# Patient Record
Sex: MALE | Race: WHITE | NOT HISPANIC OR LATINO | Employment: OTHER | URBAN - METROPOLITAN AREA
[De-identification: names, ages, dates, MRNs, and addresses within clinical notes are randomized per-mention and may not be internally consistent; named-entity substitution may affect disease eponyms.]

---

## 2017-05-08 ENCOUNTER — TELEPHONE (OUTPATIENT)
Dept: MEDICAL GROUP | Facility: PHYSICIAN GROUP | Age: 67
End: 2017-05-08

## 2017-05-08 ENCOUNTER — OFFICE VISIT (OUTPATIENT)
Dept: MEDICAL GROUP | Facility: PHYSICIAN GROUP | Age: 67
End: 2017-05-08
Payer: MEDICARE

## 2017-05-08 VITALS
HEIGHT: 70 IN | OXYGEN SATURATION: 96 % | SYSTOLIC BLOOD PRESSURE: 130 MMHG | WEIGHT: 256 LBS | BODY MASS INDEX: 36.65 KG/M2 | DIASTOLIC BLOOD PRESSURE: 70 MMHG | TEMPERATURE: 97.3 F | RESPIRATION RATE: 16 BRPM | HEART RATE: 92 BPM

## 2017-05-08 DIAGNOSIS — J02.9 PHARYNGITIS, UNSPECIFIED ETIOLOGY: ICD-10-CM

## 2017-05-08 DIAGNOSIS — E66.9 OBESITY (BMI 30-39.9): ICD-10-CM

## 2017-05-08 DIAGNOSIS — F41.9 ANXIETY: ICD-10-CM

## 2017-05-08 DIAGNOSIS — Z12.11 SCREEN FOR COLON CANCER: ICD-10-CM

## 2017-05-08 PROCEDURE — G8419 CALC BMI OUT NRM PARAM NOF/U: HCPCS | Performed by: FAMILY MEDICINE

## 2017-05-08 PROCEDURE — 99214 OFFICE O/P EST MOD 30 MIN: CPT | Performed by: FAMILY MEDICINE

## 2017-05-08 PROCEDURE — 4040F PNEUMOC VAC/ADMIN/RCVD: CPT | Performed by: FAMILY MEDICINE

## 2017-05-08 PROCEDURE — 1036F TOBACCO NON-USER: CPT | Performed by: FAMILY MEDICINE

## 2017-05-08 PROCEDURE — G8432 DEP SCR NOT DOC, RNG: HCPCS | Performed by: FAMILY MEDICINE

## 2017-05-08 PROCEDURE — G8598 ASA/ANTIPLAT THER USED: HCPCS | Performed by: FAMILY MEDICINE

## 2017-05-08 PROCEDURE — 1101F PT FALLS ASSESS-DOCD LE1/YR: CPT | Performed by: FAMILY MEDICINE

## 2017-05-08 RX ORDER — PREDNISONE 50 MG/1
50 TABLET ORAL DAILY
Qty: 2 TAB | Refills: 0 | Status: SHIPPED | OUTPATIENT
Start: 2017-05-08 | End: 2017-05-10

## 2017-05-08 RX ORDER — ALPRAZOLAM 0.5 MG/1
0.25 TABLET ORAL NIGHTLY PRN
Qty: 15 TAB | Refills: 3 | Status: SHIPPED | OUTPATIENT
Start: 2017-05-08 | End: 2017-10-11 | Stop reason: SDUPTHER

## 2017-05-08 NOTE — MR AVS SNAPSHOT
"        Curt Blair   2017 1:00 PM   Office Visit   MRN: [id number]    Department:  [facility]   Dept Phone:  [phone]    Description:  Male : 1950   Provider:  Bess Angela M.D.           Reason for Visit     Pharyngitis x5 days, ear pain, feels like something is stuck in throat      Allergies as of 2017     Allergen Noted Reactions    Lipitor [Atorvastatin] 2015   Myalgia    Ok to take 10 mg  Elevated CPK  Only at 40 Mg does this happen.      You were diagnosed with     Pharyngitis, unspecified etiology   [3614494]       Obesity (BMI 30-39.9)   [260388]       Anxiety   [881030]       Screen for colon cancer   [191712]         Vital Signs     Blood Pressure Pulse Temperature Respirations Height Weight    130/70 mmHg 92 36.3 °C (97.3 °F) 16 1.778 m (5' 10\") 116.121 kg (256 lb)    Body Mass Index Oxygen Saturation Smoking Status             36.73 kg/m2 96% Former Smoker         Basic Information     Date Of Birth Sex Race Ethnicity Preferred Language    1950 Male White Non- English      Your appointments     May 22, 2017 11:30 AM   FOLLOW UP with Iván Otoole M.D.   [facility] (--[facility]    [address] [address]  [facility] [address]   [phone]              Problem List              ICD-10-CM Priority Class Noted - Resolved    Essential hypertension, benign I10 Medium  2012 - Present    Coronary artery disease due to lipid rich plaque I25.10, I25.83 High  2012 - Present    Status post coronary artery stent placement Z95.5 High  2012 - Present    Dyslipidemia E78.5 Medium  2014 - Present    Obesity E66.9   2015 - Present    Bronchitis J40   2016 - Present    Grief reaction (his wife passed way from complications of COPD 2016) (Chronic) F43.20   Unknown - Present    Obesity (BMI 30-39.9) E66.9   2017 - Present      Health Maintenance        Date Due Completion Dates    COLON " CANCER SCREENING ANNUAL FIT 12/22/2016 12/22/2015    IMM DTaP/Tdap/Td Vaccine (2 - Td) 4/23/2024 4/23/2014            Current Immunizations     13-VALENT PCV PREVNAR 10/1/2015    Influenza Vaccine Adult HD 9/27/2016, 10/1/2015    Pneumococcal polysaccharide vaccine (PPSV-23) 10/19/2016    SHINGLES VACCINE 11/5/2015    Tdap Vaccine 4/23/2014 12:25 PM      Below and/or attached are the medications your provider expects you to take. Review all of your home medications and newly ordered medications with your provider and/or pharmacist. Follow medication instructions as directed by your provider and/or pharmacist. Please keep your medication list with you and share with your provider. Update the information when medications are discontinued, doses are changed, or new medications (including over-the-counter products) are added; and carry medication information at all times in the event of emergency situations     Allergies:  LIPITOR - Myalgia               Medications  Valid as of: May 08, 2017 -  1:30 PM    Generic Name Brand Name Tablet Size Instructions for use    ALPRAZolam (Tab) XANAX 0.5 MG Take 0.5 Tabs by mouth at bedtime as needed for Sleep.        Amlodipine Besy-Benazepril HCl (Cap) LOTREL 5-10 MG TAKE ONE CAPSULE BY MOUTH DAILY (GENERIC FOR LOTREL)        Aspirin (Tab) aspirin 81 MG Take 81 mg by mouth every day.          Atorvastatin Calcium (Tab) LIPITOR 10 MG Take 1 Tab by mouth every day.        Calcium   Take  by mouth.        Coenzyme Q10   Take  by mouth.        Krill Oil   Take  by mouth.        Multiple Vitamins-Minerals   Take  by mouth.        Multiple Vitamins-Minerals (Tab) OCUVITE  Take 1 Tab by mouth every day.        PredniSONE (Tab) DELTASONE 50 MG Take 1 Tab by mouth every day for 2 days. For sore throat pain and swelling        .                 Medicines prescribed today were sent to:     [facility] #[id number] - [place] - [address] [place] [address]    Phone:  [phone] Fax: [phone]    Open 24 Hours?: No      Medication refill instructions:       If your prescription bottle indicates you have medication refills left, it is not necessary to call your provider’s office. Please contact your pharmacy and they will refill your medication.    If your prescription bottle indicates you do not have any refills left, you may request refills at any time through one of the following ways: The online [employer] system (except Urgent Care), by calling your provider’s office, or by asking your pharmacy to contact your provider’s office with a refill request. Medication refills are processed only during regular business hours and may not be available until the next business day. Your provider may request additional information or to have a follow-up visit with you prior to refilling your medication.   *Please Note: Medication refills are assigned a new Rx number when refilled electronically. Your pharmacy may indicate that no refills were authorized even though a new prescription for the same medication is available at the pharmacy. Please request the medicine by name with the pharmacy before contacting your provider for a refill.        Other Notes About Your Plan     Sorry, this patient was seen while I was working on another project. In your medical opinion, are the following conditions valid/active for 2016 and if so, would you please assess and document the next time patient is in? This patient has been coded with coronary atherosclerosis and has a prescription for nitroglycerin, and has not been coded with angina. Is this an active condition?           [email or web address] Access Code: Activation code not generated  Current [employer] Status: Active

## 2017-05-08 NOTE — PROGRESS NOTES
"Chief Complaint   Patient presents with   • Pharyngitis     x5 days, ear pain, feels like something is stuck in throat       HPI: 4-5 days of illness including: nasal congestion, clear/whitish rhinorrhea, sore throat, laryngitis, ear pain/congestion, tooth pain, cough, myalgias ,  Sinus pain and pressure: bilateral frontal  Symptoms negative for fever, chills, night sweats,   Ears feel plugged. Wonders if ears full of wax.  Treatments tried: lozenges., tylenol.    Since onset, symptoms are worse   Similarly ill exposures: yes. Spouse recently dx sinus infection.   Medical history negative for asthma  He  reports that he quit smoking about 25 years ago. His smoking use included Cigarettes. He has a 50 pack-year smoking history. He has never used smokeless tobacco.   Reports situational anxiety and difficulty sleeping since his spouse . He is seeing a counselor. He takes alprazolam half tablet a few nights per week. Has not filled prescription since January. Requesting additional refills. Denies any prior history of alcohol abuse  Due for colon cancer screening    ROS  No fever. No productive cough. No skin rashes.  No N/V/D    Walks and rides stationary bike.   Had MI 20+ years ago. Doing well since then     Blood pressure 130/70, pulse 92, temperature 36.3 °C (97.3 °F), resp. rate 16, height 1.778 m (5' 10\"), weight 116.121 kg (256 lb), SpO2 96 %.    Gen: alert, No conversational dyspnea. No audible wheeze, nontoxic.  PERRL, conjunctiva slightly injected. No photophobia. No eye discharge.  Ears: normal pinnae. TM: normal  Nares patent with thin mucus.  Sinuses non tender over maxillary / frontal sinuses  Throat: erythematous injection. No exudate.   Neck: supple, with  mildly enlarged cervical nodes  Lungs:  clear to auscultation  Skin: warm and dry. No rash.    A/P  1. Pharyngitis, unspecified etiology      Suspect from postnasal drip. Prednisone prescribed to take once daily for 2 days. If continued symptoms " this week will Rx antibiotics for bacterial infection   2. Obesity (BMI 30-39.9)  Patient identified as having weight management issue.  Appropriate orders and counseling given.    Discussed diet and exercise. Continue walking   3. Anxiety  alprazolam (XANAX) 0.5 MG Tab    Discussed nonmedication strategies such as writing down his worries. Continue alprazolam as needed at bedtime   4. Screen for colon cancer      Test ordered     Treatments advised today in addition to orders above  include:  OTC acetaminophen PRN, prescription for symptomatic treatment as written and vaporizer   Followup for worsening symptoms, difficulty breathing, lack of expected recovery, or should new symptoms or problems arise.

## 2017-05-08 NOTE — TELEPHONE ENCOUNTER
ESTABLISHED PATIENT PRE-VISIT PLANNING     Note: Patient will not be contacted if there is no indication to call.     1.  Reviewed note from last office visit with PCP and/or other med group provider: Yes    2.  If any orders were placed at last visit, do we have Results/Consult Notes?        •  Labs - Labs were not ordered at last office visit.       •  Imaging - Imaging was not ordered at last office visit.       •  Referrals - Referral ordered, patient has NOT been seen.    3.  Immunizations were updated in [facility] using WebIZ?: Yes       •  Web Iz Recommendations: Patient is up to date on all vaccines    4.  Patient is due for the following Health Maintenance Topics:   There are no preventive care reminders to display for this patient.    Cancelled appt with [facility] for colonoscopy.    5.  Patient was not informed to arrive 15 min prior to their scheduled appointment and bring in their medication bottles.

## 2017-05-09 ENCOUNTER — HOSPITAL ENCOUNTER (OUTPATIENT)
Facility: MEDICAL CENTER | Age: 67
End: 2017-05-09
Attending: FAMILY MEDICINE
Payer: MEDICARE

## 2017-05-09 PROCEDURE — 82274 ASSAY TEST FOR BLOOD FECAL: CPT

## 2017-05-10 ENCOUNTER — PATIENT MESSAGE (OUTPATIENT)
Dept: MEDICAL GROUP | Facility: PHYSICIAN GROUP | Age: 67
End: 2017-05-10

## 2017-05-10 DIAGNOSIS — J01.10 ACUTE NON-RECURRENT FRONTAL SINUSITIS: ICD-10-CM

## 2017-05-12 RX ORDER — AMOXICILLIN AND CLAVULANATE POTASSIUM 875; 125 MG/1; MG/1
1 TABLET, FILM COATED ORAL 2 TIMES DAILY
Qty: 14 TAB | Refills: 0 | Status: SHIPPED | OUTPATIENT
Start: 2017-05-12 | End: 2017-05-19

## 2017-05-13 LAB — HEMOCCULT STL QL IA: NEGATIVE

## 2017-05-17 ENCOUNTER — TELEPHONE (OUTPATIENT)
Dept: CARDIOLOGY | Facility: MEDICAL CENTER | Age: 67
End: 2017-05-17

## 2017-05-17 NOTE — TELEPHONE ENCOUNTER
Patient has appointment coming up 5/22 with Dr. Otoole.  Spoke w/pt regarding any recent lab in the new year.  Patient stated he had labs done 1st at [facility] then stated [facility] (they do not have him in their database), then [facility] (not in database).  So I called the main [facility] lab to see if maybe the result hasn't been released or there was a mistake or something.  Everything under the lab tab is current.  MEHUL    Thank you

## 2017-05-22 ENCOUNTER — OFFICE VISIT (OUTPATIENT)
Dept: CARDIOLOGY | Facility: MEDICAL CENTER | Age: 67
End: 2017-05-22
Payer: MEDICARE

## 2017-05-22 VITALS
BODY MASS INDEX: 34.67 KG/M2 | HEIGHT: 72 IN | DIASTOLIC BLOOD PRESSURE: 70 MMHG | WEIGHT: 256 LBS | HEART RATE: 88 BPM | SYSTOLIC BLOOD PRESSURE: 120 MMHG | OXYGEN SATURATION: 94 %

## 2017-05-22 DIAGNOSIS — E78.5 DYSLIPIDEMIA: ICD-10-CM

## 2017-05-22 DIAGNOSIS — I25.83 CORONARY ARTERY DISEASE DUE TO LIPID RICH PLAQUE: ICD-10-CM

## 2017-05-22 DIAGNOSIS — Z95.5 STATUS POST CORONARY ARTERY STENT PLACEMENT: ICD-10-CM

## 2017-05-22 DIAGNOSIS — I25.10 CORONARY ARTERY DISEASE DUE TO LIPID RICH PLAQUE: ICD-10-CM

## 2017-05-22 DIAGNOSIS — I10 ESSENTIAL HYPERTENSION, BENIGN: ICD-10-CM

## 2017-05-22 PROCEDURE — 1036F TOBACCO NON-USER: CPT | Performed by: INTERNAL MEDICINE

## 2017-05-22 PROCEDURE — 4040F PNEUMOC VAC/ADMIN/RCVD: CPT | Performed by: INTERNAL MEDICINE

## 2017-05-22 PROCEDURE — G8432 DEP SCR NOT DOC, RNG: HCPCS | Performed by: INTERNAL MEDICINE

## 2017-05-22 PROCEDURE — 1101F PT FALLS ASSESS-DOCD LE1/YR: CPT | Performed by: INTERNAL MEDICINE

## 2017-05-22 PROCEDURE — 99214 OFFICE O/P EST MOD 30 MIN: CPT | Performed by: INTERNAL MEDICINE

## 2017-05-22 PROCEDURE — G8419 CALC BMI OUT NRM PARAM NOF/U: HCPCS | Performed by: INTERNAL MEDICINE

## 2017-05-22 PROCEDURE — G8598 ASA/ANTIPLAT THER USED: HCPCS | Performed by: INTERNAL MEDICINE

## 2017-05-22 ASSESSMENT — ENCOUNTER SYMPTOMS
FALLS: 0
PALPITATIONS: 0
LOSS OF CONSCIOUSNESS: 0
CLAUDICATION: 0
ABDOMINAL PAIN: 0
BRUISES/BLEEDS EASILY: 0
HEADACHES: 0
NAUSEA: 0
COUGH: 0
CHILLS: 0
FOCAL WEAKNESS: 0
SORE THROAT: 0
WEAKNESS: 0
FEVER: 0
SHORTNESS OF BREATH: 0
DIZZINESS: 0
PND: 0
BLURRED VISION: 0

## 2017-05-22 NOTE — MR AVS SNAPSHOT
Curt Blair   2017 11:30 AM   Office Visit   MRN: [id number]    Department:  [facility]   Dept Phone:  [phone]    Description:  Male : 1950   Provider:  Iván Otoole M.D.           Reason for Visit     Follow-Up           Allergies as of 2017     Allergen Noted Reactions    Lipitor [Atorvastatin] 2015   Myalgia    Ok to take 10 mg  Elevated CPK  Only at 40 Mg does this happen.      You were diagnosed with     Essential hypertension, benign   [401.1.ICD-9-CM]       Dyslipidemia   [738225]       Coronary artery disease due to lipid rich plaque   [8895658]       Status post coronary artery stent placement   [157676]         Vital Signs     Blood Pressure Pulse Height Weight Body Mass Index Oxygen Saturation    120/70 mmHg 88 1.829 m (6') 116.121 kg (256 lb) 34.71 kg/m2 94%    Smoking Status                   Former Smoker           Basic Information     Date Of Birth Sex Race Ethnicity Preferred Language    1950 Male White Non- English      Problem List              ICD-10-CM Priority Class Noted - Resolved    Essential hypertension, benign I10 Medium  2012 - Present    Coronary artery disease due to lipid rich plaque I25.10, I25.83 High  2012 - Present    Status post coronary artery stent placement Z95.5 High  2012 - Present    Dyslipidemia E78.5 Medium  2014 - Present    Obesity E66.9   2015 - Present    Bronchitis J40   2016 - Present    Grief reaction (his wife passed way from complications of COPD 2016) (Chronic) F43.20   Unknown - Present    Obesity (BMI 30-39.9) E66.9   2017 - Present      Health Maintenance        Date Due Completion Dates    COLON CANCER SCREENING ANNUAL FIT 2018, 2015    IMM DTaP/Tdap/Td Vaccine (2 - Td) 2024            Current Immunizations     13-VALENT PCV PREVNAR 10/1/2015    Influenza Vaccine Adult HD 2016, 10/1/2015    Pneumococcal  polysaccharide vaccine (PPSV-23) 10/19/2016    SHINGLES VACCINE 11/5/2015    Tdap Vaccine 4/23/2014 12:25 PM      Below and/or attached are the medications your provider expects you to take. Review all of your home medications and newly ordered medications with your provider and/or pharmacist. Follow medication instructions as directed by your provider and/or pharmacist. Please keep your medication list with you and share with your provider. Update the information when medications are discontinued, doses are changed, or new medications (including over-the-counter products) are added; and carry medication information at all times in the event of emergency situations     Allergies:  LIPITOR - Myalgia               Medications  Valid as of: May 22, 2017 - 12:26 PM    Generic Name Brand Name Tablet Size Instructions for use    ALPRAZolam (Tab) XANAX 0.5 MG Take 0.5 Tabs by mouth at bedtime as needed for Sleep.        Amlodipine Besy-Benazepril HCl (Cap) LOTREL 5-10 MG TAKE ONE CAPSULE BY MOUTH DAILY (GENERIC FOR LOTREL)        Aspirin (Tab) aspirin 81 MG Take 81 mg by mouth every day.          Atorvastatin Calcium (Tab) LIPITOR 10 MG Take 1 Tab by mouth every day.        Calcium   Take  by mouth.        Coenzyme Q10   Take  by mouth.        Krill Oil   Take  by mouth.        Multiple Vitamins-Minerals   Take  by mouth.        Multiple Vitamins-Minerals (Tab) OCUVITE  Take 1 Tab by mouth every day.        .                 Medicines prescribed today were sent to:     [facility][id number] - [place] [address] [place] [address]    Phone: [phone] Fax: [phone]    Open 24 Hours?: No      Medication refill instructions:       If your prescription bottle indicates you have medication refills left, it is not necessary to call your provider’s office. Please contact your pharmacy and they will refill your medication.    If your prescription bottle indicates you do not have any refills left, you may  request refills at any time through one of the following ways: The online [email or web address] system (except Urgent Care), by calling your provider’s office, or by asking your pharmacy to contact your provider’s office with a refill request. Medication refills are processed only during regular business hours and may not be available until the next business day. Your provider may request additional information or to have a follow-up visit with you prior to refilling your medication.   *Please Note: Medication refills are assigned a new Rx number when refilled electronically. Your pharmacy may indicate that no refills were authorized even though a new prescription for the same medication is available at the pharmacy. Please request the medicine by name with the pharmacy before contacting your provider for a refill.           [email or web address] Access Code: Activation code not generated  Current [email or web address] Status: Active

## 2017-05-23 NOTE — PROGRESS NOTES
Subjective:   Curt Blair is a 66 y.o. male who presents today for follow-up of his history of coronary disease status post stenting on 2 occasions    Last year he was having some concerns and so underwent echocardiogram and PET scan which were both within normal range    Still struggles with his weight    Past Medical History   Diagnosis Date   • Hypertension    • Hyperlipidemia    • CAD (coronary artery disease)    • S/P coronary artery stent placement    • Arthritis      Right Foot     Past Surgical History   Procedure Laterality Date   • Cholecystectomy     • Appendectomy     • Stent placement     • Endarterectomy       corornary     Family History   Problem Relation Age of Onset   • Lung Disease Mother      copd   • Hypertension Mother    • Heart Disease Father      heart attack and heart disease   • Cancer Brother      neck   • Diabetes Neg Hx    • Heart Disease Paternal Grandfather      Heart attack     History   Smoking status   • Former Smoker -- 2.00 packs/day for 25 years   • Types: Cigarettes   • Quit date: 01/01/1992   Smokeless tobacco   • Never Used     Comment: avoid all tobacco products     Allergies   Allergen Reactions   • Lipitor [Atorvastatin] Myalgia     Ok to take 10 mg  Elevated CPK  Only at 40 Mg does this happen.     Outpatient Encounter Prescriptions as of 5/22/2017   Medication Sig Dispense Refill   • alprazolam (XANAX) 0.5 MG Tab Take 0.5 Tabs by mouth at bedtime as needed for Sleep. 15 Tab 3   • Multiple Vitamins-Minerals (MULTIVITAMIN PO) Take  by mouth.     • Coenzyme Q10 (CO Q 10 PO) Take  by mouth.     • MEGARED OMEGA-3 KRILL OIL PO Take  by mouth.     • CALCIUM PO Take  by mouth.     • Multiple Vitamins-Minerals (OCUVITE) Tab Take 1 Tab by mouth every day.     • amlodipine-benazepril (LOTREL) 5-10 MG per capsule TAKE ONE CAPSULE BY MOUTH DAILY (GENERIC FOR LOTREL) 90 Cap 4   • atorvastatin (LIPITOR) 10 MG Tab Take 1 Tab by mouth every day. 90 Tab 4   • aspirin 81 MG tablet  Take 81 mg by mouth every day.         No facility-administered encounter medications on file as of 5/22/2017.     Review of Systems   Constitutional: Negative for fever and chills.   HENT: Negative for sore throat.    Eyes: Negative for blurred vision.   Respiratory: Negative for cough and shortness of breath.    Cardiovascular: Negative for chest pain, palpitations, claudication, leg swelling and PND.   Gastrointestinal: Negative for nausea and abdominal pain.   Musculoskeletal: Negative for falls.   Skin: Negative for rash.   Neurological: Negative for dizziness, focal weakness, loss of consciousness, weakness and headaches.   Endo/Heme/Allergies: Does not bruise/bleed easily.        Objective:   /70 mmHg  Pulse 88  Ht 1.829 m (6')  Wt 116.121 kg (256 lb)  BMI 34.71 kg/m2  SpO2 94%    Physical Exam   Constitutional: No distress.   HENT:   Mouth/Throat: Oropharynx is clear and moist.   Eyes: No scleral icterus.   Neck: Neck supple. No JVD present.   Cardiovascular: Normal rate, regular rhythm, normal heart sounds and intact distal pulses.  Exam reveals no gallop and no friction rub.    No murmur heard.  Pulmonary/Chest: Effort normal. He has no rales.   Abdominal: Soft. Bowel sounds are normal. There is no tenderness.   Musculoskeletal: He exhibits no edema.   Neurological: He is alert.   Skin: No rash noted. He is not diaphoretic.   Psychiatric: He has a normal mood and affect.     Reviewed the results of his echocardiogram and PET scan    Assessment:     1. Essential hypertension, benign     2. Dyslipidemia     3. Coronary artery disease due to lipid rich plaque     4. Status post coronary artery stent placement         Medical Decision Making:  Today's Assessment / Status / Plan:     It was my pleasure to meet with Mr. Blair.    He's been doing better it's been over a year since his wife's passing    I encouraged him to work on meaningful weight loss    We discussed the use of Viagra he been barron  to take this in the past, given a heart issue but reinforces normal testing to be quite safe    I will see Mr. Blair back in 1 year time and encouraged him to follow up with us over the phone or e-mail using my MyChart as issues arise.    It is my pleasure to participate in the care of Mr. Blair.  Please do not hesitate to contact me with questions or concerns.    Iván Otoole MD PhD FACC  Cardiologist [facility]

## 2017-07-31 DIAGNOSIS — E78.5 DYSLIPIDEMIA: ICD-10-CM

## 2017-07-31 DIAGNOSIS — I10 ESSENTIAL HYPERTENSION, BENIGN: ICD-10-CM

## 2017-08-02 ENCOUNTER — TELEPHONE (OUTPATIENT)
Dept: CARDIOLOGY | Facility: MEDICAL CENTER | Age: 67
End: 2017-08-02

## 2017-08-02 RX ORDER — ATORVASTATIN CALCIUM 10 MG/1
TABLET, FILM COATED ORAL
Qty: 90 TAB | Refills: 0 | Status: SHIPPED | OUTPATIENT
Start: 2017-08-02 | End: 2017-10-25 | Stop reason: SDUPTHER

## 2017-08-02 NOTE — TELEPHONE ENCOUNTER
----- Message from Shannon Donald sent at 8/2/2017 11:41 AM PDT -----  Regarding: patient wants Dr. Otoole to refill his medications from PCP  ANNA/[facility]      Patient is having problems getting his medications filled since his PCP retired, he wants to know if Dr. Otoole will take over refilling his medications. He can be reached at [phone].

## 2017-08-02 NOTE — TELEPHONE ENCOUNTER
CHANO for patient to call back.   then called me to say patient called her to cancel message because he got his medications taken care of.

## 2017-08-30 ENCOUNTER — HOSPITAL ENCOUNTER (OUTPATIENT)
Dept: LAB | Facility: MEDICAL CENTER | Age: 67
End: 2017-08-30
Attending: FAMILY MEDICINE
Payer: MEDICARE

## 2017-08-30 DIAGNOSIS — I10 ESSENTIAL HYPERTENSION, BENIGN: ICD-10-CM

## 2017-08-30 DIAGNOSIS — E78.5 DYSLIPIDEMIA: ICD-10-CM

## 2017-08-30 LAB
ALBUMIN SERPL BCP-MCNC: 4.2 G/DL (ref 3.2–4.9)
ALBUMIN/GLOB SERPL: 1.2 G/DL
ALP SERPL-CCNC: 53 U/L (ref 30–99)
ALT SERPL-CCNC: 33 U/L (ref 2–50)
ANION GAP SERPL CALC-SCNC: 8 MMOL/L (ref 0–11.9)
AST SERPL-CCNC: 30 U/L (ref 12–45)
BILIRUB SERPL-MCNC: 0.8 MG/DL (ref 0.1–1.5)
BUN SERPL-MCNC: 20 MG/DL (ref 8–22)
CALCIUM SERPL-MCNC: 9.6 MG/DL (ref 8.5–10.5)
CHLORIDE SERPL-SCNC: 103 MMOL/L (ref 96–112)
CHOLEST SERPL-MCNC: 151 MG/DL (ref 100–199)
CO2 SERPL-SCNC: 25 MMOL/L (ref 20–33)
CREAT SERPL-MCNC: 0.88 MG/DL (ref 0.5–1.4)
FASTING STATUS PATIENT QL REPORTED: NORMAL
GFR SERPL CREATININE-BSD FRML MDRD: >60 ML/MIN/1.73 M 2
GLOBULIN SER CALC-MCNC: 3.4 G/DL (ref 1.9–3.5)
GLUCOSE SERPL-MCNC: 87 MG/DL (ref 65–99)
HDLC SERPL-MCNC: 61 MG/DL
LDLC SERPL CALC-MCNC: 63 MG/DL
POTASSIUM SERPL-SCNC: 4.4 MMOL/L (ref 3.6–5.5)
PROT SERPL-MCNC: 7.6 G/DL (ref 6–8.2)
SODIUM SERPL-SCNC: 136 MMOL/L (ref 135–145)
TRIGL SERPL-MCNC: 136 MG/DL (ref 0–149)

## 2017-08-30 PROCEDURE — 80053 COMPREHEN METABOLIC PANEL: CPT

## 2017-08-30 PROCEDURE — 80061 LIPID PANEL: CPT

## 2017-08-30 PROCEDURE — 36415 COLL VENOUS BLD VENIPUNCTURE: CPT

## 2017-09-20 ENCOUNTER — PATIENT MESSAGE (OUTPATIENT)
Dept: HEALTH INFORMATION MANAGEMENT | Facility: OTHER | Age: 67
End: 2017-09-20

## 2017-09-21 ENCOUNTER — PATIENT OUTREACH (OUTPATIENT)
Dept: HEALTH INFORMATION MANAGEMENT | Facility: OTHER | Age: 67
End: 2017-09-21

## 2017-09-21 NOTE — PROGRESS NOTES
Attempt #:1    WebIZ Checked & Epic Updated: Yes  · WebIZ Recommendations: FLU  · Is patient due for Tdap? NO  · Is patient due for Shingles? NO  [facility] Verified: yes  Verify PCP: yes    Communication Preference Obtained: yes     Review Care Team: yes    Annual Wellness Visit Scheduling  1. Scheduling Status:Scheduled     Care Gap Scheduling (Attempt to Schedule EACH Overdue Care Gap!)     Health Maintenance Due   Topic Date Due   • IMM INFLUENZA (1) Scheduled        Scheduled patient for Annual Wellness Visit and Immunizations: FLU       MyChart Activation: already active  [place]: no  Virtual Visits: no  Opt In to Text Messages: no

## 2017-10-11 ENCOUNTER — OFFICE VISIT (OUTPATIENT)
Dept: MEDICAL GROUP | Facility: PHYSICIAN GROUP | Age: 67
End: 2017-10-11
Payer: MEDICARE

## 2017-10-11 VITALS
WEIGHT: 258 LBS | BODY MASS INDEX: 34.95 KG/M2 | OXYGEN SATURATION: 90 % | TEMPERATURE: 98.2 F | HEIGHT: 72 IN | SYSTOLIC BLOOD PRESSURE: 124 MMHG | DIASTOLIC BLOOD PRESSURE: 78 MMHG | RESPIRATION RATE: 16 BRPM | HEART RATE: 68 BPM

## 2017-10-11 DIAGNOSIS — Z23 NEED FOR VACCINATION: ICD-10-CM

## 2017-10-11 DIAGNOSIS — F41.9 ANXIETY: ICD-10-CM

## 2017-10-11 DIAGNOSIS — E66.9 OBESITY (BMI 30-39.9): ICD-10-CM

## 2017-10-11 DIAGNOSIS — E78.5 DYSLIPIDEMIA: ICD-10-CM

## 2017-10-11 DIAGNOSIS — I10 ESSENTIAL HYPERTENSION, BENIGN: ICD-10-CM

## 2017-10-11 DIAGNOSIS — I25.83 CORONARY ARTERY DISEASE DUE TO LIPID RICH PLAQUE: ICD-10-CM

## 2017-10-11 DIAGNOSIS — Z00.00 MEDICARE ANNUAL WELLNESS VISIT, SUBSEQUENT: ICD-10-CM

## 2017-10-11 DIAGNOSIS — I25.10 CORONARY ARTERY DISEASE DUE TO LIPID RICH PLAQUE: ICD-10-CM

## 2017-10-11 PROCEDURE — 90662 IIV NO PRSV INCREASED AG IM: CPT | Performed by: FAMILY MEDICINE

## 2017-10-11 PROCEDURE — G0008 ADMIN INFLUENZA VIRUS VAC: HCPCS | Performed by: FAMILY MEDICINE

## 2017-10-11 RX ORDER — ALPRAZOLAM 0.5 MG/1
0.25 TABLET ORAL NIGHTLY PRN
Qty: 30 TAB | Refills: 2 | Status: SHIPPED | OUTPATIENT
Start: 2017-10-11 | End: 2018-02-16

## 2017-10-11 ASSESSMENT — PATIENT HEALTH QUESTIONNAIRE - PHQ9: CLINICAL INTERPRETATION OF PHQ2 SCORE: 0

## 2017-10-11 NOTE — PROGRESS NOTES
Chief Complaint   Patient presents with   • Annual Wellness Visit         HPI:  Curt Blair is a 66 y.o. here for Medicare Annual Wellness Visit     Patient Active Problem List    Diagnosis Date Noted   • Coronary artery disease due to lipid rich plaque 08/07/2012     Priority: High   • Status post coronary artery stent placement 08/07/2012     Priority: High   • Dyslipidemia 01/26/2014     Priority: Medium   • Essential hypertension, benign 01/23/2012     Priority: Medium   • Anxiety 10/11/2017   • Obesity (BMI 30-39.9) 05/08/2017       Current Outpatient Prescriptions   Medication Sig Dispense Refill   • alprazolam (XANAX) 0.5 MG Tab Take 0.5 Tabs by mouth at bedtime as needed for Sleep. 30 Tab 2   • atorvastatin (LIPITOR) 10 MG Tab TAKE ONE TABLET BY MOUTH DAILY 90 Tab 0   • Multiple Vitamins-Minerals (MULTIVITAMIN PO) Take  by mouth.     • Coenzyme Q10 (CO Q 10 PO) Take  by mouth.     • MEGARED OMEGA-3 KRILL OIL PO Take  by mouth.     • CALCIUM PO Take  by mouth.     • Multiple Vitamins-Minerals (OCUVITE) Tab Take 1 Tab by mouth every day.     • amlodipine-benazepril (LOTREL) 5-10 MG per capsule TAKE ONE CAPSULE BY MOUTH DAILY (GENERIC FOR LOTREL) 90 Cap 4   • aspirin 81 MG tablet Take 81 mg by mouth every day.         No current facility-administered medications for this visit.             Current supplements as per medication list.       Allergies: Lipitor [atorvastatin]    Current social contact/activities: Golfing     He  reports that he quit smoking about 25 years ago. His smoking use included Cigarettes. He has a 50.00 pack-year smoking history. He has never used smokeless tobacco. He reports that he drinks about 0.6 oz of alcohol per week . He reports that he does not use drugs.  Counseling given: Not Answered        DPA/Advanced Directive:  Patient has Advanced Directive on file.       ROS:    Gait: Uses no assistive device   Ostomy: no   Other tubes: no   Amputations: no   Chronic oxygen use: no    Last eye exam:a year ago   : Denies incontinence.         Depression Screening    Little interest or pleasure in doing things?  0 - not at all  Feeling down, depressed , or hopeless? 0 - not at all  Patient Health Questionnaire Score: 0     If depressive symptoms identified deferred to follow up visit unless specifically addressed in assessment and plan.    Interpretation of PHQ-9 Total Score   Score Severity   1-4 No Depression   5-9 Mild Depression   10-14 Moderate Depression   15-19 Moderately Severe Depression   20-27 Severe Depression    Screening for Cognitive Impairment    Three Minute Recall (apple, watch, tom)  3/3    Draw clock face with all 12 numbers set to the hand to show 10 minutes past 11 o'clock  1 5/5  Cognitive concerns identified deferred for follow up unless specifically addressed in assessment and plan.    Fall Risk Assessment    Has the patient had two or more falls in the last year or any fall with injury in the last year?  No    Safety Assessment    Throw rugs on floor.  Yes  Handrails on all stairs.  Yes  Good lighting in all hallways.  Yes  Difficulty hearing.  No  Patient counseled about all safety risks that were identified.    Functional Assessment ADLs    Are there any barriers preventing you from cooking for yourself or meeting nutritional needs?  No.    Are there any barriers preventing you from driving safely or obtaining transportation?  No.    Are there any barriers preventing you from using a telephone or calling for help?  No.    Are there any barriers preventing you from shopping?  No.    Are there any barriers preventing you from taking care of your own finances?  No.    Are there any barriers preventing you from managing your medications?  No.    Are currently engaging any exercise or physical activity?  Yes.       Health Maintenance Summary                IMM INFLUENZA Overdue 9/1/2017      Done 9/27/2016 Imm Admin: Influenza Vaccine Adult HD     Patient has more  history with this topic...    Annual Wellness Visit Next Due 10/20/2017      Done 10/19/2016 Visit Dx: Medicare annual wellness visit, subsequent     Patient has more history with this topic...    COLON CANCER SCREENING ANNUAL FIT Next Due 5/9/2018      Done 5/9/2017 OCCULT BLOOD FECES IMMUNOASSAY     Patient has more history with this topic...    IMM DTaP/Tdap/Td Vaccine Next Due 4/23/2024      Done 4/23/2014 Imm Admin: Tdap Vaccine          Patient Care Team:  Rabia Hussein D.O. as PCP - General (Family Medicine)  Iván Otoole M.D. as Consulting Physician (Cardiology)  Irving Patel O.D. as Consulting Physician (Optometry)      Social History   Substance Use Topics   • Smoking status: Former Smoker     Packs/day: 2.00     Years: 25.00     Types: Cigarettes     Quit date: 1/1/1992   • Smokeless tobacco: Never Used      Comment: avoid all tobacco products   • Alcohol use 0.6 oz/week     1 Standard drinks or equivalent per week      Comment: socially     Family History   Problem Relation Age of Onset   • Lung Disease Mother      copd   • Hypertension Mother    • Heart Disease Father      heart attack and heart disease   • Cancer Brother      neck   • Heart Disease Paternal Grandfather      Heart attack   • Other Sister      non-malignant brain tumor   • Diabetes Neg Hx      He  has a past medical history of Arthritis; CAD (coronary artery disease); Hyperlipidemia; Hypertension; and S/P coronary artery stent placement.   Past Surgical History:   Procedure Laterality Date   • APPENDECTOMY     • CHOLECYSTECTOMY     • ENDARTERECTOMY      corornary   • STENT PLACEMENT         Exam:     Blood pressure 124/78, pulse 68, temperature 36.8 °C (98.2 °F), resp. rate 16, height 1.829 m (6'), weight 117 kg (258 lb), SpO2 90 %. Body mass index is 34.99 kg/m².    Hearing good.  Dentition good  Alert, oriented in no acute distress.  Eye contact is good, speech goal directed, affect calm      Assessment and Plan.  The following treatment and monitoring plan is recommended:    1. Medicare annual wellness visit, subsequent      2. Essential hypertension, benign  Chronic: well controlled    3. Obesity (BMI 30-39.9)  Pt educated on the increase of morbidity and mortality associated with excess weight including DM, Heart Disease, HTN, stroke, and sleep apnea.  Pt advised weight loss of 5% through reduced calorie, low fat diet and 150 mins of exercise a week  - Patient identified as having weight management issue.  Appropriate orders and counseling given.    4. Coronary artery disease due to lipid rich plaque  Chronic: no chest pain    5. Anxiety  Chronic: well controlled.  verified  - alprazolam (XANAX) 0.5 MG Tab; Take 0.5 Tabs by mouth at bedtime as needed for Sleep.  Dispense: 30 Tab; Refill: 2    6. Need for vaccination  I discussed benefits and side effects of each vaccine with patient, and I answered all patient's questions about vaccines.  - INFLUENZA VACCINE, HIGH DOSE (65+ ONLY)    7. Dyslipidemia  Chronic: well controlled        Services suggested: No services needed at this time  Health Care Screening: Age-appropriate preventive services Medicare covers discussed today and ordered if indicated.  Referrals offered: Community-based lifestyle interventions to reduce health risks and promote self-management and wellness, fall prevention, nutrition, physical activity, tobacco-use cessation, weight loss, and mental health services as per orders if indicated.    Discussion today about general wellness and lifestyle habits:    · Prevent falls and reduce trip hazards; Cautioned about securing or removing rugs.  · Have a working fire alarm and carbon monoxide detector;   · Engage in regular physical activity and social activities       Follow-up: Return in about 6 months (around 4/11/2018) for chronic conditions.

## 2017-10-26 NOTE — TELEPHONE ENCOUNTER
Was the patient seen in the last year in this department? Yes     Does patient have an active prescription for medications requested? No     Received Request Via: Pharmacy      Pt met protocol?: Yes Last OV 10/2017  Lab Results   Component Value Date/Time    HDL 61 08/30/2017 06:55 AM

## 2017-10-30 RX ORDER — ATORVASTATIN CALCIUM 10 MG/1
TABLET, FILM COATED ORAL
Qty: 90 TAB | Refills: 3 | Status: SHIPPED | OUTPATIENT
Start: 2017-10-30 | End: 2018-09-11 | Stop reason: SDUPTHER

## 2017-10-30 NOTE — TELEPHONE ENCOUNTER
Pt is almost out of Rx, this request was sent from pt pharmacy 5 days ago to [employer] and no response.    Please advise refill Dr Montero. Thank you.

## 2017-12-29 DIAGNOSIS — I10 ESSENTIAL HYPERTENSION, BENIGN: ICD-10-CM

## 2017-12-29 RX ORDER — AMLODIPINE BESYLATE AND BENAZEPRIL HYDROCHLORIDE 5; 10 MG/1; MG/1
CAPSULE ORAL
Qty: 90 CAP | Refills: 1 | Status: SHIPPED | OUTPATIENT
Start: 2017-12-29 | End: 2018-06-26 | Stop reason: SDUPTHER

## 2017-12-29 NOTE — TELEPHONE ENCOUNTER
Refill X 6 months, sent to pharmacy.Pt. Seen in the last 6 months per protocol.   Lab Results   Component Value Date/Time    SODIUM 136 08/30/2017 06:55 AM    POTASSIUM 4.4 08/30/2017 06:55 AM    CHLORIDE 103 08/30/2017 06:55 AM    CO2 25 08/30/2017 06:55 AM    GLUCOSE 87 08/30/2017 06:55 AM    BUN 20 08/30/2017 06:55 AM    CREATININE 0.88 08/30/2017 06:55 AM    CREATININE 0.93 02/20/2013 07:20 AM    BUNCREATRAT 17 02/20/2013 07:20 AM

## 2017-12-29 NOTE — TELEPHONE ENCOUNTER
Was the patient seen in the last year in this department? Yes     Does patient have an active prescription for medications requested? No     Received Request Via: Pharmacy      Pt met protocol?: Yes    LAST OV 10/11/2017    BP Readings from Last 1 Encounters:   10/11/17 124/78     Lab Results   Component Value Date/Time    CHOLSTRLTOT 151 08/30/2017 06:55 AM    LDL 63 08/30/2017 06:55 AM    HDL 61 08/30/2017 06:55 AM    TRIGLYCERIDE 136 08/30/2017 06:55 AM       Lab Results   Component Value Date/Time    SODIUM 136 08/30/2017 06:55 AM    POTASSIUM 4.4 08/30/2017 06:55 AM    CHLORIDE 103 08/30/2017 06:55 AM    CO2 25 08/30/2017 06:55 AM    GLUCOSE 87 08/30/2017 06:55 AM    BUN 20 08/30/2017 06:55 AM    CREATININE 0.88 08/30/2017 06:55 AM    CREATININE 0.93 02/20/2013 07:20 AM    BUNCREATRAT 17 02/20/2013 07:20 AM     Lab Results   Component Value Date/Time    ALKPHOSPHAT 53 08/30/2017 06:55 AM    ASTSGOT 30 08/30/2017 06:55 AM    ALTSGPT 33 08/30/2017 06:55 AM    TBILIRUBIN 0.8 08/30/2017 06:55 AM

## 2018-02-13 ENCOUNTER — TELEPHONE (OUTPATIENT)
Dept: MEDICAL GROUP | Facility: MEDICAL CENTER | Age: 68
End: 2018-02-13

## 2018-02-13 NOTE — TELEPHONE ENCOUNTER
ESTABLISHED PATIENT PRE-VISIT PLANNING     Note: Patient will not be contacted if there is no indication to call.     1.  Reviewed notes from the last few office visits within the medical group: Yes 10/11/2017    2.  If any orders were placed at last visit or intended to be done for this visit (i.e. 6 mos follow-up), do we have Results/Consult Notes?        •  Labs - Labs were not ordered at last office visit.   Note: If patient appointment is for lab review and patient did not complete labs, check with provider if OK to reschedule patient until labs completed.       •  Imaging - Imaging was not ordered at last office visit.       •  Referrals - No referrals were ordered at last office visit.    3. Is this appointment scheduled as a Hospital Follow-Up? No    4.  Immunizations were updated in Epic using WebIZ?: Yes       •  Web Iz Recommendations: Patient is up to date on all vaccines    5.  Patient is due for the following Health Maintenance Topics:   There are no preventive care reminders to display for this patient.        6.  Patient was NOT informed to arrive 15 min prior to their scheduled appointment and bring in their medication bottles.

## 2018-02-15 ENCOUNTER — OFFICE VISIT (OUTPATIENT)
Dept: MEDICAL GROUP | Facility: MEDICAL CENTER | Age: 68
End: 2018-02-15
Payer: MEDICARE

## 2018-02-15 VITALS
HEIGHT: 72 IN | SYSTOLIC BLOOD PRESSURE: 120 MMHG | BODY MASS INDEX: 35.03 KG/M2 | TEMPERATURE: 97.4 F | HEART RATE: 78 BPM | WEIGHT: 258.6 LBS | DIASTOLIC BLOOD PRESSURE: 76 MMHG | RESPIRATION RATE: 16 BRPM | OXYGEN SATURATION: 95 %

## 2018-02-15 DIAGNOSIS — F41.1 GENERALIZED ANXIETY DISORDER: ICD-10-CM

## 2018-02-15 DIAGNOSIS — M19.90 ARTHRITIS: ICD-10-CM

## 2018-02-15 DIAGNOSIS — E66.9 OBESITY (BMI 30-39.9): ICD-10-CM

## 2018-02-15 DIAGNOSIS — L72.3 INFLAMED SEBACEOUS CYST: ICD-10-CM

## 2018-02-15 DIAGNOSIS — Z12.11 COLON CANCER SCREENING: ICD-10-CM

## 2018-02-15 DIAGNOSIS — E78.5 DYSLIPIDEMIA: ICD-10-CM

## 2018-02-15 DIAGNOSIS — I10 ESSENTIAL HYPERTENSION, BENIGN: ICD-10-CM

## 2018-02-15 PROCEDURE — 99214 OFFICE O/P EST MOD 30 MIN: CPT | Performed by: FAMILY MEDICINE

## 2018-02-15 RX ORDER — DICLOFENAC SODIUM 75 MG/1
75 TABLET, DELAYED RELEASE ORAL 2 TIMES DAILY
Qty: 180 TAB | Refills: 3 | Status: SHIPPED | OUTPATIENT
Start: 2018-02-15 | End: 2019-01-25

## 2018-02-15 NOTE — ASSESSMENT & PLAN NOTE
This is a chronic health problem that is not controlled with current lifestyle measures. Patient has joint pain and stiffness in his hands bilaterally, knees bilaterally and his right foot. He grew up playing sports. He's had knee injections in the past. He was in the past able to get by with using over-the-counter medications but he now finds that they do not last for him. He like to try a prescription medication.

## 2018-02-15 NOTE — ASSESSMENT & PLAN NOTE
This is a chronic health problem that is uncontrolled with current medications and lifestyle measures.  The patient denies chest pain, shortness of breath or dyspnea on exertion.

## 2018-02-15 NOTE — ASSESSMENT & PLAN NOTE
"This is a chronic problem for the patient that he is unable to date how long it's been there. He tells me that it's irritated by his collar and it \"bugs him\". He would like to have it removed and we will send him to dermatology for evaluation and treatment.  "

## 2018-02-16 PROBLEM — F41.1 GENERALIZED ANXIETY DISORDER: Status: ACTIVE | Noted: 2017-10-11

## 2018-02-16 RX ORDER — TRAZODONE HYDROCHLORIDE 100 MG/1
100 TABLET ORAL NIGHTLY PRN
Qty: 90 TAB | Refills: 3 | Status: SHIPPED | OUTPATIENT
Start: 2018-02-16 | End: 2018-08-02

## 2018-02-16 NOTE — ASSESSMENT & PLAN NOTE
This is a chronic health problem for this patient is actually fairly well controlled. He previously was seeing Dr. Ariza at the [facility] but is getting ready to move to [facility] therefore is transferring care. He had blood work done on 8/30/17. At that time his total cholesterol was 151, triglycerides 136, HDL good at 61 and his LDL was excellent at 63. No liver dysfunction. He is on atorvastatin 10 mg daily and that seems to be working well for him. We will have him continue with that medication long-term.

## 2018-02-16 NOTE — PROGRESS NOTES
"  CC: Arthritis, dyslipidemia, hypertension, inflamed sebaceous cyst.    HISTORY OF PRESENT ILLNESS: Patient is a 67 y.o. male established patient who presents today to establish care at our clinic since his primary care Dr. Ariza has not left [facility]. He has a few chronic health problems that we discuss below. 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 He still has some anxiety and difficulty with sleep that's talked about below.    Health Maintenance: Patient will do his fit test after May 9, 2018    Arthritis  This is a chronic health problem that is not controlled with current lifestyle measures. Patient has joint pain and stiffness in his hands bilaterally, knees bilaterally and his right foot. He grew up playing sports. He's had knee injections in the past. He was in the past able to get by with using over-the-counter medications but he now finds that they do not last for him. He like to try a prescription medication.    Essential hypertension, benign  This is a chronic health problem that is uncontrolled with current medications and lifestyle measures.  The patient denies chest pain, shortness of breath or dyspnea on exertion.      Obesity (BMI 30-39.9)  This is a chronic health problem for this patient. He does try to get exercise. He has gained a few pounds but his weight is stable. Patient will work on weight loss with time.    Inflamed sebaceous cyst  This is a chronic problem for the patient that he is unable to date how long it's been there. He tells me that it's irritated by his collar and it \"bugs him\". He would like to have it removed and we will send him to dermatology for evaluation and treatment.    Dyslipidemia  This is a chronic health problem for this patient is actually fairly well controlled. He previously was seeing Dr. Ariza at the [facility] but is " getting ready to move to [facility] therefore is transferring care. He had blood work done on 8/30/17. At that time his total cholesterol was 151, triglycerides 136, HDL good at 61 and his LDL was excellent at 63. No liver dysfunction. He is on atorvastatin 10 mg daily and that seems to be working well for him. We will have him continue with that medication long-term.     Generalized anxiety disorder  This is a chronic condition for this gentleman. He had to take care of his wife and her final illness after 46 years of marriage. He was on alprazolam taking half of the 0.5 mg to sleep. We had a long discussion regarding the addictive nature of this medication. He states that he mostly has problems with sleep and doesn't really feel he has much in way of anxiety anymore. He is very open to trying a different medication to help him to sleep when he can't turn his mind off. I would like to try trazodone. We discussed that this is non-addicting and all of the positive aspects of this medication. He is willing to give it a try and I will write that prescription. I'm going to discontinue his alprazolam.      Patient Active Problem List    Diagnosis Date Noted   • Coronary artery disease due to lipid rich plaque 08/07/2012     Priority: High   • Status post coronary artery stent placement 08/07/2012     Priority: High   • Dyslipidemia 01/26/2014     Priority: Medium   • Essential hypertension, benign 01/23/2012     Priority: Medium   • Arthritis 02/15/2018   • Inflamed sebaceous cyst 02/15/2018   • Generalized anxiety disorder 10/11/2017   • Obesity (BMI 30-39.9) 05/08/2017      Allergies:Lipitor [atorvastatin]    Current Outpatient Prescriptions   Medication Sig Dispense Refill   • diclofenac EC (VOLTAREN) 75 MG Tablet Delayed Response Take 1 Tab by mouth 2 times a day. 180 Tab 3   • amlodipine-benazepril (LOTREL) 5-10 MG per capsule TAKE ONE CAPSULE BY MOUTH DAILY 90 Cap 1   • atorvastatin (LIPITOR) 10 MG Tab TAKE ONE  TABLET BY MOUTH DAILY 90 Tab 3   • Multiple Vitamins-Minerals (MULTIVITAMIN PO) Take  by mouth.     • Coenzyme Q10 (CO Q 10 PO) Take  by mouth.     • MEGARED OMEGA-3 KRILL OIL PO Take  by mouth.     • CALCIUM PO Take  by mouth.     • aspirin 81 MG tablet Take 81 mg by mouth every day.       • Multiple Vitamins-Minerals (OCUVITE) Tab Take 1 Tab by mouth every day.       No current facility-administered medications for this visit.        Social History   Substance Use Topics   • Smoking status: Former Smoker     Packs/day: 2.00     Years: 25.00     Types: Cigarettes     Quit date: 1/1/1992   • Smokeless tobacco: Never Used      Comment: avoid all tobacco products   • Alcohol use 0.6 oz/week     1 Standard drinks or equivalent per week      Comment: socially     Social History     Social History Narrative   • No narrative on file       Family History   Problem Relation Age of Onset   • Lung Disease Mother      copd   • Hypertension Mother    • Heart Disease Father      heart attack and heart disease   • Cancer Brother      neck   • Heart Disease Paternal Grandfather      Heart attack   • Other Sister      non-malignant brain tumor   • Diabetes Neg Hx         ROS:     - Constitutional:  Negative for fever, chills, unexpected weight change, and fatigue/generalized weakness.    - HEENT:  Negative for headaches, vision changes, hearing changes, ear pain, ear discharge, rhinorrhea, sinus congestion, sore throat, and neck pain.      - Respiratory: Negative for cough, sputum production, chest congestion, dyspnea, wheezing, and crackles.      - Cardiovascular: Negative for chest pain, palpitations, orthopnea, and bilateral lower extremity edema.     - Gastrointestinal: Negative for heartburn, nausea, vomiting, abdominal pain, hematochezia, melena, diarrhea, constipation, and greasy/foul-smelling stools.     - Genitourinary: Negative for dysuria, polyuria, hematuria, pyuria, urinary urgency, and urinary incontinence.     -  Musculoskeletal: Negative for myalgias, back pain, and joint pain.     - Skin: Lesion growing at the base of his neck slightly to the left of midline over the last year, constantly irritated by his collar.      - Neurological: Negative for dizziness, tingling, tremors, focal sensory deficit, focal weakness and headaches.     - Endo/Heme/Allergies: Does not bruise/bleed easily.     - Psychiatric/Behavioral: Continued anxiety and insomnia around the death of his previous wife, patient is now remarried Negative for depression, suicidal/homicidal ideation and memory loss.          - NOTE: All other systems reviewed and are negative, except as in HPI.      Exam:    Blood pressure 120/76, pulse 78, temperature 36.3 °C (97.4 °F), resp. rate 16, height 1.829 m (6'), weight 117.3 kg (258 lb 9.6 oz), SpO2 95 %. Body mass index is 35.07 kg/m².    General:  Well nourished, well developed male in NAD  Head is grossly normal.  Neck: Supple without JVD or bruit. Thyroid is not enlarged.  Pulmonary: Clear to ausculation and percussion.  Normal effort. No rales, ronchi, or wheezing.  Cardiovascular: Regular rate and rhythm without murmur. Carotid and radial pulses are intact and equal bilaterally.  Extremities: no clubbing, cyanosis, or edema.  Skin: Patient has a fairly large inflamed sebaceous cyst at the base of the neck in the posterior region just to the left of midline. Does have some excoriation and irritation  Please note that this dictation was created using voice recognition software. I have made every reasonable attempt to correct obvious errors, but I expect that there are errors of grammar and possibly content that I did not discover before finalizing the note.    Assessment/Plan:  1. Arthritis  Uncontrolled, patient has used over-the-counter NSAIDs without complete relief. We will try Voltaren 75 mg twice a day. I did caution him that he has to eat a meal when he does this.  - diclofenac EC (VOLTAREN) 75 MG Tablet  Delayed Response; Take 1 Tab by mouth 2 times a day.  Dispense: 180 Tab; Refill: 3    2. Essential hypertension, benign  Controlled, continue with current meds and lifestyle.      3. Obesity (BMI 30-39.9)  Uncontrolled, patient gained weight while he was caring for his wife. He is slowly starting to lose weight at this time    4. Inflamed sebaceous cyst  Uncontrolled will refer to dermatology  - REFERRAL TO DERMATOLOGY    5. Colon cancer screening  Patient will do his fit test after 5/9/18 when it's do  - OCCULT BLOOD FECES IMMUNOASSAY; Future    6. Dyslipidemia  Controlled, continue with current meds and lifestyle.  Plan will be to recheck this in August 2018, one year after the last labs    7. Generalized anxiety disorder  Stable but still having problems with insomnia. We will try trazodone for sleep

## 2018-02-16 NOTE — ASSESSMENT & PLAN NOTE
This is a chronic condition for this gentleman. He had to take care of his wife and her final illness after 46 years of marriage. He was on alprazolam taking half of the 0.5 mg to sleep. We had a long discussion regarding the addictive nature of this medication. He states that he mostly has problems with sleep and doesn't really feel he has much in way of anxiety anymore. He is very open to trying a different medication to help him to sleep when he can't turn his mind off. I would like to try trazodone. We discussed that this is non-addicting and all of the positive aspects of this medication. He is willing to give it a try and I will write that prescription. I'm going to discontinue his alprazolam.

## 2018-02-28 ENCOUNTER — PATIENT MESSAGE (OUTPATIENT)
Dept: MEDICAL GROUP | Facility: MEDICAL CENTER | Age: 68
End: 2018-02-28

## 2018-02-28 DIAGNOSIS — L72.3 INFLAMED SEBACEOUS CYST: ICD-10-CM

## 2018-03-04 ENCOUNTER — PATIENT MESSAGE (OUTPATIENT)
Dept: MEDICAL GROUP | Facility: MEDICAL CENTER | Age: 68
End: 2018-03-04

## 2018-03-16 ENCOUNTER — HOSPITAL ENCOUNTER (OUTPATIENT)
Facility: MEDICAL CENTER | Age: 68
End: 2018-03-16
Attending: SURGERY
Payer: MEDICARE

## 2018-03-16 PROCEDURE — 88305 TISSUE EXAM BY PATHOLOGIST: CPT

## 2018-04-02 ENCOUNTER — PATIENT MESSAGE (OUTPATIENT)
Dept: MEDICAL GROUP | Facility: MEDICAL CENTER | Age: 68
End: 2018-04-02

## 2018-04-02 DIAGNOSIS — C44.41 BASAL CELL CARCINOMA OF SKIN OF NECK: ICD-10-CM

## 2018-05-09 ENCOUNTER — APPOINTMENT (RX ONLY)
Dept: URBAN - METROPOLITAN AREA CLINIC 20 | Facility: CLINIC | Age: 68
Setting detail: DERMATOLOGY
End: 2018-05-09

## 2018-05-09 ENCOUNTER — OFFICE VISIT (OUTPATIENT)
Dept: CARDIOLOGY | Facility: MEDICAL CENTER | Age: 68
End: 2018-05-09
Payer: MEDICARE

## 2018-05-09 VITALS
HEIGHT: 72 IN | BODY MASS INDEX: 34.54 KG/M2 | OXYGEN SATURATION: 95 % | HEART RATE: 77 BPM | SYSTOLIC BLOOD PRESSURE: 124 MMHG | DIASTOLIC BLOOD PRESSURE: 72 MMHG | WEIGHT: 255 LBS

## 2018-05-09 DIAGNOSIS — L57.0 ACTINIC KERATOSIS: ICD-10-CM

## 2018-05-09 DIAGNOSIS — I25.83 CORONARY ARTERY DISEASE DUE TO LIPID RICH PLAQUE: ICD-10-CM

## 2018-05-09 DIAGNOSIS — Z71.89 OTHER SPECIFIED COUNSELING: ICD-10-CM

## 2018-05-09 DIAGNOSIS — I10 ESSENTIAL HYPERTENSION, BENIGN: ICD-10-CM

## 2018-05-09 DIAGNOSIS — I25.10 CORONARY ARTERY DISEASE DUE TO LIPID RICH PLAQUE: ICD-10-CM

## 2018-05-09 DIAGNOSIS — D22 MELANOCYTIC NEVI: ICD-10-CM

## 2018-05-09 DIAGNOSIS — E78.5 DYSLIPIDEMIA: ICD-10-CM

## 2018-05-09 DIAGNOSIS — L81.4 OTHER MELANIN HYPERPIGMENTATION: ICD-10-CM

## 2018-05-09 DIAGNOSIS — Z95.5 STATUS POST CORONARY ARTERY STENT PLACEMENT: ICD-10-CM

## 2018-05-09 DIAGNOSIS — L82.1 OTHER SEBORRHEIC KERATOSIS: ICD-10-CM

## 2018-05-09 DIAGNOSIS — Z85.828 PERSONAL HISTORY OF OTHER MALIGNANT NEOPLASM OF SKIN: ICD-10-CM

## 2018-05-09 DIAGNOSIS — D18.0 HEMANGIOMA: ICD-10-CM

## 2018-05-09 PROBLEM — D22.72 MELANOCYTIC NEVI OF LEFT LOWER LIMB, INCLUDING HIP: Status: ACTIVE | Noted: 2018-05-09

## 2018-05-09 PROBLEM — D22.62 MELANOCYTIC NEVI OF LEFT UPPER LIMB, INCLUDING SHOULDER: Status: ACTIVE | Noted: 2018-05-09

## 2018-05-09 PROBLEM — D48.5 NEOPLASM OF UNCERTAIN BEHAVIOR OF SKIN: Status: ACTIVE | Noted: 2018-05-09

## 2018-05-09 PROBLEM — D22.71 MELANOCYTIC NEVI OF RIGHT LOWER LIMB, INCLUDING HIP: Status: ACTIVE | Noted: 2018-05-09

## 2018-05-09 PROBLEM — D22.5 MELANOCYTIC NEVI OF TRUNK: Status: ACTIVE | Noted: 2018-05-09

## 2018-05-09 PROBLEM — D18.01 HEMANGIOMA OF SKIN AND SUBCUTANEOUS TISSUE: Status: ACTIVE | Noted: 2018-05-09

## 2018-05-09 PROBLEM — D22.39 MELANOCYTIC NEVI OF OTHER PARTS OF FACE: Status: ACTIVE | Noted: 2018-05-09

## 2018-05-09 PROBLEM — D22.61 MELANOCYTIC NEVI OF RIGHT UPPER LIMB, INCLUDING SHOULDER: Status: ACTIVE | Noted: 2018-05-09

## 2018-05-09 PROCEDURE — 99203 OFFICE O/P NEW LOW 30 MIN: CPT | Mod: 25

## 2018-05-09 PROCEDURE — ? BIOPSY BY SHAVE METHOD

## 2018-05-09 PROCEDURE — 11100: CPT | Mod: 59

## 2018-05-09 PROCEDURE — 17003 DESTRUCT PREMALG LES 2-14: CPT

## 2018-05-09 PROCEDURE — ? LIQUID NITROGEN

## 2018-05-09 PROCEDURE — 99214 OFFICE O/P EST MOD 30 MIN: CPT | Performed by: INTERNAL MEDICINE

## 2018-05-09 PROCEDURE — ? SUNSCREEN RECOMMENDATIONS

## 2018-05-09 PROCEDURE — ? OBSERVATION

## 2018-05-09 PROCEDURE — ? COUNSELING

## 2018-05-09 PROCEDURE — 17000 DESTRUCT PREMALG LESION: CPT

## 2018-05-09 RX ORDER — CEPHALEXIN 500 MG/1
CAPSULE ORAL
COMMUNITY
Start: 2018-04-02 | End: 2018-08-02

## 2018-05-09 RX ORDER — AMOXICILLIN AND CLAVULANATE POTASSIUM 875; 125 MG/1; MG/1
TABLET, FILM COATED ORAL
COMMUNITY
Start: 2018-03-09 | End: 2018-08-02

## 2018-05-09 RX ORDER — ALPRAZOLAM 0.5 MG/1
0.25 TABLET ORAL PRN
COMMUNITY
Start: 2018-03-04 | End: 2018-10-01 | Stop reason: SDUPTHER

## 2018-05-09 ASSESSMENT — ENCOUNTER SYMPTOMS
BRUISES/BLEEDS EASILY: 0
PND: 0
FALLS: 0
DIZZINESS: 0
COUGH: 0
CLAUDICATION: 0
ABDOMINAL PAIN: 0
WEAKNESS: 0
PALPITATIONS: 0
FOCAL WEAKNESS: 0
BLURRED VISION: 0
FEVER: 0
SORE THROAT: 0
SHORTNESS OF BREATH: 0
NAUSEA: 0
CHILLS: 0

## 2018-05-09 ASSESSMENT — LOCATION DETAILED DESCRIPTION DERM
LOCATION DETAILED: RIGHT INFERIOR MEDIAL MIDBACK
LOCATION DETAILED: LEFT DISTAL POSTERIOR THIGH
LOCATION DETAILED: LEFT LATERAL PROXIMAL PRETIBIAL REGION
LOCATION DETAILED: RIGHT DISTAL POSTERIOR UPPER ARM
LOCATION DETAILED: INFERIOR THORACIC SPINE
LOCATION DETAILED: LEFT VENTRAL PROXIMAL FOREARM
LOCATION DETAILED: MID-OCCIPITAL SCALP
LOCATION DETAILED: RIGHT INFERIOR FOREHEAD
LOCATION DETAILED: RIGHT CENTRAL TEMPLE
LOCATION DETAILED: RIGHT VENTRAL PROXIMAL FOREARM
LOCATION DETAILED: RIGHT DISTAL POSTERIOR THIGH
LOCATION DETAILED: MID POSTERIOR NECK
LOCATION DETAILED: RIGHT SUPERIOR OCCIPITAL SCALP
LOCATION DETAILED: LEFT PROXIMAL POSTERIOR UPPER ARM
LOCATION DETAILED: RIGHT INFERIOR MEDIAL UPPER BACK
LOCATION DETAILED: RIGHT INFERIOR CENTRAL MALAR CHEEK
LOCATION DETAILED: RIGHT PROXIMAL PRETIBIAL REGION
LOCATION DETAILED: RIGHT SUPERIOR UPPER BACK

## 2018-05-09 ASSESSMENT — LOCATION ZONE DERM
LOCATION ZONE: FACE
LOCATION ZONE: ARM
LOCATION ZONE: LEG
LOCATION ZONE: NECK
LOCATION ZONE: TRUNK
LOCATION ZONE: SCALP

## 2018-05-09 ASSESSMENT — LOCATION SIMPLE DESCRIPTION DERM
LOCATION SIMPLE: RIGHT FOREARM
LOCATION SIMPLE: RIGHT OCCIPITAL SCALP
LOCATION SIMPLE: RIGHT UPPER BACK
LOCATION SIMPLE: LEFT FOREARM
LOCATION SIMPLE: LEFT PRETIBIAL REGION
LOCATION SIMPLE: RIGHT POSTERIOR UPPER ARM
LOCATION SIMPLE: POSTERIOR NECK
LOCATION SIMPLE: RIGHT POSTERIOR THIGH
LOCATION SIMPLE: LEFT POSTERIOR UPPER ARM
LOCATION SIMPLE: RIGHT PRETIBIAL REGION
LOCATION SIMPLE: UPPER BACK
LOCATION SIMPLE: LEFT POSTERIOR THIGH
LOCATION SIMPLE: RIGHT TEMPLE
LOCATION SIMPLE: RIGHT CHEEK
LOCATION SIMPLE: RIGHT FOREHEAD
LOCATION SIMPLE: POSTERIOR SCALP
LOCATION SIMPLE: RIGHT LOWER BACK

## 2018-05-09 NOTE — PROGRESS NOTES
Chief Complaint   Patient presents with   • Coronary Artery Disease     F/V DX:CAD       Subjective:   Curt Blair is a 67 y.o. male who presents today for follow-up of his history of coronary disease with history of stenting    He has been doing okay no major issues over the past year his weight has remained the same blood pressures have been well controlled    He quite a bit issues from a sebaceous cyst as well as basal cell status post resection seems that that has improved    Past Medical History:   Diagnosis Date   • Arthritis     Right Foot   • CAD (coronary artery disease)    • Generalized anxiety disorder 10/11/2017   • Hyperlipidemia    • Hypertension    • Inflamed sebaceous cyst 2/15/2018   • S/P coronary artery stent placement     2 stents     Past Surgical History:   Procedure Laterality Date   • APPENDECTOMY     • CHOLECYSTECTOMY     • ENDARTERECTOMY      corornary   • STENT PLACEMENT       Family History   Problem Relation Age of Onset   • Lung Disease Mother      copd   • Hypertension Mother    • Heart Disease Father      heart attack and heart disease   • Cancer Brother      neck   • Heart Disease Paternal Grandfather      Heart attack   • Other Sister      non-malignant brain tumor   • Diabetes Neg Hx      Social History     Social History   • Marital status:      Spouse name: N/A   • Number of children: N/A   • Years of education: N/A     Occupational History   • Not on file.     Social History Main Topics   • Smoking status: Former Smoker     Packs/day: 2.00     Years: 25.00     Types: Cigarettes     Quit date: 1/1/1992   • Smokeless tobacco: Never Used      Comment: avoid all tobacco products   • Alcohol use 0.6 oz/week     1 Standard drinks or equivalent per week      Comment: socially   • Drug use: No   • Sexual activity: Not Currently     Partners: Female     Other Topics Concern   • Not on file     Social History Narrative   • No narrative on file     Allergies   Allergen  "Reactions   • Lipitor [Atorvastatin] Myalgia     Ok to take 10 mg  Elevated CPK  Only at 40 Mg does this happen.     Outpatient Encounter Prescriptions as of 5/9/2018   Medication Sig Dispense Refill   • ALPRAZolam (XANAX) 0.5 MG Tab Take 0.25 mg by mouth as needed.     • traZODone (DESYREL) 100 MG Tab Take 1 Tab by mouth at bedtime as needed for Sleep. 90 Tab 3   • diclofenac EC (VOLTAREN) 75 MG Tablet Delayed Response Take 1 Tab by mouth 2 times a day. 180 Tab 3   • amlodipine-benazepril (LOTREL) 5-10 MG per capsule TAKE ONE CAPSULE BY MOUTH DAILY 90 Cap 1   • atorvastatin (LIPITOR) 10 MG Tab TAKE ONE TABLET BY MOUTH DAILY 90 Tab 3   • Multiple Vitamins-Minerals (MULTIVITAMIN PO) Take  by mouth.     • Coenzyme Q10 (CO Q 10 PO) Take  by mouth.     • MEGARED OMEGA-3 KRILL OIL PO Take  by mouth.     • CALCIUM PO Take  by mouth.     • Multiple Vitamins-Minerals (OCUVITE) Tab Take 1 Tab by mouth every day.     • aspirin 81 MG tablet Take 81 mg by mouth every day.       • cephALEXin (KEFLEX) 500 MG Cap      • amoxicillin-clavulanate (AUGMENTIN) 875-125 MG Tab        No facility-administered encounter medications on file as of 5/9/2018.      Review of Systems   Constitutional: Negative for chills and fever.   HENT: Negative for sore throat.    Eyes: Negative for blurred vision.   Respiratory: Negative for cough and shortness of breath.    Cardiovascular: Negative for chest pain, palpitations, claudication, leg swelling and PND.   Gastrointestinal: Negative for abdominal pain and nausea.   Musculoskeletal: Negative for falls and joint pain.   Skin: Negative for rash.   Neurological: Negative for dizziness, focal weakness and weakness.   Endo/Heme/Allergies: Does not bruise/bleed easily.        Objective:   /72   Pulse 77   Ht 1.829 m (6' 0.01\")   Wt 115.7 kg (255 lb)   SpO2 95%   BMI 34.58 kg/m²     Physical Exam   Constitutional: No distress.   HENT:   Mouth/Throat: Oropharynx is clear and moist. No " oropharyngeal exudate.   Eyes: No scleral icterus.   Neck: No JVD present.   Cardiovascular: Normal rate and normal heart sounds.  Exam reveals no gallop and no friction rub.    No murmur heard.  Pulmonary/Chest: No respiratory distress. He has no wheezes. He has no rales.   Abdominal: Soft. Bowel sounds are normal.   Musculoskeletal: He exhibits no edema.   Neurological: He is alert.   Skin: No rash noted. He is not diaphoretic.   Psychiatric: He has a normal mood and affect.     Labs reviewed normal lipid profile    Assessment:     1. Coronary artery disease due to lipid rich plaque     2. Status post coronary artery stent placement     3. Essential hypertension, benign     4. Dyslipidemia         Medical Decision Making:  Today's Assessment / Status / Plan:     It was my pleasure to meet with Mr. Blair.    He continues to do quite well for heart status he will work on meaningful weight loss    I will see Mr. Blair back in 1 year time and encouraged him to follow up with us over the phone or e-mail using my MyChart as issues arise.    It is my pleasure to participate in the care of Mr. Blair.  Please do not hesitate to contact me with questions or concerns.    Iván Otoole MD PhD FACC  Cardiologist [facility]

## 2018-05-09 NOTE — HPI: CYST
How Severe Is Your Cyst?: mild
Is This A New Presentation, Or A Follow-Up?: Cyst
Additional History: Patient states the cyst has been drained twice previously at a surgery center. History of infection which was treated with Keflex. Cyst may be resolved now and is asymtomatic.

## 2018-05-09 NOTE — PROCEDURE: LIQUID NITROGEN
Render Post-Care Instructions In Note?: yes
Consent: The patient's consent was obtained including but not limited to risks of crusting, scabbing, blistering, scarring, darker or lighter pigmentary change, recurrence, incomplete removal and infection. RTC in 2 months if lesion(s) persistent.
Duration Of Freeze Thaw-Cycle (Seconds): 10
Detail Level: Detailed
Number Of Freeze-Thaw Cycles: 2 freeze-thaw cycles
Post-Care Instructions: I reviewed with the patient in detail post-care instructions. Patient is to wear sunprotection, and avoid picking at any of the treated lesions. Pt may apply Vaseline to crusted or scabbing areas.

## 2018-05-09 NOTE — PROCEDURE: BIOPSY BY SHAVE METHOD
Billing Type: Third-Party Bill
Was A Bandage Applied: Yes
Post-Care Instructions: I reviewed with the patient in detail post-care instructions. Patient is to keep the biopsy site clean once daily and then apply petroleum and bandaid  until healed.
Anesthesia Volume In Cc: 1
Consent: Written consent was obtained and risks were reviewed including but not limited to scarring, infection, bleeding, scabbing, incomplete removal, nerve damage and allergy to anesthesia.
X Size Of Lesion In Cm: 0.5
Bill 78586 For Specimen Handling/Conveyance To Laboratory?: no
Biopsy Method: Personna blade
Lab Facility: 
Anesthesia Type: 1% lidocaine with 1:100,000 epinephrine and 408mcg clindamycin/ml and a 1:10 solution of 8.4% sodium bicarbonate
Additional Anesthesia Volume In Cc (Will Not Render If 0): 0
Biopsy Type: H and E
Dressing: Band-Aid
Type Of Destruction Used: Curettage
Notification Instructions: Patient will be notified of biopsy results. However, patient instructed to call the office if not contacted within 2 weeks.
Hemostasis: Drysol and Electrocautery
Detail Level: Detailed
Wound Care: Bacitracin
Lab: [id number]

## 2018-05-10 ENCOUNTER — HOSPITAL ENCOUNTER (OUTPATIENT)
Facility: MEDICAL CENTER | Age: 68
End: 2018-05-10
Attending: FAMILY MEDICINE
Payer: MEDICARE

## 2018-05-10 PROCEDURE — 82274 ASSAY TEST FOR BLOOD FECAL: CPT

## 2018-05-16 DIAGNOSIS — Z12.11 COLON CANCER SCREENING: ICD-10-CM

## 2018-05-17 LAB — HEMOCCULT STL QL IA: NEGATIVE

## 2018-05-21 DIAGNOSIS — I25.119 CORONARY ARTERY DISEASE WITH ANGINA PECTORIS, UNSPECIFIED VESSEL OR LESION TYPE, UNSPECIFIED WHETHER NATIVE OR TRANSPLANTED HEART (HCC): ICD-10-CM

## 2018-05-21 RX ORDER — NITROGLYCERIN 0.4 MG/1
0.4 TABLET SUBLINGUAL PRN
Qty: 25 TAB | Refills: 5 | Status: SHIPPED | OUTPATIENT
Start: 2018-05-21 | End: 2019-05-15 | Stop reason: SDUPTHER

## 2018-05-27 ENCOUNTER — PATIENT MESSAGE (OUTPATIENT)
Dept: MEDICAL GROUP | Facility: MEDICAL CENTER | Age: 68
End: 2018-05-27

## 2018-06-26 DIAGNOSIS — I10 ESSENTIAL HYPERTENSION, BENIGN: ICD-10-CM

## 2018-06-26 RX ORDER — AMLODIPINE BESYLATE AND BENAZEPRIL HYDROCHLORIDE 5; 10 MG/1; MG/1
1 CAPSULE ORAL DAILY
Qty: 90 CAP | Refills: 3 | Status: SHIPPED | OUTPATIENT
Start: 2018-06-26 | End: 2019-05-15 | Stop reason: SDUPTHER

## 2018-08-02 ENCOUNTER — OFFICE VISIT (OUTPATIENT)
Dept: MEDICAL GROUP | Facility: MEDICAL CENTER | Age: 68
End: 2018-08-02
Payer: MEDICARE

## 2018-08-02 VITALS
TEMPERATURE: 97.5 F | WEIGHT: 254 LBS | DIASTOLIC BLOOD PRESSURE: 60 MMHG | SYSTOLIC BLOOD PRESSURE: 122 MMHG | OXYGEN SATURATION: 95 % | HEART RATE: 88 BPM | HEIGHT: 72 IN | BODY MASS INDEX: 34.4 KG/M2

## 2018-08-02 DIAGNOSIS — E66.9 OBESITY (BMI 30-39.9): ICD-10-CM

## 2018-08-02 DIAGNOSIS — M54.31 SCIATICA, RIGHT SIDE: ICD-10-CM

## 2018-08-02 PROCEDURE — 99214 OFFICE O/P EST MOD 30 MIN: CPT | Performed by: PHYSICIAN ASSISTANT

## 2018-08-02 NOTE — ASSESSMENT & PLAN NOTE
This is a 67-year-old male complains of a three-week history of sciatica. States that there is a shooting jolts of pain that starts in his right butt cheek and goes down to the leg. Symptoms are usually worse with driving. Denies any trauma. No chronic history of back pain. Has not had sciatica in the past. He is currently taking diclofenac for arthritis. Also takes aspirin 81 mg. No relief. He doesn't like to take medication. He is leaving on Monday to drive to [place]. Also is flying down to [place] and then in October going to [place]. He is retired.

## 2018-08-02 NOTE — PROGRESS NOTES
Subjective:   Curt Blair is a 67 y.o. male here today for 3 week history of right sided sciatica.    Sciatica, right side  This is a 67-year-old male complains of a three-week history of sciatica. States that there is a shooting jolts of pain that starts in his right butt cheek and goes down to the leg. Symptoms are usually worse with driving. Denies any trauma. No chronic history of back pain. Has not had sciatica in the past. He is currently taking diclofenac for arthritis. Also takes aspirin 81 mg. No relief. He doesn't like to take medication. He is leaving on Monday to drive to [place]. Also is flying down to [place] and then in October going to [place]. He is retired.       Current medicines (including changes today)  Current Outpatient Prescriptions   Medication Sig Dispense Refill   • amlodipine-benazepril (LOTREL) 5-10 MG per capsule Take 1 Cap by mouth every day. 90 Cap 3   • diclofenac EC (VOLTAREN) 75 MG Tablet Delayed Response Take 1 Tab by mouth 2 times a day. 180 Tab 3   • atorvastatin (LIPITOR) 10 MG Tab TAKE ONE TABLET BY MOUTH DAILY 90 Tab 3   • Multiple Vitamins-Minerals (MULTIVITAMIN PO) Take  by mouth.     • Coenzyme Q10 (CO Q 10 PO) Take  by mouth.     • MEGARED OMEGA-3 KRILL OIL PO Take  by mouth.     • CALCIUM PO Take  by mouth.     • Multiple Vitamins-Minerals (OCUVITE) Tab Take 1 Tab by mouth every day.     • aspirin 81 MG tablet Take 81 mg by mouth every day.       • nitroglycerin (NITROSTAT) 0.4 MG SL Tab Place 1 Tab under tongue as needed for Chest Pain (Place 1 tablet under the tongue every 5 minutes up to 3 doses as needed for chest pain). 25 Tab 5   • ALPRAZolam (XANAX) 0.5 MG Tab Take 0.25 mg by mouth as needed.       No current facility-administered medications for this visit.      He  has a past medical history of Arthritis; CAD (coronary artery disease); Generalized anxiety disorder (10/11/2017); Hyperlipidemia; Hypertension; Inflamed sebaceous cyst (2/15/2018);  and S/P coronary artery stent placement.    Social History and Family History were reviewed and updated.    ROS   No chest pain, no shortness of breath, no abdominal pain and all other systems were reviewed and are negative.       Objective:     Blood pressure 122/60, pulse 88, temperature 36.4 °C (97.5 °F), height 1.829 m (6'), weight 115.2 kg (254 lb), SpO2 95 %. Body mass index is 34.45 kg/m².   Physical Exam:  Constitutional: Alert, no distress.  Skin: Warm, dry, good turgor, no rashes in visible areas.  Eye: Equal, round and reactive, conjunctiva clear, lids normal.  ENMT: Lips without lesions, good dentition, oropharynx clear.  Neck: Trachea midline, no masses.   Lymph: No cervical or supraclavicular lymphadenopathy  Respiratory: Unlabored respiratory effort, lungs appear clear, no wheezes.  Cardiovascular: Normal S1, S2, no murmur, no edema.  Musculoskeletal: No spinal or paraspinal tenderness on the right side. Muscle strength bilateral lower extremity at 5 out of 5. No change in gait.  Neuro: DTRs bilaterally symmetrical.  Psych: Alert and oriented x3, normal affect and mood.        Assessment and Plan:   The following treatment plan was discussed    1. Sciatica, right side  Acute, new onset condition. Referred urgently to chiropractic services and physical therapy. Advised to go in today to [place] chiropractor to see about being evaluated. Discussed possible medication such as prednisone, a muscle relaxer and gabapentin he declined. Advised him that he should perform stretching exercises and provided him a handout. Advise if symptoms are not improving he will need imaging and possible referral to see physiatry. He understood. May continue Voltaren 75 mg twice a day. Also may take Tylenol 1000 mg every 4 hours if it's effective.  - REFERRAL TO PHYSICAL THERAPY Reason for Therapy: Eval/Treat/Report  - REFERRAL TO CHIROPRACTIC    2. Obesity (BMI 30-39.9)  Chronic condition. We'll monitor.  - Patient  identified as having weight management issue.  Appropriate orders and counseling given.      Followup: Return if symptoms worsen or fail to improve.    Please note that this dictation was created using voice recognition software. I have made every reasonable attempt to correct obvious errors, but I expect that there are errors of grammar and possibly content that I did not discover before finalizing the note.

## 2018-08-21 ENCOUNTER — PHYSICAL THERAPY (OUTPATIENT)
Dept: PHYSICAL THERAPY | Facility: MEDICAL CENTER | Age: 68
End: 2018-08-21
Attending: PHYSICIAN ASSISTANT
Payer: MEDICARE

## 2018-08-21 DIAGNOSIS — M54.31 SCIATICA OF RIGHT SIDE: ICD-10-CM

## 2018-08-21 PROCEDURE — 97162 PT EVAL MOD COMPLEX 30 MIN: CPT

## 2018-08-21 ASSESSMENT — ENCOUNTER SYMPTOMS
PAIN SCALE AT LOWEST: 3
QUALITY: RADIATING
PAIN SCALE: 6
QUALITY: SHOOTING
PAIN TIMING: INTERMITTENT
PAIN SCALE AT HIGHEST: 6
QUALITY: ACHING
QUALITY: TIGHTNESS

## 2018-08-21 NOTE — OP THERAPY EVALUATION
Outpatient Physical Therapy  INITIAL EVALUATION    [facility]  [address]  [place] [address]  Phone:  [phone]  Fax:  [phone]    Date of Evaluation: 2018    Patient: Curt Blair  YOB: 1950  MRN: [id number]     Referring Provider: Jarrod Ballard P.A.-C.  [address]  [address]  [place] [address]   Referring Diagnosis Sciatica, right side [M54.31]     Time Calculation  Start time: 830  Stop time: 930 Time Calculation (min): 60 minutes     Physical Therapy Occurrence Codes    Date of onset of impairment:  18   Date physical therapy care plan established or reviewed:  18   Date physical therapy treatment started:  18          Chief Complaint: Back Problem    Visit Diagnoses     ICD-10-CM   1. Sciatica of right side M54.31         Subjective   History of Present Illness:     History of chief complaint:  Curt presents today for evaluation of R sided sciatica. This has been a problem the last couple of months.  He reports no RENAN or event that caused an immediate increase in pain. Has seen a chiropractor 5 x times in the last month that has helped some. He is active golfing a couple times a week and gets out of the house regularly. 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 This has affected his walking slightly but has not caused a big problem. He wants to see if we can help alliviate his symptoms.              Pain:     Current pain ratin    At best pain rating:  3    At worst pain ratin    Quality:  Aching, radiating, tightness and shooting    Pain timing:  Intermittent    Aggravating factors:  Sitting in harder chairs   Some transition in sitting to standing  Laying down for extended periods of time        Relieving factors:  Ice and  Standing up   Changing position.     Activity Tolerance:     Current activity tolerance / Recreational activities:   Retired in the last few years. He had to care for his ill wife who has since passed away. Golf's 2-3 x week.    Social Support:     Lives in:  One-story house    Lives with:  Alone    Hand Dominance:     Hand dominance:  Right    Treatments:     Treatments to date:  Chiropractic treatment 5 x in last month    Patient Goals:     Patient goals for therapy:  Decrease pain       Past Medical History:   Diagnosis Date   • Arthritis     Right Foot   • CAD (coronary artery disease)    • Generalized anxiety disorder 10/11/2017   • Hyperlipidemia    • Hypertension    • Inflamed sebaceous cyst 2/15/2018   • S/P coronary artery stent placement     2 stents     Past Surgical History:   Procedure Laterality Date   • APPENDECTOMY     • CHOLECYSTECTOMY     • ENDARTERECTOMY      corornary   • STENT PLACEMENT         Precautions:       Objective   Observation and functional movement:  Walks with short steps, slightly antalgic. No major distress     Range of motion and strength:    Active range of motion is within functional limits.    Strength is within functional limits.    Some pull on the low back during R resisted hip flexion    Sensation and reflexes:     Sensation is intact.      Reflexes are normal and symmetrical.    Palpation and joint mobility:     Tight IR bilaterally    Tender R glute med, SI complex     Special tests:      SIMEON: Neg   Hip shear: Neg   SLR: 80 deg tightness     Balance:     No balance deficits noted.    Gait:      Normal pattern gait.    Coordination and tone:     Coordination is intact.    Tone is normal.    Basic self care and IADL's:     Independent with all self care.    Independent with all ADL's.    Cognition and visual perception:     No cognition deficits noted.    No visual perception deficits noted.            Therapeutic Exercises (CPT 97721):     1. Develop HEP, See below       Therapeutic Exercise Summary: Access Code: [email or web address]   URL: [email or web address]   Date:  08/21/2018   Prepared by: Dustin Null     Exercises  · Supine Piriformis Stretch with Foot on Ground - 2 sets - 30 hold - 2x daily - 5x weekly  · Hooklying Isometric Hip Flexion - 5 each side reps - 5 hold - 2x daily - 5x weekly  · Supine Bridge - 10 reps - 1 sets - 1x daily - 5x weekly  · Standing Hip ER Stretch at Table - 2 sets - 15 sec hold - 2x daily - 5x weekly  · Standing Lumbar Extension at Wall - Forearms - 10 reps - 3 sets - 2x daily - 5x weekly  · Standing Quadratus Lumborum Stretch with Doorway - 2 sets - 10-15 hold - 2x daily - 5x weekly        Time-based treatments/modalities:          Assessment, Response and Plan:   Impairments: abnormal gait, hypersensitivity and pain with function    Assessment details:  Curt is a 67 year old with sudden onset of R side radicular symptoms. He denies central back pain or mechanism of injury. He is active playing golf a couple times a week He has symptoms from R butt cheek to mid of R thigh. He has increase in symptoms when sitting in certain chairs and some transition from sitting to standing. He likely has some instability poor timing of motor control in the low back and hips. He would benefit from therapy services to decrease his symptoms and to improve pelvic stability and motor control.   Barriers to therapy:  None  Goals:   Short Term Goals:   1. Establish HEP  2. Decrease Symptoms 25%+ via subjective report/objective pain scale   3. Patient to show ability to brace neutral spine in supine with UE/LE motions with VC and Min A   4. Patient able to walk 30+ mins without increase in symptoms   5. Patient will show good hip hinge for squat and deadlift patterns with VC's   Short term goal time span:  2-4 weeks      Long Term Goals:    1. Ind in HEP  2. Decrease Symptoms 50%+ via subjective report/objective pain scale   3. Patient to show ability to brace neutral spine in standing with UE/LE motions with VC and Min A   4. Patient able to walk 60+ mins without  increase in symptoms   5. Patient will show good hip hinge for squat and deadlift patterns with mod I   6. Decrease RMQ to 2 or less     Long term goal time span:  6-8 weeks    Plan:   Therapy options:  Physical therapy treatment to continue  Planned therapy interventions:  E Stim Unattended (CPT 83713), Hot or Cold Pack Therapy (CPT 71743), Gait Training (CPT 33757), Manual Therapy (CPT 81948), Neuromuscular Re-education (CPT 99532), Therapeutic Exercise (CPT 68188) and Therapeutic Activities (CPT 45467)  Frequency:  1x week  Duration in weeks:  8  Duration in visits:  8  Plan details:  1 x week as needed. 8 visits as needed.       Functional Limitation G-Codes and Severity Modifiers  Mitchell Mark Low Back Pain and Disability Score: 12.5   Current:     Goal:       Referring provider co-signature:  I have reviewed this plan of care and my co-signature certifies the need for services.  Certification Dates:   From 8/21/18     To 10/16/18    Physician Signature: ________________________________ Date: ______________

## 2018-08-28 ENCOUNTER — HOSPITAL ENCOUNTER (OUTPATIENT)
Dept: LAB | Facility: MEDICAL CENTER | Age: 68
End: 2018-08-28
Attending: NURSE PRACTITIONER
Payer: MEDICARE

## 2018-08-28 ENCOUNTER — APPOINTMENT (RX ONLY)
Dept: URBAN - METROPOLITAN AREA CLINIC 20 | Facility: CLINIC | Age: 68
Setting detail: DERMATOLOGY
End: 2018-08-28

## 2018-08-28 DIAGNOSIS — L72.8 OTHER FOLLICULAR CYSTS OF THE SKIN AND SUBCUTANEOUS TISSUE: ICD-10-CM

## 2018-08-28 PROBLEM — D48.5 NEOPLASM OF UNCERTAIN BEHAVIOR OF SKIN: Status: ACTIVE | Noted: 2018-08-28

## 2018-08-28 PROCEDURE — 87205 SMEAR GRAM STAIN: CPT

## 2018-08-28 PROCEDURE — 87186 SC STD MICRODIL/AGAR DIL: CPT

## 2018-08-28 PROCEDURE — 87070 CULTURE OTHR SPECIMN AEROBIC: CPT

## 2018-08-28 PROCEDURE — ? EXCISION

## 2018-08-28 PROCEDURE — ? ORDER TESTS

## 2018-08-28 PROCEDURE — 87077 CULTURE AEROBIC IDENTIFY: CPT

## 2018-08-28 PROCEDURE — 11422 EXC H-F-NK-SP B9+MARG 1.1-2: CPT

## 2018-08-28 PROCEDURE — 12041 INTMD RPR N-HF/GENIT 2.5CM/<: CPT

## 2018-08-28 ASSESSMENT — LOCATION SIMPLE DESCRIPTION DERM: LOCATION SIMPLE: POSTERIOR NECK

## 2018-08-28 ASSESSMENT — LOCATION ZONE DERM: LOCATION ZONE: NECK

## 2018-08-28 ASSESSMENT — LOCATION DETAILED DESCRIPTION DERM: LOCATION DETAILED: RIGHT INFERIOR POSTERIOR NECK

## 2018-08-28 NOTE — PROCEDURE: EXCISION
Number Of Epidermal Sutures (Optional): 5
Trilobed Flap Text: The defect edges were debeveled with a #15 scalpel blade.  Given the location of the defect and the proximity to free margins a trilobed flap was deemed most appropriate.  Using a sterile surgical marker, an appropriate trilobed flap drawn around the defect.    The area thus outlined was incised deep to adipose tissue with a #15 scalpel blade.  The skin margins were undermined to an appropriate distance in all directions utilizing iris scissors.
Patient Will Remove Sutures At Home?: No
Excision Method: Elliptical
Island Pedicle Flap Text: The defect edges were debeveled with a #15 scalpel blade.  Given the location of the defect, shape of the defect and the proximity to free margins an island pedicle advancement flap was deemed most appropriate.  Using a sterile surgical marker, an appropriate advancement flap was drawn incorporating the defect, outlining the appropriate donor tissue and placing the expected incisions within the relaxed skin tension lines where possible.    The area thus outlined was incised deep to adipose tissue with a #15 scalpel blade.  The skin margins were undermined to an appropriate distance in all directions around the primary defect and laterally outward around the island pedicle utilizing iris scissors.  There was minimal undermining beneath the pedicle flap.
Fusiform Excision Additional Text (Leave Blank If You Do Not Want): The margin was drawn around the clinically apparent lesion.  A fusiform shape was then drawn on the skin incorporating the lesion and margins.  Incisions were then made along these lines to the appropriate tissue plane and the lesion was extirpated.
Anesthesia Type: 1% lidocaine with 1:100,000 epinephrine and a 1:12 solution of 8.4% sodium bicarbonate
Repair Performed By Another Provider Text (Leave Blank If You Do Not Want): After the tissue was excised the defect was repaired by another provider.
Show Accession Variable: Yes
Estimated Blood Loss (Cc): minimal
W Plasty Text: The lesion was extirpated to the level of the fat with a #15 scalpel blade.  Given the location of the defect, shape of the defect and the proximity to free margins a W-plasty was deemed most appropriate for repair.  Using a sterile surgical marker, the appropriate transposition arms of the W-plasty were drawn incorporating the defect and placing the expected incisions within the relaxed skin tension lines where possible.    The area thus outlined was incised deep to adipose tissue with a #15 scalpel blade.  The skin margins were undermined to an appropriate distance in all directions utilizing iris scissors.  The opposing transposition arms were then transposed into place in opposite direction and anchored with interrupted buried subcutaneous sutures.
Dermal Closure: buried vertical mattress
Bilobed Transposition Flap Text: The defect edges were debeveled with a #15 scalpel blade.  Given the location of the defect and the proximity to free margins a bilobed transposition flap was deemed most appropriate.  Using a sterile surgical marker, an appropriate bilobe flap drawn around the defect.    The area thus outlined was incised deep to adipose tissue with a #15 scalpel blade.  The skin margins were undermined to an appropriate distance in all directions utilizing iris scissors.
Second Skin Substitute Units (Will Override Primary Defect Units If Greater Than 0): 0
Complex Repair And Modified Advancement Flap Text: The defect edges were debeveled with a #15 scalpel blade.  The primary defect was closed partially with a complex linear closure.  Given the location of the remaining defect, shape of the defect and the proximity to free margins a modified advancement flap was deemed most appropriate for complete closure of the defect.  Using a sterile surgical marker, an appropriate advancement flap was drawn incorporating the defect and placing the expected incisions within the relaxed skin tension lines where possible.    The area thus outlined was incised deep to adipose tissue with a #15 scalpel blade.  The skin margins were undermined to an appropriate distance in all directions utilizing iris scissors.
Dermal Autograft Text: The defect edges were debeveled with a #15 scalpel blade.  Given the location of the defect, shape of the defect and the proximity to free margins a dermal autograft was deemed most appropriate.  Using a sterile surgical marker, the primary defect shape was transferred to the donor site. The area thus outlined was incised deep to adipose tissue with a #15 scalpel blade.  The harvested graft was then trimmed of adipose and epidermal tissue until only dermis was left.  The skin graft was then placed in the primary defect and oriented appropriately.
Complex Repair And V-Y Plasty Text: The defect edges were debeveled with a #15 scalpel blade.  The primary defect was closed partially with a complex linear closure.  Given the location of the remaining defect, shape of the defect and the proximity to free margins a V-Y plasty was deemed most appropriate for complete closure of the defect.  Using a sterile surgical marker, an appropriate advancement flap was drawn incorporating the defect and placing the expected incisions within the relaxed skin tension lines where possible.    The area thus outlined was incised deep to adipose tissue with a #15 scalpel blade.  The skin margins were undermined to an appropriate distance in all directions utilizing iris scissors.
Complex Repair And W Plasty Text: The defect edges were debeveled with a #15 scalpel blade.  The primary defect was closed partially with a complex linear closure.  Given the location of the remaining defect, shape of the defect and the proximity to free margins a W plasty was deemed most appropriate for complete closure of the defect.  Using a sterile surgical marker, an appropriate advancement flap was drawn incorporating the defect and placing the expected incisions within the relaxed skin tension lines where possible.    The area thus outlined was incised deep to adipose tissue with a #15 scalpel blade.  The skin margins were undermined to an appropriate distance in all directions utilizing iris scissors.
Billing Type: Third-Party Bill
Bilateral Helical Rim Advancement Flap Text: The defect edges were debeveled with a #15 blade scalpel.  Given the location of the defect and the proximity to free margins (helical rim) a bilateral helical rim advancement flap was deemed most appropriate.  Using a sterile surgical marker, the appropriate advancement flaps were drawn incorporating the defect and placing the expected incisions between the helical rim and antihelix where possible.  The area thus outlined was incised through and through with a #15 scalpel blade.  With a skin hook and iris scissors, the flaps were gently and sharply undermined and freed up.
S Plasty Text: Given the location and shape of the defect, and the orientation of relaxed skin tension lines, an S-plasty was deemed most appropriate for repair.  Using a sterile surgical marker, the appropriate outline of the S-plasty was drawn, incorporating the defect and placing the expected incisions within the relaxed skin tension lines where possible.  The area thus outlined was incised deep to adipose tissue with a #15 scalpel blade.  The skin margins were undermined to an appropriate distance in all directions utilizing iris scissors. The skin flaps were advanced over the defect.  The opposing margins were then approximated with interrupted buried subcutaneous sutures.
Dorsal Nasal Flap Text: The defect edges were debeveled with a #15 scalpel blade.  Given the location of the defect and the proximity to free margins a dorsal nasal flap was deemed most appropriate.  Using a sterile surgical marker, an appropriate dorsal nasal flap was drawn around the defect.    The area thus outlined was incised deep to adipose tissue with a #15 scalpel blade.  The skin margins were undermined to an appropriate distance in all directions utilizing iris scissors.
O-T Plasty Text: The defect edges were debeveled with a #15 scalpel blade.  Given the location of the defect, shape of the defect and the proximity to free margins an O-T plasty was deemed most appropriate.  Using a sterile surgical marker, an appropriate O-T plasty was drawn incorporating the defect and placing the expected incisions within the relaxed skin tension lines where possible.    The area thus outlined was incised deep to adipose tissue with a #15 scalpel blade.  The skin margins were undermined to an appropriate distance in all directions utilizing iris scissors.
Interpolation Flap Text: A decision was made to reconstruct the defect utilizing an interpolation axial flap and a staged reconstruction.  A telfa template was made of the defect.  This telfa template was then used to outline the interpolation flap.  The donor area for the pedicle flap was then injected with anesthesia.  The flap was excised through the skin and subcutaneous tissue down to the layer of the underlying musculature.  The interpolation flap was carefully excised within this deep plane to maintain its blood supply.  The edges of the donor site were undermined.   The donor site was closed in a primary fashion.  The pedicle was then rotated into position and sutured.  Once the tube was sutured into place, adequate blood supply was confirmed with blanching and refill.  The pedicle was then wrapped with xeroform gauze and dressed appropriately with a telfa and gauze bandage to ensure continued blood supply and protect the attached pedicle.
Complex Repair And A-T Advancement Flap Text: The defect edges were debeveled with a #15 scalpel blade.  The primary defect was closed partially with a complex linear closure.  Given the location of the remaining defect, shape of the defect and the proximity to free margins an A-T advancement flap was deemed most appropriate for complete closure of the defect.  Using a sterile surgical marker, an appropriate advancement flap was drawn incorporating the defect and placing the expected incisions within the relaxed skin tension lines where possible.    The area thus outlined was incised deep to adipose tissue with a #15 scalpel blade.  The skin margins were undermined to an appropriate distance in all directions utilizing iris scissors.
Modified Advancement Flap Text: The defect edges were debeveled with a #15 scalpel blade.  Given the location of the defect, shape of the defect and the proximity to free margins a modified advancement flap was deemed most appropriate.  Using a sterile surgical marker, an appropriate advancement flap was drawn incorporating the defect and placing the expected incisions within the relaxed skin tension lines where possible.    The area thus outlined was incised deep to adipose tissue with a #15 scalpel blade.  The skin margins were undermined to an appropriate distance in all directions utilizing iris scissors.
Complex Repair And Dermal Autograft Text: The defect edges were debeveled with a #15 scalpel blade.  The primary defect was closed partially with a complex linear closure.  Given the location of the defect, shape of the defect and the proximity to free margins an dermal autograft was deemed most appropriate to repair the remaining defect.  The graft was trimmed to fit the size of the remaining defect.  The graft was then placed in the primary defect, oriented appropriately, and sutured into place.
Z Plasty Text: The lesion was extirpated to the level of the fat with a #15 scalpel blade.  Given the location of the defect, shape of the defect and the proximity to free margins a Z-plasty was deemed most appropriate for repair.  Using a sterile surgical marker, the appropriate transposition arms of the Z-plasty were drawn incorporating the defect and placing the expected incisions within the relaxed skin tension lines where possible.    The area thus outlined was incised deep to adipose tissue with a #15 scalpel blade.  The skin margins were undermined to an appropriate distance in all directions utilizing iris scissors.  The opposing transposition arms were then transposed into place in opposite direction and anchored with interrupted buried subcutaneous sutures.
Complex Repair And Melolabial Flap Text: The defect edges were debeveled with a #15 scalpel blade.  The primary defect was closed partially with a complex linear closure.  Given the location of the remaining defect, shape of the defect and the proximity to free margins a melolabial flap was deemed most appropriate for complete closure of the defect.  Using a sterile surgical marker, an appropriate advancement flap was drawn incorporating the defect and placing the expected incisions within the relaxed skin tension lines where possible.    The area thus outlined was incised deep to adipose tissue with a #15 scalpel blade.  The skin margins were undermined to an appropriate distance in all directions utilizing iris scissors.
No Repair - Repaired With Adjacent Surgical Defect Text (Leave Blank If You Do Not Want): After the excision the defect was repaired concurrently with another surgical defect which was in close approximation.
Intermediate Repair Preamble Text (Leave Blank If You Do Not Want): Undermining was performed with blunt dissection.
Hemostasis: Electrocautery
Complex Repair And Skin Substitute Graft Text: The defect edges were debeveled with a #15 scalpel blade.  The primary defect was closed partially with a complex linear closure.  Given the location of the remaining defect, shape of the defect and the proximity to free margins a skin substitute graft was deemed most appropriate to repair the remaining defect.  The graft was trimmed to fit the size of the remaining defect.  The graft was then placed in the primary defect, oriented appropriately, and sutured into place.
Banner Transposition Flap Text: The defect edges were debeveled with a #15 scalpel blade.  Given the location of the defect and the proximity to free margins a Banner transposition flap was deemed most appropriate.  Using a sterile surgical marker, an appropriate flap drawn around the defect. The area thus outlined was incised deep to adipose tissue with a #15 scalpel blade.  The skin margins were undermined to an appropriate distance in all directions utilizing iris scissors.
Bilobed Flap Text: The defect edges were debeveled with a #15 scalpel blade.  Given the location of the defect and the proximity to free margins a bilobe flap was deemed most appropriate.  Using a sterile surgical marker, an appropriate bilobe flap drawn around the defect.    The area thus outlined was incised deep to adipose tissue with a #15 scalpel blade.  The skin margins were undermined to an appropriate distance in all directions utilizing iris scissors.
Complex Repair And Double Advancement Flap Text: The defect edges were debeveled with a #15 scalpel blade.  The primary defect was closed partially with a complex linear closure.  Given the location of the remaining defect, shape of the defect and the proximity to free margins a double advancement flap was deemed most appropriate for complete closure of the defect.  Using a sterile surgical marker, an appropriate advancement flap was drawn incorporating the defect and placing the expected incisions within the relaxed skin tension lines where possible.    The area thus outlined was incised deep to adipose tissue with a #15 scalpel blade.  The skin margins were undermined to an appropriate distance in all directions utilizing iris scissors.
Complex Repair And M Plasty Text: The defect edges were debeveled with a #15 scalpel blade.  The primary defect was closed partially with a complex linear closure.  Given the location of the remaining defect, shape of the defect and the proximity to free margins an M plasty was deemed most appropriate for complete closure of the defect.  Using a sterile surgical marker, an appropriate advancement flap was drawn incorporating the defect and placing the expected incisions within the relaxed skin tension lines where possible.    The area thus outlined was incised deep to adipose tissue with a #15 scalpel blade.  The skin margins were undermined to an appropriate distance in all directions utilizing iris scissors.
Dressing: pressure dressing
Intermediate / Complex Repair - Final Wound Length In Cm: 1.2
Mercedes Flap Text: The defect edges were debeveled with a #15 scalpel blade.  Given the location of the defect, shape of the defect and the proximity to free margins a Mercedes flap was deemed most appropriate.  Using a sterile surgical marker, an appropriate advancement flap was drawn incorporating the defect and placing the expected incisions within the relaxed skin tension lines where possible. The area thus outlined was incised deep to adipose tissue with a #15 scalpel blade.  The skin margins were undermined to an appropriate distance in all directions utilizing iris scissors.
Complex Repair And Epidermal Autograft Text: The defect edges were debeveled with a #15 scalpel blade.  The primary defect was closed partially with a complex linear closure.  Given the location of the defect, shape of the defect and the proximity to free margins an epidermal autograft was deemed most appropriate to repair the remaining defect.  The graft was trimmed to fit the size of the remaining defect.  The graft was then placed in the primary defect, oriented appropriately, and sutured into place.
Xenograft Text: The defect edges were debeveled with a #15 scalpel blade.  Given the location of the defect, shape of the defect and the proximity to free margins a xenograft was deemed most appropriate.  The graft was then trimmed to fit the size of the defect.  The graft was then placed in the primary defect and oriented appropriately.
Tissue Cultured Epidermal Autograft Text: The defect edges were debeveled with a #15 scalpel blade.  Given the location of the defect, shape of the defect and the proximity to free margins a tissue cultured epidermal autograft was deemed most appropriate.  The graft was then trimmed to fit the size of the defect.  The graft was then placed in the primary defect and oriented appropriately.
Excision Depth: adipose tissue
O-Z Plasty Text: The defect edges were debeveled with a #15 scalpel blade.  Given the location of the defect, shape of the defect and the proximity to free margins an O-Z plasty (double transposition flap) was deemed most appropriate.  Using a sterile surgical marker, the appropriate transposition flaps were drawn incorporating the defect and placing the expected incisions within the relaxed skin tension lines where possible.    The area thus outlined was incised deep to adipose tissue with a #15 scalpel blade.  The skin margins were undermined to an appropriate distance in all directions utilizing iris scissors.  Hemostasis was achieved with electrocautery.  The flaps were then transposed into place, one clockwise and the other counterclockwise, and anchored with interrupted buried subcutaneous sutures.
Composite Graft Text: The defect edges were debeveled with a #15 scalpel blade.  Given the location of the defect, shape of the defect, the proximity to free margins and the fact the defect was full thickness a composite graft was deemed most appropriate.  The defect was outline and then transferred to the donor site.  A full thickness graft was then excised from the donor site. The graft was then placed in the primary defect, oriented appropriately and then sutured into place.  The secondary defect was then repaired using a primary closure.
Epidermal Autograft Text: The defect edges were debeveled with a #15 scalpel blade.  Given the location of the defect, shape of the defect and the proximity to free margins an epidermal autograft was deemed most appropriate.  Using a sterile surgical marker, the primary defect shape was transferred to the donor site. The epidermal graft was then harvested.  The skin graft was then placed in the primary defect and oriented appropriately.
Complex Repair And Single Advancement Flap Text: The defect edges were debeveled with a #15 scalpel blade.  The primary defect was closed partially with a complex linear closure.  Given the location of the remaining defect, shape of the defect and the proximity to free margins a single advancement flap was deemed most appropriate for complete closure of the defect.  Using a sterile surgical marker, an appropriate advancement flap was drawn incorporating the defect and placing the expected incisions within the relaxed skin tension lines where possible.    The area thus outlined was incised deep to adipose tissue with a #15 scalpel blade.  The skin margins were undermined to an appropriate distance in all directions utilizing iris scissors.
Number Of Deep Sutures (Optional): 3
Slit Excision Additional Text (Leave Blank If You Do Not Want): A linear line was drawn on the skin overlying the lesion. An incision was made slowly until the lesion was visualized.  Once visualized, the lesion was removed with blunt dissection.
Complex Repair And Z Plasty Text: The defect edges were debeveled with a #15 scalpel blade.  The primary defect was closed partially with a complex linear closure.  Given the location of the remaining defect, shape of the defect and the proximity to free margins a Z plasty was deemed most appropriate for complete closure of the defect.  Using a sterile surgical marker, an appropriate advancement flap was drawn incorporating the defect and placing the expected incisions within the relaxed skin tension lines where possible.    The area thus outlined was incised deep to adipose tissue with a #15 scalpel blade.  The skin margins were undermined to an appropriate distance in all directions utilizing iris scissors.
Complex Repair And Double M Plasty Text: The defect edges were debeveled with a #15 scalpel blade.  The primary defect was closed partially with a complex linear closure.  Given the location of the remaining defect, shape of the defect and the proximity to free margins a double M plasty was deemed most appropriate for complete closure of the defect.  Using a sterile surgical marker, an appropriate advancement flap was drawn incorporating the defect and placing the expected incisions within the relaxed skin tension lines where possible.    The area thus outlined was incised deep to adipose tissue with a #15 scalpel blade.  The skin margins were undermined to an appropriate distance in all directions utilizing iris scissors.
Double Island Pedicle Flap Text: The defect edges were debeveled with a #15 scalpel blade.  Given the location of the defect, shape of the defect and the proximity to free margins a double island pedicle advancement flap was deemed most appropriate.  Using a sterile surgical marker, an appropriate advancement flap was drawn incorporating the defect, outlining the appropriate donor tissue and placing the expected incisions within the relaxed skin tension lines where possible.    The area thus outlined was incised deep to adipose tissue with a #15 scalpel blade.  The skin margins were undermined to an appropriate distance in all directions around the primary defect and laterally outward around the island pedicle utilizing iris scissors.  There was minimal undermining beneath the pedicle flap.
Complex Repair And Bilobe Flap Text: The defect edges were debeveled with a #15 scalpel blade.  The primary defect was closed partially with a complex linear closure.  Given the location of the remaining defect, shape of the defect and the proximity to free margins a bilobe flap was deemed most appropriate for complete closure of the defect.  Using a sterile surgical marker, an appropriate advancement flap was drawn incorporating the defect and placing the expected incisions within the relaxed skin tension lines where possible.    The area thus outlined was incised deep to adipose tissue with a #15 scalpel blade.  The skin margins were undermined to an appropriate distance in all directions utilizing iris scissors.
Post-Care Instructions: I reviewed with the patient in detail post-care instructions. Patient is not to engage in any heavy lifting, exercise, or swimming for the next 14 days. Should the patient develop any fevers, chills, bleeding, severe pain patient will contact the office immediately.
Complex Repair Preamble Text (Leave Blank If You Do Not Want): Extensive wide undermining was performed.
Cheek-To-Nose Interpolation Flap Text: A decision was made to reconstruct the defect utilizing an interpolation axial flap and a staged reconstruction.  A telfa template was made of the defect.  This telfa template was then used to outline the Cheek-To-Nose Interpolation flap.  The donor area for the pedicle flap was then injected with anesthesia.  The flap was excised through the skin and subcutaneous tissue down to the layer of the underlying musculature.  The interpolation flap was carefully excised within this deep plane to maintain its blood supply.  The edges of the donor site were undermined.   The donor site was closed in a primary fashion.  The pedicle was then rotated into position and sutured.  Once the tube was sutured into place, adequate blood supply was confirmed with blanching and refill.  The pedicle was then wrapped with xeroform gauze and dressed appropriately with a telfa and gauze bandage to ensure continued blood supply and protect the attached pedicle.
Muscle Hinge Flap Text: The defect edges were debeveled with a #15 scalpel blade.  Given the size, depth and location of the defect and the proximity to free margins a muscle hinge flap was deemed most appropriate.  Using a sterile surgical marker, an appropriate hinge flap was drawn incorporating the defect. The area thus outlined was incised with a #15 scalpel blade.  The skin margins were undermined to an appropriate distance in all directions utilizing iris scissors.
Epidermal Closure Graft Donor Site (Optional): simple interrupted
Melolabial Transposition Flap Text: The defect edges were debeveled with a #15 scalpel blade.  Given the location of the defect and the proximity to free margins a melolabial flap was deemed most appropriate.  Using a sterile surgical marker, an appropriate melolabial transposition flap was drawn incorporating the defect.    The area thus outlined was incised deep to adipose tissue with a #15 scalpel blade.  The skin margins were undermined to an appropriate distance in all directions utilizing iris scissors.
Path Notes (To The Dermatopathologist): Please check margins.
Advancement Flap (Single) Text: The defect edges were debeveled with a #15 scalpel blade.  Given the location of the defect and the proximity to free margins a single advancement flap was deemed most appropriate.  Using a sterile surgical marker, an appropriate advancement flap was drawn incorporating the defect and placing the expected incisions within the relaxed skin tension lines where possible.    The area thus outlined was incised deep to adipose tissue with a #15 scalpel blade.  The skin margins were undermined to an appropriate distance in all directions utilizing iris scissors.
Complex Repair And Rhombic Flap Text: The defect edges were debeveled with a #15 scalpel blade.  The primary defect was closed partially with a complex linear closure.  Given the location of the remaining defect, shape of the defect and the proximity to free margins a rhombic flap was deemed most appropriate for complete closure of the defect.  Using a sterile surgical marker, an appropriate advancement flap was drawn incorporating the defect and placing the expected incisions within the relaxed skin tension lines where possible.    The area thus outlined was incised deep to adipose tissue with a #15 scalpel blade.  The skin margins were undermined to an appropriate distance in all directions utilizing iris scissors.
Suture Removal: 10 days
Cheek Interpolation Flap Text: A decision was made to reconstruct the defect utilizing an interpolation axial flap and a staged reconstruction.  A telfa template was made of the defect.  This telfa template was then used to outline the Cheek Interpolation flap.  The donor area for the pedicle flap was then injected with anesthesia.  The flap was excised through the skin and subcutaneous tissue down to the layer of the underlying musculature.  The interpolation flap was carefully excised within this deep plane to maintain its blood supply.  The edges of the donor site were undermined.   The donor site was closed in a primary fashion.  The pedicle was then rotated into position and sutured.  Once the tube was sutured into place, adequate blood supply was confirmed with blanching and refill.  The pedicle was then wrapped with xeroform gauze and dressed appropriately with a telfa and gauze bandage to ensure continued blood supply and protect the attached pedicle.
Spiral Flap Text: The defect edges were debeveled with a #15 scalpel blade.  Given the location of the defect, shape of the defect and the proximity to free margins a spiral flap was deemed most appropriate.  Using a sterile surgical marker, an appropriate rotation flap was drawn incorporating the defect and placing the expected incisions within the relaxed skin tension lines where possible. The area thus outlined was incised deep to adipose tissue with a #15 scalpel blade.  The skin margins were undermined to an appropriate distance in all directions utilizing iris scissors.
Ear Star Wedge Flap Text: The defect edges were debeveled with a #15 blade scalpel.  Given the location of the defect and the proximity to free margins (helical rim) an ear star wedge flap was deemed most appropriate.  Using a sterile surgical marker, the appropriate flap was drawn incorporating the defect and placing the expected incisions between the helical rim and antihelix where possible.  The area thus outlined was incised through and through with a #15 scalpel blade.
Perilesional Excision Additional Text (Leave Blank If You Do Not Want): The margin was drawn around the clinically apparent lesion. Incisions were then made along these lines to the appropriate tissue plane and the lesion was extirpated.
Deep Sutures: 3-0 Vicryl
V-Y Plasty Text: The defect edges were debeveled with a #15 scalpel blade.  Given the location of the defect, shape of the defect and the proximity to free margins an V-Y advancement flap was deemed most appropriate.  Using a sterile surgical marker, an appropriate advancement flap was drawn incorporating the defect and placing the expected incisions within the relaxed skin tension lines where possible.    The area thus outlined was incised deep to adipose tissue with a #15 scalpel blade.  The skin margins were undermined to an appropriate distance in all directions utilizing iris scissors.
Complex Repair And O-T Advancement Flap Text: The defect edges were debeveled with a #15 scalpel blade.  The primary defect was closed partially with a complex linear closure.  Given the location of the remaining defect, shape of the defect and the proximity to free margins an O-T advancement flap was deemed most appropriate for complete closure of the defect.  Using a sterile surgical marker, an appropriate advancement flap was drawn incorporating the defect and placing the expected incisions within the relaxed skin tension lines where possible.    The area thus outlined was incised deep to adipose tissue with a #15 scalpel blade.  The skin margins were undermined to an appropriate distance in all directions utilizing iris scissors.
Alar Island Pedicle Flap Text: The defect edges were debeveled with a #15 scalpel blade.  Given the location of the defect, shape of the defect and the proximity to the alar rim an island pedicle advancement flap was deemed most appropriate.  Using a sterile surgical marker, an appropriate advancement flap was drawn incorporating the defect, outlining the appropriate donor tissue and placing the expected incisions within the nasal ala running parallel to the alar rim. The area thus outlined was incised with a #15 scalpel blade.  The skin margins were undermined minimally to an appropriate distance in all directions around the primary defect and laterally outward around the island pedicle utilizing iris scissors.  There was minimal undermining beneath the pedicle flap.
Bi-Rhombic Flap Text: The defect edges were debeveled with a #15 scalpel blade.  Given the location of the defect and the proximity to free margins a bi-rhombic flap was deemed most appropriate.  Using a sterile surgical marker, an appropriate rhombic flap was drawn incorporating the defect. The area thus outlined was incised deep to adipose tissue with a #15 scalpel blade.  The skin margins were undermined to an appropriate distance in all directions utilizing iris scissors.
Anesthesia Type: 1% lidocaine with epinephrine
Rotation Flap Text: The defect edges were debeveled with a #15 scalpel blade.  Given the location of the defect, shape of the defect and the proximity to free margins a rotation flap was deemed most appropriate.  Using a sterile surgical marker, an appropriate rotation flap was drawn incorporating the defect and placing the expected incisions within the relaxed skin tension lines where possible.    The area thus outlined was incised deep to adipose tissue with a #15 scalpel blade.  The skin margins were undermined to an appropriate distance in all directions utilizing iris scissors.
Cartilage Graft Text: The defect edges were debeveled with a #15 scalpel blade.  Given the location of the defect, shape of the defect, the fact the defect involved a full thickness cartilage defect a cartilage graft was deemed most appropriate.  An appropriate donor site was identified, cleansed, and anesthetized. The cartilage graft was then harvested and transferred to the recipient site, oriented appropriately and then sutured into place.  The secondary defect was then repaired using a primary closure.
Paramedian Forehead Flap Text: A decision was made to reconstruct the defect utilizing an interpolation axial flap and a staged reconstruction.  A telfa template was made of the defect.  This telfa template was then used to outline the paramedian forehead pedicle flap.  The donor area for the pedicle flap was then injected with anesthesia.  The flap was excised through the skin and subcutaneous tissue down to the layer of the underlying musculature.  The pedicle flap was carefully excised within this deep plane to maintain its blood supply.  The edges of the donor site were undermined.   The donor site was closed in a primary fashion.  The pedicle was then rotated into position and sutured.  Once the tube was sutured into place, adequate blood supply was confirmed with blanching and refill.  The pedicle was then wrapped with xeroform gauze and dressed appropriately with a telfa and gauze bandage to ensure continued blood supply and protect the attached pedicle.
O-T Advancement Flap Text: The defect edges were debeveled with a #15 scalpel blade.  Given the location of the defect, shape of the defect and the proximity to free margins an O-T advancement flap was deemed most appropriate.  Using a sterile surgical marker, an appropriate advancement flap was drawn incorporating the defect and placing the expected incisions within the relaxed skin tension lines where possible.    The area thus outlined was incised deep to adipose tissue with a #15 scalpel blade.  The skin margins were undermined to an appropriate distance in all directions utilizing iris scissors.
Rhombic Flap Text: The defect edges were debeveled with a #15 scalpel blade.  Given the location of the defect and the proximity to free margins a rhombic flap was deemed most appropriate.  Using a sterile surgical marker, an appropriate rhombic flap was drawn incorporating the defect.    The area thus outlined was incised deep to adipose tissue with a #15 scalpel blade.  The skin margins were undermined to an appropriate distance in all directions utilizing iris scissors.
Consent was obtained from the patient. The risks and benefits to therapy were discussed in detail. Specifically, the risks of infection, scarring, bleeding, prolonged wound healing, incomplete removal, allergy to anesthesia, nerve injury and recurrence were addressed. Prior to the procedure, the treatment site was clearly identified and confirmed by the patient. All components of Universal Protocol/PAUSE Rule completed.
Purse String (Intermediate) Text: Given the location of the defect and the characteristics of the surrounding skin a purse string intermediate closure was deemed most appropriate.  Undermining was performed circumferentially around the surgical defect.  A purse string suture was then placed and tightened.
Wound Care: Petrolatum
Saucerization Excision Additional Text (Leave Blank If You Do Not Want): The margin was drawn around the clinically apparent lesion.  Incisions were then made along these lines, in a tangential fashion, to the appropriate tissue plane and the lesion was extirpated.
Epidermal Sutures: 4-0 Nylon
Purse String (Simple) Text: Given the location of the defect and the characteristics of the surrounding skin a purse string simple closure was deemed most appropriate.  Undermining was performed circumferentially around the surgical defect.  A purse string suture was then placed and tightened.
Anesthesia Volume In Cc: 4
A-T Advancement Flap Text: The defect edges were debeveled with a #15 scalpel blade.  Given the location of the defect, shape of the defect and the proximity to free margins an A-T advancement flap was deemed most appropriate.  Using a sterile surgical marker, an appropriate advancement flap was drawn incorporating the defect and placing the expected incisions within the relaxed skin tension lines where possible.    The area thus outlined was incised deep to adipose tissue with a #15 scalpel blade.  The skin margins were undermined to an appropriate distance in all directions utilizing iris scissors.
Complex Repair And O-L Flap Text: The defect edges were debeveled with a #15 scalpel blade.  The primary defect was closed partially with a complex linear closure.  Given the location of the remaining defect, shape of the defect and the proximity to free margins an O-L flap was deemed most appropriate for complete closure of the defect.  Using a sterile surgical marker, an appropriate flap was drawn incorporating the defect and placing the expected incisions within the relaxed skin tension lines where possible.    The area thus outlined was incised deep to adipose tissue with a #15 scalpel blade.  The skin margins were undermined to an appropriate distance in all directions utilizing iris scissors.
Graft Donor Site Bandage (Optional-Leave Blank If You Don't Want In Note): Steri-strips and a pressure bandage were applied to the donor site.
Complex Repair And Dorsal Nasal Flap Text: The defect edges were debeveled with a #15 scalpel blade.  The primary defect was closed partially with a complex linear closure.  Given the location of the remaining defect, shape of the defect and the proximity to free margins a dorsal nasal flap was deemed most appropriate for complete closure of the defect.  Using a sterile surgical marker, an appropriate flap was drawn incorporating the defect and placing the expected incisions within the relaxed skin tension lines where possible.    The area thus outlined was incised deep to adipose tissue with a #15 scalpel blade.  The skin margins were undermined to an appropriate distance in all directions utilizing iris scissors.
Island Pedicle Flap-Requiring Vessel Identification Text: The defect edges were debeveled with a #15 scalpel blade.  Given the location of the defect, shape of the defect and the proximity to free margins an island pedicle advancement flap was deemed most appropriate.  Using a sterile surgical marker, an appropriate advancement flap was drawn, based on the axial vessel mentioned above, incorporating the defect, outlining the appropriate donor tissue and placing the expected incisions within the relaxed skin tension lines where possible.    The area thus outlined was incised deep to adipose tissue with a #15 scalpel blade.  The skin margins were undermined to an appropriate distance in all directions around the primary defect and laterally outward around the island pedicle utilizing iris scissors.  There was minimal undermining beneath the pedicle flap.
Skin Substitute Text: The defect edges were debeveled with a #15 scalpel blade.  Given the location of the defect, shape of the defect and the proximity to free margins a skin substitute graft was deemed most appropriate.  The graft material was trimmed to fit the size of the defect. The graft was then placed in the primary defect and oriented appropriately.
Complex Repair And Split-Thickness Skin Graft Text: The defect edges were debeveled with a #15 scalpel blade.  The primary defect was closed partially with a complex linear closure.  Given the location of the defect, shape of the defect and the proximity to free margins a split thickness skin graft was deemed most appropriate to repair the remaining defect.  The graft was trimmed to fit the size of the remaining defect.  The graft was then placed in the primary defect, oriented appropriately, and sutured into place.
Complex Repair And Transposition Flap Text: The defect edges were debeveled with a #15 scalpel blade.  The primary defect was closed partially with a complex linear closure.  Given the location of the remaining defect, shape of the defect and the proximity to free margins a transposition flap was deemed most appropriate for complete closure of the defect.  Using a sterile surgical marker, an appropriate advancement flap was drawn incorporating the defect and placing the expected incisions within the relaxed skin tension lines where possible.    The area thus outlined was incised deep to adipose tissue with a #15 scalpel blade.  The skin margins were undermined to an appropriate distance in all directions utilizing iris scissors.
Complex Repair And Rotation Flap Text: The defect edges were debeveled with a #15 scalpel blade.  The primary defect was closed partially with a complex linear closure.  Given the location of the remaining defect, shape of the defect and the proximity to free margins a rotation flap was deemed most appropriate for complete closure of the defect.  Using a sterile surgical marker, an appropriate advancement flap was drawn incorporating the defect and placing the expected incisions within the relaxed skin tension lines where possible.    The area thus outlined was incised deep to adipose tissue with a #15 scalpel blade.  The skin margins were undermined to an appropriate distance in all directions utilizing iris scissors.
Burow's Advancement Flap Text: The defect edges were debeveled with a #15 scalpel blade.  Given the location of the defect and the proximity to free margins a Burow's advancement flap was deemed most appropriate.  Using a sterile surgical marker, the appropriate advancement flap was drawn incorporating the defect and placing the expected incisions within the relaxed skin tension lines where possible.    The area thus outlined was incised deep to adipose tissue with a #15 scalpel blade.  The skin margins were undermined to an appropriate distance in all directions utilizing iris scissors.
O-L Flap Text: The defect edges were debeveled with a #15 scalpel blade.  Given the location of the defect, shape of the defect and the proximity to free margins an O-L flap was deemed most appropriate.  Using a sterile surgical marker, an appropriate advancement flap was drawn incorporating the defect and placing the expected incisions within the relaxed skin tension lines where possible.    The area thus outlined was incised deep to adipose tissue with a #15 scalpel blade.  The skin margins were undermined to an appropriate distance in all directions utilizing iris scissors.
Star Wedge Flap Text: The defect edges were debeveled with a #15 scalpel blade.  Given the location of the defect, shape of the defect and the proximity to free margins a star wedge flap was deemed most appropriate.  Using a sterile surgical marker, an appropriate rotation flap was drawn incorporating the defect and placing the expected incisions within the relaxed skin tension lines where possible. The area thus outlined was incised deep to adipose tissue with a #15 scalpel blade.  The skin margins were undermined to an appropriate distance in all directions utilizing iris scissors.
Complex Repair And Tissue Cultured Epidermal Autograft Text: The defect edges were debeveled with a #15 scalpel blade.  The primary defect was closed partially with a complex linear closure.  Given the location of the defect, shape of the defect and the proximity to free margins an tissue cultured epidermal autograft was deemed most appropriate to repair the remaining defect.  The graft was trimmed to fit the size of the remaining defect.  The graft was then placed in the primary defect, oriented appropriately, and sutured into place.
Transposition Flap Text: The defect edges were debeveled with a #15 scalpel blade.  Given the location of the defect and the proximity to free margins a transposition flap was deemed most appropriate.  Using a sterile surgical marker, an appropriate transposition flap was drawn incorporating the defect.    The area thus outlined was incised deep to adipose tissue with a #15 scalpel blade.  The skin margins were undermined to an appropriate distance in all directions utilizing iris scissors.
Hatchet Flap Text: The defect edges were debeveled with a #15 scalpel blade.  Given the location of the defect, shape of the defect and the proximity to free margins a hatchet flap was deemed most appropriate.  Using a sterile surgical marker, an appropriate hatchet flap was drawn incorporating the defect and placing the expected incisions within the relaxed skin tension lines where possible.    The area thus outlined was incised deep to adipose tissue with a #15 scalpel blade.  The skin margins were undermined to an appropriate distance in all directions utilizing iris scissors.
Mastoid Interpolation Flap Text: A decision was made to reconstruct the defect utilizing an interpolation axial flap and a staged reconstruction.  A telfa template was made of the defect.  This telfa template was then used to outline the mastoid interpolation flap.  The donor area for the pedicle flap was then injected with anesthesia.  The flap was excised through the skin and subcutaneous tissue down to the layer of the underlying musculature.  The pedicle flap was carefully excised within this deep plane to maintain its blood supply.  The edges of the donor site were undermined.   The donor site was closed in a primary fashion.  The pedicle was then rotated into position and sutured.  Once the tube was sutured into place, adequate blood supply was confirmed with blanching and refill.  The pedicle was then wrapped with xeroform gauze and dressed appropriately with a telfa and gauze bandage to ensure continued blood supply and protect the attached pedicle.
Surgeon Performing Repair (Optional): Arianna Gray
Excisional Biopsy Additional Text (Leave Blank If You Do Not Want): The margin was drawn around the clinically apparent lesion. An elliptical shape was then drawn on the skin incorporating the lesion and margins.  Incisions were then made along these lines to the appropriate tissue plane and the lesion was extirpated.
Ftsg Text: The defect edges were debeveled with a #15 scalpel blade.  Given the location of the defect, shape of the defect and the proximity to free margins a full thickness skin graft was deemed most appropriate.  Using a sterile surgical marker, the primary defect shape was transferred to the donor site. The area thus outlined was incised deep to adipose tissue with a #15 scalpel blade.  The harvested graft was then trimmed of adipose tissue until only dermis and epidermis was left.  The skin margins of the secondary defect were undermined to an appropriate distance in all directions utilizing iris scissors.  The secondary defect was closed with interrupted buried subcutaneous sutures.  The skin edges were then re-apposed with running  sutures.  The skin graft was then placed in the primary defect and oriented appropriately.
Lab Facility: [id number]
H Plasty Text: Given the location of the defect, shape of the defect and the proximity to free margins a H-plasty was deemed most appropriate for repair.  Using a sterile surgical marker, the appropriate advancement arms of the H-plasty were drawn incorporating the defect and placing the expected incisions within the relaxed skin tension lines where possible. The area thus outlined was incised deep to adipose tissue with a #15 scalpel blade. The skin margins were undermined to an appropriate distance in all directions utilizing iris scissors.  The opposing advancement arms were then advanced into place in opposite direction and anchored with interrupted buried subcutaneous sutures.
Undermining Location (Optional): in the superficial subcutaneous fat
Repair Type: Intermediate
Medical Necessity Information: It is in your best interest to select a reason for this procedure from the list below. All of these items fulfill various CMS LCD requirements except lesion extends to a margin.
Complex Repair And Xenograft Text: The defect edges were debeveled with a #15 scalpel blade.  The primary defect was closed partially with a complex linear closure.  Given the location of the defect, shape of the defect and the proximity to free margins a xenograft was deemed most appropriate to repair the remaining defect.  The graft was trimmed to fit the size of the remaining defect.  The graft was then placed in the primary defect, oriented appropriately, and sutured into place.
Complex Repair And Ftsg Text: The defect edges were debeveled with a #15 scalpel blade.  The primary defect was closed partially with a complex linear closure.  Given the location of the defect, shape of the defect and the proximity to free margins a full thickness skin graft was deemed most appropriate to repair the remaining defect.  The graft was trimmed to fit the size of the remaining defect.  The graft was then placed in the primary defect, oriented appropriately, and sutured into place.
Crescentic Advancement Flap Text: The defect edges were debeveled with a #15 scalpel blade.  Given the location of the defect and the proximity to free margins a crescentic advancement flap was deemed most appropriate.  Using a sterile surgical marker, the appropriate advancement flap was drawn incorporating the defect and placing the expected incisions within the relaxed skin tension lines where possible.    The area thus outlined was incised deep to adipose tissue with a #15 scalpel blade.  The skin margins were undermined to an appropriate distance in all directions utilizing iris scissors.
Scalpel Size: 10 blade
V-Y Flap Text: The defect edges were debeveled with a #15 scalpel blade.  Given the location of the defect, shape of the defect and the proximity to free margins a V-Y flap was deemed most appropriate.  Using a sterile surgical marker, an appropriate advancement flap was drawn incorporating the defect and placing the expected incisions within the relaxed skin tension lines where possible.    The area thus outlined was incised deep to adipose tissue with a #15 scalpel blade.  The skin margins were undermined to an appropriate distance in all directions utilizing iris scissors.
Medical Necessity Clause: This procedure was medically necessary because the lesion that was treated was painful
Mucosal Advancement Flap Text: Given the location of the defect, shape of the defect and the proximity to free margins a mucosal advancement flap was deemed most appropriate. Incisions were made with a 15 blade scalpel in the appropriate fashion along the cutaneous vermillion border and the mucosal lip. The remaining actinically damaged mucosal tissue was excised.  The mucosal advancement flap was then elevated to the gingival sulcus with care taken to preserve the neurovascular structures and advanced into the primary defect. Care was taken to ensure that precise realignment of the vermilion border was achieved.
Melolabial Interpolation Flap Text: A decision was made to reconstruct the defect utilizing an interpolation axial flap and a staged reconstruction.  A telfa template was made of the defect.  This telfa template was then used to outline the melolabial interpolation flap.  The donor area for the pedicle flap was then injected with anesthesia.  The flap was excised through the skin and subcutaneous tissue down to the layer of the underlying musculature.  The pedicle flap was carefully excised within this deep plane to maintain its blood supply.  The edges of the donor site were undermined.   The donor site was closed in a primary fashion.  The pedicle was then rotated into position and sutured.  Once the tube was sutured into place, adequate blood supply was confirmed with blanching and refill.  The pedicle was then wrapped with xeroform gauze and dressed appropriately with a telfa and gauze bandage to ensure continued blood supply and protect the attached pedicle.
Eliptical Excision Additional Text (Leave Blank If You Do Not Want): The margin was drawn around the clinically apparent lesion.  An elliptical shape was then drawn on the skin incorporating the lesion and margins.  Incisions were then made along these lines to the appropriate tissue plane and the lesion was extirpated.
Lab: [id number]
Advancement Flap (Double) Text: The defect edges were debeveled with a #15 scalpel blade.  Given the location of the defect and the proximity to free margins a double advancement flap was deemed most appropriate.  Using a sterile surgical marker, the appropriate advancement flaps were drawn incorporating the defect and placing the expected incisions within the relaxed skin tension lines where possible.    The area thus outlined was incised deep to adipose tissue with a #15 scalpel blade.  The skin margins were undermined to an appropriate distance in all directions utilizing iris scissors.
Split-Thickness Skin Graft Text: The defect edges were debeveled with a #15 scalpel blade.  Given the location of the defect, shape of the defect and the proximity to free margins a split thickness skin graft was deemed most appropriate.  Using a sterile surgical marker, the primary defect shape was transferred to the donor site. The split thickness graft was then harvested.  The skin graft was then placed in the primary defect and oriented appropriately.
Helical Rim Advancement Flap Text: The defect edges were debeveled with a #15 blade scalpel.  Given the location of the defect and the proximity to free margins (helical rim) a double helical rim advancement flap was deemed most appropriate.  Using a sterile surgical marker, the appropriate advancement flaps were drawn incorporating the defect and placing the expected incisions between the helical rim and antihelix where possible.  The area thus outlined was incised through and through with a #15 scalpel blade.  With a skin hook and iris scissors, the flaps were gently and sharply undermined and freed up.
Keystone Flap Text: The defect edges were debeveled with a #15 scalpel blade.  Given the location of the defect, shape of the defect a keystone flap was deemed most appropriate.  Using a sterile surgical marker, an appropriate keystone flap was drawn incorporating the defect, outlining the appropriate donor tissue and placing the expected incisions within the relaxed skin tension lines where possible. The area thus outlined was incised deep to adipose tissue with a #15 scalpel blade.  The skin margins were undermined to an appropriate distance in all directions around the primary defect and laterally outward around the flap utilizing iris scissors.
Lip Wedge Excision Repair Text: Given the location of the defect and the proximity to free margins a full thickness wedge repair was deemed most appropriate.  Using a sterile surgical marker, the appropriate repair was drawn incorporating the defect and placing the expected incisions perpendicular to the vermilion border.  The vermilion border was also meticulously outlined to ensure appropriate reapproximation during the repair.  The area thus outlined was incised through and through with a #15 scalpel blade.  The muscularis and dermis were reaproximated with deep sutures following hemostasis. Care was taken to realign the vermilion border before proceeding with the superficial closure.  Once the vermilion was realigned the superfical and mucosal closure was finished.
Detail Level: Detailed
Posterior Auricular Interpolation Flap Text: A decision was made to reconstruct the defect utilizing an interpolation axial flap and a staged reconstruction.  A telfa template was made of the defect.  This telfa template was then used to outline the posterior auricular interpolation flap.  The donor area for the pedicle flap was then injected with anesthesia.  The flap was excised through the skin and subcutaneous tissue down to the layer of the underlying musculature.  The pedicle flap was carefully excised within this deep plane to maintain its blood supply.  The edges of the donor site were undermined.   The donor site was closed in a primary fashion.  The pedicle was then rotated into position and sutured.  Once the tube was sutured into place, adequate blood supply was confirmed with blanching and refill.  The pedicle was then wrapped with xeroform gauze and dressed appropriately with a telfa and gauze bandage to ensure continued blood supply and protect the attached pedicle.
Island Pedicle Flap With Canthal Suspension Text: The defect edges were debeveled with a #15 scalpel blade.  Given the location of the defect, shape of the defect and the proximity to free margins an island pedicle advancement flap was deemed most appropriate.  Using a sterile surgical marker, an appropriate advancement flap was drawn incorporating the defect, outlining the appropriate donor tissue and placing the expected incisions within the relaxed skin tension lines where possible. The area thus outlined was incised deep to adipose tissue with a #15 scalpel blade.  The skin margins were undermined to an appropriate distance in all directions around the primary defect and laterally outward around the island pedicle utilizing iris scissors.  There was minimal undermining beneath the pedicle flap. A suspension suture was placed in the canthal tendon to prevent tension and prevent ectropion.
Additional Anesthesia Volume In Cc: 6

## 2018-08-29 ENCOUNTER — PHYSICAL THERAPY (OUTPATIENT)
Dept: PHYSICAL THERAPY | Facility: MEDICAL CENTER | Age: 68
End: 2018-08-29
Attending: PHYSICIAN ASSISTANT
Payer: MEDICARE

## 2018-08-29 DIAGNOSIS — M54.31 SCIATICA OF RIGHT SIDE: ICD-10-CM

## 2018-08-29 LAB
AMBIGUOUS DTTM AMBI4: NORMAL
GRAM STN SPEC: NORMAL
SIGNIFICANT IND 70042: NORMAL
SIGNIFICANT IND 70042: NORMAL
SITE SITE: NORMAL
SITE SITE: NORMAL
SOURCE SOURCE: NORMAL
SOURCE SOURCE: NORMAL

## 2018-08-29 PROCEDURE — 97140 MANUAL THERAPY 1/> REGIONS: CPT

## 2018-08-29 PROCEDURE — 97110 THERAPEUTIC EXERCISES: CPT

## 2018-08-29 PROCEDURE — 97014 ELECTRIC STIMULATION THERAPY: CPT

## 2018-08-29 NOTE — OP THERAPY DAILY TREATMENT
Outpatient Physical Therapy  DAILY TREATMENT     [facility]  [address]  [place] [address]  Phone:  [phone]  Fax:  [phone]    Date: 08/29/2018    Patient: Curt Blair  YOB: 1950  MRN: [id number]     Time Calculation  Start time: 1130  Stop time: 1215 Time Calculation (min): 45 minutes     Chief Complaint: Back Problem and Hip Problem    Visit #: 2    SUBJECTIVE:  Curt seen for follow up on R sided low back pain. He feels slightly better over the last few days/week. Pain still present though.     OBJECTIVE:  Current objective measures:          Therapeutic Exercises (CPT 10908):     1. Review HEP    2. Add Standing Lat/QL stretch    3. Standing Palvov press, green band 10-15 each side     Therapeutic Treatments and Modalities:     1. Manual Therapy (CPT 97744), R LB/hip, STM to erectors and QL (tender), glute med peel with pin and stretch flexion, ext, Belted hips mobs, iso abd and add    2. E Stim Unattended (CPT 89800), R LB, Hip, IFC and heat to R sided LB     Time-based treatments/modalities:  Manual therapy minutes (CPT 64963): 20 minutes  Therapeutic exercise minutes (CPT 66985): 10 minutes       Pain rating before treatment: 0  Pain rating after treatment: 0    ASSESSMENT:   Response to treatment: Doing ok after treatment. Will progress as able. Try side lying stab, leg abd or side plank/bridge    PLAN/RECOMMENDATIONS:   Plan for treatment: therapy treatment to continue next visit.  Planned interventions for next visit: continue with current treatment.

## 2018-08-31 LAB
BACTERIA WND AEROBE CULT: ABNORMAL
BACTERIA WND AEROBE CULT: ABNORMAL
GRAM STN SPEC: ABNORMAL
SIGNIFICANT IND 70042: ABNORMAL
SITE SITE: ABNORMAL
SOURCE SOURCE: ABNORMAL

## 2018-09-04 ENCOUNTER — RX ONLY (OUTPATIENT)
Age: 68
Setting detail: RX ONLY
End: 2018-09-04

## 2018-09-04 RX ORDER — DOXYCYCLINE HYCLATE 100 MG/1
CAPSULE, GELATIN COATED ORAL
Qty: 28 | Refills: 0 | Status: ERX

## 2018-09-05 ENCOUNTER — PHYSICAL THERAPY (OUTPATIENT)
Dept: PHYSICAL THERAPY | Facility: MEDICAL CENTER | Age: 68
End: 2018-09-05
Attending: PHYSICIAN ASSISTANT
Payer: MEDICARE

## 2018-09-05 DIAGNOSIS — M54.31 SCIATICA OF RIGHT SIDE: ICD-10-CM

## 2018-09-05 PROCEDURE — 97140 MANUAL THERAPY 1/> REGIONS: CPT

## 2018-09-05 PROCEDURE — 97110 THERAPEUTIC EXERCISES: CPT

## 2018-09-05 PROCEDURE — 97014 ELECTRIC STIMULATION THERAPY: CPT

## 2018-09-05 NOTE — OP THERAPY DAILY TREATMENT
Outpatient Physical Therapy  DAILY TREATMENT     [facility]  [address]  [place] [address]  Phone:  [phone]  Fax:  [phone]    Date: 09/05/2018    Patient: Curt Blair  YOB: 1950  MRN: [id number]     Time Calculation  Start time: 1130  Stop time: 1215 Time Calculation (min): 45 minutes     Chief Complaint: Back Problem and Hip Problem    Visit #: 3    SUBJECTIVE:  Curt seen for follow up on R sided low back pain. He feels slightly better over the last few days/week. Notes especially good      OBJECTIVE:  Current objective measures:          Therapeutic Exercises (CPT 56875):     1. Review HEP    2. Add Standing Lat/QL stretch    3. Mod half kneel groin stretch    Therapeutic Treatments and Modalities:     1. Manual Therapy (CPT 98003), R LB/hip, STM to erectors and QL (tender), glute med peel with pin and stretch flexion, ext, Belted hips mobs, iso abd and add    2. E Stim Unattended (CPT 52462), R LB, Hip, IFC and heat to R sided LB     Time-based treatments/modalities:  Manual therapy minutes (CPT 23877): 20 minutes  Therapeutic exercise minutes (CPT 48429): 10 minutes       Pain rating before treatment: 0  Pain rating after treatment: 0    ASSESSMENT:   Response to treatment: Doing ok after treatment. Will progress as able. Try side lying stab, leg abd or side plank/bridge. Will see for a couple more visits then DC to HEP     PLAN/RECOMMENDATIONS:   Plan for treatment: therapy treatment to continue next visit.  Planned interventions for next visit: continue with current treatment.

## 2018-09-11 ENCOUNTER — TELEPHONE (OUTPATIENT)
Dept: MEDICAL GROUP | Facility: MEDICAL CENTER | Age: 68
End: 2018-09-11

## 2018-09-11 ENCOUNTER — APPOINTMENT (RX ONLY)
Dept: URBAN - METROPOLITAN AREA CLINIC 20 | Facility: CLINIC | Age: 68
Setting detail: DERMATOLOGY
End: 2018-09-11

## 2018-09-11 DIAGNOSIS — Z48.02 ENCOUNTER FOR REMOVAL OF SUTURES: ICD-10-CM

## 2018-09-11 PROCEDURE — ? SUTURE REMOVAL (GLOBAL PERIOD)

## 2018-09-11 RX ORDER — ATORVASTATIN CALCIUM 10 MG/1
10 TABLET, FILM COATED ORAL DAILY
Qty: 90 TAB | Refills: 3 | Status: SHIPPED | OUTPATIENT
Start: 2018-09-11 | End: 2019-04-02 | Stop reason: SDUPTHER

## 2018-09-11 ASSESSMENT — LOCATION ZONE DERM: LOCATION ZONE: NECK

## 2018-09-11 ASSESSMENT — LOCATION SIMPLE DESCRIPTION DERM: LOCATION SIMPLE: POSTERIOR NECK

## 2018-09-11 ASSESSMENT — LOCATION DETAILED DESCRIPTION DERM: LOCATION DETAILED: MID POSTERIOR NECK

## 2018-09-11 NOTE — TELEPHONE ENCOUNTER
Future Appointments       Provider Department Center    9/19/2018 11:30 AM Dustin Null, PT, DPT [facility]    10/1/2018 10:00 AM Cheryl Chopra M.D.; [facility]  [facility]        ANNUAL WELLNESS VISIT PRE-VISIT PLANNING WITHOUT OUTREACH  3.  Immunizations were updated in Epic using WebIZ?: Yes       •  WebIZ Recommendations: FLU and SHINGRIX (Shingles)        •  Is patient due for Tdap? NO       •  Is patient due for Shingles? YES. Patient was not notified of copay/out of pocket cost.     4.  Patient is due for the following Health Maintenance Topics:   Health Maintenance Due   Topic Date Due   • IMM ZOSTER VACCINES (2 of 3) 12/31/2015   • IMM INFLUENZA (1) 09/01/2018       5.  Reviewed/Updated the following with patient:       •   Preferred Pharmacy? YES       •   Preferred Lab? YES       •   Preferred Communication? YES       •   Allergies? YES       •   Medications? YES. Was Abstract Encounter opened and chart updated? YES       •   Social History? YES. Was Abstract Encounter opened and chart updated? YES       •   Family History (document living status of immediate family members and if + hx of  cancer, diabetes, hypertension, hyperlipidemia, heart attack, stroke) YES. Was Abstract Encounter opened and chart updated? YES    6.  Care Team Updated:       •   DME Company (gait device, O2, CPAP, etc.): N\A       •   Other Specialists (eye doctor, derm, GYN, cardiology, endo, etc): N\A    7.  Patient has the following Care Path diagnoses on Problem List:  NONE    8.  MDX printed and highlighted for Provider? YES    9.  Patient was advised: “This is a free wellness visit. The provider will screen for medical conditions to help you stay healthy. If you have other concerns to address you may be asked to discuss these at a separate visit or there may be an additional fee.”     10.  Patient was  informed to arrive 15 min prior to their scheduled appointment and bring in their medication bottles.

## 2018-09-11 NOTE — TELEPHONE ENCOUNTER
Was the patient seen in the last year in this department? Yes    Does patient have an active prescription for medications requested? No     Received Request Via: Patient       Patient scheduled for labs this Thursday.

## 2018-09-11 NOTE — PROCEDURE: SUTURE REMOVAL (GLOBAL PERIOD)
Detail Level: Detailed
Add 18192 Cpt? (Important Note: In 2017 The Use Of 17424 Is Being Tracked By Cms To Determine Future Global Period Reimbursement For Global Periods): no

## 2018-09-12 ENCOUNTER — APPOINTMENT (OUTPATIENT)
Dept: PHYSICAL THERAPY | Facility: MEDICAL CENTER | Age: 68
End: 2018-09-12
Attending: PHYSICIAN ASSISTANT
Payer: MEDICARE

## 2018-09-13 ENCOUNTER — HOSPITAL ENCOUNTER (OUTPATIENT)
Dept: LAB | Facility: MEDICAL CENTER | Age: 68
End: 2018-09-13
Attending: FAMILY MEDICINE
Payer: MEDICARE

## 2018-09-13 DIAGNOSIS — E78.5 DYSLIPIDEMIA: ICD-10-CM

## 2018-09-13 LAB
ALBUMIN SERPL BCP-MCNC: 4.1 G/DL (ref 3.2–4.9)
ALBUMIN/GLOB SERPL: 1.3 G/DL
ALP SERPL-CCNC: 48 U/L (ref 30–99)
ALT SERPL-CCNC: 39 U/L (ref 2–50)
ANION GAP SERPL CALC-SCNC: 10 MMOL/L (ref 0–11.9)
AST SERPL-CCNC: 28 U/L (ref 12–45)
BASOPHILS # BLD AUTO: 1.2 % (ref 0–1.8)
BASOPHILS # BLD: 0.06 K/UL (ref 0–0.12)
BILIRUB SERPL-MCNC: 0.8 MG/DL (ref 0.1–1.5)
BUN SERPL-MCNC: 21 MG/DL (ref 8–22)
CALCIUM SERPL-MCNC: 9.2 MG/DL (ref 8.5–10.5)
CHLORIDE SERPL-SCNC: 103 MMOL/L (ref 96–112)
CHOLEST SERPL-MCNC: 146 MG/DL (ref 100–199)
CO2 SERPL-SCNC: 25 MMOL/L (ref 20–33)
CREAT SERPL-MCNC: 0.88 MG/DL (ref 0.5–1.4)
EOSINOPHIL # BLD AUTO: 0.14 K/UL (ref 0–0.51)
EOSINOPHIL NFR BLD: 2.9 % (ref 0–6.9)
ERYTHROCYTE [DISTWIDTH] IN BLOOD BY AUTOMATED COUNT: 44.7 FL (ref 35.9–50)
FASTING STATUS PATIENT QL REPORTED: NORMAL
GLOBULIN SER CALC-MCNC: 3.1 G/DL (ref 1.9–3.5)
GLUCOSE SERPL-MCNC: 97 MG/DL (ref 65–99)
HCT VFR BLD AUTO: 45.8 % (ref 42–52)
HDLC SERPL-MCNC: 56 MG/DL
HGB BLD-MCNC: 15.1 G/DL (ref 14–18)
IMM GRANULOCYTES # BLD AUTO: 0.01 K/UL (ref 0–0.11)
IMM GRANULOCYTES NFR BLD AUTO: 0.2 % (ref 0–0.9)
LDLC SERPL CALC-MCNC: 63 MG/DL
LYMPHOCYTES # BLD AUTO: 1.16 K/UL (ref 1–4.8)
LYMPHOCYTES NFR BLD: 24 % (ref 22–41)
MCH RBC QN AUTO: 30.7 PG (ref 27–33)
MCHC RBC AUTO-ENTMCNC: 33 G/DL (ref 33.7–35.3)
MCV RBC AUTO: 93.1 FL (ref 81.4–97.8)
MONOCYTES # BLD AUTO: 0.75 K/UL (ref 0–0.85)
MONOCYTES NFR BLD AUTO: 15.5 % (ref 0–13.4)
NEUTROPHILS # BLD AUTO: 2.72 K/UL (ref 1.82–7.42)
NEUTROPHILS NFR BLD: 56.2 % (ref 44–72)
NRBC # BLD AUTO: 0 K/UL
NRBC BLD-RTO: 0 /100 WBC
PLATELET # BLD AUTO: 248 K/UL (ref 164–446)
PMV BLD AUTO: 11.4 FL (ref 9–12.9)
POTASSIUM SERPL-SCNC: 4.5 MMOL/L (ref 3.6–5.5)
PROT SERPL-MCNC: 7.2 G/DL (ref 6–8.2)
RBC # BLD AUTO: 4.92 M/UL (ref 4.7–6.1)
SODIUM SERPL-SCNC: 138 MMOL/L (ref 135–145)
TRIGL SERPL-MCNC: 134 MG/DL (ref 0–149)
WBC # BLD AUTO: 4.8 K/UL (ref 4.8–10.8)

## 2018-09-13 PROCEDURE — 80053 COMPREHEN METABOLIC PANEL: CPT

## 2018-09-13 PROCEDURE — 36415 COLL VENOUS BLD VENIPUNCTURE: CPT

## 2018-09-13 PROCEDURE — 85025 COMPLETE CBC W/AUTO DIFF WBC: CPT

## 2018-09-13 PROCEDURE — 80061 LIPID PANEL: CPT

## 2018-09-19 ENCOUNTER — APPOINTMENT (OUTPATIENT)
Dept: PHYSICAL THERAPY | Facility: MEDICAL CENTER | Age: 68
End: 2018-09-19
Attending: PHYSICIAN ASSISTANT
Payer: MEDICARE

## 2018-09-20 ENCOUNTER — OFFICE VISIT (OUTPATIENT)
Dept: MEDICAL GROUP | Facility: MEDICAL CENTER | Age: 68
End: 2018-09-20
Payer: MEDICARE

## 2018-09-20 VITALS
HEART RATE: 86 BPM | SYSTOLIC BLOOD PRESSURE: 113 MMHG | OXYGEN SATURATION: 97 % | WEIGHT: 254.19 LBS | TEMPERATURE: 97.7 F | DIASTOLIC BLOOD PRESSURE: 60 MMHG | HEIGHT: 75 IN | BODY MASS INDEX: 31.61 KG/M2

## 2018-09-20 DIAGNOSIS — Z23 NEED FOR SHINGLES VACCINE: ICD-10-CM

## 2018-09-20 DIAGNOSIS — R43.2 AGEUSIA: ICD-10-CM

## 2018-09-20 DIAGNOSIS — Z23 NEEDS FLU SHOT: ICD-10-CM

## 2018-09-20 DIAGNOSIS — H61.23 BILATERAL IMPACTED CERUMEN: ICD-10-CM

## 2018-09-20 PROCEDURE — G0008 ADMIN INFLUENZA VIRUS VAC: HCPCS | Performed by: PHYSICIAN ASSISTANT

## 2018-09-20 PROCEDURE — 99214 OFFICE O/P EST MOD 30 MIN: CPT | Mod: 25 | Performed by: PHYSICIAN ASSISTANT

## 2018-09-20 PROCEDURE — 90662 IIV NO PRSV INCREASED AG IM: CPT | Performed by: PHYSICIAN ASSISTANT

## 2018-09-20 NOTE — ASSESSMENT & PLAN NOTE
This is a 67-year-old male who is here today complaining of hearing loss on the right ear. Left ear is not affected. Has a chronic history of wax impaction. Tries to do things over-the-counter with a device but has not been effective recently. Denies any pain in his ears.

## 2018-09-20 NOTE — ASSESSMENT & PLAN NOTE
Has had significant loss of taste over the past few months. It was getting worse but then accelerated with starting diclofenac. He was told by his PCP there is no side effects attributed to that medication with loss of taste. Has a remote history of smoking. Follows with dentistry every 6 months. Does have some allergy concerns. Sinus congestion and drainage. Takes an over-the-counter allergy medication daily.

## 2018-09-20 NOTE — PROGRESS NOTES
Subjective:   Curt Blair is a 67 y.o. male here today for hearing loss for one week worse in the left ear, also loss of taste and vaccines.    Bilateral impacted cerumen  This is a 67-year-old male who is here today complaining of hearing loss on the right ear. Left ear is not affected. Has a chronic history of wax impaction. Tries to do things over-the-counter with a device but has not been effective recently. Denies any pain in his ears.    Ageusia  Has had significant loss of taste over the past few months. It was getting worse but then accelerated with starting diclofenac. He was told by his PCP there is no side effects attributed to that medication with loss of taste. Has a remote history of smoking. Follows with dentistry every 6 months. Does have some allergy concerns. Sinus congestion and drainage. Takes an over-the-counter allergy medication daily.       Current medicines (including changes today)  Current Outpatient Prescriptions   Medication Sig Dispense Refill   • Zoster Vac Recomb Adjuvanted (SHINGRIX) 50 MCG Recon Susp 0.5 mL by Intramuscular route Once for 1 dose. 0.5 mL 0   • atorvastatin (LIPITOR) 10 MG Tab Take 1 Tab by mouth every day. TAKE ONE TABLET BY MOUTH DAILY 90 Tab 3   • amlodipine-benazepril (LOTREL) 5-10 MG per capsule Take 1 Cap by mouth every day. 90 Cap 3   • nitroglycerin (NITROSTAT) 0.4 MG SL Tab Place 1 Tab under tongue as needed for Chest Pain (Place 1 tablet under the tongue every 5 minutes up to 3 doses as needed for chest pain). 25 Tab 5   • ALPRAZolam (XANAX) 0.5 MG Tab Take 0.25 mg by mouth as needed.     • diclofenac EC (VOLTAREN) 75 MG Tablet Delayed Response Take 1 Tab by mouth 2 times a day. 180 Tab 3   • Multiple Vitamins-Minerals (MULTIVITAMIN PO) Take  by mouth.     • Coenzyme Q10 (CO Q 10 PO) Take  by mouth.     • MEGARED OMEGA-3 KRILL OIL PO Take  by mouth.     • CALCIUM PO Take  by mouth.     • Multiple Vitamins-Minerals (OCUVITE) Tab Take 1 Tab by mouth  "every day.     • aspirin 81 MG tablet Take 81 mg by mouth every day.         No current facility-administered medications for this visit.      He  has a past medical history of Arthritis; CAD (coronary artery disease); Generalized anxiety disorder (10/11/2017); Hyperlipidemia; Hypertension; Inflamed sebaceous cyst (2/15/2018); and S/P coronary artery stent placement.    Social History and Family History were reviewed and updated.    ROS   No chest pain, no shortness of breath, no abdominal pain and all other systems were reviewed and are negative.       Objective:     Blood pressure 113/60, pulse 86, temperature 36.5 °C (97.7 °F), height 1.905 m (6' 3\"), weight 115.3 kg (254 lb 3.1 oz), SpO2 97 %. Body mass index is 31.77 kg/m².   Physical Exam:  Constitutional: Alert, no distress.  Skin: Warm, dry, good turgor, no rashes in visible areas.  Eye: Equal, round and reactive, conjunctiva clear, lids normal.  ENMT: Lips without lesions, good dentition, oropharynx clear. Right ear with successful lavage. Left ear with some wax noted. TM is not occluded. He tolerated well.  Neck: Trachea midline, no masses.   Lymph: No cervical or supraclavicular lymphadenopathy  Respiratory: Unlabored respiratory effort, lungs clear to auscultation, no wheezes, no ronchi.  Cardiovascular: Normal S1, S2, no murmur, no edema.  Abdomen: Soft, non-tender, no masses.  Psych: Alert and oriented x3, normal affect and mood.        Assessment and Plan:   The following treatment plan was discussed    1. Bilateral impacted cerumen  New-onset condition. Advised receive yearly lavages. Discuss home use lavage.    2. Ageusia  New condition but chronic. Advised potentially using over-the-counter nasal steroid spray to see if this helps with his allergies thereby clearing of taste but concerned. Symptoms likely secondary to age. Follow with dentistry.    3. Needs flu shot  Administered without complaints.  - INFLUENZA VACCINE, HIGH DOSE (65+ ONLY)    4. " Need for shingles vaccine  Provided prescription and discussed.  - Zoster Vac Recomb Adjuvanted (SHINGRIX) 50 MCG Recon Susp; 0.5 mL by Intramuscular route Once for 1 dose.  Dispense: 0.5 mL; Refill: 0    Patient was seen for 25 minutes face to face of which > 50% of appointment time was spent on counseling and coordination of care regarding the above.    Followup: Return if symptoms worsen or fail to improve.    Please note that this dictation was created using voice recognition software. I have made every reasonable attempt to correct obvious errors, but I expect that there are errors of grammar and possibly content that I did not discover before finalizing the note.

## 2018-09-23 ENCOUNTER — PATIENT MESSAGE (OUTPATIENT)
Dept: MEDICAL GROUP | Facility: MEDICAL CENTER | Age: 68
End: 2018-09-23

## 2018-09-23 DIAGNOSIS — M54.30 SCIATICA, UNSPECIFIED LATERALITY: ICD-10-CM

## 2018-10-01 ENCOUNTER — OFFICE VISIT (OUTPATIENT)
Dept: MEDICAL GROUP | Facility: MEDICAL CENTER | Age: 68
End: 2018-10-01
Payer: MEDICARE

## 2018-10-01 VITALS
RESPIRATION RATE: 16 BRPM | BODY MASS INDEX: 34.54 KG/M2 | TEMPERATURE: 97.2 F | DIASTOLIC BLOOD PRESSURE: 66 MMHG | HEART RATE: 79 BPM | WEIGHT: 255 LBS | OXYGEN SATURATION: 95 % | SYSTOLIC BLOOD PRESSURE: 116 MMHG | HEIGHT: 72 IN

## 2018-10-01 DIAGNOSIS — Z95.5 STATUS POST CORONARY ARTERY STENT PLACEMENT: ICD-10-CM

## 2018-10-01 DIAGNOSIS — I25.83 CORONARY ARTERY DISEASE DUE TO LIPID RICH PLAQUE: ICD-10-CM

## 2018-10-01 DIAGNOSIS — I10 ESSENTIAL HYPERTENSION, BENIGN: ICD-10-CM

## 2018-10-01 DIAGNOSIS — I25.10 CORONARY ARTERY DISEASE DUE TO LIPID RICH PLAQUE: ICD-10-CM

## 2018-10-01 DIAGNOSIS — M54.31 SCIATICA, RIGHT SIDE: ICD-10-CM

## 2018-10-01 DIAGNOSIS — E78.5 DYSLIPIDEMIA: ICD-10-CM

## 2018-10-01 DIAGNOSIS — E66.9 OBESITY (BMI 30-39.9): ICD-10-CM

## 2018-10-01 DIAGNOSIS — M19.90 ARTHRITIS: ICD-10-CM

## 2018-10-01 DIAGNOSIS — F41.1 GENERALIZED ANXIETY DISORDER: ICD-10-CM

## 2018-10-01 PROBLEM — H61.23 BILATERAL IMPACTED CERUMEN: Status: RESOLVED | Noted: 2018-09-20 | Resolved: 2018-10-01

## 2018-10-01 PROBLEM — L72.3 INFLAMED SEBACEOUS CYST: Status: RESOLVED | Noted: 2018-02-15 | Resolved: 2018-10-01

## 2018-10-01 PROCEDURE — G0439 PPPS, SUBSEQ VISIT: HCPCS | Performed by: FAMILY MEDICINE

## 2018-10-01 RX ORDER — ALPRAZOLAM 0.5 MG/1
0.5 TABLET ORAL PRN
Qty: 30 TAB | Refills: 2 | Status: SHIPPED | OUTPATIENT
Start: 2018-10-01 | End: 2018-10-01 | Stop reason: SDUPTHER

## 2018-10-01 RX ORDER — ALPRAZOLAM 0.5 MG/1
0.5 TABLET ORAL PRN
Qty: 90 TAB | Refills: 0 | Status: SHIPPED | OUTPATIENT
Start: 2018-10-01 | End: 2019-01-25 | Stop reason: SDUPTHER

## 2018-10-01 ASSESSMENT — ENCOUNTER SYMPTOMS: GENERAL WELL-BEING: GOOD

## 2018-10-01 ASSESSMENT — ACTIVITIES OF DAILY LIVING (ADL): BATHING_REQUIRES_ASSISTANCE: 0

## 2018-10-01 ASSESSMENT — PATIENT HEALTH QUESTIONNAIRE - PHQ9: CLINICAL INTERPRETATION OF PHQ2 SCORE: 0

## 2018-10-01 NOTE — ASSESSMENT & PLAN NOTE
This is an acute problem that is really causing patient a lot of discomfort.  He now has an appointment to be seen at physical medicine/rehab with Dr. Porter later this week.  I am hopeful that Dr. Porter will be able to do an epidural and possibly get this under better control.

## 2018-10-01 NOTE — PROGRESS NOTES
Chief Complaint   Patient presents with   • Annual Exam         HPI:  Curt is a 67 y.o. here for Medicare Annual Wellness Visit        Patient Active Problem List    Diagnosis Date Noted   • Coronary artery disease due to lipid rich plaque 08/07/2012     Priority: High   • Status post coronary artery stent placement 08/07/2012     Priority: High   • Dyslipidemia 01/26/2014     Priority: Medium   • Essential hypertension, benign 01/23/2012     Priority: Medium   • Bilateral impacted cerumen 09/20/2018   • Ageusia 09/20/2018   • Sciatica, right side 08/02/2018   • Arthritis 02/15/2018   • Inflamed sebaceous cyst 02/15/2018   • Generalized anxiety disorder 10/11/2017   • Obesity (BMI 30-39.9) 05/08/2017       Current Outpatient Prescriptions   Medication Sig Dispense Refill   • atorvastatin (LIPITOR) 10 MG Tab Take 1 Tab by mouth every day. TAKE ONE TABLET BY MOUTH DAILY 90 Tab 3   • amlodipine-benazepril (LOTREL) 5-10 MG per capsule Take 1 Cap by mouth every day. 90 Cap 3   • nitroglycerin (NITROSTAT) 0.4 MG SL Tab Place 1 Tab under tongue as needed for Chest Pain (Place 1 tablet under the tongue every 5 minutes up to 3 doses as needed for chest pain). 25 Tab 5   • ALPRAZolam (XANAX) 0.5 MG Tab Take 0.25 mg by mouth as needed.     • diclofenac EC (VOLTAREN) 75 MG Tablet Delayed Response Take 1 Tab by mouth 2 times a day. 180 Tab 3   • Multiple Vitamins-Minerals (MULTIVITAMIN PO) Take  by mouth.     • Coenzyme Q10 (CO Q 10 PO) Take  by mouth.     • MEGARED OMEGA-3 KRILL OIL PO Take  by mouth.     • CALCIUM PO Take  by mouth.     • Multiple Vitamins-Minerals (OCUVITE) Tab Take 1 Tab by mouth every day.     • aspirin 81 MG tablet Take 81 mg by mouth every day.         No current facility-administered medications for this visit.         Patient is taking medications as noted in medication list.  Current supplements as per medication list.     Allergies: Patient has no known allergies.    Current social  contact/activities: Dinner with friends,consulting.     Is patient current with immunizations? No, due for SHINGRIX (Shingles). Patient is interested in receiving NONE today.    He  reports that he quit smoking about 26 years ago. His smoking use included Cigarettes. He has a 50.00 pack-year smoking history. He has never used smokeless tobacco. He reports that he drinks about 0.6 oz of alcohol per week . He reports that he does not use drugs.  Counseling given: Not Answered        DPA/Advanced directive: Patient has Advanced Directive on file.     ROS:    Gait: Uses no assistive device   Ostomy: No   Other tubes: No   Amputations: No   Chronic oxygen use No   Last eye exam 01/2018   Wears hearing aids: No   : Denies any urinary leakage during the last 6 months      Depression Screening    Little interest or pleasure in doing things?  0 - not at all  Feeling down, depressed, or hopeless? 0 - not at all  Patient Health Questionnaire Score: 0    If depressive symptoms identified deferred to follow up visit unless specifically addressed in assessment and plan.    Interpretation of PHQ-9 Total Score   Score Severity   1-4 No Depression   5-9 Mild Depression   10-14 Moderate Depression   15-19 Moderately Severe Depression   20-27 Severe Depression    Screening for Cognitive Impairment    Three Minute Recall (leader, season, table)   /3  (leader, season, table)  Edilberto clock face with all 12 numbers and set the hands to show 10 past 11.  Yes 5/5  If cognitive concerns identified, deferred for follow up unless specifically addressed in assessment and plan.    Fall Risk Assessment    Has the patient had two or more falls in the last year or any fall with injury in the last year?  No  If fall risk identified, deferred for follow up unless specifically addressed in assessment and plan.    Safety Assessment    Throw rugs on floor.  Yes  Handrails on all stairs.  Yes  Good lighting in all hallways.  Yes  Difficulty hearing.   No  Patient counseled about all safety risks that were identified.    Functional Assessment ADLs    Are there any barriers preventing you from cooking for yourself or meeting nutritional needs?  No.    Are there any barriers preventing you from driving safely or obtaining transportation?  No.    Are there any barriers preventing you from using a telephone or calling for help?  No.    Are there any barriers preventing you from shopping?  No.    Are there any barriers preventing you from taking care of your own finances?  No.    Are there any barriers preventing you from managing your medications?  No.    Are there any barriers preventing you from showering, bathing or dressing yourself?  No.    Are you currently engaging in any exercise or physical activity?  Yes.  Walking daily and golf 3 x week  What is your perception of your health?  Good.    Health Maintenance Summary                IMM ZOSTER VACCINES Overdue 12/31/2015      Done 11/5/2015 Imm Admin: Zoster Vaccine Live (ZVL) (Zostavax)    Annual Wellness Visit Next Due 10/12/2018      Done 10/11/2017 Visit Dx: Medicare annual wellness visit, subsequent     Patient has more history with this topic...    COLON CANCER SCREENING ANNUAL FIT Next Due 5/10/2019      Done 5/10/2018 OCCULT BLOOD FECES IMMUNOASSAY     Patient has more history with this topic...    IMM DTaP/Tdap/Td Vaccine Next Due 4/23/2024      Done 4/23/2014 Imm Admin: Tdap Vaccine          Patient Care Team:  Cheryl Chopra M.D. as PCP - General (Family Medicine)  Iván Otoole M.D. as Consulting Physician (Cardiology)  Irving Patel O.D. as Consulting Physician (Optometry)  Dustin Null, PT, DPT as Physical Therapy (Physical Therapy)    Social History   Substance Use Topics   • Smoking status: Former Smoker     Packs/day: 2.00     Years: 25.00     Types: Cigarettes     Quit date: 1/1/1992   • Smokeless tobacco: Never Used      Comment: avoid all tobacco products   • Alcohol use 0.6  oz/week     1 Standard drinks or equivalent per week      Comment: Maybe couple of beers once in awhile     Family History   Problem Relation Age of Onset   • Lung Disease Mother         copd   • Hypertension Mother    • Heart Disease Father         heart attack and heart disease   • Cancer Brother         neck   • Heart Disease Paternal Grandfather         Heart attack   • Other Sister         non-malignant brain tumor   • Diabetes Neg Hx      He  has a past medical history of Arthritis; CAD (coronary artery disease); Generalized anxiety disorder (10/11/2017); Hyperlipidemia; Hypertension; Inflamed sebaceous cyst (2/15/2018); and S/P coronary artery stent placement.   Past Surgical History:   Procedure Laterality Date   • APPENDECTOMY     • CHOLECYSTECTOMY     • ENDARTERECTOMY      corornary   • STENT PLACEMENT             Exam:     Blood pressure 116/66, pulse 79, temperature 36.2 °C (97.2 °F), resp. rate 16, height 1.829 m (6'), weight 115.7 kg (255 lb), SpO2 95 %. Body mass index is 34.58 kg/m².    Hearing good.    Dentition good  Alert, oriented in no acute distress.  Eye contact is good, speech goal directed, affect calm      Assessment and Plan. The following treatment and monitoring plan is recommended:   Arthritis  This is a chronic health problem that is well controlled with current medications and lifestyle measures.    Coronary artery disease due to lipid rich plaque  This is a chronic health problem that is well controlled with current medications and lifestyle measures.    Dyslipidemia  This is a chronic health problem that is well controlled with current medications and lifestyle measures.    Essential hypertension, benign  This is a chronic health problem that is well controlled with current medications and lifestyle measures.    Generalized anxiety disorder  This is a chronic health problem that is well controlled with current medications and lifestyle measures.    This is a chronic health problem  for this patient for which he utilizes alprazolam at bedtime on a as needed basis.  There are some nights where he has a difficult time getting to sleep because he cannot turn his brain off.  He has utilized this medication responsibly.  His  report indicates that.  We will write a new prescription today.  Obtained and reviewed patient utilization report from [facility] pharmacy database on 10/1/2018 10:56 AM  prior to writing prescription for controlled substance II, III or IV per [place] bill . Based on assessment of the report,my physical exam if necessary and the patient's health problem, the prescription is medically necessary.       Obesity (BMI 30-39.9)  Patient is working on weight loss and is doing daily exercise    Sciatica, right side  This is an acute problem that is really causing patient a lot of discomfort.  He now has an appointment to be seen at physical medicine/rehab with Dr. Porter later this week.  I am hopeful that Dr. Porter will be able to do an epidural and possibly get this under better control.    Status post coronary artery stent placement  This is a chronic health problem that is well controlled with current medications and lifestyle measures.        Services suggested: No services needed at this time  Health Care Screening recommendations as per orders if indicated.  Referrals offered: PT/OT/Nutrition counseling/Behavioral Health/Smoking cessation as per orders if indicated.    Discussion today about general wellness and lifestyle habits:    · Prevent falls and reduce trip hazards; Cautioned about securing or removing rugs.  · Have a working fire alarm and carbon monoxide detector;   · Engage in regular physical activity and social activities       Follow-up: 6 months for refill of his Alprazolam

## 2018-10-01 NOTE — ASSESSMENT & PLAN NOTE
This is a chronic health problem that is well controlled with current medications and lifestyle measures.    This is a chronic health problem for this patient for which he utilizes alprazolam at bedtime on a as needed basis.  There are some nights where he has a difficult time getting to sleep because he cannot turn his brain off.  He has utilized this medication responsibly.  His  report indicates that.  We will write a new prescription today.

## 2018-10-03 ENCOUNTER — OFFICE VISIT (OUTPATIENT)
Dept: PHYSICAL MEDICINE AND REHAB | Facility: MEDICAL CENTER | Age: 68
End: 2018-10-03
Payer: MEDICARE

## 2018-10-03 VITALS
BODY MASS INDEX: 34.82 KG/M2 | WEIGHT: 257.06 LBS | OXYGEN SATURATION: 94 % | HEIGHT: 72 IN | DIASTOLIC BLOOD PRESSURE: 70 MMHG | SYSTOLIC BLOOD PRESSURE: 120 MMHG | TEMPERATURE: 97.5 F | HEART RATE: 91 BPM

## 2018-10-03 DIAGNOSIS — I25.119 CORONARY ARTERY DISEASE WITH ANGINA PECTORIS, UNSPECIFIED VESSEL OR LESION TYPE, UNSPECIFIED WHETHER NATIVE OR TRANSPLANTED HEART (HCC): ICD-10-CM

## 2018-10-03 DIAGNOSIS — M79.18 MYOFASCIAL PAIN: ICD-10-CM

## 2018-10-03 DIAGNOSIS — M25.551 RIGHT HIP PAIN: ICD-10-CM

## 2018-10-03 DIAGNOSIS — M54.50 LUMBOSACRAL PAIN: ICD-10-CM

## 2018-10-03 DIAGNOSIS — M54.16 LUMBAR RADICULITIS: ICD-10-CM

## 2018-10-03 PROCEDURE — 99204 OFFICE O/P NEW MOD 45 MIN: CPT | Performed by: PHYSICAL MEDICINE & REHABILITATION

## 2018-10-03 ASSESSMENT — ENCOUNTER SYMPTOMS
EYE PAIN: 0
SPUTUM PRODUCTION: 0
FEVER: 0
SENSORY CHANGE: 1
TINGLING: 1
NAUSEA: 0
MYALGIAS: 1
PALPITATIONS: 0
VOMITING: 0
ORTHOPNEA: 0
HEMOPTYSIS: 0
BACK PAIN: 1
CHILLS: 0

## 2018-10-03 NOTE — PROGRESS NOTES
Subjective:      Curt Blair is a 67 y.o. male who presents with New Patient    Chief complaint: Low back pain        HPI The patient notes the onset of lumbosacral area pain approximately 2 months ago without specific event or trauma.  He was seen by primary care, referred for physical therapy as well as chiropractics.  The patient was also instructed on home exercise program, which he is performing    Unfortunately, he notes ongoing pain in the right lumbosacral region, notes right lower limb radiating pain with neuropathic component, worse with sitting.    He notes intermittent right hip area pain.    No significant neck or mid back pain noted.    He has tried physical therapy and chiropractics.  No acute changes with bowel/bladder noted.  No acute changes with strength noted.  He is making an effort with home exercise program.  The ongoing pain limits his ability to function.  He is inquiring about additional treatment options.      MEDICAL RECORDS REVIEW/DATA REVIEW: Reviewed in epic.    Records Reviewed: Reviewed referring provider notes.     I reviewed medications.     I reviewed diagnostic studies:     I reviewed radiographs.  Reviewed DEXA 10/2014, normal.    I reviewed lab studies.  Reviewed labs 9/2018, including CMP, CBC.    I reviewed medical issues.     I reviewed family history: No neuromuscular disorders noted.    I reviewed social issues.  Retired      PAST MEDICAL HISTORY:   Past Medical History:   Diagnosis Date   • Arthritis     Right Foot   • CAD (coronary artery disease)    • Generalized anxiety disorder 10/11/2017   • Hyperlipidemia    • Hypertension    • Inflamed sebaceous cyst 2/15/2018   • S/P coronary artery stent placement     2 stents       PAST SURGICAL HISTORY:    Past Surgical History:   Procedure Laterality Date   • APPENDECTOMY     • CHOLECYSTECTOMY     • ENDARTERECTOMY      corornary   • STENT PLACEMENT         ALLERGIES:  Patient has no known allergies.    MEDICATIONS:     Outpatient Encounter Prescriptions as of 10/3/2018   Medication Sig Dispense Refill   • ALPRAZolam (XANAX) 0.5 MG Tab Take 1 Tab by mouth as needed for Sleep for up to 90 days. 90 Tab 0   • atorvastatin (LIPITOR) 10 MG Tab Take 1 Tab by mouth every day. TAKE ONE TABLET BY MOUTH DAILY 90 Tab 3   • amlodipine-benazepril (LOTREL) 5-10 MG per capsule Take 1 Cap by mouth every day. 90 Cap 3   • nitroglycerin (NITROSTAT) 0.4 MG SL Tab Place 1 Tab under tongue as needed for Chest Pain (Place 1 tablet under the tongue every 5 minutes up to 3 doses as needed for chest pain). 25 Tab 5   • diclofenac EC (VOLTAREN) 75 MG Tablet Delayed Response Take 1 Tab by mouth 2 times a day. 180 Tab 3   • Multiple Vitamins-Minerals (MULTIVITAMIN PO) Take  by mouth.     • Coenzyme Q10 (CO Q 10 PO) Take  by mouth.     • MEGARED OMEGA-3 KRILL OIL PO Take  by mouth.     • CALCIUM PO Take  by mouth.     • Multiple Vitamins-Minerals (OCUVITE) Tab Take 1 Tab by mouth every day.     • aspirin 81 MG tablet Take 81 mg by mouth every day.         No facility-administered encounter medications on file as of 10/3/2018.        SOCIAL HISTORY:    Social History     Social History   • Marital status:      Spouse name: N/A   • Number of children: N/A   • Years of education: N/A     Social History Main Topics   • Smoking status: Former Smoker     Packs/day: 2.00     Years: 25.00     Types: Cigarettes     Quit date: 1/1/1992   • Smokeless tobacco: Never Used      Comment: avoid all tobacco products   • Alcohol use 0.6 oz/week     1 Standard drinks or equivalent per week      Comment: Maybe couple of beers once in awhile   • Drug use: No   • Sexual activity: Not Currently     Partners: Female      Comment: , 3 children     Other Topics Concern   •  Service Yes   • Blood Transfusions No   • Caffeine Concern No   • Occupational Exposure No   • Hobby Hazards No   • Sleep Concern Yes   • Stress Concern Yes   • Weight Concern Yes   • Special  Diet No     no fried   • Back Care Yes   • Exercise Yes   • Bike Helmet No     does not ride bike   • Self-Exams Yes     Social History Narrative   • No narrative on file         Review of Systems   Constitutional: Negative for chills and fever.   HENT: Negative for ear pain and tinnitus.    Eyes: Negative for pain.   Respiratory: Negative for hemoptysis and sputum production.    Cardiovascular: Negative for palpitations and orthopnea.   Gastrointestinal: Negative for nausea and vomiting.   Genitourinary: Negative for frequency and urgency.   Musculoskeletal: Positive for back pain, joint pain and myalgias.   Skin: Negative.    Neurological: Positive for tingling and sensory change.         Objective:     /70 (BP Location: Left arm, Patient Position: Sitting, BP Cuff Size: Adult)   Pulse 91   Temp 36.4 °C (97.5 °F) (Temporal)   Ht 1.829 m (6')   Wt 116.6 kg (257 lb 0.9 oz)   SpO2 94%   BMI 34.86 kg/m²      Physical Exam   Constitutional: oriented to person, place, and time, appears well-developed and well-nourished.   HEENT: Normocephalic atraumatic, neck supple, no JVD noted, no masses noted, no meningeal signs noted  Lymphadenopathy: no cervical, supraclavicular, or inguinal lymphadenopathy noted  Cardiovascular: Intact distal pulses, including at wrists and ankles, no limb swelling noted  Pulmonary: No tachypnea noted, no accessory muscle use noted, no dyspnea noted  Abdominal: Soft, nontender, exhibits no distension, no peritoneal signs, no HSM  Musculoskeletal:   Right hip: exhibits mild tenderness. Mild pain with range of motion testing  Left hip: exhibits no significant tenderness. Minimal pain with range of motion testing  Lumbar back: exhibits  decreased range of motion, tenderness and pain. negative straight leg testing, trigger points noted  Thoracic: No significant tenderness, no significant pain with range of motion testing  Neurological: oriented to person, place, and time. Cranial nerves  grossly intact, normal strength. Sensation intact distally. Reflexes 1+ in  lower limbs, Gait mildly antalgic, reciprocal, able to heel/toe walk, No upper motor neuron signs evident  Skin: Skin is intact. no rashes or lesions noted  Psychiatric: normal mood and affect. speech is normal and behavior is normal. Judgment and thought content normal. Cognition and memory are normal.        Assessment/Plan:       ASSESSMENT:    1.  Lumbosacral pain, myofascial pain, lumbar radicular symptoms, suspect underlying degenerative disc disease/spondylosis, consider stenosis    - DX-LUMBAR SPINE-4+ VIEWS; Future  -Reviewed injection therapy, beginning with trigger point injections to treat the myofascial component to the ongoing pain, submit authorization request.  Further goal of trigger point injection/injection therapy is to avoid opioid habituation, polypharmacy    2. Right hip pain, sprain strain    - DX-HIP-COMPLETE - UNILATERAL 2+ RIGHT; Future    3.  Comorbid medical issues, including cardiac, with care per primary care provider      DISCUSSION/PLAN:    - I discussed management options. I reviewed symptomatic care    - I reviewed home exercise program, with medical/cardiac precautions    - The patient can consider complementary trials with acupuncture, superficial massage therapy, or TENS unit    - I reviewed medication monitoring. I reviewed further symptomatic medications.  I did not prescribe any medications today    - I reviewed additional diagnostic options, including further/advanced imaging, including MRI lumbar spine, electrodiagnostic testing, vascular studies, and further lab screen    - I reviewed additional therapeutic options, including further injection/interventional therapy and additional consultative input    - Return after the above-noted diagnostic studies, after getting injection authorization in place, or an as-needed basis      Please note that this dictation was created using voice recognition  software. I have made every reasonable attempt to correct obvious errors but there may be errors of grammar and content that I may have overlooked prior to finalization of this note.

## 2018-10-04 ENCOUNTER — HOSPITAL ENCOUNTER (OUTPATIENT)
Dept: RADIOLOGY | Facility: MEDICAL CENTER | Age: 68
End: 2018-10-04
Attending: PHYSICAL MEDICINE & REHABILITATION
Payer: MEDICARE

## 2018-10-04 DIAGNOSIS — M25.551 RIGHT HIP PAIN: ICD-10-CM

## 2018-10-04 DIAGNOSIS — M54.50 LUMBOSACRAL PAIN: ICD-10-CM

## 2018-10-04 PROCEDURE — 72110 X-RAY EXAM L-2 SPINE 4/>VWS: CPT

## 2018-10-04 PROCEDURE — 73502 X-RAY EXAM HIP UNI 2-3 VIEWS: CPT | Mod: RT

## 2018-10-08 ENCOUNTER — OFFICE VISIT (OUTPATIENT)
Dept: PHYSICAL MEDICINE AND REHAB | Facility: MEDICAL CENTER | Age: 68
End: 2018-10-08
Payer: MEDICARE

## 2018-10-08 VITALS
OXYGEN SATURATION: 85 % | BODY MASS INDEX: 34.86 KG/M2 | TEMPERATURE: 97.5 F | WEIGHT: 257 LBS | SYSTOLIC BLOOD PRESSURE: 118 MMHG | DIASTOLIC BLOOD PRESSURE: 74 MMHG | HEART RATE: 94 BPM

## 2018-10-08 DIAGNOSIS — M54.16 LUMBAR RADICULITIS: ICD-10-CM

## 2018-10-08 DIAGNOSIS — M79.18 MYOFASCIAL PAIN: ICD-10-CM

## 2018-10-08 DIAGNOSIS — M47.816 LUMBAR SPONDYLOSIS: ICD-10-CM

## 2018-10-08 DIAGNOSIS — M51.36 DDD (DEGENERATIVE DISC DISEASE), LUMBAR: ICD-10-CM

## 2018-10-08 DIAGNOSIS — M25.559 ARTHRALGIA OF HIP, UNSPECIFIED LATERALITY: ICD-10-CM

## 2018-10-08 DIAGNOSIS — I25.119 CORONARY ARTERY DISEASE WITH ANGINA PECTORIS, UNSPECIFIED VESSEL OR LESION TYPE, UNSPECIFIED WHETHER NATIVE OR TRANSPLANTED HEART (HCC): ICD-10-CM

## 2018-10-08 DIAGNOSIS — M54.50 LUMBOSACRAL PAIN: ICD-10-CM

## 2018-10-08 DIAGNOSIS — M16.10 HIP ARTHRITIS: ICD-10-CM

## 2018-10-08 PROCEDURE — 99212 OFFICE O/P EST SF 10 MIN: CPT | Mod: 25 | Performed by: PHYSICAL MEDICINE & REHABILITATION

## 2018-10-08 PROCEDURE — 20552 NJX 1/MLT TRIGGER POINT 1/2: CPT | Performed by: PHYSICAL MEDICINE & REHABILITATION

## 2018-10-08 RX ORDER — METHYLPREDNISOLONE ACETATE 80 MG/ML
80 INJECTION, SUSPENSION INTRA-ARTICULAR; INTRALESIONAL; INTRAMUSCULAR; SOFT TISSUE ONCE
Status: COMPLETED | OUTPATIENT
Start: 2018-10-08 | End: 2018-10-08

## 2018-10-08 RX ADMIN — METHYLPREDNISOLONE ACETATE 80 MG: 80 INJECTION, SUSPENSION INTRA-ARTICULAR; INTRALESIONAL; INTRAMUSCULAR; SOFT TISSUE at 11:14

## 2018-10-08 ASSESSMENT — ENCOUNTER SYMPTOMS
VOMITING: 0
ORTHOPNEA: 0
EYE PAIN: 0
MYALGIAS: 1
TINGLING: 1
CHILLS: 0
HEMOPTYSIS: 0
BACK PAIN: 1
SENSORY CHANGE: 1
NAUSEA: 0
FEVER: 0
PALPITATIONS: 0
SPUTUM PRODUCTION: 0

## 2018-10-08 NOTE — PROGRESS NOTES
Subjective:      Curt Blair presents with Follow-Up        HPI Mr. Blair returns to the office today for follow-up evaluation of spinal/joint/musculoskeletal pain.    The patient notes ongoing pain in the right lumbosacral region, constant, notes intermittent radiating pain in the right lower limb with neuropathic component, worse with sitting.    He notes intermittent right hip area pain, relatively controlled.    He has had prior treatment with medications.  He has been to therapy.  He has tried chiropractics.  No acute changes with bowel/bladder noted.  No acute changes with strength noted.  He is making an effort with home exercise program.  The ongoing pain limits his ability to function.  He is inquiring about additional treatment options.      MEDICAL RECORDS REVIEW/DATA REVIEW: Reviewed in epic.    I reviewed medications.     I reviewed diagnostic studies:     I reviewed radiographs.      Reviewed lumbar spine x-rays 10/2018, see below, I reviewed images and report, showed multilevel degenerative changes, mild scoliosis, spondylosis, listhesis, with mild instability on flexion/extension    Reviewed hip x-rays 10/2018, I reviewed images and report, showed moderate bilateral hip arthritis.    Reviewed DEXA 10/2014, normal.    I reviewed lab studies.  Reviewed labs 9/2018, including CMP, CBC.    I reviewed medical issues.     I reviewed family history: No neuromuscular disorders noted.    I reviewed social issues.  Retired      10/4/2018 8:21 AM    HISTORY/REASON FOR EXAM:  Atraumatic Pain  Atraumatic LBP that radiates into right hip and down to right knee X2 months    TECHNIQUE/ EXAM DESCRIPTION AND NUMBER OF VIEWS:  4 views of the lumbar spine.    COMPARISON: None.    FINDINGS:    The bony trabecular pattern is normal.    Evaluation of the sacrum is limited due to overlying bowel content.    No acute fracture. No compression deformity.    Mild retrolisthesis of L1-2, L2-3 and L3-4 that are worse  with extension.    Mild anterolisthesis of L4-5 that is worse with flexion.    There is partial disc space fusion of the lower thoracic spine.    Severe degenerative change of the lumbar spine.    Vascular calcification.   Impression         Mild retrolisthesis of L1-2, L2-3 and L3-4 that are worse with extension.    Mild anterolisthesis of L4-5 that is worse with flexion.    There is partial disc space fusion of the lower thoracic spine.         PAST MEDICAL HISTORY:   Past Medical History:   Diagnosis Date   • Arthritis     Right Foot   • CAD (coronary artery disease)    • Generalized anxiety disorder 10/11/2017   • Hyperlipidemia    • Hypertension    • Inflamed sebaceous cyst 2/15/2018   • S/P coronary artery stent placement     2 stents       PAST SURGICAL HISTORY:    Past Surgical History:   Procedure Laterality Date   • APPENDECTOMY     • CHOLECYSTECTOMY     • ENDARTERECTOMY      corornary   • STENT PLACEMENT         ALLERGIES:  Patient has no known allergies.    MEDICATIONS:    Outpatient Encounter Prescriptions as of 10/8/2018   Medication Sig Dispense Refill   • ALPRAZolam (XANAX) 0.5 MG Tab Take 1 Tab by mouth as needed for Sleep for up to 90 days. 90 Tab 0   • atorvastatin (LIPITOR) 10 MG Tab Take 1 Tab by mouth every day. TAKE ONE TABLET BY MOUTH DAILY 90 Tab 3   • amlodipine-benazepril (LOTREL) 5-10 MG per capsule Take 1 Cap by mouth every day. 90 Cap 3   • nitroglycerin (NITROSTAT) 0.4 MG SL Tab Place 1 Tab under tongue as needed for Chest Pain (Place 1 tablet under the tongue every 5 minutes up to 3 doses as needed for chest pain). 25 Tab 5   • diclofenac EC (VOLTAREN) 75 MG Tablet Delayed Response Take 1 Tab by mouth 2 times a day. 180 Tab 3   • Multiple Vitamins-Minerals (MULTIVITAMIN PO) Take  by mouth.     • Coenzyme Q10 (CO Q 10 PO) Take  by mouth.     • MEGARED OMEGA-3 KRILL OIL PO Take  by mouth.     • CALCIUM PO Take  by mouth.     • Multiple Vitamins-Minerals (OCUVITE) Tab Take 1 Tab by mouth  every day.     • aspirin 81 MG tablet Take 81 mg by mouth every day.         No facility-administered encounter medications on file as of 10/8/2018.        SOCIAL HISTORY:    Social History     Social History   • Marital status:      Spouse name: N/A   • Number of children: N/A   • Years of education: N/A     Social History Main Topics   • Smoking status: Former Smoker     Packs/day: 2.00     Years: 25.00     Types: Cigarettes     Quit date: 1/1/1992   • Smokeless tobacco: Never Used      Comment: avoid all tobacco products   • Alcohol use 0.6 oz/week     1 Standard drinks or equivalent per week      Comment: Maybe couple of beers once in awhile   • Drug use: No   • Sexual activity: Not Currently     Partners: Female      Comment: , 3 children     Other Topics Concern   •  Service Yes   • Blood Transfusions No   • Caffeine Concern No   • Occupational Exposure No   • Hobby Hazards No   • Sleep Concern Yes   • Stress Concern Yes   • Weight Concern Yes   • Special Diet No     no fried   • Back Care Yes   • Exercise Yes   • Bike Helmet No     does not ride bike   • Self-Exams Yes     Social History Narrative   • No narrative on file         Review of Systems   Constitutional: Negative for chills and fever.   HENT: Negative for ear pain and tinnitus.    Eyes: Negative for pain.   Respiratory: Negative for hemoptysis and sputum production.    Cardiovascular: Negative for palpitations and orthopnea.   Gastrointestinal: Negative for nausea and vomiting.   Genitourinary: Negative for frequency and urgency.   Musculoskeletal: Positive for back pain, joint pain and myalgias.   Skin: Negative.    Neurological: Positive for tingling and sensory change.   Reviewed, no changes noted     Objective:     /74 (BP Location: Left arm, Patient Position: Sitting, BP Cuff Size: Adult)   Pulse 94   Temp 36.4 °C (97.5 °F) (Temporal)   Wt 116.6 kg (257 lb)   SpO2 (!) 85%   BMI 34.86 kg/m²      Physical Exam    Constitutional: oriented to person, place, and time, appears well-developed and well-nourished.   HEENT: Normocephalic atraumatic, neck supple, no JVD noted, no masses noted, no meningeal signs noted  Lymphadenopathy: no cervical, supraclavicular, or inguinal lymphadenopathy noted  Cardiovascular: Intact distal pulses, including at ankles, no limb swelling noted  Pulmonary: No tachypnea noted, no accessory muscle use noted, no dyspnea noted  Abdominal: Soft, nontender, exhibits no distension, no peritoneal signs, no HSM  Musculoskeletal:   Right hip: exhibits mild tenderness. Mild pain with range of motion testing  Left hip: exhibits mild tenderness. Mild pain with range of motion testing  Lumbar back: exhibits  decreased range of motion, tenderness and  pain. negative straight leg testing, trigger points noted right lumbosacral region  Neurological: oriented to person, place, and time. Cranial nerves grossly intact, normal strength. Sensation intact distally. Reflexes 1+ in upper limbs, Gait mildly antalgic, reciprocal  Skin: Skin is intact. no rashes or lesions noted  Psychiatric: normal mood and affect. speech is normal and behavior is normal. Judgment and thought content normal.            Procedure note: Written/informed consent was obtained, risks benefits alternatives discussed, and all questions were answered.  The patient was placed prone on the exam table and 2 trigger points were identified in the right lumbosacral region.  The areas were marked, then sterilely prepared.  Following local skin anesthesia using a 25-gauge needle, trigger point injections were performed with injection of 2 cc of a mixture of 1 cc of Depo-Medrol 80 mg/cc and 3 cc of 1% lidocaine.  The patient tolerated the procedure well.  There were no complications.        Assessment/Plan:       ASSESSMENT:    1.  Lumbosacral pain, myofascial pain, intermittent lumbar radiculitis, degenerative disc disease, listhesis with mild  instability, facet arthropathy, spondylosis, mild scoliosis    - I reviewed postprocedure precautions  - Reviewed injection therapy with further trigger point injections to treat the residual myofascial component to the ongoing pain, if not responding to more conservative care, submit authorization request.  Further goal of trigger point injection/injection therapy is to avoid opioid habituation, polypharmacy    2. Right hip pain, sprain strain, moderate hip arthritis, relatively symptomatically controlled    3.  Comorbid medical issues, including cardiac, with care per primary care provider      DISCUSSION/PLAN:    - I discussed management options. I reviewed symptomatic care    - I reviewed home exercise program, with medical/cardiac precautions.  I reviewed activity modification    - The patient can consider complementary trials with acupuncture or TENS unit    - I reviewed medication monitoring. I reviewed further symptomatic medications.  I did not prescribe any medications today    - I reviewed additional diagnostic options, including further/advanced imaging, including MRI lumbar spine, electrodiagnostic testing, vascular studies, and further lab screen    - I reviewed additional therapeutic options, including further injection/interventional therapy and additional consultative input    - Return in 1 month or an as-needed basis      Please note that this dictation was created using voice recognition software. I have made every reasonable attempt to correct obvious errors but there may be errors of grammar and content that I may have overlooked prior to finalization of this note.

## 2018-11-05 ENCOUNTER — OFFICE VISIT (OUTPATIENT)
Dept: PHYSICAL MEDICINE AND REHAB | Facility: MEDICAL CENTER | Age: 68
End: 2018-11-05
Payer: MEDICARE

## 2018-11-05 VITALS
TEMPERATURE: 97.2 F | HEIGHT: 72 IN | WEIGHT: 257 LBS | SYSTOLIC BLOOD PRESSURE: 112 MMHG | OXYGEN SATURATION: 96 % | HEART RATE: 77 BPM | BODY MASS INDEX: 34.81 KG/M2 | DIASTOLIC BLOOD PRESSURE: 68 MMHG

## 2018-11-05 DIAGNOSIS — M51.36 DDD (DEGENERATIVE DISC DISEASE), LUMBAR: ICD-10-CM

## 2018-11-05 DIAGNOSIS — M47.816 LUMBAR SPONDYLOSIS: ICD-10-CM

## 2018-11-05 DIAGNOSIS — M54.50 LUMBOSACRAL PAIN: ICD-10-CM

## 2018-11-05 DIAGNOSIS — M16.10 HIP ARTHRITIS: ICD-10-CM

## 2018-11-05 DIAGNOSIS — M25.559 ARTHRALGIA OF HIP, UNSPECIFIED LATERALITY: ICD-10-CM

## 2018-11-05 DIAGNOSIS — M25.569 KNEE PAIN, UNSPECIFIED CHRONICITY, UNSPECIFIED LATERALITY: ICD-10-CM

## 2018-11-05 PROCEDURE — 99213 OFFICE O/P EST LOW 20 MIN: CPT | Performed by: PHYSICAL MEDICINE & REHABILITATION

## 2018-11-05 ASSESSMENT — ENCOUNTER SYMPTOMS
CHILLS: 0
SENSORY CHANGE: 1
FEVER: 0
TINGLING: 1
SPUTUM PRODUCTION: 0
MYALGIAS: 1
NAUSEA: 0
ORTHOPNEA: 0
VOMITING: 0
HEMOPTYSIS: 0
BACK PAIN: 1
PALPITATIONS: 0
EYE PAIN: 0

## 2018-11-05 NOTE — PROGRESS NOTES
Subjective:      Curt Blair presents with Follow-Up        HPI Mr. Blair returns to the office today for follow-up evaluation of spinal/joint/musculoskeletal pain.    The patient notes the lumbosacral trigger point injections performed last month were helpful, tolerated, no complications noted.    The patient notes lumbosacral pain is overall controlled, notes intermittent pain primarily in the right lumbosacral region, primarily with sitting, without overt radicular component.  The patient notes standing and walking tolerance is adequate.  He has been able to improve his function, including having pain control with travel as well as with golf.    He notes intermittent hip area pain,  controlled.    He notes intermittent knee pain, left greater than right, notes orthopedic evaluation approximately 4 years ago, noted arthritis, had prior left knee injection, with benefit.  The patient notes knee pain is now relatively symptomatically controlled.    He has had prior treatment with medications.  He has been to therapy.  He has tried chiropractics.  No acute changes with bowel/bladder noted.  No acute changes with strength noted.  He is making an effort with home exercise program.  He is pleased by the progress to date.      MEDICAL RECORDS REVIEW/DATA REVIEW: Reviewed in epic.    I reviewed medications.     I reviewed diagnostic studies:     I reviewed radiographs.      Reviewed lumbar spine x-rays 10/2018, showed multilevel degenerative changes, mild scoliosis, spondylosis, listhesis, with mild instability on flexion/extension    Reviewed hip x-rays 10/2018, showed moderate bilateral hip arthritis.    Reviewed DEXA 10/2014, normal.    I reviewed lab studies.  Reviewed labs 9/2018, including CMP, CBC.    I reviewed medical issues.  Followed by primary care provider.    I reviewed family history: No neuromuscular disorders noted.    I reviewed social issues.  Retired      PAST MEDICAL HISTORY:   Past Medical  History:   Diagnosis Date   • Arthritis     Right Foot   • CAD (coronary artery disease)    • Generalized anxiety disorder 10/11/2017   • Hyperlipidemia    • Hypertension    • Inflamed sebaceous cyst 2/15/2018   • S/P coronary artery stent placement     2 stents       PAST SURGICAL HISTORY:    Past Surgical History:   Procedure Laterality Date   • APPENDECTOMY     • CHOLECYSTECTOMY     • ENDARTERECTOMY      corornary   • STENT PLACEMENT         ALLERGIES:  Patient has no known allergies.    MEDICATIONS:    Outpatient Encounter Prescriptions as of 11/5/2018   Medication Sig Dispense Refill   • ALPRAZolam (XANAX) 0.5 MG Tab Take 1 Tab by mouth as needed for Sleep for up to 90 days. 90 Tab 0   • atorvastatin (LIPITOR) 10 MG Tab Take 1 Tab by mouth every day. TAKE ONE TABLET BY MOUTH DAILY 90 Tab 3   • amlodipine-benazepril (LOTREL) 5-10 MG per capsule Take 1 Cap by mouth every day. 90 Cap 3   • nitroglycerin (NITROSTAT) 0.4 MG SL Tab Place 1 Tab under tongue as needed for Chest Pain (Place 1 tablet under the tongue every 5 minutes up to 3 doses as needed for chest pain). 25 Tab 5   • diclofenac EC (VOLTAREN) 75 MG Tablet Delayed Response Take 1 Tab by mouth 2 times a day. 180 Tab 3   • Multiple Vitamins-Minerals (MULTIVITAMIN PO) Take  by mouth.     • Coenzyme Q10 (CO Q 10 PO) Take  by mouth.     • MEGARED OMEGA-3 KRILL OIL PO Take  by mouth.     • CALCIUM PO Take  by mouth.     • Multiple Vitamins-Minerals (OCUVITE) Tab Take 1 Tab by mouth every day.     • aspirin 81 MG tablet Take 81 mg by mouth every day.         No facility-administered encounter medications on file as of 11/5/2018.        SOCIAL HISTORY:    Social History     Social History   • Marital status:      Spouse name: N/A   • Number of children: N/A   • Years of education: N/A     Social History Main Topics   • Smoking status: Former Smoker     Packs/day: 2.00     Years: 25.00     Types: Cigarettes     Quit date: 1/1/1992   • Smokeless tobacco:  Never Used      Comment: avoid all tobacco products   • Alcohol use 0.6 oz/week     1 Standard drinks or equivalent per week      Comment: Maybe couple of beers once in awhile   • Drug use: No   • Sexual activity: Not Currently     Partners: Female      Comment: , 3 children     Other Topics Concern   •  Service Yes   • Blood Transfusions No   • Caffeine Concern No   • Occupational Exposure No   • Hobby Hazards No   • Sleep Concern Yes   • Stress Concern Yes   • Weight Concern Yes   • Special Diet No     no fried   • Back Care Yes   • Exercise Yes   • Bike Helmet No     does not ride bike   • Self-Exams Yes     Social History Narrative   • No narrative on file         Review of Systems   Constitutional: Negative for chills and fever.   HENT: Negative for ear pain and tinnitus.    Eyes: Negative for pain.   Respiratory: Negative for hemoptysis and sputum production.    Cardiovascular: Negative for palpitations and orthopnea.   Gastrointestinal: Negative for nausea and vomiting.   Genitourinary: Negative for frequency and urgency.   Musculoskeletal: Positive for back pain, joint pain and myalgias.   Skin: Negative.    Neurological: Positive for tingling and sensory change.   Reviewed, no changes noted     Objective:     /68 (BP Location: Left arm, Patient Position: Sitting, BP Cuff Size: Adult)   Pulse 77   Temp 36.2 °C (97.2 °F) (Temporal)   Ht 1.829 m (6')   Wt 116.6 kg (257 lb)   SpO2 96%   BMI 34.86 kg/m²      Physical Exam   Constitutional: oriented to person, place, and time, appears well-developed and well-nourished.   HEENT: Normocephalic atraumatic, neck supple, no JVD noted, no masses noted, no meningeal signs noted  Lymphadenopathy: no cervical, supraclavicular, or inguinal lymphadenopathy noted  Cardiovascular: Intact distal pulses, including at ankles, no limb swelling noted  Pulmonary: No tachypnea noted, no accessory muscle use noted, no dyspnea noted  Abdominal: Soft,  nontender, exhibits no distension, no peritoneal signs, no HSM  Musculoskeletal:   Right hip: exhibits only mild tenderness. Minimal pain with range of motion testing  Left hip: exhibits only mild tenderness. Minimal pain with range of motion testing  Lumbar back: exhibits mild decreased range of motion, mild tenderness and mild pain. negative straight leg testing, trigger points noted  Knee: Only mild tenderness with palpation about knees, only mild pain with range of motion testing, mild crepitus noted, no significant effusion noted, no signs of infection, stable with stress testing  Neurological: oriented to person, place, and time. Cranial nerves grossly intact, normal strength. Sensation intact distally. Reflexes 1+ in lower limbs, Gait steady, reciprocal  Skin: Skin is intact. no rashes or lesions noted  Psychiatric: normal mood and affect. speech is normal and behavior is normal. Judgment and thought content normal.    Assessment/Plan:       ASSESSMENT:    1.  Lumbosacral pain, myofascial pain, intermittent lumbar radiculitis, degenerative disc disease, listhesis with mild instability, facet arthropathy, spondylosis, mild scoliosis, overall relatively symptomatically controlled    - Reviewed injection therapy with further trigger point injections to treat the myofascial component of pain, if not responding to more conservative care    2. Hip pain, sprain strain, moderate hip arthritis, symptomatically controlled    3.  Bilateral knee pain, sprain strain, suspect underlying arthritis, relatively symptomatically controlled    4.  Comorbid medical issues, including cardiac, with care per primary care provider      DISCUSSION/PLAN:    - I discussed management options. I reviewed symptomatic care    - I reviewed home exercise program, with medical/cardiac precautions.  I reviewed activity modification    - The patient can consider complementary trials with acupuncture or TENS unit    - I reviewed medication  monitoring. I reviewed further symptomatic medications.  I did not prescribe any medications today    - I reviewed additional diagnostic options, including further/advanced imaging, including MRI lumbar spine, electrodiagnostic testing, vascular studies, and further lab screen    - I reviewed additional therapeutic options, including further injection/interventional therapy and additional consultative input    - Return in 3 months or an as-needed basis      Please note that this dictation was created using voice recognition software. I have made every reasonable attempt to correct obvious errors but there may be errors of grammar and content that I may have overlooked prior to finalization of this note.

## 2018-11-07 ENCOUNTER — APPOINTMENT (RX ONLY)
Dept: URBAN - METROPOLITAN AREA CLINIC 20 | Facility: CLINIC | Age: 68
Setting detail: DERMATOLOGY
End: 2018-11-07

## 2018-11-07 DIAGNOSIS — D18.0 HEMANGIOMA: ICD-10-CM

## 2018-11-07 DIAGNOSIS — D22 MELANOCYTIC NEVI: ICD-10-CM

## 2018-11-07 DIAGNOSIS — L81.4 OTHER MELANIN HYPERPIGMENTATION: ICD-10-CM

## 2018-11-07 DIAGNOSIS — L90.5 SCAR CONDITIONS AND FIBROSIS OF SKIN: ICD-10-CM

## 2018-11-07 DIAGNOSIS — L82.1 OTHER SEBORRHEIC KERATOSIS: ICD-10-CM

## 2018-11-07 DIAGNOSIS — L57.0 ACTINIC KERATOSIS: ICD-10-CM

## 2018-11-07 DIAGNOSIS — Z85.828 PERSONAL HISTORY OF OTHER MALIGNANT NEOPLASM OF SKIN: ICD-10-CM

## 2018-11-07 DIAGNOSIS — Z71.89 OTHER SPECIFIED COUNSELING: ICD-10-CM

## 2018-11-07 PROBLEM — D22.62 MELANOCYTIC NEVI OF LEFT UPPER LIMB, INCLUDING SHOULDER: Status: ACTIVE | Noted: 2018-11-07

## 2018-11-07 PROBLEM — D22.5 MELANOCYTIC NEVI OF TRUNK: Status: ACTIVE | Noted: 2018-11-07

## 2018-11-07 PROBLEM — D18.01 HEMANGIOMA OF SKIN AND SUBCUTANEOUS TISSUE: Status: ACTIVE | Noted: 2018-11-07

## 2018-11-07 PROBLEM — D22.61 MELANOCYTIC NEVI OF RIGHT UPPER LIMB, INCLUDING SHOULDER: Status: ACTIVE | Noted: 2018-11-07

## 2018-11-07 PROCEDURE — 17000 DESTRUCT PREMALG LESION: CPT

## 2018-11-07 PROCEDURE — ? LIQUID NITROGEN

## 2018-11-07 PROCEDURE — ? OBSERVATION

## 2018-11-07 PROCEDURE — ? COUNSELING

## 2018-11-07 PROCEDURE — 17003 DESTRUCT PREMALG LES 2-14: CPT

## 2018-11-07 PROCEDURE — 99214 OFFICE O/P EST MOD 30 MIN: CPT | Mod: 25

## 2018-11-07 PROCEDURE — ? SUNSCREEN RECOMMENDATIONS

## 2018-11-07 ASSESSMENT — LOCATION DETAILED DESCRIPTION DERM
LOCATION DETAILED: SUPERIOR THORACIC SPINE
LOCATION DETAILED: LEFT SUPERIOR FOREHEAD
LOCATION DETAILED: RIGHT INFERIOR POSTERIOR NECK
LOCATION DETAILED: INFERIOR THORACIC SPINE
LOCATION DETAILED: RIGHT PROXIMAL DORSAL FOREARM
LOCATION DETAILED: LEFT CENTRAL FRONTAL SCALP
LOCATION DETAILED: RIGHT MEDIAL UPPER BACK
LOCATION DETAILED: RIGHT INFERIOR UPPER BACK
LOCATION DETAILED: RIGHT INFERIOR MEDIAL FOREHEAD
LOCATION DETAILED: LEFT RADIAL DORSAL HAND
LOCATION DETAILED: RIGHT RADIAL DORSAL HAND
LOCATION DETAILED: MID POSTERIOR NECK
LOCATION DETAILED: RIGHT INFERIOR CENTRAL MALAR CHEEK
LOCATION DETAILED: RIGHT VENTRAL DISTAL FOREARM
LOCATION DETAILED: LEFT PROXIMAL DORSAL FOREARM
LOCATION DETAILED: NASAL SUPRATIP
LOCATION DETAILED: LEFT VENTRAL PROXIMAL FOREARM

## 2018-11-07 ASSESSMENT — LOCATION ZONE DERM
LOCATION ZONE: TRUNK
LOCATION ZONE: FACE
LOCATION ZONE: ARM
LOCATION ZONE: NOSE
LOCATION ZONE: NECK
LOCATION ZONE: HAND
LOCATION ZONE: SCALP

## 2018-11-07 ASSESSMENT — LOCATION SIMPLE DESCRIPTION DERM
LOCATION SIMPLE: UPPER BACK
LOCATION SIMPLE: LEFT FOREARM
LOCATION SIMPLE: RIGHT CHEEK
LOCATION SIMPLE: RIGHT FOREHEAD
LOCATION SIMPLE: LEFT HAND
LOCATION SIMPLE: RIGHT HAND
LOCATION SIMPLE: LEFT SCALP
LOCATION SIMPLE: LEFT FOREHEAD
LOCATION SIMPLE: RIGHT UPPER BACK
LOCATION SIMPLE: NOSE
LOCATION SIMPLE: RIGHT FOREARM
LOCATION SIMPLE: POSTERIOR NECK

## 2018-11-07 NOTE — PROCEDURE: LIQUID NITROGEN
Render Post-Care Instructions In Note?: yes
Detail Level: Detailed
Post-Care Instructions: I reviewed with the patient in detail post-care instructions. Patient is to wear sunprotection, and avoid picking at any of the treated lesions. Pt may apply Vaseline to crusted or scabbing areas.
Number Of Freeze-Thaw Cycles: 2 freeze-thaw cycles
Duration Of Freeze Thaw-Cycle (Seconds): 10
Consent: The patient's consent was obtained including but not limited to risks of crusting, scabbing, blistering, scarring, darker or lighter pigmentary change, recurrence, incomplete removal and infection. RTC in 2 months if lesion(s) persistent.

## 2018-12-05 ENCOUNTER — HOSPITAL ENCOUNTER (EMERGENCY)
Facility: MEDICAL CENTER | Age: 68
End: 2018-12-05
Attending: EMERGENCY MEDICINE
Payer: MEDICARE

## 2018-12-05 ENCOUNTER — APPOINTMENT (OUTPATIENT)
Dept: RADIOLOGY | Facility: MEDICAL CENTER | Age: 68
End: 2018-12-05
Attending: EMERGENCY MEDICINE
Payer: MEDICARE

## 2018-12-05 VITALS
OXYGEN SATURATION: 93 % | RESPIRATION RATE: 19 BRPM | DIASTOLIC BLOOD PRESSURE: 79 MMHG | TEMPERATURE: 97 F | HEART RATE: 85 BPM | WEIGHT: 258.16 LBS | SYSTOLIC BLOOD PRESSURE: 141 MMHG | BODY MASS INDEX: 34.97 KG/M2 | HEIGHT: 72 IN

## 2018-12-05 DIAGNOSIS — K62.5 RECTAL BLEEDING: ICD-10-CM

## 2018-12-05 LAB
ALBUMIN SERPL BCP-MCNC: 4 G/DL (ref 3.2–4.9)
ALBUMIN/GLOB SERPL: 1.2 G/DL
ALP SERPL-CCNC: 50 U/L (ref 30–99)
ALT SERPL-CCNC: 42 U/L (ref 2–50)
ANION GAP SERPL CALC-SCNC: 6 MMOL/L (ref 0–11.9)
APTT PPP: 26.7 SEC (ref 24.7–36)
AST SERPL-CCNC: 31 U/L (ref 12–45)
BASOPHILS # BLD AUTO: 0.7 % (ref 0–1.8)
BASOPHILS # BLD: 0.05 K/UL (ref 0–0.12)
BILIRUB SERPL-MCNC: 0.8 MG/DL (ref 0.1–1.5)
BUN SERPL-MCNC: 25 MG/DL (ref 8–22)
CALCIUM SERPL-MCNC: 8.7 MG/DL (ref 8.4–10.2)
CHLORIDE SERPL-SCNC: 106 MMOL/L (ref 96–112)
CO2 SERPL-SCNC: 24 MMOL/L (ref 20–33)
CREAT SERPL-MCNC: 0.77 MG/DL (ref 0.5–1.4)
EOSINOPHIL # BLD AUTO: 0.09 K/UL (ref 0–0.51)
EOSINOPHIL NFR BLD: 1.3 % (ref 0–6.9)
ERYTHROCYTE [DISTWIDTH] IN BLOOD BY AUTOMATED COUNT: 43.4 FL (ref 35.9–50)
GLOBULIN SER CALC-MCNC: 3.3 G/DL (ref 1.9–3.5)
GLUCOSE SERPL-MCNC: 122 MG/DL (ref 65–99)
HCT VFR BLD AUTO: 44.6 % (ref 42–52)
HGB BLD-MCNC: 15.3 G/DL (ref 14–18)
IMM GRANULOCYTES # BLD AUTO: 0.03 K/UL (ref 0–0.11)
IMM GRANULOCYTES NFR BLD AUTO: 0.4 % (ref 0–0.9)
INR PPP: 1.17 (ref 0.87–1.13)
LYMPHOCYTES # BLD AUTO: 0.73 K/UL (ref 1–4.8)
LYMPHOCYTES NFR BLD: 10.9 % (ref 22–41)
MCH RBC QN AUTO: 31.6 PG (ref 27–33)
MCHC RBC AUTO-ENTMCNC: 34.3 G/DL (ref 33.7–35.3)
MCV RBC AUTO: 92.1 FL (ref 81.4–97.8)
MONOCYTES # BLD AUTO: 0.66 K/UL (ref 0–0.85)
MONOCYTES NFR BLD AUTO: 9.9 % (ref 0–13.4)
NEUTROPHILS # BLD AUTO: 5.11 K/UL (ref 1.82–7.42)
NEUTROPHILS NFR BLD: 76.8 % (ref 44–72)
NRBC # BLD AUTO: 0 K/UL
NRBC BLD-RTO: 0 /100 WBC
PLATELET # BLD AUTO: 225 K/UL (ref 164–446)
PMV BLD AUTO: 10.4 FL (ref 9–12.9)
POTASSIUM SERPL-SCNC: 4.2 MMOL/L (ref 3.6–5.5)
PROT SERPL-MCNC: 7.3 G/DL (ref 6–8.2)
PROTHROMBIN TIME: 14.8 SEC (ref 12–14.6)
RBC # BLD AUTO: 4.84 M/UL (ref 4.7–6.1)
SODIUM SERPL-SCNC: 136 MMOL/L (ref 135–145)
WBC # BLD AUTO: 6.7 K/UL (ref 4.8–10.8)

## 2018-12-05 PROCEDURE — 85025 COMPLETE CBC W/AUTO DIFF WBC: CPT

## 2018-12-05 PROCEDURE — 700105 HCHG RX REV CODE 258: Performed by: EMERGENCY MEDICINE

## 2018-12-05 PROCEDURE — 74177 CT ABD & PELVIS W/CONTRAST: CPT

## 2018-12-05 PROCEDURE — 99284 EMERGENCY DEPT VISIT MOD MDM: CPT

## 2018-12-05 PROCEDURE — 80053 COMPREHEN METABOLIC PANEL: CPT

## 2018-12-05 PROCEDURE — 700117 HCHG RX CONTRAST REV CODE 255: Performed by: EMERGENCY MEDICINE

## 2018-12-05 PROCEDURE — 700102 HCHG RX REV CODE 250 W/ 637 OVERRIDE(OP): Performed by: EMERGENCY MEDICINE

## 2018-12-05 PROCEDURE — 85610 PROTHROMBIN TIME: CPT

## 2018-12-05 PROCEDURE — 85730 THROMBOPLASTIN TIME PARTIAL: CPT

## 2018-12-05 PROCEDURE — A9270 NON-COVERED ITEM OR SERVICE: HCPCS | Performed by: EMERGENCY MEDICINE

## 2018-12-05 PROCEDURE — 36415 COLL VENOUS BLD VENIPUNCTURE: CPT

## 2018-12-05 RX ORDER — SODIUM CHLORIDE 9 MG/ML
1000 INJECTION, SOLUTION INTRAVENOUS ONCE
Status: COMPLETED | OUTPATIENT
Start: 2018-12-05 | End: 2018-12-05

## 2018-12-05 RX ORDER — CIPROFLOXACIN 500 MG/1
500 TABLET, FILM COATED ORAL ONCE
Status: COMPLETED | OUTPATIENT
Start: 2018-12-05 | End: 2018-12-05

## 2018-12-05 RX ORDER — METRONIDAZOLE 500 MG/1
500 TABLET ORAL ONCE
Status: COMPLETED | OUTPATIENT
Start: 2018-12-05 | End: 2018-12-05

## 2018-12-05 RX ORDER — METRONIDAZOLE 500 MG/1
500 TABLET ORAL 3 TIMES DAILY
Qty: 21 TAB | Refills: 0 | Status: SHIPPED | OUTPATIENT
Start: 2018-12-05 | End: 2018-12-12

## 2018-12-05 RX ORDER — CIPROFLOXACIN 500 MG/1
500 TABLET, FILM COATED ORAL 2 TIMES DAILY
Qty: 14 TAB | Refills: 0 | Status: SHIPPED | OUTPATIENT
Start: 2018-12-05 | End: 2018-12-05

## 2018-12-05 RX ORDER — CIPROFLOXACIN 500 MG/1
500 TABLET, FILM COATED ORAL 2 TIMES DAILY
Qty: 14 TAB | Refills: 0 | Status: SHIPPED | OUTPATIENT
Start: 2018-12-05 | End: 2018-12-12

## 2018-12-05 RX ADMIN — SODIUM CHLORIDE 1000 ML: 9 INJECTION, SOLUTION INTRAVENOUS at 07:09

## 2018-12-05 RX ADMIN — METRONIDAZOLE 500 MG: 500 TABLET ORAL at 10:20

## 2018-12-05 RX ADMIN — IOHEXOL 100 ML: 350 INJECTION, SOLUTION INTRAVENOUS at 09:06

## 2018-12-05 RX ADMIN — CIPROFLOXACIN 500 MG: 500 TABLET, FILM COATED ORAL at 10:19

## 2018-12-05 NOTE — ED PROVIDER NOTES
"ED Provider Note    CHIEF COMPLAINT  Chief Complaint   Patient presents with   • Bloody Stools     started this morning, \"BRB in the toilet\"       HPI  Curt Blair is a 68 y.o. male who presents To the emergency department with a chief complaint of bloody stool.  The patient has not had any abdominal pain.  He awoke this morning and felt like he needed to have a bowel movement.  He went to the bathroom and passed bright red blood per rectum mixed with stool.  There was one large bowel movement.  Subsequently he had 4 5 smaller bowel movements.  He seemed to be diminishing with each bowel movement.  Last time the patient passed stool was brown stool without any blood.  He denies any melena.  No hematemesis.  No pain or fevers.    REVIEW OF SYSTEMS  See HPI for further details. All other systems are negative.     PAST MEDICAL HISTORY  Past Medical History:   Diagnosis Date   • Arthritis     Right Foot   • CAD (coronary artery disease)    • Generalized anxiety disorder 10/11/2017   • Hyperlipidemia    • Hypertension    • Inflamed sebaceous cyst 2/15/2018   • S/P coronary artery stent placement     2 stents       FAMILY HISTORY  Family History   Problem Relation Age of Onset   • Lung Disease Mother         copd   • Hypertension Mother    • Heart Disease Father         heart attack and heart disease   • Cancer Brother         neck   • Heart Disease Paternal Grandfather         Heart attack   • Other Sister         non-malignant brain tumor   • Diabetes Neg Hx        SOCIAL HISTORY  Social History     Social History   • Marital status:      Spouse name: N/A   • Number of children: N/A   • Years of education: N/A     Social History Main Topics   • Smoking status: Former Smoker     Packs/day: 2.00     Years: 25.00     Types: Cigarettes     Quit date: 1/1/1992   • Smokeless tobacco: Never Used      Comment: avoid all tobacco products   • Alcohol use 0.6 oz/week     1 Standard drinks or equivalent per week   • " Drug use: No   • Sexual activity: Not Currently     Partners: Female      Comment: , 3 children     Other Topics Concern   •  Service Yes   • Blood Transfusions No   • Caffeine Concern No   • Occupational Exposure No   • Hobby Hazards No   • Sleep Concern Yes   • Stress Concern Yes   • Weight Concern Yes   • Special Diet No     no fried   • Back Care Yes   • Exercise Yes   • Bike Helmet No     does not ride bike   • Self-Exams Yes     Social History Narrative   • No narrative on file       SURGICAL HISTORY  Past Surgical History:   Procedure Laterality Date   • APPENDECTOMY     • CHOLECYSTECTOMY     • ENDARTERECTOMY      corornary   • STENT PLACEMENT         CURRENT MEDICATIONS  Home Medications     Reviewed by Samantha Jimenez (Pharmacy Tech) on 12/05/18 at 0726  Med List Status: Complete   Medication Last Dose Status   ALPRAZolam (XANAX) 0.5 MG Tab 12/4/2018 Active   amlodipine-benazepril (LOTREL) 5-10 MG per capsule 12/4/2018 Active   aspirin 81 MG tablet 12/4/2018 Active   atorvastatin (LIPITOR) 10 MG Tab 12/4/2018 Active   CALCIUM PO 12/4/2018 Active   Coenzyme Q10 (CO Q 10 PO) 12/4/2018 Active   diclofenac EC (VOLTAREN) 75 MG Tablet Delayed Response 12/4/2018 Active   MEGARED OMEGA-3 KRILL OIL PO 12/4/2018 Active   Multiple Vitamins-Minerals (MULTIVITAMIN PO) 12/4/2018 Active   Multiple Vitamins-Minerals (OCUVITE) Tab 12/4/2018 Active   nitroglycerin (NITROSTAT) 0.4 MG SL Tab PRN Active   NON SPECIFIED 12/4/2018 Active                ALLERGIES  No Known Allergies    PHYSICAL EXAM  VITAL SIGNS: /79   Pulse 79   Temp 36.1 °C (97 °F) (Temporal)   Resp 19   Ht 1.829 m (6')   Wt 117.1 kg (258 lb 2.5 oz)   SpO2 94%   BMI 35.01 kg/m²   Constitutional: Well developed, Well nourished, no distress, Non-toxic appearance.   HENT: Normocephalic, Atraumatic, Bilateral external ears normal, Oropharynx moist, No oral exudates, Nose normal.   Eyes: PERRL, EOMI, Conjunctiva normal, No discharge.    Neck: Normal range of motion, No tenderness, Supple, No stridor.   Lymphatic: No lymphadenopathy noted.   Cardiovascular: Normal heart rate, Normal rhythm, No murmurs, No rubs, No gallops.   Thorax & Lungs: Normal breath sounds, No respiratory distress, No wheezing, No chest tenderness.   Abdomen: Obese, soft, reducible umbilical hernia hernia noted, no reproducible abdominal tenderness to palpation.  Active bowel sounds.  No peritoneal signs.  Skin: Warm, Dry, No erythema, No rash.   Back: No tenderness, No CVA tenderness.   Extremities: Intact distal pulses, No edema, No tenderness, No cyanosis, No clubbing.   Neurologic: Alert & oriented x 3, Normal motor function, Normal sensory function, No focal deficits noted.       RADIOLOGY/PROCEDURES  CT-ABDOMEN-PELVIS WITH   Final Result      Moderate severity colonic diverticulosis with possible diverticulitis      Moderate prostate enlargement without lymphadenopathy      Hepatic steatosis      Renal cysts      Moderate coronary artery disease        Results for orders placed or performed during the hospital encounter of 12/05/18   CBC WITH DIFFERENTIAL   Result Value Ref Range    WBC 6.7 4.8 - 10.8 K/uL    RBC 4.84 4.70 - 6.10 M/uL    Hemoglobin 15.3 14.0 - 18.0 g/dL    Hematocrit 44.6 42.0 - 52.0 %    MCV 92.1 81.4 - 97.8 fL    MCH 31.6 27.0 - 33.0 pg    MCHC 34.3 33.7 - 35.3 g/dL    RDW 43.4 35.9 - 50.0 fL    Platelet Count 225 164 - 446 K/uL    MPV 10.4 9.0 - 12.9 fL    Neutrophils-Polys 76.80 (H) 44.00 - 72.00 %    Lymphocytes 10.90 (L) 22.00 - 41.00 %    Monocytes 9.90 0.00 - 13.40 %    Eosinophils 1.30 0.00 - 6.90 %    Basophils 0.70 0.00 - 1.80 %    Immature Granulocytes 0.40 0.00 - 0.90 %    Nucleated RBC 0.00 /100 WBC    Neutrophils (Absolute) 5.11 1.82 - 7.42 K/uL    Lymphs (Absolute) 0.73 (L) 1.00 - 4.80 K/uL    Monos (Absolute) 0.66 0.00 - 0.85 K/uL    Eos (Absolute) 0.09 0.00 - 0.51 K/uL    Baso (Absolute) 0.05 0.00 - 0.12 K/uL    Immature Granulocytes  (abs) 0.03 0.00 - 0.11 K/uL    NRBC (Absolute) 0.00 K/uL   COMP METABOLIC PANEL   Result Value Ref Range    Sodium 136 135 - 145 mmol/L    Potassium 4.2 3.6 - 5.5 mmol/L    Chloride 106 96 - 112 mmol/L    Co2 24 20 - 33 mmol/L    Anion Gap 6.0 0.0 - 11.9    Glucose 122 (H) 65 - 99 mg/dL    Bun 25 (H) 8 - 22 mg/dL    Creatinine 0.77 0.50 - 1.40 mg/dL    Calcium 8.7 8.4 - 10.2 mg/dL    AST(SGOT) 31 12 - 45 U/L    ALT(SGPT) 42 2 - 50 U/L    Alkaline Phosphatase 50 30 - 99 U/L    Total Bilirubin 0.8 0.1 - 1.5 mg/dL    Albumin 4.0 3.2 - 4.9 g/dL    Total Protein 7.3 6.0 - 8.2 g/dL    Globulin 3.3 1.9 - 3.5 g/dL    A-G Ratio 1.2 g/dL   APTT   Result Value Ref Range    APTT 26.7 24.7 - 36.0 sec   PROTHROMBIN TIME (INR)   Result Value Ref Range    PT 14.8 (H) 12.0 - 14.6 sec    INR 1.17 (H) 0.87 - 1.13   ESTIMATED GFR   Result Value Ref Range    GFR If African American >60 >60 mL/min/1.73 m 2    GFR If Non African American >60 >60 mL/min/1.73 m 2         COURSE & MEDICAL DECISION MAKING  Pertinent Labs & Imaging studies reviewed. (See chart for details)    The patient presents today with bloody stools.  He has a benign abdominal exam.  He does not any pain.  I suspect diverticulosis as the etiology of bleeding.  The patient has had a colonoscopy in the past but said it has been years.  He does not take any blood thinners.  Only takes an aspirin.    An IV is established.  Laboratory studies are obtained.  White blood cell count is normal.  Hemoglobin is normal.  Anabolic panel is unrevealing.  CT scan demonstrates findings consistent with fairly severe diverticulosis.  There is a questionable area of possible diverticulitis.  However, I note the patient does not have any pain or tenderness.    Patient likely has bleeding secondary to diverticulosis.  There is question of possible diverticulitis.  Therefore I will treat the patient with ciprofloxacin and Flagyl.  Patient knows doctors Cyn and Dr. Dewey.  He plans to  follow-up with 1 of them as an outpatient.  Return precautions are discussed including fevers, pain, persistent or worsening bleeding.  Patient verbalized understanding.  Discharged home in stable condition.    The patient will return for new or worsening symptoms and is stable at the time of discharge.    The patient is referred to a primary physician for blood pressure management, diabetic screening, and for all other preventative health concerns.    DISPOSITION:  Patient will be discharged home in stable condition.    FOLLOW UP:  [facility], Emergency Dept  [address]  [facility] [address] [phone]    If symptoms worsen    Trip Dewey M.D.  [address]  [address]  [facility] [address] [phone]    Schedule an appointment as soon as possible for a visit       Rodolfo Fernandes M.D.  [address]  [address]  [facility] [address] [phone]    Schedule an appointment as soon as possible for a visit         OUTPATIENT MEDICATIONS:  New Prescriptions    CIPROFLOXACIN (CIPRO) 500 MG TAB    Take 1 Tab by mouth 2 times a day for 7 days.    CIPROFLOXACIN (CIPRO) 500 MG TAB    Take 1 Tab by mouth 2 times a day for 7 days.       FINAL IMPRESSION  1. Rectal bleeding            Electronically signed by: Ag Duncan, 12/5/2018 10:04 AM

## 2018-12-05 NOTE — ED NOTES
Med rec complete per pt at bedside  Allergies have been verified   No oral ABX within the last 30 days

## 2018-12-05 NOTE — ED TRIAGE NOTES
"Chief Complaint   Patient presents with   • Bloody Stools     started this morning, \"BRB in the toilet\"     /79   Pulse 87   Temp 36.1 °C (97 °F) (Temporal)   Resp 19   Ht 1.829 m (6')   Wt 117.1 kg (258 lb 2.5 oz)   SpO2 92%   BMI 35.01 kg/m²       "

## 2018-12-05 NOTE — ED NOTES
Discharged to home with instructions and prescriptions sent to pharmacy  Patient will follow up with his doctor

## 2019-01-14 ENCOUNTER — PATIENT OUTREACH (OUTPATIENT)
Dept: HEALTH INFORMATION MANAGEMENT | Facility: OTHER | Age: 69
End: 2019-01-14

## 2019-01-18 ENCOUNTER — TELEPHONE (OUTPATIENT)
Dept: MEDICAL GROUP | Facility: MEDICAL CENTER | Age: 69
End: 2019-01-18

## 2019-01-18 NOTE — TELEPHONE ENCOUNTER
ESTABLISHED PATIENT PRE-VISIT PLANNING     Patient was NOT contacted to complete PVP.     Note: Patient will not be contacted if there is no indication to call.     1.  Reviewed notes from the last few office visits within the medical group: Yes  10/01/2018  2.  If any orders were placed at last visit or intended to be done for this visit (i.e. 6 mos follow-up), do we have Results/Consult Notes?        •  Labs - Labs were not ordered at last office visit.   Note: If patient appointment is for lab review and patient did not complete labs, check with provider if OK to reschedule patient until labs completed.       •  Imaging - Imaging was not ordered at last office visit.       •  Referrals - No referrals were ordered at last office visit.    3. Is this appointment scheduled as a Hospital Follow-Up? No    4.  Immunizations were updated in [employer] using Web[employer]?: Yes       •  Web [employer] Recommendations: SHINGRIX (Shingles)    5.  Patient is due for the following Health Maintenance Topics:   Health Maintenance Due   Topic Date Due   • IMM ZOSTER VACCINES (2 of 3) 12/31/2015   • Annual Wellness Visit  10/12/2018       6. Orders for overdue Health Maintenance topics pended in Pre-Charting? no    7.  AHA (MDX) form printed for Provider? No, already completed    8.  Patient was NOT informed to arrive 15 min prior to their scheduled appointment and bring in their medication bottles.

## 2019-01-25 ENCOUNTER — OFFICE VISIT (OUTPATIENT)
Dept: MEDICAL GROUP | Facility: MEDICAL CENTER | Age: 69
End: 2019-01-25
Payer: MEDICARE

## 2019-01-25 VITALS
OXYGEN SATURATION: 90 % | BODY MASS INDEX: 35.18 KG/M2 | WEIGHT: 259.7 LBS | RESPIRATION RATE: 14 BRPM | TEMPERATURE: 98.2 F | HEIGHT: 72 IN | HEART RATE: 79 BPM | DIASTOLIC BLOOD PRESSURE: 76 MMHG | SYSTOLIC BLOOD PRESSURE: 130 MMHG

## 2019-01-25 DIAGNOSIS — I10 ESSENTIAL HYPERTENSION, BENIGN: ICD-10-CM

## 2019-01-25 DIAGNOSIS — I70.90 ARTERIOSCLEROTIC VASCULAR DISEASE: ICD-10-CM

## 2019-01-25 DIAGNOSIS — I25.83 CORONARY ARTERY DISEASE DUE TO LIPID RICH PLAQUE: ICD-10-CM

## 2019-01-25 DIAGNOSIS — I25.119 CORONARY ARTERY DISEASE WITH ANGINA PECTORIS, UNSPECIFIED VESSEL OR LESION TYPE, UNSPECIFIED WHETHER NATIVE OR TRANSPLANTED HEART (HCC): ICD-10-CM

## 2019-01-25 DIAGNOSIS — M19.90 ARTHRITIS: ICD-10-CM

## 2019-01-25 DIAGNOSIS — F41.1 GENERALIZED ANXIETY DISORDER: ICD-10-CM

## 2019-01-25 DIAGNOSIS — I25.10 CORONARY ARTERY DISEASE DUE TO LIPID RICH PLAQUE: ICD-10-CM

## 2019-01-25 PROCEDURE — 8041 PR SCP AHA: Performed by: FAMILY MEDICINE

## 2019-01-25 PROCEDURE — 99214 OFFICE O/P EST MOD 30 MIN: CPT | Performed by: FAMILY MEDICINE

## 2019-01-25 RX ORDER — ALPRAZOLAM 0.5 MG/1
0.5 TABLET ORAL PRN
Qty: 90 TAB | Refills: 0 | Status: SHIPPED | OUTPATIENT
Start: 2019-01-25 | End: 2019-04-25 | Stop reason: SDUPTHER

## 2019-01-25 RX ORDER — CELECOXIB 200 MG/1
200 CAPSULE ORAL DAILY
Qty: 90 CAP | Refills: 3 | Status: SHIPPED | OUTPATIENT
Start: 2019-01-25 | End: 2019-04-03

## 2019-01-25 ASSESSMENT — PATIENT HEALTH QUESTIONNAIRE - PHQ9: CLINICAL INTERPRETATION OF PHQ2 SCORE: 0

## 2019-01-25 NOTE — ASSESSMENT & PLAN NOTE
This is a chronic health problem for this patient for which she utilizes alprazolam when he is having problems.  He still has some from the previous prescription but it is time for the refill and I want to be certain we get this done for him.  Obtained and reviewed patient utilization report from [facility] pharmacy database on 1/25/2019 1:20 PM  prior to writing prescription for controlled substance II, III or IV per [place] bill . Based on assessment of the report,my physical exam if necessary and the patient's health problem, the prescription is medically necessary.

## 2019-01-25 NOTE — ASSESSMENT & PLAN NOTE
This is a chronic health problem for this patient adequately controlled with current meds.  Blood pressure 130/76.The patient denies chest pain, shortness of breath or dyspnea on exertion.

## 2019-01-25 NOTE — PROGRESS NOTES
Annual Health Assessment Questions:    1.  Are you currently engaging in any exercise or physical activity? Yes    2.  How would you describe your mood or emotional well-being today? good    3.  Have you had any falls in the last year? No    4.  Have you noticed any problems with your balance or had difficulty walking? No    5.  In the last six months have you experienced any leakage of urine? No    6. DPA/Advanced Directive: Patient has Advanced Directive on file.      CC:Diagnoses of Arthritis, Generalized anxiety disorder, Coronary artery disease with angina pectoris, unspecified vessel or lesion type, unspecified whether native or transplanted heart (HCC), Coronary artery disease due to lipid rich plaque, Essential hypertension, benign, and Arteriosclerotic vascular disease were pertinent to this visit.    HISTORY OF PRESENT ILLNESS: Patient is a 68 y.o. male established patient who presents today to follow-up on chronic health problems as described below.  Patient also needs refills of his medications.  We did his annual health assessment as well.    Health Maintenance: Completed    Arthritis  This is a chronic health problem for this patient that is affecting multiple joints.  We had put him on diclofenac 75 mg twice a day and that really had helped with his joint pain.  He was able to get back out playing golf and doing fairly well.  He ended up having a GI bleed in December and had 3 bloody stools that were bright red blood.  It stopped on its own.  He has just seen Dr. Fernandes on 1/22/19 and Dr. Fernandes pointed out that the diclofenac could be causing the GI bleeding.  He wondered if there was not something else we could use.  I talked with the patient about Celebrex which he has tried before and found it to be helpful.  Were going to try switching to Celebrex 200 mg once a day and see if that is adequate to control his pain because it has less GI side effects.    Generalized anxiety disorder  This is a chronic  health problem for this patient for which she utilizes alprazolam when he is having problems.  He still has some from the previous prescription but it is time for the refill and I want to be certain we get this done for him.  Obtained and reviewed patient utilization report from [facility] pharmacy database on 1/25/2019 1:20 PM  prior to writing prescription for controlled substance II, III or IV per [place] bill . Based on assessment of the report,my physical exam if necessary and the patient's health problem, the prescription is medically necessary.       Coronary artery disease due to lipid rich plaque  This is a chronic health problem for this patient for which she continues to follow with cardiology regularly.  This is really been quite stable.  He is working on weight loss and regular exercise which has been helpful.    Essential hypertension, benign  This is a chronic health problem for this patient adequately controlled with current meds.  Blood pressure 130/76.The patient denies chest pain, shortness of breath or dyspnea on exertion.      Arteriosclerotic vascular disease  This is a chronic health problem for this patient for which she is on secondary prevention including atorvastatin 10 mg daily, daily aspirin and he has quit smoking.  He will continue in his efforts.    Coronary artery disease with angina pectoris (HCC)  This is to do first this is a chronic health problem for which patient continues to follow with cardiology and is doing quite well.  He will continue with current meds.      Patient Active Problem List    Diagnosis Date Noted   • Coronary artery disease due to lipid rich plaque 08/07/2012     Priority: High   • Status post coronary artery stent placement 08/07/2012     Priority: High   • Dyslipidemia 01/26/2014     Priority: Medium   • Essential hypertension, benign 01/23/2012     Priority: Medium   • Arteriosclerotic vascular disease 01/25/2019   • Coronary artery disease with  angina pectoris (HCC) 01/25/2019   • Ageusia 09/20/2018   • Sciatica, right side 08/02/2018   • Arthritis 02/15/2018   • Generalized anxiety disorder 10/11/2017   • Obesity (BMI 30-39.9) 05/08/2017      Allergies:Patient has no known allergies.    Current Outpatient Prescriptions   Medication Sig Dispense Refill   • celecoxib (CELEBREX) 200 MG Cap Take 1 Cap by mouth every day. 90 Cap 3   • ALPRAZolam (XANAX) 0.5 MG Tab Take 1 Tab by mouth as needed for Sleep for up to 90 days. 90 Tab 0   • NON SPECIFIED Take 1 mg by mouth every morning. CBD OIL     • atorvastatin (LIPITOR) 10 MG Tab Take 1 Tab by mouth every day. TAKE ONE TABLET BY MOUTH DAILY 90 Tab 3   • amlodipine-benazepril (LOTREL) 5-10 MG per capsule Take 1 Cap by mouth every day. 90 Cap 3   • nitroglycerin (NITROSTAT) 0.4 MG SL Tab Place 1 Tab under tongue as needed for Chest Pain (Place 1 tablet under the tongue every 5 minutes up to 3 doses as needed for chest pain). 25 Tab 5   • Multiple Vitamins-Minerals (MULTIVITAMIN PO) Take 1 Tab by mouth every morning.     • Coenzyme Q10 (CO Q 10 PO) Take 1 Tab by mouth every morning.     • MEGARED OMEGA-3 KRILL OIL PO Take 1 Tab by mouth every morning.     • CALCIUM PO Take 1 Tab by mouth every day.     • Multiple Vitamins-Minerals (OCUVITE) Tab Take 1 Tab by mouth every day.     • aspirin 81 MG tablet Take 81 mg by mouth every day.         No current facility-administered medications for this visit.        Social History   Substance Use Topics   • Smoking status: Former Smoker     Packs/day: 2.00     Years: 25.00     Types: Cigarettes     Quit date: 1/1/1992   • Smokeless tobacco: Never Used      Comment: avoid all tobacco products   • Alcohol use 0.6 oz/week     1 Standard drinks or equivalent per week     Social History     Social History Narrative   • No narrative on file       Family History   Problem Relation Age of Onset   • Lung Disease Mother         copd   • Hypertension Mother    • Heart Disease Father          heart attack and heart disease   • Cancer Brother         neck   • Heart Disease Paternal Grandfather         Heart attack   • Other Sister         non-malignant brain tumor   • Diabetes Neg Hx         ROS:     - Constitutional:  Negative for fever, chills, unexpected weight change, and fatigue/generalized weakness.    - HEENT:  Negative for headaches, vision changes, hearing changes, ear pain, ear discharge, rhinorrhea, sinus congestion, sore throat, and neck pain.      - Respiratory: Negative for cough, sputum production, chest congestion, dyspnea, wheezing, and crackles.      - Cardiovascular: Negative for chest pain, palpitations, orthopnea, and bilateral lower extremity edema.     - Gastrointestinal: Negative for heartburn, nausea, vomiting, abdominal pain, hematochezia, melena, diarrhea, constipation, and greasy/foul-smelling stools.     - Genitourinary: Negative for dysuria, polyuria, hematuria, pyuria, urinary urgency, and urinary incontinence.     - Musculoskeletal: Patient having multiple joints with problems including back, shoulders bilaterally, hips bilaterally and knees bilaterally secondary to decreasing his anti-inflammatory.  Negative for myalgias, back pain, and joint pain.     - Skin: Negative for rash, itching, cyanotic skin color change.     - Neurological: Negative for dizziness, tingling, tremors, focal sensory deficit, focal weakness and headaches.     - Endo/Heme/Allergies: Does not bruise/bleed easily.     - Psychiatric/Behavioral: Negative for depression continues to have anxiety but denies suicidal or homicidal ideation.  Also not complaining of any memory loss.            - NOTE: All other systems reviewed and are negative, except as in HPI.      Exam:    Blood pressure 130/76, pulse 79, temperature 36.8 °C (98.2 °F), temperature source Temporal, resp. rate 14, height 1.829 m (6'), weight 117.8 kg (259 lb 11.2 oz), SpO2 90 %. Body mass index is 35.22 kg/m².    General:  Well  nourished, well developed male in NAD  Head is grossly normal.  Neck: Supple without JVD or bruit. Thyroid is not enlarged.  Pulmonary: Clear to ausculation and percussion.  Normal effort. No rales, ronchi, or wheezing.  Cardiovascular: Regular rate and rhythm without murmur. Carotid and radial pulses are intact and equal bilaterally.  Extremities: no clubbing, cyanosis, or edema.    Please note that this dictation was created using voice recognition software. I have made every reasonable attempt to correct obvious errors, but I expect that there are errors of grammar and possibly content that I did not discover before finalizing the note.    Assessment/Plan:  1. Arthritis  Uncontrolled, patient had to cut down in his diclofenac sodium to 75 mg once a day and that is not adequate to keep him moving.  He would like to try a different anti-inflammatory.  We will try Celebrex 200 mg daily and see if that is adequate.  I will be seeing him back in 3 months or he could definitely let me try no sooner how he is doing via my chart    2. Generalized anxiety disorder  Adequately controlled with current dosing of alprazolam.  Refills provided for the coming 3 months.  - ALPRAZolam (XANAX) 0.5 MG Tab; Take 1 Tab by mouth as needed for Sleep for up to 90 days.  Dispense: 90 Tab; Refill: 0    3. Coronary artery disease with angina pectoris, unspecified vessel or lesion type, unspecified whether native or transplanted heart (HCC)  Stable, this patient continues to follow with cardiology.  His symptomatology is under control with current meds.    4. Coronary artery disease due to lipid rich plaque  Stable, continue to follow with cardiology    5. Essential hypertension, benign  Controlled, continue with current meds and lifestyle.      6. Arteriosclerotic vascular disease  Controlled, continue with current meds and lifestyle.

## 2019-01-25 NOTE — ASSESSMENT & PLAN NOTE
This is a chronic health problem for this patient for which she continues to follow with cardiology regularly.  This is really been quite stable.  He is working on weight loss and regular exercise which has been helpful.

## 2019-01-25 NOTE — ASSESSMENT & PLAN NOTE
This is a chronic health problem for this patient for which she is on secondary prevention including atorvastatin 10 mg daily, daily aspirin and he has quit smoking.  He will continue in his efforts.

## 2019-01-25 NOTE — ASSESSMENT & PLAN NOTE
This is to do first this is a chronic health problem for which patient continues to follow with cardiology and is doing quite well.  He will continue with current meds.

## 2019-01-25 NOTE — ASSESSMENT & PLAN NOTE
This is a chronic health problem for this patient that is affecting multiple joints.  We had put him on diclofenac 75 mg twice a day and that really had helped with his joint pain.  He was able to get back out playing golf and doing fairly well.  He ended up having a GI bleed in December and had 3 bloody stools that were bright red blood.  It stopped on its own.  He has just seen Dr. Fernandes on 1/22/19 and Dr. Fernandes pointed out that the diclofenac could be causing the GI bleeding.  He wondered if there was not something else we could use.  I talked with the patient about Celebrex which he has tried before and found it to be helpful.  Were going to try switching to Celebrex 200 mg once a day and see if that is adequate to control his pain because it has less GI side effects.

## 2019-01-29 ENCOUNTER — PATIENT MESSAGE (OUTPATIENT)
Dept: MEDICAL GROUP | Facility: MEDICAL CENTER | Age: 69
End: 2019-01-29

## 2019-01-30 ENCOUNTER — APPOINTMENT (RX ONLY)
Dept: URBAN - METROPOLITAN AREA CLINIC 20 | Facility: CLINIC | Age: 69
Setting detail: DERMATOLOGY
End: 2019-01-30

## 2019-01-30 DIAGNOSIS — Z85.828 PERSONAL HISTORY OF OTHER MALIGNANT NEOPLASM OF SKIN: ICD-10-CM

## 2019-01-30 DIAGNOSIS — L82.0 INFLAMED SEBORRHEIC KERATOSIS: ICD-10-CM

## 2019-01-30 DIAGNOSIS — L84 CORNS AND CALLOSITIES: ICD-10-CM

## 2019-01-30 PROCEDURE — ? LIQUID NITROGEN

## 2019-01-30 PROCEDURE — ? OBSERVATION

## 2019-01-30 PROCEDURE — 17110 DESTRUCTION B9 LES UP TO 14: CPT

## 2019-01-30 PROCEDURE — ? COUNSELING

## 2019-01-30 PROCEDURE — 99213 OFFICE O/P EST LOW 20 MIN: CPT | Mod: 25

## 2019-01-30 ASSESSMENT — LOCATION SIMPLE DESCRIPTION DERM
LOCATION SIMPLE: RIGHT PLANTAR SURFACE
LOCATION SIMPLE: POSTERIOR NECK
LOCATION SIMPLE: RIGHT FOREHEAD

## 2019-01-30 ASSESSMENT — LOCATION DETAILED DESCRIPTION DERM
LOCATION DETAILED: RIGHT INFERIOR FOREHEAD
LOCATION DETAILED: MID POSTERIOR NECK
LOCATION DETAILED: RIGHT PLANTAR FOREFOOT OVERLYING 2ND METATARSAL
LOCATION DETAILED: RIGHT FOREHEAD

## 2019-01-30 ASSESSMENT — LOCATION ZONE DERM
LOCATION ZONE: FACE
LOCATION ZONE: NECK
LOCATION ZONE: FEET

## 2019-01-30 NOTE — PROCEDURE: LIQUID NITROGEN
Medical Necessity Information: It is in your best interest to select a reason for this procedure from the list below. All of these items fulfill various CMS LCD requirements except the new and changing color options.
Add 52 Modifier (Optional): no
Number Of Freeze-Thaw Cycles: 2 freeze-thaw cycles
Consent: The patient's consent was obtained including but not limited to risks of crusting, scabbing, blistering, scarring, darker or lighter pigmentary change, recurrence, incomplete removal and infection.
Medical Necessity Clause: This procedure was medically necessary because the lesions that were treated were:
Detail Level: Detailed
Post-Care Instructions: I reviewed with the patient in detail post-care instructions. Patient is to wear sunprotection, and avoid picking at any of the treated lesions. Pt may apply Vaseline to crusted or scabbing areas.

## 2019-02-01 ENCOUNTER — APPOINTMENT (OUTPATIENT)
Dept: PHYSICAL MEDICINE AND REHAB | Facility: MEDICAL CENTER | Age: 69
End: 2019-02-01
Payer: MEDICARE

## 2019-02-20 ENCOUNTER — PATIENT MESSAGE (OUTPATIENT)
Dept: MEDICAL GROUP | Facility: MEDICAL CENTER | Age: 69
End: 2019-02-20

## 2019-03-19 ENCOUNTER — APPOINTMENT (RX ONLY)
Dept: URBAN - METROPOLITAN AREA CLINIC 20 | Facility: CLINIC | Age: 69
Setting detail: DERMATOLOGY
End: 2019-03-19

## 2019-03-19 DIAGNOSIS — L91.8 OTHER HYPERTROPHIC DISORDERS OF THE SKIN: ICD-10-CM

## 2019-03-19 DIAGNOSIS — L82.1 OTHER SEBORRHEIC KERATOSIS: ICD-10-CM

## 2019-03-19 PROCEDURE — 99213 OFFICE O/P EST LOW 20 MIN: CPT

## 2019-03-19 PROCEDURE — ? COUNSELING

## 2019-03-19 PROCEDURE — ? LIQUID NITROGEN

## 2019-03-19 ASSESSMENT — LOCATION SIMPLE DESCRIPTION DERM: LOCATION SIMPLE: RIGHT THIGH

## 2019-03-19 ASSESSMENT — LOCATION DETAILED DESCRIPTION DERM
LOCATION DETAILED: RIGHT ANTERIOR MEDIAL PROXIMAL THIGH
LOCATION DETAILED: RIGHT ANTERIOR PROXIMAL THIGH

## 2019-03-19 ASSESSMENT — LOCATION ZONE DERM: LOCATION ZONE: LEG

## 2019-03-19 NOTE — PROCEDURE: LIQUID NITROGEN
Detail Level: Detailed
Medical Necessity Information: It is in your best interest to select a reason for this procedure from the list below. All of these items fulfill various CMS LCD requirements except the new and changing color options.
Post-Care Instructions: I reviewed with the patient in detail post-care instructions. Patient is to wear sunprotection, and avoid picking at any of the treated lesions. Pt may apply Vaseline to crusted or scabbing areas.
Add 52 Modifier (Optional): no
Consent: The patient's consent was obtained including but not limited to risks of crusting, scabbing, blistering, scarring, darker or lighter pigmentary change, recurrence, incomplete removal and infection.
Medical Necessity Clause: This procedure was medically necessary because the lesions that were
Duration Of Freeze Thaw-Cycle (Seconds): 0

## 2019-03-19 NOTE — HPI: SKIN LESION (SKIN TAGS)
How Severe Are They?: severe
Is This A New Presentation, Or A Follow-Up?: Skin Lesion
Additional History: Pt reports large lesion was pulled and half of lesion was torn off from undeware. Lesion is irritated, bleeding and pt reports several other similar lesions that are frequently irritated.

## 2019-03-29 ENCOUNTER — APPOINTMENT (RX ONLY)
Dept: URBAN - METROPOLITAN AREA CLINIC 20 | Facility: CLINIC | Age: 69
Setting detail: DERMATOLOGY
End: 2019-03-29

## 2019-04-02 RX ORDER — ATORVASTATIN CALCIUM 10 MG/1
10 TABLET, FILM COATED ORAL DAILY
Qty: 100 TAB | Refills: 3 | Status: SHIPPED | OUTPATIENT
Start: 2019-04-02 | End: 2019-06-11 | Stop reason: SDUPTHER

## 2019-04-03 ENCOUNTER — HOSPITAL ENCOUNTER (OUTPATIENT)
Dept: RADIOLOGY | Facility: MEDICAL CENTER | Age: 69
End: 2019-04-03
Attending: FAMILY MEDICINE
Payer: MEDICARE

## 2019-04-03 ENCOUNTER — OFFICE VISIT (OUTPATIENT)
Dept: URGENT CARE | Facility: PHYSICIAN GROUP | Age: 69
End: 2019-04-03
Payer: MEDICARE

## 2019-04-03 VITALS
OXYGEN SATURATION: 95 % | TEMPERATURE: 98.6 F | HEIGHT: 72 IN | DIASTOLIC BLOOD PRESSURE: 72 MMHG | BODY MASS INDEX: 34.67 KG/M2 | SYSTOLIC BLOOD PRESSURE: 132 MMHG | WEIGHT: 256 LBS | HEART RATE: 93 BPM

## 2019-04-03 DIAGNOSIS — R05.9 COUGH: ICD-10-CM

## 2019-04-03 DIAGNOSIS — J06.9 VIRAL UPPER RESPIRATORY ILLNESS: ICD-10-CM

## 2019-04-03 PROCEDURE — 99214 OFFICE O/P EST MOD 30 MIN: CPT | Performed by: FAMILY MEDICINE

## 2019-04-03 PROCEDURE — 71046 X-RAY EXAM CHEST 2 VIEWS: CPT

## 2019-04-03 NOTE — PATIENT INSTRUCTIONS
You have a viral illness  Treatment is supportive  Salt water gurgles for any sore throat  Lozenges as needed for sore throat  Over the counter cough medication as needed like Mucinex DM or Robitussin-DM  Over the counter medication as needed for pain  Follow up if not significantly improved as expected, sooner if any worsening

## 2019-04-03 NOTE — PROGRESS NOTES
Subjective:      Curt Blair is a 68 y.o. male who presents with Cough (sore [id number] )            This is a new problem.  68-year-old who is been otherwise healthy presenting with 3-day history of sore throat, cough and congestion.  Denies any fever chills, wheezing or shortness of breath.  No travel history exposure to pneumonia.  He has not been using any over-the-counter medication for cough and wondering what he can use.        Review of Systems   All other systems reviewed and are negative.         Objective:     /72   Pulse 93   Temp 37 °C (98.6 °F) (Temporal)   Ht 1.829 m (6')   Wt 116.1 kg (256 lb)   SpO2 95%   BMI 34.72 kg/m²      Physical Exam   Constitutional: He is oriented to person, place, and time. He appears well-developed and well-nourished.  Non-toxic appearance. No distress.   HENT:   Head: Normocephalic and atraumatic.   Right Ear: Tympanic membrane, external ear and ear canal normal.   Left Ear: Tympanic membrane, external ear and ear canal normal.   Nose: No rhinorrhea.   Mouth/Throat: Uvula is midline and oropharynx is clear and moist. No oral lesions. No trismus in the jaw. No uvula swelling. No oropharyngeal exudate, posterior oropharyngeal edema, posterior oropharyngeal erythema or tonsillar abscesses. No tonsillar exudate.   Eyes: Conjunctivae are normal.   Neck: Neck supple.   Cardiovascular: Normal rate and regular rhythm.  Exam reveals no gallop and no friction rub.    No murmur heard.  Pulmonary/Chest: Effort normal. No stridor. No respiratory distress. He has decreased breath sounds in the right lower field. He has no wheezes. He has no rhonchi. He has no rales.   Lymphadenopathy:     He has no cervical adenopathy.   Neurological: He is alert and oriented to person, place, and time.   Skin: Skin is warm. He is not diaphoretic. No erythema. No pallor.        Chest x-rays negative for acute abnormalities on my read     Assessment/Plan:     1. Viral upper  respiratory illness    2. Cough  - DX-CHEST-2 VIEWS; Future      Continue symptomatic care   Reassured her chest x-ray is normal  We discussed over-the-counter Mucinex DM or Robitussin-DM as needed for cough  Plan per orders and instructions  Warning signs reviewed

## 2019-04-08 ENCOUNTER — OFFICE VISIT (OUTPATIENT)
Dept: MEDICAL GROUP | Facility: MEDICAL CENTER | Age: 69
End: 2019-04-08
Payer: MEDICARE

## 2019-04-08 VITALS
TEMPERATURE: 98 F | SYSTOLIC BLOOD PRESSURE: 132 MMHG | HEART RATE: 82 BPM | BODY MASS INDEX: 34.67 KG/M2 | DIASTOLIC BLOOD PRESSURE: 68 MMHG | RESPIRATION RATE: 12 BRPM | WEIGHT: 256 LBS | OXYGEN SATURATION: 94 % | HEIGHT: 72 IN

## 2019-04-08 DIAGNOSIS — M19.90 ARTHRITIS: ICD-10-CM

## 2019-04-08 DIAGNOSIS — R05.9 COUGH: ICD-10-CM

## 2019-04-08 PROBLEM — M54.31 SCIATICA, RIGHT SIDE: Status: RESOLVED | Noted: 2018-08-02 | Resolved: 2019-04-08

## 2019-04-08 PROCEDURE — 99214 OFFICE O/P EST MOD 30 MIN: CPT | Performed by: PHYSICIAN ASSISTANT

## 2019-04-08 RX ORDER — PROMETHAZINE HYDROCHLORIDE AND CODEINE PHOSPHATE 6.25; 1 MG/5ML; MG/5ML
5 SYRUP ORAL NIGHTLY PRN
Qty: 160 ML | Refills: 0 | Status: SHIPPED | OUTPATIENT
Start: 2019-04-08 | End: 2019-05-08

## 2019-04-08 NOTE — PROGRESS NOTES
Subjective:   Curt Blair is a 68 y.o. male here today for coughing for 2 weeks.    Cough  This is a 68-year-old male complains of a 2-week history of coughing.  Associated sinus congestion and drainage.  Went to the urgent care and had a chest x-ray that was normal.  Diagnosed with a cough.  Told to take over-the-counter medications.  States that his medication does not help. Guaifenesin has not been effective.  Denies any fevers.  Denies any shortness of breath or chest pain.  Coughing up yellow mucus at times.  No known sick contacts.    Arthritis  States currently he is back taking diclofenac.  States Celebrex was not effective.  Took it enough weeks to know.  States that diclofenac works well.       Current medicines (including changes today)  Current Outpatient Prescriptions   Medication Sig Dispense Refill   • promethazine-codeine (PHENERGAN-CODEINE) 6.25-10 MG/5ML Syrup Take 5 mL by mouth at bedtime as needed for up to 30 days. 160 mL 0   • atorvastatin (LIPITOR) 10 MG Tab Take 1 Tab by mouth every day. 100 Tab 3   • NON SPECIFIED Take 1 mg by mouth every morning. CBD OIL     • amlodipine-benazepril (LOTREL) 5-10 MG per capsule Take 1 Cap by mouth every day. 90 Cap 3   • nitroglycerin (NITROSTAT) 0.4 MG SL Tab Place 1 Tab under tongue as needed for Chest Pain (Place 1 tablet under the tongue every 5 minutes up to 3 doses as needed for chest pain). 25 Tab 5   • Multiple Vitamins-Minerals (MULTIVITAMIN PO) Take 1 Tab by mouth every morning.     • Coenzyme Q10 (CO Q 10 PO) Take 1 Tab by mouth every morning.     • MEGARED OMEGA-3 KRILL OIL PO Take 1 Tab by mouth every morning.     • CALCIUM PO Take 1 Tab by mouth every day.     • Multiple Vitamins-Minerals (OCUVITE) Tab Take 1 Tab by mouth every day.     • aspirin 81 MG tablet Take 81 mg by mouth every day.       • ALPRAZolam (XANAX) 0.5 MG Tab Take 1 Tab by mouth as needed for Sleep for up to 90 days. 90 Tab 0     No current facility-administered  medications for this visit.      He  has a past medical history of Arteriosclerotic vascular disease (1/25/2019); Arthritis; CAD (coronary artery disease); Generalized anxiety disorder (10/11/2017); Hyperlipidemia; Hypertension; Inflamed sebaceous cyst (2/15/2018); and S/P coronary artery stent placement.    Social History and Family History were reviewed and updated.    ROS   No chest pain, no shortness of breath, no abdominal pain and all other systems were reviewed and are negative.       Objective:     /68 (BP Location: Right arm, Patient Position: Sitting, BP Cuff Size: Adult)   Pulse 82   Temp 36.7 °C (98 °F) (Temporal)   Resp 12   Ht 1.829 m (6')   Wt 116.1 kg (256 lb)   SpO2 94%  Body mass index is 34.72 kg/m².   Physical Exam:  Constitutional: Alert, no distress.  Occasional cough with noted mucus in the throat.  Skin: Warm, dry, good turgor, no rashes in visible areas.  Eye: Equal, round and reactive, conjunctiva clear, lids normal.  ENMT: Lips without lesions, good dentition, oropharynx clear.  Neck: Trachea midline, no masses.   Lymph: No cervical or supraclavicular lymphadenopathy  Respiratory: Unlabored respiratory effort, lungs clear to auscultation, no wheezes, no ronchi.  Cardiovascular: Normal S1, S2, no murmur, no edema.  Psych: Alert and oriented x3, normal affect and mood.        Assessment and Plan:   The following treatment plan was discussed    1. Cough  Acute, new onset condition.  Discussed likely viral process.  Discussed symptoms may last for another week or 2.  Continue to push fluids.  Advised to take an over-the-counter antihistamine which he has at home.  Provided a codeine-based cough syrup.  Take at nighttime only.  Do not take with Xanax.  May cause drowsiness.  Do not drink or drive.  Go to the ER with any worsening symptoms such as fever, shortness of breath or chest pain.  - promethazine-codeine (PHENERGAN-CODEINE) 6.25-10 MG/5ML Syrup; Take 5 mL by mouth at bedtime  as needed for up to 30 days.  Dispense: 160 mL; Refill: 0    2. Arthritis  Chronic condition.  Stable.  Continue diclofenac as directed.  Follow-up with his PCP at the end of month.    Patient was seen for 25 minutes face to face of which > 50% of appointment time was spent on counseling and coordination of care regarding the above.        Followup: Return if symptoms worsen or fail to improve.    Please note that this dictation was created using voice recognition software. I have made every reasonable attempt to correct obvious errors, but I expect that there are errors of grammar and possibly content that I did not discover before finalizing the note.

## 2019-04-08 NOTE — ASSESSMENT & PLAN NOTE
This is a 68-year-old male complains of a 2-week history of coughing.  Associated sinus congestion and drainage.  Went to the urgent care and had a chest x-ray that was normal.  Diagnosed with a cough.  Told to take over-the-counter medications.  States that his medication does not help. Guaifenesin has not been effective.  Denies any fevers.  Denies any shortness of breath or chest pain.  Coughing up yellow mucus at times.  No known sick contacts.

## 2019-04-25 ENCOUNTER — OFFICE VISIT (OUTPATIENT)
Dept: MEDICAL GROUP | Facility: MEDICAL CENTER | Age: 69
End: 2019-04-25
Payer: MEDICARE

## 2019-04-25 VITALS
HEART RATE: 100 BPM | WEIGHT: 259.7 LBS | SYSTOLIC BLOOD PRESSURE: 122 MMHG | OXYGEN SATURATION: 94 % | BODY MASS INDEX: 35.18 KG/M2 | TEMPERATURE: 98.2 F | DIASTOLIC BLOOD PRESSURE: 60 MMHG | HEIGHT: 72 IN

## 2019-04-25 DIAGNOSIS — F41.1 GENERALIZED ANXIETY DISORDER: ICD-10-CM

## 2019-04-25 DIAGNOSIS — M19.90 ARTHRITIS: ICD-10-CM

## 2019-04-25 PROCEDURE — 99214 OFFICE O/P EST MOD 30 MIN: CPT | Performed by: FAMILY MEDICINE

## 2019-04-25 RX ORDER — DICLOFENAC SODIUM 75 MG/1
75 TABLET, DELAYED RELEASE ORAL 2 TIMES DAILY
Qty: 180 TAB | Refills: 3 | Status: SHIPPED | OUTPATIENT
Start: 2019-04-25 | End: 2019-08-09 | Stop reason: SDUPTHER

## 2019-04-25 RX ORDER — ALPRAZOLAM 0.5 MG/1
0.5 TABLET ORAL PRN
Qty: 90 TAB | Refills: 0 | Status: SHIPPED | OUTPATIENT
Start: 2019-04-25 | End: 2019-10-04 | Stop reason: SDUPTHER

## 2019-04-25 NOTE — ASSESSMENT & PLAN NOTE
This is a chronic health problem for which this patient utilizes alprazolam 0.5 mg on a as needed basis.  He gets his refills appropriately but does need a refill at this time.  Obtained and reviewed patient utilization report from [facility] pharmacy database on 4/25/2019 1:49 PM  prior to writing prescription for controlled substance II, III or IV per [place] bill . Based on assessment of the report,my physical exam if necessary and the patient's health problem, the prescription is medically necessary.

## 2019-04-25 NOTE — PROGRESS NOTES
CC:Diagnoses of Arthritis and Generalized anxiety disorder were pertinent to this visit.    HISTORY OF PRESENT ILLNESS: Patient is a 68 y.o. male established patient who presents today to talk about his chronic health problems.  Patient informs me he had his colonoscopy and is already talked with his gastroenterologist about going back on his arthritis medication which was the only one that really helped him.    Health Maintenance: Completed    Arthritis  This is a chronic health problem for this patient where he developed a GI bleed that was thought to be from his diclofenac and so he stopped that medication we tried Celebrex it does not work to control his pain.  He just had his colonoscopy in March it showed no polyps and it showed that he had gotten an infected diverticulum and that is what bled.  Patient talked with Dr. Addison his gastroenterologist who said that he would leave it up to me to decide whether or not to put him back on the medication.  Currently this patient cannot  anything because of the arthritis in his hands.  Were going to go back on the diclofenac and he will let me know if that takes care of the pain.  If it does not we can have individual joints that are particularly painful injected by the physical medicine doctors.    Generalized anxiety disorder  This is a chronic health problem for which this patient utilizes alprazolam 0.5 mg on a as needed basis.  He gets his refills appropriately but does need a refill at this time.  Obtained and reviewed patient utilization report from [facility] pharmacy database on 4/25/2019 1:49 PM  prior to writing prescription for controlled substance II, III or IV per [place] bill . Based on assessment of the report,my physical exam if necessary and the patient's health problem, the prescription is medically necessary.         Patient Active Problem List    Diagnosis Date Noted   • Coronary artery disease due to lipid rich plaque 08/07/2012      Priority: High   • Status post coronary artery stent placement 08/07/2012     Priority: High   • Dyslipidemia 01/26/2014     Priority: Medium   • Essential hypertension, benign 01/23/2012     Priority: Medium   • Cough 04/08/2019   • Arteriosclerotic vascular disease 01/25/2019   • Coronary artery disease with angina pectoris (HCC) 01/25/2019   • [place]usia 09/20/2018   • Arthritis 02/15/2018   • Generalized anxiety disorder 10/11/2017   • Obesity (BMI 30-39.9) 05/08/2017      Allergies:Patient has no known allergies.    Current Outpatient Prescriptions   Medication Sig Dispense Refill   • diclofenac EC (VOLTAREN) 75 MG Tablet Delayed Response Take 1 Tab by mouth 2 times a day. 180 Tab 3   • ALPRAZolam (XANAX) 0.5 MG Tab Take 1 Tab by mouth as needed for Sleep for up to 90 days. 90 Tab 0   • promethazine-codeine (PHENERGAN-CODEINE) 6.25-10 MG/5ML Syrup Take 5 mL by mouth at bedtime as needed for up to 30 days. 160 mL 0   • atorvastatin (LIPITOR) 10 MG Tab Take 1 Tab by mouth every day. 100 Tab 3   • NON SPECIFIED Take 1 mg by mouth every morning. CBD OIL     • amlodipine-benazepril (LOTREL) 5-10 MG per capsule Take 1 Cap by mouth every day. 90 Cap 3   • nitroglycerin (NITROSTAT) 0.4 MG SL Tab Place 1 Tab under tongue as needed for Chest Pain (Place 1 tablet under the tongue every 5 minutes up to 3 doses as needed for chest pain). 25 Tab 5   • Multiple Vitamins-Minerals (MULTIVITAMIN PO) Take 1 Tab by mouth every morning.     • Coenzyme Q10 (CO Q 10 PO) Take 1 Tab by mouth every morning.     • MEGARED OMEGA-3 KRILL OIL PO Take 1 Tab by mouth every morning.     • CALCIUM PO Take 1 Tab by mouth every day.     • Multiple Vitamins-Minerals (OCUVITE) Tab Take 1 Tab by mouth every day.     • aspirin 81 MG tablet Take 81 mg by mouth every day.         No current facility-administered medications for this visit.        Social History   Substance Use Topics   • Smoking status: Former Smoker     Packs/day: 2.00     Years: 25.00      Types: Cigarettes     Quit date: 1/1/1992   • Smokeless tobacco: Never Used      Comment: avoid all tobacco products   • Alcohol use 0.6 oz/week     1 Standard drinks or equivalent per week     Social History     Social History Narrative   • No narrative on file       Family History   Problem Relation Age of Onset   • Lung Disease Mother         copd   • Hypertension Mother    • Heart Disease Father         heart attack and heart disease   • Cancer Brother         neck   • Heart Disease Paternal Grandfather         Heart attack   • Other Sister         non-malignant brain tumor   • Diabetes Neg Hx         ROS:     - Constitutional:  Negative for fever, chills, unexpected weight change, and fatigue/generalized weakness.    - HEENT: Negative for headaches, vision changes, hearing changes, ear pain, ear discharge, rhinorrhea, sinus congestion, sore throat, and neck pain.      - Respiratory: Negative for cough, sputum production, chest congestion, dyspnea, wheezing, and crackles.      - Cardiovascular: Negative for chest pain, palpitations, orthopnea, and bilateral lower extremity edema.     - Gastrointestinal: Negative for heartburn, nausea, vomiting, abdominal pain, hematochezia, melena, diarrhea, constipation, and greasy/foul-smelling stools.     - Genitourinary: Negative for dysuria, polyuria, hematuria, pyuria, urinary urgency, and urinary incontinence.     - Musculoskeletal: Positive for significant arthritis involving hands, low back, hips and knees.   - Skin: Negative for rash, itching, cyanotic skin color change.     - Neurological: Negative for dizziness, tingling, tremors, focal sensory deficit, focal weakness and headaches.     - Endo/Heme/Allergies: Does not bruise/bleed easily.     - Psychiatric/Behavioral: Positive for generalized anxiety but denies depression suicidal/homicidal ideation and memory loss.          - NOTE: All other systems reviewed and are negative, except as in HPI.      Exam:    BP  122/60 (BP Location: Left arm, Patient Position: Sitting, BP Cuff Size: Adult)   Pulse 100   Temp 36.8 °C (98.2 °F) (Temporal)   Ht 1.829 m (6')   Wt 117.8 kg (259 lb 11.2 oz)   SpO2 94%  Body mass index is 35.22 kg/m².    General:  Well nourished, well developed male in NAD  Head is grossly normal.  Neck: Supple without JVD or bruit. Thyroid is not enlarged.  Pulmonary: Clear to ausculation and percussion.  Normal effort. No rales, ronchi, or wheezing.  Cardiovascular: Regular rate and rhythm without murmur. Carotid and radial pulses are intact and equal bilaterally.  Extremities: no clubbing, cyanosis, or edema.  Patient was seen for 25 minutes face to face of which, 20 minutes was spent counseling regarding medications interactions and side effects.  Discussion of the colonoscopy and results as well as the risk of going back on diclofenac..    Please note that this dictation was created using voice recognition software. I have made every reasonable attempt to correct obvious errors, but I expect that there are errors of grammar and possibly content that I did not discover before finalizing the note.    Assessment/Plan:  1. Arthritis  Uncontrolled, patient will return to diclofenac sodium taking 75 mg twice daily.  He will make certain he takes it with food, drinks plenty of fluid and then will let us know how this is working for him.    2. Generalized anxiety disorder  Adequately controlled with use of alprazolam.  Refill provided and follow-up in 90 days.  - ALPRAZolam (XANAX) 0.5 MG Tab; Take 1 Tab by mouth as needed for Sleep for up to 90 days.  Dispense: 90 Tab; Refill: 0

## 2019-04-25 NOTE — ASSESSMENT & PLAN NOTE
This is a chronic health problem for this patient where he developed a GI bleed that was thought to be from his diclofenac and so he stopped that medication we tried Celebrex it does not work to control his pain.  He just had his colonoscopy in March it showed no polyps and it showed that he had gotten an infected diverticulum and that is what bled.  Patient talked with Dr. Addison his gastroenterologist who said that he would leave it up to me to decide whether or not to put him back on the medication.  Currently this patient cannot  anything because of the arthritis in his hands.  Were going to go back on the diclofenac and he will let me know if that takes care of the pain.  If it does not we can have individual joints that are particularly painful injected by the physical medicine doctors.

## 2019-05-14 ENCOUNTER — APPOINTMENT (RX ONLY)
Dept: URBAN - METROPOLITAN AREA CLINIC 20 | Facility: CLINIC | Age: 69
Setting detail: DERMATOLOGY
End: 2019-05-14

## 2019-05-14 DIAGNOSIS — L82.1 OTHER SEBORRHEIC KERATOSIS: ICD-10-CM

## 2019-05-14 DIAGNOSIS — L57.0 ACTINIC KERATOSIS: ICD-10-CM

## 2019-05-14 DIAGNOSIS — D18.0 HEMANGIOMA: ICD-10-CM

## 2019-05-14 DIAGNOSIS — Z71.89 OTHER SPECIFIED COUNSELING: ICD-10-CM

## 2019-05-14 DIAGNOSIS — L85.3 XEROSIS CUTIS: ICD-10-CM

## 2019-05-14 DIAGNOSIS — L72.8 OTHER FOLLICULAR CYSTS OF THE SKIN AND SUBCUTANEOUS TISSUE: ICD-10-CM

## 2019-05-14 DIAGNOSIS — L81.4 OTHER MELANIN HYPERPIGMENTATION: ICD-10-CM

## 2019-05-14 DIAGNOSIS — Z85.828 PERSONAL HISTORY OF OTHER MALIGNANT NEOPLASM OF SKIN: ICD-10-CM

## 2019-05-14 DIAGNOSIS — D22 MELANOCYTIC NEVI: ICD-10-CM

## 2019-05-14 PROBLEM — D22.62 MELANOCYTIC NEVI OF LEFT UPPER LIMB, INCLUDING SHOULDER: Status: ACTIVE | Noted: 2019-05-14

## 2019-05-14 PROBLEM — D22.61 MELANOCYTIC NEVI OF RIGHT UPPER LIMB, INCLUDING SHOULDER: Status: ACTIVE | Noted: 2019-05-14

## 2019-05-14 PROBLEM — D22.5 MELANOCYTIC NEVI OF TRUNK: Status: ACTIVE | Noted: 2019-05-14

## 2019-05-14 PROBLEM — D18.01 HEMANGIOMA OF SKIN AND SUBCUTANEOUS TISSUE: Status: ACTIVE | Noted: 2019-05-14

## 2019-05-14 PROCEDURE — ? SUNSCREEN RECOMMENDATIONS

## 2019-05-14 PROCEDURE — 99214 OFFICE O/P EST MOD 30 MIN: CPT | Mod: 25

## 2019-05-14 PROCEDURE — ? COUNSELING

## 2019-05-14 PROCEDURE — 17000 DESTRUCT PREMALG LESION: CPT

## 2019-05-14 PROCEDURE — 17003 DESTRUCT PREMALG LES 2-14: CPT

## 2019-05-14 PROCEDURE — ? OBSERVATION AND MEASURE

## 2019-05-14 PROCEDURE — ? OBSERVATION

## 2019-05-14 PROCEDURE — ? LIQUID NITROGEN

## 2019-05-14 ASSESSMENT — LOCATION ZONE DERM
LOCATION ZONE: TRUNK
LOCATION ZONE: HAND
LOCATION ZONE: FACE
LOCATION ZONE: ARM
LOCATION ZONE: NECK
LOCATION ZONE: SCALP

## 2019-05-14 ASSESSMENT — LOCATION SIMPLE DESCRIPTION DERM
LOCATION SIMPLE: LEFT SCALP
LOCATION SIMPLE: RIGHT FOREHEAD
LOCATION SIMPLE: LEFT CHEEK
LOCATION SIMPLE: ABDOMEN
LOCATION SIMPLE: RIGHT UPPER BACK
LOCATION SIMPLE: RIGHT FOREARM
LOCATION SIMPLE: RIGHT HAND
LOCATION SIMPLE: LEFT FOREARM
LOCATION SIMPLE: LEFT LOWER BACK
LOCATION SIMPLE: LEFT HAND
LOCATION SIMPLE: UPPER BACK
LOCATION SIMPLE: POSTERIOR NECK
LOCATION SIMPLE: LEFT FOREHEAD
LOCATION SIMPLE: RIGHT CLAVICULAR SKIN

## 2019-05-14 ASSESSMENT — LOCATION DETAILED DESCRIPTION DERM
LOCATION DETAILED: RIGHT MEDIAL UPPER BACK
LOCATION DETAILED: RIGHT CLAVICULAR SKIN
LOCATION DETAILED: LEFT SUPERIOR FOREHEAD
LOCATION DETAILED: RIGHT RADIAL DORSAL HAND
LOCATION DETAILED: LEFT PROXIMAL DORSAL FOREARM
LOCATION DETAILED: INFERIOR THORACIC SPINE
LOCATION DETAILED: LEFT CENTRAL FRONTAL SCALP
LOCATION DETAILED: PERIUMBILICAL SKIN
LOCATION DETAILED: LEFT SUPERIOR MEDIAL LOWER BACK
LOCATION DETAILED: RIGHT INFERIOR UPPER BACK
LOCATION DETAILED: LEFT VENTRAL PROXIMAL FOREARM
LOCATION DETAILED: LEFT SUPERIOR LATERAL MALAR CHEEK
LOCATION DETAILED: MID POSTERIOR NECK
LOCATION DETAILED: RIGHT VENTRAL DISTAL FOREARM
LOCATION DETAILED: RIGHT PROXIMAL DORSAL FOREARM
LOCATION DETAILED: RIGHT INFERIOR MEDIAL FOREHEAD
LOCATION DETAILED: LEFT RADIAL DORSAL HAND
LOCATION DETAILED: RIGHT PROXIMAL RADIAL DORSAL FOREARM
LOCATION DETAILED: SUPERIOR THORACIC SPINE

## 2019-05-14 NOTE — PROCEDURE: LIQUID NITROGEN
Duration Of Freeze Thaw-Cycle (Seconds): 10
Render Note In Bullet Format When Appropriate: No
Consent: The patient's consent was obtained including but not limited to risks of crusting, scabbing, blistering, scarring, darker or lighter pigmentary change, recurrence, incomplete removal and infection. RTC in 2 months if lesion(s) persistent.
Detail Level: Detailed
Render Post-Care Instructions In Note?: yes
Number Of Freeze-Thaw Cycles: 2 freeze-thaw cycles
Post-Care Instructions: I reviewed with the patient in detail post-care instructions. Patient is to wear sunprotection, and avoid picking at any of the treated lesions. Pt may apply Vaseline to crusted or scabbing areas.

## 2019-05-15 ENCOUNTER — OFFICE VISIT (OUTPATIENT)
Dept: CARDIOLOGY | Facility: MEDICAL CENTER | Age: 69
End: 2019-05-15
Payer: MEDICARE

## 2019-05-15 VITALS
SYSTOLIC BLOOD PRESSURE: 120 MMHG | HEART RATE: 78 BPM | BODY MASS INDEX: 35.35 KG/M2 | DIASTOLIC BLOOD PRESSURE: 68 MMHG | OXYGEN SATURATION: 95 % | HEIGHT: 72 IN | WEIGHT: 261 LBS

## 2019-05-15 DIAGNOSIS — I25.119 CORONARY ARTERY DISEASE WITH ANGINA PECTORIS, UNSPECIFIED VESSEL OR LESION TYPE, UNSPECIFIED WHETHER NATIVE OR TRANSPLANTED HEART (HCC): ICD-10-CM

## 2019-05-15 DIAGNOSIS — E78.5 DYSLIPIDEMIA: ICD-10-CM

## 2019-05-15 DIAGNOSIS — I25.10 CORONARY ARTERY DISEASE DUE TO LIPID RICH PLAQUE: ICD-10-CM

## 2019-05-15 DIAGNOSIS — I10 ESSENTIAL HYPERTENSION, BENIGN: ICD-10-CM

## 2019-05-15 DIAGNOSIS — I25.83 CORONARY ARTERY DISEASE DUE TO LIPID RICH PLAQUE: ICD-10-CM

## 2019-05-15 DIAGNOSIS — Z95.5 STATUS POST CORONARY ARTERY STENT PLACEMENT: ICD-10-CM

## 2019-05-15 PROCEDURE — 99214 OFFICE O/P EST MOD 30 MIN: CPT | Performed by: INTERNAL MEDICINE

## 2019-05-15 RX ORDER — AMLODIPINE BESYLATE AND BENAZEPRIL HYDROCHLORIDE 5; 10 MG/1; MG/1
1 CAPSULE ORAL DAILY
Qty: 90 CAP | Refills: 3 | Status: SHIPPED | OUTPATIENT
Start: 2019-05-15 | End: 2020-04-09

## 2019-05-15 RX ORDER — NITROGLYCERIN 0.4 MG/1
0.4 TABLET SUBLINGUAL PRN
Qty: 25 TAB | Refills: 5 | Status: SHIPPED | OUTPATIENT
Start: 2019-05-15 | End: 2021-05-03

## 2019-05-16 ASSESSMENT — ENCOUNTER SYMPTOMS
BLURRED VISION: 0
CLAUDICATION: 0
PALPITATIONS: 0
NAUSEA: 0
ABDOMINAL PAIN: 0
PND: 0
FALLS: 0
SHORTNESS OF BREATH: 0
COUGH: 0
WEAKNESS: 0
FEVER: 0
FOCAL WEAKNESS: 0
BRUISES/BLEEDS EASILY: 0
SORE THROAT: 0
DIZZINESS: 0
CHILLS: 0

## 2019-05-16 NOTE — PROGRESS NOTES
Chief Complaint   Patient presents with   • Coronary Artery Disease       Subjective:   Curt Blair is a 68 y.o. male who presents today for follow-up of his history of coronary disease with stenting    He still has need for dermatologic procedures doing well holding his aspirin if needed    Tolerating his blood pressure medicines well      Past Medical History:   Diagnosis Date   • Arteriosclerotic vascular disease 1/25/2019   • Arthritis     Right Foot   • CAD (coronary artery disease)    • Generalized anxiety disorder 10/11/2017   • Hyperlipidemia    • Hypertension    • Inflamed sebaceous cyst 2/15/2018   • S/P coronary artery stent placement     2 stents     Past Surgical History:   Procedure Laterality Date   • APPENDECTOMY     • CHOLECYSTECTOMY     • ENDARTERECTOMY      corornary   • STENT PLACEMENT       Family History   Problem Relation Age of Onset   • Lung Disease Mother         copd   • Hypertension Mother    • Heart Disease Father         heart attack and heart disease   • Cancer Brother         neck   • Heart Disease Paternal Grandfather         Heart attack   • Other Sister         non-malignant brain tumor   • Diabetes Neg Hx      Social History     Social History   • Marital status:      Spouse name: N/A   • Number of children: N/A   • Years of education: N/A     Occupational History   • Not on file.     Social History Main Topics   • Smoking status: Former Smoker     Packs/day: 2.00     Years: 25.00     Types: Cigarettes     Quit date: 1/1/1992   • Smokeless tobacco: Never Used      Comment: avoid all tobacco products   • Alcohol use 0.6 oz/week     1 Standard drinks or equivalent per week   • Drug use: No   • Sexual activity: Not Currently     Partners: Female      Comment: , 3 children     Other Topics Concern   •  Service Yes   • Blood Transfusions No   • Caffeine Concern No   • Occupational Exposure No   • Hobby Hazards No   • Sleep Concern Yes   • Stress Concern  Yes   • Weight Concern Yes   • Special Diet No     no fried   • Back Care Yes   • Exercise Yes   • Bike Helmet No     does not ride bike   • Self-Exams Yes     Social History Narrative   • No narrative on file     No Known Allergies  Outpatient Encounter Prescriptions as of 5/15/2019   Medication Sig Dispense Refill   • nitroglycerin (NITROSTAT) 0.4 MG SL Tab Place 1 Tab under tongue as needed for Chest Pain (Place 1 tablet under the tongue every 5 minutes up to 3 doses as needed for chest pain). 25 Tab 5   • amlodipine-benazepril (LOTREL) 5-10 MG per capsule Take 1 Cap by mouth every day. 90 Cap 3   • diclofenac EC (VOLTAREN) 75 MG Tablet Delayed Response Take 1 Tab by mouth 2 times a day. 180 Tab 3   • ALPRAZolam (XANAX) 0.5 MG Tab Take 1 Tab by mouth as needed for Sleep for up to 90 days. 90 Tab 0   • atorvastatin (LIPITOR) 10 MG Tab Take 1 Tab by mouth every day. 100 Tab 3   • NON SPECIFIED Take 1 mg by mouth every morning. CBD OIL     • Multiple Vitamins-Minerals (MULTIVITAMIN PO) Take 1 Tab by mouth every morning.     • Coenzyme Q10 (CO Q 10 PO) Take 1 Tab by mouth every morning.     • MEGARED OMEGA-3 KRILL OIL PO Take 1 Tab by mouth every morning.     • CALCIUM PO Take 1 Tab by mouth every day.     • aspirin 81 MG tablet Take 81 mg by mouth every day.       • [DISCONTINUED] amlodipine-benazepril (LOTREL) 5-10 MG per capsule Take 1 Cap by mouth every day. 90 Cap 3   • [DISCONTINUED] nitroglycerin (NITROSTAT) 0.4 MG SL Tab Place 1 Tab under tongue as needed for Chest Pain (Place 1 tablet under the tongue every 5 minutes up to 3 doses as needed for chest pain). 25 Tab 5   • Multiple Vitamins-Minerals (OCUVITE) Tab Take 1 Tab by mouth every day.       No facility-administered encounter medications on file as of 5/15/2019.      Review of Systems   Constitutional: Negative for chills and fever.   HENT: Negative for sore throat.    Eyes: Negative for blurred vision.   Respiratory: Negative for cough and shortness of  breath.    Cardiovascular: Negative for chest pain, palpitations, claudication, leg swelling and PND.   Gastrointestinal: Negative for abdominal pain and nausea.   Musculoskeletal: Negative for falls and joint pain.   Skin: Negative for rash.   Neurological: Negative for dizziness, focal weakness and weakness.   Endo/Heme/Allergies: Does not bruise/bleed easily.        Objective:   /68 (BP Location: Left arm, Patient Position: Sitting, BP Cuff Size: Adult)   Pulse 78   Ht 1.829 m (6')   Wt 118.4 kg (261 lb)   SpO2 95%   BMI 35.40 kg/m²     Physical Exam   Constitutional: No distress.   HENT:   Mouth/Throat: Oropharynx is clear and moist. No oropharyngeal exudate.   Eyes: No scleral icterus.   Neck: No JVD present.   Cardiovascular: Normal rate and normal heart sounds.  Exam reveals no gallop and no friction rub.    No murmur heard.  Pulmonary/Chest: No respiratory distress. He has no wheezes. He has no rales.   Abdominal: Soft. Bowel sounds are normal.   Musculoskeletal: He exhibits no edema.   Neurological: He is alert.   Skin: No rash noted. He is not diaphoretic.   Psychiatric: He has a normal mood and affect.     We reviewed in person the most recent labs  Recent Results (from the past 4032 hour(s))   CBC WITH DIFFERENTIAL    Collection Time: 12/05/18  7:00 AM   Result Value Ref Range    WBC 6.7 4.8 - 10.8 K/uL    RBC 4.84 4.70 - 6.10 M/uL    Hemoglobin 15.3 14.0 - 18.0 g/dL    Hematocrit 44.6 42.0 - 52.0 %    MCV 92.1 81.4 - 97.8 fL    MCH 31.6 27.0 - 33.0 pg    MCHC 34.3 33.7 - 35.3 g/dL    RDW 43.4 35.9 - 50.0 fL    Platelet Count 225 164 - 446 K/uL    MPV 10.4 9.0 - 12.9 fL    Neutrophils-Polys 76.80 (H) 44.00 - 72.00 %    Lymphocytes 10.90 (L) 22.00 - 41.00 %    Monocytes 9.90 0.00 - 13.40 %    Eosinophils 1.30 0.00 - 6.90 %    Basophils 0.70 0.00 - 1.80 %    Immature Granulocytes 0.40 0.00 - 0.90 %    Nucleated RBC 0.00 /100 WBC    Neutrophils (Absolute) 5.11 1.82 - 7.42 K/uL    Lymphs  (Absolute) 0.73 (L) 1.00 - 4.80 K/uL    Monos (Absolute) 0.66 0.00 - 0.85 K/uL    Eos (Absolute) 0.09 0.00 - 0.51 K/uL    Baso (Absolute) 0.05 0.00 - 0.12 K/uL    Immature Granulocytes (abs) 0.03 0.00 - 0.11 K/uL    NRBC (Absolute) 0.00 K/uL   COMP METABOLIC PANEL    Collection Time: 12/05/18  7:00 AM   Result Value Ref Range    Sodium 136 135 - 145 mmol/L    Potassium 4.2 3.6 - 5.5 mmol/L    Chloride 106 96 - 112 mmol/L    Co2 24 20 - 33 mmol/L    Anion Gap 6.0 0.0 - 11.9    Glucose 122 (H) 65 - 99 mg/dL    Bun 25 (H) 8 - 22 mg/dL    Creatinine 0.77 0.50 - 1.40 mg/dL    Calcium 8.7 8.4 - 10.2 mg/dL    AST(SGOT) 31 12 - 45 U/L    ALT(SGPT) 42 2 - 50 U/L    Alkaline Phosphatase 50 30 - 99 U/L    Total Bilirubin 0.8 0.1 - 1.5 mg/dL    Albumin 4.0 3.2 - 4.9 g/dL    Total Protein 7.3 6.0 - 8.2 g/dL    Globulin 3.3 1.9 - 3.5 g/dL    A-G Ratio 1.2 g/dL   APTT    Collection Time: 12/05/18  7:00 AM   Result Value Ref Range    APTT 26.7 24.7 - 36.0 sec   PROTHROMBIN TIME (INR)    Collection Time: 12/05/18  7:00 AM   Result Value Ref Range    PT 14.8 (H) 12.0 - 14.6 sec    INR 1.17 (H) 0.87 - 1.13   ESTIMATED GFR    Collection Time: 12/05/18  7:00 AM   Result Value Ref Range    GFR If African American >60 >60 mL/min/1.73 m 2    GFR If Non African American >60 >60 mL/min/1.73 m 2         Assessment:     1. Status post coronary artery stent placement     2. Coronary artery disease due to lipid rich plaque     3. Dyslipidemia     4. Essential hypertension, benign  amlodipine-benazepril (LOTREL) 5-10 MG per capsule   5. Coronary artery disease with angina pectoris, unspecified vessel or lesion type, unspecified whether native or transplanted heart (HCC)  nitroglycerin (NITROSTAT) 0.4 MG SL Tab       Medical Decision Making:  Today's Assessment / Status / Plan:     It was my pleasure to meet with Mr. Blair.    Blood pressure is well controlled.      He is on appropriate statin.    I will see Mr. Blair back in 1 year time and  encouraged him to follow up with us over the phone or e-mail using my MyChart as issues arise.    It is my pleasure to participate in the care of Mr. Blair.  Please do not hesitate to contact me with questions or concerns.    Iván Otoole MD PhD [facility]  Cardiologist [facility]    Please note that this dictation was created using voice recognition software. I have worked with consultants from the vendor as well as technical experts from [facility] to optimize the interface. I have made every reasonable attempt to correct obvious errors, but I expect that there are errors of grammar and possibly content I did not discover before finalizing the note.

## 2019-06-11 ENCOUNTER — PATIENT MESSAGE (OUTPATIENT)
Dept: MEDICAL GROUP | Facility: MEDICAL CENTER | Age: 69
End: 2019-06-11

## 2019-06-11 NOTE — TELEPHONE ENCOUNTER
From: Curt Blair  To: Cheryl Chopra M.D.  Sent: 6/11/2019 11:48 AM PDT  Subject: Prescription Question    Hi Dr. Luna I have one refill remaing on my lipitor it is at [facility]. I have been trying to get it transfered to [facility] but so far they wont do it [facility] has been trying as well including contacting you so they say. Should I just go ahead and refill at [facility] this one last time or would you sent a perscription to [facility] for me I have about a week left of the meds. thanks see you soon Curt

## 2019-06-12 RX ORDER — ATORVASTATIN CALCIUM 10 MG/1
10 TABLET, FILM COATED ORAL DAILY
Qty: 100 TAB | Refills: 3 | Status: SHIPPED | OUTPATIENT
Start: 2019-06-12 | End: 2020-06-05

## 2019-07-15 ENCOUNTER — TELEPHONE (OUTPATIENT)
Dept: MEDICAL GROUP | Facility: MEDICAL CENTER | Age: 69
End: 2019-07-15

## 2019-07-15 NOTE — TELEPHONE ENCOUNTER
ESTABLISHED PATIENT PRE-VISIT PLANNING     Patient was NOT contacted to complete PVP.     Note: Patient will not be contacted if there is no indication to call.     1.  Reviewed notes from the last few office visits within the medical group: Yes    2.  If any orders were placed at last visit or intended to be done for this visit (i.e. 6 mos follow-up), do we have Results/Consult Notes?        •  Labs - Labs were not ordered at last office visit.   Note: If patient appointment is for lab review and patient did not complete labs, check with provider if OK to reschedule patient until labs completed.       •  Imaging - Imaging was not ordered at last office visit.       •  Referrals - Referral ordered, patient was seen and consult notes are in chart. Care Teams updated  YES.    3. Is this appointment scheduled as a Hospital Follow-Up? No    4.  Immunizations were updated in Epic using WebIZ?: Epic matches WebIZ       •  Web Iz Recommendations: SHINGRIX (Shingles)    5.  Patient is due for the following Health Maintenance Topics:   Health Maintenance Due   Topic Date Due   • HEPATITIS C SCREENING  1950   • IMM ZOSTER VACCINES (2 of 3) 12/31/2015   • Annual Wellness Visit  10/12/2018       - Patient has completed FLU, PNEUMOVAX (PPSV23), PREVNAR (PCV13)  and TDAP Immunization(s) per WebIZ. Chart has been updated.    6. Orders for overdue Health Maintenance topics pended in Pre-Charting? NO    7.  AHA (MDX) form printed for Provider? No, already completed    8.  Patient was NOT informed to arrive 15 min prior to their scheduled appointment and bring in their medication bottles.

## 2019-07-19 ENCOUNTER — OFFICE VISIT (OUTPATIENT)
Dept: MEDICAL GROUP | Facility: MEDICAL CENTER | Age: 69
End: 2019-07-19
Payer: MEDICARE

## 2019-07-19 VITALS
HEIGHT: 72 IN | RESPIRATION RATE: 12 BRPM | SYSTOLIC BLOOD PRESSURE: 120 MMHG | DIASTOLIC BLOOD PRESSURE: 68 MMHG | WEIGHT: 265.43 LBS | BODY MASS INDEX: 35.95 KG/M2 | OXYGEN SATURATION: 93 % | TEMPERATURE: 98.2 F | HEART RATE: 89 BPM

## 2019-07-19 DIAGNOSIS — I10 ESSENTIAL HYPERTENSION, BENIGN: ICD-10-CM

## 2019-07-19 DIAGNOSIS — R73.03 PREDIABETES: ICD-10-CM

## 2019-07-19 DIAGNOSIS — E78.5 DYSLIPIDEMIA: ICD-10-CM

## 2019-07-19 PROCEDURE — 99214 OFFICE O/P EST MOD 30 MIN: CPT | Performed by: FAMILY MEDICINE

## 2019-07-19 NOTE — ASSESSMENT & PLAN NOTE
This is a chronic health problem for this patient that is well controlled with his current medications.  Blood pressure 120/68.  Patient has gained a few pounds over the winter and is frustrated because he cannot get his head wrapped around losing weight at this time.  I am concerned because his blood sugar was high.  We are going to recheck this and we will also to recheck his lipid panel and then see him back in about 3 months.

## 2019-07-19 NOTE — ASSESSMENT & PLAN NOTE
This is a chronic health problem that is uncontrolled with current medications and lifestyle measures.  His glucose was elevated at 122.  We need to recheck his numbers.

## 2019-07-19 NOTE — PROGRESS NOTES
CC:Diagnoses of Prediabetes, Essential hypertension, benign, and Dyslipidemia were pertinent to this visit.    HISTORY OF PRESENT ILLNESS: Patient is a 68 y.o. male established patient who presents today to get refills of his alprazolam that he uses for generalized anxiety disorder.  This patient does not take that medication on a daily basis.  Turns out he actually did not need a refill today.  He does want to talk about the fact that he is having a difficult time losing weight and wanting to get some blood work done.  We will be seeing him back for his annual wellness exam in October and will do blood work prior to that visit.    Health Maintenance: Completed    Prediabetes  This is a chronic health problem that is uncontrolled with current medications and lifestyle measures.  His glucose was elevated at 122.  We need to recheck his numbers.    Essential hypertension, benign  This is a chronic health problem for this patient that is well controlled with his current medications.  Blood pressure 120/68.  Patient has gained a few pounds over the winter and is frustrated because he cannot get his head wrapped around losing weight at this time.  I am concerned because his blood sugar was high.  We are going to recheck this and we will also to recheck his lipid panel and then see him back in about 3 months.    Dyslipidemia  This is a chronic health problem for which the patient is on atorvastatin 10 mg daily.  Last time we did his lipids 6 months ago they were in the normal range.  We will plan to recheck these prior to his coming for his annual wellness visit.      Patient Active Problem List    Diagnosis Date Noted   • Coronary artery disease due to lipid rich plaque 08/07/2012     Priority: High   • Status post coronary artery stent placement 08/07/2012     Priority: High   • Dyslipidemia 01/26/2014     Priority: Medium   • Essential hypertension, benign 01/23/2012     Priority: Medium   • Prediabetes 07/19/2019   •  Cough 04/08/2019   • Arteriosclerotic vascular disease 01/25/2019   • Ageusia 09/20/2018   • Arthritis 02/15/2018   • Generalized anxiety disorder 10/11/2017   • Obesity (BMI 30-39.9) 05/08/2017      Allergies:Patient has no known allergies.    Current Outpatient Prescriptions   Medication Sig Dispense Refill   • atorvastatin (LIPITOR) 10 MG Tab Take 1 Tab by mouth every day. 100 Tab 3   • nitroglycerin (NITROSTAT) 0.4 MG SL Tab Place 1 Tab under tongue as needed for Chest Pain (Place 1 tablet under the tongue every 5 minutes up to 3 doses as needed for chest pain). 25 Tab 5   • amlodipine-benazepril (LOTREL) 5-10 MG per capsule Take 1 Cap by mouth every day. 90 Cap 3   • diclofenac EC (VOLTAREN) 75 MG Tablet Delayed Response Take 1 Tab by mouth 2 times a day. 180 Tab 3   • ALPRAZolam (XANAX) 0.5 MG Tab Take 1 Tab by mouth as needed for Sleep for up to 90 days. 90 Tab 0   • NON SPECIFIED Take 1 mg by mouth every morning. CBD OIL     • Multiple Vitamins-Minerals (MULTIVITAMIN PO) Take 1 Tab by mouth every morning.     • Coenzyme Q10 (CO Q 10 PO) Take 1 Tab by mouth every morning.     • MEGARED OMEGA-3 KRILL OIL PO Take 1 Tab by mouth every morning.     • CALCIUM PO Take 1 Tab by mouth every day.     • Multiple Vitamins-Minerals (OCUVITE) Tab Take 1 Tab by mouth every day.     • aspirin 81 MG tablet Take 81 mg by mouth every day.         No current facility-administered medications for this visit.        Social History   Substance Use Topics   • Smoking status: Former Smoker     Packs/day: 2.00     Years: 25.00     Types: Cigarettes     Quit date: 1/1/1992   • Smokeless tobacco: Never Used      Comment: avoid all tobacco products   • Alcohol use 0.6 oz/week     1 Standard drinks or equivalent per week     Social History     Social History Narrative   • No narrative on file       Family History   Problem Relation Age of Onset   • Lung Disease Mother         copd   • Hypertension Mother    • Heart Disease Father          heart attack and heart disease   • Cancer Brother         neck   • Heart Disease Paternal Grandfather         Heart attack   • Other Sister         non-malignant brain tumor   • Diabetes Neg Hx         ROS:     - Constitutional:  Negative for fever, chills, unexpected weight change, and fatigue/generalized weakness.    - HEENT:  Negative for headaches, vision changes, hearing changes, ear pain, ear discharge, rhinorrhea, sinus congestion, sore throat, and neck pain.      - Respiratory: Negative for cough, sputum production, chest congestion, dyspnea, wheezing, and crackles.      - Cardiovascular: Negative for chest pain, palpitations, orthopnea, and bilateral lower extremity edema.     - Gastrointestinal: Negative for heartburn, nausea, vomiting, abdominal pain, hematochezia, melena, diarrhea, constipation, and greasy/foul-smelling stools.     - Genitourinary: Negative for dysuria, polyuria, hematuria, pyuria, urinary urgency, and urinary incontinence.     - Musculoskeletal: Positive for significant bilateral knee and foot pain.     - Skin: Patient has a small cyst in the medial surface of the right thumb.     - Neurological: Negative for dizziness, tingling, tremors, focal sensory deficit, focal weakness and headaches.     - Endo/Heme/Allergies: Does not bruise/bleed easily.     - Psychiatric/Behavioral: Negative for depression, suicidal/homicidal ideation and memory loss.          - NOTE: All other systems reviewed and are negative, except as in HPI.      Exam:    /68 (BP Location: Left arm, Patient Position: Sitting, BP Cuff Size: Adult)   Pulse 89   Temp 36.8 °C (98.2 °F) (Temporal)   Resp 12   Ht 1.829 m (6')   Wt 120.4 kg (265 lb 6.9 oz)   SpO2 93%  Body mass index is 36 kg/m².    General:  Well nourished, well developed male in NAD  Head is grossly normal.  .  Patient was seen for 25 minutes face to face of which, 20 minutes was spent counseling regarding medications interactions and side effects  as well as a discussion of his bilateral knee pain that keeps him from exercising as much as he would like.  We also talked about options for how to treat this short of knee replacement.  He is going to consider a geniculate block and let me know when I see him back..    Please note that this dictation was created using voice recognition software. I have made every reasonable attempt to correct obvious errors, but I expect that there are errors of grammar and possibly content that I did not discover before finalizing the note.    Assessment/Plan:  1. Prediabetes  Uncontrolled, I am very concerned that this gentleman may have slipped over into diabetes since he is having such a difficult time with weight loss.  We will plan to recheck prior to his visit in October  - HEMOGLOBIN A1C; Future    2. Essential hypertension, benign  Controlled, continue with current meds and lifestyle.      3. Dyslipidemia  Previously well controlled with current meds but with weight gain I am concerned.  - Comp Metabolic Panel; Future  - Lipid Profile; Future

## 2019-07-19 NOTE — ASSESSMENT & PLAN NOTE
This is a chronic health problem for which the patient is on atorvastatin 10 mg daily.  Last time we did his lipids 6 months ago they were in the normal range.  We will plan to recheck these prior to his coming for his annual wellness visit.

## 2019-08-09 RX ORDER — DICLOFENAC SODIUM 75 MG/1
TABLET, DELAYED RELEASE ORAL
Qty: 180 TAB | Refills: 3 | Status: SHIPPED | OUTPATIENT
Start: 2019-08-09 | End: 2020-07-22 | Stop reason: SDUPTHER

## 2019-09-19 ENCOUNTER — HOSPITAL ENCOUNTER (OUTPATIENT)
Dept: LAB | Facility: MEDICAL CENTER | Age: 69
End: 2019-09-19
Attending: FAMILY MEDICINE
Payer: MEDICARE

## 2019-09-19 DIAGNOSIS — R73.03 PREDIABETES: ICD-10-CM

## 2019-09-19 DIAGNOSIS — E78.5 DYSLIPIDEMIA: ICD-10-CM

## 2019-09-19 LAB
ALBUMIN SERPL BCP-MCNC: 3.7 G/DL (ref 3.2–4.9)
ALBUMIN/GLOB SERPL: 1.2 G/DL
ALP SERPL-CCNC: 47 U/L (ref 30–99)
ALT SERPL-CCNC: 27 U/L (ref 2–50)
ANION GAP SERPL CALC-SCNC: 7 MMOL/L (ref 0–11.9)
AST SERPL-CCNC: 24 U/L (ref 12–45)
BILIRUB SERPL-MCNC: 0.6 MG/DL (ref 0.1–1.5)
BUN SERPL-MCNC: 21 MG/DL (ref 8–22)
CALCIUM SERPL-MCNC: 8.7 MG/DL (ref 8.5–10.5)
CHLORIDE SERPL-SCNC: 104 MMOL/L (ref 96–112)
CHOLEST SERPL-MCNC: 177 MG/DL (ref 100–199)
CO2 SERPL-SCNC: 27 MMOL/L (ref 20–33)
CREAT SERPL-MCNC: 1.1 MG/DL (ref 0.5–1.4)
EST. AVERAGE GLUCOSE BLD GHB EST-MCNC: 120 MG/DL
GLOBULIN SER CALC-MCNC: 3 G/DL (ref 1.9–3.5)
GLUCOSE SERPL-MCNC: 91 MG/DL (ref 65–99)
HBA1C MFR BLD: 5.8 % (ref 0–5.6)
HDLC SERPL-MCNC: 64 MG/DL
LDLC SERPL CALC-MCNC: 84 MG/DL
POTASSIUM SERPL-SCNC: 4.7 MMOL/L (ref 3.6–5.5)
PROT SERPL-MCNC: 6.7 G/DL (ref 6–8.2)
SODIUM SERPL-SCNC: 138 MMOL/L (ref 135–145)
TRIGL SERPL-MCNC: 145 MG/DL (ref 0–149)

## 2019-09-19 PROCEDURE — 83036 HEMOGLOBIN GLYCOSYLATED A1C: CPT

## 2019-09-19 PROCEDURE — 80061 LIPID PANEL: CPT

## 2019-09-19 PROCEDURE — 80053 COMPREHEN METABOLIC PANEL: CPT

## 2019-09-19 PROCEDURE — 36415 COLL VENOUS BLD VENIPUNCTURE: CPT

## 2019-09-30 ENCOUNTER — TELEPHONE (OUTPATIENT)
Dept: MEDICAL GROUP | Facility: MEDICAL CENTER | Age: 69
End: 2019-09-30

## 2019-09-30 ENCOUNTER — PATIENT MESSAGE (OUTPATIENT)
Dept: HEALTH INFORMATION MANAGEMENT | Facility: OTHER | Age: 69
End: 2019-09-30

## 2019-09-30 NOTE — TELEPHONE ENCOUNTER
ANNUAL WELLNESS VISIT PRE-VISIT PLANNING WITHOUT OUTREACH    1.  Reviewed note from last office visit with PCP: YES    2.  If any orders were placed at last visit, do we have Results/Consult Notes?        •  Labs - Labs ordered, completed on 09/19/2019 and results are in chart.  Note: If patient appointment is for lab review and patient did not complete labs, check with provider if OK to reschedule patient until labs completed.       •  Imaging - Imaging was not ordered at last office visit.       •  Referrals - No referrals were ordered at last office visit.    3.  Immunizations were updated in [facility] using WebIZ?: Yes       •  WebIZ Recommendations: SHINGRIX (Shingles)        •  Is patient due for Tdap? NO       •  Is patient due for Shingrix? YES. Patient was not notified of copay/out of pocket cost. Vaccine is on backorder.    4.  Patient is due for the following Health Maintenance Topics:   Health Maintenance Due   Topic Date Due   • HEPATITIS C SCREENING  1950   • IMM ZOSTER VACCINES (2 of 3) 12/31/2015   • Annual Wellness Visit  10/12/2018       - Patient has completed FLU, PNEUMOVAX (PPSV23), PREVNAR (PCV13)  and TDAP Immunization(s) per WebIZ. Chart has been updated.    5.  Reviewed/Updated the following with patient:       •   Preferred Pharmacy? YES       •   Preferred Lab? YES       •   Preferred Communication? YES       •   Allergies? YES       •   Medications? YES. Was Abstract Encounter opened and chart updated? YES       •   Social History? NO       •   Family History (document living status of immediate family members and if + hx of  cancer, diabetes, hypertension, hyperlipidemia, heart attack, stroke) NO    6.  Care Team Updated:       •   DME Company (gait device, O2, CPAP, etc.): N\A       •   Other Specialists (eye doctor, derm, GYN, cardiology, endo, etc): YES    7. Orders for overdue Health Maintenance topics pended in Pre-Charting? NO    8.  Patient has the following Care Path diagnoses  on Problem List:  NONE    9.  Patient was advised: “This is a free wellness visit. The provider will screen for medical conditions to help you stay healthy. If you have other concerns to address you may be asked to discuss these at a separate visit or there may be an additional fee.”     10.  Patient was informed to arrive 15 min prior to their scheduled appointment and bring in their medication bottles.

## 2019-10-04 ENCOUNTER — OFFICE VISIT (OUTPATIENT)
Dept: MEDICAL GROUP | Facility: MEDICAL CENTER | Age: 69
End: 2019-10-04
Payer: MEDICARE

## 2019-10-04 VITALS
WEIGHT: 263.6 LBS | HEIGHT: 71 IN | HEART RATE: 83 BPM | DIASTOLIC BLOOD PRESSURE: 58 MMHG | BODY MASS INDEX: 36.9 KG/M2 | OXYGEN SATURATION: 96 % | SYSTOLIC BLOOD PRESSURE: 110 MMHG | TEMPERATURE: 98 F

## 2019-10-04 DIAGNOSIS — I10 ESSENTIAL HYPERTENSION, BENIGN: ICD-10-CM

## 2019-10-04 DIAGNOSIS — E66.9 OBESITY (BMI 30-39.9): ICD-10-CM

## 2019-10-04 DIAGNOSIS — E78.5 DYSLIPIDEMIA: ICD-10-CM

## 2019-10-04 DIAGNOSIS — I25.10 CORONARY ARTERY DISEASE DUE TO LIPID RICH PLAQUE: ICD-10-CM

## 2019-10-04 DIAGNOSIS — I25.83 CORONARY ARTERY DISEASE DUE TO LIPID RICH PLAQUE: ICD-10-CM

## 2019-10-04 DIAGNOSIS — F41.1 GENERALIZED ANXIETY DISORDER: ICD-10-CM

## 2019-10-04 DIAGNOSIS — Z12.5 SCREENING PSA (PROSTATE SPECIFIC ANTIGEN): ICD-10-CM

## 2019-10-04 DIAGNOSIS — R43.2 AGEUSIA: ICD-10-CM

## 2019-10-04 DIAGNOSIS — M19.90 ARTHRITIS: ICD-10-CM

## 2019-10-04 DIAGNOSIS — Z11.59 NEED FOR HEPATITIS C SCREENING TEST: ICD-10-CM

## 2019-10-04 PROBLEM — R05.9 COUGH: Status: RESOLVED | Noted: 2019-04-08 | Resolved: 2019-10-04

## 2019-10-04 PROCEDURE — G0439 PPPS, SUBSEQ VISIT: HCPCS | Performed by: FAMILY MEDICINE

## 2019-10-04 RX ORDER — ALPRAZOLAM 0.5 MG/1
0.5 TABLET ORAL PRN
Qty: 90 TAB | Refills: 0 | Status: SHIPPED | OUTPATIENT
Start: 2019-10-04 | End: 2020-03-17 | Stop reason: SDUPTHER

## 2019-10-04 ASSESSMENT — ACTIVITIES OF DAILY LIVING (ADL): BATHING_REQUIRES_ASSISTANCE: 0

## 2019-10-04 ASSESSMENT — ENCOUNTER SYMPTOMS: GENERAL WELL-BEING: GOOD

## 2019-10-04 ASSESSMENT — PATIENT HEALTH QUESTIONNAIRE - PHQ9: CLINICAL INTERPRETATION OF PHQ2 SCORE: 0

## 2019-10-04 NOTE — ASSESSMENT & PLAN NOTE
She is now working on weight loss with regular exercise.  He did hurt his back so he had to stop for a while but he is down 2 pounds.  He will continue to work on this.

## 2019-10-04 NOTE — ASSESSMENT & PLAN NOTE
This is a chronic health problem for this patient for which she utilizes alprazolam 0.5 mg on a as needed basis.  He only takes it at night when he is having trouble turning his brain off and is afraid he will not get adequate sleep.  He does not take it on a nightly basis.  It is appropriate for refill at this time.  Obtained and reviewed patient utilization report from [facility] pharmacy database on 10/4/2019 11:03 AM  prior to writing prescription for controlled substance II, III or IV per [place] bill . Based on assessment of the report,my physical exam if necessary and the patient's health problem, the prescription is medically necessary.

## 2019-10-04 NOTE — PROGRESS NOTES
Chief Complaint   Patient presents with   • Annual Wellness Visit         HPI:  Curt is a 68 y.o. here for Medicare Annual Wellness Visit        Patient Active Problem List    Diagnosis Date Noted   • Coronary artery disease due to lipid rich plaque 08/07/2012     Priority: High   • Status post coronary artery stent placement 08/07/2012     Priority: High   • Dyslipidemia 01/26/2014     Priority: Medium   • Essential hypertension, benign 01/23/2012     Priority: Medium   • Prediabetes 07/19/2019   • Cough 04/08/2019   • Arteriosclerotic vascular disease 01/25/2019   • Ageusia 09/20/2018   • Arthritis 02/15/2018   • Generalized anxiety disorder 10/11/2017   • Obesity (BMI 30-39.9) 05/08/2017       Current Outpatient Medications   Medication Sig Dispense Refill   • diclofenac EC (VOLTAREN) 75 MG Tablet Delayed Response TAKE 1 TABLET BY MOUTH TWICE A  Tab 3   • atorvastatin (LIPITOR) 10 MG Tab Take 1 Tab by mouth every day. 100 Tab 3   • nitroglycerin (NITROSTAT) 0.4 MG SL Tab Place 1 Tab under tongue as needed for Chest Pain (Place 1 tablet under the tongue every 5 minutes up to 3 doses as needed for chest pain). 25 Tab 5   • amlodipine-benazepril (LOTREL) 5-10 MG per capsule Take 1 Cap by mouth every day. 90 Cap 3   • NON SPECIFIED Take 1 mg by mouth every morning. CBD OIL     • Multiple Vitamins-Minerals (MULTIVITAMIN PO) Take 1 Tab by mouth every morning.     • Coenzyme Q10 (CO Q 10 PO) Take 1 Tab by mouth every morning.     • MEGARED OMEGA-3 KRILL OIL PO Take 1 Tab by mouth every morning.     • CALCIUM PO Take 1 Tab by mouth every day.     • Multiple Vitamins-Minerals (OCUVITE) Tab Take 1 Tab by mouth every day.     • aspirin 81 MG tablet Take 81 mg by mouth every day.         No current facility-administered medications for this visit.         Patient is taking medications as noted in medication list.  Current supplements as per medication list.     Allergies: Patient has no known allergies.    Current  social contact/activities: pt reports going out for dinners, traveling     Is patient current with immunizations? No, due for SHINGRIX (Shingles). Patient is interested in receiving NONE today.Vaccine is on backorder.    He  reports that he quit smoking about 27 years ago. His smoking use included cigarettes. He has a 50.00 pack-year smoking history. He has never used smokeless tobacco. He reports that he drinks about 0.6 oz of alcohol per week. He reports that he does not use drugs.  Counseling given: Not Answered  Comment: avoid all tobacco products        DPA/Advanced directive: Patient has Advanced Directive on file.     ROS:    Gait: Uses no assistive device   Ostomy: No   Other tubes: No   Amputations: No   Chronic oxygen use No   Last eye exam pt reports less than 1 year ago   Wears hearing aids: No   : Denies any urinary leakage during the last 6 months          Depression Screening    Little interest or pleasure in doing things?  0 - not at all  Feeling down, depressed, or hopeless?    Patient Health Questionnaire Score: 0    If depressive symptoms identified deferred to follow up visit unless specifically addressed in assessment and plan.    Interpretation of PHQ-9 Total Score   Score Severity   1-4 No Depression   5-9 Mild Depression   10-14 Moderate Depression   15-19 Moderately Severe Depression   20-27 Severe Depression    Screening for Cognitive Impairment    Three Minute Recall (village, kitchen, baby)  2/3    Edilberto clock face with all 12 numbers and set the hands to show 10 past 10.  Yes    If cognitive concerns identified, deferred for follow up unless specifically addressed in assessment and plan.    Fall Risk Assessment    Has the patient had two or more falls in the last year or any fall with injury in the last year?  No  If fall risk identified, deferred for follow up unless specifically addressed in assessment and plan.    Safety Assessment    Throw rugs on floor.  Yes  Handrails on all  stairs.  Yes  Good lighting in all hallways.  Yes  Difficulty hearing.  No  Patient counseled about all safety risks that were identified.    Functional Assessment ADLs    Are there any barriers preventing you from cooking for yourself or meeting nutritional needs?  No.    Are there any barriers preventing you from driving safely or obtaining transportation?  No.    Are there any barriers preventing you from using a telephone or calling for help?  No.    Are there any barriers preventing you from shopping?  No.    Are there any barriers preventing you from taking care of your own finances?  No.    Are there any barriers preventing you from managing your medications?  No.    Are there any barriers preventing you from showering, bathing or dressing yourself?  No.    Are you currently engaging in any exercise or physical activity?  Yes.  Pt reports going golfing, using the rowing machine at home  What is your perception of your health?  Good.    Health Maintenance Summary                HEPATITIS C SCREENING Overdue 1950     IMM ZOSTER VACCINES Overdue 12/31/2015      Done 11/5/2015 Imm Admin: Zoster Vaccine Live (ZVL) (Zostavax)    Annual Wellness Visit Overdue 10/12/2018      Done 10/11/2017 Visit Dx: Medicare annual wellness visit, subsequent     Patient has more history with this topic...    IMM DTaP/Tdap/Td Vaccine Next Due 4/23/2024      Done 4/23/2014 Imm Admin: Tdap Vaccine    COLONOSCOPY Next Due 3/13/2029      Done 3/13/2019 REFERRAL TO GI FOR COLONOSCOPY     Patient has more history with this topic...          Patient Care Team:  Cheryl Chopra M.D. as PCP - General (Family Medicine)  Iván Otoole M.D. as Consulting Physician (Cardiology)  Irving Patel O.D. as Consulting Physician (Optometry)  Dustin Null, PT, DPT as Physical Therapy (Physical Therapy)  Rodolfo Fernandes M.D. as Consulting Physician (Gastroenterology)    Social History     Tobacco Use   • Smoking status: Former Smoker     " Packs/day: 2.00     Years: 25.00     Pack years: 50.00     Types: Cigarettes     Last attempt to quit: 1992     Years since quittin.7   • Smokeless tobacco: Never Used   • Tobacco comment: avoid all tobacco products   Substance Use Topics   • Alcohol use: Yes     Alcohol/week: 0.6 oz     Types: 1 Standard drinks or equivalent per week   • Drug use: No     Family History   Problem Relation Age of Onset   • Lung Disease Mother         copd   • Hypertension Mother    • Heart Disease Father         heart attack and heart disease   • Cancer Brother         neck   • Heart Disease Paternal Grandfather         Heart attack   • Other Sister         non-malignant brain tumor   • Diabetes Neg Hx      He  has a past medical history of Arteriosclerotic vascular disease (2019), Arthritis, CAD (coronary artery disease), Generalized anxiety disorder (10/11/2017), Hyperlipidemia, Hypertension, Inflamed sebaceous cyst (2/15/2018), Prediabetes (2019), and S/P coronary artery stent placement.   Past Surgical History:   Procedure Laterality Date   • APPENDECTOMY     • CHOLECYSTECTOMY     • ENDARTERECTOMY      corornary   • STENT PLACEMENT             Exam:     /58 (BP Location: Left arm, Patient Position: Sitting, BP Cuff Size: Large adult long)   Pulse 83   Temp 36.7 °C (98 °F) (Temporal)   Ht 1.803 m (5' 11\")   Wt 119.6 kg (263 lb 9.6 oz)   SpO2 96%  Body mass index is 36.76 kg/m².    Hearing good.    Dentition good  Alert, oriented in no acute distress.  Eye contact is good, speech goal directed, affect calm      Assessment and Plan. The following treatment and monitoring plan is recommended:    Ageusia  This is better than it had been.     Arthritis  This is a chronic health problem that is well controlled with current medications and lifestyle measures.    Coronary artery disease due to lipid rich plaque  This is a chronic health problem that is well controlled with current medications and lifestyle " measures.    Dyslipidemia  Well controlled on current meds with a total cholesterol of 177, 145, HDL 64, LDL 84.    Essential hypertension, benign  This is a chronic health problem that is well controlled with current medications and lifestyle measures.    Generalized anxiety disorder  This is a chronic health problem for this patient for which she utilizes alprazolam 0.5 mg on a as needed basis.  He only takes it at night when he is having trouble turning his brain off and is afraid he will not get adequate sleep.  He does not take it on a nightly basis.  It is appropriate for refill at this time.  Obtained and reviewed patient utilization report from [facility] pharmacy database on 10/4/2019 11:03 AM  prior to writing prescription for controlled substance II, III or IV per [place] bill . Based on assessment of the report,my physical exam if necessary and the patient's health problem, the prescription is medically necessary.       Obesity (BMI 30-39.9)  She is now working on weight loss with regular exercise.  He did hurt his back so he had to stop for a while but he is down 2 pounds.  He will continue to work on this.        Services suggested: No services needed at this time  Health Care Screening recommendations as per orders if indicated.  Referrals offered: PT/OT/Nutrition counseling/Behavioral Health/Smoking cessation as per orders if indicated.    Discussion today about general wellness and lifestyle habits:    · Prevent falls and reduce trip hazards; Cautioned about securing or removing rugs.  · Have a working fire alarm and carbon monoxide detector;   · Engage in regular physical activity and social activities       Follow-up: 6 months

## 2019-10-09 ENCOUNTER — HOSPITAL ENCOUNTER (OUTPATIENT)
Dept: LAB | Facility: MEDICAL CENTER | Age: 69
End: 2019-10-09
Attending: FAMILY MEDICINE
Payer: MEDICARE

## 2019-10-09 DIAGNOSIS — Z11.59 NEED FOR HEPATITIS C SCREENING TEST: ICD-10-CM

## 2019-10-09 PROCEDURE — 36415 COLL VENOUS BLD VENIPUNCTURE: CPT

## 2019-10-09 PROCEDURE — 86803 HEPATITIS C AB TEST: CPT

## 2019-10-10 LAB — HCV AB SER QL: NEGATIVE

## 2019-10-14 ENCOUNTER — OFFICE VISIT (OUTPATIENT)
Dept: MEDICAL GROUP | Facility: MEDICAL CENTER | Age: 69
End: 2019-10-14
Payer: MEDICARE

## 2019-10-14 VITALS
HEIGHT: 72 IN | WEIGHT: 264 LBS | DIASTOLIC BLOOD PRESSURE: 56 MMHG | OXYGEN SATURATION: 94 % | BODY MASS INDEX: 35.76 KG/M2 | HEART RATE: 98 BPM | RESPIRATION RATE: 14 BRPM | TEMPERATURE: 97.3 F | SYSTOLIC BLOOD PRESSURE: 126 MMHG

## 2019-10-14 DIAGNOSIS — J06.9 ACUTE URI: ICD-10-CM

## 2019-10-14 PROCEDURE — 99214 OFFICE O/P EST MOD 30 MIN: CPT | Performed by: PHYSICIAN ASSISTANT

## 2019-10-14 RX ORDER — PROMETHAZINE HYDROCHLORIDE AND CODEINE PHOSPHATE 6.25; 1 MG/5ML; MG/5ML
5 SYRUP ORAL NIGHTLY PRN
Qty: 160 ML | Refills: 0 | Status: SHIPPED | OUTPATIENT
Start: 2019-10-14 | End: 2019-11-13

## 2019-10-14 RX ORDER — AMOXICILLIN 875 MG/1
875 TABLET, COATED ORAL 2 TIMES DAILY
Qty: 20 TAB | Refills: 0 | Status: SHIPPED | OUTPATIENT
Start: 2019-10-14 | End: 2019-10-24

## 2019-10-14 NOTE — PROGRESS NOTES
Subjective:   Curt Blair is a 68 y.o. male here today for cold symptoms for 5 days.    Acute URI  This is a 68-year-old male who complains of a proximate 5-day history of sinus congestion drainage and coughing.  Denies any fevers, shortness of breath or chest pain.  His wife was treated last week by Dr. Louie with an antibiotic as well as cough syrup.  The antibiotic which was amoxicillin to help with her symptoms.  He is leaving for town in 2 days to [place].  He is currently taking some over-the-counter cold medications.  Also drinking fluids.       Current medicines (including changes today)  Current Outpatient Medications   Medication Sig Dispense Refill   • promethazine-codeine (PHENERGAN-CODEINE) 6.25-10 MG/5ML Syrup Take 5 mL by mouth at bedtime as needed for up to 30 days. 160 mL 0   • amoxicillin (AMOXIL) 875 MG tablet Take 1 Tab by mouth 2 times a day for 10 days. 20 Tab 0   • ALPRAZolam (XANAX) 0.5 MG Tab Take 1 Tab by mouth as needed for Sleep for up to 90 days. 90 Tab 0   • diclofenac EC (VOLTAREN) 75 MG Tablet Delayed Response TAKE 1 TABLET BY MOUTH TWICE A  Tab 3   • atorvastatin (LIPITOR) 10 MG Tab Take 1 Tab by mouth every day. 100 Tab 3   • nitroglycerin (NITROSTAT) 0.4 MG SL Tab Place 1 Tab under tongue as needed for Chest Pain (Place 1 tablet under the tongue every 5 minutes up to 3 doses as needed for chest pain). 25 Tab 5   • amlodipine-benazepril (LOTREL) 5-10 MG per capsule Take 1 Cap by mouth every day. 90 Cap 3   • NON SPECIFIED Take 1 mg by mouth every morning. CBD OIL     • Multiple Vitamins-Minerals (MULTIVITAMIN PO) Take 1 Tab by mouth every morning.     • Coenzyme Q10 (CO Q 10 PO) Take 1 Tab by mouth every morning.     • MEGARED OMEGA-3 KRILL OIL PO Take 1 Tab by mouth every morning.     • CALCIUM PO Take 1 Tab by mouth every day.     • aspirin 81 MG tablet Take 81 mg by mouth every day.       • Multiple Vitamins-Minerals (OCUVITE) Tab Take 1 Tab by mouth every  day.       No current facility-administered medications for this visit.      He  has a past medical history of Arteriosclerotic vascular disease (1/25/2019), Arthritis, CAD (coronary artery disease), Generalized anxiety disorder (10/11/2017), Hyperlipidemia, Hypertension, Inflamed sebaceous cyst (2/15/2018), Prediabetes (7/19/2019), and S/P coronary artery stent placement.    Social History and Family History were reviewed and updated.    ROS   No chest pain, no shortness of breath, no abdominal pain and all other systems were reviewed and are negative.       Objective:     /56 (BP Location: Left arm, Patient Position: Sitting, BP Cuff Size: Large adult)   Pulse 98   Temp 36.3 °C (97.3 °F) (Temporal)   Resp 14   Ht 1.829 m (6')   Wt 119.7 kg (264 lb)   SpO2 94%  Body mass index is 35.8 kg/m².   Physical Exam:  Constitutional: Alert, no distress.  Skin: Warm, dry, good turgor, no rashes in visible areas.  Eye: Equal, round and reactive, conjunctiva clear, lids normal.  ENMT: Lips without lesions, good dentition, oropharynx clear.  Neck: Trachea midline, no masses.   Lymph: No cervical or supraclavicular lymphadenopathy  Respiratory: Unlabored respiratory effort, lungs clear to auscultation, no wheezes, no ronchi.  Cardiovascular: Normal S1, S2, no murmur, no edema.  Psych: Alert and oriented x3, normal affect and mood.        Assessment and Plan:   The following treatment plan was discussed    1. Acute URI  Acute, new onset condition.  Discussed with viral process.  Is not a bacterial infection.  Push fluids.  Make continue over-the-counter cold medications.  Provided a codeine-based cough syrup.  May take at nighttime only.  Do not drink or drive.  Prescribed Amoxicillin to take for worsening symptoms. Follow-up with any worsening symptoms such as fever, shortness of breath or chest pain.  That would signify a bacterial process.  - promethazine-codeine (PHENERGAN-CODEINE) 6.25-10 MG/5ML Syrup; Take 5 mL  by mouth at bedtime as needed for up to 30 days.  Dispense: 160 mL; Refill: 0  - amoxicillin (AMOXIL) 875 MG tablet; Take 1 Tab by mouth 2 times a day for 10 days.  Dispense: 20 Tab; Refill: 0      Followup: Return if symptoms worsen or fail to improve.    Please note that this dictation was created using voice recognition software. I have made every reasonable attempt to correct obvious errors, but I expect that there are errors of grammar and possibly content that I did not discover before finalizing the note.

## 2019-10-14 NOTE — ASSESSMENT & PLAN NOTE
This is a 68-year-old male who complains of a proximate 5-day history of sinus congestion drainage and coughing.  Denies any fevers, shortness of breath or chest pain.  His wife was treated last week by Dr. Louie with an antibiotic as well as cough syrup.  The antibiotic which was amoxicillin to help with her symptoms.  He is leaving for town in 2 days to [place].  He is currently taking some over-the-counter cold medications.  Also drinking fluids.

## 2019-11-05 ENCOUNTER — APPOINTMENT (RX ONLY)
Dept: URBAN - METROPOLITAN AREA CLINIC 20 | Facility: CLINIC | Age: 69
Setting detail: DERMATOLOGY
End: 2019-11-05

## 2019-11-05 DIAGNOSIS — L82.0 INFLAMED SEBORRHEIC KERATOSIS: ICD-10-CM

## 2019-11-05 DIAGNOSIS — L57.0 ACTINIC KERATOSIS: ICD-10-CM

## 2019-11-05 PROCEDURE — 17003 DESTRUCT PREMALG LES 2-14: CPT

## 2019-11-05 PROCEDURE — 17000 DESTRUCT PREMALG LESION: CPT

## 2019-11-05 PROCEDURE — 99213 OFFICE O/P EST LOW 20 MIN: CPT | Mod: 25

## 2019-11-05 PROCEDURE — ? COUNSELING

## 2019-11-05 PROCEDURE — ? ADDITIONAL NOTES

## 2019-11-05 PROCEDURE — ? LIQUID NITROGEN

## 2019-11-05 ASSESSMENT — LOCATION DETAILED DESCRIPTION DERM
LOCATION DETAILED: NASAL DORSUM
LOCATION DETAILED: RIGHT VENTRAL PROXIMAL FOREARM
LOCATION DETAILED: LEFT DISTAL RADIAL DORSAL FOREARM

## 2019-11-05 ASSESSMENT — LOCATION SIMPLE DESCRIPTION DERM
LOCATION SIMPLE: RIGHT FOREARM
LOCATION SIMPLE: NOSE
LOCATION SIMPLE: LEFT FOREARM

## 2019-11-05 ASSESSMENT — LOCATION ZONE DERM
LOCATION ZONE: NOSE
LOCATION ZONE: ARM

## 2019-11-05 NOTE — PROCEDURE: LIQUID NITROGEN
Number Of Freeze-Thaw Cycles: 2 freeze-thaw cycles
Post-Care Instructions: I reviewed with the patient in detail post-care instructions. Patient is to wear sunprotection, and avoid picking at any of the treated lesions. Pt may apply Vaseline to crusted or scabbing areas.
Render Note In Bullet Format When Appropriate: No
Consent: The patient's consent was obtained including but not limited to risks of crusting, scabbing, blistering, scarring, darker or lighter pigmentary change, recurrence, incomplete removal and infection. RTC in 2 months if lesion(s) persistent.
Duration Of Freeze Thaw-Cycle (Seconds): 10
Render Post-Care Instructions In Note?: yes
Detail Level: Detailed

## 2019-11-05 NOTE — HPI: SKIN LESIONS
Is This A New Presentation, Or A Follow-Up?: Skin Lesions
How Severe Is Your Skin Lesion?: mild
Have Your Skin Lesions Been Treated?: not been treated
Additional History: Patient has used hydrocortisone cream.

## 2019-11-05 NOTE — PROCEDURE: ADDITIONAL NOTES
Detail Level: Detailed
Additional Notes: Will biopsy nose AK at next office visit if not resolved with liquid nitrogen.

## 2019-12-24 ENCOUNTER — OFFICE VISIT (OUTPATIENT)
Dept: MEDICAL GROUP | Facility: MEDICAL CENTER | Age: 69
End: 2019-12-24
Payer: MEDICARE

## 2019-12-24 VITALS
TEMPERATURE: 97.1 F | HEART RATE: 91 BPM | OXYGEN SATURATION: 96 % | RESPIRATION RATE: 14 BRPM | WEIGHT: 264.99 LBS | SYSTOLIC BLOOD PRESSURE: 124 MMHG | DIASTOLIC BLOOD PRESSURE: 66 MMHG | BODY MASS INDEX: 35.89 KG/M2 | HEIGHT: 72 IN

## 2019-12-24 DIAGNOSIS — T78.40XA ALLERGIC STATE, INITIAL ENCOUNTER: ICD-10-CM

## 2019-12-24 DIAGNOSIS — I10 ESSENTIAL HYPERTENSION, BENIGN: ICD-10-CM

## 2019-12-24 PROBLEM — R43.2 AGEUSIA: Status: RESOLVED | Noted: 2018-09-20 | Resolved: 2019-12-24

## 2019-12-24 PROBLEM — J06.9 ACUTE URI: Status: RESOLVED | Noted: 2019-10-14 | Resolved: 2019-12-24

## 2019-12-24 PROCEDURE — 99214 OFFICE O/P EST MOD 30 MIN: CPT | Performed by: FAMILY MEDICINE

## 2019-12-24 RX ORDER — PREDNISONE 10 MG/1
TABLET ORAL
Qty: 15 TAB | Refills: 0 | Status: SHIPPED | OUTPATIENT
Start: 2019-12-24 | End: 2020-05-29

## 2019-12-24 NOTE — ASSESSMENT & PLAN NOTE
This is a new problem for this patient where he seems of gotten exposed to something he is allergic to.  He did put up an artificial tree about 2 weeks ago and his arms were attached him definitely have contact dermatitis but he also is experiencing increased nasal drainage that is all clear, a cough that is productive of clear sputum and wheezing at night.  He has been taking Allegra but it does not last for 24 hours.  I did like to give him a quick round of prednisone to clear this up for him.  We will put him on 20 mg for 5 days then 10 mg for 5 days then off.

## 2019-12-24 NOTE — ASSESSMENT & PLAN NOTE
Chronic health problem for this patient well-controlled with current meds.  Blood pressure 124/66 and weight is stable which is excellent in light of the time of year and the fact that his wife is baking like crazy.

## 2019-12-24 NOTE — PROGRESS NOTES
CC:Diagnoses of Allergic state, initial encounter and Essential hypertension, benign were pertinent to this visit.    HISTORY OF PRESENT ILLNESS: Patient is a 69 y.o. male established patient who presents today to talk about the fact that he has had this cough bothering him for about 2 to 3 weeks and a rash she is developed on his arms.  Appears that this is all allergies.  He is interested in something to try and make everything better.      Allergies  This is a new problem for this patient where he seems of gotten exposed to something he is allergic to.  He did put up an artificial tree about 2 weeks ago and his arms were attached him definitely have contact dermatitis but he also is experiencing increased nasal drainage that is all clear, a cough that is productive of clear sputum and wheezing at night.  He has been taking Allegra but it does not last for 24 hours.  I did like to give him a quick round of prednisone to clear this up for him.  We will put him on 20 mg for 5 days then 10 mg for 5 days then off.    Essential hypertension, benign  Chronic health problem for this patient well-controlled with current meds.  Blood pressure 124/66 and weight is stable which is excellent in light of the time of year and the fact that his wife is baking like crazy.      Patient Active Problem List    Diagnosis Date Noted   • Coronary artery disease due to lipid rich plaque 08/07/2012     Priority: High   • Status post coronary artery stent placement 08/07/2012     Priority: High   • Dyslipidemia 01/26/2014     Priority: Medium   • Essential hypertension, benign 01/23/2012     Priority: Medium   • Allergies 12/24/2019   • Prediabetes 07/19/2019   • Arteriosclerotic vascular disease 01/25/2019   • Arthritis 02/15/2018   • Generalized anxiety disorder 10/11/2017   • Obesity (BMI 30-39.9) 05/08/2017      Allergies:Patient has no known allergies.    Current Outpatient Medications   Medication Sig Dispense Refill   • predniSONE  (DELTASONE) 10 MG Tab 2 tabs daily for 5 days, then 1 tab daily for 5 days. 15 Tab 0   • ALPRAZolam (XANAX) 0.5 MG Tab Take 1 Tab by mouth as needed for Sleep for up to 90 days. 90 Tab 0   • diclofenac EC (VOLTAREN) 75 MG Tablet Delayed Response TAKE 1 TABLET BY MOUTH TWICE A  Tab 3   • atorvastatin (LIPITOR) 10 MG Tab Take 1 Tab by mouth every day. 100 Tab 3   • nitroglycerin (NITROSTAT) 0.4 MG SL Tab Place 1 Tab under tongue as needed for Chest Pain (Place 1 tablet under the tongue every 5 minutes up to 3 doses as needed for chest pain). 25 Tab 5   • amlodipine-benazepril (LOTREL) 5-10 MG per capsule Take 1 Cap by mouth every day. 90 Cap 3   • NON SPECIFIED Take 1 mg by mouth every morning. CBD OIL     • Multiple Vitamins-Minerals (MULTIVITAMIN PO) Take 1 Tab by mouth every morning.     • Coenzyme Q10 (CO Q 10 PO) Take 1 Tab by mouth every morning.     • MEGARED OMEGA-3 KRILL OIL PO Take 1 Tab by mouth every morning.     • CALCIUM PO Take 1 Tab by mouth every day.     • Multiple Vitamins-Minerals (OCUVITE) Tab Take 1 Tab by mouth every day.     • aspirin 81 MG tablet Take 81 mg by mouth every day.         No current facility-administered medications for this visit.        Social History     Tobacco Use   • Smoking status: Former Smoker     Packs/day: 2.00     Years: 25.00     Pack years: 50.00     Types: Cigarettes     Last attempt to quit: 1992     Years since quittin.9   • Smokeless tobacco: Never Used   • Tobacco comment: avoid all tobacco products   Substance Use Topics   • Alcohol use: Yes     Alcohol/week: 0.6 oz     Types: 1 Standard drinks or equivalent per week   • Drug use: No     Social History     Patient does not qualify to have social determinant information on file (likely too young).   Social History Narrative   • Not on file       Family History   Problem Relation Age of Onset   • Lung Disease Mother         copd   • Hypertension Mother    • Heart Disease Father         heart  attack and heart disease   • Cancer Brother         neck   • Heart Disease Paternal Grandfather         Heart attack   • Other Sister         non-malignant brain tumor   • Diabetes Neg Hx         ROS:     - Constitutional:  Negative for fever, chills, unexpected weight change, and fatigue/generalized weakness.    - HEENT: Clear rhinorrhea denies sinus congestion, headache, sore throat or neck pain.       - Respiratory: Cough productive intermittently of clear sputum otherwise no rales rhonchi or wheezes.       - Cardiovascular: Negative for chest pain, palpitations, orthopnea, and bilateral lower extremity edema.     - Gastrointestinal: Negative for heartburn, nausea, vomiting, abdominal pain, hematochezia, melena, diarrhea, constipation, and greasy/foul-smelling stools.     - Genitourinary: Negative for dysuria, polyuria, hematuria, pyuria, urinary urgency, and urinary incontinence.     - Musculoskeletal: Negative for myalgias, back pain, and joint pain.     - Skin: Negative for rash, itching, cyanotic skin color change.     - Neurological: Negative for dizziness, tingling, tremors, focal sensory deficit, focal weakness and headaches.     - Endo/Heme/Allergies: Does not bruise/bleed easily.     - Psychiatric/Behavioral: Negative for depression, suicidal/homicidal ideation and memory loss.          - NOTE: All other systems reviewed and are negative, except as in HPI.      Exam:    /66 (BP Location: Left arm, Patient Position: Sitting, BP Cuff Size: Adult)   Pulse 91   Temp 36.2 °C (97.1 °F) (Temporal)   Resp 14   Ht 1.829 m (6')   Wt 120.2 kg (264 lb 15.9 oz)   SpO2 96%  Body mass index is 35.94 kg/m².    General:  Well nourished, well developed male in NAD  HEENT: Eyes conjunctiva is clear, lids without ptosis, pupils equal round and reactive to light and accommodation.  Ears normal shape and contour, canals are clear bilaterally, TMs with good light reflex and appear normal.  Nasal mucosa dry.   Oropharynx benign.  Sinuses (frontal and maxillary) nontender to palpation.  Head is grossly normal.  Neck: Supple without JVD or bruit. Thyroid is not enlarged.  Pulmonary: Clear to ausculation and percussion.  Normal effort. No rales, ronchi, or wheezing.  Cardiovascular: Regular rate and rhythm without murmur. Carotid and radial pulses are intact and equal bilaterally.  Extremities: no clubbing, cyanosis, or edema.    Please note that this dictation was created using voice recognition software. I have made every reasonable attempt to correct obvious errors, but I expect that there are errors of grammar and possibly content that I did not discover before finalizing the note.    Assessment/Plan:  1. Allergic state, initial encounter  Uncontrolled patient will start on a prednisone taper utilizing 20 mg for 5 days then 10 mg for 5 days then off.  Hoping this will take care of both the rhinorrhea, cough and dermatitis.    2. Essential hypertension, benign  Controlled, continue with current meds and lifestyle.

## 2020-02-07 ENCOUNTER — OFFICE VISIT (OUTPATIENT)
Dept: MEDICAL GROUP | Facility: MEDICAL CENTER | Age: 70
End: 2020-02-07
Payer: MEDICARE

## 2020-02-07 VITALS
HEIGHT: 72 IN | WEIGHT: 262.35 LBS | OXYGEN SATURATION: 95 % | DIASTOLIC BLOOD PRESSURE: 70 MMHG | BODY MASS INDEX: 35.53 KG/M2 | HEART RATE: 82 BPM | SYSTOLIC BLOOD PRESSURE: 126 MMHG | TEMPERATURE: 98.1 F | RESPIRATION RATE: 14 BRPM

## 2020-02-07 DIAGNOSIS — L20.84 INTRINSIC ECZEMA: ICD-10-CM

## 2020-02-07 DIAGNOSIS — Z23 NEED FOR VACCINATION: ICD-10-CM

## 2020-02-07 PROCEDURE — 99214 OFFICE O/P EST MOD 30 MIN: CPT | Mod: 25 | Performed by: FAMILY MEDICINE

## 2020-02-07 PROCEDURE — 90471 IMMUNIZATION ADMIN: CPT | Performed by: FAMILY MEDICINE

## 2020-02-07 PROCEDURE — 90750 HZV VACC RECOMBINANT IM: CPT | Performed by: FAMILY MEDICINE

## 2020-02-07 RX ORDER — TRIAMCINOLONE ACETONIDE 1 MG/G
CREAM TOPICAL
Qty: 60 G | Refills: 3 | Status: SHIPPED | OUTPATIENT
Start: 2020-02-07 | End: 2020-05-29

## 2020-02-07 ASSESSMENT — PATIENT HEALTH QUESTIONNAIRE - PHQ9: CLINICAL INTERPRETATION OF PHQ2 SCORE: 0

## 2020-02-07 NOTE — PROGRESS NOTES
CC:Diagnoses of Intrinsic eczema and Need for vaccination were pertinent to this visit.    HISTORY OF PRESENT ILLNESS: Patient is a 69 y.o. male established patient who presents today to talk about his rash that has returned on the left forearm.  Patient also has a couple of actinic keratoses in the same area but he tells me those will be treated by his dermatologist in May and he does not want them treated today.      Intrinsic eczema  This is a new problem affecting the patient's left forearm.  He had a similar episode back in December 2019 that we treated with a steroid course because we thought it was secondary to putting up his [place] tree.  He now in the exact same area has an erythematous rash with associated pruritus consistent with eczema.  Were going to treat with triamcinolone cream      Patient Active Problem List    Diagnosis Date Noted   • Coronary artery disease due to lipid rich plaque 08/07/2012     Priority: High   • Status post coronary artery stent placement 08/07/2012     Priority: High   • Dyslipidemia 01/26/2014     Priority: Medium   • Essential hypertension, benign 01/23/2012     Priority: Medium   • Intrinsic eczema 02/07/2020   • Allergies 12/24/2019   • Prediabetes 07/19/2019   • Arteriosclerotic vascular disease 01/25/2019   • Arthritis 02/15/2018   • Generalized anxiety disorder 10/11/2017   • Obesity (BMI 30-39.9) 05/08/2017      Allergies:Patient has no known allergies.    Current Outpatient Medications   Medication Sig Dispense Refill   • triamcinolone acetonide (KENALOG) 0.1 % Cream Apply to affected area bid til clear. 60 g 3   • predniSONE (DELTASONE) 10 MG Tab 2 tabs daily for 5 days, then 1 tab daily for 5 days. 15 Tab 0   • diclofenac EC (VOLTAREN) 75 MG Tablet Delayed Response TAKE 1 TABLET BY MOUTH TWICE A  Tab 3   • atorvastatin (LIPITOR) 10 MG Tab Take 1 Tab by mouth every day. 100 Tab 3   • nitroglycerin (NITROSTAT) 0.4 MG SL Tab Place 1 Tab under tongue as  needed for Chest Pain (Place 1 tablet under the tongue every 5 minutes up to 3 doses as needed for chest pain). 25 Tab 5   • amlodipine-benazepril (LOTREL) 5-10 MG per capsule Take 1 Cap by mouth every day. 90 Cap 3   • NON SPECIFIED Take 1 mg by mouth every morning. CBD OIL     • Multiple Vitamins-Minerals (MULTIVITAMIN PO) Take 1 Tab by mouth every morning.     • Coenzyme Q10 (CO Q 10 PO) Take 1 Tab by mouth every morning.     • MEGARED OMEGA-3 KRILL OIL PO Take 1 Tab by mouth every morning.     • CALCIUM PO Take 1 Tab by mouth every day.     • Multiple Vitamins-Minerals (OCUVITE) Tab Take 1 Tab by mouth every day.     • aspirin 81 MG tablet Take 81 mg by mouth every day.         No current facility-administered medications for this visit.        Social History     Tobacco Use   • Smoking status: Former Smoker     Packs/day: 2.00     Years: 25.00     Pack years: 50.00     Types: Cigarettes     Last attempt to quit: 1992     Years since quittin.1   • Smokeless tobacco: Never Used   • Tobacco comment: avoid all tobacco products   Substance Use Topics   • Alcohol use: Yes     Alcohol/week: 0.6 oz     Types: 1 Standard drinks or equivalent per week   • Drug use: No     Social History     Patient does not qualify to have social determinant information on file (likely too young).   Social History Narrative   • Not on file       Family History   Problem Relation Age of Onset   • Lung Disease Mother         copd   • Hypertension Mother    • Heart Disease Father         heart attack and heart disease   • Cancer Brother         neck   • Heart Disease Paternal Grandfather         Heart attack   • Other Sister         non-malignant brain tumor   • Diabetes Neg Hx         ROS:     - Constitutional:  Negative for fever, chills, unexpected weight change, and fatigue/generalized weakness.    - HEENT:  Negative for headaches, vision changes, hearing changes, ear pain, ear discharge, rhinorrhea, sinus congestion, sore  throat, and neck pain.      - Respiratory: Negative for cough, sputum production, chest congestion, dyspnea, wheezing, and crackles.      - Cardiovascular: Negative for chest pain, palpitations, orthopnea, and bilateral lower extremity edema.     - Gastrointestinal: Negative for heartburn, nausea, vomiting, abdominal pain, hematochezia, melena, diarrhea, constipation, and greasy/foul-smelling stools.     - Genitourinary: Negative for dysuria, polyuria, hematuria, pyuria, urinary urgency, and urinary incontinence.     - Musculoskeletal: Negative for myalgias, back pain, and joint pain.     - Skin: Patient has multiple actinic keratoses on his forearms bilaterally.  Also has eczema without secondary cellulitis.      - Neurological: Negative for dizziness, tingling, tremors, focal sensory deficit, focal weakness and headaches.     - Endo/Heme/Allergies: Does not bruise/bleed easily.     - Psychiatric/Behavioral: Negative for depression, suicidal/homicidal ideation and memory loss.          - NOTE: All other systems reviewed and are negative, except as in HPI.      Exam:    /70   Pulse 82   Temp 36.7 °C (98.1 °F)   Resp 14   Ht 1.829 m (6')   Wt 119 kg (262 lb 5.6 oz)   SpO2 95%  Body mass index is 35.58 kg/m².    General:  Well nourished, well developed male in NAD  Head is grossly normal.  Extremities: no clubbing, cyanosis, or edema.  Skin: Erythematous scaly rash on the left forearm measuring approximately 10 cm x 4 cm without secondary signs of cellulitis.  Mild excoriations present.  Please note that this dictation was created using voice recognition software. I have made every reasonable attempt to correct obvious errors, but I expect that there are errors of grammar and possibly content that I did not discover before finalizing the note.    Assessment/Plan:  1. Intrinsic eczema  Uncontrolled, will give him triamcinolone 0.1% cream to apply small amount twice daily till clear knowing that he may need  it again.    2. Need for vaccination  Given today he will need to have be revaccinated within 6 months.  - Shingles Vaccine (Shingrix)

## 2020-02-07 NOTE — ASSESSMENT & PLAN NOTE
This is a new problem affecting the patient's left forearm.  He had a similar episode back in December 2019 that we treated with a steroid course because we thought it was secondary to putting up his [place] tree.  He now in the exact same area has an erythematous rash with associated pruritus consistent with eczema.  Were going to treat with triamcinolone cream

## 2020-02-17 ENCOUNTER — PATIENT MESSAGE (OUTPATIENT)
Dept: MEDICAL GROUP | Facility: MEDICAL CENTER | Age: 70
End: 2020-02-17

## 2020-03-16 ENCOUNTER — TELEPHONE (OUTPATIENT)
Dept: PHYSICAL THERAPY | Facility: MEDICAL CENTER | Age: 70
End: 2020-03-16

## 2020-03-16 NOTE — OP THERAPY DISCHARGE SUMMARY
Outpatient Physical Therapy  DISCHARGE SUMMARY NOTE      [facility]  [address]  [place] [address]  Phone:  [phone]  Fax:  [phone]    Date of Visit: 03/16/2020    Patient: Curt Blair  YOB: 1950  MRN: [id number]     Referring Provider: No referring provider defined for this encounter.   Referring Diagnosis No admission diagnoses are documented for this encounter.     Physical Therapy Occurrence Codes    Date of onset of impairment:  8/2/18   Date physical therapy care plan established or reviewed:  8/21/18   Date physical therapy treatment started:  8/21/18          Functional Assessment Used        Your patient is being discharged from Physical Therapy with the following comments:   · Patient cancelled or missed more than 2 scheduled appointments/non-compliant  · Patient has failed to schedule or reschedule follow-up visits    Comments:  Patients seen for 3 visits for low back. Was last seen 9/5/18. Chart will be closedd/discharged    Limitations Remaining:  Unknown  Recommendations:  Follow up with primary care as needed    Dustin Null PT, DPT    Date: 3/16/2020

## 2020-03-17 DIAGNOSIS — F41.1 GENERALIZED ANXIETY DISORDER: ICD-10-CM

## 2020-03-17 RX ORDER — ALPRAZOLAM 0.5 MG/1
0.5 TABLET ORAL PRN
Qty: 90 TAB | Refills: 0 | Status: SHIPPED
Start: 2020-03-17 | End: 2020-10-01 | Stop reason: SDUPTHER

## 2020-03-17 NOTE — PROGRESS NOTES
This patient cannot come into the office due to age and the chronic crisis.  We will send a prescription for his alprazolam directly to his pharmacy.  His last fill was completed as of 1/2/2020 and his  is appropriate.  Obtained and reviewed patient utilization report from [facility] pharmacy database on 3/17/2020 2:02 PM  prior to writing prescription for controlled substance II, III or IV per [place] bill . Based on assessment of the report,my physical exam if necessary and the patient's health problem, the prescription is medically necessary.

## 2020-04-09 DIAGNOSIS — I10 ESSENTIAL HYPERTENSION, BENIGN: ICD-10-CM

## 2020-04-09 RX ORDER — AMLODIPINE BESYLATE AND BENAZEPRIL HYDROCHLORIDE 5; 10 MG/1; MG/1
CAPSULE ORAL
Qty: 100 CAP | Refills: 1 | Status: SHIPPED | OUTPATIENT
Start: 2020-04-09 | End: 2020-10-19

## 2020-04-13 ENCOUNTER — TELEPHONE (OUTPATIENT)
Dept: MEDICAL GROUP | Facility: MEDICAL CENTER | Age: 70
End: 2020-04-13

## 2020-04-13 DIAGNOSIS — Z23 NEED FOR VACCINATION: ICD-10-CM

## 2020-04-13 NOTE — TELEPHONE ENCOUNTER
Patient is on the MA Schedule tomorrow for Zoster vaccine/injection.    SPECIFIC Action To Be Taken: Orders pending, please sign.

## 2020-04-14 ENCOUNTER — NON-PROVIDER VISIT (OUTPATIENT)
Dept: MEDICAL GROUP | Facility: MEDICAL CENTER | Age: 70
End: 2020-04-14
Payer: MEDICARE

## 2020-04-14 PROCEDURE — 90471 IMMUNIZATION ADMIN: CPT | Performed by: FAMILY MEDICINE

## 2020-04-14 PROCEDURE — 90750 HZV VACC RECOMBINANT IM: CPT | Performed by: FAMILY MEDICINE

## 2020-04-14 NOTE — PROGRESS NOTES
"Curt Blair is a 69 y.o. male here for a non-provider visit for:   SHINGRIX (Shingles)    Reason for immunization: Overdue/Provider Recommended  Immunization records indicate need for vaccine: Yes, confirmed with Epic  Minimum interval has been met for this vaccine: Yes  ABN completed: Not Indicated    Order and dose verified by: JACK  VIS Dated  02/18/2018 was given to patient: Yes  All IAC Questionnaire questions were answered \"No.\"    Patient tolerated injection and no adverse effects were observed or reported: Yes    Pt scheduled for next dose in series: Not Indicated    "

## 2020-05-05 ENCOUNTER — APPOINTMENT (RX ONLY)
Dept: URBAN - METROPOLITAN AREA CLINIC 20 | Facility: CLINIC | Age: 70
Setting detail: DERMATOLOGY
End: 2020-05-05

## 2020-05-05 DIAGNOSIS — L91.8 OTHER HYPERTROPHIC DISORDERS OF THE SKIN: ICD-10-CM

## 2020-05-05 DIAGNOSIS — L57.0 ACTINIC KERATOSIS: ICD-10-CM

## 2020-05-05 PROCEDURE — 99213 OFFICE O/P EST LOW 20 MIN: CPT | Mod: 25

## 2020-05-05 PROCEDURE — 17000 DESTRUCT PREMALG LESION: CPT

## 2020-05-05 PROCEDURE — ? COUNSELING

## 2020-05-05 PROCEDURE — ? LIQUID NITROGEN

## 2020-05-05 PROCEDURE — ? ADDITIONAL NOTES

## 2020-05-05 ASSESSMENT — LOCATION SIMPLE DESCRIPTION DERM
LOCATION SIMPLE: LEFT ZYGOMA
LOCATION SIMPLE: LEFT UPPER ARM

## 2020-05-05 ASSESSMENT — LOCATION ZONE DERM
LOCATION ZONE: FACE
LOCATION ZONE: ARM

## 2020-05-05 ASSESSMENT — LOCATION DETAILED DESCRIPTION DERM
LOCATION DETAILED: LEFT ANTERIOR MEDIAL PROXIMAL UPPER ARM
LOCATION DETAILED: LEFT CENTRAL ZYGOMA

## 2020-05-05 NOTE — PROCEDURE: ADDITIONAL NOTES
Detail Level: Detailed
Additional Notes: As a courtesy, 3 lesions cleansed with Maxicleanse and snipped. Band aids applied

## 2020-05-05 NOTE — PROCEDURE: LIQUID NITROGEN
Render Post-Care Instructions In Note?: yes
Duration Of Freeze Thaw-Cycle (Seconds): 10
Detail Level: Detailed
Render Note In Bullet Format When Appropriate: No
Post-Care Instructions: I reviewed with the patient in detail post-care instructions. Patient is to wear sunprotection, and avoid picking at any of the treated lesions. Pt may apply Vaseline to crusted or scabbing areas.
Number Of Freeze-Thaw Cycles: 2 freeze-thaw cycles
Consent: The patient's consent was obtained including but not limited to risks of crusting, scabbing, blistering, scarring, darker or lighter pigmentary change, recurrence, incomplete removal and infection. RTC in 2 months if lesion(s) persistent.

## 2020-05-17 ENCOUNTER — PATIENT MESSAGE (OUTPATIENT)
Dept: MEDICAL GROUP | Facility: PHYSICIAN GROUP | Age: 70
End: 2020-05-17

## 2020-05-17 DIAGNOSIS — M25.562 CHRONIC PAIN OF BOTH KNEES: ICD-10-CM

## 2020-05-17 DIAGNOSIS — G89.29 CHRONIC PAIN OF BOTH KNEES: ICD-10-CM

## 2020-05-17 DIAGNOSIS — M25.561 CHRONIC PAIN OF BOTH KNEES: ICD-10-CM

## 2020-05-18 ENCOUNTER — PATIENT MESSAGE (OUTPATIENT)
Dept: MEDICAL GROUP | Facility: PHYSICIAN GROUP | Age: 70
End: 2020-05-18

## 2020-05-19 ENCOUNTER — PATIENT OUTREACH (OUTPATIENT)
Dept: HEALTH INFORMATION MANAGEMENT | Facility: OTHER | Age: 70
End: 2020-05-19

## 2020-05-19 ENCOUNTER — PATIENT MESSAGE (OUTPATIENT)
Dept: MEDICAL GROUP | Facility: PHYSICIAN GROUP | Age: 70
End: 2020-05-19

## 2020-05-19 NOTE — TELEPHONE ENCOUNTER
From: Curt Blair  To: Cheryl Chopra M.D.  Sent: 5/18/2020 11:24 AM PDT  Subject: Non-Urgent Medical Question    thanks do I wait until they contact me?      ----- Message -----   From:Cheryl Chopra M.D.   Sent:5/18/2020 10:36 AM PDT   To:Curt Blair   Subject:RE: Non-Urgent Medical Question    Tereso Elias,  Let's send you to see Dr. Sanders. He is the majo who injected my knee and I am still pain free.  Thank you,  Dr. MOJICA  Putting in the referral right now      ----- Message -----   From:Curt Blair   Sent:5/17/2020 12:00 PM PDT   To:Cheryl Chopra M.D.   Subject:Non-Urgent Medical Question    Hi hope you are all settled in to the new dig[place]. My left knee thats full of arthritis is really hurting do you have a non-surgical treatment for it. You said one time that you were looking at one for your knees. Stay well, Curt

## 2020-05-19 NOTE — TELEPHONE ENCOUNTER
From: Curt Blair  To: Cheryl Chopra M.D.  Sent: 5/19/2020 3:56 PM PDT  Subject: Non-Urgent Medical Question    could not see Dr. Sanders until mid july have apt. with Dr. Rick June 12th also have knot on side of knee and a lot of pain any suggestions on what I can do re pain?      ----- Message -----   From:Cheryl Chopra M.D.   Sent:5/18/2020 7:27 PM PDT   To:Curt Blair    Subject:RE: Non-Urgent Medical Question    Yes Curt they will call to set you up once the referral is approved by [facility].  Thank you,  Dr. MOJICA      ----- Message -----   From:Curt Blair   Sent:5/18/2020 11:24 AM PDT   To:Cheryl Chopra M.D.   Subject:Non-Urgent Medical Question    thanks do I wait until they contact me?      ----- Message -----   From:Cheryl Chopra M.D.   Sent:5/18/2020 10:36 AM PDT   To:Curt Blair   Subject:RE: Non-Urgent Medical Question    Hi Curt,  Let's send you to see Dr. Sanders. He is the majo who injected my knee and I am still pain free.  Thank you,  Dr. MOJICA  Putting in the referral right now      ----- Message -----   From:Curt Blair   Sent:5/17/2020 12:00 PM PDT   To:Cheryl Chopra M.D.   Subject:Non-Urgent Medical Question    Hi hope you are all settled in to the new digs. My left knee thats full of arthritis is really hurting do you have a non-surgical treatment for it. You said one time that you were looking at one for your knees. Stay well, Curt

## 2020-05-19 NOTE — PROGRESS NOTES
Outcome: Left Message  AWV / AHA call      Please transfer to Patient Outreach Team at [phone] when patient returns call.      [facility] Verified: yes    Attempt # 1

## 2020-05-29 ENCOUNTER — OFFICE VISIT (OUTPATIENT)
Dept: PHYSICAL MEDICINE AND REHAB | Facility: MEDICAL CENTER | Age: 70
End: 2020-05-29
Payer: MEDICARE

## 2020-05-29 VITALS
DIASTOLIC BLOOD PRESSURE: 60 MMHG | TEMPERATURE: 97.7 F | HEIGHT: 72 IN | WEIGHT: 264.33 LBS | HEART RATE: 96 BPM | BODY MASS INDEX: 35.8 KG/M2 | OXYGEN SATURATION: 93 % | SYSTOLIC BLOOD PRESSURE: 124 MMHG

## 2020-05-29 DIAGNOSIS — M17.12 PRIMARY OSTEOARTHRITIS OF LEFT KNEE: ICD-10-CM

## 2020-05-29 DIAGNOSIS — M51.36 DDD (DEGENERATIVE DISC DISEASE), LUMBAR: ICD-10-CM

## 2020-05-29 DIAGNOSIS — M17.0 PRIMARY OSTEOARTHRITIS OF BOTH KNEES: ICD-10-CM

## 2020-05-29 PROCEDURE — 99215 OFFICE O/P EST HI 40 MIN: CPT | Performed by: PHYSICAL MEDICINE & REHABILITATION

## 2020-05-29 ASSESSMENT — PAIN SCALES - GENERAL: PAINLEVEL: 8=MODERATE-SEVERE PAIN

## 2020-05-29 ASSESSMENT — PATIENT HEALTH QUESTIONNAIRE - PHQ9: CLINICAL INTERPRETATION OF PHQ2 SCORE: 0

## 2020-05-29 ASSESSMENT — FIBROSIS 4 INDEX: FIB4 SCORE: 1.44

## 2020-05-29 NOTE — PATIENT INSTRUCTIONS
[email or web address]

## 2020-05-29 NOTE — PROGRESS NOTES
New patient note, patient previously seen by Dr. Porter     Physiatry (physical medicine and  Rehabilitation), interventional spine and sports medicine    Date of Service: 05/29/2020    Chief complaint:   Chief Complaint   Patient presents with   • New Patient     Knee pain       HISTORY    HPI: Curt Blair 69 y.o. male who presents today for evaluation of knee pain.  He presents today for evaluation of his knee pain.  From what he reports, he has had pain in his knees for a number of years.  The left knee is the worst.  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  Growing up, he was involved with a lot of sports and he has had intermittent issues with pain in his knees over the years.      About 6 years ago, he saw an orthopedic surgeon and had a steroid injection, which was helpful for a while.  He is not interested in surgery.  He reports that when he gets up in the morning, he has cracking in his knees.  The left knee has been waking him up for the last few weeks.  Prior to the last few weeks, he has had an exercise routine with walking and rowing machine.     No locking in the knees.  Knees have not been giving out.  The left knee has edema intermittently.    Standing and walking makes the pain worse.  Sitting relieves the pain.  He has had some relief with aspercreme with lidocaine.  Walking up and down stairs makes his pain worse       Medical records review:  I reviewed the note from the referring provider Cheryl Chopra M.D. dated 02/07/2020 for unrelated conditions.    Reviewed records from Dr Clovis Alicea 07/2013 At that visit, he assessed that MRI of left knee was indicated.  On 07/30/2013, they reviewed the study and the patient had a left knee corticosteroid injection    Previous treatments:    Physical Therapy: No    Medications the patient is tried: NSAIDs    Previous interventions: none, recently     Previous surgeries to relieve the  above pain:  none      ROS:   Eyes: cataract surgery, recently  CV: history of MI at 41  Red Flags ROS:   Fever, Chills, Sweats: Denies  Involuntary Weight Loss: Denies  Bladder Incontinence: Denies  Bowel Incontinence: Denies  Saddle Anesthesia: Denies    All other systems reviewed and negative.       PMHx:   Past Medical History:   Diagnosis Date   • Arteriosclerotic vascular disease 1/25/2019   • Arthritis     Right Foot   • CAD (coronary artery disease)    • Generalized anxiety disorder 10/11/2017   • Hyperlipidemia    • Hypertension    • Inflamed sebaceous cyst 2/15/2018   • Intrinsic eczema 2/7/2020   • Prediabetes 7/19/2019   • S/P coronary artery stent placement     2 stents       PSHx:   Past Surgical History:   Procedure Laterality Date   • APPENDECTOMY     • CHOLECYSTECTOMY     • ENDARTERECTOMY      corornary   • STENT PLACEMENT         Family history   Family History   Problem Relation Age of Onset   • Lung Disease Mother         copd   • Hypertension Mother    • Heart Disease Father         heart attack and heart disease   • Cancer Brother         neck   • Heart Disease Paternal Grandfather         Heart attack   • Other Sister         non-malignant brain tumor   • Diabetes Neg Hx          Medications:   Current Outpatient Medications   Medication   • Diclofenac Sodium 1 % Gel   • amlodipine-benazepril (LOTREL) 5-10 MG per capsule   • ALPRAZolam (XANAX) 0.5 MG Tab   • diclofenac EC (VOLTAREN) 75 MG Tablet Delayed Response   • atorvastatin (LIPITOR) 10 MG Tab   • nitroglycerin (NITROSTAT) 0.4 MG SL Tab   • Multiple Vitamins-Minerals (MULTIVITAMIN PO)   • Coenzyme Q10 (CO Q 10 PO)   • MEGARED OMEGA-3 KRILL OIL PO   • CALCIUM PO   • aspirin 81 MG tablet   • NON SPECIFIED   • Multiple Vitamins-Minerals (OCUVITE) Tab     No current facility-administered medications for this visit.        Allergies:   No Known Allergies    Social Hx:   Social History     Socioeconomic History   • Marital status:       Spouse name: Not on file   • Number of children: Not on file   • Years of education: Not on file   • Highest education level: Not on file   Occupational History   • Not on file   Social Needs   • Financial resource strain: Not on file   • Food insecurity     Worry: Not on file     Inability: Not on file   • Transportation needs     Medical: Not on file     Non-medical: Not on file   Tobacco Use   • Smoking status: Former Smoker     Packs/day: 2.00     Years: 25.00     Pack years: 50.00     Types: Cigarettes     Last attempt to quit: 1992     Years since quittin.4   • Smokeless tobacco: Never Used   • Tobacco comment: avoid all tobacco products   Substance and Sexual Activity   • Alcohol use: Yes     Alcohol/week: 0.6 oz     Types: 1 Standard drinks or equivalent per week   • Drug use: No   • Sexual activity: Not Currently     Partners: Female     Comment: , 3 children   Lifestyle   • Physical activity     Days per week: Not on file     Minutes per session: Not on file   • Stress: Not on file   Relationships   • Social connections     Talks on phone: Not on file     Gets together: Not on file     Attends Christianity service: Not on file     Active member of club or organization: Not on file     Attends meetings of clubs or organizations: Not on file     Relationship status: Not on file   • Intimate partner violence     Fear of current or ex partner: Not on file     Emotionally abused: Not on file     Physically abused: Not on file     Forced sexual activity: Not on file   Other Topics Concern   •  Service Yes   • Blood Transfusions No   • Caffeine Concern No   • Occupational Exposure No   • Hobby Hazards No   • Sleep Concern Yes   • Stress Concern Yes   • Weight Concern Yes   • Special Diet No     Comment: no fried   • Back Care Yes   • Exercise Yes   • Bike Helmet No     Comment: does not ride bike   • Seat Belt Not Asked   • Self-Exams Yes   Social History Narrative   • Not on file          EXAMINATION     Physical Exam:   Vitals: /60 (BP Location: Left arm, Patient Position: Sitting, BP Cuff Size: Large adult long)   Pulse 96   Temp 36.5 °C (97.7 °F) (Temporal)   Ht 1.829 m (6')   Wt 119.9 kg (264 lb 5.3 oz)   SpO2 93%     Constitutional:   Body Habitus: Body mass index is 35.85 kg/m².  Cooperation: Fully cooperates with exam  Appearance: Well-groomed, well-nourished, not disheveled, in no acute distress    Eyes: No scleral icterus, no proptosis     ENT -no obvious auditory deficits, wearing face mask    Skin -no rashes or lesions noted     Respiratory-  breathing comfortable on room air, no audible wheezing    Cardiovascular- capillary refills less than 2 seconds. No lower extremity edema is noted.     Psychiatric- alert and oriented ×3. Normal affect.     Gait - normal gait, no use of ambulatory device, minimally antalgic. The patient can toe walk with ease. The patient can heel walk with ease..     Musculoskeletal -   Thoracic/Lumbar Spine/Sacral Spine/Hips   Inspection: No evidence of atrophy in bilateral lower extremities throughout     ROM: functiona AROM lumbar spine, but decreased flexion with fingertips to the midcalf bilaterally, extension, lateral flexion slightly decreased bilaterally    Palpation:   palpation over SI joint: negative bilaterally    palpation over buttock: negative bilaterally    palpation in hip or over the greater trochanters: negative bilaterally      Lumbar spine Special tests  Neuro tension  Straight leg test negative bilaterally    Note of hamstring tightness bilaterally    KNEES  Left knee  Mild-moderate effusion on the left knee. Tenderness over the medial greater than lateral joint line.  Mild patellar apprehension, mild crepitus with ROM of the knee.  Functional ROM of the knee  Tenderness to palpation over the medial knee.  Positive Moiz's for medial pain.  No medial or lateral instability.  Negative Lachman's test    Right knee  No  significant effusion noted.  Mild tenderness over the medial joint line.  Minimal crepitus.  Negative patellar apprehension.  Functional ROM.  Negative Lachman's test.  Negative for medial or lateral instability.  Negative Moiz's test.      HIP  Range of motion in the hips is within normal limits internal and external rotation bilaterally    SI joint tests  Observation patient sits on one buttocks: Negative    Neuro     Key points for the international standards for neurological classification of spinal cord injury (ISNCSCI) to light touch.     Dermatome R L   L2 2 2   L3 2 2   L4 2 2   L5 2 2   S1 2 2   S2 2 2       Motor Exam Lower Extremities    ? Myotome R L   Hip flexion L2 5 5   Knee extension L3 5 5   Ankle dorsiflexion L4 5 5   Toe extension L5 5 5   Ankle plantarflexion S1 5 5       Clonus of the ankle negative bilaterally     Reflexes  ?  R L   Patella  2+ 2+   Achilles   2+ 2+       MEDICAL DECISION MAKING    Medical records review: see under HPI section.     DATA    Labs:   Lab Results   Component Value Date/Time    SODIUM 138 09/19/2019 06:45 AM    POTASSIUM 4.7 09/19/2019 06:45 AM    CHLORIDE 104 09/19/2019 06:45 AM    CO2 27 09/19/2019 06:45 AM    ANION 7.0 09/19/2019 06:45 AM    GLUCOSE 91 09/19/2019 06:45 AM    BUN 21 09/19/2019 06:45 AM    CREATININE 1.10 09/19/2019 06:45 AM    CREATININE 0.93 02/20/2013 07:20 AM    CALCIUM 8.7 09/19/2019 06:45 AM    ASTSGOT 24 09/19/2019 06:45 AM    ALTSGPT 27 09/19/2019 06:45 AM    TBILIRUBIN 0.6 09/19/2019 06:45 AM    ALBUMIN 3.7 09/19/2019 06:45 AM    TOTPROTEIN 6.7 09/19/2019 06:45 AM    GLOBULIN 3.0 09/19/2019 06:45 AM    AGRATIO 1.2 09/19/2019 06:45 AM       Lab Results   Component Value Date/Time    PROTHROMBTM 14.8 (H) 12/05/2018 07:00 AM    INR 1.17 (H) 12/05/2018 07:00 AM        Lab Results   Component Value Date/Time    WBC 6.7 12/05/2018 07:00 AM    RBC 4.84 12/05/2018 07:00 AM    HEMOGLOBIN 15.3 12/05/2018 07:00 AM    HEMATOCRIT 44.6 12/05/2018  07:00 AM    MCV 92.1 12/05/2018 07:00 AM    MCH 31.6 12/05/2018 07:00 AM    MCHC 34.3 12/05/2018 07:00 AM    MPV 10.4 12/05/2018 07:00 AM    NEUTSPOLYS 76.80 (H) 12/05/2018 07:00 AM    LYMPHOCYTES 10.90 (L) 12/05/2018 07:00 AM    MONOCYTES 9.90 12/05/2018 07:00 AM    EOSINOPHILS 1.30 12/05/2018 07:00 AM    BASOPHILS 0.70 12/05/2018 07:00 AM        Lab Results   Component Value Date/Time    HBA1C 5.8 (H) 09/19/2019 06:45 AM        Imaging: I personally reviewed following images, these are my reads  Xray hip right 10/04/2018  Moderate hip osteoarthritis noted bilaterally     MRI left knee 07/22/2013  There is note of moderate joint effusion.  The medial collateral ligament has mild edema.  There is note of osteophytes in the medial and lateral compartments.  Tendinopathy in the quadriceps tendon noted.    IMAGING radiology reads. I reviewed the following radiology reads   MRI left knee 07/22/2013       Impression        1. Tendinopathy of the quadriceps tendon.     2. Medial collateral ligament sprain.     3. Osteoarthritis, most severe in the medial compartment.     4. Fraying of the inner rim of the bodies of the lateral and medial menisci.     5. Moderate joint effusion.      Xray hips 10/04/2018  Impression:  No acute osseous abnormality                         Results for orders placed during the hospital encounter of 10/04/18   DX-LUMBAR SPINE-4+ VIEWS    Impression Mild retrolisthesis of L1-2, L2-3 and L3-4 that are worse with extension.    Mild anterolisthesis of L4-5 that is worse with flexion.    There is partial disc space fusion of the lower thoracic spine.                                         Diagnosis   Visit Diagnoses     ICD-10-CM   1. Primary osteoarthritis of both knees  M17.0   2. Primary osteoarthritis of left knee  M17.12   3. DDD (degenerative disc disease), lumbar  M51.36           ASSESSMENT:  Curt Blair 69 y.o. male seen for above     Curt was seen today for new  patient.    Diagnoses and all orders for this visit:    Primary osteoarthritis of both knees  -     Diclofenac Sodium 1 % Gel; Apply 4 g to skin as directed 4 times a day as needed for up to 30 days.  -     DX-KNEE COMPLETE 4+ RIGHT; Future  -     DX-KNEE COMPLETE 4+ LEFT; Future    Primary osteoarthritis of left knee  -     REFERRAL TO PHYSIATRY (PMR)    DDD (degenerative disc disease), lumbar      Discussed left knee pain and reviewed findings from treatment with Dr. Alicea as well as findings on MRI, although from 2013.  No clear indication to repeat MRI at this time.  Although he does have some findings that could suggest medial meniscus injury, he does not have any locking or giving way.    We discussed the range of treatment plans including physical therapy, medications and injections.  He would like to avoid surgery.  He has tried to maintain strength at home.  Deferred repeat PT for now.    Trial topical diclofenac gel 1%.  Instructions given.  He will stop his oral diclofenac and see if he is able to manage symptoms with this.  If his hand arthritis worsens, he will let me know.    We discussed left knee injection with synvisc one.  The risks benefits and alternatives to this procedure were discussed and the patient wishes to proceed with the procedure. Risks include but are not limited to damage to surrounding structures, infection, bleeding, worsening of pain which can be permanent, weakness which can be permanent. Benefits include pain relief, improved function. Alternatives includes not doing the procedure.  Information given for him to review.  Request placed.  He would like to proceed.      Follow-up: Return for Office injection.    Thank you very much for asking me to participate in Curt Blair's care.  Please contact me with any questions or concerns.      Please note that this dictation was created using voice recognition software. I have made every reasonable attempt to correct obvious  errors but there may be errors of grammar and content that I may have overlooked prior to finalization of this note.      Sav Rick MD  Physical Medicine and Rehabilitation  Interventional Spine and Sports Physiatry  [facility]           Cheryl Hill M.D.

## 2020-05-30 ENCOUNTER — HOSPITAL ENCOUNTER (OUTPATIENT)
Dept: RADIOLOGY | Facility: MEDICAL CENTER | Age: 70
End: 2020-05-30
Attending: PHYSICAL MEDICINE & REHABILITATION
Payer: MEDICARE

## 2020-05-30 DIAGNOSIS — M17.0 PRIMARY OSTEOARTHRITIS OF BOTH KNEES: ICD-10-CM

## 2020-05-30 PROCEDURE — 73564 X-RAY EXAM KNEE 4 OR MORE: CPT | Mod: LT

## 2020-05-30 PROCEDURE — 73564 X-RAY EXAM KNEE 4 OR MORE: CPT | Mod: RT

## 2020-06-05 ENCOUNTER — OFFICE VISIT (OUTPATIENT)
Dept: CARDIOLOGY | Facility: MEDICAL CENTER | Age: 70
End: 2020-06-05
Payer: MEDICARE

## 2020-06-05 VITALS
HEIGHT: 72 IN | HEART RATE: 84 BPM | WEIGHT: 263 LBS | DIASTOLIC BLOOD PRESSURE: 61 MMHG | OXYGEN SATURATION: 95 % | SYSTOLIC BLOOD PRESSURE: 126 MMHG | RESPIRATION RATE: 20 BRPM | BODY MASS INDEX: 35.62 KG/M2

## 2020-06-05 DIAGNOSIS — I70.90 ARTERIOSCLEROTIC VASCULAR DISEASE: ICD-10-CM

## 2020-06-05 DIAGNOSIS — I25.10 CORONARY ARTERY DISEASE DUE TO LIPID RICH PLAQUE: ICD-10-CM

## 2020-06-05 DIAGNOSIS — I10 ESSENTIAL HYPERTENSION, BENIGN: ICD-10-CM

## 2020-06-05 DIAGNOSIS — Z95.5 STATUS POST CORONARY ARTERY STENT PLACEMENT: ICD-10-CM

## 2020-06-05 DIAGNOSIS — I25.83 CORONARY ARTERY DISEASE DUE TO LIPID RICH PLAQUE: ICD-10-CM

## 2020-06-05 DIAGNOSIS — E78.5 DYSLIPIDEMIA: ICD-10-CM

## 2020-06-05 PROCEDURE — 99214 OFFICE O/P EST MOD 30 MIN: CPT | Performed by: INTERNAL MEDICINE

## 2020-06-05 RX ORDER — ROSUVASTATIN CALCIUM 10 MG/1
20 TABLET, COATED ORAL EVERY EVENING
Qty: 90 TAB | Refills: 3 | Status: SHIPPED | OUTPATIENT
Start: 2020-06-05 | End: 2020-06-08 | Stop reason: SDUPTHER

## 2020-06-05 SDOH — HEALTH STABILITY: MENTAL HEALTH: HOW OFTEN DO YOU HAVE A DRINK CONTAINING ALCOHOL?: 2-4 TIMES A MONTH

## 2020-06-05 ASSESSMENT — ENCOUNTER SYMPTOMS
SHORTNESS OF BREATH: 0
SORE THROAT: 0
PALPITATIONS: 0
CLAUDICATION: 0
COUGH: 0
PND: 0
CHILLS: 0
BLURRED VISION: 0
ABDOMINAL PAIN: 0
DIZZINESS: 0
FOCAL WEAKNESS: 0
NAUSEA: 0
FALLS: 0
WEAKNESS: 0
BRUISES/BLEEDS EASILY: 0
FEVER: 0

## 2020-06-05 ASSESSMENT — FIBROSIS 4 INDEX: FIB4 SCORE: 1.44

## 2020-06-05 NOTE — PROGRESS NOTES
Chief Complaint   Patient presents with   • Coronary Artery Disease       Subjective:   Curt Blair is a 69 y.o. male who presents today for follow-up of his history of coronary disease status post stenting remotely    Labs last year showed increase in his LDL previously we have tried higher dose atorvastatin but he developed myalgias and his CPK was mildly elevated so we backed off to 10 mg as his numbers were actually good    He has gained about 10 pounds in the last year which is maintaining that weight previously was around 2 50-55    He is planning on upcoming knee injections for significant arthritis in his knees which is limiting his exercise    Past Medical History:   Diagnosis Date   • Arteriosclerotic vascular disease 1/25/2019   • Arthritis     Right Foot   • CAD (coronary artery disease)    • Generalized anxiety disorder 10/11/2017   • Hyperlipidemia    • Hypertension    • Inflamed sebaceous cyst 2/15/2018   • Intrinsic eczema 2/7/2020   • Prediabetes 7/19/2019   • S/P coronary artery stent placement     2 stents     Past Surgical History:   Procedure Laterality Date   • APPENDECTOMY     • CHOLECYSTECTOMY     • ENDARTERECTOMY      corornary   • STENT PLACEMENT       Family History   Problem Relation Age of Onset   • Lung Disease Mother         copd   • Hypertension Mother    • Heart Disease Father         heart attack and heart disease   • Cancer Brother         neck   • Heart Disease Paternal Grandfather         Heart attack   • Other Sister         non-malignant brain tumor   • Diabetes Neg Hx      Social History     Socioeconomic History   • Marital status:      Spouse name: Not on file   • Number of children: Not on file   • Years of education: Not on file   • Highest education level: Not on file   Occupational History   • Not on file   Social Needs   • Financial resource strain: Not on file   • Food insecurity     Worry: Not on file     Inability: Not on file   • Transportation needs      Medical: Not on file     Non-medical: Not on file   Tobacco Use   • Smoking status: Former Smoker     Packs/day: 2.00     Years: 25.00     Pack years: 50.00     Types: Cigarettes     Last attempt to quit: 1992     Years since quittin.4   • Smokeless tobacco: Never Used   • Tobacco comment: avoid all tobacco products   Substance and Sexual Activity   • Alcohol use: Yes     Alcohol/week: 0.6 oz     Types: 1 Standard drinks or equivalent per week     Frequency: 2-4 times a month   • Drug use: No   • Sexual activity: Not Currently     Partners: Female     Comment: , 3 children   Lifestyle   • Physical activity     Days per week: Not on file     Minutes per session: Not on file   • Stress: Not on file   Relationships   • Social connections     Talks on phone: Not on file     Gets together: Not on file     Attends Cheondoism service: Not on file     Active member of club or organization: Not on file     Attends meetings of clubs or organizations: Not on file     Relationship status: Not on file   • Intimate partner violence     Fear of current or ex partner: Not on file     Emotionally abused: Not on file     Physically abused: Not on file     Forced sexual activity: Not on file   Other Topics Concern   •  Service Yes   • Blood Transfusions No   • Caffeine Concern No   • Occupational Exposure No   • Hobby Hazards No   • Sleep Concern Yes   • Stress Concern Yes   • Weight Concern Yes   • Special Diet No     Comment: no fried   • Back Care Yes   • Exercise Yes   • Bike Helmet No     Comment: does not ride bike   • Seat Belt Not Asked   • Self-Exams Yes   Social History Narrative   • Not on file     No Known Allergies  Outpatient Encounter Medications as of 2020   Medication Sig Dispense Refill   • rosuvastatin (CRESTOR) 10 MG Tab Take 2 Tabs by mouth every evening. 90 Tab 3   • Diclofenac Sodium 1 % Gel Apply 4 g to skin as directed 4 times a day as needed for up to 30 days. 100 g 1   •  amlodipine-benazepril (LOTREL) 5-10 MG per capsule TAKE 1 CAPSULE BY MOUTH EVERY  Cap 1   • ALPRAZolam (XANAX) 0.5 MG Tab Take 1 Tab by mouth as needed for Sleep for up to 90 days. 90 Tab 0   • diclofenac EC (VOLTAREN) 75 MG Tablet Delayed Response TAKE 1 TABLET BY MOUTH TWICE A  Tab 3   • nitroglycerin (NITROSTAT) 0.4 MG SL Tab Place 1 Tab under tongue as needed for Chest Pain (Place 1 tablet under the tongue every 5 minutes up to 3 doses as needed for chest pain). 25 Tab 5   • Multiple Vitamins-Minerals (MULTIVITAMIN PO) Take 1 Tab by mouth every morning.     • Coenzyme Q10 (CO Q 10 PO) Take 1 Tab by mouth every morning.     • MEGARED OMEGA-3 KRILL OIL PO Take 1 Tab by mouth every morning.     • CALCIUM PO Take 1 Tab by mouth every day.     • Multiple Vitamins-Minerals (OCUVITE) Tab Take 1 Tab by mouth every day.     • aspirin 81 MG tablet Take 81 mg by mouth every day.       • [DISCONTINUED] atorvastatin (LIPITOR) 10 MG Tab Take 1 Tab by mouth every day. 100 Tab 3   • NON SPECIFIED Take 1 mg by mouth every morning. CBD OIL       No facility-administered encounter medications on file as of 6/5/2020.      Review of Systems   Constitutional: Negative for chills and fever.   HENT: Negative for sore throat.    Eyes: Negative for blurred vision.   Respiratory: Negative for cough and shortness of breath.    Cardiovascular: Negative for chest pain, palpitations, claudication, leg swelling and PND.   Gastrointestinal: Negative for abdominal pain and nausea.   Musculoskeletal: Positive for joint pain. Negative for falls.   Skin: Negative for rash.   Neurological: Negative for dizziness, focal weakness and weakness.   Endo/Heme/Allergies: Does not bruise/bleed easily.        Objective:   /61 (BP Location: Left arm, Patient Position: Sitting, BP Cuff Size: Large adult)   Pulse 84   Resp 20   Ht 1.829 m (6')   Wt 119.3 kg (263 lb)   SpO2 95%   BMI 35.67 kg/m²     Physical Exam   Constitutional: No  distress.   HENT:   Mouth/Throat: Oropharynx is clear and moist. No oropharyngeal exudate.   Eyes: No scleral icterus.   Neck: No JVD present.   Cardiovascular: Normal rate and normal heart sounds. Exam reveals no gallop and no friction rub.   No murmur heard.  Pulmonary/Chest: No respiratory distress. He has no wheezes. He has no rales.   Abdominal: Soft. Bowel sounds are normal.   Musculoskeletal:         General: No edema.   Neurological: He is alert.   Skin: No rash noted. He is not diaphoretic.   Psychiatric: He has a normal mood and affect.     We reviewed in person the most recent labs      Assessment:     1. Coronary artery disease due to lipid rich plaque     2. Status post coronary artery stent placement     3. Dyslipidemia  Lipid Profile    CREATINE KINASE   4. Essential hypertension, benign     5. Arteriosclerotic vascular disease         Medical Decision Making:  Today's Assessment / Status / Plan:     It was my pleasure to meet with Mr. Blair.    Blood pressure is well controlled.  We specifically assessed the labs on hypertension treatment    He is on appropriate statin.  We will try rosuvastatin in place of the Lipitor hopefully this is tolerated well because had elevated CPK in the past perhaps we will just make sure that it is normal on any medicine    We discussed the importance of meaningful weight loss.    I will see Mr. Blair back in 1 year time and encouraged him to follow up with us over the phone or electronically using my MyChart as issues arise.    It is my pleasure to participate in the care of Mr. Blair.  Please do not hesitate to contact me with questions or concerns.    Iván Otoole MD PhD FAC  Cardiologist [facility]    Please note that this dictation was created using voice recognition software. There may be errors I did not discover before finalizing the note.

## 2020-06-08 DIAGNOSIS — E78.5 DYSLIPIDEMIA: Primary | ICD-10-CM

## 2020-06-08 RX ORDER — ROSUVASTATIN CALCIUM 20 MG/1
20 TABLET, COATED ORAL EVERY EVENING
Qty: 90 TAB | Refills: 3 | Status: SHIPPED | OUTPATIENT
Start: 2020-06-08 | End: 2020-08-17

## 2020-06-12 ENCOUNTER — PATIENT MESSAGE (OUTPATIENT)
Dept: CARDIOLOGY | Facility: MEDICAL CENTER | Age: 70
End: 2020-06-12

## 2020-06-12 ENCOUNTER — OFFICE VISIT (OUTPATIENT)
Dept: PHYSICAL MEDICINE AND REHAB | Facility: MEDICAL CENTER | Age: 70
End: 2020-06-12
Payer: MEDICARE

## 2020-06-12 VITALS
DIASTOLIC BLOOD PRESSURE: 62 MMHG | HEIGHT: 72 IN | BODY MASS INDEX: 35.68 KG/M2 | OXYGEN SATURATION: 95 % | TEMPERATURE: 98.3 F | WEIGHT: 263.45 LBS | HEART RATE: 85 BPM | SYSTOLIC BLOOD PRESSURE: 126 MMHG

## 2020-06-12 DIAGNOSIS — M25.562 LEFT KNEE PAIN, UNSPECIFIED CHRONICITY: ICD-10-CM

## 2020-06-12 DIAGNOSIS — M17.12 PRIMARY OSTEOARTHRITIS OF LEFT KNEE: ICD-10-CM

## 2020-06-12 PROCEDURE — 20610 DRAIN/INJ JOINT/BURSA W/O US: CPT | Mod: LT | Performed by: PHYSICAL MEDICINE & REHABILITATION

## 2020-06-12 ASSESSMENT — PAIN SCALES - GENERAL: PAINLEVEL: 8=MODERATE-SEVERE PAIN

## 2020-06-12 ASSESSMENT — FIBROSIS 4 INDEX: FIB4 SCORE: 1.44

## 2020-06-12 ASSESSMENT — PATIENT HEALTH QUESTIONNAIRE - PHQ9: CLINICAL INTERPRETATION OF PHQ2 SCORE: 0

## 2020-06-12 NOTE — PROCEDURES
Date of Service: 6/12/2020    Physician/s: Sav Rick MD    Pre-operative Diagnosis: Left knee osteoarthritis, left knee pain    Post-operative Diagnosis: Left knee osteoarthritis, left knee pain    Procedure: Left intra-articular knee synvisc One injection    Description of procedure:    The risks, benefits, and alternatives of the procedure were reviewed and discussed with the patient.  Written informed consent was freely obtained. A pre-procedural time-out was conducted by the physician verifying patient’s identity, procedure to be performed, procedure site and side, and allergy verification. Appropriate equipment was determined to be in place for the procedure.     No sedation was used for this procedure.     In the office suite the patient was placed in a supine position, and the left knee was prepped and draped in the usual sterile fashion.  The target for injection was marked, and a 22g 1.5 inch needle was placed into skin and advanced into the joint space. Following negative aspiration, synvisc one was then injected into the joint, and the needle was subsequently removed intact. The patient's knee was wiped with a 4x4 gauze, the area was cleansed with alcohol prep, and a bandaid was applied. There were no complications noted. The patient tolerated improvement in his pain prior to discharge.      Sav iRck MD   Physical Medicine and Rehabilitation  Interventional Spine and Sports Physiatry  [facility]

## 2020-06-15 NOTE — PATIENT COMMUNICATION
Received [employer] bookhart message from pt.    Pt concerns relayed to Pharmacy Technician.    Will await for Pharmacy Technician response to be received.

## 2020-07-07 ENCOUNTER — OFFICE VISIT (OUTPATIENT)
Dept: PHYSICAL MEDICINE AND REHAB | Facility: MEDICAL CENTER | Age: 70
End: 2020-07-07
Payer: MEDICARE

## 2020-07-07 VITALS
DIASTOLIC BLOOD PRESSURE: 90 MMHG | SYSTOLIC BLOOD PRESSURE: 130 MMHG | HEART RATE: 76 BPM | OXYGEN SATURATION: 94 % | WEIGHT: 263.01 LBS | TEMPERATURE: 97.8 F | BODY MASS INDEX: 37.65 KG/M2 | HEIGHT: 70 IN

## 2020-07-07 DIAGNOSIS — M17.12 PRIMARY OSTEOARTHRITIS OF LEFT KNEE: ICD-10-CM

## 2020-07-07 DIAGNOSIS — M25.562 LEFT KNEE PAIN, UNSPECIFIED CHRONICITY: ICD-10-CM

## 2020-07-07 DIAGNOSIS — M23.304 DEGENERATION DISEASE OF MEDIAL MENISCUS OF LEFT KNEE: ICD-10-CM

## 2020-07-07 PROCEDURE — 99213 OFFICE O/P EST LOW 20 MIN: CPT | Performed by: PHYSICAL MEDICINE & REHABILITATION

## 2020-07-07 ASSESSMENT — PAIN SCALES - GENERAL: PAINLEVEL: 5=MODERATE PAIN

## 2020-07-07 ASSESSMENT — FIBROSIS 4 INDEX: FIB4 SCORE: 1.44

## 2020-07-07 ASSESSMENT — PATIENT HEALTH QUESTIONNAIRE - PHQ9
SUM OF ALL RESPONSES TO PHQ QUESTIONS 1-9: 2
5. POOR APPETITE OR OVEREATING: 0 - NOT AT ALL
CLINICAL INTERPRETATION OF PHQ2 SCORE: 2

## 2020-07-07 NOTE — PROGRESS NOTES
Follow-up patient note, patient previously seen by Dr. Porter     Physiatry (physical medicine and  Rehabilitation), interventional spine and sports medicine    Date of Service: 07/7/2020    Chief complaint:   Chief Complaint   Patient presents with   • Follow-Up     Knee pain       HISTORY    HPI: Curt Blair 69 y.o. male who presents today for evaluation of knee pain.  He presents today for follow-up evaluation of his knee pain.      Since the last visit, he reports that he has had improvement in his knee symptoms.  There is no cracking now when he gets up.  He does have some pain with weight-bearing, although this is less. He is able to cross his legs, which he could not before.  Still, when he has his legs crossed for a while, symptoms worsen.  Going up stairs is difficult.  He has to go up with the right leg rather than the left.    At around 2:30-4AM, he has cramping on the knee caps.  Tylenol does not stop the cramps.  Cramping stops when he gets up.  He is a side sleeper.    Tums have helped with his leg cramps.      Medical records review:  I reviewed the note from the referring provider Cheryl Chopra M.D. dated 02/07/2020 for unrelated conditions.    Reviewed records from Dr Clovis Alicea 07/2013 At that visit, he assessed that MRI of left knee was indicated.  On 07/30/2013, they reviewed the study and the patient had a left knee corticosteroid injection    Previous treatments:    Physical Therapy: No    Medications the patient is tried: NSAIDs    Previous interventions: none, recently     Previous surgeries to relieve the above pain:  none      ROS: Unchanged from 06/12/2020  Eyes: cataract surgery, recently  CV: history of MI at 41  Red Flags ROS:   Fever, Chills, Sweats: Denies  Involuntary Weight Loss: Denies  Bladder Incontinence: Denies  Bowel Incontinence: Denies  Saddle Anesthesia: Denies    All other systems reviewed and negative.       PMHx:   Past Medical History:   Diagnosis Date    • Arteriosclerotic vascular disease 1/25/2019   • Arthritis     Right Foot   • CAD (coronary artery disease)    • Generalized anxiety disorder 10/11/2017   • Hyperlipidemia    • Hypertension    • Inflamed sebaceous cyst 2/15/2018   • Intrinsic eczema 2/7/2020   • Prediabetes 7/19/2019   • S/P coronary artery stent placement     2 stents       PSHx:   Past Surgical History:   Procedure Laterality Date   • APPENDECTOMY     • CHOLECYSTECTOMY     • ENDARTERECTOMY      corornary   • STENT PLACEMENT         Family history   Family History   Problem Relation Age of Onset   • Lung Disease Mother         copd   • Hypertension Mother    • Heart Disease Father         heart attack and heart disease   • Cancer Brother         neck   • Heart Disease Paternal Grandfather         Heart attack   • Other Sister         non-malignant brain tumor   • Diabetes Neg Hx          Medications:   Current Outpatient Medications   Medication   • rosuvastatin (CRESTOR) 20 MG Tab   • amlodipine-benazepril (LOTREL) 5-10 MG per capsule   • diclofenac EC (VOLTAREN) 75 MG Tablet Delayed Response   • nitroglycerin (NITROSTAT) 0.4 MG SL Tab   • NON SPECIFIED   • Multiple Vitamins-Minerals (MULTIVITAMIN PO)   • Coenzyme Q10 (CO Q 10 PO)   • MEGARED OMEGA-3 KRILL OIL PO   • CALCIUM PO   • Multiple Vitamins-Minerals (OCUVITE) Tab   • aspirin 81 MG tablet     No current facility-administered medications for this visit.        Allergies:   No Known Allergies    Social Hx:   Social History     Socioeconomic History   • Marital status:      Spouse name: Not on file   • Number of children: Not on file   • Years of education: Not on file   • Highest education level: Not on file   Occupational History   • Not on file   Social Needs   • Financial resource strain: Not on file   • Food insecurity     Worry: Not on file     Inability: Not on file   • Transportation needs     Medical: Not on file     Non-medical: Not on file   Tobacco Use   • Smoking  "status: Former Smoker     Packs/day: 2.00     Years: 25.00     Pack years: 50.00     Types: Cigarettes     Last attempt to quit: 1992     Years since quittin.5   • Smokeless tobacco: Never Used   • Tobacco comment: avoid all tobacco products   Substance and Sexual Activity   • Alcohol use: Yes     Alcohol/week: 0.6 oz     Types: 1 Standard drinks or equivalent per week     Frequency: 2-4 times a month   • Drug use: No   • Sexual activity: Not Currently     Partners: Female     Comment: , 3 children   Lifestyle   • Physical activity     Days per week: Not on file     Minutes per session: Not on file   • Stress: Not on file   Relationships   • Social connections     Talks on phone: Not on file     Gets together: Not on file     Attends Synagogue service: Not on file     Active member of club or organization: Not on file     Attends meetings of clubs or organizations: Not on file     Relationship status: Not on file   • Intimate partner violence     Fear of current or ex partner: Not on file     Emotionally abused: Not on file     Physically abused: Not on file     Forced sexual activity: Not on file   Other Topics Concern   •  Service Yes   • Blood Transfusions No   • Caffeine Concern No   • Occupational Exposure No   • Hobby Hazards No   • Sleep Concern Yes   • Stress Concern Yes   • Weight Concern Yes   • Special Diet No     Comment: no fried   • Back Care Yes   • Exercise Yes   • Bike Helmet No     Comment: does not ride bike   • Seat Belt Not Asked   • Self-Exams Yes   Social History Narrative   • Not on file         EXAMINATION     Physical Exam:   Vitals: /90 (BP Location: Left arm, Patient Position: Sitting, BP Cuff Size: Adult)   Pulse 76   Temp 36.6 °C (97.8 °F) (Temporal)   Ht 1.778 m (5' 10\")   Wt 119.3 kg (263 lb 0.1 oz)   SpO2 94%     Constitutional:   Body Habitus: Body mass index is 37.74 kg/m².  Cooperation: Fully cooperates with exam  Appearance: Well-groomed, " well-nourished, not disheveled, in no acute distress    Eyes: No scleral icterus, no proptosis     ENT -no obvious auditory deficits, wearing face mask    Skin -no rashes or lesions noted     Respiratory-  breathing comfortable on room air, no audible wheezing    Cardiovascular- capillary refills less than 2 seconds. No lower extremity edema is noted.     Psychiatric- alert and oriented ×3. Normal affect.     Gait - normal gait, no use of ambulatory device, minimally antalgic.     Musculoskeletal -   Thoracic/Lumbar Spine/Sacral Spine/Hips   Inspection: No evidence of atrophy in bilateral lower extremities throughout     KNEES  Left knee  Mild-moderate effusion on the left knee. Tenderness over the medial greater than lateral joint line.  Mild patellar apprehension, mild crepitus with ROM of the knee.  Functional ROM of the knee.  Positive Moiz's for medial pain.  No medial or lateral instability.  Negative Lachman's test      MEDICAL DECISION MAKING    Medical records review: see under HPI section.     DATA    Labs:   Lab Results   Component Value Date/Time    SODIUM 138 09/19/2019 06:45 AM    POTASSIUM 4.7 09/19/2019 06:45 AM    CHLORIDE 104 09/19/2019 06:45 AM    CO2 27 09/19/2019 06:45 AM    ANION 7.0 09/19/2019 06:45 AM    GLUCOSE 91 09/19/2019 06:45 AM    BUN 21 09/19/2019 06:45 AM    CREATININE 1.10 09/19/2019 06:45 AM    CREATININE 0.93 02/20/2013 07:20 AM    CALCIUM 8.7 09/19/2019 06:45 AM    ASTSGOT 24 09/19/2019 06:45 AM    ALTSGPT 27 09/19/2019 06:45 AM    TBILIRUBIN 0.6 09/19/2019 06:45 AM    ALBUMIN 3.7 09/19/2019 06:45 AM    TOTPROTEIN 6.7 09/19/2019 06:45 AM    GLOBULIN 3.0 09/19/2019 06:45 AM    AGRATIO 1.2 09/19/2019 06:45 AM       Lab Results   Component Value Date/Time    PROTHROMBTM 14.8 (H) 12/05/2018 07:00 AM    INR 1.17 (H) 12/05/2018 07:00 AM        Lab Results   Component Value Date/Time    WBC 6.7 12/05/2018 07:00 AM    RBC 4.84 12/05/2018 07:00 AM    HEMOGLOBIN 15.3 12/05/2018 07:00  AM    HEMATOCRIT 44.6 12/05/2018 07:00 AM    MCV 92.1 12/05/2018 07:00 AM    MCH 31.6 12/05/2018 07:00 AM    MCHC 34.3 12/05/2018 07:00 AM    MPV 10.4 12/05/2018 07:00 AM    NEUTSPOLYS 76.80 (H) 12/05/2018 07:00 AM    LYMPHOCYTES 10.90 (L) 12/05/2018 07:00 AM    MONOCYTES 9.90 12/05/2018 07:00 AM    EOSINOPHILS 1.30 12/05/2018 07:00 AM    BASOPHILS 0.70 12/05/2018 07:00 AM        Lab Results   Component Value Date/Time    HBA1C 5.8 (H) 09/19/2019 06:45 AM        Imaging: I personally reviewed following images, these are my reads    Xray hip right 10/04/2018  Moderate hip osteoarthritis noted bilaterally     MRI left knee 07/22/2013  There is note of moderate joint effusion.  The medial collateral ligament has mild edema.  There is note of osteophytes in the medial and lateral compartments.  Tendinopathy in the quadriceps tendon noted.    IMAGING radiology reads. I reviewed the following radiology reads     MRI left knee 07/22/2013       Impression        1. Tendinopathy of the quadriceps tendon.     2. Medial collateral ligament sprain.     3. Osteoarthritis, most severe in the medial compartment.     4. Fraying of the inner rim of the bodies of the lateral and medial menisci.     5. Moderate joint effusion.      Xray hips 10/04/2018  Impression:  No acute osseous abnormality                         Results for orders placed during the hospital encounter of 10/04/18   DX-LUMBAR SPINE-4+ VIEWS    Impression Mild retrolisthesis of L1-2, L2-3 and L3-4 that are worse with extension.    Mild anterolisthesis of L4-5 that is worse with flexion.    There is partial disc space fusion of the lower thoracic spine.                                         Diagnosis   Visit Diagnoses     ICD-10-CM   1. Primary osteoarthritis of left knee  M17.12   2. Left knee pain, unspecified chronicity  M25.562   3. 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 male seen for  above     Curt was seen today for follow-up.    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 He has had some improvement after synvisc one.  We discussed consideration of left knee injection.  Discussed possible left intra-articular steroid injection.  The risks benefits and alternatives to this procedure were discussed and the patient wishes to proceed with the procedure. Risks include but are not limited to damage to surrounding structures, infection, bleeding, worsening of pain which can be permanent, weakness which can be permanent. Benefits include pain relief, improved function. Alternatives includes not doing the procedure.  Will plan to order MRI left knee first.  2. MRI left knee  3. Trial compression sleeve at night.        Follow-up: Return after MRI left knee.    Thank you very much for asking me to participate in Curt Blair's care.  Please contact me with any questions or concerns.      Please note that this dictation was created using voice recognition software. I have made every reasonable attempt to correct obvious errors but there may be errors of grammar and content that I may have overlooked prior to finalization of this note.      Sav Rick MD  Physical Medicine and Rehabilitation  Interventional Spine and Sports Physiatry  [facility]           Cheryl Hill M.D.

## 2020-07-10 ENCOUNTER — HOSPITAL ENCOUNTER (OUTPATIENT)
Dept: RADIOLOGY | Facility: MEDICAL CENTER | Age: 70
End: 2020-07-10
Attending: PHYSICAL MEDICINE & REHABILITATION
Payer: MEDICARE

## 2020-07-10 DIAGNOSIS — M17.12 PRIMARY OSTEOARTHRITIS OF LEFT KNEE: ICD-10-CM

## 2020-07-10 DIAGNOSIS — M25.562 LEFT KNEE PAIN, UNSPECIFIED CHRONICITY: ICD-10-CM

## 2020-07-10 DIAGNOSIS — M23.304 DEGENERATION DISEASE OF MEDIAL MENISCUS OF LEFT KNEE: ICD-10-CM

## 2020-07-10 PROCEDURE — 73721 MRI JNT OF LWR EXTRE W/O DYE: CPT | Mod: LT

## 2020-07-16 ENCOUNTER — OFFICE VISIT (OUTPATIENT)
Dept: PHYSICAL MEDICINE AND REHAB | Facility: MEDICAL CENTER | Age: 70
End: 2020-07-16
Payer: MEDICARE

## 2020-07-16 VITALS
WEIGHT: 261.47 LBS | TEMPERATURE: 98.2 F | OXYGEN SATURATION: 95 % | DIASTOLIC BLOOD PRESSURE: 82 MMHG | HEART RATE: 83 BPM | BODY MASS INDEX: 35.41 KG/M2 | SYSTOLIC BLOOD PRESSURE: 118 MMHG | HEIGHT: 72 IN

## 2020-07-16 DIAGNOSIS — M25.862 CYST OF LEFT KNEE JOINT: ICD-10-CM

## 2020-07-16 DIAGNOSIS — M25.562 LEFT KNEE PAIN, UNSPECIFIED CHRONICITY: ICD-10-CM

## 2020-07-16 DIAGNOSIS — M17.12 PRIMARY OSTEOARTHRITIS OF LEFT KNEE: ICD-10-CM

## 2020-07-16 PROCEDURE — 99999 PR NO CHARGE: CPT | Mod: 25 | Performed by: PHYSICAL MEDICINE & REHABILITATION

## 2020-07-16 PROCEDURE — 20610 DRAIN/INJ JOINT/BURSA W/O US: CPT | Mod: LT | Performed by: PHYSICAL MEDICINE & REHABILITATION

## 2020-07-16 RX ORDER — TRIAMCINOLONE ACETONIDE 40 MG/ML
40 INJECTION, SUSPENSION INTRA-ARTICULAR; INTRAMUSCULAR ONCE
Qty: 1 ML | Refills: 0 | Status: SHIPPED | OUTPATIENT
Start: 2020-07-16 | End: 2020-07-16

## 2020-07-16 RX ORDER — TRIAMCINOLONE ACETONIDE 40 MG/ML
40 INJECTION, SUSPENSION INTRA-ARTICULAR; INTRAMUSCULAR ONCE
Status: COMPLETED | OUTPATIENT
Start: 2020-07-16 | End: 2020-07-16

## 2020-07-16 RX ADMIN — TRIAMCINOLONE ACETONIDE 40 MG: 40 INJECTION, SUSPENSION INTRA-ARTICULAR; INTRAMUSCULAR at 10:43

## 2020-07-16 ASSESSMENT — PATIENT HEALTH QUESTIONNAIRE - PHQ9: CLINICAL INTERPRETATION OF PHQ2 SCORE: 0

## 2020-07-16 ASSESSMENT — FIBROSIS 4 INDEX: FIB4 SCORE: 1.44

## 2020-07-16 ASSESSMENT — PAIN SCALES - GENERAL: PAINLEVEL: 7=MODERATE-SEVERE PAIN

## 2020-07-16 NOTE — PROCEDURES
Date of Service: 7/16/2020    Physician/s: Sav Rick MD    Pre-operative Diagnosis: left knee osteoarthritis, pain    Post-operative Diagnosis: left knee osteoarthritis, pain    Procedure: left Knee injection ultrasound-guided    Description of procedure:    The risks, benefits, and alternatives of the procedure were reviewed and discussed with the patient.  Written informed consent was freely obtained. A pre-procedural time-out was conducted by the physician verifying patient’s identity, procedure to be performed, procedure site and side, and allergy verification. Appropriate equipment was determined to be in place for the procedure.     No sedation was used for this procedure.     In the office suite the patient was placed in a sitting position, and the knee was prepped and draped in the usual sterile fashion. The target for injection was marked, and a 25g 1.5 inch needle was placed into skin and advanced into the joint space. Following negative aspiration, approx 4mL of 1% lidocaine and 1cc of 40mg/mL Triamcinolone was then injected into the joint, and the needle was subsequently removed intact. The patient's knee was wiped with a 4x4 gauze, the area was cleansed with alcohol prep, and a bandaid was applied. There were no complications noted.     The patient tolerated the procedure and was discharged in good condition.    Sav Rick MD  Physical Medicine and Rehabilitation  Interventional Spine and Sports Physiatry  [facility]

## 2020-07-16 NOTE — PROGRESS NOTES
Follow-up patient note, patient previously seen by Dr. Porter     Physiatry (physical medicine and  Rehabilitation), interventional spine and sports medicine    Date of Service: 07/16/2020    Chief complaint:   Chief Complaint   Patient presents with   • Follow-Up     Left knee       HISTORY    HPI: Curt Blair 69 y.o. male who presents today for follow-up evaluation of knee pain.  He presents today for follow-up evaluation of his knee pain.      Since the last visit, he had MRI of his knee.      His knee pain is worse with weight-bearing.  Going up stairs is painful.  Pain is present anteriorly in the knee.  After synvisc-one injection, he noted no cracking, increased tolerance of crossing his legs, and some decrease in knee pain.  Still, anterior knee pain persists.  Use of knee sleeve was not helpful in managing cramping.      Medical records review:  I reviewed the note from the referring provider Cheryl Chopra M.D. dated 02/07/2020 for unrelated conditions.    Reviewed records from Dr Clovis Alicea 07/2013 At that visit, he assessed that MRI of left knee was indicated.  On 07/30/2013, they reviewed the study and the patient had a left knee corticosteroid injection    Previous treatments:    Physical Therapy: No    Medications the patient is tried: NSAIDs    Previous interventions: none, recently     Previous surgeries to relieve the above pain:  none      ROS: Unchanged from 06/12/2020  Eyes: cataract surgery, recently  CV: history of MI at 41  Red Flags ROS:   Fever, Chills, Sweats: Denies  Involuntary Weight Loss: Denies  Bladder Incontinence: Denies  Bowel Incontinence: Denies  Saddle Anesthesia: Denies    All other systems reviewed and negative.       PMHx:   Past Medical History:   Diagnosis Date   • Arteriosclerotic vascular disease 1/25/2019   • Arthritis     Right Foot   • CAD (coronary artery disease)    • Generalized anxiety disorder 10/11/2017   • Hyperlipidemia    • Hypertension    •  Inflamed sebaceous cyst 2/15/2018   • Intrinsic eczema 2020   • Prediabetes 2019   • S/P coronary artery stent placement     2 stents       PSHx:   Past Surgical History:   Procedure Laterality Date   • APPENDECTOMY     • CHOLECYSTECTOMY     • ENDARTERECTOMY      corornary   • STENT PLACEMENT         Family history   Family History   Problem Relation Age of Onset   • Lung Disease Mother         copd   • Hypertension Mother    • Heart Disease Father         heart attack and heart disease   • Cancer Brother         neck   • Heart Disease Paternal Grandfather         Heart attack   • Other Sister         non-malignant brain tumor   • Diabetes Neg Hx          Medications:   Current Outpatient Medications   Medication   • rosuvastatin (CRESTOR) 20 MG Tab   • amlodipine-benazepril (LOTREL) 5-10 MG per capsule   • diclofenac EC (VOLTAREN) 75 MG Tablet Delayed Response   • nitroglycerin (NITROSTAT) 0.4 MG SL Tab   • Multiple Vitamins-Minerals (MULTIVITAMIN PO)   • Coenzyme Q10 (CO Q 10 PO)   • MEGARED OMEGA-3 KRILL OIL PO   • CALCIUM PO   • aspirin 81 MG tablet   • NON SPECIFIED   • Multiple Vitamins-Minerals (OCUVITE) Tab     No current facility-administered medications for this visit.        Allergies:   No Known Allergies    Social Hx:   Social History     Socioeconomic History   • Marital status:      Spouse name: Not on file   • Number of children: Not on file   • Years of education: Not on file   • Highest education level: Not on file   Occupational History   • Not on file   Social Needs   • Financial resource strain: Not on file   • Food insecurity     Worry: Not on file     Inability: Not on file   • Transportation needs     Medical: Not on file     Non-medical: Not on file   Tobacco Use   • Smoking status: Former Smoker     Packs/day: 2.00     Years: 25.00     Pack years: 50.00     Types: Cigarettes     Last attempt to quit: 1992     Years since quittin.5   • Smokeless tobacco: Never Used    • Tobacco comment: avoid all tobacco products   Substance and Sexual Activity   • Alcohol use: Not Currently     Alcohol/week: 0.6 oz     Types: 1 Standard drinks or equivalent per week     Frequency: 2-4 times a month   • Drug use: No   • Sexual activity: Not Currently     Partners: Female     Comment: , 3 children   Lifestyle   • Physical activity     Days per week: Not on file     Minutes per session: Not on file   • Stress: Not on file   Relationships   • Social connections     Talks on phone: Not on file     Gets together: Not on file     Attends [employer] service: Not on file     Active member of club or organization: Not on file     Attends meetings of clubs or organizations: Not on file     Relationship status: Not on file   • Intimate partner violence     Fear of current or ex partner: Not on file     Emotionally abused: Not on file     Physically abused: Not on file     Forced sexual activity: Not on file   Other Topics Concern   •  Service Yes   • Blood Transfusions No   • Caffeine Concern No   • Occupational Exposure No   • Hobby Hazards No   • Sleep Concern Yes   • Stress Concern Yes   • Weight Concern Yes   • Special Diet No     Comment: no fried   • Back Care Yes   • Exercise Yes   • Bike Helmet No     Comment: does not ride bike   • Seat Belt Not Asked   • Self-Exams Yes   Social History Narrative   • Not on file         EXAMINATION     Physical Exam:   Vitals: /82 (BP Location: Left arm, Patient Position: Sitting, BP Cuff Size: Adult long)   Pulse 83   Temp 36.8 °C (98.2 °F) (Temporal)   Ht 1.829 m (6')   Wt 118.6 kg (261 lb 7.5 oz)   SpO2 95%     Constitutional:   Body Habitus: Body mass index is 35.46 kg/m².  Cooperation: Fully cooperates with exam  Appearance: Well-groomed, well-nourished, not disheveled, in no acute distress    Eyes: No scleral icterus, no proptosis     ENT -no obvious auditory deficits, wearing face mask    Skin -no rashes or lesions noted      Respiratory-  breathing comfortable on room air, no audible wheezing    Cardiovascular- No lower extremity edema is noted.     Psychiatric- alert and oriented ×3. Normal affect.     Gait - normal gait, no use of ambulatory device, minimally antalgic.     Musculoskeletal -   Thoracic/Lumbar Spine/Sacral Spine/Hips   Inspection: No evidence of atrophy in bilateral lower extremities throughout     KNEES  Left knee  Mild-moderate effusion on the left knee. Tenderness over the medial greater than lateral joint line.       MEDICAL DECISION MAKING    Medical records review: see under HPI section.     DATA    Labs:   Lab Results   Component Value Date/Time    SODIUM 138 09/19/2019 06:45 AM    POTASSIUM 4.7 09/19/2019 06:45 AM    CHLORIDE 104 09/19/2019 06:45 AM    CO2 27 09/19/2019 06:45 AM    ANION 7.0 09/19/2019 06:45 AM    GLUCOSE 91 09/19/2019 06:45 AM    BUN 21 09/19/2019 06:45 AM    CREATININE 1.10 09/19/2019 06:45 AM    CREATININE 0.93 02/20/2013 07:20 AM    CALCIUM 8.7 09/19/2019 06:45 AM    ASTSGOT 24 09/19/2019 06:45 AM    ALTSGPT 27 09/19/2019 06:45 AM    TBILIRUBIN 0.6 09/19/2019 06:45 AM    ALBUMIN 3.7 09/19/2019 06:45 AM    TOTPROTEIN 6.7 09/19/2019 06:45 AM    GLOBULIN 3.0 09/19/2019 06:45 AM    AGRATIO 1.2 09/19/2019 06:45 AM       Lab Results   Component Value Date/Time    PROTHROMBTM 14.8 (H) 12/05/2018 07:00 AM    INR 1.17 (H) 12/05/2018 07:00 AM        Lab Results   Component Value Date/Time    WBC 6.7 12/05/2018 07:00 AM    RBC 4.84 12/05/2018 07:00 AM    HEMOGLOBIN 15.3 12/05/2018 07:00 AM    HEMATOCRIT 44.6 12/05/2018 07:00 AM    MCV 92.1 12/05/2018 07:00 AM    MCH 31.6 12/05/2018 07:00 AM    MCHC 34.3 12/05/2018 07:00 AM    MPV 10.4 12/05/2018 07:00 AM    NEUTSPOLYS 76.80 (H) 12/05/2018 07:00 AM    LYMPHOCYTES 10.90 (L) 12/05/2018 07:00 AM    MONOCYTES 9.90 12/05/2018 07:00 AM    EOSINOPHILS 1.30 12/05/2018 07:00 AM    BASOPHILS 0.70 12/05/2018 07:00 AM        Lab Results   Component Value  Date/Time    HBA1C 5.8 (H) 09/19/2019 06:45 AM        Imaging: I personally reviewed following images, these are my reads  MRI left knee 07/10/2020  There is note of a small joint effusion.  Cyst posterior to the PCL.  No ligamentous or meniscal tears  Tricompartmental degenerative changes, greatest medial compartment    Xray hip right 10/04/2018  Moderate hip osteoarthritis noted bilaterally     MRI left knee 07/22/2013  There is note of moderate joint effusion.  The medial collateral ligament has mild edema.  There is note of osteophytes in the medial and lateral compartments.  Tendinopathy in the quadriceps tendon noted.    IMAGING radiology reads. I reviewed the following radiology reads   MRI left knee 07/10/2020  IMPRESSION:     1.  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     2.  Intact ligaments and menisci.     3.  Small joint effusion.     4.  [place] synovial or ganglion cyst immediately posterior to the posterior cruciate ligament.      MRI left knee 07/22/2013       Impression        1. Tendinopathy of the quadriceps tendon.     2. Medial collateral ligament sprain.     3. Osteoarthritis, most severe in the medial compartment.     4. Fraying of the inner rim of the bodies of the lateral and medial menisci.     5. Moderate joint effusion.      Xray hips 10/04/2018  Impression:  No acute osseous abnormality                         Results for orders placed during the hospital encounter of 10/04/18   DX-LUMBAR SPINE-4+ VIEWS    Impression Mild retrolisthesis of L1-2, L2-3 and L3-4 that are worse with extension.    Mild anterolisthesis of L4-5 that is worse with flexion.    There is partial disc space fusion of the lower thoracic spine.                                         Diagnosis   Visit Diagnoses     ICD-10-CM   1. Primary osteoarthritis of left knee  M17.12   2. Left knee pain, unspecified chronicity  M25.562   3. Cyst of left knee  joint  M25.862           ASSESSMENT:  Curt Blair 69 y.o. male seen for above     Curt was seen today for follow-up.    Diagnoses and all orders for this visit:    Primary osteoarthritis of left knee  -     Discontinue: triamcinolone acetonide (KENALOG-40) 40 MG/ML Suspension; 1 mL by Intramuscular route Once for 1 dose.  -     triamcinolone acetonide (KENALOG-40) injection 40 mg    Left knee pain, unspecified chronicity    Cyst of left knee joint         1.  We reviewed the findings of the MRI left knee.  2.  Discussed proceeding with left intra-articular steroid injection.  The risks benefits and alternatives to this procedure were discussed and the patient wishes to proceed with the procedure. Risks include but are not limited to damage to surrounding structures, infection, bleeding, worsening of pain which can be permanent, weakness which can be permanent. Benefits include pain relief, improved function. Alternatives includes not doing the procedure.    3. See separate procedure note of the same date        Follow-up: Return in about 2 weeks (around 7/30/2020), or if symptoms worsen or fail to improve.    Thank you very much for asking me to participate in Curt Blair's care.  Please contact me with any questions or concerns.      Please note that this dictation was created using voice recognition software. I have made every reasonable attempt to correct obvious errors but there may be errors of grammar and content that I may have overlooked prior to finalization of this note.      Sav Rick MD  Physical Medicine and Rehabilitation  Interventional Spine and Sports Physiatry  [facility]

## 2020-07-22 ENCOUNTER — APPOINTMENT (RX ONLY)
Dept: URBAN - METROPOLITAN AREA CLINIC 20 | Facility: CLINIC | Age: 70
Setting detail: DERMATOLOGY
End: 2020-07-22

## 2020-07-22 DIAGNOSIS — Z85.828 PERSONAL HISTORY OF OTHER MALIGNANT NEOPLASM OF SKIN: ICD-10-CM

## 2020-07-22 DIAGNOSIS — Z71.89 OTHER SPECIFIED COUNSELING: ICD-10-CM

## 2020-07-22 DIAGNOSIS — D18.0 HEMANGIOMA: ICD-10-CM

## 2020-07-22 DIAGNOSIS — D22 MELANOCYTIC NEVI: ICD-10-CM

## 2020-07-22 DIAGNOSIS — L57.0 ACTINIC KERATOSIS: ICD-10-CM

## 2020-07-22 DIAGNOSIS — L82.1 OTHER SEBORRHEIC KERATOSIS: ICD-10-CM

## 2020-07-22 DIAGNOSIS — L81.4 OTHER MELANIN HYPERPIGMENTATION: ICD-10-CM | Status: STABLE

## 2020-07-22 PROBLEM — D18.01 HEMANGIOMA OF SKIN AND SUBCUTANEOUS TISSUE: Status: ACTIVE | Noted: 2020-07-22

## 2020-07-22 PROBLEM — D22.71 MELANOCYTIC NEVI OF RIGHT LOWER LIMB, INCLUDING HIP: Status: ACTIVE | Noted: 2020-07-22

## 2020-07-22 PROBLEM — D22.72 MELANOCYTIC NEVI OF LEFT LOWER LIMB, INCLUDING HIP: Status: ACTIVE | Noted: 2020-07-22

## 2020-07-22 PROBLEM — D22.5 MELANOCYTIC NEVI OF TRUNK: Status: ACTIVE | Noted: 2020-07-22

## 2020-07-22 PROBLEM — D22.61 MELANOCYTIC NEVI OF RIGHT UPPER LIMB, INCLUDING SHOULDER: Status: ACTIVE | Noted: 2020-07-22

## 2020-07-22 PROBLEM — D22.62 MELANOCYTIC NEVI OF LEFT UPPER LIMB, INCLUDING SHOULDER: Status: ACTIVE | Noted: 2020-07-22

## 2020-07-22 PROBLEM — D22.39 MELANOCYTIC NEVI OF OTHER PARTS OF FACE: Status: ACTIVE | Noted: 2020-07-22

## 2020-07-22 PROCEDURE — ? COUNSELING

## 2020-07-22 PROCEDURE — 99214 OFFICE O/P EST MOD 30 MIN: CPT | Mod: 25

## 2020-07-22 PROCEDURE — ? SUNSCREEN RECOMMENDATIONS

## 2020-07-22 PROCEDURE — 17003 DESTRUCT PREMALG LES 2-14: CPT

## 2020-07-22 PROCEDURE — ? LIQUID NITROGEN

## 2020-07-22 PROCEDURE — ? OBSERVATION AND MEASURE

## 2020-07-22 PROCEDURE — 17000 DESTRUCT PREMALG LESION: CPT

## 2020-07-22 PROCEDURE — ? OBSERVATION

## 2020-07-22 RX ORDER — DICLOFENAC SODIUM 75 MG/1
TABLET, DELAYED RELEASE ORAL
Qty: 180 TAB | Refills: 3 | Status: SHIPPED | OUTPATIENT
Start: 2020-07-22 | End: 2021-07-19

## 2020-07-22 ASSESSMENT — LOCATION DETAILED DESCRIPTION DERM
LOCATION DETAILED: RIGHT FOREHEAD
LOCATION DETAILED: RIGHT INFERIOR MEDIAL UPPER BACK
LOCATION DETAILED: LEFT SUPERIOR LATERAL BUCCAL CHEEK
LOCATION DETAILED: RIGHT INFERIOR CENTRAL MALAR CHEEK
LOCATION DETAILED: RIGHT DISTAL POSTERIOR UPPER ARM
LOCATION DETAILED: RIGHT PROXIMAL RADIAL DORSAL FOREARM
LOCATION DETAILED: RIGHT INFERIOR MEDIAL MIDBACK
LOCATION DETAILED: LEFT DISTAL POSTERIOR THIGH
LOCATION DETAILED: LEFT LATERAL MALAR CHEEK
LOCATION DETAILED: RIGHT INFERIOR FOREHEAD
LOCATION DETAILED: RIGHT CENTRAL TEMPLE
LOCATION DETAILED: LEFT PROXIMAL POSTERIOR UPPER ARM
LOCATION DETAILED: INFERIOR THORACIC SPINE
LOCATION DETAILED: MID POSTERIOR NECK
LOCATION DETAILED: RIGHT PROXIMAL PRETIBIAL REGION
LOCATION DETAILED: LEFT LATERAL PROXIMAL PRETIBIAL REGION
LOCATION DETAILED: LEFT INFERIOR PREAURICULAR CHEEK
LOCATION DETAILED: RIGHT DISTAL POSTERIOR THIGH
LOCATION DETAILED: RIGHT SUPERIOR UPPER BACK
LOCATION DETAILED: LEFT VENTRAL PROXIMAL FOREARM
LOCATION DETAILED: LEFT INFERIOR LATERAL MALAR CHEEK
LOCATION DETAILED: RIGHT VENTRAL PROXIMAL FOREARM

## 2020-07-22 ASSESSMENT — LOCATION SIMPLE DESCRIPTION DERM
LOCATION SIMPLE: RIGHT POSTERIOR UPPER ARM
LOCATION SIMPLE: RIGHT FOREARM
LOCATION SIMPLE: RIGHT LOWER BACK
LOCATION SIMPLE: RIGHT UPPER BACK
LOCATION SIMPLE: POSTERIOR NECK
LOCATION SIMPLE: LEFT PRETIBIAL REGION
LOCATION SIMPLE: LEFT CHEEK
LOCATION SIMPLE: RIGHT TEMPLE
LOCATION SIMPLE: UPPER BACK
LOCATION SIMPLE: RIGHT FOREHEAD
LOCATION SIMPLE: LEFT FOREARM
LOCATION SIMPLE: RIGHT PRETIBIAL REGION
LOCATION SIMPLE: LEFT POSTERIOR THIGH
LOCATION SIMPLE: LEFT POSTERIOR UPPER ARM
LOCATION SIMPLE: RIGHT POSTERIOR THIGH
LOCATION SIMPLE: RIGHT CHEEK

## 2020-07-22 ASSESSMENT — LOCATION ZONE DERM
LOCATION ZONE: FACE
LOCATION ZONE: NECK
LOCATION ZONE: ARM
LOCATION ZONE: TRUNK
LOCATION ZONE: LEG

## 2020-07-22 NOTE — PROCEDURE: MIPS QUALITY
Quality 431: Preventive Care And Screening: Unhealthy Alcohol Use - Screening: Patient screened for unhealthy alcohol use using a single question and scores less than 2 times per year
Detail Level: Detailed
Quality 130: Documentation Of Current Medications In The Medical Record: Current Medications Documented
Quality 111:Pneumonia Vaccination Status For Older Adults: Pneumococcal Vaccination Previously Received
Quality 226: Preventive Care And Screening: Tobacco Use: Screening And Cessation Intervention: Patient screened for tobacco use and is an ex/non-smoker

## 2020-07-28 ENCOUNTER — PATIENT MESSAGE (OUTPATIENT)
Dept: MEDICAL GROUP | Facility: PHYSICIAN GROUP | Age: 70
End: 2020-07-28

## 2020-08-07 ENCOUNTER — OFFICE VISIT (OUTPATIENT)
Dept: PHYSICAL MEDICINE AND REHAB | Facility: MEDICAL CENTER | Age: 70
End: 2020-08-07
Payer: MEDICARE

## 2020-08-07 ENCOUNTER — HOSPITAL ENCOUNTER (OUTPATIENT)
Dept: LAB | Facility: MEDICAL CENTER | Age: 70
End: 2020-08-07
Attending: PHYSICAL MEDICINE & REHABILITATION
Payer: MEDICARE

## 2020-08-07 ENCOUNTER — HOSPITAL ENCOUNTER (OUTPATIENT)
Dept: RADIOLOGY | Facility: MEDICAL CENTER | Age: 70
End: 2020-08-07
Attending: PHYSICAL MEDICINE & REHABILITATION
Payer: MEDICARE

## 2020-08-07 VITALS
WEIGHT: 257.06 LBS | DIASTOLIC BLOOD PRESSURE: 62 MMHG | HEART RATE: 87 BPM | TEMPERATURE: 97.6 F | SYSTOLIC BLOOD PRESSURE: 124 MMHG | OXYGEN SATURATION: 93 % | HEIGHT: 72 IN | BODY MASS INDEX: 34.82 KG/M2

## 2020-08-07 DIAGNOSIS — M17.12 PRIMARY OSTEOARTHRITIS OF LEFT KNEE: ICD-10-CM

## 2020-08-07 DIAGNOSIS — M47.816 LUMBAR SPONDYLOSIS: ICD-10-CM

## 2020-08-07 DIAGNOSIS — Z87.19 H/O DIVERTICULITIS OF COLON: ICD-10-CM

## 2020-08-07 DIAGNOSIS — M25.562 LEFT KNEE PAIN, UNSPECIFIED CHRONICITY: ICD-10-CM

## 2020-08-07 DIAGNOSIS — M25.862 CYST OF LEFT KNEE JOINT: ICD-10-CM

## 2020-08-07 DIAGNOSIS — M43.16 SPONDYLOLISTHESIS OF LUMBAR REGION: ICD-10-CM

## 2020-08-07 LAB
BASOPHILS # BLD AUTO: 0.7 % (ref 0–1.8)
BASOPHILS # BLD: 0.05 K/UL (ref 0–0.12)
EOSINOPHIL # BLD AUTO: 0.09 K/UL (ref 0–0.51)
EOSINOPHIL NFR BLD: 1.3 % (ref 0–6.9)
ERYTHROCYTE [DISTWIDTH] IN BLOOD BY AUTOMATED COUNT: 42.8 FL (ref 35.9–50)
HCT VFR BLD AUTO: 46.9 % (ref 42–52)
HGB BLD-MCNC: 15.9 G/DL (ref 14–18)
IMM GRANULOCYTES # BLD AUTO: 0.03 K/UL (ref 0–0.11)
IMM GRANULOCYTES NFR BLD AUTO: 0.4 % (ref 0–0.9)
LYMPHOCYTES # BLD AUTO: 1.3 K/UL (ref 1–4.8)
LYMPHOCYTES NFR BLD: 18.3 % (ref 22–41)
MCH RBC QN AUTO: 31.1 PG (ref 27–33)
MCHC RBC AUTO-ENTMCNC: 33.9 G/DL (ref 33.7–35.3)
MCV RBC AUTO: 91.8 FL (ref 81.4–97.8)
MONOCYTES # BLD AUTO: 0.95 K/UL (ref 0–0.85)
MONOCYTES NFR BLD AUTO: 13.3 % (ref 0–13.4)
NEUTROPHILS # BLD AUTO: 4.7 K/UL (ref 1.82–7.42)
NEUTROPHILS NFR BLD: 66 % (ref 44–72)
NRBC # BLD AUTO: 0 K/UL
NRBC BLD-RTO: 0 /100 WBC
PLATELET # BLD AUTO: 236 K/UL (ref 164–446)
PMV BLD AUTO: 10 FL (ref 9–12.9)
RBC # BLD AUTO: 5.11 M/UL (ref 4.7–6.1)
WBC # BLD AUTO: 7.1 K/UL (ref 4.8–10.8)

## 2020-08-07 PROCEDURE — 72110 X-RAY EXAM L-2 SPINE 4/>VWS: CPT

## 2020-08-07 PROCEDURE — 99215 OFFICE O/P EST HI 40 MIN: CPT | Performed by: PHYSICAL MEDICINE & REHABILITATION

## 2020-08-07 PROCEDURE — 85025 COMPLETE CBC W/AUTO DIFF WBC: CPT

## 2020-08-07 PROCEDURE — 36415 COLL VENOUS BLD VENIPUNCTURE: CPT

## 2020-08-07 ASSESSMENT — PATIENT HEALTH QUESTIONNAIRE - PHQ9: CLINICAL INTERPRETATION OF PHQ2 SCORE: 0

## 2020-08-07 ASSESSMENT — PAIN SCALES - GENERAL: PAINLEVEL: 3=SLIGHT PAIN

## 2020-08-07 ASSESSMENT — FIBROSIS 4 INDEX: FIB4 SCORE: 1.44

## 2020-08-07 NOTE — PROGRESS NOTES
Follow-up patient note, patient previously seen by Dr. Porter     Physiatry (physical medicine and  Rehabilitation), interventional spine and sports medicine    Date of Service: 08/07/2020    Chief complaint:   Chief Complaint   Patient presents with   • Follow-Up     Lower back pain       HISTORY    HPI: Curt Blair 69 y.o. male who presents today for follow-up evaluation of knee pain.      The right knee is nonpainful.  No cracking on the left, right knee cracks some.  Going up and down stairs is no trouble.  Injection seems to have helped.  Going up and down stairs is now nonpainful on the left.  No new complaints regarding knee pain.    He had a cramp in his low back that started last week.  He has had some relief after bowel movement.  This was typically in the morning. He does report that he has had a history of diverticulitis last year, which did seem to have some low back pain symptoms, he is wondering if this is related or is related to lumbar spine pathology.  No paresthesias or radicular symptoms.    He does not report any systemic symptoms and denies blood in stool.    Medical records review:  I reviewed the note from the referring provider Cheryl Chopra M.D. dated 02/07/2020 for unrelated conditions.    Reviewed records from Dr Clovis Alicea 07/2013 At that visit, he assessed that MRI of left knee was indicated.  On 07/30/2013, they reviewed the study and the patient had a left knee corticosteroid injection    Previous treatments:    Physical Therapy: No    Medications the patient is tried: NSAIDs    Previous interventions: none, recently     Previous surgeries to relieve the above pain:  none      ROS: Unchanged from 07/02/2020, except as noted in the HPI  Eyes: cataract surgery, recently  CV: history of MI at 41  Red Flags ROS:   Fever, Chills, Sweats: Denies  Involuntary Weight Loss: Denies  Bladder Incontinence: Denies  Bowel Incontinence: Denies  Saddle Anesthesia: Denies    All other  systems reviewed and negative.       PMHx:   Past Medical History:   Diagnosis Date   • Arteriosclerotic vascular disease 1/25/2019   • Arthritis     Right Foot   • CAD (coronary artery disease)    • Generalized anxiety disorder 10/11/2017   • Hyperlipidemia    • Hypertension    • Inflamed sebaceous cyst 2/15/2018   • Intrinsic eczema 2/7/2020   • Prediabetes 7/19/2019   • S/P coronary artery stent placement     2 stents       PSHx:   Past Surgical History:   Procedure Laterality Date   • APPENDECTOMY     • CHOLECYSTECTOMY     • ENDARTERECTOMY      corornary   • STENT PLACEMENT         Family history   Family History   Problem Relation Age of Onset   • Lung Disease Mother         copd   • Hypertension Mother    • Heart Disease Father         heart attack and heart disease   • Cancer Brother         neck   • Heart Disease Paternal Grandfather         Heart attack   • Other Sister         non-malignant brain tumor   • Diabetes Neg Hx          Medications:   Current Outpatient Medications   Medication   • diclofenac DR (VOLTAREN) 75 MG Tablet Delayed Response   • rosuvastatin (CRESTOR) 20 MG Tab   • amlodipine-benazepril (LOTREL) 5-10 MG per capsule   • nitroglycerin (NITROSTAT) 0.4 MG SL Tab   • Multiple Vitamins-Minerals (MULTIVITAMIN PO)   • Coenzyme Q10 (CO Q 10 PO)   • MEGARED OMEGA-3 KRILL OIL PO   • CALCIUM PO   • aspirin 81 MG tablet     No current facility-administered medications for this visit.        Allergies:   No Known Allergies    Social Hx:   Social History     Socioeconomic History   • Marital status:      Spouse name: Not on file   • Number of children: Not on file   • Years of education: Not on file   • Highest education level: Not on file   Occupational History   • Not on file   Social Needs   • Financial resource strain: Not on file   • Food insecurity     Worry: Not on file     Inability: Not on file   • Transportation needs     Medical: Not on file     Non-medical: Not on file   Tobacco  Use   • Smoking status: Former Smoker     Packs/day: 2.00     Years: 25.00     Pack years: 50.00     Types: Cigarettes     Quit date: 1992     Years since quittin.6   • Smokeless tobacco: Never Used   • Tobacco comment: avoid all tobacco products   Substance and Sexual Activity   • Alcohol use: Not Currently     Alcohol/week: 0.6 oz     Types: 1 Standard drinks or equivalent per week     Frequency: 2-4 times a month   • Drug use: No   • Sexual activity: Not Currently     Partners: Female     Comment: , 3 children   Lifestyle   • Physical activity     Days per week: Not on file     Minutes per session: Not on file   • Stress: Not on file   Relationships   • Social connections     Talks on phone: Not on file     Gets together: Not on file     Attends Christian service: Not on file     Active member of club or organization: Not on file     Attends meetings of clubs or organizations: Not on file     Relationship status: Not on file   • Intimate partner violence     Fear of current or ex partner: Not on file     Emotionally abused: Not on file     Physically abused: Not on file     Forced sexual activity: Not on file   Other Topics Concern   •  Service Yes   • Blood Transfusions No   • Caffeine Concern No   • Occupational Exposure No   • Hobby Hazards No   • Sleep Concern Yes   • Stress Concern Yes   • Weight Concern Yes   • Special Diet No     Comment: no fried   • Back Care Yes   • Exercise Yes   • Bike Helmet No     Comment: does not ride bike   • Seat Belt Not Asked   • Self-Exams Yes   Social History Narrative   • Not on file         EXAMINATION     Physical Exam:   Vitals: /62 (BP Location: Left arm, Patient Position: Sitting, BP Cuff Size: Large adult long)   Pulse 87   Temp 36.4 °C (97.6 °F) (Temporal)   Ht 1.829 m (6')   Wt 116.6 kg (257 lb 0.9 oz)   SpO2 93%     Constitutional:   Body Habitus: Body mass index is 34.86 kg/m².  Cooperation: Fully cooperates with  exam  Appearance: Well-groomed, well-nourished, not disheveled, in no acute distress    Eyes: No scleral icterus, no proptosis     ENT -no obvious auditory deficits, wearing a face mask    Skin -no rashes or lesions noted     Respiratory-  breathing comfortable on room air, no audible wheezing    Cardiovascular- No lower extremity edema is noted.     Psychiatric- alert and oriented ×3. Normal affect.     Gait - normal gait, no use of ambulatory device, minimally antalgic.     Musculoskeletal -   Thoracic/Lumbar Spine/Sacral Spine/Hips   Inspection: No evidence of atrophy in bilateral lower extremities throughout     KNEES  Left knee  Mild-moderate effusion on the left knee. No warmth or erythema    Lumbar spine ROM is within functional limits.  Increased pain with extension and quadrant loading on the right    No focal motor or sensory deficits in the lower extremities bilaterally    Reflexes are 2+ patella and achilles bilaterally    Seated SLR is negative bilaterally    MEDICAL DECISION MAKING    Medical records review: see under HPI section.     DATA    Labs:   Lab Results   Component Value Date/Time    SODIUM 138 09/19/2019 06:45 AM    POTASSIUM 4.7 09/19/2019 06:45 AM    CHLORIDE 104 09/19/2019 06:45 AM    CO2 27 09/19/2019 06:45 AM    ANION 7.0 09/19/2019 06:45 AM    GLUCOSE 91 09/19/2019 06:45 AM    BUN 21 09/19/2019 06:45 AM    CREATININE 1.10 09/19/2019 06:45 AM    CREATININE 0.93 02/20/2013 07:20 AM    CALCIUM 8.7 09/19/2019 06:45 AM    ASTSGOT 24 09/19/2019 06:45 AM    ALTSGPT 27 09/19/2019 06:45 AM    TBILIRUBIN 0.6 09/19/2019 06:45 AM    ALBUMIN 3.7 09/19/2019 06:45 AM    TOTPROTEIN 6.7 09/19/2019 06:45 AM    GLOBULIN 3.0 09/19/2019 06:45 AM    AGRATIO 1.2 09/19/2019 06:45 AM       Lab Results   Component Value Date/Time    PROTHROMBTM 14.8 (H) 12/05/2018 07:00 AM    INR 1.17 (H) 12/05/2018 07:00 AM        Lab Results   Component Value Date/Time    WBC 7.1 08/07/2020 11:10 AM    RBC 5.11 08/07/2020  11:10 AM    HEMOGLOBIN 15.9 08/07/2020 11:10 AM    HEMATOCRIT 46.9 08/07/2020 11:10 AM    MCV 91.8 08/07/2020 11:10 AM    MCH 31.1 08/07/2020 11:10 AM    MCHC 33.9 08/07/2020 11:10 AM    MPV 10.0 08/07/2020 11:10 AM    NEUTSPOLYS 66.00 08/07/2020 11:10 AM    LYMPHOCYTES 18.30 (L) 08/07/2020 11:10 AM    MONOCYTES 13.30 08/07/2020 11:10 AM    EOSINOPHILS 1.30 08/07/2020 11:10 AM    BASOPHILS 0.70 08/07/2020 11:10 AM        Lab Results   Component Value Date/Time    HBA1C 5.8 (H) 09/19/2019 06:45 AM        Imaging: I personally reviewed following images, these are my reads    Xray lumbar spine 10/04/2018  There is severe degenerative change in the lumbar spine; there is note of multilevel retrolisthesis at L1-2, L3-4 and L2-3  Mild movement of anterolisthesis at L4-5 with flexion    MRI left knee 07/10/2020  There is note of a small joint effusion.  Cyst posterior to the PCL.  No ligamentous or meniscal tears  Tricompartmental degenerative changes, greatest medial compartment    Xray hip right 10/04/2018  Moderate hip osteoarthritis noted bilaterally     MRI left knee 07/22/2013  There is note of moderate joint effusion.  The medial collateral ligament has mild edema.  There is note of osteophytes in the medial and lateral compartments.  Tendinopathy in the quadriceps tendon noted.    IMAGING radiology reads. I reviewed the following radiology reads   Xray lumbar spine 10/04/2018  IMPRESSION:        Mild retrolisthesis of L1-2, L2-3 and L3-4 that are worse with extension.     Mild anterolisthesis of L4-5 that is worse with flexion.     There is partial disc space fusion of the lower thoracic spine.    MRI left knee 07/10/2020  IMPRESSION:     1.  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     2.  Intact ligaments and menisci.     3.  Small joint effusion.     4.  [place] synovial or ganglion cyst immediately posterior to the posterior cruciate  ligament.      MRI left knee 07/22/2013       Impression        1. Tendinopathy of the quadriceps tendon.     2. Medial collateral ligament sprain.     3. Osteoarthritis, most severe in the medial compartment.     4. Fraying of the inner rim of the bodies of the lateral and medial menisci.     5. Moderate joint effusion.      Xray hips 10/04/2018  Impression:  No acute osseous abnormality                         Results for orders placed during the hospital encounter of 10/04/18   DX-LUMBAR SPINE-4+ VIEWS    Impression Mild retrolisthesis of L1-2, L2-3 and L3-4 that are worse with extension.    Mild anterolisthesis of L4-5 that is worse with flexion.    There is partial disc space fusion of the lower thoracic spine.                                         Diagnosis   Visit Diagnoses     ICD-10-CM   1. Primary osteoarthritis of left knee  M17.12   2. Left knee pain, unspecified chronicity  M25.562   3. Cyst of left knee joint  M25.862   4. Lumbar spondylosis  M47.816   5. Spondylolisthesis of lumbar region  M43.16   6. H/O diverticulitis of colon  Z87.19           ASSESSMENT:  Curt Blair 69 y.o. male seen for above     Curt was seen today for follow-up.    Diagnoses and all orders for this visit:    Primary osteoarthritis of left knee    Left knee pain, unspecified chronicity    Cyst of left knee joint    Lumbar spondylosis  -     DX-LUMBAR SPINE-4+ VIEWS; Future    Spondylolisthesis of lumbar region  -     DX-LUMBAR SPINE-4+ VIEWS; Future    H/O diverticulitis of colon  -     CBC WITH DIFFERENTIAL; Future           1. He has done well after the left knee intra-articular steroid injection.  2. Discussed getting repeat lumbar spine films.    3. Check CBC and encouraged him to follow-up with Dr. Chopra regarding concerns about diverticulosis.      Follow-up: Return in about 4 weeks (around 9/4/2020).    Thank you very much for asking me to participate in Curt Blair's care.  Please contact me with  any questions or concerns.      Please note that this dictation was created using voice recognition software. I have made every reasonable attempt to correct obvious errors but there may be errors of grammar and content that I may have overlooked prior to finalization of this note.      Sav Rick MD  Physical Medicine and Rehabilitation  Interventional Spine and Sports Physiatry  [facility]

## 2020-08-13 ENCOUNTER — PATIENT MESSAGE (OUTPATIENT)
Dept: CARDIOLOGY | Facility: MEDICAL CENTER | Age: 70
End: 2020-08-13

## 2020-08-14 ENCOUNTER — HOSPITAL ENCOUNTER (OUTPATIENT)
Dept: LAB | Facility: MEDICAL CENTER | Age: 70
End: 2020-08-14
Attending: INTERNAL MEDICINE
Payer: MEDICARE

## 2020-08-14 DIAGNOSIS — E78.5 DYSLIPIDEMIA: ICD-10-CM

## 2020-08-14 LAB
CHOLEST SERPL-MCNC: 137 MG/DL (ref 100–199)
CK SERPL-CCNC: 229 U/L (ref 0–154)
FASTING STATUS PATIENT QL REPORTED: NORMAL
HDLC SERPL-MCNC: 73 MG/DL
LDLC SERPL CALC-MCNC: 50 MG/DL
TRIGL SERPL-MCNC: 72 MG/DL (ref 0–149)

## 2020-08-14 PROCEDURE — 82550 ASSAY OF CK (CPK): CPT

## 2020-08-14 PROCEDURE — 80061 LIPID PANEL: CPT

## 2020-08-14 PROCEDURE — 36415 COLL VENOUS BLD VENIPUNCTURE: CPT

## 2020-08-16 ENCOUNTER — PATIENT MESSAGE (OUTPATIENT)
Dept: MEDICAL GROUP | Facility: PHYSICIAN GROUP | Age: 70
End: 2020-08-16

## 2020-08-16 DIAGNOSIS — E78.5 DYSLIPIDEMIA: ICD-10-CM

## 2020-08-16 DIAGNOSIS — R74.8 ELEVATED CPK: Chronic | ICD-10-CM

## 2020-08-17 ENCOUNTER — PATIENT MESSAGE (OUTPATIENT)
Dept: MEDICAL GROUP | Facility: PHYSICIAN GROUP | Age: 70
End: 2020-08-17

## 2020-08-17 RX ORDER — ATORVASTATIN CALCIUM 10 MG/1
10 TABLET, FILM COATED ORAL DAILY
Qty: 90 TAB | Refills: 3 | Status: SHIPPED | OUTPATIENT
Start: 2020-08-17 | End: 2021-05-13

## 2020-08-17 NOTE — TELEPHONE ENCOUNTER
From: Curt Blair  To: Cheryl Chopra M.D.  Sent: 8/17/2020 10:00 AM PDT  Subject: Non-Urgent Medical Question    thanks my blood work wasnt all that bad to begin with. since my visit with Dr. Otoole I have lost 10 pounds which should help as well      ----- Message -----   From:Cheryl Chopra M.D.   Sent:8/17/2020 9:53 AM PDT   To:Curt Blair   Subject:RE: Non-Urgent Medical Question    Tereso Elias  I looked at your blood work. I can put you back on the Lipitor which is better than nothing. I will have you do a lipid panel before you come see me in October.  Sending Rx now  Take care, Dr. Luna    I also placed the lab orders so the be available to you prior to your coming to see me.      ----- Message -----   From:Curt Blair   Sent:8/16/2020 1:20 PM PDT   To:Cheryl Chopra M.D.   Subject:Non-Urgent Medical Question    Hi Dr. Luna please take a look at my latest blood work ordered by Dr. Otoole. He put me on crestor 20ml. and like the last time he put me on a higher dose I stared having problems with my legs I have not heard from him but I will not take any more of the drug. I want to go back to lipitor 10ml. If you need any more blood work for my next visit oct.1 please let me know thanks Curt dwyer I will be bringing Luann down for her well baby checkup lol on sept. 8th

## 2020-08-20 NOTE — PROGRESS NOTES
He can certainly go back on the 10 mg of atorvastatin if that is the best option, we don't want him to have regular muscle cramps on either of the medications.      He could also explore the PCSK9 inhibitors as a monthly injection as an alternative option they do not have the concerns with the muscle but unfortunately are quite expensive for most patients.    Please call him and let him know    Thank you

## 2020-09-04 ENCOUNTER — OFFICE VISIT (OUTPATIENT)
Dept: PHYSICAL MEDICINE AND REHAB | Facility: MEDICAL CENTER | Age: 70
End: 2020-09-04
Payer: MEDICARE

## 2020-09-04 VITALS
SYSTOLIC BLOOD PRESSURE: 124 MMHG | BODY MASS INDEX: 34.7 KG/M2 | HEART RATE: 87 BPM | TEMPERATURE: 98 F | WEIGHT: 256.17 LBS | OXYGEN SATURATION: 95 % | HEIGHT: 72 IN | DIASTOLIC BLOOD PRESSURE: 60 MMHG

## 2020-09-04 DIAGNOSIS — M17.12 PRIMARY OSTEOARTHRITIS OF LEFT KNEE: ICD-10-CM

## 2020-09-04 DIAGNOSIS — M47.816 LUMBAR SPONDYLOSIS: ICD-10-CM

## 2020-09-04 DIAGNOSIS — M17.0 PRIMARY OSTEOARTHRITIS OF BOTH KNEES: ICD-10-CM

## 2020-09-04 DIAGNOSIS — M25.562 LEFT KNEE PAIN, UNSPECIFIED CHRONICITY: ICD-10-CM

## 2020-09-04 DIAGNOSIS — M43.16 SPONDYLOLISTHESIS OF LUMBAR REGION: ICD-10-CM

## 2020-09-04 PROCEDURE — 99214 OFFICE O/P EST MOD 30 MIN: CPT | Performed by: PHYSICAL MEDICINE & REHABILITATION

## 2020-09-04 ASSESSMENT — FIBROSIS 4 INDEX: FIB4 SCORE: 1.37

## 2020-09-04 ASSESSMENT — PATIENT HEALTH QUESTIONNAIRE - PHQ9: CLINICAL INTERPRETATION OF PHQ2 SCORE: 0

## 2020-09-04 ASSESSMENT — PAIN SCALES - GENERAL: PAINLEVEL: 6=MODERATE PAIN

## 2020-09-04 NOTE — PROGRESS NOTES
Follow-up patient note, patient previously seen by Dr. Porter     Physiatry (physical medicine and  Rehabilitation), interventional spine and sports medicine    Date of Service: 09/04/2020     Chief complaint:   Chief Complaint   Patient presents with   • Follow-Up     Knee pain       HISTORY    HPI: Curt Blair 69 y.o. male who presents today for follow-up evaluation of knee pain.      Since his last visit, he reports that he started having more cramping on his low back and legs.  This seemed to start after making a change in his lipid meds, being changed to Crestor.  He reports that he had an elevation in his CPK to 229 on 08/14/2020.  Dr. Chopra agreed to resume lipitor.  He has been been working on weight loss.  His sleep dysfunction was worse with the Crestor as well.    Since going back off of the Crestor, he has had resolution of his low back pain.  No pain now.  Leg cramps are gone, too.    His left knee pain is better during the day and is not bothering him during daily activties.  This is better since injections.  He does seem to have pain that wakes him up at night around 2-3 AM.  Heat and aspercreme have not helped the left knee. He takes his diclofenac 75mg one in the morning and one at night.  Use of a neoprene sleeve at night did seem to help initially, but he is having a hard time tolerating it.    His right knee is better and is not bothering him with his daily activities.      Medical records review:  I reviewed the note from the referring provider Cheryl Chopra M.D. dated 02/07/2020 for unrelated conditions.    Reviewed records from Dr Clovis Alicea 07/2013 At that visit, he assessed that MRI of left knee was indicated.  On 07/30/2013, they reviewed the study and the patient had a left knee corticosteroid injection    Previous treatments:    Physical Therapy: No    Medications the patient is tried: NSAIDs    Previous interventions: none, recently     Previous surgeries to relieve  the above pain:  none      ROS: Unchanged from 08/07/2020, except as noted in the HPI  Eyes: cataract surgery, recently  CV: history of MI at 41  Red Flags ROS:   Fever, Chills, Sweats: Denies  Involuntary Weight Loss: Denies  Bladder Incontinence: Denies  Bowel Incontinence: Denies  Saddle Anesthesia: Denies    All other systems reviewed and negative.       PMHx:   Past Medical History:   Diagnosis Date   • Arteriosclerotic vascular disease 1/25/2019   • Arthritis     Right Foot   • CAD (coronary artery disease)    • Elevated CPK 8/16/2020   • Generalized anxiety disorder 10/11/2017   • Hyperlipidemia    • Hypertension    • Inflamed sebaceous cyst 2/15/2018   • Intrinsic eczema 2/7/2020   • Prediabetes 7/19/2019   • S/P coronary artery stent placement     2 stents       PSHx:   Past Surgical History:   Procedure Laterality Date   • APPENDECTOMY     • CHOLECYSTECTOMY     • ENDARTERECTOMY      corornary   • STENT PLACEMENT         Family history   Family History   Problem Relation Age of Onset   • Lung Disease Mother         copd   • Hypertension Mother    • Heart Disease Father         heart attack and heart disease   • Cancer Brother         neck   • Heart Disease Paternal Grandfather         Heart attack   • Other Sister         non-malignant brain tumor   • Diabetes Neg Hx          Medications:   Current Outpatient Medications   Medication   • atorvastatin (LIPITOR) 10 MG Tab   • diclofenac DR (VOLTAREN) 75 MG Tablet Delayed Response   • amlodipine-benazepril (LOTREL) 5-10 MG per capsule   • nitroglycerin (NITROSTAT) 0.4 MG SL Tab   • Multiple Vitamins-Minerals (MULTIVITAMIN PO)   • Coenzyme Q10 (CO Q 10 PO)   • MEGARED OMEGA-3 KRILL OIL PO   • CALCIUM PO   • aspirin 81 MG tablet     No current facility-administered medications for this visit.        Allergies:   Allergies   Allergen Reactions   • Atorvastatin Myalgia     Ok to take 10 mg  Elevated CPK  Only at 40 Mg does this happen.   • Rosuvastatin      muscle  cramps elevated CPK       Social Hx:   Social History     Socioeconomic History   • Marital status:      Spouse name: Not on file   • Number of children: Not on file   • Years of education: Not on file   • Highest education level: Not on file   Occupational History   • Not on file   Social Needs   • Financial resource strain: Not on file   • Food insecurity     Worry: Not on file     Inability: Not on file   • Transportation needs     Medical: Not on file     Non-medical: Not on file   Tobacco Use   • Smoking status: Former Smoker     Packs/day: 2.00     Years: 25.00     Pack years: 50.00     Types: Cigarettes     Quit date: 1992     Years since quittin.6   • Smokeless tobacco: Never Used   • Tobacco comment: avoid all tobacco products   Substance and Sexual Activity   • Alcohol use: Not Currently     Alcohol/week: 0.6 oz     Types: 1 Standard drinks or equivalent per week     Frequency: 2-4 times a month   • Drug use: No   • Sexual activity: Not Currently     Partners: Female     Comment: , 3 children   Lifestyle   • Physical activity     Days per week: Not on file     Minutes per session: Not on file   • Stress: Not on file   Relationships   • Social connections     Talks on phone: Not on file     Gets together: Not on file     Attends [employer] service: Not on file     Active member of club or organization: Not on file     Attends meetings of clubs or organizations: Not on file     Relationship status: Not on file   • Intimate partner violence     Fear of current or ex partner: Not on file     Emotionally abused: Not on file     Physically abused: Not on file     Forced sexual activity: Not on file   Other Topics Concern   •  Service Yes   • Blood Transfusions No   • Caffeine Concern No   • Occupational Exposure No   • Hobby Hazards No   • Sleep Concern Yes   • Stress Concern Yes   • Weight Concern Yes   • Special Diet No     Comment: no fried   • Back Care Yes   • Exercise Yes   •  Bike Helmet No     Comment: does not ride bike   • Seat Belt Not Asked   • Self-Exams Yes   Social History Narrative   • Not on file         EXAMINATION     Physical Exam:   Vitals: /60 (BP Location: Right arm, Patient Position: Sitting, BP Cuff Size: Large adult long)   Pulse 87   Temp 36.7 °C (98 °F) (Temporal)   Ht 1.829 m (6')   Wt 116.2 kg (256 lb 2.8 oz)   SpO2 95%     Constitutional:   Body Habitus: Body mass index is 34.74 kg/m².  Cooperation: Fully cooperates with exam  Appearance: Well-groomed, well-nourished, not disheveled, in no acute distress    Eyes: No scleral icterus, no proptosis     ENT -no obvious auditory deficits, wearing a face mask    Skin -no rashes or lesions noted, no warmth or erythema of the left knee    Respiratory-  breathing comfortable on room air, no audible wheezing    Cardiovascular- No lower extremity edema is noted.     Psychiatric- alert and oriented ×3. Normal affect.     Gait - normal gait, no use of ambulatory device, minimally antalgic.     Musculoskeletal -   Thoracic/Lumbar Spine/Sacral Spine/Hips   Inspection: No evidence of atrophy in bilateral lower extremities throughout     KNEES  Left knee  Mild effusion on the left knee. No warmth or erythema.  No medial or lateral joint line tenderness.    No focal motor or sensory deficits in the lower extremities bilaterally    Reflexes are 2+ patella and achilles bilaterally    Seated SLR is negative bilaterally    MEDICAL DECISION MAKING    Medical records review: see under HPI section.     DATA    Labs:   Lab Results   Component Value Date/Time    SODIUM 138 09/19/2019 06:45 AM    POTASSIUM 4.7 09/19/2019 06:45 AM    CHLORIDE 104 09/19/2019 06:45 AM    CO2 27 09/19/2019 06:45 AM    ANION 7.0 09/19/2019 06:45 AM    GLUCOSE 91 09/19/2019 06:45 AM    BUN 21 09/19/2019 06:45 AM    CREATININE 1.10 09/19/2019 06:45 AM    CREATININE 0.93 02/20/2013 07:20 AM    CALCIUM 8.7 09/19/2019 06:45 AM    ASTSGOT 24 09/19/2019 06:45  AM    ALTSGPT 27 09/19/2019 06:45 AM    TBILIRUBIN 0.6 09/19/2019 06:45 AM    ALBUMIN 3.7 09/19/2019 06:45 AM    TOTPROTEIN 6.7 09/19/2019 06:45 AM    GLOBULIN 3.0 09/19/2019 06:45 AM    AGRATIO 1.2 09/19/2019 06:45 AM       Lab Results   Component Value Date/Time    PROTHROMBTM 14.8 (H) 12/05/2018 07:00 AM    INR 1.17 (H) 12/05/2018 07:00 AM        Lab Results   Component Value Date/Time    WBC 7.1 08/07/2020 11:10 AM    RBC 5.11 08/07/2020 11:10 AM    HEMOGLOBIN 15.9 08/07/2020 11:10 AM    HEMATOCRIT 46.9 08/07/2020 11:10 AM    MCV 91.8 08/07/2020 11:10 AM    MCH 31.1 08/07/2020 11:10 AM    MCHC 33.9 08/07/2020 11:10 AM    MPV 10.0 08/07/2020 11:10 AM    NEUTSPOLYS 66.00 08/07/2020 11:10 AM    LYMPHOCYTES 18.30 (L) 08/07/2020 11:10 AM    MONOCYTES 13.30 08/07/2020 11:10 AM    EOSINOPHILS 1.30 08/07/2020 11:10 AM    BASOPHILS 0.70 08/07/2020 11:10 AM        Lab Results   Component Value Date/Time    HBA1C 5.8 (H) 09/19/2019 06:45 AM        Imaging: I personally reviewed following images, these are my reads  Xray lumbar spine 08/07/2020  There is multilevel retrolisthesis at L1-2, L2-3, and L3-4  Grade I anterolisthesis at L4-5, stable on flexion and extension  Degenerative changes with multilevel facet arthropathy    Xray lumbar spine 10/04/2018  There is severe degenerative change in the lumbar spine; there is note of multilevel retrolisthesis at L1-2, L3-4 and L2-3  Mild movement of anterolisthesis at L4-5 with flexion    MRI left knee 07/10/2020  There is note of a small joint effusion.  Cyst posterior to the PCL.  No ligamentous or meniscal tears  Tricompartmental degenerative changes, greatest medial compartment    Xray hip right 10/04/2018  Moderate hip osteoarthritis noted bilaterally     MRI left knee 07/22/2013  There is note of moderate joint effusion.  The medial collateral ligament has mild edema.  There is note of osteophytes in the medial and lateral compartments.  Tendinopathy in the quadriceps  tendon noted.    IMAGING radiology reads. I reviewed the following radiology reads     Xray lumbar spine 08/07/2020  IMPRESSION:     1.  Multilevel degenerative disc disease and facet degeneration.     2.  Mild anterior subluxation at L4-5. This is stable with flexion and extension.     3.  Again seen multilevel degenerative retrolisthesis. This does not change significantly extension.    Xray lumbar spine 10/04/2018  IMPRESSION:        Mild retrolisthesis of L1-2, L2-3 and L3-4 that are worse with extension.     Mild anterolisthesis of L4-5 that is worse with flexion.     There is partial disc space fusion of the lower thoracic spine.    MRI left knee 07/10/2020  IMPRESSION:     1.  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     2.  Intact ligaments and menisci.     3.  Small joint effusion.     4.  [place] synovial or ganglion cyst immediately posterior to the posterior cruciate ligament.      MRI left knee 07/22/2013       Impression        1. Tendinopathy of the quadriceps tendon.     2. Medial collateral ligament sprain.     3. Osteoarthritis, most severe in the medial compartment.     4. Fraying of the inner rim of the bodies of the lateral and medial menisci.     5. Moderate joint effusion.      Xray hips 10/04/2018  Impression:  No acute osseous abnormality                         Results for orders placed during the hospital encounter of 10/04/18   DX-LUMBAR SPINE-4+ VIEWS    Impression Mild retrolisthesis of L1-2, L2-3 and L3-4 that are worse with extension.    Mild anterolisthesis of L4-5 that is worse with flexion.    There is partial disc space fusion of the lower thoracic spine.                                         Diagnosis   Visit Diagnoses     ICD-10-CM   1. Primary osteoarthritis of left knee  M17.12   2. Left knee pain, unspecified chronicity  M25.562   3. Spondylolisthesis of lumbar region  M43.16   4. Lumbar spondylosis   M47.816   5. Primary osteoarthritis of both knees  M17.0           ASSESSMENT:  Curt Blair 69 y.o. male seen for above     Curt was seen today for follow-up.    Diagnoses and all orders for this visit:    Primary osteoarthritis of left knee    Left knee pain, unspecified chronicity    Spondylolisthesis of lumbar region    Lumbar spondylosis    Primary osteoarthritis of both knees       1. Lumbar spine findings are essentially stable from previous imaging study.  2. Discussed trial of use of pillow between his knees when he sleeps.  He tends to sleep on his right side.  3. He will try topical diclofenac at night to see if this helps.  During the day, his knee pain is well-controlled and pain is focused at the knee.  He does not want to try other medications at this time.    4. Worsened cramps with change to Crestor, improved after he stopped this.  5. Continue to follow-up with Dr. Chopra for general medical complaints.      Follow-up: Return if symptoms worsen or fail to improve.    Thank you very much for asking me to participate in Curt Blair's care.  Please contact me with any questions or concerns.      Please note that this dictation was created using voice recognition software. I have made every reasonable attempt to correct obvious errors but there may be errors of grammar and content that I may have overlooked prior to finalization of this note.      Sav Rick MD  Physical Medicine and Rehabilitation  Interventional Spine and Sports Physiatry  [facility]

## 2020-09-16 DIAGNOSIS — M17.0 PRIMARY OSTEOARTHRITIS OF BOTH KNEES: ICD-10-CM

## 2020-09-16 NOTE — TELEPHONE ENCOUNTER
Was the patient seen in the last year in this department? Yes    Does patient have an active prescription for medications requested? No      Do they have a refill on file? No     If a controlled substance, is a new  on file? No     Received Request Via: Pharmacy    If pharmacy requests, is the patient still taking the medication or medication discontinued? yes

## 2020-09-23 ENCOUNTER — HOSPITAL ENCOUNTER (OUTPATIENT)
Dept: LAB | Facility: MEDICAL CENTER | Age: 70
End: 2020-09-23
Attending: FAMILY MEDICINE
Payer: MEDICARE

## 2020-09-23 DIAGNOSIS — E78.5 DYSLIPIDEMIA: ICD-10-CM

## 2020-09-23 DIAGNOSIS — R74.8 ELEVATED CPK: Chronic | ICD-10-CM

## 2020-09-23 LAB
ALBUMIN SERPL BCP-MCNC: 4.2 G/DL (ref 3.2–4.9)
ALBUMIN/GLOB SERPL: 1.2 G/DL
ALP SERPL-CCNC: 56 U/L (ref 30–99)
ALT SERPL-CCNC: 34 U/L (ref 2–50)
ANION GAP SERPL CALC-SCNC: 12 MMOL/L (ref 7–16)
AST SERPL-CCNC: 32 U/L (ref 12–45)
BILIRUB SERPL-MCNC: 0.7 MG/DL (ref 0.1–1.5)
BUN SERPL-MCNC: 23 MG/DL (ref 8–22)
CALCIUM SERPL-MCNC: 9.3 MG/DL (ref 8.4–10.2)
CHLORIDE SERPL-SCNC: 100 MMOL/L (ref 96–112)
CHOLEST SERPL-MCNC: 159 MG/DL (ref 100–199)
CK SERPL-CCNC: 201 U/L (ref 0–154)
CO2 SERPL-SCNC: 24 MMOL/L (ref 20–33)
CREAT SERPL-MCNC: 0.84 MG/DL (ref 0.5–1.4)
FASTING STATUS PATIENT QL REPORTED: NORMAL
GLOBULIN SER CALC-MCNC: 3.5 G/DL (ref 1.9–3.5)
GLUCOSE SERPL-MCNC: 107 MG/DL (ref 65–99)
HDLC SERPL-MCNC: 65 MG/DL
LDLC SERPL CALC-MCNC: 69 MG/DL
POTASSIUM SERPL-SCNC: 4.6 MMOL/L (ref 3.6–5.5)
PROT SERPL-MCNC: 7.7 G/DL (ref 6–8.2)
SODIUM SERPL-SCNC: 136 MMOL/L (ref 135–145)
TRIGL SERPL-MCNC: 124 MG/DL (ref 0–149)

## 2020-09-23 PROCEDURE — 80053 COMPREHEN METABOLIC PANEL: CPT

## 2020-09-23 PROCEDURE — 80061 LIPID PANEL: CPT

## 2020-09-23 PROCEDURE — 36415 COLL VENOUS BLD VENIPUNCTURE: CPT

## 2020-09-23 PROCEDURE — 82550 ASSAY OF CK (CPK): CPT

## 2020-09-24 SDOH — ECONOMIC STABILITY: HOUSING INSECURITY: IN THE LAST 12 MONTHS, HOW MANY PLACES HAVE YOU LIVED?: 1

## 2020-09-24 SDOH — ECONOMIC STABILITY: HOUSING INSECURITY
IN THE LAST 12 MONTHS, WAS THERE A TIME WHEN YOU DID NOT HAVE A STEADY PLACE TO SLEEP OR SLEPT IN A SHELTER (INCLUDING NOW)?: NO

## 2020-09-24 SDOH — HEALTH STABILITY: PHYSICAL HEALTH: ON AVERAGE, HOW MANY MINUTES DO YOU ENGAGE IN EXERCISE AT THIS LEVEL?: 0 MINUTES

## 2020-09-24 SDOH — HEALTH STABILITY: PHYSICAL HEALTH: ON AVERAGE, HOW MANY DAYS PER WEEK DO YOU ENGAGE IN MODERATE TO STRENUOUS EXERCISE (LIKE A BRISK WALK)?: 0 DAYS

## 2020-09-24 SDOH — ECONOMIC STABILITY: INCOME INSECURITY: IN THE LAST 12 MONTHS, WAS THERE A TIME WHEN YOU WERE NOT ABLE TO PAY THE MORTGAGE OR RENT ON TIME?: NO

## 2020-09-24 SDOH — ECONOMIC STABILITY: TRANSPORTATION INSECURITY
IN THE PAST 12 MONTHS, HAS THE LACK OF TRANSPORTATION KEPT YOU FROM MEDICAL APPOINTMENTS OR FROM GETTING MEDICATIONS?: NO

## 2020-09-24 SDOH — HEALTH STABILITY: MENTAL HEALTH
STRESS IS WHEN SOMEONE FEELS TENSE, NERVOUS, ANXIOUS, OR CAN'T SLEEP AT NIGHT BECAUSE THEIR MIND IS TROUBLED. HOW STRESSED ARE YOU?: TO SOME EXTENT

## 2020-09-24 SDOH — ECONOMIC STABILITY: TRANSPORTATION INSECURITY
IN THE PAST 12 MONTHS, HAS LACK OF RELIABLE TRANSPORTATION KEPT YOU FROM MEDICAL APPOINTMENTS, MEETINGS, WORK OR FROM GETTING THINGS NEEDED FOR DAILY LIVING?: NO

## 2020-09-24 ASSESSMENT — SOCIAL DETERMINANTS OF HEALTH (SDOH)
HOW OFTEN DO YOU HAVE SIX OR MORE DRINKS ON ONE OCCASION: NEVER
HOW HARD IS IT FOR YOU TO PAY FOR THE VERY BASICS LIKE FOOD, HOUSING, MEDICAL CARE, AND HEATING?: NOT HARD AT ALL
HOW MANY DRINKS CONTAINING ALCOHOL DO YOU HAVE ON A TYPICAL DAY WHEN YOU ARE DRINKING: 1 OR 2
DO YOU BELONG TO ANY CLUBS OR ORGANIZATIONS SUCH AS CHURCH GROUPS UNIONS, FRATERNAL OR ATHLETIC GROUPS, OR SCHOOL GROUPS?: YES
HOW OFTEN DO YOU ATTEND CHURCH OR RELIGIOUS SERVICES?: NEVER
HOW OFTEN DO YOU HAVE A DRINK CONTAINING ALCOHOL: 2-4 TIMES A MONTH
IN A TYPICAL WEEK, HOW MANY TIMES DO YOU TALK ON THE PHONE WITH FAMILY, FRIENDS, OR NEIGHBORS?: MORE THAN THREE TIMES A WEEK
HOW OFTEN DO YOU ATTENT MEETINGS OF THE CLUB OR ORGANIZATION YOU BELONG TO?: 1 TO 4 TIMES PER YEAR
WITHIN THE PAST 12 MONTHS, THE FOOD YOU BOUGHT JUST DIDN'T LAST AND YOU DIDN'T HAVE MONEY TO GET MORE: NEVER TRUE
WITHIN THE PAST 12 MONTHS, YOU WORRIED THAT YOUR FOOD WOULD RUN OUT BEFORE YOU GOT THE MONEY TO BUY MORE: NEVER TRUE
HOW OFTEN DO YOU GET TOGETHER WITH FRIENDS OR RELATIVES?: ONCE A WEEK

## 2020-09-26 ENCOUNTER — OFFICE VISIT (OUTPATIENT)
Dept: URGENT CARE | Facility: PHYSICIAN GROUP | Age: 70
End: 2020-09-26
Payer: MEDICARE

## 2020-09-26 VITALS
HEART RATE: 98 BPM | WEIGHT: 256 LBS | BODY MASS INDEX: 34.67 KG/M2 | TEMPERATURE: 98.9 F | SYSTOLIC BLOOD PRESSURE: 122 MMHG | DIASTOLIC BLOOD PRESSURE: 70 MMHG | OXYGEN SATURATION: 97 % | RESPIRATION RATE: 16 BRPM | HEIGHT: 72 IN

## 2020-09-26 DIAGNOSIS — L20.84 INTRINSIC ECZEMA: ICD-10-CM

## 2020-09-26 PROCEDURE — 99214 OFFICE O/P EST MOD 30 MIN: CPT | Performed by: PHYSICIAN ASSISTANT

## 2020-09-26 RX ORDER — PREDNISONE 10 MG/1
40 TABLET ORAL DAILY
Qty: 20 TAB | Refills: 0 | Status: SHIPPED | OUTPATIENT
Start: 2020-09-26 | End: 2020-10-01

## 2020-09-26 ASSESSMENT — FIBROSIS 4 INDEX: FIB4 SCORE: 1.63

## 2020-09-26 ASSESSMENT — ENCOUNTER SYMPTOMS
MYALGIAS: 0
VOMITING: 0
SHORTNESS OF BREATH: 0
SORE THROAT: 0
NAUSEA: 0
CHILLS: 0
DIARRHEA: 0
HEADACHES: 0
EYE PAIN: 0
FEVER: 0
ABDOMINAL PAIN: 0
COUGH: 0
CONSTIPATION: 0

## 2020-09-26 NOTE — PROGRESS NOTES
Subjective:   Curt Blair is a 69 y.o. male who presents for Rash (all over body, x2 days )      Is a 69-year-old male with a history of intrinsic eczema who presents for a body wide rash which began on his ankles and feet and spread up his legs, became bothersome on his arms, and now is diffuse and mild on his trunk and neck, moderate behind his ears, but diffusely present everywhere.  He has not tried any treatments for this yet.  He describes onset over the last several days.  It is fairly pruritic especially on the ankles.  He is not noticed any fevers or chills, myalgias, body aches.  He has no lesions on the palms or soles or in the mouth.  He had a similar episode around this spring and was treated with topical 0.1% triamcinolone      Review of Systems   Constitutional: Negative for chills and fever.   HENT: Negative for congestion, ear pain and sore throat.    Eyes: Negative for pain.   Respiratory: Negative for cough and shortness of breath.    Cardiovascular: Negative for chest pain.   Gastrointestinal: Negative for abdominal pain, constipation, diarrhea, nausea and vomiting.   Genitourinary: Negative for dysuria.   Musculoskeletal: Negative for myalgias.   Skin: Positive for itching and rash.   Neurological: Negative for headaches.       Medications:    • amlodipine-benazepril  • aspirin  • atorvastatin Tabs  • CALCIUM PO  • CO Q 10 PO  • diclofenac DR Tbec  • Diclofenac Sodium Gel  • MEGARED OMEGA-3 KRILL OIL PO  • MULTIVITAMIN PO  • nitroglycerin Subl  • predniSONE Tabs    Allergies: Atorvastatin and Rosuvastatin    Problem List: Curt Blair has Essential hypertension, benign; Coronary artery disease due to lipid rich plaque; Status post coronary artery stent placement; Dyslipidemia; Obesity (BMI 30-39.9); Generalized anxiety disorder; Arthritis; Arteriosclerotic vascular disease; Prediabetes; Allergies; Intrinsic eczema; and Elevated CPK on their problem list.    Surgical History:  Past  Surgical History:   Procedure Laterality Date   • APPENDECTOMY     • CHOLECYSTECTOMY     • ENDARTERECTOMY      corornary   • STENT PLACEMENT         Past Social Hx: Curt Blair  reports that he quit smoking about 28 years ago. His smoking use included cigarettes. He has a 50.00 pack-year smoking history. He has never used smokeless tobacco. He reports previous alcohol use of about 0.6 oz of alcohol per week. He reports that he does not use drugs.     Past Family Hx:  Curt Blair family history includes Cancer in his brother; Heart Disease in his father and paternal grandfather; Hypertension in his mother; Lung Disease in his mother; Other in his sister.     Problem list, medications, and allergies reviewed by myself today in Epic.     Objective:     /70   Pulse 98   Temp 37.2 °C (98.9 °F) (Temporal)   Resp 16   Ht 1.829 m (6')   Wt 116.1 kg (256 lb)   SpO2 97%   BMI 34.72 kg/m²     Physical Exam  Vitals signs reviewed.   Constitutional:       Appearance: Normal appearance.   HENT:      Head: Normocephalic and atraumatic.      Right Ear: External ear normal.      Left Ear: External ear normal.      Nose: Nose normal.      Mouth/Throat:      Mouth: Mucous membranes are moist.   Eyes:      Conjunctiva/sclera: Conjunctivae normal.   Cardiovascular:      Rate and Rhythm: Normal rate.   Pulmonary:      Effort: Pulmonary effort is normal.   Skin:     General: Skin is warm and dry.      Capillary Refill: Capillary refill takes less than 2 seconds.      Comments: Dry scaling skin with diffuse erythematous rash most apparent on the ankles and legs but diffuse over flexor and extensor surfaces of the extremities.  Mild on the face and neck and truncal areas.  No blistering or bulla.  No oral lesions or palmar lesions.  No splinter hemorrhages or other signs on the nails   Neurological:      Mental Status: He is alert and oriented to person, place, and time.         Assessment/Plan:     Diagnosis  and associated orders:     1. Intrinsic eczema  predniSONE (DELTASONE) 10 MG Tab      Comments/MDM:     • 5 days of prednisone therapy for his diffuse eczema  • Suggested barrier cream such as Vaseline or Aquaphor  • Discontinue use of oral Voltaren while on steroid  • Follow-up with PCP for scheduled appointment this week  • Return if any infectious symptoms  • Avoid itching, consider nonsedating antihistamine during the day  • Reviewed patient's labs and he has adequate kidney function, no dose adjustment necessary, reviewed previous notes and he is tolerated steroids in the past without difficulty           Differential diagnosis, natural history, supportive care, and indications for immediate follow-up discussed.    Advised the patient to follow-up with the primary care physician for recheck, reevaluation, and consideration of further management.    Please note that this dictation was created using voice recognition software. I have made a reasonable attempt to correct obvious errors, but I expect that there are errors of grammar and possibly content that I did not discover before finalizing the note.    This note was electronically signed by Jacques Ramos PA-C

## 2020-10-01 ENCOUNTER — OFFICE VISIT (OUTPATIENT)
Dept: MEDICAL GROUP | Facility: PHYSICIAN GROUP | Age: 70
End: 2020-10-01
Payer: MEDICARE

## 2020-10-01 VITALS
HEIGHT: 72 IN | BODY MASS INDEX: 34.81 KG/M2 | DIASTOLIC BLOOD PRESSURE: 82 MMHG | WEIGHT: 257 LBS | SYSTOLIC BLOOD PRESSURE: 130 MMHG | RESPIRATION RATE: 12 BRPM | HEART RATE: 89 BPM | OXYGEN SATURATION: 96 % | TEMPERATURE: 98.2 F

## 2020-10-01 DIAGNOSIS — I25.10 CORONARY ARTERY DISEASE DUE TO LIPID RICH PLAQUE: ICD-10-CM

## 2020-10-01 DIAGNOSIS — R73.03 PREDIABETES: ICD-10-CM

## 2020-10-01 DIAGNOSIS — I10 ESSENTIAL HYPERTENSION, BENIGN: ICD-10-CM

## 2020-10-01 DIAGNOSIS — I25.83 CORONARY ARTERY DISEASE DUE TO LIPID RICH PLAQUE: ICD-10-CM

## 2020-10-01 DIAGNOSIS — E78.5 DYSLIPIDEMIA: ICD-10-CM

## 2020-10-01 DIAGNOSIS — R74.8 ELEVATED CPK: Chronic | ICD-10-CM

## 2020-10-01 DIAGNOSIS — E66.9 OBESITY (BMI 30-39.9): ICD-10-CM

## 2020-10-01 DIAGNOSIS — F41.1 GENERALIZED ANXIETY DISORDER: ICD-10-CM

## 2020-10-01 DIAGNOSIS — M19.90 ARTHRITIS: ICD-10-CM

## 2020-10-01 PROCEDURE — 99214 OFFICE O/P EST MOD 30 MIN: CPT | Performed by: FAMILY MEDICINE

## 2020-10-01 PROCEDURE — 8041 PR SCP AHA: Performed by: FAMILY MEDICINE

## 2020-10-01 RX ORDER — PREDNISONE 20 MG/1
20 TABLET ORAL DAILY
Qty: 5 TAB | Refills: 0 | Status: SHIPPED | OUTPATIENT
Start: 2020-10-01 | End: 2020-10-06

## 2020-10-01 RX ORDER — ALPRAZOLAM 0.5 MG/1
0.5 TABLET ORAL NIGHTLY PRN
Qty: 90 TAB | Refills: 0 | Status: SHIPPED | OUTPATIENT
Start: 2020-10-01 | End: 2020-12-30

## 2020-10-01 ASSESSMENT — FIBROSIS 4 INDEX: FIB4 SCORE: 1.63

## 2020-10-01 ASSESSMENT — PATIENT HEALTH QUESTIONNAIRE - PHQ9: CLINICAL INTERPRETATION OF PHQ2 SCORE: 0

## 2020-10-01 NOTE — ASSESSMENT & PLAN NOTE
This is a chronic health problem for this patient for which she currently is on atorvastatin 10 mg daily.  He previously had his cardiologist raise his atorvastatin dose and he immediately developed muscle aches and spasms.  When he went and got his blood work done his CPK was elevated at 229 on 8/14/2020.  We just repeated that on 9/23/2020 and is down to 201.  His total cholesterol is 159, triglycerides 124, HDL 65 LDL 69.  He will continue at the 10 mg dose which seems to be working well for him.

## 2020-10-01 NOTE — PROGRESS NOTES
Chief Complaint   Patient presents with   • Annual Exam         HPI:  Curt is a 69 y.o. here for Medicare Annual Wellness Visit    ***    Patient Active Problem List    Diagnosis Date Noted   • Coronary artery disease due to lipid rich plaque 08/07/2012     Priority: High   • Status post coronary artery stent placement 08/07/2012     Priority: High   • Dyslipidemia 01/26/2014     Priority: Medium   • Essential hypertension, benign 01/23/2012     Priority: Medium   • Elevated CPK 08/16/2020   • Intrinsic eczema 02/07/2020   • Allergies 12/24/2019   • Prediabetes 07/19/2019   • Arteriosclerotic vascular disease 01/25/2019   • Arthritis 02/15/2018   • Generalized anxiety disorder 10/11/2017   • Obesity (BMI 30-39.9) 05/08/2017       Current Outpatient Medications   Medication Sig Dispense Refill   • predniSONE (DELTASONE) 10 MG Tab Take 4 Tabs by mouth every day for 5 days. 20 Tab 0   • Diclofenac Sodium 1 % Gel APPLY 4 G TO SKIN AS DIRECTED 4 TIMES A DAY AS NEEDED FOR UP TO 30 DAYS. 100 g 1   • atorvastatin (LIPITOR) 10 MG Tab Take 1 Tab by mouth every day. 90 Tab 3   • diclofenac DR (VOLTAREN) 75 MG Tablet Delayed Response TAKE 1 TABLET BY MOUTH TWICE A  Tab 3   • amlodipine-benazepril (LOTREL) 5-10 MG per capsule TAKE 1 CAPSULE BY MOUTH EVERY  Cap 1   • nitroglycerin (NITROSTAT) 0.4 MG SL Tab Place 1 Tab under tongue as needed for Chest Pain (Place 1 tablet under the tongue every 5 minutes up to 3 doses as needed for chest pain). 25 Tab 5   • Multiple Vitamins-Minerals (MULTIVITAMIN PO) Take 1 Tab by mouth every morning.     • Coenzyme Q10 (CO Q 10 PO) Take 1 Tab by mouth every morning.     • MEGARED OMEGA-3 KRILL OIL PO Take 1 Tab by mouth every morning.     • CALCIUM PO Take 1 Tab by mouth every day.     • aspirin 81 MG tablet Take 81 mg by mouth every day.         No current facility-administered medications for this visit.         {MEDICATION ADHERENCE:52697}  Current supplements as per medication  list.     Allergies: Atorvastatin and Rosuvastatin    Current social contact/activities: *** ***    Is patient current with immunizations? Yes.    He  reports that he quit smoking about 28 years ago. His smoking use included cigarettes. He has a 50.00 pack-year smoking history. He has never used smokeless tobacco. He reports current alcohol use of about 0.6 oz of alcohol per week. He reports that he does not use drugs.  Counseling given: Not Answered  Comment: avoid all tobacco products        DPA/Advanced directive: Patient has Living Will, but it is not on file. Instructed to bring in a copy to scan into their chart.    ROS:    Gait: Uses no assistive device ***  Ostomy: No ***  Other tubes: No ***  Amputations: No ***  Chronic oxygen use No ***  Last eye exam *** ***  Wears hearing aids: {YES / NO:30037} ***  : {Urinary leakage?:82557}  ***    Screening:    ***    Depression Screening    Little interest or pleasure in doing things?  0 - not at all  Feeling down, depressed, or hopeless? 0 - not at all  Patient Health Questionnaire Score: 0    If depressive symptoms identified deferred to follow up visit unless specifically addressed in assessment and plan.    Interpretation of PHQ-9 Total Score   Score Severity   1-4 No Depression   5-9 Mild Depression   10-14 Moderate Depression   15-19 Moderately Severe Depression   20-27 Severe Depression    Screening for Cognitive Impairment    Three Minute Recall (river, jim, finger)  3/3    Edilberto clock face with all 12 numbers and set the hands to show 10 past 11.  Yes    If cognitive concerns identified, deferred for follow up unless specifically addressed in assessment and plan.    Fall Risk Assessment    Has the patient had two or more falls in the last year or any fall with injury in the last year?  No  If fall risk identified, deferred for follow up unless specifically addressed in assessment and plan.    Safety Assessment    Throw rugs on floor.  Yes  Handrails on  all stairs.  Yes  Good lighting in all hallways.  Yes  Difficulty hearing.  Yes  Patient counseled about all safety risks that were identified.    Functional Assessment ADLs    Are there any barriers preventing you from cooking for yourself or meeting nutritional needs?  No.    Are there any barriers preventing you from driving safely or obtaining transportation?  No.    Are there any barriers preventing you from using a telephone or calling for help?  No.    Are there any barriers preventing you from shopping?  No.    Are there any barriers preventing you from taking care of your own finances?  No.    Are there any barriers preventing you from managing your medications?  No.    Are there any barriers preventing you from showering, bathing or dressing yourself?  No.    Are you currently engaging in any exercise or physical activity?  No.     What is your perception of your health?  Good.    Health Maintenance Summary                Annual Wellness Visit Next Due 10/4/2020      Done 10/4/2019 SUBSEQUENT ANNUAL WELLNESS VISIT-INCLUDES PPPS ()     Patient has more history with this topic...    IMM DTaP/Tdap/Td Vaccine Next Due 2024      Done 2014 Imm Admin: Tdap Vaccine    COLONOSCOPY Next Due 3/13/2029      Done 3/13/2019 REFERRAL TO GI FOR COLONOSCOPY     Patient has more history with this topic...          Patient Care Team:  Cheryl Chopra M.D. as PCP - General (Family Medicine)  Iván Otoole M.D. as Consulting Physician (Cardiology)  Irving Patel O.D. as Consulting Physician (Optometry)  Dustin Null, PT, DPT as Physical Therapy (Physical Therapy)  Rodolfo Fernandes M.D. as Consulting Physician (Gastroenterology)    Social History     Tobacco Use   • Smoking status: Former Smoker     Packs/day: 2.00     Years: 25.00     Pack years: 50.00     Types: Cigarettes     Quit date: 1992     Years since quittin.7   • Smokeless tobacco: Never Used   • Tobacco comment: avoid all tobacco  products   Substance Use Topics   • Alcohol use: Yes     Alcohol/week: 0.6 oz     Types: 1 Standard drinks or equivalent per week     Frequency: 2-4 times a month     Drinks per session: 1 or 2     Binge frequency: Never   • Drug use: No     Family History   Problem Relation Age of Onset   • Lung Disease Mother         copd   • Hypertension Mother    • Heart Disease Father         heart attack and heart disease   • Cancer Brother         neck   • Heart Disease Paternal Grandfather         Heart attack   • Other Sister         non-malignant brain tumor   • Diabetes Neg Hx      He  has a past medical history of Arteriosclerotic vascular disease (1/25/2019), Arthritis, CAD (coronary artery disease), Elevated CPK (8/16/2020), Generalized anxiety disorder (10/11/2017), Hyperlipidemia, Hypertension, Inflamed sebaceous cyst (2/15/2018), Intrinsic eczema (2/7/2020), Prediabetes (7/19/2019), and S/P coronary artery stent placement.   Past Surgical History:   Procedure Laterality Date   • APPENDECTOMY     • CHOLECYSTECTOMY     • ENDARTERECTOMY      corornary   • STENT PLACEMENT     • VASECTOMY             Exam:     /82 (BP Location: Left arm, Patient Position: Sitting, BP Cuff Size: Adult)   Pulse 89   Temp 36.8 °C (98.2 °F) (Temporal)   Resp 12   Ht 1.829 m (6')   Wt 116.6 kg (257 lb)   SpO2 96%  Body mass index is 34.86 kg/m².    Hearing {GOOD/FAIR/POOR/EXCELLENT:56336}.    Dentition {DENTITION:74927}  Alert, oriented in no acute distress.  Eye contact is good, speech goal directed, affect calm  ***    Assessment and Plan. The following treatment and monitoring plan is recommended:  ***  No diagnosis found.      Services suggested: { AWV COORDINATION OF SERVICES:01071}  Health Care Screening recommendations as per orders if indicated.  Referrals offered: PT/OT/Nutrition counseling/Behavioral Health/Smoking cessation as per orders if indicated.    Discussion today about general wellness and lifestyle habits:     · Prevent falls and reduce trip hazards; Cautioned about securing or removing rugs.  · Have a working fire alarm and carbon monoxide detector;   · Engage in regular physical activity and social activities       Follow-up: No follow-ups on file.

## 2020-10-01 NOTE — ASSESSMENT & PLAN NOTE
This is a chronic health problem for this patient and he does continue to work on lifestyle management trying to lose weight.

## 2020-10-01 NOTE — PROGRESS NOTES
Annual Health Assessment Questions:    1.  Are you currently engaging in any exercise or physical activity? No    2.  How would you describe your mood or emotional well-being today? good    3.  Have you had any falls in the last year? No    4.  Have you noticed any problems with your balance or had difficulty walking? No    5.  In the last six months have you experienced any leakage of urine? No    6. DPA/Advanced Directive: Patient has Advanced Directive, but it is not on file. Instructed to bring in a copy to scan into their chart.   CC:Diagnoses of Prediabetes, Essential hypertension, benign, Generalized anxiety disorder, Elevated CPK, Dyslipidemia, Coronary artery disease due to lipid rich plaque, and Arthritis were pertinent to this visit.    HISTORY OF PRESENT ILLNESS: Patient is a 69 y.o. male established patient who presents today to have his annual health assessment and to review his pulse 8 form.  Patient also tells me he knows he has bilateral wax in his ears and needs that cleaned out.      Prediabetes  This is a chronic health problem that is well controlled with current medications and lifestyle measures. He did his blood work and his glucose was great at 107.    Essential hypertension, benign  This is a chronic health problem that is well controlled with current medications and lifestyle measures. His BP is excellent at 130/82.    Generalized anxiety disorder  This is a chronic health problem that is well controlled with current medications and lifestyle measures. He is doing well with use of alprazolam 0.5 mg that he utilizes for sleep.  It is time to renew his prescription today.  His  report is appropriate.  Obtained and reviewed patient utilization report from [facility] pharmacy database on 10/1/2020 11:55 AM  prior to writing prescription for controlled substance II, III or IV per [place] bill . Based on assessment of the report,my physical exam if necessary and the patient's health  problem, the prescription is medically necessary.       Elevated CPK  This patient unfortunately had his atorvastatin dose elevated and he developed muscle aches and cramps and had checked his CPK and it was above 250.  I did and new CPK with labs on 9/23/2020 and is down to 201.  I did like to recheck about November 15 which will be 3 months after he dropped his dose back to the current dose and I am hoping his CPK will be back to normal.    Dyslipidemia  This is a chronic health problem for this patient for which she currently is on atorvastatin 10 mg daily.  He previously had his cardiologist raise his atorvastatin dose and he immediately developed muscle aches and spasms.  When he went and got his blood work done his CPK was elevated at 229 on 8/14/2020.  We just repeated that on 9/23/2020 and is down to 201.  His total cholesterol is 159, triglycerides 124, HDL 65 LDL 69.  He will continue at the 10 mg dose which seems to be working well for him.    Coronary artery disease due to lipid rich plaque  We are having him follow secondary prevention with atorvastatin 10 mg daily.    Arthritis  Patient has this in multiple joints particularly his hands and feet.  He is working with Dr. Rick on trying to get his left knee better.      Patient Active Problem List    Diagnosis Date Noted   • Coronary artery disease due to lipid rich plaque 08/07/2012     Priority: High   • Status post coronary artery stent placement 08/07/2012     Priority: High   • Dyslipidemia 01/26/2014     Priority: Medium   • Essential hypertension, benign 01/23/2012     Priority: Medium   • Elevated CPK 08/16/2020   • Intrinsic eczema 02/07/2020   • Allergies 12/24/2019   • Prediabetes 07/19/2019   • Arteriosclerotic vascular disease 01/25/2019   • Arthritis 02/15/2018   • Generalized anxiety disorder 10/11/2017   • Obesity (BMI 30-39.9) 05/08/2017      Allergies:Atorvastatin and Rosuvastatin    Current Outpatient Medications   Medication Sig  Dispense Refill   • predniSONE (DELTASONE) 20 MG Tab Take 1 Tab by mouth every day for 5 days. 5 Tab 0   • ALPRAZolam (XANAX) 0.5 MG Tab Take 1 Tab by mouth at bedtime as needed for Sleep for up to 90 days. 90 Tab 0   • Diclofenac Sodium 1 % Gel APPLY 4 G TO SKIN AS DIRECTED 4 TIMES A DAY AS NEEDED FOR UP TO 30 DAYS. 100 g 1   • atorvastatin (LIPITOR) 10 MG Tab Take 1 Tab by mouth every day. 90 Tab 3   • diclofenac DR (VOLTAREN) 75 MG Tablet Delayed Response TAKE 1 TABLET BY MOUTH TWICE A  Tab 3   • amlodipine-benazepril (LOTREL) 5-10 MG per capsule TAKE 1 CAPSULE BY MOUTH EVERY  Cap 1   • nitroglycerin (NITROSTAT) 0.4 MG SL Tab Place 1 Tab under tongue as needed for Chest Pain (Place 1 tablet under the tongue every 5 minutes up to 3 doses as needed for chest pain). 25 Tab 5   • Multiple Vitamins-Minerals (MULTIVITAMIN PO) Take 1 Tab by mouth every morning.     • Coenzyme Q10 (CO Q 10 PO) Take 1 Tab by mouth every morning.     • MEGARED OMEGA-3 KRILL OIL PO Take 1 Tab by mouth every morning.     • CALCIUM PO Take 1 Tab by mouth every day.     • aspirin 81 MG tablet Take 81 mg by mouth every day.         No current facility-administered medications for this visit.        Social History     Tobacco Use   • Smoking status: Former Smoker     Packs/day: 2.00     Years: 25.00     Pack years: 50.00     Types: Cigarettes     Quit date: 1992     Years since quittin.7   • Smokeless tobacco: Never Used   • Tobacco comment: avoid all tobacco products   Substance Use Topics   • Alcohol use: Yes     Alcohol/week: 0.6 oz     Types: 1 Standard drinks or equivalent per week     Frequency: 2-4 times a month     Drinks per session: 1 or 2     Binge frequency: Never   • Drug use: No     Social History     Social History Narrative   • Not on file       Family History   Problem Relation Age of Onset   • Lung Disease Mother         copd   • Hypertension Mother    • Heart Disease Father         heart attack and heart  disease   • Cancer Brother         neck   • Heart Disease Paternal Grandfather         Heart attack   • Other Sister         non-malignant brain tumor   • Diabetes Neg Hx         ROS:     - Constitutional:  Negative for fever, chills, unexpected weight change, and fatigue/generalized weakness.    - HEENT:  Negative for headaches, vision changes, hearing changes, ear pain, ear discharge, rhinorrhea, sinus congestion, sore throat, and neck pain.      - Respiratory: Negative for cough, sputum production, chest congestion, dyspnea, wheezing, and crackles.      - Cardiovascular: Negative for chest pain, palpitations, orthopnea, and bilateral lower extremity edema.     - Gastrointestinal: Negative for heartburn, nausea, vomiting, abdominal pain, hematochezia, melena, diarrhea, constipation, and greasy/foul-smelling stools.     - Genitourinary: Negative for dysuria, polyuria, hematuria, pyuria, urinary urgency, and urinary incontinence.     - Musculoskeletal: All double joints that are afflicted with osteoarthritis including bilateral hands bilateral feet and left knee.      - Skin: Negative for rash, itching, cyanotic skin color change.     - Neurological: Negative for dizziness, tingling, tremors, focal sensory deficit, focal weakness and headaches.     - Endo/Heme/Allergies: Does not bruise/bleed easily.     - Psychiatric/Behavioral: Negative for depression, suicidal/homicidal ideation and memory loss.          - NOTE: All other systems reviewed and are negative, except as in HPI.      Exam:    /82 (BP Location: Left arm, Patient Position: Sitting, BP Cuff Size: Adult)   Pulse 89   Temp 36.8 °C (98.2 °F) (Temporal)   Resp 12   Ht 1.829 m (6')   Wt 116.6 kg (257 lb)   SpO2 96%  Body mass index is 34.86 kg/m².    General:  Well nourished, well developed male in NAD    LABS: 9/23/2020: Results reviewed and discussed with the patient, questions answered.    Please note that this dictation was created using voice  recognition software. I have made every reasonable attempt to correct obvious errors, but I expect that there are errors of grammar and possibly content that I did not discover before finalizing the note.    Assessment/Plan:  1. Prediabetes  Controlled, continue with current meds and lifestyle.      2. Essential hypertension, benign  Controlled, continue with current meds and lifestyle.      3. Generalized anxiety disorder  Patient utilizing it appropriately, renewed for the coming 90 days  - ALPRAZolam (XANAX) 0.5 MG Tab; Take 1 Tab by mouth at bedtime as needed for Sleep for up to 90 days.  Dispense: 90 Tab; Refill: 0    4. Elevated CPK  Uncontrolled, we will recheck on 11/15/2020  - CREATINE KINASE; Future    5. Dyslipidemia  Controlled, continue with current meds and lifestyle.      6. Coronary artery disease due to lipid rich plaque  Controlled, continue with current meds and lifestyle.      7. Arthritis  Controlled, continue with current meds and lifestyle.

## 2020-10-01 NOTE — ASSESSMENT & PLAN NOTE
This is a chronic health problem that is well controlled with current medications and lifestyle measures. His BP is excellent at 130/82.

## 2020-10-01 NOTE — ASSESSMENT & PLAN NOTE
This is a chronic health problem that is well controlled with current medications and lifestyle measures. He is doing well with use of alprazolam 0.5 mg that he utilizes for sleep.  It is time to renew his prescription today.  His  report is appropriate.  Obtained and reviewed patient utilization report from [facility] pharmacy database on 10/1/2020 11:55 AM  prior to writing prescription for controlled substance II, III or IV per [place] bill . Based on assessment of the report,my physical exam if necessary and the patient's health problem, the prescription is medically necessary.

## 2020-10-01 NOTE — ASSESSMENT & PLAN NOTE
This patient unfortunately had his atorvastatin dose elevated and he developed muscle aches and cramps and had checked his CPK and it was above 250.  I did and new CPK with labs on 9/23/2020 and is down to 201.  I did like to recheck about November 15 which will be 3 months after he dropped his dose back to the current dose and I am hoping his CPK will be back to normal.

## 2020-10-01 NOTE — ASSESSMENT & PLAN NOTE
Patient has this in multiple joints particularly his hands and feet.  He is working with Dr. Rick on trying to get his left knee better.

## 2020-10-01 NOTE — ASSESSMENT & PLAN NOTE
This is a chronic health problem that is well controlled with current medications and lifestyle measures. He did his blood work and his glucose was great at 107.

## 2020-10-16 ENCOUNTER — OFFICE VISIT (OUTPATIENT)
Dept: PHYSICAL MEDICINE AND REHAB | Facility: MEDICAL CENTER | Age: 70
End: 2020-10-16
Payer: MEDICARE

## 2020-10-16 VITALS
SYSTOLIC BLOOD PRESSURE: 124 MMHG | BODY MASS INDEX: 34.79 KG/M2 | DIASTOLIC BLOOD PRESSURE: 70 MMHG | TEMPERATURE: 97.6 F | HEART RATE: 92 BPM | WEIGHT: 256.84 LBS | OXYGEN SATURATION: 92 % | HEIGHT: 72 IN

## 2020-10-16 DIAGNOSIS — R20.2 PARESTHESIA: ICD-10-CM

## 2020-10-16 DIAGNOSIS — M17.12 PRIMARY OSTEOARTHRITIS OF LEFT KNEE: ICD-10-CM

## 2020-10-16 DIAGNOSIS — M43.16 SPONDYLOLISTHESIS OF LUMBAR REGION: ICD-10-CM

## 2020-10-16 DIAGNOSIS — M51.36 DDD (DEGENERATIVE DISC DISEASE), LUMBAR: ICD-10-CM

## 2020-10-16 PROCEDURE — 99214 OFFICE O/P EST MOD 30 MIN: CPT | Performed by: PHYSICAL MEDICINE & REHABILITATION

## 2020-10-16 ASSESSMENT — FIBROSIS 4 INDEX: FIB4 SCORE: 1.63

## 2020-10-16 ASSESSMENT — PATIENT HEALTH QUESTIONNAIRE - PHQ9
SUM OF ALL RESPONSES TO PHQ QUESTIONS 1-9: 4
5. POOR APPETITE OR OVEREATING: 0 - NOT AT ALL
CLINICAL INTERPRETATION OF PHQ2 SCORE: 2

## 2020-10-16 ASSESSMENT — PAIN SCALES - GENERAL: PAINLEVEL: 5=MODERATE PAIN

## 2020-10-16 NOTE — PROGRESS NOTES
Follow-up patient note, patient previously seen by Dr. Porter     Physiatry (physical medicine and  Rehabilitation), interventional spine and sports medicine    Date of Service: 10/16/2020    Chief complaint:   Chief Complaint   Patient presents with   • Follow-Up     Left knee       HISTORY    HPI: Curt Blair 69 y.o. male who presents today for follow-up evaluation of knee pain.      Since the last visit, Curt reports that he continues to have decreased knee pain with walking.  He went to [facility] recently and walked 2.5 miles and 3.0 miles on subsequent days without change in his knee pain.    He continues to report that he has had aching in his legs, but this has improved since taking Tums, as Dr. Louie suggested.      Still, in the middle of the night, he has cramping in his left knee at night.  This is usually between 2:30-3AM and seems to be better when he lays on his back, rather than on his side.  No weakness in his legs.    Diclofenac helps with pain and he takes this twice a day.  At times, he uses topical diclofenac.      Medical records review:  I reviewed the note from the referring provider Cheryl Chopra M.D. dated 02/07/2020 for unrelated conditions.    Reviewed records from Dr Clovis Alicea 07/2013 At that visit, he assessed that MRI of left knee was indicated.  On 07/30/2013, they reviewed the study and the patient had a left knee corticosteroid injection    Previous treatments:    Physical Therapy: No    Medications the patient is tried: NSAIDs    Previous interventions: none, recently     Previous surgeries to relieve the above pain:  none      ROS: Unchanged from 09/04/2020, except as noted in the HPI  Eyes: cataract surgery, recently  CV: history of MI at 41  Red Flags ROS:   Fever, Chills, Sweats: Denies  Involuntary Weight Loss: Denies  Bladder Incontinence: Denies  Bowel Incontinence: Denies  Saddle Anesthesia: Denies    All other systems reviewed and negative.       PMHx:    Past Medical History:   Diagnosis Date   • Arteriosclerotic vascular disease 1/25/2019   • Arthritis     Right Foot   • CAD (coronary artery disease)    • Elevated CPK 8/16/2020   • Generalized anxiety disorder 10/11/2017   • Hyperlipidemia    • Hypertension    • Inflamed sebaceous cyst 2/15/2018   • Intrinsic eczema 2/7/2020   • Prediabetes 7/19/2019   • S/P coronary artery stent placement     2 stents       PSHx:   Past Surgical History:   Procedure Laterality Date   • APPENDECTOMY     • CHOLECYSTECTOMY     • ENDARTERECTOMY      corornary   • STENT PLACEMENT     • VASECTOMY         Family history   Family History   Problem Relation Age of Onset   • Lung Disease Mother         copd   • Hypertension Mother    • Heart Disease Father         heart attack and heart disease   • Cancer Brother         neck   • Heart Disease Paternal Grandfather         Heart attack   • Other Sister         non-malignant brain tumor   • Diabetes Neg Hx          Medications:   Current Outpatient Medications   Medication   • ALPRAZolam (XANAX) 0.5 MG Tab   • atorvastatin (LIPITOR) 10 MG Tab   • diclofenac DR (VOLTAREN) 75 MG Tablet Delayed Response   • amlodipine-benazepril (LOTREL) 5-10 MG per capsule   • nitroglycerin (NITROSTAT) 0.4 MG SL Tab   • Multiple Vitamins-Minerals (MULTIVITAMIN PO)   • Coenzyme Q10 (CO Q 10 PO)   • MEGARED OMEGA-3 KRILL OIL PO   • CALCIUM PO   • aspirin 81 MG tablet     No current facility-administered medications for this visit.        Allergies:   Allergies   Allergen Reactions   • Atorvastatin Myalgia     Ok to take 10 mg  Elevated CPK  Only at 40 Mg does this happen.   • Rosuvastatin      muscle cramps elevated CPK       Social Hx:   Social History     Socioeconomic History   • Marital status:      Spouse name: Not on file   • Number of children: Not on file   • Years of education: Not on file   • Highest education level: 12th grade   Occupational History   • Not on file   Social Needs   • Financial  resource strain: Not hard at all   • Food insecurity     Worry: Never true     Inability: Never true   • Transportation needs     Medical: No     Non-medical: No   Tobacco Use   • Smoking status: Former Smoker     Packs/day: 2.00     Years: 25.00     Pack years: 50.00     Types: Cigarettes     Quit date: 1992     Years since quittin.8   • Smokeless tobacco: Never Used   • Tobacco comment: avoid all tobacco products   Substance and Sexual Activity   • Alcohol use: Yes     Alcohol/week: 0.6 oz     Types: 1 Standard drinks or equivalent per week     Frequency: 2-4 times a month     Drinks per session: 1 or 2     Binge frequency: Never     Comment: occ   • Drug use: No   • Sexual activity: Yes     Partners: Female     Comment: , 3 children   Lifestyle   • Physical activity     Days per week: 0 days     Minutes per session: 0 min   • Stress: To some extent   Relationships   • Social connections     Talks on phone: More than three times a week     Gets together: Once a week     Attends [employer] service: Never     Active member of club or organization: Yes     Attends meetings of clubs or organizations: 1 to 4 times per year     Relationship status:    • Intimate partner violence     Fear of current or ex partner: Not on file     Emotionally abused: Not on file     Physically abused: Not on file     Forced sexual activity: Not on file   Other Topics Concern   •  Service Yes   • Blood Transfusions No   • Caffeine Concern No   • Occupational Exposure No   • Hobby Hazards No   • Sleep Concern Yes   • Stress Concern Yes   • Weight Concern Yes   • Special Diet No     Comment: no fried   • Back Care Yes   • Exercise Yes   • Bike Helmet No     Comment: does not ride bike   • Seat Belt Not Asked   • Self-Exams Yes   Social History Narrative   • Not on file         EXAMINATION     Physical Exam:   Vitals: /70 (BP Location: Right arm, Patient Position: Sitting, BP Cuff Size: Adult long)   Pulse  92   Temp 36.4 °C (97.6 °F) (Temporal)   Ht 1.829 m (6')   Wt 116.5 kg (256 lb 13.4 oz)   SpO2 92%     Constitutional:   Body Habitus: Body mass index is 34.83 kg/m².  Cooperation: Fully cooperates with exam  Appearance: Well-groomed, well-nourished, not disheveled, in no acute distress    Eyes: No scleral icterus, no proptosis     ENT -no obvious auditory deficits, wearing a face mask    Skin -no rashes or lesions noted, no warmth or erythema of the left knee    Respiratory-  breathing comfortable on room air, no audible wheezing    Cardiovascular- No lower extremity edema is noted.     Psychiatric- alert and oriented ×3. Normal affect.     Gait - normal gait, no use of ambulatory device, minimally antalgic.     Musculoskeletal -   Thoracic/Lumbar Spine/Sacral Spine/Hips   Inspection: No evidence of atrophy in bilateral lower extremities throughout     KNEES  Left knee  Minimal effusion on the left knee. No warmth or erythema.  No medial or lateral joint line tenderness.    No focal motor or sensory deficits in the lower extremities bilaterally    Reflexes are 2+ patella and achilles bilaterally    Seated SLR is negative bilaterally    MEDICAL DECISION MAKING    Medical records review: see under HPI section.     DATA    Labs:   Lab Results   Component Value Date/Time    SODIUM 136 09/23/2020 07:31 AM    POTASSIUM 4.6 09/23/2020 07:31 AM    CHLORIDE 100 09/23/2020 07:31 AM    CO2 24 09/23/2020 07:31 AM    ANION 12.0 09/23/2020 07:31 AM    GLUCOSE 107 (H) 09/23/2020 07:31 AM    BUN 23 (H) 09/23/2020 07:31 AM    CREATININE 0.84 09/23/2020 07:31 AM    CREATININE 0.93 02/20/2013 07:20 AM    CALCIUM 9.3 09/23/2020 07:31 AM    ASTSGOT 32 09/23/2020 07:31 AM    ALTSGPT 34 09/23/2020 07:31 AM    TBILIRUBIN 0.7 09/23/2020 07:31 AM    ALBUMIN 4.2 09/23/2020 07:31 AM    TOTPROTEIN 7.7 09/23/2020 07:31 AM    GLOBULIN 3.5 09/23/2020 07:31 AM    AGRATIO 1.2 09/23/2020 07:31 AM       Lab Results   Component Value Date/Time     PROTHROMBTM 14.8 (H) 12/05/2018 07:00 AM    INR 1.17 (H) 12/05/2018 07:00 AM        Lab Results   Component Value Date/Time    WBC 7.1 08/07/2020 11:10 AM    RBC 5.11 08/07/2020 11:10 AM    HEMOGLOBIN 15.9 08/07/2020 11:10 AM    HEMATOCRIT 46.9 08/07/2020 11:10 AM    MCV 91.8 08/07/2020 11:10 AM    MCH 31.1 08/07/2020 11:10 AM    MCHC 33.9 08/07/2020 11:10 AM    MPV 10.0 08/07/2020 11:10 AM    NEUTSPOLYS 66.00 08/07/2020 11:10 AM    LYMPHOCYTES 18.30 (L) 08/07/2020 11:10 AM    MONOCYTES 13.30 08/07/2020 11:10 AM    EOSINOPHILS 1.30 08/07/2020 11:10 AM    BASOPHILS 0.70 08/07/2020 11:10 AM        Lab Results   Component Value Date/Time    HBA1C 5.8 (H) 09/19/2019 06:45 AM        Imaging: I personally reviewed following images, these are my reads  Xray lumbar spine 08/07/2020  There is multilevel retrolisthesis at L1-2, L2-3, and L3-4  Grade I anterolisthesis at L4-5, stable on flexion and extension  Degenerative changes with multilevel facet arthropathy    Xray lumbar spine 10/04/2018  There is severe degenerative change in the lumbar spine; there is note of multilevel retrolisthesis at L1-2, L3-4 and L2-3  Mild movement of anterolisthesis at L4-5 with flexion    MRI left knee 07/10/2020  There is note of a small joint effusion.  Cyst posterior to the PCL.  No ligamentous or meniscal tears  Tricompartmental degenerative changes, greatest medial compartment    Xray hip right 10/04/2018  Moderate hip osteoarthritis noted bilaterally     MRI left knee 07/22/2013  There is note of moderate joint effusion.  The medial collateral ligament has mild edema.  There is note of osteophytes in the medial and lateral compartments.  Tendinopathy in the quadriceps tendon noted.    IMAGING radiology reads. I reviewed the following radiology reads     Xray lumbar spine 08/07/2020  IMPRESSION:  1.  Multilevel degenerative disc disease and facet degeneration.  2.  Mild anterior subluxation at L4-5. This is stable with flexion and  extension.  3.  Again seen multilevel degenerative retrolisthesis. This does not change significantly extension.    Xray lumbar spine 10/04/2018  IMPRESSION:     Mild retrolisthesis of L1-2, L2-3 and L3-4 that are worse with extension.  Mild anterolisthesis of L4-5 that is worse with flexion.  There is partial disc space fusion of the lower thoracic spine.    MRI left knee 07/10/2020  IMPRESSION:  1.  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  2.  Intact ligaments and menisci.  3.  Small joint effusion.  4.  [place] synovial or ganglion cyst immediately posterior to the posterior cruciate ligament.      MRI left knee 07/22/2013       Impression        1. Tendinopathy of the quadriceps tendon.     2. Medial collateral ligament sprain.     3. Osteoarthritis, most severe in the medial compartment.     4. Fraying of the inner rim of the bodies of the lateral and medial menisci.     5. Moderate joint effusion.      Xray hips 10/04/2018  Impression:  No acute osseous abnormality                         Results for orders placed during the hospital encounter of 10/04/18   DX-LUMBAR SPINE-4+ VIEWS    Impression Mild retrolisthesis of L1-2, L2-3 and L3-4 that are worse with extension.    Mild anterolisthesis of L4-5 that is worse with flexion.    There is partial disc space fusion of the lower thoracic spine.                                         Diagnosis   Visit Diagnoses     ICD-10-CM   1. Spondylolisthesis of lumbar region  M43.16   2. Primary osteoarthritis of left knee  M17.12   3. Paresthesia  R20.2   4. DDD (degenerative disc disease), lumbar  M51.36           ASSESSMENT:  Curt Blair 69 y.o. male seen for above     Curt was seen today for follow-up.    Diagnoses and all orders for this visit:    Spondylolisthesis of lumbar region  -     MR-LUMBAR SPINE-W/O; Future    Primary osteoarthritis of left knee    Paresthesia  -     MR-LUMBAR  SPINE-W/O; Future    DDD (degenerative disc disease), lumbar  -     MR-LUMBAR SPINE-W/O; Future       1. Discussed MRI lumbar spine to further evaluate possible contribution from lumbar spine to current pain complaints.  2. Left knee pain is not limiting to daily activities.  3. Minimize diclofenac as much as he is able, but this helps with more diffuse musculoskeletal pain complaints.  4. Continue to follow-up with Dr. Chopra for general medical complaints.      Follow-up: Return for after MRI lumbar spine.    Thank you very much for asking me to participate in Curt Blair's care.  Please contact me with any questions or concerns.      Please note that this dictation was created using voice recognition software. I have made every reasonable attempt to correct obvious errors but there may be errors of grammar and content that I may have overlooked prior to finalization of this note.      Sav Rick MD  Physical Medicine and Rehabilitation  Interventional Spine and Sports Physiatry  [facility]

## 2020-10-17 DIAGNOSIS — I10 ESSENTIAL HYPERTENSION, BENIGN: ICD-10-CM

## 2020-10-20 RX ORDER — AMLODIPINE BESYLATE AND BENAZEPRIL HYDROCHLORIDE 5; 10 MG/1; MG/1
CAPSULE ORAL
Qty: 100 CAP | Refills: 3 | Status: SHIPPED | OUTPATIENT
Start: 2020-10-20 | End: 2021-11-22

## 2020-10-22 ENCOUNTER — PATIENT MESSAGE (OUTPATIENT)
Dept: MEDICAL GROUP | Facility: PHYSICIAN GROUP | Age: 70
End: 2020-10-22

## 2020-10-28 ENCOUNTER — TELEPHONE (OUTPATIENT)
Dept: MEDICAL GROUP | Facility: PHYSICIAN GROUP | Age: 70
End: 2020-10-28

## 2020-10-28 NOTE — TELEPHONE ENCOUNTER
We would love to see them.     We are both sorry you are leaving, but of course wish you all the best. When is your last day?     Right now we are closed to new patients but will open up again next month.     Can you have them call me in November?

## 2020-10-28 NOTE — TELEPHONE ENCOUNTER
----- Message from Cheryl Chopra M.D. sent at 10/1/2020  4:31 PM PDT -----    ----- Message -----  From: Shila Mullen D.O.  Sent: 10/1/2020  11:54 AM PDT  To: Cheryl Chopra M.D.    Landen Lozae,    Happy to have them, thanks for thinking of us! Kenya will reach out to get them scheduled before I go out on maternity leave (due 2020!).    Estephanie  ----- Message -----  From: Cheryl Chopra M.D.  Sent: 10/1/2020  11:48 AM PDT  To: Kenya Purvis, [facility], #    Ladies,  This gentleman and his wife both have [facility] and are wonderful patients.  Since I am now leaving and will be able to see [facility] where I am going, I was hoping you would consider taking them into your practice.    Could Kenya give them a call to get them set up to be seen before the end of the year.  I just did his AHA today.    His wife is Luann Blair : 47

## 2020-10-29 ENCOUNTER — HOSPITAL ENCOUNTER (OUTPATIENT)
Dept: RADIOLOGY | Facility: MEDICAL CENTER | Age: 70
End: 2020-10-29
Attending: PHYSICAL MEDICINE & REHABILITATION
Payer: MEDICARE

## 2020-10-29 DIAGNOSIS — M51.36 DDD (DEGENERATIVE DISC DISEASE), LUMBAR: ICD-10-CM

## 2020-10-29 DIAGNOSIS — R20.2 PARESTHESIA: ICD-10-CM

## 2020-10-29 DIAGNOSIS — M43.16 SPONDYLOLISTHESIS OF LUMBAR REGION: ICD-10-CM

## 2020-10-29 PROCEDURE — 72148 MRI LUMBAR SPINE W/O DYE: CPT

## 2020-11-03 ENCOUNTER — TELEPHONE (OUTPATIENT)
Dept: PHYSICAL MEDICINE AND REHAB | Facility: MEDICAL CENTER | Age: 70
End: 2020-11-03

## 2020-11-03 NOTE — TELEPHONE ENCOUNTER
----- Message from Sav Rick M.D. sent at 11/3/2020  8:56 AM PST -----  Please let Curt know that I have reviewed the imaging studies performed.  If he would like to discuss these in the office, please have him schedule a follow-up.    Thank you.

## 2020-11-05 ENCOUNTER — OFFICE VISIT (OUTPATIENT)
Dept: PHYSICAL MEDICINE AND REHAB | Facility: MEDICAL CENTER | Age: 70
End: 2020-11-05
Payer: MEDICARE

## 2020-11-05 VITALS
SYSTOLIC BLOOD PRESSURE: 110 MMHG | BODY MASS INDEX: 35.12 KG/M2 | OXYGEN SATURATION: 97 % | HEIGHT: 72 IN | HEART RATE: 95 BPM | WEIGHT: 259.26 LBS | DIASTOLIC BLOOD PRESSURE: 60 MMHG | TEMPERATURE: 97.5 F

## 2020-11-05 DIAGNOSIS — M43.16 SPONDYLOLISTHESIS OF LUMBAR REGION: ICD-10-CM

## 2020-11-05 DIAGNOSIS — M23.304 DEGENERATION DISEASE OF MEDIAL MENISCUS OF LEFT KNEE: ICD-10-CM

## 2020-11-05 DIAGNOSIS — M17.12 PRIMARY OSTEOARTHRITIS OF LEFT KNEE: ICD-10-CM

## 2020-11-05 DIAGNOSIS — M48.061 SPINAL STENOSIS OF LUMBAR REGION, UNSPECIFIED WHETHER NEUROGENIC CLAUDICATION PRESENT: ICD-10-CM

## 2020-11-05 DIAGNOSIS — M48.061 NEURAL FORAMINAL STENOSIS OF LUMBAR SPINE: ICD-10-CM

## 2020-11-05 PROCEDURE — 99214 OFFICE O/P EST MOD 30 MIN: CPT | Performed by: PHYSICAL MEDICINE & REHABILITATION

## 2020-11-05 ASSESSMENT — PATIENT HEALTH QUESTIONNAIRE - PHQ9
CLINICAL INTERPRETATION OF PHQ2 SCORE: 1
SUM OF ALL RESPONSES TO PHQ QUESTIONS 1-9: 1
5. POOR APPETITE OR OVEREATING: 0 - NOT AT ALL

## 2020-11-05 ASSESSMENT — PAIN SCALES - GENERAL: PAINLEVEL: 5=MODERATE PAIN

## 2020-11-05 ASSESSMENT — FIBROSIS 4 INDEX: FIB4 SCORE: 1.65

## 2020-11-05 NOTE — PROGRESS NOTES
Follow-up patient note, patient previously seen by Dr. Porter     Physiatry (physical medicine and  Rehabilitation), interventional spine and sports medicine    Date of Service: 11/05/2020    Chief complaint:   Chief Complaint   Patient presents with   • Follow-Up     Left knee pain       HISTORY    HPI: Curt Blari 70 y.o. male who presents today for follow-up evaluation of knee pain.      Since the last visit, Curt reports that his symptoms are slightly better than previously.  He reports that he thinks his pain is about 50% better.  Knee pain does not seem to have returned to level prior to knee injection.    The symptoms of cramping pain in the middle of the night that was waking him seems to be decreasing.  He has some aching in his back, but this is not significant.   He has been able to continue golfing without significant change in symptoms.    Diclofenac helps with pain and he takes this twice a day.  At times, he uses topical diclofenac, although has not been using this lately at night and has had continued reduction in symptoms.    Medical records review:  I reviewed the note from the referring provider Cheryl Chopra M.D. dated 02/07/2020 for unrelated conditions.    Reviewed records from Dr Clovis Alicea 07/2013 At that visit, he assessed that MRI of left knee was indicated.  On 07/30/2013, they reviewed the study and the patient had a left knee corticosteroid injection    Previous treatments:    Physical Therapy: No    Medications the patient is tried: NSAIDs    Previous interventions: none, recently     Previous surgeries to relieve the above pain:  none      ROS: Unchanged from 10/16/2020 , except as noted in the HPI  Eyes: cataract surgery, recently  CV: history of MI at 41  Red Flags ROS:   Fever, Chills, Sweats: Denies  Involuntary Weight Loss: Denies  Bladder Incontinence: Denies  Bowel Incontinence: Denies  Saddle Anesthesia: Denies    All other systems reviewed and negative.        PMHx:   Past Medical History:   Diagnosis Date   • Arteriosclerotic vascular disease 1/25/2019   • Arthritis     Right Foot   • CAD (coronary artery disease)    • Elevated CPK 8/16/2020   • Generalized anxiety disorder 10/11/2017   • Hyperlipidemia    • Hypertension    • Inflamed sebaceous cyst 2/15/2018   • Intrinsic eczema 2/7/2020   • Prediabetes 7/19/2019   • S/P coronary artery stent placement     2 stents       PSHx:   Past Surgical History:   Procedure Laterality Date   • APPENDECTOMY     • CHOLECYSTECTOMY     • ENDARTERECTOMY      corornary   • STENT PLACEMENT     • VASECTOMY         Family history   Family History   Problem Relation Age of Onset   • Lung Disease Mother         copd   • Hypertension Mother    • Heart Disease Father         heart attack and heart disease   • Cancer Brother         neck   • Heart Disease Paternal Grandfather         Heart attack   • Other Sister         non-malignant brain tumor   • Diabetes Neg Hx          Medications:   Current Outpatient Medications   Medication   • amlodipine-benazepril (LOTREL) 5-10 MG per capsule   • ALPRAZolam (XANAX) 0.5 MG Tab   • atorvastatin (LIPITOR) 10 MG Tab   • diclofenac DR (VOLTAREN) 75 MG Tablet Delayed Response   • nitroglycerin (NITROSTAT) 0.4 MG SL Tab   • Multiple Vitamins-Minerals (MULTIVITAMIN PO)   • Coenzyme Q10 (CO Q 10 PO)   • MEGARED OMEGA-3 KRILL OIL PO   • CALCIUM PO   • aspirin 81 MG tablet     No current facility-administered medications for this visit.        Allergies:   Allergies   Allergen Reactions   • Atorvastatin Myalgia     Ok to take 10 mg  Elevated CPK  Only at 40 Mg does this happen.   • Rosuvastatin      muscle cramps elevated CPK       Social Hx:   Social History     Socioeconomic History   • Marital status:      Spouse name: Not on file   • Number of children: Not on file   • Years of education: Not on file   • Highest education level: 12th grade   Occupational History   • Not on file   Social Needs    • Financial resource strain: Not hard at all   • Food insecurity     Worry: Never true     Inability: Never true   • Transportation needs     Medical: No     Non-medical: No   Tobacco Use   • Smoking status: Former Smoker     Packs/day: 2.00     Years: 25.00     Pack years: 50.00     Types: Cigarettes     Quit date: 1992     Years since quittin.8   • Smokeless tobacco: Never Used   • Tobacco comment: avoid all tobacco products   Substance and Sexual Activity   • Alcohol use: Yes     Alcohol/week: 0.6 oz     Types: 1 Standard drinks or equivalent per week     Frequency: 2-4 times a month     Drinks per session: 1 or 2     Binge frequency: Never     Comment: occ   • Drug use: No   • Sexual activity: Yes     Partners: Female     Comment: , 3 children   Lifestyle   • Physical activity     Days per week: 0 days     Minutes per session: 0 min   • Stress: To some extent   Relationships   • Social connections     Talks on phone: More than three times a week     Gets together: Once a week     Attends Shinto service: Never     Active member of club or organization: Yes     Attends meetings of clubs or organizations: 1 to 4 times per year     Relationship status:    • Intimate partner violence     Fear of current or ex partner: Not on file     Emotionally abused: Not on file     Physically abused: Not on file     Forced sexual activity: Not on file   Other Topics Concern   •  Service Yes   • Blood Transfusions No   • Caffeine Concern No   • Occupational Exposure No   • Hobby Hazards No   • Sleep Concern Yes   • Stress Concern Yes   • Weight Concern Yes   • Special Diet No     Comment: no fried   • Back Care Yes   • Exercise Yes   • Bike Helmet No     Comment: does not ride bike   • Seat Belt Not Asked   • Self-Exams Yes   Social History Narrative   • Not on file         EXAMINATION     Physical Exam:   Vitals: /60 (BP Location: Right arm, Patient Position: Sitting, BP Cuff Size: Adult  long)   Pulse 95   Temp 36.4 °C (97.5 °F) (Temporal)   Ht 1.829 m (6')   Wt 117.6 kg (259 lb 4.2 oz)   SpO2 97%     Constitutional:   Body Habitus: Body mass index is 35.16 kg/m².  Cooperation: Fully cooperates with exam  Appearance: Well-groomed, well-nourished, not disheveled, in no acute distress    Eyes: No scleral icterus, no proptosis     ENT -no obvious auditory deficits, wearing a face mask    Skin -no rashes or lesions noted    Respiratory-  breathing comfortable on room air, no audible wheezing    Cardiovascular- No lower extremity edema is noted.     Psychiatric- alert and oriented ×3. Normal affect.     Gait - normal gait, no use of ambulatory device, minimally antalgic.     Musculoskeletal -   Thoracic/Lumbar Spine/Sacral Spine/Hips   Inspection: No evidence of atrophy in bilateral lower extremities throughout     KNEES  Left knee  Minimal effusion on the left knee. No warmth or erythema.  No medial or lateral joint line tenderness.    No focal motor or sensory deficits in the lower extremities bilaterally    Reflexes are 2+ patella and achilles bilaterally    Seated SLR is negative bilaterally    MEDICAL DECISION MAKING    Medical records review: see under HPI section.     DATA    Labs:   Lab Results   Component Value Date/Time    SODIUM 136 09/23/2020 07:31 AM    POTASSIUM 4.6 09/23/2020 07:31 AM    CHLORIDE 100 09/23/2020 07:31 AM    CO2 24 09/23/2020 07:31 AM    ANION 12.0 09/23/2020 07:31 AM    GLUCOSE 107 (H) 09/23/2020 07:31 AM    BUN 23 (H) 09/23/2020 07:31 AM    CREATININE 0.84 09/23/2020 07:31 AM    CREATININE 0.93 02/20/2013 07:20 AM    CALCIUM 9.3 09/23/2020 07:31 AM    ASTSGOT 32 09/23/2020 07:31 AM    ALTSGPT 34 09/23/2020 07:31 AM    TBILIRUBIN 0.7 09/23/2020 07:31 AM    ALBUMIN 4.2 09/23/2020 07:31 AM    TOTPROTEIN 7.7 09/23/2020 07:31 AM    GLOBULIN 3.5 09/23/2020 07:31 AM    AGRATIO 1.2 09/23/2020 07:31 AM       Lab Results   Component Value Date/Time    PROTHROMBTM 14.8 (H)  12/05/2018 07:00 AM    INR 1.17 (H) 12/05/2018 07:00 AM        Lab Results   Component Value Date/Time    WBC 7.1 08/07/2020 11:10 AM    RBC 5.11 08/07/2020 11:10 AM    HEMOGLOBIN 15.9 08/07/2020 11:10 AM    HEMATOCRIT 46.9 08/07/2020 11:10 AM    MCV 91.8 08/07/2020 11:10 AM    MCH 31.1 08/07/2020 11:10 AM    MCHC 33.9 08/07/2020 11:10 AM    MPV 10.0 08/07/2020 11:10 AM    NEUTSPOLYS 66.00 08/07/2020 11:10 AM    LYMPHOCYTES 18.30 (L) 08/07/2020 11:10 AM    MONOCYTES 13.30 08/07/2020 11:10 AM    EOSINOPHILS 1.30 08/07/2020 11:10 AM    BASOPHILS 0.70 08/07/2020 11:10 AM        Lab Results   Component Value Date/Time    HBA1C 5.8 (H) 09/19/2019 06:45 AM        Imaging: I personally reviewed following images, these are my reads  MRI lumbar spine 10/29/2020  At L5-S1, there is bilateral facet arthropathy without central canal narrowing.  Mild foraminal stenosis bilaterally   At L4-5, mild central canal stenosis second to disc bulge and severe facet arthropathy.  Moderate foraminal stenosis bilaterally  At L3-4, disc bulge with moderate facet arthropathy and mild-moderate central canal stenosis.  Severe foraminal stenosis bilaterally  At L2-3, posterior disc bulge with facet arthropathy and mild central canal narrowing.  Mild foraminal stenosis bilaterally  At L1-2, mild facet arthropathy bilaterally, mild foraminal stenosis bilaterally.  Prominent epidural fat    Xray lumbar spine 08/07/2020  There is multilevel retrolisthesis at L1-2, L2-3, and L3-4  Grade I anterolisthesis at L4-5, stable on flexion and extension  Degenerative changes with multilevel facet arthropathy    Xray lumbar spine 10/04/2018  There is severe degenerative change in the lumbar spine; there is note of multilevel retrolisthesis at L1-2, L3-4 and L2-3  Mild movement of anterolisthesis at L4-5 with flexion    MRI left knee 07/10/2020  There is note of a small joint effusion.  Cyst posterior to the PCL.  No ligamentous or meniscal  tears  Tricompartmental degenerative changes, greatest medial compartment    Xray hip right 10/04/2018  Moderate hip osteoarthritis noted bilaterally     MRI left knee 07/22/2013  There is note of moderate joint effusion.  The medial collateral ligament has mild edema.  There is note of osteophytes in the medial and lateral compartments.  Tendinopathy in the quadriceps tendon noted.    IMAGING radiology reads. I reviewed the following radiology reads     MRI lumbar spine 10/29/2020  IMPRESSION:  1.  Minimal anterolisthesis of L4-5.  2.  Multilevel degenerative disease and facet arthropathy with resultant central canal and foraminal narrowing as described.    Xray lumbar spine 08/07/2020  IMPRESSION:  1.  Multilevel degenerative disc disease and facet degeneration.  2.  Mild anterior subluxation at L4-5. This is stable with flexion and extension.  3.  Again seen multilevel degenerative retrolisthesis. This does not change significantly extension.    Xray lumbar spine 10/04/2018  IMPRESSION:     Mild retrolisthesis of L1-2, L2-3 and L3-4 that are worse with extension.  Mild anterolisthesis of L4-5 that is worse with flexion.  There is partial disc space fusion of the lower thoracic spine.    MRI left knee 07/10/2020  IMPRESSION:  1.  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  2.  Intact ligaments and menisci.  3.  Small joint effusion.  4.  [place] synovial or ganglion cyst immediately posterior to the posterior cruciate ligament.      MRI left knee 07/22/2013       Impression        1. Tendinopathy of the quadriceps tendon.     2. Medial collateral ligament sprain.     3. Osteoarthritis, most severe in the medial compartment.     4. Fraying of the inner rim of the bodies of the lateral and medial menisci.     5. Moderate joint effusion.      Xray hips 10/04/2018  Impression:  No acute osseous abnormality                         Results for orders  placed during the hospital encounter of 10/04/18   DX-LUMBAR SPINE-4+ VIEWS    Impression Mild retrolisthesis of L1-2, L2-3 and L3-4 that are worse with extension.    Mild anterolisthesis of L4-5 that is worse with flexion.    There is partial disc space fusion of the lower thoracic spine.                                         Diagnosis   Visit Diagnoses     ICD-10-CM   1. Spondylolisthesis of lumbar region  M43.16   2. Primary osteoarthritis of left knee  M17.12   3. 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 Neural foraminal stenosis of lumbar spine  M99.73   5. Spinal stenosis of lumbar region, unspecified whether neurogenic claudication present  M48.061           ASSESSMENT:  Curt Blair 70 y.o. male seen for above     Curt was seen today for follow-up.    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 Discussed that he has had improvement in his nocturnal pain.  As a result, we have held on use of additional medication.  2. Reviewed MRI lumbar spine findings, which may account for symptoms.  If symptoms worsen, he will let me know and we can consider medications or injections.  3. Left knee pain is not limiting his activities at this time.  4. Minimize diclofenac as much as he is able, but this helps with more diffuse musculoskeletal pain complaints.      Follow-up: Return if symptoms worsen or fail to improve.    Thank you very much for asking me to participate in Curt Blair's care.  Please contact me with any questions or concerns.      Please note that this dictation was created using voice recognition software. I have made every reasonable attempt to correct obvious errors but there may be  errors of grammar and content that I may have overlooked prior to finalization of this note.      Sav Rick MD  Physical Medicine and Rehabilitation  Interventional Spine and Sports Physiatry  [facility]

## 2020-11-13 ENCOUNTER — PATIENT MESSAGE (OUTPATIENT)
Dept: MEDICAL GROUP | Facility: PHYSICIAN GROUP | Age: 70
End: 2020-11-13

## 2020-11-13 ENCOUNTER — HOSPITAL ENCOUNTER (OUTPATIENT)
Dept: LAB | Facility: MEDICAL CENTER | Age: 70
End: 2020-11-13
Attending: FAMILY MEDICINE
Payer: MEDICARE

## 2020-11-13 DIAGNOSIS — R74.8 ELEVATED CPK: Chronic | ICD-10-CM

## 2020-11-13 LAB — CK SERPL-CCNC: 127 U/L (ref 0–154)

## 2020-11-13 PROCEDURE — 36415 COLL VENOUS BLD VENIPUNCTURE: CPT

## 2020-11-13 PROCEDURE — 82550 ASSAY OF CK (CPK): CPT

## 2020-11-18 ENCOUNTER — OFFICE VISIT (OUTPATIENT)
Dept: PHYSICAL MEDICINE AND REHAB | Facility: MEDICAL CENTER | Age: 70
End: 2020-11-18
Payer: MEDICARE

## 2020-11-18 VITALS
WEIGHT: 261.25 LBS | OXYGEN SATURATION: 93 % | BODY MASS INDEX: 35.38 KG/M2 | TEMPERATURE: 97.4 F | DIASTOLIC BLOOD PRESSURE: 60 MMHG | HEART RATE: 83 BPM | SYSTOLIC BLOOD PRESSURE: 130 MMHG | HEIGHT: 72 IN

## 2020-11-18 DIAGNOSIS — M48.061 SPINAL STENOSIS OF LUMBAR REGION, UNSPECIFIED WHETHER NEUROGENIC CLAUDICATION PRESENT: ICD-10-CM

## 2020-11-18 DIAGNOSIS — M23.304 DEGENERATION DISEASE OF MEDIAL MENISCUS OF LEFT KNEE: ICD-10-CM

## 2020-11-18 DIAGNOSIS — M43.16 SPONDYLOLISTHESIS OF LUMBAR REGION: ICD-10-CM

## 2020-11-18 DIAGNOSIS — M47.816 LUMBAR SPONDYLOSIS: ICD-10-CM

## 2020-11-18 DIAGNOSIS — M48.061 NEURAL FORAMINAL STENOSIS OF LUMBAR SPINE: ICD-10-CM

## 2020-11-18 DIAGNOSIS — M17.12 PRIMARY OSTEOARTHRITIS OF LEFT KNEE: ICD-10-CM

## 2020-11-18 PROCEDURE — 99214 OFFICE O/P EST MOD 30 MIN: CPT | Performed by: PHYSICAL MEDICINE & REHABILITATION

## 2020-11-18 ASSESSMENT — PATIENT HEALTH QUESTIONNAIRE - PHQ9
5. POOR APPETITE OR OVEREATING: 0 - NOT AT ALL
SUM OF ALL RESPONSES TO PHQ QUESTIONS 1-9: 2
CLINICAL INTERPRETATION OF PHQ2 SCORE: 1

## 2020-11-18 ASSESSMENT — PAIN SCALES - GENERAL: PAINLEVEL: 5=MODERATE PAIN

## 2020-11-18 ASSESSMENT — FIBROSIS 4 INDEX: FIB4 SCORE: 1.65

## 2020-11-18 NOTE — PROGRESS NOTES
Follow-up patient note, patient previously seen by Dr. Porter     Physiatry (physical medicine and  Rehabilitation), interventional spine and sports medicine    Date of Service: 11/18/2020    Chief complaint:   Chief Complaint   Patient presents with   • Follow-Up     Left knee pain       HISTORY    HPI: Curt Blair 70 y.o. male who presents today for follow-up evaluation of knee pain.      Curt reports that he does not have back pain and that his pain is primarily a cramping pain at night that wakes him.  It seems that this is waking him more frequently again.      He does not report limitation from pain with his daily activities and does not report significant back pain or radicular symptoms.  The pain he has at night seems to resolve with changing his position.    Knee pain has not returned to previous level of pain prior to injection.    Diclofenac helps with pain and he takes this twice a day.  This helps with pain in his hands as well and discontinuing it was not successful previously    He is concerned because his PCP seems to think that he needs to have back surgery and wants to discuss this.    Medical records review:  I reviewed the note from the referring provider Cheryl Chopra M.D. dated 02/07/2020 for unrelated conditions.    Reviewed records from Dr Clovis Alicea 07/2013 At that visit, he assessed that MRI of left knee was indicated.  On 07/30/2013, they reviewed the study and the patient had a left knee corticosteroid injection    Previous treatments:    Physical Therapy: No    Medications the patient is tried: NSAIDs    Previous interventions: none, recently     Previous surgeries to relieve the above pain:  none      ROS: Unchanged from 11/05/2020 , except as noted in the HPI  Eyes: cataract surgery, recently  CV: history of MI at 41  Red Flags ROS:   Fever, Chills, Sweats: Denies  Involuntary Weight Loss: Denies  Bladder Incontinence: Denies  Bowel Incontinence: Denies  Saddle  Anesthesia: Denies    All other systems reviewed and negative.       PMHx:   Past Medical History:   Diagnosis Date   • Arteriosclerotic vascular disease 1/25/2019   • Arthritis     Right Foot   • CAD (coronary artery disease)    • Elevated CPK 8/16/2020   • Generalized anxiety disorder 10/11/2017   • Hyperlipidemia    • Hypertension    • Inflamed sebaceous cyst 2/15/2018   • Intrinsic eczema 2/7/2020   • Prediabetes 7/19/2019   • S/P coronary artery stent placement     2 stents       PSHx:   Past Surgical History:   Procedure Laterality Date   • APPENDECTOMY     • CHOLECYSTECTOMY     • ENDARTERECTOMY      corornary   • STENT PLACEMENT     • VASECTOMY         Family history   Family History   Problem Relation Age of Onset   • Lung Disease Mother         copd   • Hypertension Mother    • Heart Disease Father         heart attack and heart disease   • Cancer Brother         neck   • Heart Disease Paternal Grandfather         Heart attack   • Other Sister         non-malignant brain tumor   • Diabetes Neg Hx          Medications:   Current Outpatient Medications   Medication   • amlodipine-benazepril (LOTREL) 5-10 MG per capsule   • ALPRAZolam (XANAX) 0.5 MG Tab   • atorvastatin (LIPITOR) 10 MG Tab   • diclofenac DR (VOLTAREN) 75 MG Tablet Delayed Response   • nitroglycerin (NITROSTAT) 0.4 MG SL Tab   • Multiple Vitamins-Minerals (MULTIVITAMIN PO)   • Coenzyme Q10 (CO Q 10 PO)   • MEGARED OMEGA-3 KRILL OIL PO   • CALCIUM PO   • aspirin 81 MG tablet     No current facility-administered medications for this visit.        Allergies:   Allergies   Allergen Reactions   • Atorvastatin Myalgia     Ok to take 10 mg  Elevated CPK  Only at 40 Mg does this happen.   • Rosuvastatin      muscle cramps elevated CPK       Social Hx:   Social History     Socioeconomic History   • Marital status:      Spouse name: Not on file   • Number of children: Not on file   • Years of education: Not on file   • Highest education level:  12th grade   Occupational History   • Not on file   Social Needs   • Financial resource strain: Not hard at all   • Food insecurity     Worry: Never true     Inability: Never true   • Transportation needs     Medical: No     Non-medical: No   Tobacco Use   • Smoking status: Former Smoker     Packs/day: 2.00     Years: 25.00     Pack years: 50.00     Types: Cigarettes     Quit date: 1992     Years since quittin.9   • Smokeless tobacco: Never Used   • Tobacco comment: avoid all tobacco products   Substance and Sexual Activity   • Alcohol use: Yes     Alcohol/week: 0.6 oz     Types: 1 Standard drinks or equivalent per week     Frequency: 2-4 times a month     Drinks per session: 1 or 2     Binge frequency: Never     Comment: occ   • Drug use: No   • Sexual activity: Yes     Partners: Female     Comment: , 3 children   Lifestyle   • Physical activity     Days per week: 0 days     Minutes per session: 0 min   • Stress: To some extent   Relationships   • Social connections     Talks on phone: More than three times a week     Gets together: Once a week     Attends [employer] service: Never     Active member of club or organization: Yes     Attends meetings of clubs or organizations: 1 to 4 times per year     Relationship status:    • Intimate partner violence     Fear of current or ex partner: Not on file     Emotionally abused: Not on file     Physically abused: Not on file     Forced sexual activity: Not on file   Other Topics Concern   •  Service Yes   • Blood Transfusions No   • Caffeine Concern No   • Occupational Exposure No   • Hobby Hazards No   • Sleep Concern Yes   • Stress Concern Yes   • Weight Concern Yes   • Special Diet No     Comment: no fried   • Back Care Yes   • Exercise Yes   • Bike Helmet No     Comment: does not ride bike   • Seat Belt Not Asked   • Self-Exams Yes   Social History Narrative   • Not on file         EXAMINATION     Physical Exam:   Vitals: /60 (BP  Location: Right arm, Patient Position: Sitting, BP Cuff Size: Adult long)   Pulse 83   Temp 36.3 °C (97.4 °F) (Temporal)   Ht 1.829 m (6')   Wt 118.5 kg (261 lb 3.9 oz)   SpO2 93%     Constitutional:   Body Habitus: Body mass index is 35.43 kg/m².  Cooperation: Fully cooperates with exam  Appearance: Well-groomed, well-nourished, not disheveled, in no acute distress    Eyes: No scleral icterus, no proptosis     ENT -no obvious auditory deficits, wearing a face mask    Skin -no rashes or lesions noted    Respiratory-  breathing comfortable on room air, no audible wheezing    Cardiovascular- No lower extremity edema is noted.     Psychiatric- alert and oriented ×3. Normal affect.     Gait - normal gait, no use of ambulatory device, minimally antalgic.     Musculoskeletal -   Thoracic/Lumbar Spine/Sacral Spine/Hips   Inspection: No evidence of atrophy in bilateral lower extremities throughout     KNEES  Left knee  Minimal effusion on the left knee. No warmth or erythema.  No medial or lateral joint line tenderness.    No focal motor or sensory deficits in the lower extremities bilaterally    Reflexes are 2+ patella and achilles bilaterally    Seated SLR is negative bilaterally    MEDICAL DECISION MAKING    Medical records review: see under HPI section.     DATA    Labs:   Lab Results   Component Value Date/Time    SODIUM 136 09/23/2020 07:31 AM    POTASSIUM 4.6 09/23/2020 07:31 AM    CHLORIDE 100 09/23/2020 07:31 AM    CO2 24 09/23/2020 07:31 AM    ANION 12.0 09/23/2020 07:31 AM    GLUCOSE 107 (H) 09/23/2020 07:31 AM    BUN 23 (H) 09/23/2020 07:31 AM    CREATININE 0.84 09/23/2020 07:31 AM    CREATININE 0.93 02/20/2013 07:20 AM    CALCIUM 9.3 09/23/2020 07:31 AM    ASTSGOT 32 09/23/2020 07:31 AM    ALTSGPT 34 09/23/2020 07:31 AM    TBILIRUBIN 0.7 09/23/2020 07:31 AM    ALBUMIN 4.2 09/23/2020 07:31 AM    TOTPROTEIN 7.7 09/23/2020 07:31 AM    GLOBULIN 3.5 09/23/2020 07:31 AM    AGRATIO 1.2 09/23/2020 07:31 AM        Lab Results   Component Value Date/Time    PROTHROMBTM 14.8 (H) 12/05/2018 07:00 AM    INR 1.17 (H) 12/05/2018 07:00 AM        Lab Results   Component Value Date/Time    WBC 7.1 08/07/2020 11:10 AM    RBC 5.11 08/07/2020 11:10 AM    HEMOGLOBIN 15.9 08/07/2020 11:10 AM    HEMATOCRIT 46.9 08/07/2020 11:10 AM    MCV 91.8 08/07/2020 11:10 AM    MCH 31.1 08/07/2020 11:10 AM    MCHC 33.9 08/07/2020 11:10 AM    MPV 10.0 08/07/2020 11:10 AM    NEUTSPOLYS 66.00 08/07/2020 11:10 AM    LYMPHOCYTES 18.30 (L) 08/07/2020 11:10 AM    MONOCYTES 13.30 08/07/2020 11:10 AM    EOSINOPHILS 1.30 08/07/2020 11:10 AM    BASOPHILS 0.70 08/07/2020 11:10 AM        Lab Results   Component Value Date/Time    HBA1C 5.8 (H) 09/19/2019 06:45 AM        Imaging: I personally reviewed following images, these are my reads  MRI lumbar spine 10/29/2020  At L5-S1, there is bilateral facet arthropathy without central canal narrowing.  Mild foraminal stenosis bilaterally   At L4-5, mild central canal stenosis second to disc bulge and severe facet arthropathy.  Moderate foraminal stenosis bilaterally  At L3-4, disc bulge with moderate facet arthropathy and mild-moderate central canal stenosis.  Severe foraminal stenosis bilaterally  At L2-3, posterior disc bulge with facet arthropathy and mild central canal narrowing.  Mild foraminal stenosis bilaterally  At L1-2, mild facet arthropathy bilaterally, mild foraminal stenosis bilaterally.  Prominent epidural fat    Xray lumbar spine 08/07/2020  There is multilevel retrolisthesis at L1-2, L2-3, and L3-4  Grade I anterolisthesis at L4-5, stable on flexion and extension  Degenerative changes with multilevel facet arthropathy    Xray lumbar spine 10/04/2018  There is severe degenerative change in the lumbar spine; there is note of multilevel retrolisthesis at L1-2, L3-4 and L2-3  Mild movement of anterolisthesis at L4-5 with flexion    MRI left knee 07/10/2020  There is note of a small joint effusion.  Cyst  posterior to the PCL.  No ligamentous or meniscal tears  Tricompartmental degenerative changes, greatest medial compartment    Xray hip right 10/04/2018  Moderate hip osteoarthritis noted bilaterally     MRI left knee 07/22/2013  There is note of moderate joint effusion.  The medial collateral ligament has mild edema.  There is note of osteophytes in the medial and lateral compartments.  Tendinopathy in the quadriceps tendon noted.    IMAGING radiology reads. I reviewed the following radiology reads     MRI lumbar spine 10/29/2020  IMPRESSION:  1.  Minimal anterolisthesis of L4-5.  2.  Multilevel degenerative disease and facet arthropathy with resultant central canal and foraminal narrowing as described.    Xray lumbar spine 08/07/2020  IMPRESSION:  1.  Multilevel degenerative disc disease and facet degeneration.  2.  Mild anterior subluxation at L4-5. This is stable with flexion and extension.  3.  Again seen multilevel degenerative retrolisthesis. This does not change significantly extension.    Xray lumbar spine 10/04/2018  IMPRESSION:     Mild retrolisthesis of L1-2, L2-3 and L3-4 that are worse with extension.  Mild anterolisthesis of L4-5 that is worse with flexion.  There is partial disc space fusion of the lower thoracic spine.    MRI left knee 07/10/2020  IMPRESSION:  1.  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  2.  Intact ligaments and menisci.  3.  Small joint effusion.  4.  Clifton synovial or ganglion cyst immediately posterior to the posterior cruciate ligament.      MRI left knee 07/22/2013       Impression        1. Tendinopathy of the quadriceps tendon.     2. Medial collateral ligament sprain.     3. Osteoarthritis, most severe in the medial compartment.     4. Fraying of the inner rim of the bodies of the lateral and medial menisci.     5. Moderate joint effusion.      Xray hips 10/04/2018  Impression:  No acute osseous  abnormality                         Results for orders placed during the hospital encounter of 10/04/18   DX-LUMBAR SPINE-4+ VIEWS    Impression Mild retrolisthesis of L1-2, L2-3 and L3-4 that are worse with extension.    Mild anterolisthesis of L4-5 that is worse with flexion.    There is partial disc space fusion of the lower thoracic spine.                                         Diagnosis   Visit Diagnoses     ICD-10-CM   1. Primary osteoarthritis of left knee  M17.12   2. Spondylolisthesis of lumbar region  M43.16   3. Spinal stenosis of lumbar region, unspecified whether neurogenic claudication present  M48.061   4. Neural foraminal stenosis of lumbar spine  M99.73   5. 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 Lumbar spondylosis  M47.816           ASSESSMENT:  Curt Blair 70 y.o. male seen for above     Curt was seen today for follow-up.    Diagnoses and all orders for this visit:    Primary osteoarthritis of left knee    Spondylolisthesis of lumbar region    Spinal stenosis of lumbar region, unspecified whether neurogenic claudication present    Neural foraminal stenosis of lumbar spine    Degeneration disease of medial meniscus of left knee    Lumbar spondylosis         1. We reviewed his symptoms and MRI of the lumbar spine and pathology in the left knee.   2. While he has symptoms in his left knee that primarily bother him at night, he is not currently limited by pain or radicular symptoms during his ADLs or recreational activities.  We reviewed that he does have significant findings on his MRI lumbar spine but that he is not currently wanting a diagnostic block in the lumbar spine to see if this changes his nocturnal left knee pain, does not want to take other medications for these symptoms and is not interested in having low back surgery at this time.  We discussed that he could change his mind, should symptoms progress or start to impact his activities.    3. We  discussed the risks and benefits of a conservative or more aggressive strategies and that he could have progression of symptoms over time.  He does have spine pathology.  If he starts having more symptoms or wants to trial diagnostic and potentially therapeutic injections, he will let me know and we can consider this or possible surgical referral.  Discussed that surgical consultation would not mean that he needs to have surgery.  He does not want to pursue this right now.   4. Minimize diclofenac as much as he is able, but this helps with more diffuse musculoskeletal pain complaints.      Follow-up: Return if symptoms worsen or fail to improve.    Thank you very much for asking me to participate in Curt Blair's care.  Please contact me with any questions or concerns.    Patient was seen for 28 minutes face to face of which > 50% of appointment time was spent on counseling and coordination of care regarding the above.    Please note that this dictation was created using voice recognition software. I have made every reasonable attempt to correct obvious errors but there may be errors of grammar and content that I may have overlooked prior to finalization of this note.      Sav Rick MD  Physical Medicine and Rehabilitation  Interventional Spine and Sports Physiatry  [facility]

## 2020-12-15 ENCOUNTER — OFFICE VISIT (OUTPATIENT)
Dept: MEDICAL GROUP | Facility: MEDICAL CENTER | Age: 70
End: 2020-12-15
Payer: MEDICARE

## 2020-12-15 VITALS
DIASTOLIC BLOOD PRESSURE: 60 MMHG | HEIGHT: 72 IN | TEMPERATURE: 97.9 F | OXYGEN SATURATION: 95 % | BODY MASS INDEX: 35.06 KG/M2 | SYSTOLIC BLOOD PRESSURE: 132 MMHG | HEART RATE: 76 BPM | WEIGHT: 258.82 LBS

## 2020-12-15 DIAGNOSIS — I25.10 CORONARY ARTERY DISEASE DUE TO LIPID RICH PLAQUE: ICD-10-CM

## 2020-12-15 DIAGNOSIS — I25.83 CORONARY ARTERY DISEASE DUE TO LIPID RICH PLAQUE: ICD-10-CM

## 2020-12-15 DIAGNOSIS — I10 ESSENTIAL HYPERTENSION, BENIGN: ICD-10-CM

## 2020-12-15 DIAGNOSIS — G89.29 CHRONIC PAIN OF LEFT KNEE: ICD-10-CM

## 2020-12-15 DIAGNOSIS — R73.03 PREDIABETES: ICD-10-CM

## 2020-12-15 DIAGNOSIS — K57.90 DIVERTICULOSIS: ICD-10-CM

## 2020-12-15 DIAGNOSIS — R74.8 ELEVATED CPK: Chronic | ICD-10-CM

## 2020-12-15 DIAGNOSIS — F41.1 GENERALIZED ANXIETY DISORDER: ICD-10-CM

## 2020-12-15 DIAGNOSIS — E78.5 DYSLIPIDEMIA: ICD-10-CM

## 2020-12-15 DIAGNOSIS — M25.562 CHRONIC PAIN OF LEFT KNEE: ICD-10-CM

## 2020-12-15 DIAGNOSIS — K42.9 UMBILICAL HERNIA WITHOUT OBSTRUCTION AND WITHOUT GANGRENE: ICD-10-CM

## 2020-12-15 DIAGNOSIS — Z95.5 STATUS POST CORONARY ARTERY STENT PLACEMENT: ICD-10-CM

## 2020-12-15 DIAGNOSIS — M19.90 ARTHRITIS: ICD-10-CM

## 2020-12-15 PROBLEM — T78.40XA ALLERGIES: Status: RESOLVED | Noted: 2019-12-24 | Resolved: 2020-12-15

## 2020-12-15 PROCEDURE — 99204 OFFICE O/P NEW MOD 45 MIN: CPT | Performed by: INTERNAL MEDICINE

## 2020-12-15 ASSESSMENT — FIBROSIS 4 INDEX: FIB4 SCORE: 1.65

## 2020-12-15 NOTE — ASSESSMENT & PLAN NOTE
Recent problem, improving.  With the steroid injections, increased calcium at night, and adjustment of the support in his bed he reports that the left knee pain is nearly resolved.  It was most bothersome in the evening.  He does not require use of a gait aid during the day.

## 2020-12-15 NOTE — ASSESSMENT & PLAN NOTE
Recent problem, resolved.  Secondary to increase in statin dose.  No additional lab work needed at this time.  Myalgias are improved.

## 2020-12-15 NOTE — PROGRESS NOTES
Subjective:     CC:  Diagnoses of Status post coronary artery stent placement, Prediabetes, Elevated CPK, Dyslipidemia, Essential hypertension, benign, Diverticulosis- proximal transverse and sigmoid, moderate severity, Chronic pain of left knee, Umbilical hernia without obstruction and without gangrene, Generalized anxiety disorder, Arthritis, and Coronary artery disease due to lipid rich plaque were pertinent to this visit.    HISTORY OF THE PRESENT ILLNESS: Patient is a 70 y.o. male. This pleasant patient is here today to establish care and discuss the below stated chronic medical conditions. He is accompanied by his wife, Luann.    Problem   Coronary Artery Disease Due to Lipid Rich Plaque    He reports a MI at age 41 with unsuccessful stenting. He underwent atherectomy at [place] with good results. Then in his late 50's he had a 2nd event leading to stenting of the RCA.    He follows annually with cardiology. Blood pressure well controlled on amlodipine-benazepril and cholesterol well controlled on atorvastatin 10 mg daily. He takes baby aspirin.       Status Post Coronary Artery Stent Placement    CAD status post atherectomy of the LAD many years ago and subsequent PCI to the RCA approximately 17 years ago. He has no cardiac symptoms at this time, but previously did have chest pain prior to his PCI procedures.    He follows with Dr. Otoole annually.    Current regimen: Amlodipine-benazepril 5-10 mg daily, aspirin 81 mg daily, atorvastatin 10 mg daily, coenzyme Q 10, omega-3 Krill oil, nitroglycerin as needed     Dyslipidemia       Ref. Range 8/14/2020 07:11 9/23/2020 07:31   Cholesterol,Tot Latest Ref Range: 100 - 199 mg/dL 137 159   Triglycerides Latest Ref Range: 0 - 149 mg/dL 72 124   HDL Latest Ref Range: >=40 mg/dL 73 65   LDL Latest Ref Range: <100 mg/dL 50 69     He had elevations in his CPK following increase in statin medicine. He has since been switched to rosuvastatin and CPK levels have trended  down.     Essential Hypertension, Benign       Ref. Range 9/23/2020 07:31   Sodium Latest Ref Range: 135 - 145 mmol/L 136   Potassium Latest Ref Range: 3.6 - 5.5 mmol/L 4.6   Chloride Latest Ref Range: 96 - 112 mmol/L 100   Bun Latest Ref Range: 8 - 22 mg/dL 23 (H)   Creatinine Latest Ref Range: 0.50 - 1.40 mg/dL 0.84   GFR If Non  Latest Ref Range: >60 mL/min/1.73 m 2 >60     He reports longstanding history of hypertension.  Is been well controlled on same regiment for many years.  No signs or symptoms of orthostasis.  No angina equivalents.  He follows with cardiology on an annual basis.    Current regimen: amlodipine-benazepril 5-10 mg daily    Blood pressure in clinic ranges 110-132/60.     Diverticulosis- proximal transverse and sigmoid, moderate severity    Seen on last colonoscopy in March 2019. Follows with Dr. Fernandes at [facility].    Shortly before his colonoscopy in March 2019 he was having an exacerbation of back pain but then passed bright red blood felt to be secondary to diverticulitis.  This resolved with antibiotic treatment.     Chronic Pain of Left Knee    S/p 2 injections into the left knee. He tried conservative measures and with increased support at night on his bed settings and has nearly resolved.     Umbilical Hernia Without Obstruction and Without Gangrene    Around 2017 he noted development of an umbilical hernia. It has remained stable in size.  No history of gangrene or incarceration.  No prior laparoscopic surgeries.  Other than aesthetically not liking it, he does not have any specific complaints related to his hernia.  He knows to monitor closely for signs of incarceration.     Elevated Cpk       Ref. Range 8/14/2020 07:11 9/23/2020 07:31 11/13/2020 10:49   CPK Total Latest Ref Range: 0 - 154 U/L 229 (H) 201 (H) 127     He had a recent exacerbation of myalgias related to an increased dose of his statin.  He also had this occur in the past when his statin medicine  was increased.  Since it has been reduced his symptoms have abated.  Most recent CPK level follow-up was reassuring.     Prediabetes       Ref. Range 2019 06:45   Glycohemoglobin Latest Ref Range: 0.0 - 5.6 % 5.8 (H)   Estim. Avg Glu Latest Units: mg/dL 120     He has a history of prediabetes.  He reports is been present for a long time and is never progressed.  No significant side effect such as blurred vision, polyuria, or polydipsia.  No prior use of glucose lowering medications.     Arthritis    He reports history of arthritis, most recently noted in the left knee.  He also has significant lumbar changes on advanced imaging but clinically no significant back pain or radiculopathy.  He is working with Dr. Rick of physiatry.  He has had 2 steroid injections into the left knee which have been helpful in addition to increasing calcium intake at night as he is having associated cramps.  He uses diclofenac twice daily which was started many years ago for osteoarthritis in the hands.     Generalized Anxiety Disorder    He developed generalized anxiety at bedtime around the time of his wife's passing.  She  in 2016 from complications of COPD.  He has never slept 7 or 8 hours per night, he normally averages 4 to 5 hours per evening which is sufficient for him.  His main challenge was initiation of sleep.  He started with over-the-counter attempts with melatonin and Benadryl with no significant improvement.  He was then started on low-dose alprazolam 0.25 to 0.5 mg nightly.  Prior to the COVID-19 pandemic he was using this 3-4 nights per week.  Due to increased stress related to the pandemic he has been using it more regularly.  He will still try to use the lower dose but will sometimes require the second dose.  He understands the inherent risk with chronic use of benzodiazepines including cognitive impairment as well as withdrawal symptoms and those with regular use.  He feels it is helpful and does not have  any negative sequelae such as morning grogginess or gait disturbance.  He would like to continue with this strategy.         Allergies: Atorvastatin and Rosuvastatin    Current Outpatient Medications Ordered in Epic   Medication Sig Dispense Refill   • amlodipine-benazepril (LOTREL) 5-10 MG per capsule TAKE 1 CAPSULE BY MOUTH EVERY  Cap 3   • ALPRAZolam (XANAX) 0.5 MG Tab Take 1 Tab by mouth at bedtime as needed for Sleep for up to 90 days. 90 Tab 0   • atorvastatin (LIPITOR) 10 MG Tab Take 1 Tab by mouth every day. 90 Tab 3   • diclofenac DR (VOLTAREN) 75 MG Tablet Delayed Response TAKE 1 TABLET BY MOUTH TWICE A  Tab 3   • nitroglycerin (NITROSTAT) 0.4 MG SL Tab Place 1 Tab under tongue as needed for Chest Pain (Place 1 tablet under the tongue every 5 minutes up to 3 doses as needed for chest pain). 25 Tab 5   • Multiple Vitamins-Minerals (MULTIVITAMIN PO) Take 1 Tab by mouth every morning.     • Coenzyme Q10 (CO Q 10 PO) Take 1 Tab by mouth every morning.     • MEGARED OMEGA-3 KRILL OIL PO Take 1 Tab by mouth every morning.     • CALCIUM PO Take 1 Tab by mouth every day.     • aspirin 81 MG tablet Take 81 mg by mouth every day.         No current Epic-ordered facility-administered medications on file.        Past Medical History:   Diagnosis Date   • Allergies 12/24/2019   • Arteriosclerotic vascular disease 1/25/2019   • Arthritis     Right Foot   • CAD (coronary artery disease)    • Elevated CPK 8/16/2020   • Generalized anxiety disorder 10/11/2017   • Hyperlipidemia    • Hypertension    • Inflamed sebaceous cyst 2/15/2018   • Intrinsic eczema 2/7/2020   • Prediabetes 7/19/2019   • S/P coronary artery stent placement     2 stents       Past Surgical History:   Procedure Laterality Date   • APPENDECTOMY     • CHOLECYSTECTOMY     • ENDARTERECTOMY      corornary   • STENT PLACEMENT     • VASECTOMY         Social History     Tobacco Use   • Smoking status: Former Smoker     Packs/day: 2.00     Years:  25.00     Pack years: 50.00     Types: Cigarettes     Quit date: 1992     Years since quittin.9   • Smokeless tobacco: Never Used   • Tobacco comment: avoid all tobacco products   Substance Use Topics   • Alcohol use: Yes     Alcohol/week: 0.6 oz     Types: 1 Standard drinks or equivalent per week     Frequency: 2-4 times a month     Drinks per session: 1 or 2     Binge frequency: Never     Comment: occ   • Drug use: No       Social History     Social History Narrative    He was born in [place]. He worked in the [employer] industry, he retired at age 65. He  Luann in 2016 after both of their spouses passed away. He has children in [place] and several grandchildren and great grandchildren. He is an avid traveler.       Family History   Problem Relation Age of Onset   • Lung Disease Mother         copd   • Hypertension Mother    • Heart Disease Father         heart attack and heart disease   • Cancer Brother         neck   • Heart Disease Paternal Grandfather         Heart attack   • Other Sister         non-malignant brain tumor   • Diabetes Neg Hx        Health Maintenance: Completed    ROS:   As above in the HPI All Others Reviewed and Negative  Objective:     Exam: /60 (BP Location: Left arm, Patient Position: Sitting, BP Cuff Size: Adult)   Pulse 76   Temp 36.6 °C (97.9 °F) (Temporal)   Ht 1.829 m (6')   Wt 117.4 kg (258 lb 13.1 oz)   SpO2 95%  Body mass index is 35.1 kg/m².    General: Normal appearing. No distress. Appears stated age.  EEN: Eyes conjunctiva clear lids without ptosis, extraocular movements intact, ears normal shape and contour, external ear canals with mild cerumen bilaterally, tympanic membranes are benign.  Neck: Supple without JVD. Thyroid is not enlarged. No carotid bruits.  Pulmonary: Clear to ausculation.  Normal effort. No rales, ronchi, or wheezing. No cough.  Cardiovascular: Regular rate and rhythm without murmur. No lower extremity edema  bilaterally.  Abdomen: Soft, nontender, nondistended. Normal bowel sounds. +umbilical hernia, reducible.  Neurologic: No resting tremor, no increased tone or rigidity.  Lymph: No cervical or supraclavicular lymph nodes are palpable  Skin: Warm and dry.  No obvious lesions on skin exposed areas.  Musculoskeletal: Normal gait. No extremity cyanosis, clubbing, or edema. Patient ambulates independently and without a gait aid. He reports pain in the left knee.  Psych: Normal mood and affect. Judgment and insight is normal.    Assessment & Plan:   70 y.o. male with the following -    Problem List Items Addressed This Visit     Elevated CPK (Chronic)     Recent problem, resolved.  Secondary to increase in statin dose.  No additional lab work needed at this time.  Myalgias are improved.         Coronary artery disease due to lipid rich plaque     Chronic and stable problem.  Continue follow-up with cardiology as recommended.  Continue good blood pressure and cholesterol control with amlodipine-benazepril and atorvastatin.  Continue on baby aspirin.           Status post coronary artery stent placement     Chronic and stable problem.  Continue follow-up with cardiology as recommended.  He has nitroglycerin available for angina.  Continue current regiment of amlodipine-benazepril, aspirin, atorvastatin, coenzyme every 10, and omega-3 Krill oil.         Essential hypertension, benign     Chronic and stable problem.  Blood pressure remains at goal.  Most recent electrolytes and kidney function are appropriate.  Okay to continue on amlodipine-benazepril 5-10 mg daily.  Continue follow-up with cardiology as recommended.         Dyslipidemia     Chronic and stable problem.  He has had 2 previous dose adjustments to his statin and both have led to myalgias and elevation of CPK levels.  He does well on the 10 mg dose and will plan to continue on that moving forward.  LDL below 70 and other cholesterol values also at goal.          Generalized anxiety disorder     Chronic and stable problem.  He has difficulty shutting off his mind at night before bed. This started around the time of his wife's illness and eventual death in 2016.  We had a long discussion of the risk of long-term use of alprazolam and he reports that he has no negative sequelae and wants to continue at this time.  Prior to COVID-19 pandemic he was only using it 3-4 times weekly but now finds himself using it nightly due to increased stress and frustration in the current pandemic.  We will continue to assess dose reduction and discontinuation of this medicine moving forward.         Arthritis     Chronic and ongoing problem.  Continue follow-up with Dr. Rick as recommended.  Discussed things to watch out for on oral diclofenac including gastritis, hematochezia, and the need for monitoring of kidney function at least on a twice annual basis.         Prediabetes     Previous problem that requires follow-up.  Will update hemoglobin A1c to ensure ongoing stability.  Notification for pharmacotherapy at this time.         Diverticulosis- proximal transverse and sigmoid, moderate severity     Previous problem, no recurrence.  He continues to monitor for hematochezia.  He is on baby aspirin.  Previous episode felt to be related to diverticulitis, no subsequent episodes.         Chronic pain of left knee     Recent problem, improving.  With the steroid injections, increased calcium at night, and adjustment of the support in his bed he reports that the left knee pain is nearly resolved.  It was most bothersome in the evening.  He does not require use of a gait aid during the day.         Umbilical hernia without obstruction and without gangrene     New problem by my assessment, issue has been present since least 2017 per patient.  He knows to monitor for signs and symptoms of incarceration.  Continued observation at this time recommended.                Return in about 3 months (around  3/15/2021).    Please note that this dictation was created using voice recognition software. I have made every reasonable attempt to correct obvious errors, but I expect that there are errors of grammar and possibly content that I did not discover before finalizing the note.

## 2020-12-15 NOTE — ASSESSMENT & PLAN NOTE
Chronic and stable problem.  Blood pressure remains at goal.  Most recent electrolytes and kidney function are appropriate.  Okay to continue on amlodipine-benazepril 5-10 mg daily.  Continue follow-up with cardiology as recommended.

## 2020-12-15 NOTE — ASSESSMENT & PLAN NOTE
Previous problem, no recurrence.  He continues to monitor for hematochezia.  He is on baby aspirin.  Previous episode felt to be related to diverticulitis, no subsequent episodes.

## 2020-12-15 NOTE — ASSESSMENT & PLAN NOTE
Chronic and ongoing problem.  Continue follow-up with Dr. Rick as recommended.  Discussed things to watch out for on oral diclofenac including gastritis, hematochezia, and the need for monitoring of kidney function at least on a twice annual basis.

## 2020-12-15 NOTE — ASSESSMENT & PLAN NOTE
Chronic and stable problem.  He has had 2 previous dose adjustments to his statin and both have led to myalgias and elevation of CPK levels.  He does well on the 10 mg dose and will plan to continue on that moving forward.  LDL below 70 and other cholesterol values also at goal.

## 2020-12-15 NOTE — ASSESSMENT & PLAN NOTE
Chronic and stable problem.  Continue follow-up with cardiology as recommended.  Continue good blood pressure and cholesterol control with amlodipine-benazepril and atorvastatin.  Continue on baby aspirin.

## 2020-12-15 NOTE — ASSESSMENT & PLAN NOTE
Chronic and stable problem.  He has difficulty shutting off his mind at night before bed. This started around the time of his wife's illness and eventual death in 2016.  We had a long discussion of the risk of long-term use of alprazolam and he reports that he has no negative sequelae and wants to continue at this time.  Prior to COVID-19 pandemic he was only using it 3-4 times weekly but now finds himself using it nightly due to increased stress and frustration in the current pandemic.  We will continue to assess dose reduction and discontinuation of this medicine moving forward.

## 2020-12-16 NOTE — ASSESSMENT & PLAN NOTE
Chronic and stable problem.  Continue follow-up with cardiology as recommended.  He has nitroglycerin available for angina.  Continue current regiment of amlodipine-benazepril, aspirin, atorvastatin, coenzyme every 10, and omega-3 Krill oil.

## 2020-12-16 NOTE — ASSESSMENT & PLAN NOTE
Previous problem that requires follow-up.  Will update hemoglobin A1c to ensure ongoing stability.  Notification for pharmacotherapy at this time.

## 2020-12-16 NOTE — ASSESSMENT & PLAN NOTE
New problem by my assessment, issue has been present since least 2017 per patient.  He knows to monitor for signs and symptoms of incarceration.  Continued observation at this time recommended.

## 2020-12-17 NOTE — ASSESSMENT & PLAN NOTE
This is a chronic health problem for this patient. He does try to get exercise. He has gained a few pounds but his weight is stable. Patient will work on weight loss with time.   63

## 2021-01-15 DIAGNOSIS — Z23 NEED FOR VACCINATION: ICD-10-CM

## 2021-01-19 DIAGNOSIS — M25.562 LEFT KNEE PAIN, UNSPECIFIED CHRONICITY: ICD-10-CM

## 2021-01-19 DIAGNOSIS — M17.12 PRIMARY OSTEOARTHRITIS OF LEFT KNEE: ICD-10-CM

## 2021-01-22 ENCOUNTER — NURSE TRIAGE (OUTPATIENT)
Dept: HEALTH INFORMATION MANAGEMENT | Facility: OTHER | Age: 71
End: 2021-01-22

## 2021-01-28 ENCOUNTER — IMMUNIZATION (OUTPATIENT)
Dept: FAMILY PLANNING/WOMEN'S HEALTH CLINIC | Facility: IMMUNIZATION CENTER | Age: 71
End: 2021-01-28
Attending: INTERNAL MEDICINE
Payer: MEDICARE

## 2021-01-28 DIAGNOSIS — Z23 NEED FOR VACCINATION: ICD-10-CM

## 2021-01-28 DIAGNOSIS — Z23 ENCOUNTER FOR VACCINATION: Primary | ICD-10-CM

## 2021-01-28 PROCEDURE — 0011A MODERNA SARS-COV-2 VACCINE: CPT

## 2021-01-28 PROCEDURE — 91301 MODERNA SARS-COV-2 VACCINE: CPT

## 2021-02-05 ENCOUNTER — OFFICE VISIT (OUTPATIENT)
Dept: PHYSICAL MEDICINE AND REHAB | Facility: MEDICAL CENTER | Age: 71
End: 2021-02-05
Payer: MEDICARE

## 2021-02-05 VITALS
OXYGEN SATURATION: 96 % | TEMPERATURE: 97.4 F | DIASTOLIC BLOOD PRESSURE: 68 MMHG | SYSTOLIC BLOOD PRESSURE: 130 MMHG | HEIGHT: 72 IN | BODY MASS INDEX: 35.24 KG/M2 | WEIGHT: 260.14 LBS | HEART RATE: 76 BPM

## 2021-02-05 DIAGNOSIS — M48.061 SPINAL STENOSIS OF LUMBAR REGION, UNSPECIFIED WHETHER NEUROGENIC CLAUDICATION PRESENT: ICD-10-CM

## 2021-02-05 DIAGNOSIS — M17.12 PRIMARY OSTEOARTHRITIS OF LEFT KNEE: ICD-10-CM

## 2021-02-05 DIAGNOSIS — M43.16 SPONDYLOLISTHESIS OF LUMBAR REGION: ICD-10-CM

## 2021-02-05 DIAGNOSIS — M48.061 NEURAL FORAMINAL STENOSIS OF LUMBAR SPINE: ICD-10-CM

## 2021-02-05 DIAGNOSIS — M25.562 LEFT KNEE PAIN, UNSPECIFIED CHRONICITY: ICD-10-CM

## 2021-02-05 PROCEDURE — 99213 OFFICE O/P EST LOW 20 MIN: CPT | Performed by: PHYSICAL MEDICINE & REHABILITATION

## 2021-02-05 ASSESSMENT — FIBROSIS 4 INDEX: FIB4 SCORE: 1.65

## 2021-02-05 ASSESSMENT — PAIN SCALES - GENERAL: PAINLEVEL: 8=MODERATE-SEVERE PAIN

## 2021-02-05 ASSESSMENT — PATIENT HEALTH QUESTIONNAIRE - PHQ9: CLINICAL INTERPRETATION OF PHQ2 SCORE: 0

## 2021-02-07 NOTE — PROGRESS NOTES
Follow-up patient note, patient previously seen by Dr. Porter     Physiatry (physical medicine and  Rehabilitation), interventional spine and sports medicine    Date of Service: 02/5/2021    Chief complaint:   Chief Complaint   Patient presents with   • Follow-Up     Left knee intra-articular steroid injection       HISTORY    HPI: Curt Blair 70 y.o. male who presents today for follow-up evaluation of knee pain.    He continues to report that his left knee pain does not limit him during the day.  He has cramping pain at night, it seems to wake him at 2-3 AM.  When he takes tylenol PM at bedtime, still wakes.  Recently, stopped that and took when he woke.  This seemed to work.  The pain makes it difficult for him to find a comfortable position and needs to get up to resolve.    Back pain is not prominent.  He does not want to consider knee or back surgery, if avoidable.    Diclofenac helps with pain and he takes this twice a day.  This helps with pain in his hands as well and discontinuing it was not successful previously    Medical records review:  I reviewed the note from the referring provider Cheryl Chopra M.D. dated 02/07/2020 for unrelated conditions.    Reviewed records from Dr Clovis Alicea 07/2013 At that visit, he assessed that MRI of left knee was indicated.  On 07/30/2013, they reviewed the study and the patient had a left knee corticosteroid injection    Previous treatments:    Physical Therapy: No    Medications the patient is tried: NSAIDs    Previous interventions: none, recently     Previous surgeries to relieve the above pain:  none      ROS: Unchanged from 11/05/2020, except as noted in the HPI  Eyes: cataract surgery, recently  CV: history of MI at 41  Red Flags ROS:   Fever, Chills, Sweats: Denies  Involuntary Weight Loss: Denies  Bladder Incontinence: Denies  Bowel Incontinence: Denies  Saddle Anesthesia: Denies    All other systems reviewed and negative.       PMHx:   Past Medical  History:   Diagnosis Date   • Allergies 12/24/2019   • Arteriosclerotic vascular disease 1/25/2019   • Arthritis     Right Foot   • CAD (coronary artery disease)    • Elevated CPK 8/16/2020   • Generalized anxiety disorder 10/11/2017   • Hyperlipidemia    • Hypertension    • Inflamed sebaceous cyst 2/15/2018   • Intrinsic eczema 2/7/2020   • Prediabetes 7/19/2019   • S/P coronary artery stent placement     2 stents       PSHx:   Past Surgical History:   Procedure Laterality Date   • APPENDECTOMY     • CHOLECYSTECTOMY     • ENDARTERECTOMY      corornary   • STENT PLACEMENT     • VASECTOMY         Family history   Family History   Problem Relation Age of Onset   • Lung Disease Mother         copd   • Hypertension Mother    • Heart Disease Father         heart attack and heart disease   • Cancer Brother         neck   • Heart Disease Paternal Grandfather         Heart attack   • Other Sister         non-malignant brain tumor   • Diabetes Neg Hx          Medications:   Current Outpatient Medications   Medication   • amlodipine-benazepril (LOTREL) 5-10 MG per capsule   • atorvastatin (LIPITOR) 10 MG Tab   • diclofenac DR (VOLTAREN) 75 MG Tablet Delayed Response   • nitroglycerin (NITROSTAT) 0.4 MG SL Tab   • Multiple Vitamins-Minerals (MULTIVITAMIN PO)   • Coenzyme Q10 (CO Q 10 PO)   • MEGARED OMEGA-3 KRILL OIL PO   • CALCIUM PO   • aspirin 81 MG tablet     No current facility-administered medications for this visit.        Allergies:   Allergies   Allergen Reactions   • Atorvastatin Myalgia     Ok to take 10 mg  Elevated CPK  Only at 40 Mg does this happen.   • Rosuvastatin      muscle cramps elevated CPK       Social Hx:   Social History     Socioeconomic History   • Marital status:      Spouse name: Not on file   • Number of children: Not on file   • Years of education: Not on file   • Highest education level: 12th grade   Occupational History   • Not on file   Social Needs   • Financial resource strain: Not  hard at all   • Food insecurity     Worry: Never true     Inability: Never true   • Transportation needs     Medical: No     Non-medical: No   Tobacco Use   • Smoking status: Former Smoker     Packs/day: 2.00     Years: 25.00     Pack years: 50.00     Types: Cigarettes     Quit date: 1992     Years since quittin.1   • Smokeless tobacco: Never Used   • Tobacco comment: avoid all tobacco products   Substance and Sexual Activity   • Alcohol use: Yes     Alcohol/week: 0.6 oz     Types: 1 Standard drinks or equivalent per week     Frequency: 2-4 times a month     Drinks per session: 1 or 2     Binge frequency: Never     Comment: occ   • Drug use: No   • Sexual activity: Yes     Partners: Female     Comment: , 3 children   Lifestyle   • Physical activity     Days per week: 0 days     Minutes per session: 0 min   • Stress: To some extent   Relationships   • Social connections     Talks on phone: More than three times a week     Gets together: Once a week     Attends Sabianism service: Never     Active member of club or organization: Yes     Attends meetings of clubs or organizations: 1 to 4 times per year     Relationship status:    • Intimate partner violence     Fear of current or ex partner: Not on file     Emotionally abused: Not on file     Physically abused: Not on file     Forced sexual activity: Not on file   Other Topics Concern   •  Service Yes   • Blood Transfusions No   • Caffeine Concern No   • Occupational Exposure No   • Hobby Hazards No   • Sleep Concern Yes   • Stress Concern Yes   • Weight Concern Yes   • Special Diet No     Comment: no fried   • Back Care Yes   • Exercise Yes   • Bike Helmet No     Comment: does not ride bike   • Seat Belt Not Asked   • Self-Exams Yes   Social History Narrative    He was born in [place]. He worked in the [employer] industry, he retired at age 65. He  Luann in 2016 after both of their spouses passed away. He has children in [place]  and several grandchildren and great grandchildren. He is an avid traveler.         EXAMINATION     Physical Exam:   Vitals: /68 (BP Location: Right arm, Patient Position: Sitting, BP Cuff Size: Adult long)   Pulse 76   Temp 36.3 °C (97.4 °F) (Temporal)   Ht 1.829 m (6')   Wt 118 kg (260 lb 2.3 oz)   SpO2 96%     Constitutional:   Body Habitus: Body mass index is 35.28 kg/m².  Cooperation: Fully cooperates with exam  Appearance: Well-groomed, well-nourished, not disheveled, in no acute distress    Eyes: No scleral icterus, no proptosis     ENT -no obvious auditory deficits, wearing a face mask    Skin -no rashes or lesions noted    Respiratory-  breathing comfortable on room air, no audible wheezing    Cardiovascular- No lower extremity edema is noted.     Psychiatric- alert and oriented ×3. Normal affect.     Gait - normal gait, no use of ambulatory device, minimally antalgic.       MEDICAL DECISION MAKING    Medical records review: see under HPI section.     DATA    Labs:   Lab Results   Component Value Date/Time    SODIUM 136 09/23/2020 07:31 AM    POTASSIUM 4.6 09/23/2020 07:31 AM    CHLORIDE 100 09/23/2020 07:31 AM    CO2 24 09/23/2020 07:31 AM    ANION 12.0 09/23/2020 07:31 AM    GLUCOSE 107 (H) 09/23/2020 07:31 AM    BUN 23 (H) 09/23/2020 07:31 AM    CREATININE 0.84 09/23/2020 07:31 AM    CREATININE 0.93 02/20/2013 07:20 AM    CALCIUM 9.3 09/23/2020 07:31 AM    ASTSGOT 32 09/23/2020 07:31 AM    ALTSGPT 34 09/23/2020 07:31 AM    TBILIRUBIN 0.7 09/23/2020 07:31 AM    ALBUMIN 4.2 09/23/2020 07:31 AM    TOTPROTEIN 7.7 09/23/2020 07:31 AM    GLOBULIN 3.5 09/23/2020 07:31 AM    AGRATIO 1.2 09/23/2020 07:31 AM       Lab Results   Component Value Date/Time    PROTHROMBTM 14.8 (H) 12/05/2018 07:00 AM    INR 1.17 (H) 12/05/2018 07:00 AM        Lab Results   Component Value Date/Time    WBC 7.1 08/07/2020 11:10 AM    RBC 5.11 08/07/2020 11:10 AM    HEMOGLOBIN 15.9 08/07/2020 11:10 AM    HEMATOCRIT 46.9  08/07/2020 11:10 AM    MCV 91.8 08/07/2020 11:10 AM    MCH 31.1 08/07/2020 11:10 AM    MCHC 33.9 08/07/2020 11:10 AM    MPV 10.0 08/07/2020 11:10 AM    NEUTSPOLYS 66.00 08/07/2020 11:10 AM    LYMPHOCYTES 18.30 (L) 08/07/2020 11:10 AM    MONOCYTES 13.30 08/07/2020 11:10 AM    EOSINOPHILS 1.30 08/07/2020 11:10 AM    BASOPHILS 0.70 08/07/2020 11:10 AM        Lab Results   Component Value Date/Time    HBA1C 5.8 (H) 09/19/2019 06:45 AM        Imaging: I personally reviewed following images, these are my reads  MRI lumbar spine 10/29/2020  At L5-S1, there is bilateral facet arthropathy without central canal narrowing.  Mild foraminal stenosis bilaterally   At L4-5, mild central canal stenosis second to disc bulge and severe facet arthropathy.  Moderate foraminal stenosis bilaterally  At L3-4, disc bulge with moderate facet arthropathy and mild-moderate central canal stenosis.  Severe foraminal stenosis bilaterally  At L2-3, posterior disc bulge with facet arthropathy and mild central canal narrowing.  Mild foraminal stenosis bilaterally  At L1-2, mild facet arthropathy bilaterally, mild foraminal stenosis bilaterally.  Prominent epidural fat    Xray lumbar spine 08/07/2020  There is multilevel retrolisthesis at L1-2, L2-3, and L3-4  Grade I anterolisthesis at L4-5, stable on flexion and extension  Degenerative changes with multilevel facet arthropathy    Xray lumbar spine 10/04/2018  There is severe degenerative change in the lumbar spine; there is note of multilevel retrolisthesis at L1-2, L3-4 and L2-3  Mild movement of anterolisthesis at L4-5 with flexion    MRI left knee 07/10/2020  There is note of a small joint effusion.  Cyst posterior to the PCL.  No ligamentous or meniscal tears  Tricompartmental degenerative changes, greatest medial compartment    Xray hip right 10/04/2018  Moderate hip osteoarthritis noted bilaterally     MRI left knee 07/22/2013  There is note of moderate joint effusion.  The medial collateral  ligament has mild edema.  There is note of osteophytes in the medial and lateral compartments.  Tendinopathy in the quadriceps tendon noted.    IMAGING radiology reads. I reviewed the following radiology reads     MRI lumbar spine 10/29/2020  IMPRESSION:  1.  Minimal anterolisthesis of L4-5.  2.  Multilevel degenerative disease and facet arthropathy with resultant central canal and foraminal narrowing as described.    Xray lumbar spine 08/07/2020  IMPRESSION:  1.  Multilevel degenerative disc disease and facet degeneration.  2.  Mild anterior subluxation at L4-5. This is stable with flexion and extension.  3.  Again seen multilevel degenerative retrolisthesis. This does not change significantly extension.    Xray lumbar spine 10/04/2018  IMPRESSION:     Mild retrolisthesis of L1-2, L2-3 and L3-4 that are worse with extension.  Mild anterolisthesis of L4-5 that is worse with flexion.  There is partial disc space fusion of the lower thoracic spine.    MRI left knee 07/10/2020  IMPRESSION:  1.  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  2.  Intact ligaments and menisci.  3.  Small joint effusion.  4.  [place] synovial or ganglion cyst immediately posterior to the posterior cruciate ligament.      MRI left knee 07/22/2013       Impression        1. Tendinopathy of the quadriceps tendon.     2. Medial collateral ligament sprain.     3. Osteoarthritis, most severe in the medial compartment.     4. Fraying of the inner rim of the bodies of the lateral and medial menisci.     5. Moderate joint effusion.      Xray hips 10/04/2018  Impression:  No acute osseous abnormality                         Results for orders placed during the hospital encounter of 10/04/18   DX-LUMBAR SPINE-4+ VIEWS    Impression Mild retrolisthesis of L1-2, L2-3 and L3-4 that are worse with extension.    Mild anterolisthesis of L4-5 that is worse with flexion.    There is partial disc  space fusion of the lower thoracic spine.                                         Diagnosis   Visit Diagnoses     ICD-10-CM   1. Primary osteoarthritis of left knee  M17.12   2. Left knee pain, unspecified chronicity  M25.562   3. Spondylolisthesis of lumbar region  M43.16   4. Spinal stenosis of lumbar region, unspecified whether neurogenic claudication present  M48.061   5. Neural foraminal stenosis of lumbar spine  M99.73           ASSESSMENT:  Curt Blair 70 y.o. male seen for above     Curt was seen today for follow-up.    Diagnoses and all orders for this visit:    Primary osteoarthritis of left knee    Left knee pain, unspecified chronicity    Spondylolisthesis of lumbar region    Spinal stenosis of lumbar region, unspecified whether neurogenic claudication present    Neural foraminal stenosis of lumbar spine       1. Discussed plans for injection.  Reviewed that he plans to get the COVID vaccine and we discussed concerns about steroids within this period.  Guidance from [facility] regarding spine injection reviewed.  We discussed the unknown, essentially, as these guidelines are not well-established.  In the end, we agreed to delay left knee injection for now.   Discussed again the range of possible medications that could be used, his use of tylenol if he wakes and the possible involvement of spine vs. Knee in these symptoms.  He still would like to consider left knee injection first.  We discussed holding off until two weeks after his second vaccine injection. The risks benefits and alternatives to this procedure were discussed and the patient wishes to proceed with the procedure. Risks include but are not limited to damage to surrounding structures, infection, bleeding, worsening of pain which can be permanent, weakness which can be permanent. Benefits include pain relief, improved function. Alternatives includes not doing the procedure.      2.Minimize diclofenac as much as he is able, but this helps  with more diffuse musculoskeletal pain complaints.      Follow-up: Return in about 5 weeks (around 3/12/2021) for Office injection.    Thank you very much for asking me to participate in Curt Blair's care.  Please contact me with any questions or concerns.    Please note that this dictation was created using voice recognition software. I have made every reasonable attempt to correct obvious errors but there may be errors of grammar and content that I may have overlooked prior to finalization of this note.      Sav Rick MD  Physical Medicine and Rehabilitation  Interventional Spine and Sports Physiatry  [facility]

## 2021-02-11 DIAGNOSIS — M48.061 SPINAL STENOSIS OF LUMBAR REGION, UNSPECIFIED WHETHER NEUROGENIC CLAUDICATION PRESENT: ICD-10-CM

## 2021-02-11 DIAGNOSIS — M48.061 NEURAL FORAMINAL STENOSIS OF LUMBAR SPINE: ICD-10-CM

## 2021-02-11 RX ORDER — GABAPENTIN 300 MG/1
300 CAPSULE ORAL 2 TIMES DAILY PRN
Qty: 60 CAPSULE | Refills: 1 | Status: SHIPPED | OUTPATIENT
Start: 2021-02-11 | End: 2021-03-18

## 2021-02-23 ENCOUNTER — APPOINTMENT (OUTPATIENT)
Dept: PHYSICAL MEDICINE AND REHAB | Facility: MEDICAL CENTER | Age: 71
End: 2021-02-23
Payer: MEDICARE

## 2021-02-25 ENCOUNTER — IMMUNIZATION (OUTPATIENT)
Dept: FAMILY PLANNING/WOMEN'S HEALTH CLINIC | Facility: IMMUNIZATION CENTER | Age: 71
End: 2021-02-25
Attending: INTERNAL MEDICINE
Payer: MEDICARE

## 2021-02-25 DIAGNOSIS — Z23 ENCOUNTER FOR VACCINATION: Primary | ICD-10-CM

## 2021-02-25 PROCEDURE — 0012A MODERNA SARS-COV-2 VACCINE: CPT

## 2021-02-25 PROCEDURE — 91301 MODERNA SARS-COV-2 VACCINE: CPT

## 2021-03-18 ENCOUNTER — OFFICE VISIT (OUTPATIENT)
Dept: PHYSICAL MEDICINE AND REHAB | Facility: MEDICAL CENTER | Age: 71
End: 2021-03-18
Payer: MEDICARE

## 2021-03-18 VITALS
WEIGHT: 261.02 LBS | HEART RATE: 79 BPM | DIASTOLIC BLOOD PRESSURE: 84 MMHG | TEMPERATURE: 97.1 F | OXYGEN SATURATION: 95 % | HEIGHT: 72 IN | SYSTOLIC BLOOD PRESSURE: 128 MMHG | BODY MASS INDEX: 35.35 KG/M2

## 2021-03-18 DIAGNOSIS — M43.16 SPONDYLOLISTHESIS OF LUMBAR REGION: ICD-10-CM

## 2021-03-18 DIAGNOSIS — M48.061 NEURAL FORAMINAL STENOSIS OF LUMBAR SPINE: ICD-10-CM

## 2021-03-18 DIAGNOSIS — M48.061 SPINAL STENOSIS OF LUMBAR REGION, UNSPECIFIED WHETHER NEUROGENIC CLAUDICATION PRESENT: ICD-10-CM

## 2021-03-18 DIAGNOSIS — M25.562 LEFT KNEE PAIN, UNSPECIFIED CHRONICITY: ICD-10-CM

## 2021-03-18 DIAGNOSIS — M17.12 PRIMARY OSTEOARTHRITIS OF LEFT KNEE: ICD-10-CM

## 2021-03-18 PROCEDURE — 20611 DRAIN/INJ JOINT/BURSA W/US: CPT | Mod: LT | Performed by: PHYSICAL MEDICINE & REHABILITATION

## 2021-03-18 PROCEDURE — 99214 OFFICE O/P EST MOD 30 MIN: CPT | Mod: 25 | Performed by: PHYSICAL MEDICINE & REHABILITATION

## 2021-03-18 RX ORDER — DEXAMETHASONE SODIUM PHOSPHATE 4 MG/ML
4 INJECTION, SOLUTION INTRA-ARTICULAR; INTRALESIONAL; INTRAMUSCULAR; INTRAVENOUS; SOFT TISSUE ONCE
Status: COMPLETED | OUTPATIENT
Start: 2021-03-18 | End: 2021-03-18

## 2021-03-18 RX ORDER — PREGABALIN 50 MG/1
50 CAPSULE ORAL 2 TIMES DAILY
Qty: 60 CAPSULE | Refills: 0 | Status: SHIPPED | OUTPATIENT
Start: 2021-03-18 | End: 2021-05-25

## 2021-03-18 RX ADMIN — DEXAMETHASONE SODIUM PHOSPHATE 4 MG: 4 INJECTION, SOLUTION INTRA-ARTICULAR; INTRALESIONAL; INTRAMUSCULAR; INTRAVENOUS; SOFT TISSUE at 11:55

## 2021-03-18 ASSESSMENT — PAIN SCALES - GENERAL: PAINLEVEL: 7=MODERATE-SEVERE PAIN

## 2021-03-18 ASSESSMENT — PATIENT HEALTH QUESTIONNAIRE - PHQ9: CLINICAL INTERPRETATION OF PHQ2 SCORE: 0

## 2021-03-18 ASSESSMENT — FIBROSIS 4 INDEX: FIB4 SCORE: 1.65

## 2021-03-18 NOTE — PROGRESS NOTES
Follow-up patient note, patient previously seen by Dr. Porter     Physiatry (physical medicine and  Rehabilitation), interventional spine and sports medicine    Date of Service: 03/18/2021    Chief complaint:   Chief Complaint   Patient presents with   • Follow-Up     Left knee intra-articular steroid injection       HISTORY    HPI: Curt Blair 70 y.o. male who presents today for follow-up evaluation of knee pain.   Since the last visit, Curt reports that he tried taking gabapentin.  This did seem to help with cramps in the left leg.      He was not able to tolerate the gabapentin in the end, but the first few nights were helpful.  No cramps.  Then, he started to have more nightmare style dreams when he tried it again.  Decided to stop this.    He has sensitivity to touch in the left lateral knee.  It is painful.    From what he reports, he went to [place], he started to have some more knee pain when he was walking a lot.  This caused some aching pain, but otherwise, he does not feel limited in his ambulation.    Pain is 7/10 on the NRS.    He does not want to consider knee or back surgery at this time.    Diclofenac helps with pain and he takes this twice a day.      Medical records review:  I reviewed the note from the referring provider Cheryl Chopra M.D. dated 02/07/2020 for unrelated conditions.    Reviewed records from Dr Clovis Alicea 07/2013 At that visit, he assessed that MRI of left knee was indicated.  On 07/30/2013, they reviewed the study and the patient had a left knee corticosteroid injection    Previous treatments:    Physical Therapy: No    Medications the patient is tried: NSAIDs    Previous interventions: none, recently     Previous surgeries to relieve the above pain:  none      ROS: Unchanged   Eyes: cataract surgery, recently  CV: history of MI at 41  Red Flags ROS:   Fever, Chills, Sweats: Denies  Involuntary Weight Loss: Denies  Bladder Incontinence: Denies  Bowel  Incontinence: Denies  Saddle Anesthesia: Denies    All other systems reviewed and negative.       PMHx:   Past Medical History:   Diagnosis Date   • Allergies 12/24/2019   • Arteriosclerotic vascular disease 1/25/2019   • Arthritis     Right Foot   • CAD (coronary artery disease)    • Elevated CPK 8/16/2020   • Generalized anxiety disorder 10/11/2017   • Hyperlipidemia    • Hypertension    • Inflamed sebaceous cyst 2/15/2018   • Intrinsic eczema 2/7/2020   • Prediabetes 7/19/2019   • S/P coronary artery stent placement     2 stents       PSHx:   Past Surgical History:   Procedure Laterality Date   • APPENDECTOMY     • CHOLECYSTECTOMY     • ENDARTERECTOMY      corornary   • STENT PLACEMENT     • VASECTOMY         Family history   Family History   Problem Relation Age of Onset   • Lung Disease Mother         copd   • Hypertension Mother    • Heart Disease Father         heart attack and heart disease   • Cancer Brother         neck   • Heart Disease Paternal Grandfather         Heart attack   • Other Sister         non-malignant brain tumor   • Diabetes Neg Hx          Medications:   Current Outpatient Medications   Medication   • pregabalin (LYRICA) 50 MG capsule   • amlodipine-benazepril (LOTREL) 5-10 MG per capsule   • atorvastatin (LIPITOR) 10 MG Tab   • diclofenac DR (VOLTAREN) 75 MG Tablet Delayed Response   • nitroglycerin (NITROSTAT) 0.4 MG SL Tab   • Multiple Vitamins-Minerals (MULTIVITAMIN PO)   • Coenzyme Q10 (CO Q 10 PO)   • MEGARED OMEGA-3 KRILL OIL PO   • CALCIUM PO   • aspirin 81 MG tablet     No current facility-administered medications for this visit.       Allergies:   Allergies   Allergen Reactions   • Atorvastatin Myalgia     Ok to take 10 mg  Elevated CPK  Only at 40 Mg does this happen.   • Rosuvastatin      muscle cramps elevated CPK       Social Hx:   Social History     Socioeconomic History   • Marital status:      Spouse name: Not on file   • Number of children: Not on file   • Years  of education: Not on file   • Highest education level: 12th grade   Occupational History   • Not on file   Tobacco Use   • Smoking status: Former Smoker     Packs/day: 2.00     Years: 25.00     Pack years: 50.00     Types: Cigarettes     Quit date: 1992     Years since quittin.2   • Smokeless tobacco: Never Used   • Tobacco comment: avoid all tobacco products   Substance and Sexual Activity   • Alcohol use: Yes     Alcohol/week: 0.6 oz     Types: 1 Standard drinks or equivalent per week     Comment: occ   • Drug use: No   • Sexual activity: Yes     Partners: Female     Comment: , 3 children   Other Topics Concern   •  Service Yes   • Blood Transfusions No   • Caffeine Concern No   • Occupational Exposure No   • Hobby Hazards No   • Sleep Concern Yes   • Stress Concern Yes   • Weight Concern Yes   • Special Diet No     Comment: no fried   • Back Care Yes   • Exercise Yes   • Bike Helmet No     Comment: does not ride bike   • Seat Belt Not Asked   • Self-Exams Yes   Social History Narrative    He was born in [place]. He worked in the [employer] industry, he retired at age 65. He  Luann in 2016 after both of their spouses passed away. He has children in [place] and several grandchildren and great grandchildren. He is an avid traveler.     Social Determinants of Health     Financial Resource Strain: Low Risk    • Difficulty of Paying Living Expenses: Not hard at all   Food Insecurity: No Food Insecurity   • Worried About Running Out of Food in the Last Year: Never true   • Ran Out of Food in the Last Year: Never true   Transportation Needs: No Transportation Needs   • Lack of Transportation (Medical): No   • Lack of Transportation (Non-Medical): No   Physical Activity: Inactive   • Days of Exercise per Week: 0 days   • Minutes of Exercise per Session: 0 min   Stress: Stress Concern Present   • Feeling of Stress : To some extent   Social Connections: Slightly Isolated   • Frequency of  Communication with Friends and Family: More than three times a week   • Frequency of Social Gatherings with Friends and Family: Once a week   • Attends Mandaeism Services: Never   • Active Member of Clubs or Organizations: Yes   • Attends Club or Organization Meetings: 1 to 4 times per year   • Marital Status:    Intimate Partner Violence:    • Fear of Current or Ex-Partner:    • Emotionally Abused:    • Physically Abused:    • Sexually Abused:          EXAMINATION     Physical Exam:   Vitals: /84 (BP Location: Right arm, Patient Position: Sitting, BP Cuff Size: Adult long)   Pulse 79   Temp 36.2 °C (97.1 °F) (Temporal)   Ht 1.829 m (6')   Wt 118 kg (261 lb 0.4 oz)   SpO2 95%     Constitutional:   Body Habitus: Body mass index is 35.4 kg/m².  Cooperation: Fully cooperates with exam  Appearance: Well-groomed, well-nourished, not disheveled, in no acute distress    Eyes: No scleral icterus, no proptosis     ENT -no obvious auditory deficits, wearing a face mask    Skin -no rashes or lesions noted    Respiratory-  breathing comfortable on room air, no audible wheezing    Cardiovascular- No lower extremity edema is noted.     Psychiatric- alert and oriented ×3. Normal affect.     Gait - normal gait, no use of ambulatory device, minimally antalgic.       MEDICAL DECISION MAKING    Medical records review: see under HPI section.     DATA    Labs:   Lab Results   Component Value Date/Time    SODIUM 136 09/23/2020 07:31 AM    POTASSIUM 4.6 09/23/2020 07:31 AM    CHLORIDE 100 09/23/2020 07:31 AM    CO2 24 09/23/2020 07:31 AM    ANION 12.0 09/23/2020 07:31 AM    GLUCOSE 107 (H) 09/23/2020 07:31 AM    BUN 23 (H) 09/23/2020 07:31 AM    CREATININE 0.84 09/23/2020 07:31 AM    CREATININE 0.93 02/20/2013 07:20 AM    CALCIUM 9.3 09/23/2020 07:31 AM    ASTSGOT 32 09/23/2020 07:31 AM    ALTSGPT 34 09/23/2020 07:31 AM    TBILIRUBIN 0.7 09/23/2020 07:31 AM    ALBUMIN 4.2 09/23/2020 07:31 AM    TOTPROTEIN 7.7 09/23/2020  07:31 AM    GLOBULIN 3.5 09/23/2020 07:31 AM    AGRATIO 1.2 09/23/2020 07:31 AM       Lab Results   Component Value Date/Time    PROTHROMBTM 14.8 (H) 12/05/2018 07:00 AM    INR 1.17 (H) 12/05/2018 07:00 AM        Lab Results   Component Value Date/Time    WBC 7.1 08/07/2020 11:10 AM    RBC 5.11 08/07/2020 11:10 AM    HEMOGLOBIN 15.9 08/07/2020 11:10 AM    HEMATOCRIT 46.9 08/07/2020 11:10 AM    MCV 91.8 08/07/2020 11:10 AM    MCH 31.1 08/07/2020 11:10 AM    MCHC 33.9 08/07/2020 11:10 AM    MPV 10.0 08/07/2020 11:10 AM    NEUTSPOLYS 66.00 08/07/2020 11:10 AM    LYMPHOCYTES 18.30 (L) 08/07/2020 11:10 AM    MONOCYTES 13.30 08/07/2020 11:10 AM    EOSINOPHILS 1.30 08/07/2020 11:10 AM    BASOPHILS 0.70 08/07/2020 11:10 AM        Lab Results   Component Value Date/Time    HBA1C 5.8 (H) 09/19/2019 06:45 AM        Imaging: I personally reviewed following images, these are my reads  MRI lumbar spine 10/29/2020  At L5-S1, there is bilateral facet arthropathy without central canal narrowing.  Mild foraminal stenosis bilaterally   At L4-5, mild central canal stenosis second to disc bulge and severe facet arthropathy.  Moderate foraminal stenosis bilaterally  At L3-4, disc bulge with moderate facet arthropathy and mild-moderate central canal stenosis.  Severe foraminal stenosis bilaterally  At L2-3, posterior disc bulge with facet arthropathy and mild central canal narrowing.  Mild foraminal stenosis bilaterally  At L1-2, mild facet arthropathy bilaterally, mild foraminal stenosis bilaterally.  Prominent epidural fat    Xray lumbar spine 08/07/2020  There is multilevel retrolisthesis at L1-2, L2-3, and L3-4  Grade I anterolisthesis at L4-5, stable on flexion and extension  Degenerative changes with multilevel facet arthropathy    Xray lumbar spine 10/04/2018  There is severe degenerative change in the lumbar spine; there is note of multilevel retrolisthesis at L1-2, L3-4 and L2-3  Mild movement of anterolisthesis at L4-5 with  flexion    MRI left knee 07/10/2020  There is note of a small joint effusion.  Cyst posterior to the PCL.  No ligamentous or meniscal tears  Tricompartmental degenerative changes, greatest medial compartment    Xray hip right 10/04/2018  Moderate hip osteoarthritis noted bilaterally     MRI left knee 07/22/2013  There is note of moderate joint effusion.  The medial collateral ligament has mild edema.  There is note of osteophytes in the medial and lateral compartments.  Tendinopathy in the quadriceps tendon noted.    IMAGING radiology reads. I reviewed the following radiology reads     MRI lumbar spine 10/29/2020  IMPRESSION:  1.  Minimal anterolisthesis of L4-5.  2.  Multilevel degenerative disease and facet arthropathy with resultant central canal and foraminal narrowing as described.    Xray lumbar spine 08/07/2020  IMPRESSION:  1.  Multilevel degenerative disc disease and facet degeneration.  2.  Mild anterior subluxation at L4-5. This is stable with flexion and extension.  3.  Again seen multilevel degenerative retrolisthesis. This does not change significantly extension.    Xray lumbar spine 10/04/2018  IMPRESSION:     Mild retrolisthesis of L1-2, L2-3 and L3-4 that are worse with extension.  Mild anterolisthesis of L4-5 that is worse with flexion.  There is partial disc space fusion of the lower thoracic spine.    MRI left knee 07/10/2020  IMPRESSION:  1.  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  2.  Intact ligaments and menisci.  3.  Small joint effusion.  4.  Lennox synovial or ganglion cyst immediately posterior to the posterior cruciate ligament.      MRI left knee 07/22/2013       Impression        1. Tendinopathy of the quadriceps tendon.     2. Medial collateral ligament sprain.     3. Osteoarthritis, most severe in the medial compartment.     4. Fraying of the inner rim of the bodies of the lateral and medial menisci.     5.  Moderate joint effusion.      Xray hips 10/04/2018  Impression:  No acute osseous abnormality                         Results for orders placed during the hospital encounter of 10/04/18   DX-LUMBAR SPINE-4+ VIEWS    Impression Mild retrolisthesis of L1-2, L2-3 and L3-4 that are worse with extension.    Mild anterolisthesis of L4-5 that is worse with flexion.    There is partial disc space fusion of the lower thoracic spine.                                         Diagnosis   Visit Diagnoses     ICD-10-CM   1. Spinal stenosis of lumbar region, unspecified whether neurogenic claudication present  M48.061   2. Spondylolisthesis of lumbar region  M43.16   3. Neural foraminal stenosis of lumbar spine  M99.73   4. Primary osteoarthritis of left knee  M17.12   5. Left knee pain, unspecified chronicity  M25.562           ASSESSMENT:  Curt Blair 70 y.o. male seen for above     Curt was seen today for follow-up.    Diagnoses and all orders for this visit:    Spinal stenosis of lumbar region, unspecified whether neurogenic claudication present  -     pregabalin (LYRICA) 50 MG capsule; Take 1 capsule by mouth 2 times a day for 30 days.    Spondylolisthesis of lumbar region    Neural foraminal stenosis of lumbar spine  -     pregabalin (LYRICA) 50 MG capsule; Take 1 capsule by mouth 2 times a day for 30 days.    Primary osteoarthritis of left knee  -     dexamethasone (DECADRON) injection 4 mg    Left knee pain, unspecified chronicity  -     dexamethasone (DECADRON) injection 4 mg       1. Discussed plans for left knee injection.  He has completed his COVID vaccines over two weeks ago. The risks benefits and alternatives to this procedure were discussed and the patient wishes to proceed with the procedure. Risks include but are not limited to damage to surrounding structures, infection, bleeding, worsening of pain which can be permanent, weakness which can be permanent. Benefits include pain relief, improved function.  Alternatives includes not doing the procedure.  See separate procedure note.  2. Discussed trial of lyrica, while this might have the same side effects for him, it might not.  Discussed trial of 50mg at bedtime, can increase to 100mg/day.  3. Discussed possible role that his low back might pain and will reassess after left knee injection and trial of lyrica.  He would like to avoid surgery, if possible      Follow-up: Return in about 3 weeks (around 4/8/2021).    Thank you very much for asking me to participate in Curt Blair's care.  Please contact me with any questions or concerns.    Please note that this dictation was created using voice recognition software. I have made every reasonable attempt to correct obvious errors but there may be errors of grammar and content that I may have overlooked prior to finalization of this note.      Sav Rick MD  Physical Medicine and Rehabilitation  Interventional Spine and Sports Physiatry  [facility]

## 2021-03-19 NOTE — PROCEDURES
Date of Service 03/18/2021    Physician/s: Sav Rick MD    Pre-operative Diagnosis: left knee osteoarthritis, pain    Post-operative Diagnosis: left knee osteoarthritis, pain    Procedure: left Knee injection ultrasound-guided    Description of procedure:    The risks, benefits, and alternatives of the procedure were reviewed and discussed with the patient.  Written informed consent was freely obtained. A pre-procedural time-out was conducted by the physician verifying patient’s identity, procedure to be performed, procedure site and side, and allergy verification. Appropriate equipment was determined to be in place for the procedure.     No sedation was used for this procedure.     In the office suite the patient was placed in a supine position, and the knee was prepped and draped in the usual sterile fashion. The target for injection was marked, and a 25g 1.5 inch needle was placed into skin and advanced into the joint space from the lateral approach.  Ultrasound was used to evaluate the joint. Following negative aspiration, approx 4mL of 1% lidocaine and 1cc of 4mg/mL dexamethasone was then injected into the joint, and the needle was subsequently removed intact. The patient's knee was wiped with a 4x4 gauze, the area was cleansed with alcohol prep, and a bandaid was applied. There were no complications noted.     The patient tolerated the procedure and was discharged in good condition.    Sav Rick MD  Physical Medicine and Rehabilitation  Interventional Spine and Sports Physiatry  [facility]

## 2021-03-25 ENCOUNTER — TELEPHONE (OUTPATIENT)
Dept: MEDICAL GROUP | Facility: PHYSICIAN GROUP | Age: 71
End: 2021-03-25

## 2021-03-25 NOTE — TELEPHONE ENCOUNTER
ESTABLISHED PATIENT PRE-VISIT PLANNING     Patient was NOT contacted to complete PVP.     Note: Patient will not be contacted if there is no indication to call.     1.  Reviewed notes from the last few office visits within the medical group: Yes    2.  If any orders were placed at last visit or intended to be done for this visit (i.e. 6 mos follow-up), do we have Results/Consult Notes?         •  Labs - Labs were not ordered at last office visit.  Note: If patient appointment is for lab review and patient did not complete labs, check with provider if OK to reschedule patient until labs completed.       •  Imaging - Imaging was not ordered at last office visit.       •  Referrals - No referrals were ordered at last office visit.    3. Is this appointment scheduled as a Hospital Follow-Up? No    4.  Immunizations were updated in Epic using Reconcile Outside Information activity? Yes    5.  Patient is due for the following Health Maintenance Topics:   There are no preventive care reminders to display for this patient.    - Patient is up-to-date on all Health Maintenance topics. No records have been requested at this time.    6.  AHA (Pulse8) form printed for Provider? Yes

## 2021-03-29 ENCOUNTER — OFFICE VISIT (OUTPATIENT)
Dept: MEDICAL GROUP | Facility: PHYSICIAN GROUP | Age: 71
End: 2021-03-29
Payer: MEDICARE

## 2021-03-29 VITALS
HEART RATE: 71 BPM | OXYGEN SATURATION: 96 % | WEIGHT: 259 LBS | SYSTOLIC BLOOD PRESSURE: 124 MMHG | BODY MASS INDEX: 35.08 KG/M2 | HEIGHT: 72 IN | RESPIRATION RATE: 15 BRPM | DIASTOLIC BLOOD PRESSURE: 74 MMHG | TEMPERATURE: 98 F

## 2021-03-29 DIAGNOSIS — R73.03 PREDIABETES: ICD-10-CM

## 2021-03-29 DIAGNOSIS — M25.562 CHRONIC PAIN OF LEFT KNEE: ICD-10-CM

## 2021-03-29 DIAGNOSIS — F41.1 GENERALIZED ANXIETY DISORDER: ICD-10-CM

## 2021-03-29 DIAGNOSIS — Z12.5 SCREENING FOR MALIGNANT NEOPLASM OF PROSTATE: ICD-10-CM

## 2021-03-29 DIAGNOSIS — G89.29 CHRONIC PAIN OF LEFT KNEE: ICD-10-CM

## 2021-03-29 DIAGNOSIS — I49.9 IRREGULAR HEART RHYTHM: ICD-10-CM

## 2021-03-29 DIAGNOSIS — E78.5 DYSLIPIDEMIA: ICD-10-CM

## 2021-03-29 DIAGNOSIS — I49.1 PAC (PREMATURE ATRIAL CONTRACTION): ICD-10-CM

## 2021-03-29 DIAGNOSIS — I10 ESSENTIAL HYPERTENSION, BENIGN: ICD-10-CM

## 2021-03-29 PROCEDURE — 99215 OFFICE O/P EST HI 40 MIN: CPT | Performed by: INTERNAL MEDICINE

## 2021-03-29 ASSESSMENT — FIBROSIS 4 INDEX: FIB4 SCORE: 1.65

## 2021-03-29 ASSESSMENT — PAIN SCALES - GENERAL: PAINLEVEL: 8=MODERATE-SEVERE PAIN

## 2021-03-29 NOTE — ASSESSMENT & PLAN NOTE
New problem. EKG completed in clinic demonstrates PAC's. No additional evaluation needed at this time as he is asymptomatic.

## 2021-03-29 NOTE — PROGRESS NOTES
Subjective:   Chief Complaint/History of Present Illness:  Curt Blair is a 70 y.o. male established patient who presents today to discuss medical problems as listed below. Curt is accompanied by his wife, Luann, who is in the next room.    Problem   Dyslipidemia       Ref. Range 8/14/2020 07:11 9/23/2020 07:31   Cholesterol,Tot Latest Ref Range: 100 - 199 mg/dL 137 159   Triglycerides Latest Ref Range: 0 - 149 mg/dL 72 124   HDL Latest Ref Range: >=40 mg/dL 73 65   LDL Latest Ref Range: <100 mg/dL 50 69     He had elevations in his CPK following increase in statin medicine. He has since been switched to rosuvastatin and CPK levels have trended down.    Current regimen: atorvastatin 10 mg daily     Essential Hypertension, Benign       Ref. Range 9/23/2020 07:31   Sodium Latest Ref Range: 135 - 145 mmol/L 136   Potassium Latest Ref Range: 3.6 - 5.5 mmol/L 4.6   Chloride Latest Ref Range: 96 - 112 mmol/L 100   Bun Latest Ref Range: 8 - 22 mg/dL 23 (H)   Creatinine Latest Ref Range: 0.50 - 1.40 mg/dL 0.84   GFR If Non  Latest Ref Range: >60 mL/min/1.73 m 2 >60     He reports longstanding history of hypertension.  It has been well controlled on same regiment for many years.  No signs or symptoms of orthostasis.  No angina equivalents.  He follows with cardiology on an annual basis.    Current regimen: amlodipine-benazepril 5-10 mg daily    Blood pressure in clinic ranges 124-128/68-84.     Pac (Premature Atrial Contraction)    He was having abnormal rhythm during physical exam in March 2021. No known history of arrhythmia. EKG was performed in clinic to analyze rhythm and demonstrated sinus rhythm with PAC's. He denies palpitations, chest pressure, or dyspnea on exertion at this time.     Chronic Pain of Left Knee    MRI left knee (7/2020):  1.  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  2.  Intact ligaments and menisci.  3.   Small joint effusion.  4.  [place] synovial or ganglion cyst immediately posterior to the posterior cruciate ligament.    S/p 3 injections into the left knee, most recently completed in 2021. He tried conservative measures and initially had improved with increased support at night on his bed settings but then worsened again. Pain is only at night at this time, but wakes him up predictably between 2-3 AM.  He was initially trialed on gabapentin but he had a negative reaction and was transitioned to Lyrica which she is currently taking.     Prediabetes       Ref. Range 2019 06:45   Glycohemoglobin Latest Ref Range: 0.0 - 5.6 % 5.8 (H)   Estim. Avg Glu Latest Units: mg/dL 120     He has a history of prediabetes.  He reports is been present for a long time and is never progressed.  No significant side effect such as blurred vision, polyuria, or polydipsia.  No prior use of glucose lowering medications.     Generalized Anxiety Disorder    He developed generalized anxiety at bedtime around the time of his wife's passing.  She  in  from complications of COPD.  He has never slept 7 or 8 hours per night, he normally averages 4 to 5 hours per evening which is sufficient for him.  His main challenge was initiation of sleep.  He started with over-the-counter attempts with melatonin and Benadryl with no significant improvement.  He was then started on low-dose alprazolam 0.25 to 0.5 mg nightly.  Prior to the COVID-19 pandemic he was using this 3-4 nights per week.  Due to increased stress related to the pandemic he has been using it more regularly.  He will still try to use the lower dose but will sometimes require the second dose.  He understands the inherent risk with chronic use of benzodiazepines including cognitive impairment as well as withdrawal symptoms and those with regular use.  He feels it is helpful and does not have any negative sequelae such as morning grogginess or gait disturbance.  He would  like to continue with this strategy.    On follow-up in March 2021 he reports that he has been able to use melatonin in place of the alprazolam and no longer has issues with falling asleep but more so knee pain waking him up.          Current Medications:  Current Outpatient Medications Ordered in Epic   Medication Sig Dispense Refill   • pregabalin (LYRICA) 50 MG capsule Take 1 capsule by mouth 2 times a day for 30 days. 60 capsule 0   • amlodipine-benazepril (LOTREL) 5-10 MG per capsule TAKE 1 CAPSULE BY MOUTH EVERY  Cap 3   • atorvastatin (LIPITOR) 10 MG Tab Take 1 Tab by mouth every day. 90 Tab 3   • diclofenac DR (VOLTAREN) 75 MG Tablet Delayed Response TAKE 1 TABLET BY MOUTH TWICE A  Tab 3   • nitroglycerin (NITROSTAT) 0.4 MG SL Tab Place 1 Tab under tongue as needed for Chest Pain (Place 1 tablet under the tongue every 5 minutes up to 3 doses as needed for chest pain). 25 Tab 5   • Multiple Vitamins-Minerals (MULTIVITAMIN PO) Take 1 Tab by mouth every morning.     • Coenzyme Q10 (CO Q 10 PO) Take 1 Tab by mouth every morning.     • MEGARED OMEGA-3 KRILL OIL PO Take 1 Tab by mouth every morning.     • CALCIUM PO Take 1 Tab by mouth every day.     • aspirin 81 MG tablet Take 81 mg by mouth every day.         No current Epic-ordered facility-administered medications on file.          Objective:   Physical Exam:    Vitals: /74 (BP Location: Left arm, Patient Position: Sitting, BP Cuff Size: Adult)   Pulse 71   Temp 36.7 °C (98 °F) (Temporal)   Resp 15   Ht 1.829 m (6')   Wt 117 kg (259 lb)   SpO2 96%    BMI: Body mass index is 35.13 kg/m².  Physical Exam   Constitutional: He is well-developed, well-nourished, and in no distress.   Eyes: Conjunctivae are normal.   Cardiovascular: Normal rate.   Regular rhythm with premature contractions   Pulmonary/Chest: Effort normal and breath sounds normal. No respiratory distress.   Neurological: He is alert.   Skin: Skin is warm and dry. No rash  noted.   Psychiatric: Mood, memory, affect and judgment normal.        Assessment and Plan:   Curt is a 70 y.o. male with the following:  Problem List Items Addressed This Visit     Essential hypertension, benign     Chronic and stable problem.  Continue amlodipine-benazepril 5-10 mg daily.  Continue follow-up with cardiology as recommended.         Relevant Orders    CBC WITH DIFFERENTIAL    Comp Metabolic Panel    Dyslipidemia     Chronic and stable problem.  Continue atorvastatin 10 mg daily, will update lipid panel to ensure ongoing stability.         Relevant Orders    Comp Metabolic Panel    Lipid Profile    Generalized anxiety disorder     Previous problem, improved. Continue with melatonin to aid with sleep. He will try to minimize use of alprazolam as able.         Prediabetes     Previous problem that requires follow up. Will update A1c and recommend pharmacotherapy if indicated.         Relevant Orders    Comp Metabolic Panel    HEMOGLOBIN A1C    Chronic pain of left knee     Chronic and ongoing issue.  Continue follow-up with Dr. Rick as recommended.  He was recently transition onto Lyrica which she is taking at night and notes instead of waking up at 1 in the morning with pain in the knee it is more like 2 or 3 in the morning.  No negative issues from the Lyrica at this point.  He does not think the steroid injections are having much effect at this point.  He was told that his low back could be contributing to the knee issues.         PAC (premature atrial contraction)     New problem. EKG completed in clinic demonstrates PAC's. No additional evaluation needed at this time as he is asymptomatic.         Relevant Orders    EKG - Clinic Performed      Other Visit Diagnoses     Screening for malignant neoplasm of prostate        Relevant Orders    PROSTATE SPECIFIC AG SCREENING             Annual Health Assessment Questions:    1.  Are you currently engaging in any exercise or physical activity? No    2.   How would you describe your mood or emotional well-being today? good    3.  Have you had any falls in the last year? No    4.  Have you noticed any problems with your balance or had difficulty walking? No    5.  In the last six months have you experienced any leakage of urine? No    6. DPA/Advanced Directive: Patient has Advanced Directive on file.        RTC: Return in about 6 months (around 9/29/2021) for awv.    I spent a total of 42 minutes with record review, exam, communication with the patient, communication with other providers, and documentation of this encounter.    PLEASE NOTE: This dictation was created using voice recognition software. I have made every reasonable attempt to correct obvious errors, but I expect that there are errors of grammar and possibly content that I did not discover before finalizing the note.      Shila Mullen, DO  Geriatric and Internal Medicine  [facility]

## 2021-03-30 NOTE — ASSESSMENT & PLAN NOTE
Previous problem that requires follow up. Will update A1c and recommend pharmacotherapy if indicated.

## 2021-03-30 NOTE — ASSESSMENT & PLAN NOTE
Chronic and ongoing issue.  Continue follow-up with Dr. Rick as recommended.  He was recently transition onto Lyrica which she is taking at night and notes instead of waking up at 1 in the morning with pain in the knee it is more like 2 or 3 in the morning.  No negative issues from the Lyrica at this point.  He does not think the steroid injections are having much effect at this point.  He was told that his low back could be contributing to the knee issues.

## 2021-03-30 NOTE — ASSESSMENT & PLAN NOTE
Chronic and stable problem.  Continue amlodipine-benazepril 5-10 mg daily.  Continue follow-up with cardiology as recommended.

## 2021-03-30 NOTE — ASSESSMENT & PLAN NOTE
Previous problem, improved. Continue with melatonin to aid with sleep. He will try to minimize use of alprazolam as able.

## 2021-03-30 NOTE — ASSESSMENT & PLAN NOTE
Chronic and stable problem.  Continue atorvastatin 10 mg daily, will update lipid panel to ensure ongoing stability.

## 2021-04-21 ENCOUNTER — APPOINTMENT (RX ONLY)
Dept: URBAN - METROPOLITAN AREA CLINIC 20 | Facility: CLINIC | Age: 71
Setting detail: DERMATOLOGY
End: 2021-04-21

## 2021-04-21 DIAGNOSIS — L82.1 OTHER SEBORRHEIC KERATOSIS: ICD-10-CM

## 2021-04-21 DIAGNOSIS — Z71.89 OTHER SPECIFIED COUNSELING: ICD-10-CM

## 2021-04-21 DIAGNOSIS — D18.0 HEMANGIOMA: ICD-10-CM

## 2021-04-21 DIAGNOSIS — L57.0 ACTINIC KERATOSIS: ICD-10-CM

## 2021-04-21 DIAGNOSIS — D22 MELANOCYTIC NEVI: ICD-10-CM

## 2021-04-21 DIAGNOSIS — L81.4 OTHER MELANIN HYPERPIGMENTATION: ICD-10-CM

## 2021-04-21 DIAGNOSIS — Z85.828 PERSONAL HISTORY OF OTHER MALIGNANT NEOPLASM OF SKIN: ICD-10-CM

## 2021-04-21 PROBLEM — D22.72 MELANOCYTIC NEVI OF LEFT LOWER LIMB, INCLUDING HIP: Status: ACTIVE | Noted: 2021-04-21

## 2021-04-21 PROBLEM — D22.62 MELANOCYTIC NEVI OF LEFT UPPER LIMB, INCLUDING SHOULDER: Status: ACTIVE | Noted: 2021-04-21

## 2021-04-21 PROBLEM — D22.5 MELANOCYTIC NEVI OF TRUNK: Status: ACTIVE | Noted: 2021-04-21

## 2021-04-21 PROBLEM — D22.39 MELANOCYTIC NEVI OF OTHER PARTS OF FACE: Status: ACTIVE | Noted: 2021-04-21

## 2021-04-21 PROBLEM — D18.01 HEMANGIOMA OF SKIN AND SUBCUTANEOUS TISSUE: Status: ACTIVE | Noted: 2021-04-21

## 2021-04-21 PROBLEM — D22.61 MELANOCYTIC NEVI OF RIGHT UPPER LIMB, INCLUDING SHOULDER: Status: ACTIVE | Noted: 2021-04-21

## 2021-04-21 PROBLEM — D22.71 MELANOCYTIC NEVI OF RIGHT LOWER LIMB, INCLUDING HIP: Status: ACTIVE | Noted: 2021-04-21

## 2021-04-21 PROCEDURE — ? OBSERVATION

## 2021-04-21 PROCEDURE — ? LIQUID NITROGEN

## 2021-04-21 PROCEDURE — 17003 DESTRUCT PREMALG LES 2-14: CPT

## 2021-04-21 PROCEDURE — ? SUNSCREEN RECOMMENDATIONS

## 2021-04-21 PROCEDURE — 17000 DESTRUCT PREMALG LESION: CPT

## 2021-04-21 PROCEDURE — ? COUNSELING

## 2021-04-21 PROCEDURE — 99213 OFFICE O/P EST LOW 20 MIN: CPT | Mod: 25

## 2021-04-21 PROCEDURE — ? ADDITIONAL NOTES

## 2021-04-21 ASSESSMENT — LOCATION DETAILED DESCRIPTION DERM
LOCATION DETAILED: LEFT PROXIMAL POSTERIOR UPPER ARM
LOCATION DETAILED: INFERIOR THORACIC SPINE
LOCATION DETAILED: RIGHT INFERIOR CENTRAL MALAR CHEEK
LOCATION DETAILED: RIGHT INFERIOR MEDIAL UPPER BACK
LOCATION DETAILED: RIGHT SUPERIOR CENTRAL MALAR CHEEK
LOCATION DETAILED: LEFT CENTRAL ZYGOMA
LOCATION DETAILED: RIGHT PROXIMAL PRETIBIAL REGION
LOCATION DETAILED: LEFT INFERIOR LATERAL FOREHEAD
LOCATION DETAILED: RIGHT INFERIOR MEDIAL MIDBACK
LOCATION DETAILED: RIGHT INFERIOR FOREHEAD
LOCATION DETAILED: RIGHT SUPERIOR UPPER BACK
LOCATION DETAILED: LEFT VENTRAL PROXIMAL FOREARM
LOCATION DETAILED: RIGHT DISTAL POSTERIOR UPPER ARM
LOCATION DETAILED: LEFT DISTAL POSTERIOR THIGH
LOCATION DETAILED: RIGHT VENTRAL PROXIMAL FOREARM
LOCATION DETAILED: LEFT SUPERIOR CENTRAL MALAR CHEEK
LOCATION DETAILED: LEFT LATERAL PROXIMAL PRETIBIAL REGION
LOCATION DETAILED: RIGHT LATERAL PROXIMAL UPPER ARM
LOCATION DETAILED: RIGHT DISTAL POSTERIOR THIGH
LOCATION DETAILED: MID POSTERIOR NECK
LOCATION DETAILED: RIGHT CENTRAL TEMPLE

## 2021-04-21 ASSESSMENT — LOCATION SIMPLE DESCRIPTION DERM
LOCATION SIMPLE: RIGHT UPPER BACK
LOCATION SIMPLE: RIGHT UPPER ARM
LOCATION SIMPLE: RIGHT LOWER BACK
LOCATION SIMPLE: RIGHT CHEEK
LOCATION SIMPLE: RIGHT FOREARM
LOCATION SIMPLE: RIGHT PRETIBIAL REGION
LOCATION SIMPLE: POSTERIOR NECK
LOCATION SIMPLE: LEFT CHEEK
LOCATION SIMPLE: LEFT ZYGOMA
LOCATION SIMPLE: LEFT FOREARM
LOCATION SIMPLE: UPPER BACK
LOCATION SIMPLE: RIGHT POSTERIOR THIGH
LOCATION SIMPLE: RIGHT TEMPLE
LOCATION SIMPLE: LEFT POSTERIOR THIGH
LOCATION SIMPLE: LEFT PRETIBIAL REGION
LOCATION SIMPLE: RIGHT POSTERIOR UPPER ARM
LOCATION SIMPLE: RIGHT FOREHEAD
LOCATION SIMPLE: LEFT FOREHEAD
LOCATION SIMPLE: LEFT POSTERIOR UPPER ARM

## 2021-04-21 ASSESSMENT — LOCATION ZONE DERM
LOCATION ZONE: ARM
LOCATION ZONE: TRUNK
LOCATION ZONE: FACE
LOCATION ZONE: LEG
LOCATION ZONE: NECK

## 2021-04-21 NOTE — PROCEDURE: ADDITIONAL NOTES
Detail Level: Detailed
Additional Notes: On Rt superior central malar cheek, if doesn’t resolve then plan Bx
Render Risk Assessment In Note?: no

## 2021-04-21 NOTE — PROCEDURE: LIQUID NITROGEN
Post-Care Instructions: I reviewed with the patient in detail post-care instructions. Patient is to wear sunprotection, and avoid picking at any of the treated lesions. Pt may apply Vaseline to crusted or scabbing areas.
Detail Level: Detailed
Consent: The patient's consent was obtained including but not limited to risks of crusting, scabbing, blistering, scarring, darker or lighter pigmentary change, recurrence, incomplete removal and infection. RTC in 2 months if lesion(s) persistent.
Number Of Freeze-Thaw Cycles: 2 freeze-thaw cycles
Render Note In Bullet Format When Appropriate: No
Render Post-Care Instructions In Note?: yes
Duration Of Freeze Thaw-Cycle (Seconds): 10

## 2021-04-23 ENCOUNTER — OFFICE VISIT (OUTPATIENT)
Dept: PHYSICAL MEDICINE AND REHAB | Facility: MEDICAL CENTER | Age: 71
End: 2021-04-23
Payer: MEDICARE

## 2021-04-23 VITALS
SYSTOLIC BLOOD PRESSURE: 124 MMHG | WEIGHT: 259.48 LBS | HEART RATE: 83 BPM | DIASTOLIC BLOOD PRESSURE: 86 MMHG | OXYGEN SATURATION: 95 % | TEMPERATURE: 97.5 F | HEIGHT: 72 IN | BODY MASS INDEX: 35.15 KG/M2

## 2021-04-23 DIAGNOSIS — M48.061 SPINAL STENOSIS OF LUMBAR REGION, UNSPECIFIED WHETHER NEUROGENIC CLAUDICATION PRESENT: ICD-10-CM

## 2021-04-23 DIAGNOSIS — M25.862 CYST OF LEFT KNEE JOINT: ICD-10-CM

## 2021-04-23 DIAGNOSIS — M17.12 PRIMARY OSTEOARTHRITIS OF LEFT KNEE: ICD-10-CM

## 2021-04-23 DIAGNOSIS — M48.061 NEURAL FORAMINAL STENOSIS OF LUMBAR SPINE: ICD-10-CM

## 2021-04-23 DIAGNOSIS — M25.562 LEFT KNEE PAIN, UNSPECIFIED CHRONICITY: ICD-10-CM

## 2021-04-23 DIAGNOSIS — M43.16 SPONDYLOLISTHESIS OF LUMBAR REGION: ICD-10-CM

## 2021-04-23 PROCEDURE — 99214 OFFICE O/P EST MOD 30 MIN: CPT | Performed by: PHYSICAL MEDICINE & REHABILITATION

## 2021-04-23 ASSESSMENT — PAIN SCALES - GENERAL: PAINLEVEL: 8=MODERATE-SEVERE PAIN

## 2021-04-23 ASSESSMENT — FIBROSIS 4 INDEX: FIB4 SCORE: 1.65

## 2021-04-23 ASSESSMENT — PATIENT HEALTH QUESTIONNAIRE - PHQ9: CLINICAL INTERPRETATION OF PHQ2 SCORE: 0

## 2021-04-23 NOTE — PROGRESS NOTES
"Follow-up patient note, patient previously seen by Dr. Porter     Physiatry (physical medicine and  Rehabilitation), interventional spine and sports medicine    Date of Service: 04/23/2021    Chief complaint:   Chief Complaint   Patient presents with   • Follow-Up     Left knee pain       HISTORY    HPI: Curt Blair 70 y.o. male who presents today for follow-up evaluation of left knee pain.     Curt reports that after the left knee injection on 03/18/2021, he had a few days of improved pain, like the knee was \"numb\" and this was better.      He tried lyrica, but was still waking up in the middle of the night.  This has been cramping, intermittent pain.  Pain is 8/10 on the NRS currently.  Sleeping on the right side, he does not usually have symptoms.  He does, when he sleeps on the left.    He then started taking tylenol and xanax at night.  He then had about 2 weeks without any cramping pain.  Getting up and walking, relieves the pain when it occurs.    Overall, his pain is just at night and he is not able to determine correlation with the previous days activity    Pain is 8/10 on the NRS only when his pain is present.  Pain is currently a 0/10 on the NRS.    Diclofenac helps with pain and he takes this twice a day.      Medical records review:  I reviewed the note from the referring provider Cheryl Chopra M.D. dated 02/07/2020 for unrelated conditions.    Reviewed records from Dr Clovis Alicea 07/2013 At that visit, he assessed that MRI of left knee was indicated.  On 07/30/2013, they reviewed the study and the patient had a left knee corticosteroid injection    Previous treatments:    Physical Therapy: No    Medications the patient is tried: NSAIDs    Previous interventions: none, recently     Previous surgeries to relieve the above pain:  none      ROS:   Eyes: cataract surgery, recently  CV: history of MI at 41  Red Flags ROS:   Fever, Chills, Sweats: Denies  Involuntary Weight Loss: " Denies  Bladder Incontinence: Denies  Bowel Incontinence: Denies  Saddle Anesthesia: Denies    All other systems reviewed and negative.       PMHx:   Past Medical History:   Diagnosis Date   • Allergies 12/24/2019   • Arteriosclerotic vascular disease 1/25/2019   • Arthritis     Right Foot   • CAD (coronary artery disease)    • Elevated CPK 8/16/2020   • Generalized anxiety disorder 10/11/2017   • Hyperlipidemia    • Hypertension    • Inflamed sebaceous cyst 2/15/2018   • Intrinsic eczema 2/7/2020   • Prediabetes 7/19/2019   • S/P coronary artery stent placement     2 stents       PSHx:   Past Surgical History:   Procedure Laterality Date   • APPENDECTOMY     • CHOLECYSTECTOMY     • ENDARTERECTOMY      corornary   • STENT PLACEMENT     • VASECTOMY         Family history   Family History   Problem Relation Age of Onset   • Lung Disease Mother         copd   • Hypertension Mother    • Heart Disease Father         heart attack and heart disease   • Cancer Brother         neck   • Heart Disease Paternal Grandfather         Heart attack   • Other Sister         non-malignant brain tumor   • Diabetes Neg Hx          Medications:   Current Outpatient Medications   Medication   • amlodipine-benazepril (LOTREL) 5-10 MG per capsule   • atorvastatin (LIPITOR) 10 MG Tab   • diclofenac DR (VOLTAREN) 75 MG Tablet Delayed Response   • nitroglycerin (NITROSTAT) 0.4 MG SL Tab   • Multiple Vitamins-Minerals (MULTIVITAMIN PO)   • Coenzyme Q10 (CO Q 10 PO)   • MEGARED OMEGA-3 KRILL OIL PO   • CALCIUM PO   • aspirin 81 MG tablet     No current facility-administered medications for this visit.       Allergies:   Allergies   Allergen Reactions   • Atorvastatin Myalgia     Ok to take 10 mg  Elevated CPK  Only at 40 Mg does this happen.   • Rosuvastatin      muscle cramps elevated CPK       Social Hx:   Social History     Socioeconomic History   • Marital status:      Spouse name: Not on file   • Number of children: Not on file   •  Years of education: Not on file   • Highest education level: 12th grade   Occupational History   • Not on file   Tobacco Use   • Smoking status: Former Smoker     Packs/day: 2.00     Years: 25.00     Pack years: 50.00     Types: Cigarettes     Quit date: 1992     Years since quittin.3   • Smokeless tobacco: Never Used   • Tobacco comment: avoid all tobacco products   Substance and Sexual Activity   • Alcohol use: Yes     Alcohol/week: 0.6 oz     Types: 1 Standard drinks or equivalent per week     Comment: occ   • Drug use: No   • Sexual activity: Yes     Partners: Female     Comment: , 3 children   Other Topics Concern   •  Service Yes   • Blood Transfusions No   • Caffeine Concern No   • Occupational Exposure No   • Hobby Hazards No   • Sleep Concern Yes   • Stress Concern Yes   • Weight Concern Yes   • Special Diet No     Comment: no fried   • Back Care Yes   • Exercise Yes   • Bike Helmet No     Comment: does not ride bike   • Seat Belt Not Asked   • Self-Exams Yes   Social History Narrative    He was born in [place]. He worked in the [employer] industry, he retired at age 65. He  Luann in 2016 after both of their spouses passed away. He has children in [place] and several grandchildren and great grandchildren. He is an avid traveler.     Social Determinants of Health     Financial Resource Strain: Low Risk    • Difficulty of Paying Living Expenses: Not hard at all   Food Insecurity: No Food Insecurity   • Worried About Running Out of Food in the Last Year: Never true   • Ran Out of Food in the Last Year: Never true   Transportation Needs: No Transportation Needs   • Lack of Transportation (Medical): No   • Lack of Transportation (Non-Medical): No   Physical Activity: Inactive   • Days of Exercise per Week: 0 days   • Minutes of Exercise per Session: 0 min   Stress: Stress Concern Present   • Feeling of Stress : To some extent   Social Connections: Slightly Isolated   • Frequency  of Communication with Friends and Family: More than three times a week   • Frequency of Social Gatherings with Friends and Family: Once a week   • Attends Yazidi Services: Never   • Active Member of Clubs or Organizations: Yes   • Attends Club or Organization Meetings: 1 to 4 times per year   • Marital Status:    Intimate Partner Violence:    • Fear of Current or Ex-Partner:    • Emotionally Abused:    • Physically Abused:    • Sexually Abused:          EXAMINATION     Physical Exam:   Vitals: /86 (BP Location: Right arm, Patient Position: Sitting, BP Cuff Size: Adult long)   Pulse 83   Temp 36.4 °C (97.5 °F) (Temporal)   Ht 1.829 m (6')   Wt 118 kg (259 lb 7.7 oz)   SpO2 95%     Constitutional:   Body Habitus: Body mass index is 35.19 kg/m².  Cooperation: Fully cooperates with exam  Appearance: Well-groomed, well-nourished, not disheveled, in no acute distress    Eyes: No scleral icterus, no proptosis     ENT -no obvious auditory deficits, wearing a face mask    Skin -no rashes or lesions noted    Respiratory-  breathing comfortable on room air, no audible wheezing    Cardiovascular- No lower extremity edema is noted.     Psychiatric- alert and oriented ×3. Normal affect.     Gait - normal gait, no use of ambulatory device, minimally antalgic.     Manual muscle testing:no focal motor deficits in the lower extremities  Sensory grossly intact to light touch bilaterally  Reflexes 2+ patella and achilles bilaterally      MEDICAL DECISION MAKING    Medical records review: see under HPI section.     DATA    Labs:   Lab Results   Component Value Date/Time    SODIUM 136 09/23/2020 07:31 AM    POTASSIUM 4.6 09/23/2020 07:31 AM    CHLORIDE 100 09/23/2020 07:31 AM    CO2 24 09/23/2020 07:31 AM    ANION 12.0 09/23/2020 07:31 AM    GLUCOSE 107 (H) 09/23/2020 07:31 AM    BUN 23 (H) 09/23/2020 07:31 AM    CREATININE 0.84 09/23/2020 07:31 AM    CREATININE 0.93 02/20/2013 07:20 AM    CALCIUM 9.3 09/23/2020  07:31 AM    ASTSGOT 32 09/23/2020 07:31 AM    ALTSGPT 34 09/23/2020 07:31 AM    TBILIRUBIN 0.7 09/23/2020 07:31 AM    ALBUMIN 4.2 09/23/2020 07:31 AM    TOTPROTEIN 7.7 09/23/2020 07:31 AM    GLOBULIN 3.5 09/23/2020 07:31 AM    AGRATIO 1.2 09/23/2020 07:31 AM       Lab Results   Component Value Date/Time    PROTHROMBTM 14.8 (H) 12/05/2018 07:00 AM    INR 1.17 (H) 12/05/2018 07:00 AM        Lab Results   Component Value Date/Time    WBC 7.1 08/07/2020 11:10 AM    RBC 5.11 08/07/2020 11:10 AM    HEMOGLOBIN 15.9 08/07/2020 11:10 AM    HEMATOCRIT 46.9 08/07/2020 11:10 AM    MCV 91.8 08/07/2020 11:10 AM    MCH 31.1 08/07/2020 11:10 AM    MCHC 33.9 08/07/2020 11:10 AM    MPV 10.0 08/07/2020 11:10 AM    NEUTSPOLYS 66.00 08/07/2020 11:10 AM    LYMPHOCYTES 18.30 (L) 08/07/2020 11:10 AM    MONOCYTES 13.30 08/07/2020 11:10 AM    EOSINOPHILS 1.30 08/07/2020 11:10 AM    BASOPHILS 0.70 08/07/2020 11:10 AM        Lab Results   Component Value Date/Time    HBA1C 5.8 (H) 09/19/2019 06:45 AM        Imaging: I personally reviewed following images, these are my reads  MRI lumbar spine 10/29/2020  At L5-S1, there is bilateral facet arthropathy without central canal narrowing.  Mild foraminal stenosis bilaterally   At L4-5, mild central canal stenosis second to disc bulge and severe facet arthropathy.  Moderate foraminal stenosis bilaterally  At L3-4, disc bulge with moderate facet arthropathy and mild-moderate central canal stenosis.  Severe foraminal stenosis bilaterally  At L2-3, posterior disc bulge with facet arthropathy and mild central canal narrowing.  Mild foraminal stenosis bilaterally  At L1-2, mild facet arthropathy bilaterally, mild foraminal stenosis bilaterally.  Prominent epidural fat    Xray lumbar spine 08/07/2020  There is multilevel retrolisthesis at L1-2, L2-3, and L3-4  Grade I anterolisthesis at L4-5, stable on flexion and extension  Degenerative changes with multilevel facet arthropathy    Xray lumbar spine  10/04/2018  There is severe degenerative change in the lumbar spine; there is note of multilevel retrolisthesis at L1-2, L3-4 and L2-3  Mild movement of anterolisthesis at L4-5 with flexion    MRI left knee 07/10/2020  There is note of a small joint effusion.  Cyst posterior to the PCL.  No ligamentous or meniscal tears  Tricompartmental degenerative changes, greatest medial compartment    Xray hip right 10/04/2018  Moderate hip osteoarthritis noted bilaterally     MRI left knee 07/22/2013  There is note of moderate joint effusion.  The medial collateral ligament has mild edema.  There is note of osteophytes in the medial and lateral compartments.  Tendinopathy in the quadriceps tendon noted.    IMAGING radiology reads. I reviewed the following radiology reads     MRI lumbar spine 10/29/2020  IMPRESSION:  1.  Minimal anterolisthesis of L4-5.  2.  Multilevel degenerative disease and facet arthropathy with resultant central canal and foraminal narrowing as described.    Xray lumbar spine 08/07/2020  IMPRESSION:  1.  Multilevel degenerative disc disease and facet degeneration.  2.  Mild anterior subluxation at L4-5. This is stable with flexion and extension.  3.  Again seen multilevel degenerative retrolisthesis. This does not change significantly extension.    Xray lumbar spine 10/04/2018  IMPRESSION:     Mild retrolisthesis of L1-2, L2-3 and L3-4 that are worse with extension.  Mild anterolisthesis of L4-5 that is worse with flexion.  There is partial disc space fusion of the lower thoracic spine.    MRI left knee 07/10/2020  IMPRESSION:  1.  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  2.  Intact ligaments and menisci.  3.  Small joint effusion.  4.  [place] synovial or ganglion cyst immediately posterior to the posterior cruciate ligament.      MRI left knee 07/22/2013       Impression        1. Tendinopathy of the quadriceps tendon.     2. Medial  collateral ligament sprain.     3. Osteoarthritis, most severe in the medial compartment.     4. Fraying of the inner rim of the bodies of the lateral and medial menisci.     5. Moderate joint effusion.      Xray hips 10/04/2018  Impression:  No acute osseous abnormality                         Results for orders placed during the hospital encounter of 10/04/18   DX-LUMBAR SPINE-4+ VIEWS    Impression Mild retrolisthesis of L1-2, L2-3 and L3-4 that are worse with extension.    Mild anterolisthesis of L4-5 that is worse with flexion.    There is partial disc space fusion of the lower thoracic spine.                                         Diagnosis   Visit Diagnoses     ICD-10-CM   1. Primary osteoarthritis of left knee  M17.12   2. Cyst of left knee joint  M25.862   3. Left knee pain, unspecified chronicity  M25.562   4. Spinal stenosis of lumbar region, unspecified whether neurogenic claudication present  M48.061   5. Spondylolisthesis of lumbar region  M43.16   6. Neural foraminal stenosis of lumbar spine  M99.73           ASSESSMENT:  Curt Blair 70 y.o. male seen for above     Curt was seen today for follow-up.    Diagnoses and all orders for this visit:    Primary osteoarthritis of left knee    Cyst of left knee joint    Left knee pain, unspecified chronicity    Spinal stenosis of lumbar region, unspecified whether neurogenic claudication present    Spondylolisthesis of lumbar region    Neural foraminal stenosis of lumbar spine         1. Discussed that he has had some improvement in his pain, perhaps related to injection, unclear.  2. The pattern of his pain continues to be challenging to treat as he does not report having any pain during the day or with activities.  Since he does not have pain, except at night, we discussed that because of this, treatments like genicular nerve blocks are not an option as they are diagnostic blocks with response related to the anesthetic period of the medication.     3. He continues to decline surgical consultation, dicussed again today.  Back pain has been less without golfing recently, he will let me know if this worsens.  Unclear if lumbar spine pathology contributes.    4. If lyrica is no better than taking tylenol 500mg at bedtime, discussed that he can discontinue.        Follow-up: Return in about 4 months (around 8/23/2021), or if symptoms worsen or fail to improve.    Thank you very much for asking me to participate in Curt Lamonte Rossy's care.  Please contact me with any questions or concerns.    Please note that this dictation was created using voice recognition software. I have made every reasonable attempt to correct obvious errors but there may be errors of grammar and content that I may have overlooked prior to finalization of this note.      Sav Rick MD  Physical Medicine and Rehabilitation  Interventional Spine and Sports Physiatry  [facility]    CC: Shila Mullen, DO

## 2021-05-02 DIAGNOSIS — I25.119 CORONARY ARTERY DISEASE WITH ANGINA PECTORIS, UNSPECIFIED VESSEL OR LESION TYPE, UNSPECIFIED WHETHER NATIVE OR TRANSPLANTED HEART (HCC): ICD-10-CM

## 2021-05-05 RX ORDER — NITROGLYCERIN 0.4 MG/1
TABLET SUBLINGUAL
Qty: 25 TABLET | Refills: 0 | Status: SHIPPED | OUTPATIENT
Start: 2021-05-05 | End: 2022-05-05 | Stop reason: SDUPTHER

## 2021-05-11 ENCOUNTER — OFFICE VISIT (OUTPATIENT)
Dept: PHYSICAL MEDICINE AND REHAB | Facility: MEDICAL CENTER | Age: 71
End: 2021-05-11
Payer: MEDICARE

## 2021-05-11 VITALS
TEMPERATURE: 97.6 F | SYSTOLIC BLOOD PRESSURE: 128 MMHG | HEART RATE: 89 BPM | WEIGHT: 258.38 LBS | DIASTOLIC BLOOD PRESSURE: 60 MMHG | OXYGEN SATURATION: 95 % | HEIGHT: 72 IN | BODY MASS INDEX: 35 KG/M2

## 2021-05-11 DIAGNOSIS — M17.12 PRIMARY OSTEOARTHRITIS OF LEFT KNEE: ICD-10-CM

## 2021-05-11 DIAGNOSIS — M25.862 CYST OF LEFT KNEE JOINT: ICD-10-CM

## 2021-05-11 DIAGNOSIS — M43.16 SPONDYLOLISTHESIS OF LUMBAR REGION: ICD-10-CM

## 2021-05-11 DIAGNOSIS — M48.061 NEURAL FORAMINAL STENOSIS OF LUMBAR SPINE: ICD-10-CM

## 2021-05-11 DIAGNOSIS — M48.061 SPINAL STENOSIS OF LUMBAR REGION, UNSPECIFIED WHETHER NEUROGENIC CLAUDICATION PRESENT: ICD-10-CM

## 2021-05-11 PROCEDURE — 99214 OFFICE O/P EST MOD 30 MIN: CPT | Performed by: PHYSICAL MEDICINE & REHABILITATION

## 2021-05-11 ASSESSMENT — PATIENT HEALTH QUESTIONNAIRE - PHQ9: CLINICAL INTERPRETATION OF PHQ2 SCORE: 0

## 2021-05-11 ASSESSMENT — FIBROSIS 4 INDEX: FIB4 SCORE: 1.65

## 2021-05-11 ASSESSMENT — PAIN SCALES - GENERAL: PAINLEVEL: 5=MODERATE PAIN

## 2021-05-11 NOTE — PATIENT INSTRUCTIONS
Your procedure will be at the [facility] special procedure suite.    [facility] [place] [place] [address]       PRE-PROCEDURE INSTRUCTIONS  You may take your regular medications except:   · No Anti-inflammatories 5 days prior to your procedure. Anti-inflammatories include medicines such as  ibuprofen (Motrin, Advil), Excedrin, Naproxen (Aleve, Anaprox, Naprelan, Naprosyn), Celecoxib (Celebrex), Diclofenac (Voltaren-XR tab), and Meloxicam (Mobic). Include aspirin  · No Glucophage or Metformin 24 hours before your procedure. You may resume next day after your procedure.  · Call the physiatry office if you are taking or prescribed anti-biotics within five days of procedure.  · Please ask provider if you are taking any new diabetes medication.  · CONTINUE TAKING BLOOD PRESSURE MEDICATIONS AS PRESCRIBED.  · Pain medications will not be prescribed on the procedure day. Procedural pain medication may be used by your provider   · Call your doctor's office performing the procedure if you have a fever, chills, rash or new illness prior to your procedure    Anticoagulation/antiplatelet medications  No Blood thinning medications such as Coumadin or Plavix 5 days prior to procedure unless your doctor said to continue these medications. Call your doctor if a new medication is prescribed in this class.     Restrictions for eating before procedure:   · If you are getting procedural sedation, then do not eat to for 8 hours prior to procedure appointment time. Do not drink fluids for four hours prior to your procedure time.   · If you are not having procedural sedation, then Skip the meal prior to your procedure. If you have a morning procedure then skip breakfast. If you have an afternoon procedure then skip lunch.   · You may drink clear liquids up to 2 hours prior to your procedure  · You must have a  the day of procedure to accompany you home.      POST PROCEDURE INSTRUCTIONS   · No heavy lifting, strenuous  bending or strenuous exercise for 3 days after your procedure.  · No hot tubs, baths, swimming for 3 days after your procedure  · You can remove the bandage the day after the procedure.  · IF YOU RECEIVED A STEROID INJECTION. PLEASE NOTE THAT THERE MAY BE A DELAY FOR THE INJECTION TO START WORKING, THE DELAY MAY BE UP TO TWO WEEKS. IF YOU HAVE DIABETES, PLEASE NOTE THAT YOUR SUGAR LEVELS MAY BE ELEVATED FOR 1-2 DAYS AFTER A STEROID INJECTION.  THE STEROID MAY CAUSE TEMPORARY SYMPTOMS WHICH USUALLY RESOLVE ON THEIR OWN WITHIN 1 TO 2 DAYS INCLUDING FACIAL FLUSHING OR A FEELING OF WARMTH ON THE FACE, TEMPORARY INCREASES IN BLOOD SUGAR, INSOMNIA, INCREASED HUNGER  · IF YOU RECEIVED A DIAGNOSTIC PROCEDURE (SUCH AS A MEDIAL BRANCH BLOCK), PLEASE NOTE THAT WE DO EXPECT THIS INJECTION TO WEAR OFF.  IT IS IMPORTANT TO COMPLETE THE PAIN DIARY AND LIST THE PAIN SCORE ONLY FOR THE REGION WHERE THE PROCEDURE WAS AND BRING THIS TO YOUR FOLLOW UP VISIT.  · IF YOU RECEIVED A RADIOFREQUENCY PROCEDURE, THERE MAY BE SOME SORENESS AFTER THE PROCEDURE.  THIS IS NORMAL.  · IF YOU EXPERIENCE PROLONGED WEAKNESS LONGER THAN ONE DAY, BOWEL OR BLADDER INCONTINENCE THEN PLEASE CALL THE PHYSIATRY OFFICE.  · Your leg may feel heavy, weak and numb for up to 1-2 days. Be very careful walking.   ·  You may resume normal activities 3 days after procedure.

## 2021-05-11 NOTE — PROGRESS NOTES
Follow-up patient note, patient previously seen by Dr. Porter     Physiatry (physical medicine and  Rehabilitation), interventional spine and sports medicine    Date of Service: 05/11/2021    Chief complaint:   Chief Complaint   Patient presents with   • Follow-Up     Left knee pain       HISTORY    HPI: Curt [place] 70 y.o. male who presents today for follow-up evaluation of left knee pain.     Curt reports that since the last visit, he has noted that he has an ache in his left knee.  It is new for him to have pain during the day.  It is aching and he is able to continue with his activities.  Pain is usually worse at night and disrupts his sleep.  This causes him to wake every night.  He has some improvement with tylenol.  Sleep is about 4-5 hours a night.  Cramping at night seems to then make it hard to be comfortable.    Diclofenac helps with pain and he takes this twice a day.  He has started turmeric and feels like this is helping his hands and fingers are not as sore.    Medical records review:    Reviewed records from Dr Clovis Alicea 07/2013 At that visit, he assessed that MRI of left knee was indicated.  On 07/30/2013, they reviewed the study and the patient had a left knee corticosteroid injection    Previous treatments:    Physical Therapy: No    Medications the patient is tried: NSAIDs    Previous interventions: none, recently     Previous surgeries to relieve the above pain:  none      ROS:   Eyes: cataract surgery, recently  CV: history of MI at 41  Red Flags ROS:   Fever, Chills, Sweats: Denies  Involuntary Weight Loss: Denies  Bladder Incontinence: Denies  Bowel Incontinence: Denies  Saddle Anesthesia: Denies    All other systems reviewed and negative.       PMHx:   Past Medical History:   Diagnosis Date   • Allergies 12/24/2019   • Arteriosclerotic vascular disease 1/25/2019   • Arthritis     Right Foot   • CAD (coronary artery disease)    • Elevated CPK 8/16/2020   • Generalized anxiety  disorder 10/11/2017   • Hyperlipidemia    • Hypertension    • Inflamed sebaceous cyst 2/15/2018   • Intrinsic eczema 2020   • Prediabetes 2019   • S/P coronary artery stent placement     2 stents       PSHx:   Past Surgical History:   Procedure Laterality Date   • APPENDECTOMY     • CHOLECYSTECTOMY     • ENDARTERECTOMY      corornary   • STENT PLACEMENT     • VASECTOMY         Family history   Family History   Problem Relation Age of Onset   • Lung Disease Mother         copd   • Hypertension Mother    • Heart Disease Father         heart attack and heart disease   • Cancer Brother         neck   • Heart Disease Paternal Grandfather         Heart attack   • Other Sister         non-malignant brain tumor   • Diabetes Neg Hx          Medications:   Current Outpatient Medications   Medication   • nitroglycerin (NITROSTAT) 0.4 MG SL Tab   • amlodipine-benazepril (LOTREL) 5-10 MG per capsule   • atorvastatin (LIPITOR) 10 MG Tab   • diclofenac DR (VOLTAREN) 75 MG Tablet Delayed Response   • Multiple Vitamins-Minerals (MULTIVITAMIN PO)   • Coenzyme Q10 (CO Q 10 PO)   • MEGARED OMEGA-3 KRILL OIL PO   • CALCIUM PO   • aspirin 81 MG tablet     No current facility-administered medications for this visit.       Allergies:   Allergies   Allergen Reactions   • Atorvastatin Myalgia     Ok to take 10 mg  Elevated CPK  Only at 40 Mg does this happen.   • Rosuvastatin      muscle cramps elevated CPK       Social Hx:   Social History     Socioeconomic History   • Marital status:      Spouse name: Not on file   • Number of children: Not on file   • Years of education: Not on file   • Highest education level: 12th grade   Occupational History   • Not on file   Tobacco Use   • Smoking status: Former Smoker     Packs/day: 2.00     Years: 25.00     Pack years: 50.00     Types: Cigarettes     Quit date: 1992     Years since quittin.3   • Smokeless tobacco: Never Used   • Tobacco comment: avoid all tobacco products    Substance and Sexual Activity   • Alcohol use: Yes     Alcohol/week: 0.6 oz     Types: 1 Standard drinks or equivalent per week     Comment: occ   • Drug use: No   • Sexual activity: Yes     Partners: Female     Comment: , 3 children   Other Topics Concern   •  Service Yes   • Blood Transfusions No   • Caffeine Concern No   • Occupational Exposure No   • Hobby Hazards No   • Sleep Concern Yes   • Stress Concern Yes   • Weight Concern Yes   • Special Diet No     Comment: no fried   • Back Care Yes   • Exercise Yes   • Bike Helmet No     Comment: does not ride bike   • Seat Belt Not Asked   • Self-Exams Yes   Social History Narrative    He was born in [place]. He worked in the [employer] industry, he retired at age 65. He  Luann in 2016 after both of their spouses passed away. He has children in [place] and several grandchildren and great grandchildren. He is an avid traveler.     Social Determinants of Health     Financial Resource Strain: Low Risk    • Difficulty of Paying Living Expenses: Not hard at all   Food Insecurity: No Food Insecurity   • Worried About Running Out of Food in the Last Year: Never true   • Ran Out of Food in the Last Year: Never true   Transportation Needs: No Transportation Needs   • Lack of Transportation (Medical): No   • Lack of Transportation (Non-Medical): No   Physical Activity: Inactive   • Days of Exercise per Week: 0 days   • Minutes of Exercise per Session: 0 min   Stress: Stress Concern Present   • Feeling of Stress : To some extent   Social Connections: Slightly Isolated   • Frequency of Communication with Friends and Family: More than three times a week   • Frequency of Social Gatherings with Friends and Family: Once a week   • Attends Buddhist Services: Never   • Active Member of Clubs or Organizations: Yes   • Attends Club or Organization Meetings: 1 to 4 times per year   • Marital Status:    Intimate Partner Violence:    • Fear of Current or  Ex-Partner:    • Emotionally Abused:    • Physically Abused:    • Sexually Abused:          EXAMINATION     Physical Exam:   Vitals: /60 (BP Location: Right arm, Patient Position: Sitting, BP Cuff Size: Small adult)   Pulse 89   Temp 36.4 °C (97.6 °F) (Temporal)   Ht 1.829 m (6')   Wt 117 kg (258 lb 6.1 oz)   SpO2 95%     Constitutional:   Body Habitus: Body mass index is 35.04 kg/m².  Cooperation: Fully cooperates with exam  Appearance: Well-groomed, well-nourished, not disheveled, in no acute distress    Eyes: No scleral icterus, no proptosis     ENT -no obvious auditory deficits, wearing a face mask    Skin -no rashes or lesions noted    Respiratory-  breathing comfortable on room air, no audible wheezing    Cardiovascular- No lower extremity edema is noted.     Psychiatric- alert and oriented ×3. Normal affect.     Gait - normal gait, no use of ambulatory device, minimally antalgic.     Mild tenderness over the joint line medial and lateral knee.  No significant effusion     MEDICAL DECISION MAKING    Medical records review: see under HPI section.     DATA    Labs:   Lab Results   Component Value Date/Time    SODIUM 136 09/23/2020 07:31 AM    POTASSIUM 4.6 09/23/2020 07:31 AM    CHLORIDE 100 09/23/2020 07:31 AM    CO2 24 09/23/2020 07:31 AM    ANION 12.0 09/23/2020 07:31 AM    GLUCOSE 107 (H) 09/23/2020 07:31 AM    BUN 23 (H) 09/23/2020 07:31 AM    CREATININE 0.84 09/23/2020 07:31 AM    CREATININE 0.93 02/20/2013 07:20 AM    CALCIUM 9.3 09/23/2020 07:31 AM    ASTSGOT 32 09/23/2020 07:31 AM    ALTSGPT 34 09/23/2020 07:31 AM    TBILIRUBIN 0.7 09/23/2020 07:31 AM    ALBUMIN 4.2 09/23/2020 07:31 AM    TOTPROTEIN 7.7 09/23/2020 07:31 AM    GLOBULIN 3.5 09/23/2020 07:31 AM    AGRATIO 1.2 09/23/2020 07:31 AM       Lab Results   Component Value Date/Time    PROTHROMBTM 14.8 (H) 12/05/2018 07:00 AM    INR 1.17 (H) 12/05/2018 07:00 AM        Lab Results   Component Value Date/Time    WBC 7.1 08/07/2020 11:10 AM     RBC 5.11 08/07/2020 11:10 AM    HEMOGLOBIN 15.9 08/07/2020 11:10 AM    HEMATOCRIT 46.9 08/07/2020 11:10 AM    MCV 91.8 08/07/2020 11:10 AM    MCH 31.1 08/07/2020 11:10 AM    MCHC 33.9 08/07/2020 11:10 AM    MPV 10.0 08/07/2020 11:10 AM    NEUTSPOLYS 66.00 08/07/2020 11:10 AM    LYMPHOCYTES 18.30 (L) 08/07/2020 11:10 AM    MONOCYTES 13.30 08/07/2020 11:10 AM    EOSINOPHILS 1.30 08/07/2020 11:10 AM    BASOPHILS 0.70 08/07/2020 11:10 AM        Lab Results   Component Value Date/Time    HBA1C 5.8 (H) 09/19/2019 06:45 AM        Imaging: I personally reviewed following images, these are my reads  MRI lumbar spine 10/29/2020  At L5-S1, there is bilateral facet arthropathy without central canal narrowing.  Mild foraminal stenosis bilaterally   At L4-5, mild central canal stenosis second to disc bulge and severe facet arthropathy.  Moderate foraminal stenosis bilaterally  At L3-4, disc bulge with moderate facet arthropathy and mild-moderate central canal stenosis.  Severe foraminal stenosis bilaterally  At L2-3, posterior disc bulge with facet arthropathy and mild central canal narrowing.  Mild foraminal stenosis bilaterally  At L1-2, mild facet arthropathy bilaterally, mild foraminal stenosis bilaterally.  Prominent epidural fat    Xray lumbar spine 08/07/2020  There is multilevel retrolisthesis at L1-2, L2-3, and L3-4  Grade I anterolisthesis at L4-5, stable on flexion and extension  Degenerative changes with multilevel facet arthropathy    Xray lumbar spine 10/04/2018  There is severe degenerative change in the lumbar spine; there is note of multilevel retrolisthesis at L1-2, L3-4 and L2-3  Mild movement of anterolisthesis at L4-5 with flexion    MRI left knee 07/10/2020  There is note of a small joint effusion.  Cyst posterior to the PCL.  No ligamentous or meniscal tears  Tricompartmental degenerative changes, greatest medial compartment    Xray hip right 10/04/2018  Moderate hip osteoarthritis noted bilaterally      MRI left knee 07/22/2013  There is note of moderate joint effusion.  The medial collateral ligament has mild edema.  There is note of osteophytes in the medial and lateral compartments.  Tendinopathy in the quadriceps tendon noted.    IMAGING radiology reads. I reviewed the following radiology reads     MRI lumbar spine 10/29/2020  IMPRESSION:  1.  Minimal anterolisthesis of L4-5.  2.  Multilevel degenerative disease and facet arthropathy with resultant central canal and foraminal narrowing as described.    Xray lumbar spine 08/07/2020  IMPRESSION:  1.  Multilevel degenerative disc disease and facet degeneration.  2.  Mild anterior subluxation at L4-5. This is stable with flexion and extension.  3.  Again seen multilevel degenerative retrolisthesis. This does not change significantly extension.    Xray lumbar spine 10/04/2018  IMPRESSION:     Mild retrolisthesis of L1-2, L2-3 and L3-4 that are worse with extension.  Mild anterolisthesis of L4-5 that is worse with flexion.  There is partial disc space fusion of the lower thoracic spine.    MRI left knee 07/10/2020  IMPRESSION:  1.  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  2.  Intact ligaments and menisci.  3.  Small joint effusion.  4.  Lund synovial or ganglion cyst immediately posterior to the posterior cruciate ligament.      MRI left knee 07/22/2013       Impression        1. Tendinopathy of the quadriceps tendon.     2. Medial collateral ligament sprain.     3. Osteoarthritis, most severe in the medial compartment.     4. Fraying of the inner rim of the bodies of the lateral and medial menisci.     5. Moderate joint effusion.      Xray hips 10/04/2018  Impression:  No acute osseous abnormality                         Results for orders placed during the hospital encounter of 10/04/18   DX-LUMBAR SPINE-4+ VIEWS    Impression Mild retrolisthesis of L1-2, L2-3 and L3-4 that are worse with  extension.    Mild anterolisthesis of L4-5 that is worse with flexion.    There is partial disc space fusion of the lower thoracic spine.                                         Diagnosis   Visit Diagnoses     ICD-10-CM   1. Primary osteoarthritis of left knee  M17.12   2. Cyst of left knee joint  M25.862   3. Spondylolisthesis of lumbar region  M43.16   4. Spinal stenosis of lumbar region, unspecified whether neurogenic claudication present  M48.061   5. Neural foraminal stenosis of lumbar spine  M99.73           ASSESSMENT:  Curt [place] 70 y.o. male seen for above     Curt was seen today for follow-up.    Diagnoses and all orders for this visit:    Primary osteoarthritis of left knee  -     REFERRAL TO ORTHOPEDICS  -     REFERRAL TO OUTPATIENT INTERVENTIONAL PAIN CLINIC    Cyst of left knee joint  -     REFERRAL TO ORTHOPEDICS  -     REFERRAL TO OUTPATIENT INTERVENTIONAL PAIN CLINIC    Spondylolisthesis of lumbar region    Spinal stenosis of lumbar region, unspecified whether neurogenic claudication present    Neural foraminal stenosis of lumbar spine         1. Discussed that he has now been having pain in the left knee.  This is new and is worse with walking.  Discussed referral for surgical consultation.  Referral placed and discussed with patient.  2. Reviewed treatment options.  He is not anxious for surgical management.  Discussed left genicular nerve blocks, diagnostic blocks.  The risks benefits and alternatives to this procedure were discussed and the patient wishes to proceed with the procedure. Risks include but are not limited to damage to surrounding structures, infection, bleeding, worsening of pain which can be permanent, weakness which can be permanent. Benefits include pain relief, improved function. Alternatives includes not doing the procedure.  Discussed that he can hold his aspirin for 5 days prior to procedure, okay to continue diclofenac and hold only the day of the procedure.  3.  Continue to deny limitations from back.  Continue activity as tolerated.      Follow-up: Return if symptoms worsen or fail to improve, for Hospital injection.    Thank you very much for asking me to participate in Curt [place]'s care.  Please contact me with any questions or concerns.    Please note that this dictation was created using voice recognition software. I have made every reasonable attempt to correct obvious errors but there may be errors of grammar and content that I may have overlooked prior to finalization of this note.      Sav Rick MD  Physical Medicine and Rehabilitation  Interventional Spine and Sports Physiatry  [facility]    CC: Shila Mullen, DO

## 2021-05-13 RX ORDER — ATORVASTATIN CALCIUM 10 MG/1
TABLET, FILM COATED ORAL
Qty: 100 TABLET | Refills: 3 | Status: SHIPPED | OUTPATIENT
Start: 2021-05-13 | End: 2022-04-05 | Stop reason: SDUPTHER

## 2021-05-23 DIAGNOSIS — M48.061 NEURAL FORAMINAL STENOSIS OF LUMBAR SPINE: ICD-10-CM

## 2021-05-23 DIAGNOSIS — M48.061 SPINAL STENOSIS OF LUMBAR REGION, UNSPECIFIED WHETHER NEUROGENIC CLAUDICATION PRESENT: ICD-10-CM

## 2021-05-25 ENCOUNTER — APPOINTMENT (RX ONLY)
Dept: URBAN - METROPOLITAN AREA CLINIC 20 | Facility: CLINIC | Age: 71
Setting detail: DERMATOLOGY
End: 2021-05-25

## 2021-05-25 DIAGNOSIS — L82.0 INFLAMED SEBORRHEIC KERATOSIS: ICD-10-CM

## 2021-05-25 PROCEDURE — ? BENIGN DESTRUCTION

## 2021-05-25 PROCEDURE — 17110 DESTRUCTION B9 LES UP TO 14: CPT

## 2021-05-25 PROCEDURE — ? COUNSELING

## 2021-05-25 RX ORDER — PREGABALIN 50 MG/1
CAPSULE ORAL
Qty: 60 CAPSULE | Refills: 0 | Status: SHIPPED | OUTPATIENT
Start: 2021-05-25 | End: 2021-06-24

## 2021-05-25 ASSESSMENT — LOCATION ZONE DERM: LOCATION ZONE: ARM

## 2021-05-25 ASSESSMENT — LOCATION SIMPLE DESCRIPTION DERM: LOCATION SIMPLE: LEFT FOREARM

## 2021-05-25 ASSESSMENT — LOCATION DETAILED DESCRIPTION DERM: LOCATION DETAILED: LEFT VENTRAL DISTAL FOREARM

## 2021-05-25 NOTE — PROCEDURE: BENIGN DESTRUCTION
Anesthesia Volume In Cc: 2
Render Post-Care Instructions In Note?: no
Medical Necessity Information: It is in your best interest to select a reason for this procedure from the list below. All of these items fulfill various CMS LCD requirements except the new and changing color options.
Treatment Number (Will Not Render If 0): 0
Detail Level: Detailed
Consent: The patient's consent was obtained including but not limited to risks of crusting, scabbing, blistering, scarring, darker or lighter pigmentary change, recurrence, incomplete removal and infection.
Post-Care Instructions: I reviewed with the patient in detail post-care instructions. Patient is to wear sunprotection, and avoid picking at any of the treated lesions. Pt may apply Vaseline to crusted or scabbing areas.

## 2021-06-02 ENCOUNTER — OFFICE VISIT (OUTPATIENT)
Dept: CARDIOLOGY | Facility: MEDICAL CENTER | Age: 71
End: 2021-06-02
Payer: MEDICARE

## 2021-06-02 VITALS
WEIGHT: 256.6 LBS | HEIGHT: 72 IN | HEART RATE: 96 BPM | BODY MASS INDEX: 34.75 KG/M2 | DIASTOLIC BLOOD PRESSURE: 60 MMHG | SYSTOLIC BLOOD PRESSURE: 112 MMHG | RESPIRATION RATE: 20 BRPM | OXYGEN SATURATION: 95 %

## 2021-06-02 DIAGNOSIS — I25.83 CORONARY ARTERY DISEASE DUE TO LIPID RICH PLAQUE: ICD-10-CM

## 2021-06-02 DIAGNOSIS — E78.5 DYSLIPIDEMIA: ICD-10-CM

## 2021-06-02 DIAGNOSIS — Z95.5 STATUS POST CORONARY ARTERY STENT PLACEMENT: ICD-10-CM

## 2021-06-02 DIAGNOSIS — I25.10 CORONARY ARTERY DISEASE DUE TO LIPID RICH PLAQUE: ICD-10-CM

## 2021-06-02 DIAGNOSIS — R74.8 ELEVATED CPK: Chronic | ICD-10-CM

## 2021-06-02 DIAGNOSIS — I10 ESSENTIAL HYPERTENSION, BENIGN: ICD-10-CM

## 2021-06-02 DIAGNOSIS — I49.1 PAC (PREMATURE ATRIAL CONTRACTION): ICD-10-CM

## 2021-06-02 PROCEDURE — 99214 OFFICE O/P EST MOD 30 MIN: CPT | Performed by: INTERNAL MEDICINE

## 2021-06-02 ASSESSMENT — ENCOUNTER SYMPTOMS
SORE THROAT: 0
BLURRED VISION: 0
PALPITATIONS: 0
CHILLS: 0
BRUISES/BLEEDS EASILY: 0
COUGH: 0
NAUSEA: 0
CLAUDICATION: 0
ABDOMINAL PAIN: 0
SHORTNESS OF BREATH: 0
WEAKNESS: 0
FALLS: 0
FEVER: 0
DIZZINESS: 0
FOCAL WEAKNESS: 0
PND: 0

## 2021-06-02 ASSESSMENT — FIBROSIS 4 INDEX: FIB4 SCORE: 1.65

## 2021-06-02 NOTE — PROGRESS NOTES
Chief Complaint   Patient presents with   • Hypertension Follow-up       Subjective:   Curt [place] is a 70 y.o. male who presents today for follow-up of his history of coronary disease status post stenting    He is having side effects from the statins    He is planning upcoming knee block may also see orthopedics    Past Medical History:   Diagnosis Date   • Allergies 2019   • Arteriosclerotic vascular disease 2019   • Arthritis     Right Foot   • CAD (coronary artery disease)    • Elevated CPK 2020   • Generalized anxiety disorder 10/11/2017   • Hyperlipidemia    • Hypertension    • Inflamed sebaceous cyst 2/15/2018   • Intrinsic eczema 2020   • Prediabetes 2019   • S/P coronary artery stent placement     2 stents     Past Surgical History:   Procedure Laterality Date   • APPENDECTOMY     • CHOLECYSTECTOMY     • ENDARTERECTOMY      corornary   • STENT PLACEMENT     • VASECTOMY       Family History   Problem Relation Age of Onset   • Lung Disease Mother         copd   • Hypertension Mother    • Heart Disease Father         heart attack and heart disease   • Cancer Brother         neck   • Heart Disease Paternal Grandfather         Heart attack   • Other Sister         non-malignant brain tumor   • Diabetes Neg Hx      Social History     Socioeconomic History   • Marital status:      Spouse name: Not on file   • Number of children: Not on file   • Years of education: Not on file   • Highest education level: 12th grade   Occupational History   • Not on file   Tobacco Use   • Smoking status: Former Smoker     Packs/day: 2.00     Years: 25.00     Pack years: 50.00     Types: Cigarettes     Quit date: 1992     Years since quittin.4   • Smokeless tobacco: Never Used   • Tobacco comment: avoid all tobacco products   Vaping Use   • Vaping Use: Never used   Substance and Sexual Activity   • Alcohol use: Yes     Alcohol/week: 0.6 oz     Types: 1 Standard drinks or equivalent  per week     Comment: occ   • Drug use: No   • Sexual activity: Yes     Partners: Female     Comment: , 3 children   Other Topics Concern   •  Service Yes   • Blood Transfusions No   • Caffeine Concern No   • Occupational Exposure No   • Hobby Hazards No   • Sleep Concern Yes   • Stress Concern Yes   • Weight Concern Yes   • Special Diet No     Comment: no fried   • Back Care Yes   • Exercise Yes   • Bike Helmet No     Comment: does not ride bike   • Seat Belt Not Asked   • Self-Exams Yes   Social History Narrative    He was born in [place]. He worked in the [email or web address] industry, he retired at age 65. He  Luann in 2016 after both of their spouses passed away. He has children in [place] and several grandchildren and great grandchildren. He is an avid traveler.     Social Determinants of Health     Financial Resource Strain: Low Risk    • Difficulty of Paying Living Expenses: Not hard at all   Food Insecurity: No Food Insecurity   • Worried About Running Out of Food in the Last Year: Never true   • Ran Out of Food in the Last Year: Never true   Transportation Needs: No Transportation Needs   • Lack of Transportation (Medical): No   • Lack of Transportation (Non-Medical): No   Physical Activity: Inactive   • Days of Exercise per Week: 0 days   • Minutes of Exercise per Session: 0 min   Stress: Stress Concern Present   • Feeling of Stress : To some extent   Social Connections: Moderately Integrated   • Frequency of Communication with Friends and Family: More than three times a week   • Frequency of Social Gatherings with Friends and Family: Once a week   • Attends Orthodoxy Services: Never   • Active Member of Clubs or Organizations: Yes   • Attends Club or Organization Meetings: 1 to 4 times per year   • Marital Status:    Intimate Partner Violence:    • Fear of Current or Ex-Partner:    • Emotionally Abused:    • Physically Abused:    • Sexually Abused:      Allergies   Allergen Reactions    • Atorvastatin Myalgia     Ok to take 10 mg  Elevated CPK  Only at 40 Mg does this happen.   • Rosuvastatin      muscle cramps elevated CPK     Outpatient Encounter Medications as of 6/2/2021   Medication Sig Dispense Refill   • Turmeric (QC TUMERIC COMPLEX PO) Take 1,500 mg by mouth every day.     • pregabalin (LYRICA) 50 MG capsule TAKE 1 CAPSULE BY MOUTH 2 TIMES A DAY FOR 30 DAYS. M48 30 DAYS 60 capsule 0   • atorvastatin (LIPITOR) 10 MG Tab TAKE 1 TABLET BY MOUTH EVERY  tablet 3   • nitroglycerin (NITROSTAT) 0.4 MG SL Tab PLACE 1 TABLET UNDER THE TONGUE EVERY 5 MINUTES UP TO 3 DOSES AS NEEDED FOR CHEST PAIN). 25 tablet 0   • amlodipine-benazepril (LOTREL) 5-10 MG per capsule TAKE 1 CAPSULE BY MOUTH EVERY  Cap 3   • diclofenac DR (VOLTAREN) 75 MG Tablet Delayed Response TAKE 1 TABLET BY MOUTH TWICE A  Tab 3   • Multiple Vitamins-Minerals (MULTIVITAMIN PO) Take 1 Tab by mouth every morning.     • Coenzyme Q10 (CO Q 10 PO) Take 1 Tab by mouth every morning.     • MEGARED OMEGA-3 KRILL OIL PO Take 1 Tab by mouth every morning.     • CALCIUM PO Take 1 Tab by mouth every day.     • aspirin 81 MG tablet Take 81 mg by mouth every day.         No facility-administered encounter medications on file as of 6/2/2021.     Review of Systems   Constitutional: Negative for chills and fever.   HENT: Negative for sore throat.    Eyes: Negative for blurred vision.   Respiratory: Negative for cough and shortness of breath.    Cardiovascular: Negative for chest pain, palpitations, claudication, leg swelling and PND.   Gastrointestinal: Negative for abdominal pain and nausea.   Musculoskeletal: Negative for falls and joint pain.   Skin: Negative for rash.   Neurological: Negative for dizziness, focal weakness and weakness.   Endo/Heme/Allergies: Does not bruise/bleed easily.        Objective:   /60 (BP Location: Left arm, Patient Position: Sitting, BP Cuff Size: Adult)   Pulse 96   Resp 20   Ht 1.829 m  (6')   Wt 116 kg (256 lb 9.6 oz)   SpO2 95%   BMI 34.80 kg/m²     Physical Exam   Constitutional: No distress.   Eyes: No scleral icterus.   Neck: No JVD present.   Cardiovascular: Normal rate and normal heart sounds. Exam reveals no gallop and no friction rub.   No murmur heard.  Pulmonary/Chest: No respiratory distress. He has no wheezes. He has no rales.   Abdominal: Soft. Bowel sounds are normal.   Neurological: He is alert.   Skin: No rash noted. He is not diaphoretic.     We reviewed in person the most recent labs  Recent Results (from the past 5040 hour(s))   CREATINE KINASE    Collection Time: 11/13/20 10:49 AM   Result Value Ref Range    CPK Total 127 0 - 154 U/L       Assessment:     1. Coronary artery disease due to lipid rich plaque     2. Dyslipidemia     3. Essential hypertension, benign     4. PAC (premature atrial contraction)     5. Status post coronary artery stent placement     6. Elevated CPK  CREATINE KINASE       Medical Decision Making:  Today's Assessment / Status / Plan:     It was my pleasure to meet with Mr. [place].    We addressed the management of hypertension at today's visit. Blood pressure is well controlled.  We specifically assessed the labs on hypertension treatment    We addressed the management of dyslipidemia at today's visit. He is on appropriate statin.  We will keep track of the CPK on statin    Safe to procede can hold antiplatelets    I will see Mr. [place] back in 1 year time and encouraged him to follow up with us over the phone or electronically using my MyChart as issues arise.    It is my pleasure to participate in the care of Mr. [place].  Please do not hesitate to contact me with questions or concerns.    Iván Otoole MD PhD FAC  Cardiologist [facility]    Please note that this dictation was created using voice recognition software. There may be errors I did not discover before finalizing the note.

## 2021-06-15 ENCOUNTER — TELEPHONE (OUTPATIENT)
Dept: PHYSICAL MEDICINE AND REHAB | Facility: MEDICAL CENTER | Age: 71
End: 2021-06-15

## 2021-06-15 NOTE — TELEPHONE ENCOUNTER
"I called the pt to let him know he could take the Pregabalin and he said he had already been cleared to take it by the \"nurse\" who called him to pre-screen him for his procedure tomorrow- Left genicular nerve block, diagnostic. ND    ----- Message from Sav Rick M.D. sent at 6/15/2021  5:57 AM PDT -----  Regarding: RE: Pre-procedure pain management  He can take the Pregabalin.    Thank you,    Sav Rick MD  ----- Message -----  From: Herber Henson, Med Ass't  Sent: 6/14/2021  10:39 AM PDT  To: Sav Rick M.D.  Subject: Pre-procedure pain management                    Camilo Rick. This pt called us to ask whether he can safely take his prescribed Pregabalin prior to his upcoming procedure on 6/16 - Left genicular nerve block, diagnostic. He claimed to be experiencing a considerable amount of pain and wanted to know how to handle it. ND        "

## 2021-06-16 ENCOUNTER — APPOINTMENT (OUTPATIENT)
Dept: RADIOLOGY | Facility: REHABILITATION | Age: 71
End: 2021-06-16
Attending: PHYSICAL MEDICINE & REHABILITATION
Payer: MEDICARE

## 2021-06-16 ENCOUNTER — HOSPITAL ENCOUNTER (OUTPATIENT)
Facility: REHABILITATION | Age: 71
End: 2021-06-16
Attending: PHYSICAL MEDICINE & REHABILITATION | Admitting: PHYSICAL MEDICINE & REHABILITATION
Payer: MEDICARE

## 2021-06-16 VITALS
BODY MASS INDEX: 34.34 KG/M2 | DIASTOLIC BLOOD PRESSURE: 75 MMHG | RESPIRATION RATE: 17 BRPM | WEIGHT: 253.53 LBS | HEART RATE: 77 BPM | OXYGEN SATURATION: 97 % | HEIGHT: 72 IN | TEMPERATURE: 97.6 F | SYSTOLIC BLOOD PRESSURE: 122 MMHG

## 2021-06-16 PROCEDURE — 700101 HCHG RX REV CODE 250

## 2021-06-16 PROCEDURE — 64454 NJX AA&/STRD GNCLR NRV BRNCH: CPT

## 2021-06-16 PROCEDURE — 700117 HCHG RX CONTRAST REV CODE 255

## 2021-06-16 RX ORDER — BUPIVACAINE HYDROCHLORIDE 5 MG/ML
INJECTION, SOLUTION EPIDURAL; INTRACAUDAL
Status: COMPLETED
Start: 2021-06-16 | End: 2021-06-16

## 2021-06-16 RX ORDER — LIDOCAINE HYDROCHLORIDE 20 MG/ML
INJECTION, SOLUTION EPIDURAL; INFILTRATION; INTRACAUDAL; PERINEURAL
Status: COMPLETED
Start: 2021-06-16 | End: 2021-06-16

## 2021-06-16 RX ADMIN — BUPIVACAINE HYDROCHLORIDE 5 ML: 5 INJECTION, SOLUTION EPIDURAL; INTRACAUDAL at 08:05

## 2021-06-16 RX ADMIN — IOHEXOL 5 ML: 240 INJECTION, SOLUTION INTRATHECAL; INTRAVASCULAR; INTRAVENOUS; ORAL at 08:06

## 2021-06-16 ASSESSMENT — PAIN DESCRIPTION - PAIN TYPE: TYPE: CHRONIC PAIN

## 2021-06-16 ASSESSMENT — FIBROSIS 4 INDEX: FIB4 SCORE: 1.65

## 2021-06-16 NOTE — NON-PROVIDER
Fluids  tolerated well.  Reviewed  home care  and  pain diary instructions given and  he verbalized understanding. Dr. Rick evaluated patient.Meets criteria for discharged ambulatory without difficulty with designated .

## 2021-06-16 NOTE — PROGRESS NOTES
Handoff received from pre-procedure RN. Pre assessment complete with pertinent history reviewed (CAD, MI, HTN, Anxiety).  Allergy to Statins reviewed, stop bang = 6.  Pt positioned supine by CST, RN, XRT, left knee elevated with foam wedge, hands flexed at side.

## 2021-06-16 NOTE — H&P
Physical Medicine & Rehab History & Physical Note    Date  2021    Primary Care Physician  Shila Mullen D.O.      Pre-Op Diagnosis Codes:     * Primary osteoarthritis of left knee [M17.12]     * Cyst of left knee joint [M25.862]    HPI  This is a 70 y.o. male who presents for left knee genicular nerve blocks.  He has had worsening left knee pain, which had been painful just at night and has not responded to medications, intra-articular injections.  He is here for left genicular nerve block and reports that his pain is 7/10 on the NRS today.    Past Medical History:   Diagnosis Date   • Allergies 2019   • Arteriosclerotic vascular disease 2019   • Arthritis     Right Foot   • CAD (coronary artery disease)    • Elevated CPK 2020   • Generalized anxiety disorder 10/11/2017   • Hyperlipidemia    • Hypertension    • Inflamed sebaceous cyst 2/15/2018   • Intrinsic eczema 2020   • Prediabetes 2019   • S/P coronary artery stent placement     2 stents       Past Surgical History:   Procedure Laterality Date   • APPENDECTOMY     • CHOLECYSTECTOMY     • ENDARTERECTOMY      corornary   • STENT PLACEMENT     • VASECTOMY         Current Facility-Administered Medications   Medication Dose Route Frequency Provider Last Rate Last Admin   • BUPIVACAINE HCL (PF) 0.5 % INJ SOLN            • LIDOCAINE HCL (PF) 2 % INJ SOLN            • IOHEXOL 240 MG/ML INJ SOLN                Social History     Socioeconomic History   • Marital status:      Spouse name: Not on file   • Number of children: Not on file   • Years of education: Not on file   • Highest education level: 12th grade   Occupational History   • Not on file   Tobacco Use   • Smoking status: Former Smoker     Packs/day: 2.00     Years: 25.00     Pack years: 50.00     Types: Cigarettes     Quit date: 1992     Years since quittin.4   • Smokeless tobacco: Never Used   • Tobacco comment: avoid all tobacco products   Vaping Use    • Vaping Use: Never used   Substance and Sexual Activity   • Alcohol use: Yes     Alcohol/week: 0.6 oz     Types: 1 Standard drinks or equivalent per week     Comment: occ   • Drug use: No   • Sexual activity: Yes     Partners: Female     Comment: , 3 children   Other Topics Concern   •  Service Yes   • Blood Transfusions No   • Caffeine Concern No   • Occupational Exposure No   • Hobby Hazards No   • Sleep Concern Yes   • Stress Concern Yes   • Weight Concern Yes   • Special Diet No     Comment: no fried   • Back Care Yes   • Exercise Yes   • Bike Helmet No     Comment: does not ride bike   • Seat Belt Not Asked   • Self-Exams Yes   Social History Narrative    He was born in [place]. He worked in the [employer] industry, he retired at age 65. He  Luann in 2016 after both of their spouses passed away. He has children in [place] and several grandchildren and great grandchildren. He is an avid traveler.     Social Determinants of Health     Financial Resource Strain: Low Risk    • Difficulty of Paying Living Expenses: Not hard at all   Food Insecurity: No Food Insecurity   • Worried About Running Out of Food in the Last Year: Never true   • Ran Out of Food in the Last Year: Never true   Transportation Needs: No Transportation Needs   • Lack of Transportation (Medical): No   • Lack of Transportation (Non-Medical): No   Physical Activity: Inactive   • Days of Exercise per Week: 0 days   • Minutes of Exercise per Session: 0 min   Stress: Stress Concern Present   • Feeling of Stress : To some extent   Social Connections: Moderately Integrated   • Frequency of Communication with Friends and Family: More than three times a week   • Frequency of Social Gatherings with Friends and Family: Once a week   • Attends Religion Services: Never   • Active Member of Clubs or Organizations: Yes   • Attends Club or Organization Meetings: 1 to 4 times per year   • Marital Status:    Intimate Partner  Violence:    • Fear of Current or Ex-Partner:    • Emotionally Abused:    • Physically Abused:    • Sexually Abused:        Family History   Problem Relation Age of Onset   • Lung Disease Mother         copd   • Hypertension Mother    • Heart Disease Father         heart attack and heart disease   • Cancer Brother         neck   • Heart Disease Paternal Grandfather         Heart attack   • Other Sister         non-malignant brain tumor   • Diabetes Neg Hx        Allergies  Atorvastatin and Rosuvastatin    Review of Systems  No significant changes since last visit on 05/11/2021    Physical Exam  Vitals and nursing note reviewed.     Gen: Alert and cooperative  MSK: Left knee without warmth or erythema  CV: Regular rate and rhythm  Lungs: CTA bilaterally  Ext: no peripheral edema noted    Vital Signs  Blood Pressure : 117/73   Temperature: 36.4 °C (97.6 °F)   Pulse: 91   Respiration: 18           Radiology:  DX-PORTABLE FLUOROSCOPY < 1 HOUR    (Results Pending)         Assessment/Plan:  Pre-Op Diagnosis Codes:     * Primary osteoarthritis of left knee [M17.12]     * Cyst of left knee joint [M25.862]  Procedure(s):  LEFT genicular nerve blocks, diagnostic  GENICULAR NERVE BRANCHES INCLUDING IMAGING GUIDANCE WITH A REQUIRED MINIMUM THREE NERVE BRANCHES     Plan to proceed with diagnostic left genicular nerve block. Consent obtained and in chart.  Plan to proceed with diagnostic blocks.    Sav Rick MD  Physical Medicine and Rehabilitation  Interventional Spine and Sports Physiatry  [facility]

## 2021-06-16 NOTE — OP REPORT
Date of Service: 6/16/2021    Physician/s: Sav Rick MD    Pre-operative Diagnosis: Primary osteoarthritis of left knee(M17.12), Cyst of left knee joint(M25.862)    Post-operative Diagnosis: Primary osteoarthritis of left knee(M17.12), Cyst of left knee joint(M25.862)    Procedure: Left Knee Genicular Nerve Blocks     Description of procedure:  The risks, benefits, and alternatives of the procedure were reviewed and discussed with the patient.  Written informed consent was freely obtained. A pre-procedural time-out was conducted by the physician verifying patient’s identity, procedure to be performed, procedure site and side, and allergy verification. Appropriate equipment was determined to be in place for the procedure.     In the fluoroscopy suite the patient was placed in a supine position, the knee was flexed with a pillow/towels underneath for support, and the skin was prepped and draped in the usual sterile fashion. The fluoroscope was placed over the left knee at an true AP position, and three targets for injection were marked. A 25g needle was placed into each of the markings at the genicular nerve targets, and approx 1cc of 1% Lidocaine was injected subcutaneously into the epidermal and dermal layers. The needle was removed. A 25g spinal needle was then advanced into the superior lateral, superior medial, superior intermediate and inferior and medial target points for the genicular nerves. The needle tips were then verified by AP and lateral views. In the AP view, contrast dye was used to highlight the genicular nerve branches while the fluoroscope was running live. Following negative aspiration, approx 1cc of 0.5% Marcaine was then injected at the above levels, and the needles were removed intact after restyleted. The patient's back was covered with a 4x4 gauze, the area was cleansed with sterile normal saline, and a dressing was applied.     There were no complications noted, was hemodynamically  stable, and tolerated the procedure well.   He noted significant improvement in his pain after the procedure and was sent home with a pain diary.    Sav Rick MD  PM&R/Pain Mgmt    CPT: 77736, [id number]

## 2021-06-16 NOTE — NON-PROVIDER
Verified patient name.Confirmed procedure, site and  consent signed. H&P updated. Reviewed medication allergies and current medication in epic. Printed home care discharged  pre and post including infection  prevention verbal instruction given to patient and  he verbalized understanding.  Confirmed   waiting . Pertinent medical health  information reviewed in epic  ( HTN, RAO, CAD, arthritis, PAC ) .  Off Voltaren and Aspirin  for 6  days.  Stop bang score 6 . Patient denied taking blood thinner and anti-inflammatory medication. Dr. Rick spoke and examined patient.

## 2021-06-29 ENCOUNTER — PATIENT MESSAGE (OUTPATIENT)
Dept: HEALTH INFORMATION MANAGEMENT | Facility: OTHER | Age: 71
End: 2021-06-29

## 2021-07-02 ENCOUNTER — OFFICE VISIT (OUTPATIENT)
Dept: PHYSICAL MEDICINE AND REHAB | Facility: MEDICAL CENTER | Age: 71
End: 2021-07-02
Payer: MEDICARE

## 2021-07-02 VITALS
HEART RATE: 73 BPM | SYSTOLIC BLOOD PRESSURE: 126 MMHG | BODY MASS INDEX: 34.88 KG/M2 | DIASTOLIC BLOOD PRESSURE: 70 MMHG | HEIGHT: 72 IN | OXYGEN SATURATION: 95 % | WEIGHT: 257.5 LBS | TEMPERATURE: 98 F

## 2021-07-02 DIAGNOSIS — M43.16 SPONDYLOLISTHESIS OF LUMBAR REGION: ICD-10-CM

## 2021-07-02 DIAGNOSIS — M54.50 LOW BACK PAIN WITH RADIATION: ICD-10-CM

## 2021-07-02 DIAGNOSIS — M17.12 PRIMARY OSTEOARTHRITIS OF LEFT KNEE: ICD-10-CM

## 2021-07-02 DIAGNOSIS — M48.061 SPINAL STENOSIS OF LUMBAR REGION, UNSPECIFIED WHETHER NEUROGENIC CLAUDICATION PRESENT: ICD-10-CM

## 2021-07-02 DIAGNOSIS — M25.862 CYST OF LEFT KNEE JOINT: ICD-10-CM

## 2021-07-02 DIAGNOSIS — M48.061 NEURAL FORAMINAL STENOSIS OF LUMBAR SPINE: ICD-10-CM

## 2021-07-02 PROCEDURE — 99214 OFFICE O/P EST MOD 30 MIN: CPT | Performed by: PHYSICAL MEDICINE & REHABILITATION

## 2021-07-02 RX ORDER — PREGABALIN 50 MG/1
50 CAPSULE ORAL 2 TIMES DAILY
COMMUNITY
End: 2021-07-26 | Stop reason: SDUPTHER

## 2021-07-02 RX ORDER — PREGABALIN 150 MG/1
150 CAPSULE ORAL 2 TIMES DAILY
Qty: 60 CAPSULE | Refills: 1 | Status: SHIPPED | OUTPATIENT
Start: 2021-07-02 | End: 2021-07-26 | Stop reason: SINTOL

## 2021-07-02 ASSESSMENT — FIBROSIS 4 INDEX: FIB4 SCORE: 1.65

## 2021-07-02 ASSESSMENT — PATIENT HEALTH QUESTIONNAIRE - PHQ9
5. POOR APPETITE OR OVEREATING: 0 - NOT AT ALL
CLINICAL INTERPRETATION OF PHQ2 SCORE: 0

## 2021-07-02 ASSESSMENT — PAIN SCALES - GENERAL: PAINLEVEL: 5=MODERATE PAIN

## 2021-07-02 NOTE — PROGRESS NOTES
"Follow-up patient note, patient previously seen by Dr. Porter     Physiatry (physical medicine and  Rehabilitation), interventional spine and sports medicine    Date of Service: 07/2/2021    Chief complaint:   Chief Complaint   Patient presents with   • Follow-Up     Left Knee and Right Thigh pain       HISTORY    HPI: Curt Blair 70 y.o. male who presents today for follow-up evaluation of left knee pain.     Since the last visit, Curt reports that he has continued aching in his left knee, but the cramping at night is no longer present.  He had left genicular nerve blocks with pain 7/10 on the NRS prior to procedure, reduced to 0/10 and then remained 2/10 on the NRS during the anesthetic period.    Since last visit, though, he has started to have worsened low back pain and has had raidcular symptoms with his right thigh feeling like it is \"on fire\" and making it difficult to sleep.  Some left thigh symptoms.  No focal weakness.  No bowel or bladder changes.  Mobility is declining, hasn't been playing golf.    Lyrica twice a day has not been helpful.  During the day, this dulled his symptoms.  Only sleeping 2-3 hours.      Diclofenac helps with pain and he takes this twice a day    Medical records review:    Reviewed records from Dr Clovis Alicea 07/2013 At that visit, he assessed that MRI of left knee was indicated.  On 07/30/2013, they reviewed the study and the patient had a left knee corticosteroid injection    Previous treatments:    Physical Therapy: No    Medications the patient is tried: NSAIDs    Previous interventions: none, recently     Previous surgeries to relieve the above pain:  none      ROS:   Eyes: cataract surgery, recently  CV: history of MI at 41  Red Flags ROS:   Fever, Chills, Sweats: Denies  Involuntary Weight Loss: Denies  Bladder Incontinence: Denies  Bowel Incontinence: Denies  Saddle Anesthesia: Denies    All other systems reviewed and negative.       PMHx:   Past Medical " History:   Diagnosis Date   • Allergies 12/24/2019   • Arteriosclerotic vascular disease 1/25/2019   • Arthritis     Right Foot   • CAD (coronary artery disease)    • Elevated CPK 8/16/2020   • Generalized anxiety disorder 10/11/2017   • Hyperlipidemia    • Hypertension    • Inflamed sebaceous cyst 2/15/2018   • Intrinsic eczema 2/7/2020   • Prediabetes 7/19/2019   • S/P coronary artery stent placement     2 stents       PSHx:   Past Surgical History:   Procedure Laterality Date   • VA FLUOROSCOPIC GUIDANCE NEEDLE PLACEMENT Left 6/16/2021    Procedure: LEFT genicular nerve blocks, diagnostic;  Surgeon: Sav Rick M.D.;  [facility];  Service: Pain Management   • VA INJ AA/STRD GNCLR NRV BRNCH W/IMG Left 6/16/2021    Procedure: GENICULAR NERVE BRANCHES INCLUDING IMAGING GUIDANCE WITH A REQUIRED MINIMUM THREE NERVE BRANCHES;  Surgeon: Sav Rick M.D.;  Location: [facility];  Service: Pain Management   • APPENDECTOMY     • CHOLECYSTECTOMY     • ENDARTERECTOMY      corornary   • STENT PLACEMENT     • VASECTOMY         Family history   Family History   Problem Relation Age of Onset   • Lung Disease Mother         copd   • Hypertension Mother    • Heart Disease Father         heart attack and heart disease   • Cancer Brother         neck   • Heart Disease Paternal Grandfather         Heart attack   • Other Sister         non-malignant brain tumor   • Diabetes Neg Hx          Medications:   Current Outpatient Medications   Medication   • pregabalin (LYRICA) 50 MG capsule   • pregabalin (LYRICA) 150 MG Cap   • Turmeric (QC TUMERIC COMPLEX PO)   • atorvastatin (LIPITOR) 10 MG Tab   • nitroglycerin (NITROSTAT) 0.4 MG SL Tab   • amlodipine-benazepril (LOTREL) 5-10 MG per capsule   • diclofenac DR (VOLTAREN) 75 MG Tablet Delayed Response   • Multiple Vitamins-Minerals (MULTIVITAMIN PO)   • Coenzyme Q10 (CO Q 10 PO)   • MEGARED OMEGA-3 KRILL OIL PO   • CALCIUM PO   •  aspirin 81 MG tablet     No current facility-administered medications for this visit.       Allergies:   Allergies   Allergen Reactions   • Atorvastatin Myalgia     Ok to take 10 mg  Elevated CPK  Only at 40 Mg does this happen.   • Rosuvastatin      muscle cramps elevated CPK       Social Hx:   Social History     Socioeconomic History   • Marital status:      Spouse name: Not on file   • Number of children: Not on file   • Years of education: Not on file   • Highest education level: 12th grade   Occupational History   • Not on file   Tobacco Use   • Smoking status: Former Smoker     Packs/day: 2.00     Years: 25.00     Pack years: 50.00     Types: Cigarettes     Quit date: 1992     Years since quittin.5   • Smokeless tobacco: Never Used   • Tobacco comment: avoid all tobacco products   Vaping Use   • Vaping Use: Never used   Substance and Sexual Activity   • Alcohol use: Yes     Alcohol/week: 0.6 oz     Types: 1 Standard drinks or equivalent per week     Comment: occ   • Drug use: No   • Sexual activity: Yes     Partners: Female     Comment: , 3 children   Other Topics Concern   •  Service Yes   • Blood Transfusions No   • Caffeine Concern No   • Occupational Exposure No   • Hobby Hazards No   • Sleep Concern Yes   • Stress Concern Yes   • Weight Concern Yes   • Special Diet No     Comment: no fried   • Back Care Yes   • Exercise Yes   • Bike Helmet No     Comment: does not ride bike   • Seat Belt Not Asked   • Self-Exams Yes   Social History Narrative    He was born in [place]. He worked in the [employer] industry, he retired at age 65. He  Luann in 2016 after both of their spouses passed away. He has children in [place] and several grandchildren and great grandchildren. He is an avid traveler.     Social Determinants of Health     Financial Resource Strain: Low Risk    • Difficulty of Paying Living Expenses: Not hard at all   Food Insecurity: No Food Insecurity   • Worried  About Running Out of Food in the Last Year: Never true   • Ran Out of Food in the Last Year: Never true   Transportation Needs: No Transportation Needs   • Lack of Transportation (Medical): No   • Lack of Transportation (Non-Medical): No   Physical Activity: Inactive   • Days of Exercise per Week: 0 days   • Minutes of Exercise per Session: 0 min   Stress: Stress Concern Present   • Feeling of Stress : To some extent   Social Connections: Moderately Integrated   • Frequency of Communication with Friends and Family: More than three times a week   • Frequency of Social Gatherings with Friends and Family: Once a week   • Attends Islam Services: Never   • Active Member of Clubs or Organizations: Yes   • Attends Club or Organization Meetings: 1 to 4 times per year   • Marital Status:    Intimate Partner Violence:    • Fear of Current or Ex-Partner:    • Emotionally Abused:    • Physically Abused:    • Sexually Abused:          EXAMINATION     Physical Exam:   Vitals: /70 (BP Location: Right arm, Patient Position: Sitting, BP Cuff Size: Small adult)   Pulse 73   Temp 36.7 °C (98 °F) (Temporal)   Ht 1.829 m (6')   Wt 117 kg (257 lb 8 oz)   SpO2 95%     Constitutional:   Body Habitus: Body mass index is 34.92 kg/m².  Cooperation: Fully cooperates with exam  Appearance: Well-groomed, well-nourished, not disheveled, in no acute distress    Eyes: No scleral icterus, no proptosis     ENT -no obvious auditory deficits, wearing a face mask    Skin -no rashes or lesions noted    Respiratory-  breathing comfortable on room air, no audible wheezing    Cardiovascular- No lower extremity edema is noted.     Psychiatric- alert and oriented ×3. Normal affect.     Gait - normal gait, no use of ambulatory device, minimally antalgic.     Mild tenderness over the joint line medial and lateral knee.  No significant effusion     MEDICAL DECISION MAKING    Medical records review: see under HPI section.     DATA    Labs:    Lab Results   Component Value Date/Time    SODIUM 136 09/23/2020 07:31 AM    POTASSIUM 4.6 09/23/2020 07:31 AM    CHLORIDE 100 09/23/2020 07:31 AM    CO2 24 09/23/2020 07:31 AM    ANION 12.0 09/23/2020 07:31 AM    GLUCOSE 107 (H) 09/23/2020 07:31 AM    BUN 23 (H) 09/23/2020 07:31 AM    CREATININE 0.84 09/23/2020 07:31 AM    CREATININE 0.93 02/20/2013 07:20 AM    CALCIUM 9.3 09/23/2020 07:31 AM    ASTSGOT 32 09/23/2020 07:31 AM    ALTSGPT 34 09/23/2020 07:31 AM    TBILIRUBIN 0.7 09/23/2020 07:31 AM    ALBUMIN 4.2 09/23/2020 07:31 AM    TOTPROTEIN 7.7 09/23/2020 07:31 AM    GLOBULIN 3.5 09/23/2020 07:31 AM    AGRATIO 1.2 09/23/2020 07:31 AM       Lab Results   Component Value Date/Time    PROTHROMBTM 14.8 (H) 12/05/2018 07:00 AM    INR 1.17 (H) 12/05/2018 07:00 AM        Lab Results   Component Value Date/Time    WBC 7.1 08/07/2020 11:10 AM    RBC 5.11 08/07/2020 11:10 AM    HEMOGLOBIN 15.9 08/07/2020 11:10 AM    HEMATOCRIT 46.9 08/07/2020 11:10 AM    MCV 91.8 08/07/2020 11:10 AM    MCH 31.1 08/07/2020 11:10 AM    MCHC 33.9 08/07/2020 11:10 AM    MPV 10.0 08/07/2020 11:10 AM    NEUTSPOLYS 66.00 08/07/2020 11:10 AM    LYMPHOCYTES 18.30 (L) 08/07/2020 11:10 AM    MONOCYTES 13.30 08/07/2020 11:10 AM    EOSINOPHILS 1.30 08/07/2020 11:10 AM    BASOPHILS 0.70 08/07/2020 11:10 AM        Lab Results   Component Value Date/Time    HBA1C 5.8 (H) 09/19/2019 06:45 AM        Imaging: I personally reviewed following images, these are my reads  MRI lumbar spine 10/29/2020  At L5-S1, there is bilateral facet arthropathy without central canal narrowing.  Mild foraminal stenosis bilaterally   At L4-5, mild central canal stenosis second to disc bulge and severe facet arthropathy.  Moderate foraminal stenosis bilaterally  At L3-4, disc bulge with moderate facet arthropathy and mild-moderate central canal stenosis.  Severe foraminal stenosis bilaterally  At L2-3, posterior disc bulge with facet arthropathy and mild central canal  narrowing.  Mild foraminal stenosis bilaterally  At L1-2, mild facet arthropathy bilaterally, mild foraminal stenosis bilaterally.  Prominent epidural fat    Xray lumbar spine 08/07/2020  There is multilevel retrolisthesis at L1-2, L2-3, and L3-4  Grade I anterolisthesis at L4-5, stable on flexion and extension  Degenerative changes with multilevel facet arthropathy    Xray lumbar spine 10/04/2018  There is severe degenerative change in the lumbar spine; there is note of multilevel retrolisthesis at L1-2, L3-4 and L2-3  Mild movement of anterolisthesis at L4-5 with flexion    MRI left knee 07/10/2020  There is note of a small joint effusion.  Cyst posterior to the PCL.  No ligamentous or meniscal tears  Tricompartmental degenerative changes, greatest medial compartment    Xray hip right 10/04/2018  Moderate hip osteoarthritis noted bilaterally     MRI left knee 07/22/2013  There is note of moderate joint effusion.  The medial collateral ligament has mild edema.  There is note of osteophytes in the medial and lateral compartments.  Tendinopathy in the quadriceps tendon noted.    IMAGING radiology reads. I reviewed the following radiology reads     MRI lumbar spine 10/29/2020  IMPRESSION:  1.  Minimal anterolisthesis of L4-5.  2.  Multilevel degenerative disease and facet arthropathy with resultant central canal and foraminal narrowing as described.    Xray lumbar spine 08/07/2020  IMPRESSION:  1.  Multilevel degenerative disc disease and facet degeneration.  2.  Mild anterior subluxation at L4-5. This is stable with flexion and extension.  3.  Again seen multilevel degenerative retrolisthesis. This does not change significantly extension.    Xray lumbar spine 10/04/2018  IMPRESSION:     Mild retrolisthesis of L1-2, L2-3 and L3-4 that are worse with extension.  Mild anterolisthesis of L4-5 that is worse with flexion.  There is partial disc space fusion of the lower thoracic spine.    MRI left knee  07/10/2020  IMPRESSION:  1.  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  2.  Intact ligaments and menisci.  3.  Small joint effusion.  4.  [place] synovial or ganglion cyst immediately posterior to the posterior cruciate ligament.      MRI left knee 07/22/2013       Impression        1. Tendinopathy of the quadriceps tendon.     2. Medial collateral ligament sprain.     3. Osteoarthritis, most severe in the medial compartment.     4. Fraying of the inner rim of the bodies of the lateral and medial menisci.     5. Moderate joint effusion.      Xray hips 10/04/2018  Impression:  No acute osseous abnormality                         Results for orders placed during the hospital encounter of 10/04/18   DX-LUMBAR SPINE-4+ VIEWS    Impression Mild retrolisthesis of L1-2, L2-3 and L3-4 that are worse with extension.    Mild anterolisthesis of L4-5 that is worse with flexion.    There is partial disc space fusion of the lower thoracic spine.                                         Diagnosis   Visit Diagnoses     ICD-10-CM   1. Spinal stenosis of lumbar region, unspecified whether neurogenic claudication present  M48.061   2. Spondylolisthesis of lumbar region  M43.16   3. Low back pain with radiation  M54.5   4. Neural foraminal stenosis of lumbar spine  M48.061   5. Primary osteoarthritis of left knee  M17.12   6. Cyst of left knee joint  M25.862         ASSESSMENT:  Curt Justenjb Munozmanjeet 70 y.o. male seen for above     Curt was seen today for follow-up.    Diagnoses and all orders for this visit:    Spinal stenosis of lumbar region, unspecified whether neurogenic claudication present  -     MR-LUMBAR SPINE-W/O; Future  -     Cancel: REFERRAL TO OUTPATIENT INTERVENTIONAL PAIN CLINIC  -     REFERRAL TO PHYSICAL THERAPY  -     REFERRAL TO OUTPATIENT INTERVENTIONAL PAIN CLINIC  -     pregabalin (LYRICA) 150 MG Cap; Take 1 capsule by mouth 2 times a day for 30  days.    Spondylolisthesis of lumbar region  -     MR-LUMBAR SPINE-W/O; Future  -     Cancel: REFERRAL TO OUTPATIENT INTERVENTIONAL PAIN CLINIC  -     REFERRAL TO PHYSICAL THERAPY  -     REFERRAL TO OUTPATIENT INTERVENTIONAL PAIN CLINIC  -     pregabalin (LYRICA) 150 MG Cap; Take 1 capsule by mouth 2 times a day for 30 days.    Low back pain with radiation  -     Cancel: REFERRAL TO OUTPATIENT INTERVENTIONAL PAIN CLINIC  -     REFERRAL TO PHYSICAL THERAPY  -     REFERRAL TO OUTPATIENT INTERVENTIONAL PAIN CLINIC  -     pregabalin (LYRICA) 150 MG Cap; Take 1 capsule by mouth 2 times a day for 30 days.    Neural foraminal stenosis of lumbar spine  -     Cancel: REFERRAL TO OUTPATIENT INTERVENTIONAL PAIN CLINIC  -     REFERRAL TO OUTPATIENT INTERVENTIONAL PAIN CLINIC  -     pregabalin (LYRICA) 150 MG Cap; Take 1 capsule by mouth 2 times a day for 30 days.    Primary osteoarthritis of left knee  -     REFERRAL TO OUTPATIENT INTERVENTIONAL PAIN CLINIC    Cyst of left knee joint  -     REFERRAL TO OUTPATIENT INTERVENTIONAL PAIN CLINIC       1. Discussed referral to physical therapy for worsening back pain.  Discussed plan to repeat MRI lumbar spine to assess for progression.  Ideally, get this done prior to recommended injections.  2. Titrate lyrica, continue titration with 50mg pills with goal of lyrica 150mg po bid.  3. Discussed that he did well with left genicular nerve blocks.  Discussed left genicular nerve radiofrequency ablations.  The risks benefits and alternatives to this procedure were discussed and the patient wishes to proceed with the procedure. Risks include but are not limited to damage to surrounding structures, infection, bleeding, worsening of pain which can be permanent, weakness which can be permanent. Benefits include pain relief, improved function. Alternatives includes not doing the procedure.  Discussed use of conscious sedation.  4. He will continue with plans to consult with Dr. Mott.  5.  Discussed right L4-5 interlaminar epidural steroid injection. The risks benefits and alternatives to this procedure were discussed and the patient wishes to proceed with the procedure. Risks include but are not limited to damage to surrounding structures, infection, bleeding, worsening of pain which can be permanent, weakness which can be permanent. Benefits include pain relief, improved function. Alternatives includes not doing the procedure.        Follow-up: Return for Hospital injection.    Thank you very much for asking me to participate in Curt Lamonte Blair's care.  Please contact me with any questions or concerns.    Please note that this dictation was created using voice recognition software. I have made every reasonable attempt to correct obvious errors but there may be errors of grammar and content that I may have overlooked prior to finalization of this note.      Sav Rick MD  Physical Medicine and Rehabilitation  Interventional Spine and Sports Physiatry  [facility]    CC: Shila Mullen,

## 2021-07-02 NOTE — PATIENT INSTRUCTIONS
LYRICA INSTRUCTIONS  Start 50mg in the morning and 100mg at bedtime for 3 days,   Then 100mg in the morning and 100mg at bedtime for 3 days,  Then 150mg in the morning and 150mg at bedtime      Your procedure will be at the [facility] special procedure suite.    [facility] [place] [address]     FOLLOW THE SEDATION INSTRUCTION.      PRE-PROCEDURE INSTRUCTIONS  You may take your regular medications except:   · No Anti-inflammatories 5 days prior to your procedure. Anti-inflammatories include medicines such as  ibuprofen (Motrin, Advil), Excedrin, Naproxen (Aleve, Anaprox, Naprelan, Naprosyn), Celecoxib (Celebrex), Diclofenac (Voltaren-XR tab), and Meloxicam (Mobic).   · No Glucophage or Metformin 24 hours before your procedure. You may resume next day after your procedure.  · Call the physiatry office if you are taking or prescribed anti-biotics within five days of procedure.  · Please ask provider if you are taking any new diabetes medication.  · CONTINUE TAKING BLOOD PRESSURE MEDICATIONS AS PRESCRIBED.  · Pain medications will not be prescribed on the procedure day. Procedural pain medication may be used by your provider   · Call your doctor's office performing the procedure if you have a fever, chills, rash or new illness prior to your procedure    Anticoagulation/antiplatelet medications  No Blood thinning medications such as Coumadin or Plavix 5 days prior to procedure unless your doctor said to continue these medications. Call your doctor if a new medication is prescribed in this class.     Restrictions for eating before procedure:   · If you are getting procedural sedation, then do not eat to for 8 hours prior to procedure appointment time. Do not drink fluids for four hours prior to your procedure time.   · If you are not having procedural sedation, then Skip the meal prior to your procedure. If you have a morning procedure then skip breakfast. If you have an afternoon procedure then skip  lunch.   · You may drink clear liquids up to 2 hours prior to your procedure  · You must have a  the day of procedure to accompany you home.      POST PROCEDURE INSTRUCTIONS   · No heavy lifting, strenuous bending or strenuous exercise for 3 days after your procedure.  · No hot tubs, baths, swimming for 3 days after your procedure  · You can remove the bandage the day after the procedure.  · IF YOU RECEIVED A STEROID INJECTION. PLEASE NOTE THAT THERE MAY BE A DELAY FOR THE INJECTION TO START WORKING, THE DELAY MAY BE UP TO TWO WEEKS. IF YOU HAVE DIABETES, PLEASE NOTE THAT YOUR SUGAR LEVELS MAY BE ELEVATED FOR 1-2 DAYS AFTER A STEROID INJECTION.  THE STEROID MAY CAUSE TEMPORARY SYMPTOMS WHICH USUALLY RESOLVE ON THEIR OWN WITHIN 1 TO 2 DAYS INCLUDING FACIAL FLUSHING OR A FEELING OF WARMTH ON THE FACE, TEMPORARY INCREASES IN BLOOD SUGAR, INSOMNIA, INCREASED HUNGER  · IF YOU RECEIVED A DIAGNOSTIC PROCEDURE (SUCH AS A MEDIAL BRANCH BLOCK), PLEASE NOTE THAT WE DO EXPECT THIS INJECTION TO WEAR OFF.  IT IS IMPORTANT TO COMPLETE THE PAIN DIARY AND LIST THE PAIN SCORE ONLY FOR THE REGION WHERE THE PROCEDURE WAS AND BRING THIS TO YOUR FOLLOW UP VISIT.  · IF YOU RECEIVED A RADIOFREQUENCY PROCEDURE, THERE MAY BE SOME SORENESS AFTER THE PROCEDURE.  THIS IS NORMAL.  · IF YOU EXPERIENCE PROLONGED WEAKNESS LONGER THAN ONE DAY, BOWEL OR BLADDER INCONTINENCE THEN PLEASE CALL THE PHYSIATRY OFFICE.  · Your leg may feel heavy, weak and numb for up to 1-2 days. Be very careful walking.   ·  You may resume normal activities 3 days after procedure.

## 2021-07-02 NOTE — H&P (VIEW-ONLY)
"Follow-up patient note, patient previously seen by Dr. Porter     Physiatry (physical medicine and  Rehabilitation), interventional spine and sports medicine    Date of Service: 07/2/2021    Chief complaint:   Chief Complaint   Patient presents with   • Follow-Up     Left Knee and Right Thigh pain       HISTORY    HPI: Curt Blair 70 y.o. male who presents today for follow-up evaluation of left knee pain.     Since the last visit, Curt reports that he has continued aching in his left knee, but the cramping at night is no longer present.  He had left genicular nerve blocks with pain 7/10 on the NRS prior to procedure, reduced to 0/10 and then remained 2/10 on the NRS during the anesthetic period.    Since last visit, though, he has started to have worsened low back pain and has had raidcular symptoms with his right thigh feeling like it is \"on fire\" and making it difficult to sleep.  Some left thigh symptoms.  No focal weakness.  No bowel or bladder changes.  Mobility is declining, hasn't been playing golf.    Lyrica twice a day has not been helpful.  During the day, this dulled his symptoms.  Only sleeping 2-3 hours.      Diclofenac helps with pain and he takes this twice a day    Medical records review:    Reviewed records from Dr Clovis Alicea 07/2013 At that visit, he assessed that MRI of left knee was indicated.  On 07/30/2013, they reviewed the study and the patient had a left knee corticosteroid injection    Previous treatments:    Physical Therapy: No    Medications the patient is tried: NSAIDs    Previous interventions: none, recently     Previous surgeries to relieve the above pain:  none      ROS:   Eyes: cataract surgery, recently  CV: history of MI at 41  Red Flags ROS:   Fever, Chills, Sweats: Denies  Involuntary Weight Loss: Denies  Bladder Incontinence: Denies  Bowel Incontinence: Denies  Saddle Anesthesia: Denies    All other systems reviewed and negative.       PMHx:   Past Medical " History:   Diagnosis Date   • Allergies 12/24/2019   • Arteriosclerotic vascular disease 1/25/2019   • Arthritis     Right Foot   • CAD (coronary artery disease)    • Elevated CPK 8/16/2020   • Generalized anxiety disorder 10/11/2017   • Hyperlipidemia    • Hypertension    • Inflamed sebaceous cyst 2/15/2018   • Intrinsic eczema 2/7/2020   • Prediabetes 7/19/2019   • S/P coronary artery stent placement     2 stents       PSHx:   Past Surgical History:   Procedure Laterality Date   • MI FLUOROSCOPIC GUIDANCE NEEDLE PLACEMENT Left 6/16/2021    Procedure: LEFT genicular nerve blocks, diagnostic;  Surgeon: Sav Rick M.D.;  [facility];  Service: Pain Management   • MI INJ AA/STRD GNCLR NRV BRNCH W/IMG Left 6/16/2021    Procedure: GENICULAR NERVE BRANCHES INCLUDING IMAGING GUIDANCE WITH A REQUIRED MINIMUM THREE NERVE BRANCHES;  Surgeon: Sav Rick M.D.;  Location: [facility];  Service: Pain Management   • APPENDECTOMY     • CHOLECYSTECTOMY     • ENDARTERECTOMY      corornary   • STENT PLACEMENT     • VASECTOMY         Family history   Family History   Problem Relation Age of Onset   • Lung Disease Mother         copd   • Hypertension Mother    • Heart Disease Father         heart attack and heart disease   • Cancer Brother         neck   • Heart Disease Paternal Grandfather         Heart attack   • Other Sister         non-malignant brain tumor   • Diabetes Neg Hx          Medications:   Current Outpatient Medications   Medication   • pregabalin (LYRICA) 50 MG capsule   • pregabalin (LYRICA) 150 MG Cap   • Turmeric (QC TUMERIC COMPLEX PO)   • atorvastatin (LIPITOR) 10 MG Tab   • nitroglycerin (NITROSTAT) 0.4 MG SL Tab   • amlodipine-benazepril (LOTREL) 5-10 MG per capsule   • diclofenac DR (VOLTAREN) 75 MG Tablet Delayed Response   • Multiple Vitamins-Minerals (MULTIVITAMIN PO)   • Coenzyme Q10 (CO Q 10 PO)   • MEGARED OMEGA-3 KRILL OIL PO   • CALCIUM PO   •  aspirin 81 MG tablet     No current facility-administered medications for this visit.       Allergies:   Allergies   Allergen Reactions   • Atorvastatin Myalgia     Ok to take 10 mg  Elevated CPK  Only at 40 Mg does this happen.   • Rosuvastatin      muscle cramps elevated CPK       Social Hx:   Social History     Socioeconomic History   • Marital status:      Spouse name: Not on file   • Number of children: Not on file   • Years of education: Not on file   • Highest education level: 12th grade   Occupational History   • Not on file   Tobacco Use   • Smoking status: Former Smoker     Packs/day: 2.00     Years: 25.00     Pack years: 50.00     Types: Cigarettes     Quit date: 1992     Years since quittin.5   • Smokeless tobacco: Never Used   • Tobacco comment: avoid all tobacco products   Vaping Use   • Vaping Use: Never used   Substance and Sexual Activity   • Alcohol use: Yes     Alcohol/week: 0.6 oz     Types: 1 Standard drinks or equivalent per week     Comment: occ   • Drug use: No   • Sexual activity: Yes     Partners: Female     Comment: , 3 children   Other Topics Concern   •  Service Yes   • Blood Transfusions No   • Caffeine Concern No   • Occupational Exposure No   • Hobby Hazards No   • Sleep Concern Yes   • Stress Concern Yes   • Weight Concern Yes   • Special Diet No     Comment: no fried   • Back Care Yes   • Exercise Yes   • Bike Helmet No     Comment: does not ride bike   • Seat Belt Not Asked   • Self-Exams Yes   Social History Narrative    He was born in [place]. He worked in the [employer] industry, he retired at age 65. He  Luann in 2016 after both of their spouses passed away. He has children in [place] and several grandchildren and great grandchildren. He is an avid traveler.     Social Determinants of Health     Financial Resource Strain: Low Risk    • Difficulty of Paying Living Expenses: Not hard at all   Food Insecurity: No Food Insecurity   • Worried  About Running Out of Food in the Last Year: Never true   • Ran Out of Food in the Last Year: Never true   Transportation Needs: No Transportation Needs   • Lack of Transportation (Medical): No   • Lack of Transportation (Non-Medical): No   Physical Activity: Inactive   • Days of Exercise per Week: 0 days   • Minutes of Exercise per Session: 0 min   Stress: Stress Concern Present   • Feeling of Stress : To some extent   Social Connections: Moderately Integrated   • Frequency of Communication with Friends and Family: More than three times a week   • Frequency of Social Gatherings with Friends and Family: Once a week   • Attends Restoration Services: Never   • Active Member of Clubs or Organizations: Yes   • Attends Club or Organization Meetings: 1 to 4 times per year   • Marital Status:    Intimate Partner Violence:    • Fear of Current or Ex-Partner:    • Emotionally Abused:    • Physically Abused:    • Sexually Abused:          EXAMINATION     Physical Exam:   Vitals: /70 (BP Location: Right arm, Patient Position: Sitting, BP Cuff Size: Small adult)   Pulse 73   Temp 36.7 °C (98 °F) (Temporal)   Ht 1.829 m (6')   Wt 117 kg (257 lb 8 oz)   SpO2 95%     Constitutional:   Body Habitus: Body mass index is 34.92 kg/m².  Cooperation: Fully cooperates with exam  Appearance: Well-groomed, well-nourished, not disheveled, in no acute distress    Eyes: No scleral icterus, no proptosis     ENT -no obvious auditory deficits, wearing a face mask    Skin -no rashes or lesions noted    Respiratory-  breathing comfortable on room air, no audible wheezing    Cardiovascular- No lower extremity edema is noted.     Psychiatric- alert and oriented ×3. Normal affect.     Gait - normal gait, no use of ambulatory device, minimally antalgic.     Mild tenderness over the joint line medial and lateral knee.  No significant effusion     MEDICAL DECISION MAKING    Medical records review: see under HPI section.     DATA    Labs:    Lab Results   Component Value Date/Time    SODIUM 136 09/23/2020 07:31 AM    POTASSIUM 4.6 09/23/2020 07:31 AM    CHLORIDE 100 09/23/2020 07:31 AM    CO2 24 09/23/2020 07:31 AM    ANION 12.0 09/23/2020 07:31 AM    GLUCOSE 107 (H) 09/23/2020 07:31 AM    BUN 23 (H) 09/23/2020 07:31 AM    CREATININE 0.84 09/23/2020 07:31 AM    CREATININE 0.93 02/20/2013 07:20 AM    CALCIUM 9.3 09/23/2020 07:31 AM    ASTSGOT 32 09/23/2020 07:31 AM    ALTSGPT 34 09/23/2020 07:31 AM    TBILIRUBIN 0.7 09/23/2020 07:31 AM    ALBUMIN 4.2 09/23/2020 07:31 AM    TOTPROTEIN 7.7 09/23/2020 07:31 AM    GLOBULIN 3.5 09/23/2020 07:31 AM    AGRATIO 1.2 09/23/2020 07:31 AM       Lab Results   Component Value Date/Time    PROTHROMBTM 14.8 (H) 12/05/2018 07:00 AM    INR 1.17 (H) 12/05/2018 07:00 AM        Lab Results   Component Value Date/Time    WBC 7.1 08/07/2020 11:10 AM    RBC 5.11 08/07/2020 11:10 AM    HEMOGLOBIN 15.9 08/07/2020 11:10 AM    HEMATOCRIT 46.9 08/07/2020 11:10 AM    MCV 91.8 08/07/2020 11:10 AM    MCH 31.1 08/07/2020 11:10 AM    MCHC 33.9 08/07/2020 11:10 AM    MPV 10.0 08/07/2020 11:10 AM    NEUTSPOLYS 66.00 08/07/2020 11:10 AM    LYMPHOCYTES 18.30 (L) 08/07/2020 11:10 AM    MONOCYTES 13.30 08/07/2020 11:10 AM    EOSINOPHILS 1.30 08/07/2020 11:10 AM    BASOPHILS 0.70 08/07/2020 11:10 AM        Lab Results   Component Value Date/Time    HBA1C 5.8 (H) 09/19/2019 06:45 AM        Imaging: I personally reviewed following images, these are my reads  MRI lumbar spine 10/29/2020  At L5-S1, there is bilateral facet arthropathy without central canal narrowing.  Mild foraminal stenosis bilaterally   At L4-5, mild central canal stenosis second to disc bulge and severe facet arthropathy.  Moderate foraminal stenosis bilaterally  At L3-4, disc bulge with moderate facet arthropathy and mild-moderate central canal stenosis.  Severe foraminal stenosis bilaterally  At L2-3, posterior disc bulge with facet arthropathy and mild central canal  narrowing.  Mild foraminal stenosis bilaterally  At L1-2, mild facet arthropathy bilaterally, mild foraminal stenosis bilaterally.  Prominent epidural fat    Xray lumbar spine 08/07/2020  There is multilevel retrolisthesis at L1-2, L2-3, and L3-4  Grade I anterolisthesis at L4-5, stable on flexion and extension  Degenerative changes with multilevel facet arthropathy    Xray lumbar spine 10/04/2018  There is severe degenerative change in the lumbar spine; there is note of multilevel retrolisthesis at L1-2, L3-4 and L2-3  Mild movement of anterolisthesis at L4-5 with flexion    MRI left knee 07/10/2020  There is note of a small joint effusion.  Cyst posterior to the PCL.  No ligamentous or meniscal tears  Tricompartmental degenerative changes, greatest medial compartment    Xray hip right 10/04/2018  Moderate hip osteoarthritis noted bilaterally     MRI left knee 07/22/2013  There is note of moderate joint effusion.  The medial collateral ligament has mild edema.  There is note of osteophytes in the medial and lateral compartments.  Tendinopathy in the quadriceps tendon noted.    IMAGING radiology reads. I reviewed the following radiology reads     MRI lumbar spine 10/29/2020  IMPRESSION:  1.  Minimal anterolisthesis of L4-5.  2.  Multilevel degenerative disease and facet arthropathy with resultant central canal and foraminal narrowing as described.    Xray lumbar spine 08/07/2020  IMPRESSION:  1.  Multilevel degenerative disc disease and facet degeneration.  2.  Mild anterior subluxation at L4-5. This is stable with flexion and extension.  3.  Again seen multilevel degenerative retrolisthesis. This does not change significantly extension.    Xray lumbar spine 10/04/2018  IMPRESSION:     Mild retrolisthesis of L1-2, L2-3 and L3-4 that are worse with extension.  Mild anterolisthesis of L4-5 that is worse with flexion.  There is partial disc space fusion of the lower thoracic spine.    MRI left knee  07/10/2020  IMPRESSION:  1.  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  2.  Intact ligaments and menisci.  3.  Small joint effusion.  4.  Center synovial or ganglion cyst immediately posterior to the posterior cruciate ligament.      MRI left knee 07/22/2013       Impression        1. Tendinopathy of the quadriceps tendon.     2. Medial collateral ligament sprain.     3. Osteoarthritis, most severe in the medial compartment.     4. Fraying of the inner rim of the bodies of the lateral and medial menisci.     5. Moderate joint effusion.      Xray hips 10/04/2018  Impression:  No acute osseous abnormality                         Results for orders placed during the hospital encounter of 10/04/18   DX-LUMBAR SPINE-4+ VIEWS    Impression Mild retrolisthesis of L1-2, L2-3 and L3-4 that are worse with extension.    Mild anterolisthesis of L4-5 that is worse with flexion.    There is partial disc space fusion of the lower thoracic spine.                                         Diagnosis   Visit Diagnoses     ICD-10-CM   1. Spinal stenosis of lumbar region, unspecified whether neurogenic claudication present  M48.061   2. Spondylolisthesis of lumbar region  M43.16   3. Low back pain with radiation  M54.5   4. Neural foraminal stenosis of lumbar spine  M48.061   5. Primary osteoarthritis of left knee  M17.12   6. Cyst of left knee joint  M25.862         ASSESSMENT:  Curt Justenjb Munozmanjeet 70 y.o. male seen for above     Curt was seen today for follow-up.    Diagnoses and all orders for this visit:    Spinal stenosis of lumbar region, unspecified whether neurogenic claudication present  -     MR-LUMBAR SPINE-W/O; Future  -     Cancel: REFERRAL TO OUTPATIENT INTERVENTIONAL PAIN CLINIC  -     REFERRAL TO PHYSICAL THERAPY  -     REFERRAL TO OUTPATIENT INTERVENTIONAL PAIN CLINIC  -     pregabalin (LYRICA) 150 MG Cap; Take 1 capsule by mouth 2 times a day for 30  days.    Spondylolisthesis of lumbar region  -     MR-LUMBAR SPINE-W/O; Future  -     Cancel: REFERRAL TO OUTPATIENT INTERVENTIONAL PAIN CLINIC  -     REFERRAL TO PHYSICAL THERAPY  -     REFERRAL TO OUTPATIENT INTERVENTIONAL PAIN CLINIC  -     pregabalin (LYRICA) 150 MG Cap; Take 1 capsule by mouth 2 times a day for 30 days.    Low back pain with radiation  -     Cancel: REFERRAL TO OUTPATIENT INTERVENTIONAL PAIN CLINIC  -     REFERRAL TO PHYSICAL THERAPY  -     REFERRAL TO OUTPATIENT INTERVENTIONAL PAIN CLINIC  -     pregabalin (LYRICA) 150 MG Cap; Take 1 capsule by mouth 2 times a day for 30 days.    Neural foraminal stenosis of lumbar spine  -     Cancel: REFERRAL TO OUTPATIENT INTERVENTIONAL PAIN CLINIC  -     REFERRAL TO OUTPATIENT INTERVENTIONAL PAIN CLINIC  -     pregabalin (LYRICA) 150 MG Cap; Take 1 capsule by mouth 2 times a day for 30 days.    Primary osteoarthritis of left knee  -     REFERRAL TO OUTPATIENT INTERVENTIONAL PAIN CLINIC    Cyst of left knee joint  -     REFERRAL TO OUTPATIENT INTERVENTIONAL PAIN CLINIC       1. Discussed referral to physical therapy for worsening back pain.  Discussed plan to repeat MRI lumbar spine to assess for progression.  Ideally, get this done prior to recommended injections.  2. Titrate lyrica, continue titration with 50mg pills with goal of lyrica 150mg po bid.  3. Discussed that he did well with left genicular nerve blocks.  Discussed left genicular nerve radiofrequency ablations.  The risks benefits and alternatives to this procedure were discussed and the patient wishes to proceed with the procedure. Risks include but are not limited to damage to surrounding structures, infection, bleeding, worsening of pain which can be permanent, weakness which can be permanent. Benefits include pain relief, improved function. Alternatives includes not doing the procedure.  Discussed use of conscious sedation.  4. He will continue with plans to consult with Dr. Mott.  5.  Discussed right L4-5 interlaminar epidural steroid injection. The risks benefits and alternatives to this procedure were discussed and the patient wishes to proceed with the procedure. Risks include but are not limited to damage to surrounding structures, infection, bleeding, worsening of pain which can be permanent, weakness which can be permanent. Benefits include pain relief, improved function. Alternatives includes not doing the procedure.        Follow-up: Return for Hospital injection.    Thank you very much for asking me to participate in Curt Lamonte Blair's care.  Please contact me with any questions or concerns.    Please note that this dictation was created using voice recognition software. I have made every reasonable attempt to correct obvious errors but there may be errors of grammar and content that I may have overlooked prior to finalization of this note.      Sav Rick MD  Physical Medicine and Rehabilitation  Interventional Spine and Sports Physiatry  [facility]    CC: Shila Mullen,

## 2021-07-06 ENCOUNTER — HOSPITAL ENCOUNTER (OUTPATIENT)
Facility: REHABILITATION | Age: 71
End: 2021-07-06
Attending: PHYSICAL MEDICINE & REHABILITATION | Admitting: PHYSICAL MEDICINE & REHABILITATION
Payer: MEDICARE

## 2021-07-21 ENCOUNTER — TELEPHONE (OUTPATIENT)
Dept: PHYSICAL MEDICINE AND REHAB | Facility: MEDICAL CENTER | Age: 71
End: 2021-07-21

## 2021-07-21 NOTE — TELEPHONE ENCOUNTER
Called pt to confirm SP with Dr. Rick on 7/28/21. Pt confirmed that he understood all pre procedure requirements. ND

## 2021-07-26 ENCOUNTER — TELEPHONE (OUTPATIENT)
Dept: MEDICAL GROUP | Facility: PHYSICIAN GROUP | Age: 71
End: 2021-07-26

## 2021-07-26 ENCOUNTER — HOSPITAL ENCOUNTER (OUTPATIENT)
Facility: MEDICAL CENTER | Age: 71
End: 2021-07-26
Attending: INTERNAL MEDICINE
Payer: MEDICARE

## 2021-07-26 ENCOUNTER — HOSPITAL ENCOUNTER (OUTPATIENT)
Dept: RADIOLOGY | Facility: MEDICAL CENTER | Age: 71
End: 2021-07-26
Attending: PHYSICAL MEDICINE & REHABILITATION
Payer: MEDICARE

## 2021-07-26 ENCOUNTER — OFFICE VISIT (OUTPATIENT)
Dept: MEDICAL GROUP | Facility: PHYSICIAN GROUP | Age: 71
End: 2021-07-26
Payer: MEDICARE

## 2021-07-26 VITALS
WEIGHT: 258.4 LBS | SYSTOLIC BLOOD PRESSURE: 122 MMHG | OXYGEN SATURATION: 97 % | HEART RATE: 74 BPM | BODY MASS INDEX: 35 KG/M2 | TEMPERATURE: 97.8 F | RESPIRATION RATE: 16 BRPM | DIASTOLIC BLOOD PRESSURE: 58 MMHG | HEIGHT: 72 IN

## 2021-07-26 DIAGNOSIS — I10 ESSENTIAL HYPERTENSION, BENIGN: ICD-10-CM

## 2021-07-26 DIAGNOSIS — R74.8 ELEVATED CPK: Chronic | ICD-10-CM

## 2021-07-26 DIAGNOSIS — M43.16 SPONDYLOLISTHESIS OF LUMBAR REGION: ICD-10-CM

## 2021-07-26 DIAGNOSIS — G89.29 CHRONIC PAIN OF LEFT KNEE: ICD-10-CM

## 2021-07-26 DIAGNOSIS — M25.562 CHRONIC PAIN OF LEFT KNEE: ICD-10-CM

## 2021-07-26 DIAGNOSIS — M25.561 PAIN AND SWELLING OF RIGHT KNEE: ICD-10-CM

## 2021-07-26 DIAGNOSIS — M48.061 SPINAL STENOSIS OF LUMBAR REGION, UNSPECIFIED WHETHER NEUROGENIC CLAUDICATION PRESENT: ICD-10-CM

## 2021-07-26 DIAGNOSIS — E78.5 DYSLIPIDEMIA: ICD-10-CM

## 2021-07-26 DIAGNOSIS — M25.461 PAIN AND SWELLING OF RIGHT KNEE: ICD-10-CM

## 2021-07-26 DIAGNOSIS — R73.03 PREDIABETES: ICD-10-CM

## 2021-07-26 LAB
ALBUMIN SERPL BCP-MCNC: 4.7 G/DL (ref 3.2–4.9)
ALBUMIN/GLOB SERPL: 1.9 G/DL
ALP SERPL-CCNC: 63 U/L (ref 30–99)
ALT SERPL-CCNC: 29 U/L (ref 2–50)
ANION GAP SERPL CALC-SCNC: 11 MMOL/L (ref 7–16)
AST SERPL-CCNC: 22 U/L (ref 12–45)
BASOPHILS # BLD AUTO: 1 % (ref 0–1.8)
BASOPHILS # BLD: 0.07 K/UL (ref 0–0.12)
BILIRUB SERPL-MCNC: 0.6 MG/DL (ref 0.1–1.5)
BUN SERPL-MCNC: 18 MG/DL (ref 8–22)
CALCIUM SERPL-MCNC: 9.8 MG/DL (ref 8.5–10.5)
CHLORIDE SERPL-SCNC: 103 MMOL/L (ref 96–112)
CK SERPL-CCNC: 143 U/L (ref 0–154)
CO2 SERPL-SCNC: 25 MMOL/L (ref 20–33)
CREAT SERPL-MCNC: 0.9 MG/DL (ref 0.5–1.4)
EOSINOPHIL # BLD AUTO: 0.09 K/UL (ref 0–0.51)
EOSINOPHIL NFR BLD: 1.2 % (ref 0–6.9)
ERYTHROCYTE [DISTWIDTH] IN BLOOD BY AUTOMATED COUNT: 47.6 FL (ref 35.9–50)
GLOBULIN SER CALC-MCNC: 2.5 G/DL (ref 1.9–3.5)
GLUCOSE SERPL-MCNC: 90 MG/DL (ref 65–99)
HCT VFR BLD AUTO: 49.2 % (ref 42–52)
HGB BLD-MCNC: 15.8 G/DL (ref 14–18)
IMM GRANULOCYTES # BLD AUTO: 0.03 K/UL (ref 0–0.11)
IMM GRANULOCYTES NFR BLD AUTO: 0.4 % (ref 0–0.9)
LYMPHOCYTES # BLD AUTO: 1.39 K/UL (ref 1–4.8)
LYMPHOCYTES NFR BLD: 19.1 % (ref 22–41)
MCH RBC QN AUTO: 31 PG (ref 27–33)
MCHC RBC AUTO-ENTMCNC: 32.1 G/DL (ref 33.7–35.3)
MCV RBC AUTO: 96.7 FL (ref 81.4–97.8)
MONOCYTES # BLD AUTO: 1.05 K/UL (ref 0–0.85)
MONOCYTES NFR BLD AUTO: 14.4 % (ref 0–13.4)
NEUTROPHILS # BLD AUTO: 4.66 K/UL (ref 1.82–7.42)
NEUTROPHILS NFR BLD: 63.9 % (ref 44–72)
NRBC # BLD AUTO: 0 K/UL
NRBC BLD-RTO: 0 /100 WBC
PLATELET # BLD AUTO: 241 K/UL (ref 164–446)
PMV BLD AUTO: 11.2 FL (ref 9–12.9)
POTASSIUM SERPL-SCNC: 5 MMOL/L (ref 3.6–5.5)
PROT SERPL-MCNC: 7.2 G/DL (ref 6–8.2)
RBC # BLD AUTO: 5.09 M/UL (ref 4.7–6.1)
SODIUM SERPL-SCNC: 139 MMOL/L (ref 135–145)
WBC # BLD AUTO: 7.3 K/UL (ref 4.8–10.8)

## 2021-07-26 PROCEDURE — 85025 COMPLETE CBC W/AUTO DIFF WBC: CPT

## 2021-07-26 PROCEDURE — 82550 ASSAY OF CK (CPK): CPT

## 2021-07-26 PROCEDURE — 72148 MRI LUMBAR SPINE W/O DYE: CPT | Mod: ME

## 2021-07-26 PROCEDURE — 80053 COMPREHEN METABOLIC PANEL: CPT

## 2021-07-26 PROCEDURE — 99000 SPECIMEN HANDLING OFFICE-LAB: CPT | Performed by: FAMILY MEDICINE

## 2021-07-26 PROCEDURE — 36415 COLL VENOUS BLD VENIPUNCTURE: CPT | Performed by: FAMILY MEDICINE

## 2021-07-26 PROCEDURE — 99215 OFFICE O/P EST HI 40 MIN: CPT | Performed by: FAMILY MEDICINE

## 2021-07-26 RX ORDER — PREGABALIN 50 MG/1
50 CAPSULE ORAL 2 TIMES DAILY
Qty: 42 CAPSULE | Refills: 0 | Status: SHIPPED | OUTPATIENT
Start: 2021-07-26 | End: 2021-08-13 | Stop reason: SINTOL

## 2021-07-26 ASSESSMENT — ENCOUNTER SYMPTOMS
TINGLING: 1
FEVER: 0
PALPITATIONS: 0
MYALGIAS: 1
CHILLS: 0
SENSORY CHANGE: 1
ORTHOPNEA: 0
DIZZINESS: 0
WEAKNESS: 1

## 2021-07-26 ASSESSMENT — FIBROSIS 4 INDEX: FIB4 SCORE: 1.65

## 2021-07-26 ASSESSMENT — PAIN SCALES - GENERAL: PAINLEVEL: 10=SEVERE PAIN

## 2021-07-26 NOTE — ASSESSMENT & PLAN NOTE
" Patient stated he started increased Lyrica dose of 150 mg BID a few weeks ago and feels that his increased pain and swelling are related to this, peripheral edema can occur in roughly 16% of patients taking Lyrica, pt would like to taper off of this medication. Discussed some side effects of Lyrica can be weight gain, pain, or peripheral edema.  Pt denies chest pains, heart palpitations, or shortness of breath. Strength remains the same in right leg as before, but increased pain with ROM, and has decreased ROM due to swelling. Pt describes the pain as \"pin pricks\" and intermittent. Patient given medication taper instructions today in clinic and will f/u with physiatry in 2 days when he has his next appt.  RN to draw labs today in clinic that were previously ordered to r/o electrolyte abnormality, platelet issue, elevated CPK, or decreased kidney function.  "

## 2021-07-26 NOTE — PROGRESS NOTES
Patient arrived for blood draw. Verified patient's name/date-of-birth and reason for visit before procedure was started. Patient's left antecubital cleansed. Venipuncture attempts X1. Blood draw completed on patient's left AC. Applied pressure afterwards and placed Coban on site of venipuncture with directions for patient to remove within the next hour. Patient tolerated procedure well, no adverse reactions. Patient ambulated safely upon leaving clinic.   Completed labels were placed on blood samples in draw room with patient present. Appropriate blood samples were centrifuged. Blood samples were then placed in locked lab box for  pick-up.

## 2021-07-26 NOTE — PROGRESS NOTES
"Subjective:     CC:   Chief Complaint   Patient presents with   • Nerve Pain     rt leg, cramped up during MRI, started Saturday, no past hx, hurts sitting and standing, 10 out of 10   • Leg Swelling     rt leg   • Knee Pain     Dr. Rick, bone spurs, injections, unspecified allergy to px medication       HPI:   Curt presents today with right thigh and knee swelling x 3 days. Patient currently seeing Dr. Rick from physiatry but she is off today and patient wanted to see someone regarding new swelling and increased pain in right knee and lower back. Pt is scheduled for interlaminar epidural R L4-5 on 7/28/21 and left knee ablation 8/11/21.     Pain and swelling of right knee   Patient stated he started increased Lyrica dose of 150 mg BID a few weeks ago and feels that his increased pain and swelling are related to this, peripheral edema can occur in roughly 16% of patients taking Lyrica, pt would like to taper off of this medication. Discussed some side effects of Lyrica can be weight gain, pain, or peripheral edema.  Pt denies chest pains, heart palpitations, or shortness of breath. Strength remains the same in right leg as before, but increased pain with ROM, and has decreased ROM due to swelling. Pt describes the pain as \"pin pricks\" and intermittent. Patient given medication taper instructions today in clinic and will f/u with physiatry in 2 days when he has his next appt.  RN to draw labs today in clinic that were previously ordered to r/o electrolyte abnormality, platelet issue, elevated CPK, or decreased kidney function.      Current Outpatient Medications Ordered in Epic   Medication Sig Dispense Refill   • pregabalin (LYRICA) 50 MG capsule Take 1 capsule by mouth 2 times a day for 21 days. 42 capsule 0   • diclofenac DR (VOLTAREN) 75 MG Tablet Delayed Response TAKE 1 TABLET BY MOUTH TWICE A  tablet 3   • Turmeric (QC TUMERIC COMPLEX PO) Take 1,500 mg by mouth every day.     • atorvastatin (LIPITOR) " 10 MG Tab TAKE 1 TABLET BY MOUTH EVERY  tablet 3   • nitroglycerin (NITROSTAT) 0.4 MG SL Tab PLACE 1 TABLET UNDER THE TONGUE EVERY 5 MINUTES UP TO 3 DOSES AS NEEDED FOR CHEST PAIN). 25 tablet 0   • amlodipine-benazepril (LOTREL) 5-10 MG per capsule TAKE 1 CAPSULE BY MOUTH EVERY  Cap 3   • Multiple Vitamins-Minerals (MULTIVITAMIN PO) Take 1 Tab by mouth every morning.     • Coenzyme Q10 (CO Q 10 PO) Take 1 Tab by mouth every morning.     • MEGARED OMEGA-3 KRILL OIL PO Take 1 Tab by mouth every morning.     • CALCIUM PO Take 1 Tab by mouth every day.     • aspirin 81 MG tablet Take 81 mg by mouth every day.         No current Epic-ordered facility-administered medications on file.         Review of Systems   Constitutional: Negative for chills and fever.   Cardiovascular: Positive for leg swelling. Negative for chest pain, palpitations and orthopnea.   Musculoskeletal: Positive for joint pain and myalgias.   Neurological: Positive for tingling, sensory change and weakness. Negative for dizziness.         Objective:     Exam:  /58 (BP Location: Right arm, Patient Position: Sitting, BP Cuff Size: Adult)   Pulse 74   Temp 36.6 °C (97.8 °F) (Temporal)   Resp 16   Ht 1.829 m (6')   Wt 117 kg (258 lb 6.4 oz)   SpO2 97%   BMI 35.05 kg/m²  Body mass index is 35.05 kg/m².    Physical Exam  Vitals reviewed.   Constitutional:       General: He is not in acute distress.     Appearance: Normal appearance. He is not ill-appearing.   Eyes:      Conjunctiva/sclera: Conjunctivae normal.      Pupils: Pupils are equal, round, and reactive to light.   Cardiovascular:      Rate and Rhythm: Normal rate and regular rhythm.   Musculoskeletal:         General: Swelling and tenderness present.      Right knee: Swelling present. No erythema. Decreased range of motion. Tenderness present.   Neurological:      Mental Status: He is alert and oriented to person, place, and time.   Psychiatric:         Mood and Affect:  Mood normal.         Behavior: Behavior normal.         Assessment & Plan:     70 y.o. male with the following -     1. Chronic pain of left knee  Chronic, stable. Pt stated Lyrica due to L knee pain, and is now having R knee pain with swelling. Pt would like to titrate off Lyrica to see if it is due to this. Titration instructions given for over the next 7 days, pt needed 50 mg capsules to titrate down. Script sent to pharmacy.  - pregabalin (LYRICA) 50 MG capsule; Take 1 capsule by mouth 2 times a day for 21 days.  Dispense: 42 capsule; Refill: 0    2. Pain and swelling of right knee   New problem, pt feels like this has correlated with his increased dose of Lyrica. Pt frustrated he is having L knee issues which have gotten better but now having problems with his R knee. Would like to stop Lyrica to see if it is side effect from that. Pt also feels like his pain is worse because he cannot take his Diclofenac because he has procedure in 2 days and it has to be held for 5 days prior. Taper given over 7 days, pt will f/u with physiatry in 2 days. Discussed if sx worsen or he does not tolerate taper to let PCP or go to . Pt verbalized understanding.     I spent a total of 45 minutes with record review, exam, communication with the patient, communication with other providers, and documentation of this encounter.      Return if symptoms worsen or fail to improve.    Please note that this dictation was created using voice recognition software. I have made every reasonable attempt to correct obvious errors, but I expect that there are errors of grammar and possibly content that I did not discover before finalizing the note.

## 2021-07-28 ENCOUNTER — APPOINTMENT (OUTPATIENT)
Dept: RADIOLOGY | Facility: REHABILITATION | Age: 71
End: 2021-07-28
Attending: PHYSICAL MEDICINE & REHABILITATION
Payer: MEDICARE

## 2021-07-28 ENCOUNTER — HOSPITAL ENCOUNTER (OUTPATIENT)
Facility: REHABILITATION | Age: 71
End: 2021-07-28
Attending: PHYSICAL MEDICINE & REHABILITATION | Admitting: PHYSICAL MEDICINE & REHABILITATION
Payer: MEDICARE

## 2021-07-28 VITALS
RESPIRATION RATE: 18 BRPM | HEART RATE: 75 BPM | SYSTOLIC BLOOD PRESSURE: 138 MMHG | HEIGHT: 72 IN | DIASTOLIC BLOOD PRESSURE: 72 MMHG | WEIGHT: 253.53 LBS | BODY MASS INDEX: 34.34 KG/M2 | OXYGEN SATURATION: 96 % | TEMPERATURE: 97.8 F

## 2021-07-28 PROCEDURE — 64483 NJX AA&/STRD TFRM EPI L/S 1: CPT

## 2021-07-28 PROCEDURE — 700101 HCHG RX REV CODE 250

## 2021-07-28 PROCEDURE — 99152 MOD SED SAME PHYS/QHP 5/>YRS: CPT

## 2021-07-28 PROCEDURE — 62323 NJX INTERLAMINAR LMBR/SAC: CPT

## 2021-07-28 PROCEDURE — 700111 HCHG RX REV CODE 636 W/ 250 OVERRIDE (IP)

## 2021-07-28 PROCEDURE — 700117 HCHG RX CONTRAST REV CODE 255

## 2021-07-28 RX ORDER — DEXAMETHASONE SODIUM PHOSPHATE 10 MG/ML
INJECTION, SOLUTION INTRAMUSCULAR; INTRAVENOUS
Status: COMPLETED
Start: 2021-07-28 | End: 2021-07-28

## 2021-07-28 RX ORDER — MIDAZOLAM HYDROCHLORIDE 1 MG/ML
INJECTION INTRAMUSCULAR; INTRAVENOUS
Status: COMPLETED
Start: 2021-07-28 | End: 2021-07-28

## 2021-07-28 RX ORDER — LIDOCAINE HYDROCHLORIDE 20 MG/ML
INJECTION, SOLUTION EPIDURAL; INFILTRATION; INTRACAUDAL; PERINEURAL
Status: COMPLETED
Start: 2021-07-28 | End: 2021-07-28

## 2021-07-28 RX ORDER — ONDANSETRON 2 MG/ML
4 INJECTION INTRAMUSCULAR; INTRAVENOUS
Status: DISCONTINUED | OUTPATIENT
Start: 2021-07-28 | End: 2021-07-28 | Stop reason: HOSPADM

## 2021-07-28 RX ADMIN — MIDAZOLAM HYDROCHLORIDE 1 MG: 1 INJECTION, SOLUTION INTRAMUSCULAR; INTRAVENOUS at 09:32

## 2021-07-28 RX ADMIN — DEXAMETHASONE SODIUM PHOSPHATE 10 MG: 10 INJECTION, SOLUTION INTRAMUSCULAR; INTRAVENOUS at 09:36

## 2021-07-28 RX ADMIN — FENTANYL CITRATE 25 MCG: 50 INJECTION, SOLUTION INTRAMUSCULAR; INTRAVENOUS at 09:38

## 2021-07-28 RX ADMIN — FENTANYL CITRATE 25 MCG: 50 INJECTION, SOLUTION INTRAMUSCULAR; INTRAVENOUS at 09:29

## 2021-07-28 RX ADMIN — IOHEXOL 5 ML: 240 INJECTION, SOLUTION INTRATHECAL; INTRAVASCULAR; INTRAVENOUS; ORAL at 09:36

## 2021-07-28 RX ADMIN — MIDAZOLAM HYDROCHLORIDE 1 MG: 1 INJECTION, SOLUTION INTRAMUSCULAR; INTRAVENOUS at 09:29

## 2021-07-28 RX ADMIN — LIDOCAINE HYDROCHLORIDE 5 ML: 20 INJECTION, SOLUTION EPIDURAL; INFILTRATION; INTRACAUDAL; PERINEURAL at 09:36

## 2021-07-28 ASSESSMENT — PAIN DESCRIPTION - PAIN TYPE
TYPE: CHRONIC PAIN

## 2021-07-28 ASSESSMENT — FIBROSIS 4 INDEX: FIB4 SCORE: 1.2

## 2021-07-28 NOTE — PROGRESS NOTES
0822 Pt received to pre procedure area. ID band and allergies verified. Vital signs taken and stable. Pertinent medical history (HTN, CAD,) reviewed and communicated to procedure RN. Verified that patient has not taken NSAIDS, anticoagulants or blood thinners in past 5 days. Reviewed post op instructions with patient, questions answered, verbalized understanding.     0980 Pt received to recovery area, report received from procedure RN Arleth . Vitals taken and stable. Patient tolerated po fluids and snack without difficulty. Dressing clean, dry and intact. Ice pack applied over dressing. Patient ambulatory without difficulty. Pt discharged to designated .

## 2021-07-28 NOTE — PROGRESS NOTES
0958  Pt received to recovery area, report received from procedure RN Arleth . Vitals taken and stable. Patient tolerated po fluids and snack without difficulty. Dressing clean, dry and intact. Ice pack applied over dressing. Patient ambulatory without difficulty. Pt discharged to designated .

## 2021-07-28 NOTE — INTERVAL H&P NOTE
H&P reviewed. The patient was examined and there are no changes to the H&P      /76   Pulse 75   Temp 36.6 °C (97.8 °F) (Temporal)   Ht 1.829 m (6')   Wt 115 kg (253 lb 8.5 oz)   SpO2 95%     CV: RRR, Normal S1, S2  RESP: Clear to auscultation bilaterally    Continue with injection as planned.  Discussed findings on MRI.   He is discontinuing his lyrica, weaning due to swelling in legs.    Sav Rick MD  Physical Medicine and Rehabilitation  Interventional Spine and Sports Physiatry  [facility]

## 2021-07-28 NOTE — PROGRESS NOTES
0822 Pt received to pre procedure area. ID band and allergies verified. Vital signs taken and stable. Pertinent medical history (HTN, CAD,Diverticulosis) reviewed and communicated to procedure RN. Verified that patient has not taken NSAIDS, anticoagulants or blood thinners in past 5 days. Reviewed post op instructions with patient, questions answered, verbalized understanding.

## 2021-07-28 NOTE — PROGRESS NOTES
Preprocedure assessment completed.  Pertinent health information(HTN, CAD, 2 heart stents) communicated to physician and staff prior to time out.  Patient positioned preprocedure by RN, CSTand XRAY.  Foam wedge under ankles for support.

## 2021-07-28 NOTE — OP REPORT
Type of procedure: Right L4-5 interlaminar epidural steroid injection     Pre-Operative Diagnosis:   M48.061 (ICD-10-CM) - Spinal stenosis of lumbar region, unspecified whether neurogenic claudication present   M43.16 (ICD-10-CM) - Spondylolisthesis of lumbar region   M48.061 (ICD-10-CM) - Neural foraminal stenosis of lumbar spine   M54.5 (ICD-10-CM) - Low back pain with radiation       Post-Operative Diagnoses:  M48.061 (ICD-10-CM) - Spinal stenosis of lumbar region, unspecified whether neurogenic claudication present   M43.16 (ICD-10-CM) - Spondylolisthesis of lumbar region   M48.061 (ICD-10-CM) - Neural foraminal stenosis of lumbar spine   M54.5 (ICD-10-CM) - Low back pain with radiation       Type of Anesthesia: Conscious sedation and local anesthesia    Physician: Sav Rick MD     Blood Loss: None     Method of Procedure:     The patient signed an informed consent in the pre-op area after all risks and complications were explained and all questions were answered.  An IV was started in the antecubital fossa in the pre-op area.  Blood pressure, heart rate, pulse oximetry, and EKG monitoring were performed throughout the procedure.  All vital signs remained stable throughout the procedure and EKG monitoring revealed a normal sinus rhythm.     The patient was prepped and draped in a sterile fashion in the prone position.     Moderation sedation was achieved with Versed (2.0mg) and Fentanyl (50mcg). Monitoring of the patients vital signs and respiratory status was provided by trained independent registered nurse during the entire course of the procedures and under my supervision and recoded in the patient’s medical record. The duration of sedation was over 10 minutes.      The patient's spine was surveyed under fluoroscopic visualization and anatomical landmarks were identified.     Right L4-5 Interlaminar Epidural Steroid Injection:     The region overlying the right L5 lamina was localized and the soft tissues  "overlying this structure were infiltrated with 1% Lidocaine without Epinephrine.  A 20G 6 inch, Tuohy needle was inserted through the anesthetized tract of tissue to the right L5 lamina.  The needle was \"walked off\" the lamina and then loss of resistance was obtained.  After negative aspiration for blood, Omnipaque was used to confirm needle location and AP and lateral films were obtained confirming needle placement in the epidural space.  Then, an injection was performed using 1.0 cc dexamethasone (10mg/cc), 1.0 cc of 2% lidocaine without epinephrine and 2.0 cc of preservative-free normal saline.  The patient tolerated the procedure well.     Complications:None     Disposition:     1. The patient was discharged to the recovery area in good condition.  2. Discharge instructions were provided.  3. Call with any questions or problems  4. Apply ice to injection site and keep clean and dry for 24 hours.    Sav Rick MD  Physical Medicine and Rehabilitation  Interventional Spine and Sports Physiatry  [facility]    CPT: 35116, [id number]  "

## 2021-07-29 ENCOUNTER — TELEPHONE (OUTPATIENT)
Dept: PHYSICAL MEDICINE AND REHAB | Facility: MEDICAL CENTER | Age: 71
End: 2021-07-29

## 2021-07-29 NOTE — TELEPHONE ENCOUNTER
I called patient to follow up after his Special procedure Right L4-5 interlaminar epidural steroid injection with sedation and he stated he is doing fine and he is icing the area. RR

## 2021-07-30 ENCOUNTER — TELEPHONE (OUTPATIENT)
Dept: MEDICAL GROUP | Facility: PHYSICIAN GROUP | Age: 71
End: 2021-07-30

## 2021-07-30 NOTE — TELEPHONE ENCOUNTER
Phone Number Called: [phone] (home)   Curt Blair      Call outcome: Spoke to patient regarding message below.    Message: Pt left voicemail with some questions on bloodwork. When I called the pt back he was wondering if he was due for any labs. His chart said he still has three A1C, Prostate, and Lipid. Since they are fasting he said he will go into [facility] and have them done their before his next appointment with Dr. Mullen.

## 2021-08-03 ENCOUNTER — PATIENT MESSAGE (OUTPATIENT)
Dept: CARDIOLOGY | Facility: MEDICAL CENTER | Age: 71
End: 2021-08-03

## 2021-08-04 ENCOUNTER — PATIENT MESSAGE (OUTPATIENT)
Dept: CARDIOLOGY | Facility: MEDICAL CENTER | Age: 71
End: 2021-08-04

## 2021-08-06 ENCOUNTER — TELEPHONE (OUTPATIENT)
Dept: CARDIOLOGY | Facility: MEDICAL CENTER | Age: 71
End: 2021-08-06

## 2021-08-06 ENCOUNTER — PATIENT MESSAGE (OUTPATIENT)
Dept: CARDIOLOGY | Facility: MEDICAL CENTER | Age: 71
End: 2021-08-06

## 2021-08-06 NOTE — TELEPHONE ENCOUNTER
----- Message from Iván Otoole M.D. sent at 7/27/2021  9:12 AM PDT -----  Labs look good, please let him know     Thank you

## 2021-08-07 NOTE — TELEPHONE ENCOUNTER
From: Curt Blair  To: Nurse Belinda PEREZ  Sent: 8/4/2021 3:42 PM PDT  Subject: Non-Urgent Medical Question    no concerns my pressure is good I have a friend whose  told him to take his a night based on some study that was done some where.      ----- Message -----   From:Nurse Belinda PEREZ   Sent:8/4/2021 12:12 PM PDT   To:Curt Blair   Subject:RE: Non-Urgent Medical Question    Hi Curt,    As long as it is the same time everyday, there is no preference on when to take it.    If you take it in the morning, continue to take it at that time.    Are you concerned with your blood pressure readings?    Please let me know your thoughts.     Thank you,  Belinda STEEN for Dr. Otoole      ----- Message -----   From:Curt Blair   Sent:8/3/2021 9:47 AM PDT   To:Physician Iván Otoole   Subject:Non-Urgent Medical Question    Hi Dr. Otoole should I be taking my blood pressure med. at night instead of first thing in morning or does it matter?

## 2021-08-13 ENCOUNTER — OFFICE VISIT (OUTPATIENT)
Dept: PHYSICAL MEDICINE AND REHAB | Facility: MEDICAL CENTER | Age: 71
End: 2021-08-13
Payer: MEDICARE

## 2021-08-13 VITALS
OXYGEN SATURATION: 94 % | DIASTOLIC BLOOD PRESSURE: 70 MMHG | BODY MASS INDEX: 34.37 KG/M2 | HEART RATE: 78 BPM | HEIGHT: 72 IN | SYSTOLIC BLOOD PRESSURE: 122 MMHG | TEMPERATURE: 98.2 F | WEIGHT: 253.75 LBS

## 2021-08-13 DIAGNOSIS — M48.061 NEURAL FORAMINAL STENOSIS OF LUMBAR SPINE: ICD-10-CM

## 2021-08-13 DIAGNOSIS — M43.16 SPONDYLOLISTHESIS OF LUMBAR REGION: ICD-10-CM

## 2021-08-13 DIAGNOSIS — M48.061 SPINAL STENOSIS OF LUMBAR REGION, UNSPECIFIED WHETHER NEUROGENIC CLAUDICATION PRESENT: ICD-10-CM

## 2021-08-13 DIAGNOSIS — M17.12 PRIMARY OSTEOARTHRITIS OF LEFT KNEE: ICD-10-CM

## 2021-08-13 DIAGNOSIS — M53.3 SACROILIAC JOINT DYSFUNCTION OF RIGHT SIDE: ICD-10-CM

## 2021-08-13 PROCEDURE — 99214 OFFICE O/P EST MOD 30 MIN: CPT | Performed by: PHYSICAL MEDICINE & REHABILITATION

## 2021-08-13 ASSESSMENT — PATIENT HEALTH QUESTIONNAIRE - PHQ9
5. POOR APPETITE OR OVEREATING: 0 - NOT AT ALL
CLINICAL INTERPRETATION OF PHQ2 SCORE: 0

## 2021-08-13 ASSESSMENT — FIBROSIS 4 INDEX: FIB4 SCORE: 1.2

## 2021-08-13 ASSESSMENT — PAIN SCALES - GENERAL: PAINLEVEL: 2=MINIMAL-SLIGHT

## 2021-08-13 NOTE — PROGRESS NOTES
"Follow-up patient note, patient previously seen by Dr. Porter     Physiatry (physical medicine and  Rehabilitation), interventional spine and sports medicine    Date of Service: 08/13/2021    Chief complaint:   Chief Complaint   Patient presents with   • Follow-Up     Low Back Pain       HISTORY    HPI: Curt Blair 70 y.o. male who presents today for follow-up evaluation.    Since right L4-5 interlaminar epidural steroid injection, Curt reports that his leg cramping at night has significantly decreased.  Left cramping is better at night.  Occasional cramping.  Changing positions improves now.    He has seen ortho and got a right knee injection.  Both knees are \"fine\" he thinks, for now.    He has been going to [facility] for physical therapy.  Feeling like he is walking better.      Pain is better than it was and feels significantly more manageable.    He stopped lyrica and swelling has resolved.  Sleeping is better, but still 4-6 hours a night.    Diclofenac helps with pain, particularly in hands, and he takes this twice a day    Medical records review:    Reviewed records from Dr Clovis Alicea 07/2013 At that visit, he assessed that MRI of left knee was indicated.  On 07/30/2013, they reviewed the study and the patient had a left knee corticosteroid injection    Previous treatments:    Physical Therapy: No    Medications the patient is tried: NSAIDs    Previous interventions: none, recently     Previous surgeries to relieve the above pain:  none      ROS:   Eyes: cataract surgery, recently  CV: history of MI at 41  Red Flags ROS:   Fever, Chills, Sweats: Denies  Involuntary Weight Loss: Denies  Bladder Incontinence: Denies  Bowel Incontinence: Denies  Saddle Anesthesia: Denies    All other systems reviewed and negative.       PMHx:   Past Medical History:   Diagnosis Date   • Allergies 12/24/2019   • Arteriosclerotic vascular disease 1/25/2019   • Arthritis     Right Foot   • CAD (coronary artery disease)    • " Elevated CPK 8/16/2020   • Generalized anxiety disorder 10/11/2017   • Hyperlipidemia    • Hypertension    • Inflamed sebaceous cyst 2/15/2018   • Intrinsic eczema 2/7/2020   • Prediabetes 7/19/2019   • S/P coronary artery stent placement     2 stents       PSHx:   Past Surgical History:   Procedure Laterality Date   • IN INJ LUMBAR/SACRAL,W/ IMAGING Right 7/28/2021    Procedure: RIGHT L4-5 interlaminar epidural steroid injection with sedation;  Surgeon: Sav Rick M.D.;  [facility];  Service: Pain Management   • IN FLUOROSCOPIC GUIDANCE NEEDLE PLACEMENT Left 6/16/2021    Procedure: LEFT genicular nerve blocks, diagnostic;  Surgeon: Sav Rick M.D.;  Location: [facility];  Service: Pain Management   • IN INJ AA/STRD GNCLR NRV BRNCH W/IMG Left 6/16/2021    Procedure: GENICULAR NERVE BRANCHES INCLUDING IMAGING GUIDANCE WITH A REQUIRED MINIMUM THREE NERVE BRANCHES;  Surgeon: Sav Rick M.D.;  Location: [facility];  Service: Pain Management   • APPENDECTOMY     • CHOLECYSTECTOMY     • ENDARTERECTOMY      corornary   • STENT PLACEMENT     • VASECTOMY         Family history   Family History   Problem Relation Age of Onset   • Lung Disease Mother         copd   • Hypertension Mother    • Heart Disease Father         heart attack and heart disease   • Cancer Brother         neck   • Heart Disease Paternal Grandfather         Heart attack   • Other Sister         non-malignant brain tumor   • Diabetes Neg Hx          Medications:   Current Outpatient Medications   Medication   • diclofenac DR (VOLTAREN) 75 MG Tablet Delayed Response   • Turmeric (QC TUMERIC COMPLEX PO)   • atorvastatin (LIPITOR) 10 MG Tab   • nitroglycerin (NITROSTAT) 0.4 MG SL Tab   • amlodipine-benazepril (LOTREL) 5-10 MG per capsule   • Multiple Vitamins-Minerals (MULTIVITAMIN PO)   • Coenzyme Q10 (CO Q 10 PO)   • MEGARED OMEGA-3 KRILL OIL PO   • CALCIUM PO   • aspirin 81 MG  tablet     No current facility-administered medications for this visit.       Allergies:   Allergies   Allergen Reactions   • Atorvastatin Myalgia     Ok to take 10 mg  Elevated CPK  Only at 40 Mg does this happen.   • Lyrica [Pregabalin]    • Rosuvastatin      muscle cramps elevated CPK       Social Hx:   Social History     Socioeconomic History   • Marital status:      Spouse name: Not on file   • Number of children: Not on file   • Years of education: Not on file   • Highest education level: 12th grade   Occupational History   • Not on file   Tobacco Use   • Smoking status: Former Smoker     Packs/day: 2.00     Years: 25.00     Pack years: 50.00     Types: Cigarettes     Quit date: 1992     Years since quittin.6   • Smokeless tobacco: Never Used   • Tobacco comment: avoid all tobacco products   Vaping Use   • Vaping Use: Never used   Substance and Sexual Activity   • Alcohol use: Yes     Alcohol/week: 0.6 oz     Types: 1 Standard drinks or equivalent per week     Comment: occ   • Drug use: No   • Sexual activity: Yes     Partners: Female     Comment: , 3 children   Other Topics Concern   •  Service Yes   • Blood Transfusions No   • Caffeine Concern No   • Occupational Exposure No   • Hobby Hazards No   • Sleep Concern Yes   • Stress Concern Yes   • Weight Concern Yes   • Special Diet No     Comment: no fried   • Back Care Yes   • Exercise Yes   • Bike Helmet No     Comment: does not ride bike   • Seat Belt Not Asked   • Self-Exams Yes   Social History Narrative    He was born in [place]. He worked in the [employer] industry, he retired at age 65. He  Luann in 2016 after both of their spouses passed away. He has children in [place] and several grandchildren and great grandchildren. He is an avid traveler.     Social Determinants of Health     Financial Resource Strain: Low Risk    • Difficulty of Paying Living Expenses: Not hard at all   Food Insecurity: No Food Insecurity    • Worried About Running Out of Food in the Last Year: Never true   • Ran Out of Food in the Last Year: Never true   Transportation Needs: No Transportation Needs   • Lack of Transportation (Medical): No   • Lack of Transportation (Non-Medical): No   Physical Activity: Inactive   • Days of Exercise per Week: 0 days   • Minutes of Exercise per Session: 0 min   Stress: Stress Concern Present   • Feeling of Stress : To some extent   Social Connections: Moderately Integrated   • Frequency of Communication with Friends and Family: More than three times a week   • Frequency of Social Gatherings with Friends and Family: Once a week   • Attends Latter-day Services: Never   • Active Member of Clubs or Organizations: Yes   • Attends Club or Organization Meetings: 1 to 4 times per year   • Marital Status:    Intimate Partner Violence:    • Fear of Current or Ex-Partner:    • Emotionally Abused:    • Physically Abused:    • Sexually Abused:          EXAMINATION     Physical Exam:   Vitals: /70 (BP Location: Right arm, Patient Position: Sitting, BP Cuff Size: Small adult)   Pulse 78   Temp 36.8 °C (98.2 °F) (Temporal)   Ht 1.829 m (6')   Wt 115 kg (253 lb 12 oz)   SpO2 94%     Constitutional:   Body Habitus: Body mass index is 34.41 kg/m².  Cooperation: Fully cooperates with exam  Appearance: Well-groomed, well-nourished, not disheveled, in no acute distress    Eyes: No scleral icterus, no proptosis     ENT -no obvious auditory deficits, wearing a face mask    Skin -no rashes or lesions noted, no warmth or erythema was noted at the injection site    Respiratory-  breathing comfortable on room air, no audible wheezing    Cardiovascular- No lower extremity edema is noted.     Psychiatric- alert and oriented ×3. Normal affect.     Gait - normal gait, no use of ambulatory device, minimally antalgic.     Tenderness over the right PSIS.  Positive forward flexion test on the right.  Stork test is negative bilaterally.   Negative Akila's test bilaterally    No focal motor or sensory deficits in the lower extremities bilaterally    MEDICAL DECISION MAKING    Medical records review: see under HPI section.     DATA    Labs:   Lab Results   Component Value Date/Time    SODIUM 139 07/26/2021 10:55 AM    POTASSIUM 5.0 07/26/2021 10:55 AM    CHLORIDE 103 07/26/2021 10:55 AM    CO2 25 07/26/2021 10:55 AM    ANION 11.0 07/26/2021 10:55 AM    GLUCOSE 90 07/26/2021 10:55 AM    BUN 18 07/26/2021 10:55 AM    CREATININE 0.90 07/26/2021 10:55 AM    CREATININE 0.93 02/20/2013 07:20 AM    CALCIUM 9.8 07/26/2021 10:55 AM    ASTSGOT 22 07/26/2021 10:55 AM    ALTSGPT 29 07/26/2021 10:55 AM    TBILIRUBIN 0.6 07/26/2021 10:55 AM    ALBUMIN 4.7 07/26/2021 10:55 AM    TOTPROTEIN 7.2 07/26/2021 10:55 AM    GLOBULIN 2.5 07/26/2021 10:55 AM    AGRATIO 1.9 07/26/2021 10:55 AM       Lab Results   Component Value Date/Time    PROTHROMBTM 14.8 (H) 12/05/2018 07:00 AM    INR 1.17 (H) 12/05/2018 07:00 AM        Lab Results   Component Value Date/Time    WBC 7.3 07/26/2021 10:55 AM    RBC 5.09 07/26/2021 10:55 AM    HEMOGLOBIN 15.8 07/26/2021 10:55 AM    HEMATOCRIT 49.2 07/26/2021 10:55 AM    MCV 96.7 07/26/2021 10:55 AM    MCH 31.0 07/26/2021 10:55 AM    MCHC 32.1 (L) 07/26/2021 10:55 AM    MPV 11.2 07/26/2021 10:55 AM    NEUTSPOLYS 63.90 07/26/2021 10:55 AM    LYMPHOCYTES 19.10 (L) 07/26/2021 10:55 AM    MONOCYTES 14.40 (H) 07/26/2021 10:55 AM    EOSINOPHILS 1.20 07/26/2021 10:55 AM    BASOPHILS 1.00 07/26/2021 10:55 AM        Lab Results   Component Value Date/Time    HBA1C 5.8 (H) 09/19/2019 06:45 AM        Imaging: I personally reviewed following images, these are my reads  MRI lumbar spine 07/26/2021  At L1-2, no central or foraminal stenosis  At L2-3, disc bulge, facet arthropathy and mild foraminal stenosis bilaterally  At L3-4, moderate central canal stenosis, mod-severe foraminal stenosis bilaterally, facet arthropathy  At L4-5, grade I anterolisthesis,  disc bulge, severe facet arthropathy, moderate foraminal stenosis bilaterally  At L5-S1, mod-severe facet arthropathy with right paracentral annular tear.  No central or foraminal stenosis      MRI lumbar spine 10/29/2020  At L5-S1, there is bilateral facet arthropathy without central canal narrowing.  Mild foraminal stenosis bilaterally   At L4-5, mild central canal stenosis second to disc bulge and severe facet arthropathy.  Moderate foraminal stenosis bilaterally  At L3-4, disc bulge with moderate facet arthropathy and mild-moderate central canal stenosis.  Severe foraminal stenosis bilaterally  At L2-3, posterior disc bulge with facet arthropathy and mild central canal narrowing.  Mild foraminal stenosis bilaterally  At L1-2, mild facet arthropathy bilaterally, mild foraminal stenosis bilaterally.  Prominent epidural fat    Xray lumbar spine 08/07/2020  There is multilevel retrolisthesis at L1-2, L2-3, and L3-4  Grade I anterolisthesis at L4-5, stable on flexion and extension  Degenerative changes with multilevel facet arthropathy    Xray lumbar spine 10/04/2018  There is severe degenerative change in the lumbar spine; there is note of multilevel retrolisthesis at L1-2, L3-4 and L2-3  Mild movement of anterolisthesis at L4-5 with flexion    MRI left knee 07/10/2020  There is note of a small joint effusion.  Cyst posterior to the PCL.  No ligamentous or meniscal tears  Tricompartmental degenerative changes, greatest medial compartment    Xray hip right 10/04/2018  Moderate hip osteoarthritis noted bilaterally     MRI left knee 07/22/2013  There is note of moderate joint effusion.  The medial collateral ligament has mild edema.  There is note of osteophytes in the medial and lateral compartments.  Tendinopathy in the quadriceps tendon noted.    IMAGING radiology reads. I reviewed the following radiology reads   MRI lumbar spine 07/26/2021     IMPRESSION:     1.  New right paracentral L5/S1 annular tear.     2.   Otherwise no significant change in moderate spondylosis with up to moderate to severe L3/4 foraminal stenoses     3.  Greatest the central stenoses are mild to moderate at L3/4     4.  Facet arthropathy greater than degenerative disc disease     5.  Degenerative L4/5 anterolisthesis where there is facet arthropathy and a moderate right facet joint effusion as before    MRI lumbar spine 10/29/2020  IMPRESSION:  1.  Minimal anterolisthesis of L4-5.  2.  Multilevel degenerative disease and facet arthropathy with resultant central canal and foraminal narrowing as described.    Xray lumbar spine 08/07/2020  IMPRESSION:  1.  Multilevel degenerative disc disease and facet degeneration.  2.  Mild anterior subluxation at L4-5. This is stable with flexion and extension.  3.  Again seen multilevel degenerative retrolisthesis. This does not change significantly extension.    Xray lumbar spine 10/04/2018  IMPRESSION:     Mild retrolisthesis of L1-2, L2-3 and L3-4 that are worse with extension.  Mild anterolisthesis of L4-5 that is worse with flexion.  There is partial disc space fusion of the lower thoracic spine.    MRI left knee 07/10/2020  IMPRESSION:  1.  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  2.  Intact ligaments and menisci.  3.  Small joint effusion.  4.  Havana synovial or ganglion cyst immediately posterior to the posterior cruciate ligament.      MRI left knee 07/22/2013       Impression        1. Tendinopathy of the quadriceps tendon.     2. Medial collateral ligament sprain.     3. Osteoarthritis, most severe in the medial compartment.     4. Fraying of the inner rim of the bodies of the lateral and medial menisci.     5. Moderate joint effusion.      Xray hips 10/04/2018  Impression:  No acute osseous abnormality                         Results for orders placed during the hospital encounter of 10/04/18   DX-LUMBAR SPINE-4+ VIEWS    Impression  Mild retrolisthesis of L1-2, L2-3 and L3-4 that are worse with extension.    Mild anterolisthesis of L4-5 that is worse with flexion.    There is partial disc space fusion of the lower thoracic spine.                                         Diagnosis   Visit Diagnoses     ICD-10-CM   1. Spinal stenosis of lumbar region, unspecified whether neurogenic claudication present  M48.061   2. Spondylolisthesis of lumbar region  M43.16   3. Sacroiliac joint dysfunction of right side  M53.3   4. Primary osteoarthritis of left knee  M17.12   5. Neural foraminal stenosis of lumbar spine  M48.061         ASSESSMENT:  Curt Blair 70 y.o. male seen for above     Curt was seen today for follow-up.    Diagnoses and all orders for this visit:    Spinal stenosis of lumbar region, unspecified whether neurogenic claudication present    Spondylolisthesis of lumbar region    Sacroiliac joint dysfunction of right side    Primary osteoarthritis of left knee    Neural foraminal stenosis of lumbar spine         1. Discussed that he is doing significantly better after injection on 07/28/2021.  2. Knee pain is improved.  No additional treatment planned for now.  3. Continue with PTP physical therapy. Discussed right-sided sacroiliac joint region tenderness.  While this could be from multiple pain generators, but there is some mechanical dysfunction.  He will continue with PT and let me know if this worsens.  4. Discontinued lyrica.  Continuing diclofenac  5. Will hold off on starting any other medication for neuropathic pain.        Follow-up: Return in about 3 months (around 11/13/2021), or if symptoms worsen or fail to improve.    Thank you very much for asking me to participate in Curt Blair's care.  Please contact me with any questions or concerns.    Please note that this dictation was created using voice recognition software. I have made every reasonable attempt to correct obvious errors but there may be errors of  grammar and content that I may have overlooked prior to finalization of this note.      Sav Rick MD  Physical Medicine and Rehabilitation  Interventional Spine and Sports Physiatry  [facility]    CC: Shila Mullen, DO

## 2021-08-31 ENCOUNTER — HOSPITAL ENCOUNTER (OUTPATIENT)
Facility: MEDICAL CENTER | Age: 71
End: 2021-08-31
Attending: FAMILY MEDICINE
Payer: MEDICARE

## 2021-08-31 ENCOUNTER — HOSPITAL ENCOUNTER (OUTPATIENT)
Facility: MEDICAL CENTER | Age: 71
End: 2021-08-31
Attending: INTERNAL MEDICINE
Payer: MEDICARE

## 2021-08-31 ENCOUNTER — NON-PROVIDER VISIT (OUTPATIENT)
Dept: MEDICAL GROUP | Facility: PHYSICIAN GROUP | Age: 71
End: 2021-08-31
Payer: MEDICARE

## 2021-08-31 DIAGNOSIS — R73.03 PREDIABETES: ICD-10-CM

## 2021-08-31 DIAGNOSIS — Z12.5 SCREENING FOR MALIGNANT NEOPLASM OF PROSTATE: ICD-10-CM

## 2021-08-31 DIAGNOSIS — E78.5 DYSLIPIDEMIA: ICD-10-CM

## 2021-08-31 DIAGNOSIS — K57.90 DIVERTICULOSIS: ICD-10-CM

## 2021-08-31 LAB
BASOPHILS # BLD AUTO: 0.9 % (ref 0–1.8)
BASOPHILS # BLD: 0.05 K/UL (ref 0–0.12)
CHOLEST SERPL-MCNC: 162 MG/DL (ref 100–199)
EOSINOPHIL # BLD AUTO: 0.08 K/UL (ref 0–0.51)
EOSINOPHIL NFR BLD: 1.4 % (ref 0–6.9)
ERYTHROCYTE [DISTWIDTH] IN BLOOD BY AUTOMATED COUNT: 47.1 FL (ref 35.9–50)
EST. AVERAGE GLUCOSE BLD GHB EST-MCNC: 128 MG/DL
HBA1C MFR BLD: 6.1 % (ref 4–5.6)
HCT VFR BLD AUTO: 47.8 % (ref 42–52)
HDLC SERPL-MCNC: 70 MG/DL
HGB BLD-MCNC: 15.6 G/DL (ref 14–18)
IMM GRANULOCYTES # BLD AUTO: 0.03 K/UL (ref 0–0.11)
IMM GRANULOCYTES NFR BLD AUTO: 0.5 % (ref 0–0.9)
LDLC SERPL CALC-MCNC: 73 MG/DL
LYMPHOCYTES # BLD AUTO: 1.05 K/UL (ref 1–4.8)
LYMPHOCYTES NFR BLD: 18.4 % (ref 22–41)
MCH RBC QN AUTO: 31.1 PG (ref 27–33)
MCHC RBC AUTO-ENTMCNC: 32.6 G/DL (ref 33.7–35.3)
MCV RBC AUTO: 95.4 FL (ref 81.4–97.8)
MONOCYTES # BLD AUTO: 0.72 K/UL (ref 0–0.85)
MONOCYTES NFR BLD AUTO: 12.6 % (ref 0–13.4)
NEUTROPHILS # BLD AUTO: 3.78 K/UL (ref 1.82–7.42)
NEUTROPHILS NFR BLD: 66.2 % (ref 44–72)
NRBC # BLD AUTO: 0 K/UL
NRBC BLD-RTO: 0 /100 WBC
PLATELET # BLD AUTO: 237 K/UL (ref 164–446)
PMV BLD AUTO: 11.4 FL (ref 9–12.9)
PSA SERPL-MCNC: 1.09 NG/ML (ref 0–4)
RBC # BLD AUTO: 5.01 M/UL (ref 4.7–6.1)
TRIGL SERPL-MCNC: 94 MG/DL (ref 0–149)
WBC # BLD AUTO: 5.7 K/UL (ref 4.8–10.8)

## 2021-08-31 PROCEDURE — 85025 COMPLETE CBC W/AUTO DIFF WBC: CPT

## 2021-08-31 PROCEDURE — 80061 LIPID PANEL: CPT

## 2021-08-31 PROCEDURE — 83036 HEMOGLOBIN GLYCOSYLATED A1C: CPT

## 2021-08-31 PROCEDURE — 99000 SPECIMEN HANDLING OFFICE-LAB: CPT | Performed by: INTERNAL MEDICINE

## 2021-08-31 PROCEDURE — 84153 ASSAY OF PSA TOTAL: CPT

## 2021-08-31 PROCEDURE — 36415 COLL VENOUS BLD VENIPUNCTURE: CPT | Performed by: INTERNAL MEDICINE

## 2021-08-31 NOTE — PROGRESS NOTES
Patient arrived for blood draw. Patient reports fasting for at least 8-10 hours.   Verified patient's name/date-of-birth and reason for visit before procedure was started. Patient's left antecubital  cleansed. Venipuncture attempts X1. Blood draw completed on patient's left AC. Applied pressure afterwards and placed Coban on site of venipuncture with directions for patient to remove within the next hour. Patient tolerated procedure well, no adverse reactions. Patient ambulated safely upon leaving clinic.   Completed labels were placed on blood samples in draw room with patient present. Appropriate blood samples were centrifuged. Blood samples were then placed in locked lab box for  pick-up.     Pt requesting to add on a CBC to check for possible increased WBC. Pt reports history of Diverticulitis and is currently experiencing some lower back pain that pt reports has been indicative of Diverticulitis flare. Consulted with FLORENTIN Medina in absence of Dr. Mullen- received VO to add CBC. Placed and signed CBC order.

## 2021-09-08 ENCOUNTER — OFFICE VISIT (OUTPATIENT)
Dept: PHYSICAL MEDICINE AND REHAB | Facility: MEDICAL CENTER | Age: 71
End: 2021-09-08
Payer: MEDICARE

## 2021-09-08 VITALS
WEIGHT: 253.31 LBS | BODY MASS INDEX: 34.31 KG/M2 | OXYGEN SATURATION: 95 % | DIASTOLIC BLOOD PRESSURE: 68 MMHG | HEART RATE: 84 BPM | SYSTOLIC BLOOD PRESSURE: 130 MMHG | TEMPERATURE: 97.5 F | HEIGHT: 72 IN

## 2021-09-08 DIAGNOSIS — M48.061 SPINAL STENOSIS OF LUMBAR REGION, UNSPECIFIED WHETHER NEUROGENIC CLAUDICATION PRESENT: ICD-10-CM

## 2021-09-08 DIAGNOSIS — M43.16 SPONDYLOLISTHESIS OF LUMBAR REGION: ICD-10-CM

## 2021-09-08 DIAGNOSIS — M53.3 SACROILIAC JOINT DYSFUNCTION OF RIGHT SIDE: ICD-10-CM

## 2021-09-08 DIAGNOSIS — M17.0 PRIMARY OSTEOARTHRITIS OF BOTH KNEES: ICD-10-CM

## 2021-09-08 PROCEDURE — 99214 OFFICE O/P EST MOD 30 MIN: CPT | Performed by: PHYSICAL MEDICINE & REHABILITATION

## 2021-09-08 ASSESSMENT — PAIN SCALES - GENERAL: PAINLEVEL: 7=MODERATE-SEVERE PAIN

## 2021-09-08 ASSESSMENT — PATIENT HEALTH QUESTIONNAIRE - PHQ9: CLINICAL INTERPRETATION OF PHQ2 SCORE: 0

## 2021-09-08 ASSESSMENT — FIBROSIS 4 INDEX: FIB4 SCORE: 1.22

## 2021-09-08 NOTE — PROGRESS NOTES
Follow-up patient note, patient previously seen by Dr. Porter     Physiatry (physical medicine and  Rehabilitation), interventional spine and sports medicine    Date of Service: 09/08/2021    Chief complaint:   Chief Complaint   Patient presents with   • Follow-Up     Lower back pain       HISTORY    HPI: Curt Blair 70 y.o. male who presents today for follow-up evaluation.    He reports that he has been doing better, but he continues to have pain in the right posterior gluteal region.  He has been going to PT, twice a week.      He continues to feel that his symptoms are better at night.  He can sleep comfortably and does not report cramping at night.  Right knee feels fine.  Pain still reduced after right L4-5 interlaminar epidural steroid injection,     Diclofenac helps with pain, particularly in hands, and he takes this twice a day    Medical records review:    Reviewed records from Dr Clovis Alicea 07/2013 At that visit, he assessed that MRI of left knee was indicated.  On 07/30/2013, they reviewed the study and the patient had a left knee corticosteroid injection    Previous treatments:    Physical Therapy: No    Medications the patient is tried: NSAIDs    Previous interventions: none, recently     Previous surgeries to relieve the above pain:  none      ROS:   Eyes: cataract surgery, recently  CV: history of MI at 41  Red Flags ROS:   Fever, Chills, Sweats: Denies  Involuntary Weight Loss: Denies  Bladder Incontinence: Denies  Bowel Incontinence: Denies  Saddle Anesthesia: Denies    All other systems reviewed and negative.       PMHx:   Past Medical History:   Diagnosis Date   • Allergies 12/24/2019   • Arteriosclerotic vascular disease 1/25/2019   • Arthritis     Right Foot   • CAD (coronary artery disease)    • Elevated CPK 8/16/2020   • Generalized anxiety disorder 10/11/2017   • Hyperlipidemia    • Hypertension    • Inflamed sebaceous cyst 2/15/2018   • Intrinsic eczema 2/7/2020   • Prediabetes  7/19/2019   • S/P coronary artery stent placement     2 stents       PSHx:   Past Surgical History:   Procedure Laterality Date   • NH INJ LUMBAR/SACRAL,W/ IMAGING Right 7/28/2021    Procedure: RIGHT L4-5 interlaminar epidural steroid injection with sedation;  Surgeon: Sav Rick M.D.;  [facility];  Service: Pain Management   • NH FLUOROSCOPIC GUIDANCE NEEDLE PLACEMENT Left 6/16/2021    Procedure: LEFT genicular nerve blocks, diagnostic;  Surgeon: Sav Rick M.D.;  Location: [facility];  Service: Pain Management   • NH INJ AA/STRD GNCLR NRV BRNCH W/IMG Left 6/16/2021    Procedure: GENICULAR NERVE BRANCHES INCLUDING IMAGING GUIDANCE WITH A REQUIRED MINIMUM THREE NERVE BRANCHES;  Surgeon: Sav Rick M.D.;  [facility];  Service: Pain Management   • APPENDECTOMY     • CHOLECYSTECTOMY     • ENDARTERECTOMY      corornary   • STENT PLACEMENT     • VASECTOMY         Family history   Family History   Problem Relation Age of Onset   • Lung Disease Mother         copd   • Hypertension Mother    • Heart Disease Father         heart attack and heart disease   • Cancer Brother         neck   • Heart Disease Paternal Grandfather         Heart attack   • Other Sister         non-malignant brain tumor   • Diabetes Neg Hx          Medications:   Current Outpatient Medications   Medication   • diclofenac DR (VOLTAREN) 75 MG Tablet Delayed Response   • Turmeric (QC TUMERIC COMPLEX PO)   • atorvastatin (LIPITOR) 10 MG Tab   • nitroglycerin (NITROSTAT) 0.4 MG SL Tab   • amlodipine-benazepril (LOTREL) 5-10 MG per capsule   • Multiple Vitamins-Minerals (MULTIVITAMIN PO)   • Coenzyme Q10 (CO Q 10 PO)   • MEGARED OMEGA-3 KRILL OIL PO   • CALCIUM PO   • aspirin 81 MG tablet     No current facility-administered medications for this visit.       Allergies:   Allergies   Allergen Reactions   • Atorvastatin Myalgia     Ok to take 10 mg  Elevated CPK  Only  at 40 Mg does this happen.   • Lyrica [Pregabalin]    • Rosuvastatin      muscle cramps elevated CPK       Social Hx:   Social History     Socioeconomic History   • Marital status:      Spouse name: Not on file   • Number of children: Not on file   • Years of education: Not on file   • Highest education level: 12th grade   Occupational History   • Not on file   Tobacco Use   • Smoking status: Former Smoker     Packs/day: 2.00     Years: 25.00     Pack years: 50.00     Types: Cigarettes     Quit date: 1992     Years since quittin.7   • Smokeless tobacco: Never Used   • Tobacco comment: avoid all tobacco products   Vaping Use   • Vaping Use: Never used   Substance and Sexual Activity   • Alcohol use: Yes     Alcohol/week: 0.6 oz     Types: 1 Standard drinks or equivalent per week     Comment: occ   • Drug use: No   • Sexual activity: Yes     Partners: Female     Comment: , 3 children   Other Topics Concern   •  Service Yes   • Blood Transfusions No   • Caffeine Concern No   • Occupational Exposure No   • Hobby Hazards No   • Sleep Concern Yes   • Stress Concern Yes   • Weight Concern Yes   • Special Diet No     Comment: no fried   • Back Care Yes   • Exercise Yes   • Bike Helmet No     Comment: does not ride bike   • Seat Belt Not Asked   • Self-Exams Yes   Social History Narrative    He was born in [place]. He worked in the [employer] industry, he retired at age 65. He  Luann in 2016 after both of their spouses passed away. He has children in [place] and several grandchildren and great grandchildren. He is an avid traveler.     Social Determinants of Health     Financial Resource Strain: Low Risk    • Difficulty of Paying Living Expenses: Not hard at all   Food Insecurity: No Food Insecurity   • Worried About Running Out of Food in the Last Year: Never true   • Ran Out of Food in the Last Year: Never true   Transportation Needs: No Transportation Needs   • Lack of  Transportation (Medical): No   • Lack of Transportation (Non-Medical): No   Physical Activity: Inactive   • Days of Exercise per Week: 0 days   • Minutes of Exercise per Session: 0 min   Stress: Stress Concern Present   • Feeling of Stress : To some extent   Social Connections: Moderately Integrated   • Frequency of Communication with Friends and Family: More than three times a week   • Frequency of Social Gatherings with Friends and Family: Once a week   • Attends Evangelical Services: Never   • Active Member of Clubs or Organizations: Yes   • Attends Club or Organization Meetings: 1 to 4 times per year   • Marital Status:    Intimate Partner Violence:    • Fear of Current or Ex-Partner:    • Emotionally Abused:    • Physically Abused:    • Sexually Abused:          EXAMINATION     Physical Exam:   Vitals: /68 (BP Location: Right arm, Patient Position: Sitting, BP Cuff Size: Adult long)   Pulse 84   Temp 36.4 °C (97.5 °F) (Temporal)   Ht 1.829 m (6')   Wt 115 kg (253 lb 4.9 oz)   SpO2 95%     Constitutional:   Body Habitus: Body mass index is 34.35 kg/m².  Cooperation: Fully cooperates with exam  Appearance: Well-groomed, well-nourished, not disheveled, in no acute distress    Eyes: No scleral icterus, no proptosis     ENT -no obvious auditory deficits, wearing a face mask    Skin -no rashes or lesions noted, no warmth or erythema was noted at the injection site    Respiratory-  breathing comfortable on room air, no audible wheezing    Cardiovascular- No lower extremity edema is noted.     Psychiatric- alert and oriented ×3. Normal affect.     Gait - normal gait, no use of ambulatory device, minimally antalgic.     Tenderness over the right PSIS, positive nancie's test on the right for back pain.      No focal motor or sensory deficits in the lower extremities bilaterally    MEDICAL DECISION MAKING    Medical records review: see under HPI section.     DATA    Labs:   Lab Results   Component Value  Date/Time    SODIUM 139 07/26/2021 10:55 AM    POTASSIUM 5.0 07/26/2021 10:55 AM    CHLORIDE 103 07/26/2021 10:55 AM    CO2 25 07/26/2021 10:55 AM    ANION 11.0 07/26/2021 10:55 AM    GLUCOSE 90 07/26/2021 10:55 AM    BUN 18 07/26/2021 10:55 AM    CREATININE 0.90 07/26/2021 10:55 AM    CREATININE 0.93 02/20/2013 07:20 AM    CALCIUM 9.8 07/26/2021 10:55 AM    ASTSGOT 22 07/26/2021 10:55 AM    ALTSGPT 29 07/26/2021 10:55 AM    TBILIRUBIN 0.6 07/26/2021 10:55 AM    ALBUMIN 4.7 07/26/2021 10:55 AM    TOTPROTEIN 7.2 07/26/2021 10:55 AM    GLOBULIN 2.5 07/26/2021 10:55 AM    AGRATIO 1.9 07/26/2021 10:55 AM       Lab Results   Component Value Date/Time    PROTHROMBTM 14.8 (H) 12/05/2018 07:00 AM    INR 1.17 (H) 12/05/2018 07:00 AM        Lab Results   Component Value Date/Time    WBC 5.7 08/31/2021 08:25 AM    RBC 5.01 08/31/2021 08:25 AM    HEMOGLOBIN 15.6 08/31/2021 08:25 AM    HEMATOCRIT 47.8 08/31/2021 08:25 AM    MCV 95.4 08/31/2021 08:25 AM    MCH 31.1 08/31/2021 08:25 AM    MCHC 32.6 (L) 08/31/2021 08:25 AM    MPV 11.4 08/31/2021 08:25 AM    NEUTSPOLYS 66.20 08/31/2021 08:25 AM    LYMPHOCYTES 18.40 (L) 08/31/2021 08:25 AM    MONOCYTES 12.60 08/31/2021 08:25 AM    EOSINOPHILS 1.40 08/31/2021 08:25 AM    BASOPHILS 0.90 08/31/2021 08:25 AM        Lab Results   Component Value Date/Time    HBA1C 6.1 (H) 08/31/2021 08:25 AM        Imaging: I personally reviewed following images, these are my reads  MRI lumbar spine 07/26/2021  At L1-2, no central or foraminal stenosis  At L2-3, disc bulge, facet arthropathy and mild foraminal stenosis bilaterally  At L3-4, moderate central canal stenosis, mod-severe foraminal stenosis bilaterally, facet arthropathy  At L4-5, grade I anterolisthesis, disc bulge, severe facet arthropathy, moderate foraminal stenosis bilaterally  At L5-S1, mod-severe facet arthropathy with right paracentral annular tear.  No central or foraminal stenosis      MRI lumbar spine 10/29/2020  At L5-S1, there is  bilateral facet arthropathy without central canal narrowing.  Mild foraminal stenosis bilaterally   At L4-5, mild central canal stenosis second to disc bulge and severe facet arthropathy.  Moderate foraminal stenosis bilaterally  At L3-4, disc bulge with moderate facet arthropathy and mild-moderate central canal stenosis.  Severe foraminal stenosis bilaterally  At L2-3, posterior disc bulge with facet arthropathy and mild central canal narrowing.  Mild foraminal stenosis bilaterally  At L1-2, mild facet arthropathy bilaterally, mild foraminal stenosis bilaterally.  Prominent epidural fat    Xray lumbar spine 08/07/2020  There is multilevel retrolisthesis at L1-2, L2-3, and L3-4  Grade I anterolisthesis at L4-5, stable on flexion and extension  Degenerative changes with multilevel facet arthropathy    Xray lumbar spine 10/04/2018  There is severe degenerative change in the lumbar spine; there is note of multilevel retrolisthesis at L1-2, L3-4 and L2-3  Mild movement of anterolisthesis at L4-5 with flexion    MRI left knee 07/10/2020  There is note of a small joint effusion.  Cyst posterior to the PCL.  No ligamentous or meniscal tears  Tricompartmental degenerative changes, greatest medial compartment    Xray hip right 10/04/2018  Moderate hip osteoarthritis noted bilaterally     MRI left knee 07/22/2013  There is note of moderate joint effusion.  The medial collateral ligament has mild edema.  There is note of osteophytes in the medial and lateral compartments.  Tendinopathy in the quadriceps tendon noted.    IMAGING radiology reads. I reviewed the following radiology reads   MRI lumbar spine 07/26/2021     IMPRESSION:     1.  New right paracentral L5/S1 annular tear.     2.  Otherwise no significant change in moderate spondylosis with up to moderate to severe L3/4 foraminal stenoses     3.  Greatest the central stenoses are mild to moderate at L3/4     4.  Facet arthropathy greater than degenerative disc  disease     5.  Degenerative L4/5 anterolisthesis where there is facet arthropathy and a moderate right facet joint effusion as before    MRI lumbar spine 10/29/2020  IMPRESSION:  1.  Minimal anterolisthesis of L4-5.  2.  Multilevel degenerative disease and facet arthropathy with resultant central canal and foraminal narrowing as described.    Xray lumbar spine 08/07/2020  IMPRESSION:  1.  Multilevel degenerative disc disease and facet degeneration.  2.  Mild anterior subluxation at L4-5. This is stable with flexion and extension.  3.  Again seen multilevel degenerative retrolisthesis. This does not change significantly extension.    Xray lumbar spine 10/04/2018  IMPRESSION:     Mild retrolisthesis of L1-2, L2-3 and L3-4 that are worse with extension.  Mild anterolisthesis of L4-5 that is worse with flexion.  There is partial disc space fusion of the lower thoracic spine.    MRI left knee 07/10/2020  IMPRESSION:  1.  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  2.  Intact ligaments and menisci.  3.  Small joint effusion.  4.  [place] synovial or ganglion cyst immediately posterior to the posterior cruciate ligament.      MRI left knee 07/22/2013       Impression        1. Tendinopathy of the quadriceps tendon.     2. Medial collateral ligament sprain.     3. Osteoarthritis, most severe in the medial compartment.     4. Fraying of the inner rim of the bodies of the lateral and medial menisci.     5. Moderate joint effusion.      Xray hips 10/04/2018  Impression:  No acute osseous abnormality                         Results for orders placed during the hospital encounter of 10/04/18   DX-LUMBAR SPINE-4+ VIEWS    Impression Mild retrolisthesis of L1-2, L2-3 and L3-4 that are worse with extension.    Mild anterolisthesis of L4-5 that is worse with flexion.    There is partial disc space fusion of the lower thoracic spine.                                          Diagnosis   Visit Diagnoses     ICD-10-CM   1. Spinal stenosis of lumbar region, unspecified whether neurogenic claudication present  M48.061   2. Spondylolisthesis of lumbar region  M43.16   3. Sacroiliac joint dysfunction of right side  M53.3   4. Primary osteoarthritis of both knees  M17.0         ASSESSMENT:  Curt Blair 70 y.o. male seen for above     Curt was seen today for follow-up.    Diagnoses and all orders for this visit:    Spinal stenosis of lumbar region, unspecified whether neurogenic claudication present    Spondylolisthesis of lumbar region    Sacroiliac joint dysfunction of right side    Primary osteoarthritis of both knees         1. Continue with physical therapy, [facility].   He has been making progress.  Encouraged him to continue.  If this does not continue to improve, we can consider right sacroiliac joint injection, but this seems to be slowly improving.  He does feel like symptoms are improving and that other symptoms are better in knees and right leg.  2. Knee pain is improved bilaterally  3. Continuing diclofenac      Follow-up: Return in about 4 weeks (around 10/6/2021), or if symptoms worsen or fail to improve.    Thank you very much for asking me to participate in Curt Blair's care.  Please contact me with any questions or concerns.    Please note that this dictation was created using voice recognition software. I have made every reasonable attempt to correct obvious errors but there may be errors of grammar and content that I may have overlooked prior to finalization of this note.      Sav Rick MD  Physical Medicine and Rehabilitation  Interventional Spine and Sports Physiatry  [facility]    CC: Shila Mullen,

## 2021-09-17 ENCOUNTER — TELEPHONE (OUTPATIENT)
Dept: PHYSICAL MEDICINE AND REHAB | Facility: MEDICAL CENTER | Age: 71
End: 2021-09-17

## 2021-09-17 DIAGNOSIS — M43.16 SPONDYLOLISTHESIS OF LUMBAR REGION: ICD-10-CM

## 2021-09-17 DIAGNOSIS — M48.061 SPINAL STENOSIS OF LUMBAR REGION, UNSPECIFIED WHETHER NEUROGENIC CLAUDICATION PRESENT: ICD-10-CM

## 2021-09-17 DIAGNOSIS — M17.0 PRIMARY OSTEOARTHRITIS OF BOTH KNEES: ICD-10-CM

## 2021-09-17 DIAGNOSIS — M48.061 NEURAL FORAMINAL STENOSIS OF LUMBAR SPINE: ICD-10-CM

## 2021-09-17 NOTE — TELEPHONE ENCOUNTER
Can you call and confirm with Curt? I thought that he was going to [facility] and I don't have any updates from them to be able to determine.  Thank you.    Sav Rick MD

## 2021-09-17 NOTE — TELEPHONE ENCOUNTER
"\"Claudia\" from [facility] called requesting that 12 additional PT sessions be prescribed for this pt, Curt. ND  "

## 2021-09-28 ENCOUNTER — TELEPHONE (OUTPATIENT)
Dept: MEDICAL GROUP | Facility: PHYSICIAN GROUP | Age: 71
End: 2021-09-28

## 2021-09-28 NOTE — TELEPHONE ENCOUNTER
ESTABLISHED PATIENT PRE-VISIT PLANNING     Patient was NOT contacted to complete PVP.     Note: Patient will not be contacted if there is no indication to call.     1.  Reviewed notes from the last few office visits within the medical group: Yes    2.  If any orders were placed at last visit or intended to be done for this visit (i.e. 6 mos follow-up), do we have Results/Consult Notes?         •  Labs - Labs were not ordered at last office visit.  Note: If patient appointment is for lab review and patient did not complete labs, check with provider if OK to reschedule patient until labs completed.       •  Imaging - Imaging was not ordered at last office visit.       •  Referrals - No referrals were ordered at last office visit.    3. Is this appointment scheduled as a Hospital Follow-Up? No    4.  Immunizations were updated in Epic using Reconcile Outside Information activity? Yes    5.  Patient is due for the following Health Maintenance Topics:   Health Maintenance Due   Topic Date Due   • IMM INFLUENZA (1) 09/01/2021       6.  AHA (Pulse8) form printed for Provider? No, already completed

## 2021-09-29 ENCOUNTER — OFFICE VISIT (OUTPATIENT)
Dept: MEDICAL GROUP | Facility: PHYSICIAN GROUP | Age: 71
End: 2021-09-29
Payer: MEDICARE

## 2021-09-29 VITALS
WEIGHT: 253 LBS | TEMPERATURE: 97 F | OXYGEN SATURATION: 96 % | DIASTOLIC BLOOD PRESSURE: 74 MMHG | RESPIRATION RATE: 12 BRPM | SYSTOLIC BLOOD PRESSURE: 128 MMHG | BODY MASS INDEX: 34.27 KG/M2 | HEART RATE: 79 BPM | HEIGHT: 72 IN

## 2021-09-29 DIAGNOSIS — M54.41 CHRONIC BILATERAL LOW BACK PAIN WITH RIGHT-SIDED SCIATICA: ICD-10-CM

## 2021-09-29 DIAGNOSIS — G89.29 CHRONIC BILATERAL LOW BACK PAIN WITH RIGHT-SIDED SCIATICA: ICD-10-CM

## 2021-09-29 DIAGNOSIS — F51.01 PRIMARY INSOMNIA: ICD-10-CM

## 2021-09-29 DIAGNOSIS — H61.23 BILATERAL IMPACTED CERUMEN: ICD-10-CM

## 2021-09-29 DIAGNOSIS — Z00.00 ENCOUNTER FOR SUBSEQUENT ANNUAL WELLNESS VISIT (AWV) IN MEDICARE PATIENT: ICD-10-CM

## 2021-09-29 DIAGNOSIS — R73.03 PREDIABETES: ICD-10-CM

## 2021-09-29 DIAGNOSIS — E78.5 DYSLIPIDEMIA: ICD-10-CM

## 2021-09-29 DIAGNOSIS — I10 ESSENTIAL HYPERTENSION, BENIGN: ICD-10-CM

## 2021-09-29 DIAGNOSIS — Z23 NEED FOR VACCINATION: ICD-10-CM

## 2021-09-29 PROCEDURE — 90662 IIV NO PRSV INCREASED AG IM: CPT | Performed by: INTERNAL MEDICINE

## 2021-09-29 PROCEDURE — G0439 PPPS, SUBSEQ VISIT: HCPCS | Performed by: INTERNAL MEDICINE

## 2021-09-29 PROCEDURE — G0008 ADMIN INFLUENZA VIRUS VAC: HCPCS | Performed by: INTERNAL MEDICINE

## 2021-09-29 RX ORDER — ALPRAZOLAM 0.5 MG/1
0.5 TABLET ORAL NIGHTLY PRN
Qty: 90 TABLET | Refills: 0 | Status: SHIPPED | OUTPATIENT
Start: 2021-09-29 | End: 2021-12-28

## 2021-09-29 ASSESSMENT — PATIENT HEALTH QUESTIONNAIRE - PHQ9: CLINICAL INTERPRETATION OF PHQ2 SCORE: 0

## 2021-09-29 ASSESSMENT — ACTIVITIES OF DAILY LIVING (ADL): BATHING_REQUIRES_ASSISTANCE: 0

## 2021-09-29 ASSESSMENT — FIBROSIS 4 INDEX: FIB4 SCORE: 1.22

## 2021-09-29 ASSESSMENT — ENCOUNTER SYMPTOMS: GENERAL WELL-BEING: GOOD

## 2021-09-29 NOTE — ASSESSMENT & PLAN NOTE
Chronic and ongoing problem, continues to be a source of frustration and limiting him from sitting on a flight or golfing which affects his quality of life.  He will continue to work hard in physical therapy, he does home exercise stretches 4 times per day.  He is following with Dr. Rick who notes she can perform an SI injection in the lower back if needed in the future.  Hopefully this will continue to improve with the conservative treatments he has added into his routine.

## 2021-09-29 NOTE — PROGRESS NOTES
Chief Complaint   Patient presents with   • Annual Exam   • Back Pain         HPI:  Curt Blair is a 70 y.o. male here for Medicare Annual Wellness Visit     Problem   Primary Insomnia    He developed generalized anxiety at bedtime around the time of his wife's passing.  She  in 2016 from complications of COPD.  He has never slept 7 or 8 hours per night, he normally averages 4 to 5 hours per evening which is sufficient for him.  His main challenge was initiation of sleep.  He started with over-the-counter attempts with melatonin and Benadryl with no significant improvement.  He was then started on low-dose alprazolam 0.25 to 0.5 mg nightly.  Prior to the COVID-19 pandemic he was using this 3-4 nights per week.  Due to increased stress related to the pandemic he has been using it more regularly.  He will still try to use the lower dose but will sometimes require the second dose.  He understands the inherent risk with chronic use of benzodiazepines including cognitive impairment as well as withdrawal symptoms and those with regular use.  He feels it is helpful and does not have any negative sequelae such as morning grogginess or gait disturbance.  He would like to continue with this strategy, he makes a 90 days prescription last approximately 1 year.    Current regimen: alprazolam 0.5 mg nightly as needed     Chronic Bilateral Low Back Pain With Right-Sided Sciatica    Curt reports low back pain with radiation of pain into the right buttock. This is made worse by sitting, starts cramping after 30 minutes. This makes it difficult for him to sit on a flight. This has stopped him from playing golf, which he loves. He is able to stand/walk without issue. He sleeps on his left side or back which has been helpful. He started PT in early Aug and is in his 7th week, he notes some improvement. He continues to follow closely with Dr. Rick of physiatry. He has updated MRI imaging of the low back.      Prediabetes        Ref. Range 8/31/2021 08:25   Glycohemoglobin Latest Ref Range: 4.0 - 5.6 % 6.1 (H)   Estim. Avg Glu Latest Units: mg/dL 128     He has a history of prediabetes.  He reports it has been present for a long time and has never progressed.  No significant side effect such as blurred vision, polyuria, or polydipsia.  No prior use of glucose lowering medications.     Bilateral Impacted Cerumen    He has longstanding history of wax buildup in his ears, needs them lavaged about every year.  He has some devices at home he uses but they are not very effective.     Dyslipidemia       Ref. Range 8/31/2021 08:25   Cholesterol,Tot Latest Ref Range: 100 - 199 mg/dL 162   Triglycerides Latest Ref Range: 0 - 149 mg/dL 94   HDL Latest Ref Range: >=40 mg/dL 70   LDL Latest Ref Range: <100 mg/dL 73     He had elevations in his CPK following increase in statin medicine, now improved/resolved.    Current regimen: atorvastatin 10 mg daily     Essential Hypertension, Benign      He reports longstanding history of hypertension.  It has been well controlled on same regiment for many years.  No signs or symptoms of orthostasis.  No angina equivalents.  He follows with cardiology on an annual basis.    Current regimen: amlodipine-benazepril 5-10 mg daily    Blood pressure in clinic ranges 122-130/68/74              Current supplements as per medication list.       Allergies: Atorvastatin, Lyrica [pregabalin], and Rosuvastatin    Current social contact/activities: spending time with his wife.    He  reports that he quit smoking about 29 years ago. His smoking use included cigarettes. He has a 50.00 pack-year smoking history. He has never used smokeless tobacco. He reports current alcohol use of about 0.6 oz of alcohol per week. He reports that he does not use drugs.  Counseling given: Not Answered  Comment: avoid all tobacco products      DPA/Advanced Directive:  Patient has Advanced Directive on file.     ROS:    Gait: Uses no assistive  device  Ostomy: No  Other tubes: No  Amputations: No  Chronic oxygen use: No  Last eye exam: a year ago  Cataract surgery  Due a visit   Wears hearing aids: No   : Denies any urinary leakage during the last 6 months    Screening:    Depression Screening    Little interest or pleasure in doing things?  0 - not at all  Feeling down, depressed , or hopeless? 0 - not at all  Trouble falling or staying asleep, or sleeping too much?     Feeling tired or having little energy?     Poor appetite or overeating?     Feeling bad about yourself - or that you are a failure or have let yourself or your family down?    Trouble concentrating on things, such as reading the newspaper or watching television?    Moving or speaking so slowly that other people could have noticed.  Or the opposite - being so fidgety or restless that you have been moving around a lot more than usual?     Thoughts that you would be better off dead, or of hurting yourself?     Patient Health Questionnaire Score:      If depressive symptoms identified deferred to follow up visit unless specifically addressed in assessment and plan.    Interpretation of PHQ-9 Total Score   Score Severity   1-4 No Depression   5-9 Mild Depression   10-14 Moderate Depression   15-19 Moderately Severe Depression   20-27 Severe Depression      Screening for Cognitive Impairment    Three Minute Recall (captain, garden, picture) 3/3    Edilberto clock face with all 12 numbers and set the hands to show 5 past 8.  Yes    Cognitive concerns identified deferred for follow up unless specifically addressed in assessment and plan.    Fall Risk Assessment    Has the patient had two or more falls in the last year or any fall with injury in the last year?  No    Safety Assessment    Throw rugs on floor.  Yes  Handrails on all stairs.  No  Good lighting in all hallways.  Yes  Difficulty hearing.  No  Patient counseled about all safety risks that were identified.    Functional Assessment  ADLs    Are there any barriers preventing you from cooking for yourself or meeting nutritional needs?  No.    Are there any barriers preventing you from driving safely or obtaining transportation?  No.    Are there any barriers preventing you from using a telephone or calling for help?  No.    Are there any barriers preventing you from shopping?  No.    Are there any barriers preventing you from taking care of your own finances?  No.    Are there any barriers preventing you from managing your medications?  No.    Are there any barriers preventing you from showering, bathing or dressing yourself? No.    Are you currently engaging in any exercise or physical activity?  Yes.  Stretching exercises   Walks   What is your perception of your health?  Good.    Health Maintenance Summary                Annual Wellness Visit Next Due 10/2/2021      Done 10/1/2020      Patient has more history with this topic...    IMM DTaP/Tdap/Td Vaccine Next Due 2024      Done 2014 Imm Admin: Tdap Vaccine    COLORECTAL CANCER SCREENING Next Due 3/13/2029           Patient Care Team:  Shila Mullen D.O. as PCP - General (Internal Medicine)  Iván Otoole M.D. as Consulting Physician (Cardiovascular Disease (Cardiology))  Irving Patel O.D. as Consulting Physician (Optometry)  Dustin Null, PT, DPT as Physical Therapist (Physical Therapy)  Rodolfo Fernandes M.D. as Consulting Physician (Gastroenterology)      Social History     Tobacco Use   • Smoking status: Former Smoker     Packs/day: 2.00     Years: 25.00     Pack years: 50.00     Types: Cigarettes     Quit date: 1992     Years since quittin.7   • Smokeless tobacco: Never Used   • Tobacco comment: avoid all tobacco products   Vaping Use   • Vaping Use: Never used   Substance Use Topics   • Alcohol use: Yes     Alcohol/week: 0.6 oz     Types: 1 Standard drinks or equivalent per week     Comment: occ   • Drug use: No     Family History   Problem Relation  Age of Onset   • Lung Disease Mother         copd   • Hypertension Mother    • Heart Disease Father         heart attack and heart disease   • Cancer Brother         neck   • Heart Disease Paternal Grandfather         Heart attack   • Other Sister         non-malignant brain tumor   • Diabetes Neg Hx      He  has a past medical history of Allergies (12/24/2019), Arteriosclerotic vascular disease (1/25/2019), Arthritis, CAD (coronary artery disease), Elevated CPK (8/16/2020), Generalized anxiety disorder (10/11/2017), Hyperlipidemia, Hypertension, Inflamed sebaceous cyst (2/15/2018), Intrinsic eczema (2/7/2020), Prediabetes (7/19/2019), and S/P coronary artery stent placement.   Past Surgical History:   Procedure Laterality Date   • CO INJ LUMBAR/SACRAL,W/ IMAGING Right 7/28/2021    Procedure: RIGHT L4-5 interlaminar epidural steroid injection with sedation;  Surgeon: Sav Rick M.D.;  [facility];  Service: Pain Management   • CO FLUOROSCOPIC GUIDANCE NEEDLE PLACEMENT Left 6/16/2021    Procedure: LEFT genicular nerve blocks, diagnostic;  Surgeon: Sav Rick M.D.;  Location[facility];  Service: Pain Management   • CO INJ AA/STRD GNCLR NRV BRNCH W/IMG Left 6/16/2021    Procedure: GENICULAR NERVE BRANCHES INCLUDING IMAGING GUIDANCE WITH A REQUIRED MINIMUM THREE NERVE BRANCHES;  Surgeon: Sav Rick M.D.;  [facility];  Service: Pain Management   • APPENDECTOMY     • CHOLECYSTECTOMY     • ENDARTERECTOMY      corornary   • STENT PLACEMENT     • VASECTOMY         Exam:   /74 (BP Location: Left arm, Patient Position: Sitting, BP Cuff Size: Large adult)   Pulse 79   Temp 36.1 °C (97 °F) (Temporal)   Resp 12   Ht 1.829 m (6')   Wt 115 kg (253 lb)   SpO2 96%  Body mass index is 34.31 kg/m².      Physical Exam  Constitutional:       General: He is not in acute distress.     Appearance: Normal appearance. He is obese. He is  not ill-appearing.   HENT:      Right Ear: There is impacted cerumen.      Left Ear: There is impacted cerumen.   Eyes:      Extraocular Movements: Extraocular movements intact.      Conjunctiva/sclera: Conjunctivae normal.   Cardiovascular:      Rate and Rhythm: Normal rate and regular rhythm.      Pulses: Normal pulses.      Heart sounds: No murmur heard.     Pulmonary:      Effort: Pulmonary effort is normal. No respiratory distress.      Breath sounds: Normal breath sounds. No wheezing or rhonchi.   Musculoskeletal:      Right lower leg: No edema.      Left lower leg: No edema.   Skin:     Findings: No bruising or rash.   Psychiatric:         Mood and Affect: Mood normal.         Behavior: Behavior normal.         Thought Content: Thought content normal.         Judgment: Judgment normal.          Assessment and Plan. The following treatment and monitoring plan is recommended:      Curt is a 70 y.o. male with the following:  Problem List Items Addressed This Visit     Essential hypertension, benign     Chronic and ongoing problem, blood pressure continues to be at goal.  Continue current regimen of amlodipine and benazepril.  Electrolytes and renal function stable.         Dyslipidemia     Chronic and ongoing problem.  Cholesterol values continue to be at goal.  Continue atorvastatin 10 mg daily.         Bilateral impacted cerumen     Chronic and decompensated problem.  Ear lavage performed in clinic by medical assistant, Keyna ARGUETA.  Patient Asifer thought difficulty enlargement of cerumen was removed.  No immediate complications.         Prediabetes     Chronic and ongoing problem.  A1c slightly increased, likely related multiple steroid injections in recent time.  No need to add pharmacotherapy at this time.  Continue monitoring periodically to ensure stability.         Primary insomnia     Chronic and ongoing problem. In times of increased stress his insomnia decompensates and he will use alprazolam to help  "initiate sleep. No deleterious side effects. He normally makes a 90 day supply last 1 year. Controlled substance agreement completed in clinic today.    Obtained and reviewed patient utilization report from [facility] pharmacy database on 9/29/2021 12:49 PM  prior to writing prescription for controlled substance II, III or IV per [place] bill . Based on assessment of the report, the prescription is medically necessary.     Patient understands this prescription is a controlled substance which is potentially habit-forming and its use is regulated by the [employer]. We also discussed the new \"black box\" warning regarding the lethal combination of opioids and benzodiazepines. Refills are subject to terms of a controlled substance agreement and patient has an updated one on file. Most recent UDS is appropriate. Any refill requires an office visit. Narcotics have may adverse effects and the risks of addiction, accidental overdose and death were emphasized. Provided prescriptions for the next three months.         Relevant Medications    ALPRAZolam (XANAX) 0.5 MG Tab    Other Relevant Orders    Controlled Substance Treatment Agreement    Chronic bilateral low back pain with right-sided sciatica     Chronic and ongoing problem, continues to be a source of frustration and limiting him from sitting on a flight or golfing which affects his quality of life.  He will continue to work hard in physical therapy, he does home exercise stretches 4 times per day.  He is following with Dr. Rick who notes she can perform an SI injection in the lower back if needed in the future.  Hopefully this will continue to improve with the conservative treatments he has added into his routine.         Relevant Medications    ALPRAZolam (XANAX) 0.5 MG Tab      Other Visit Diagnoses     Need for vaccination        Relevant Orders    Influenza Vaccine, High Dose (65+ Only) (Completed)    Encounter for subsequent annual wellness visit (AWV) in Medicare " patient               Services suggested: No services needed at this time  Health Care Screening: Age-appropriate preventive services recommended by USPTF and ACIP covered by Medicare were discussed today. Services ordered if indicated and agreed upon by the patient.  Referrals offered: Community-based lifestyle interventions to reduce health risks and promote self-management and wellness, fall prevention, nutrition, physical activity, tobacco-use cessation, weight loss, and mental health services as per orders if indicated.    Discussion today about general wellness and lifestyle habits:    · Prevent falls and reduce trip hazards; Cautioned about securing or removing rugs.  · Have a working fire alarm and carbon monoxide detector;   · Engage in regular physical activity and social activities     Follow-up: Return in about 6 months (around 3/29/2022).    I spent a total of 38 minutes with record review, exam, communication with the patient, communication with other providers, and documentation of this encounter.       PLEASE NOTE: This dictation was created using voice recognition software. I have made every reasonable attempt to correct obvious errors, but I expect that there are errors of grammar and possibly content that I did not discover before finalizing the note.      Shila Mullen, DO  Geriatric and Internal Medicine  [facility]

## 2021-09-29 NOTE — ASSESSMENT & PLAN NOTE
"Chronic and ongoing problem. In times of increased stress his insomnia decompensates and he will use alprazolam to help initiate sleep. No deleterious side effects. He normally makes a 90 day supply last 1 year. Controlled substance agreement completed in clinic today.    Obtained and reviewed patient utilization report from [facility] pharmacy database on 9/29/2021 12:49 PM  prior to writing prescription for controlled substance II, III or IV per [place] bill . Based on assessment of the report, the prescription is medically necessary.     Patient understands this prescription is a controlled substance which is potentially habit-forming and its use is regulated by the [employer]. We also discussed the new \"black box\" warning regarding the lethal combination of opioids and benzodiazepines. Refills are subject to terms of a controlled substance agreement and patient has an updated one on file. Most recent UDS is appropriate. Any refill requires an office visit. Narcotics have may adverse effects and the risks of addiction, accidental overdose and death were emphasized. Provided prescriptions for the next three months.  "

## 2021-09-29 NOTE — ASSESSMENT & PLAN NOTE
Chronic and ongoing problem.  Cholesterol values continue to be at goal.  Continue atorvastatin 10 mg daily.

## 2021-09-29 NOTE — ASSESSMENT & PLAN NOTE
Chronic and ongoing problem.  A1c slightly increased, likely related multiple steroid injections in recent time.  No need to add pharmacotherapy at this time.  Continue monitoring periodically to ensure stability.

## 2021-09-29 NOTE — ASSESSMENT & PLAN NOTE
Chronic and ongoing problem, blood pressure continues to be at goal.  Continue current regimen of amlodipine and benazepril.  Electrolytes and renal function stable.

## 2021-09-29 NOTE — NON-PROVIDER
Bilateral ear lavage sucessfully completed.  Both ear drums were visualized after ccleaning ears with one part hydrogen peroxide and two parts water.  Patient tolerated well.

## 2021-09-29 NOTE — ASSESSMENT & PLAN NOTE
Chronic and decompensated problem.  Ear lavage performed in clinic by medical assistant, Kenya Lopes thought difficulty enlargement of cerumen was removed.  No immediate complications.

## 2021-10-06 ENCOUNTER — OFFICE VISIT (OUTPATIENT)
Dept: PHYSICAL MEDICINE AND REHAB | Facility: MEDICAL CENTER | Age: 71
End: 2021-10-06
Payer: MEDICARE

## 2021-10-06 VITALS
WEIGHT: 253.31 LBS | TEMPERATURE: 97.3 F | HEART RATE: 96 BPM | BODY MASS INDEX: 34.31 KG/M2 | OXYGEN SATURATION: 96 % | SYSTOLIC BLOOD PRESSURE: 124 MMHG | DIASTOLIC BLOOD PRESSURE: 68 MMHG | HEIGHT: 72 IN

## 2021-10-06 DIAGNOSIS — M17.0 PRIMARY OSTEOARTHRITIS OF BOTH KNEES: ICD-10-CM

## 2021-10-06 DIAGNOSIS — M48.061 SPINAL STENOSIS OF LUMBAR REGION, UNSPECIFIED WHETHER NEUROGENIC CLAUDICATION PRESENT: ICD-10-CM

## 2021-10-06 DIAGNOSIS — M62.838 MUSCLE SPASM: ICD-10-CM

## 2021-10-06 DIAGNOSIS — M54.50 LUMBOSACRAL PAIN: ICD-10-CM

## 2021-10-06 DIAGNOSIS — M47.816 LUMBAR SPONDYLOSIS: ICD-10-CM

## 2021-10-06 DIAGNOSIS — M43.16 SPONDYLOLISTHESIS OF LUMBAR REGION: ICD-10-CM

## 2021-10-06 DIAGNOSIS — M53.3 SACROILIAC JOINT PAIN: ICD-10-CM

## 2021-10-06 PROCEDURE — 99215 OFFICE O/P EST HI 40 MIN: CPT | Performed by: PHYSICAL MEDICINE & REHABILITATION

## 2021-10-06 RX ORDER — CYCLOBENZAPRINE HCL 10 MG
10 TABLET ORAL
Qty: 30 TABLET | Refills: 0 | Status: SHIPPED | OUTPATIENT
Start: 2021-10-06 | End: 2021-11-05

## 2021-10-06 ASSESSMENT — PATIENT HEALTH QUESTIONNAIRE - PHQ9: CLINICAL INTERPRETATION OF PHQ2 SCORE: 0

## 2021-10-06 ASSESSMENT — FIBROSIS 4 INDEX: FIB4 SCORE: 1.22

## 2021-10-06 ASSESSMENT — PAIN SCALES - GENERAL: PAINLEVEL: 6=MODERATE PAIN

## 2021-10-06 NOTE — TELEPHONE ENCOUNTER
Dear Curt,    [facility] has partnered with the [facility] to help distribute the initially available, limited supplies of COVID-19 vaccines. All 3,000 appointments for this week have been made and confirmed. No additional appointments are available for this week. All those who are eligible (age 70 and higher), [facility], and have received a ticket to schedule are on a waiting list. As the [facility] makes more vaccines available to us, we hope to provide additional appointments.     At this time, any vaccines that we are able to help the [facility] distribute will be administered at the [facility] vaccine clinic by appointment only. We will continue to provide updates on the progress of identifying and vaccinating the different identified groups on our website: [email or web address].    Sincerely,  Your Healthcare Team   Detail Level: Detailed

## 2021-10-06 NOTE — PROGRESS NOTES
Follow-up patient note, patient previously seen by Dr. Porter     Physiatry (physical medicine and  Rehabilitation), interventional spine and sports medicine    Date of Service: 10/06/2021    Chief complaint:   Chief Complaint   Patient presents with   • Follow-Up     Back pain       HISTORY    HPI: Curt Blair 70 y.o. male who presents today for follow-up evaluation.    Curt returns after working with physical therapy.  Pain does not seem to be improving.  Pain is 7/10 on the NRS.  The pain is shooting into the right leg.  He has ongoing pain in the right posterior gluteal region.     Sitting aggravates it.  Going to bed at night, he takes a tylenol.  Using diclofenac gel on his back.  Sleep is limited.  Pain on the right makes it difficult for him to get up when he goes to the bathroom.  Going to bed, he has a hard time getting back to sleep.  Pain radiates into the right knee but not below.    Pain is stabbing and cramping.  No symptoms on the left.    Right L4-5 interlaminar epidural steroid injection on 7/28/2021 had helped, but pain is now relatively constant in the right gluteal region.  The cramping in the legs that he had prior to epidural has not returned.    Medical records review:    Reviewed records from Dr Clovis Alicea 07/2013 At that visit, he assessed that MRI of left knee was indicated.  On 07/30/2013, they reviewed the study and the patient had a left knee corticosteroid injection    Previous treatments:    Physical Therapy: No    Medications the patient is tried: NSAIDs    Previous interventions: none, recently     Previous surgeries to relieve the above pain:  none      ROS:   Eyes: cataract surgery, recently  CV: history of MI at 41  Red Flags ROS:   Fever, Chills, Sweats: Denies  Involuntary Weight Loss: Denies  Bladder Incontinence: Denies  Bowel Incontinence: Denies  Saddle Anesthesia: Denies    All other systems reviewed and negative.       PMHx:   Past Medical History:    Diagnosis Date   • Allergies 12/24/2019   • Arteriosclerotic vascular disease 1/25/2019   • Arthritis     Right Foot   • CAD (coronary artery disease)    • Elevated CPK 8/16/2020   • Generalized anxiety disorder 10/11/2017   • Hyperlipidemia    • Hypertension    • Inflamed sebaceous cyst 2/15/2018   • Intrinsic eczema 2/7/2020   • Prediabetes 7/19/2019   • S/P coronary artery stent placement     2 stents       PSHx:   Past Surgical History:   Procedure Laterality Date   • RI INJ LUMBAR/SACRAL,W/ IMAGING Right 7/28/2021    Procedure: RIGHT L4-5 interlaminar epidural steroid injection with sedation;  Surgeon: Sav Rick M.D.;  [facility];  Service: Pain Management   • RI FLUOROSCOPIC GUIDANCE NEEDLE PLACEMENT Left 6/16/2021    Procedure: LEFT genicular nerve blocks, diagnostic;  Surgeon: Sav Rick M.D.;  Location: [facility];  Service: Pain Management   • RI INJ AA/STRD GNCLR NRV BRNCH W/IMG Left 6/16/2021    Procedure: GENICULAR NERVE BRANCHES INCLUDING IMAGING GUIDANCE WITH A REQUIRED MINIMUM THREE NERVE BRANCHES;  Surgeon: Sav Rick M.D.;  Location: [facility];  Service: Pain Management   • APPENDECTOMY     • CHOLECYSTECTOMY     • ENDARTERECTOMY      corornary   • STENT PLACEMENT     • VASECTOMY         Family history   Family History   Problem Relation Age of Onset   • Lung Disease Mother         copd   • Hypertension Mother    • Heart Disease Father         heart attack and heart disease   • Cancer Brother         neck   • Heart Disease Paternal Grandfather         Heart attack   • Other Sister         non-malignant brain tumor   • Diabetes Neg Hx          Medications:   Current Outpatient Medications   Medication   • cyclobenzaprine (FLEXERIL) 10 mg Tab   • ALPRAZolam (XANAX) 0.5 MG Tab   • diclofenac DR (VOLTAREN) 75 MG Tablet Delayed Response   • Turmeric (QC TUMERIC COMPLEX PO)   • atorvastatin (LIPITOR) 10 MG Tab   •  nitroglycerin (NITROSTAT) 0.4 MG SL Tab   • amlodipine-benazepril (LOTREL) 5-10 MG per capsule   • Multiple Vitamins-Minerals (MULTIVITAMIN PO)   • Coenzyme Q10 (CO Q 10 PO)   • MEGARED OMEGA-3 KRILL OIL PO   • CALCIUM PO   • aspirin 81 MG tablet     No current facility-administered medications for this visit.       Allergies:   Allergies   Allergen Reactions   • Atorvastatin Myalgia     Ok to take 10 mg  Elevated CPK  Only at 40 Mg does this happen.   • Lyrica [Pregabalin]    • Rosuvastatin      muscle cramps elevated CPK       Social Hx:   Social History     Socioeconomic History   • Marital status:      Spouse name: Not on file   • Number of children: Not on file   • Years of education: Not on file   • Highest education level: 12th grade   Occupational History   • Not on file   Tobacco Use   • Smoking status: Former Smoker     Packs/day: 2.00     Years: 25.00     Pack years: 50.00     Types: Cigarettes     Quit date: 1992     Years since quittin.7   • Smokeless tobacco: Never Used   • Tobacco comment: avoid all tobacco products   Vaping Use   • Vaping Use: Never used   Substance and Sexual Activity   • Alcohol use: Yes     Alcohol/week: 0.6 oz     Types: 1 Standard drinks or equivalent per week     Comment: occ   • Drug use: No   • Sexual activity: Yes     Partners: Female     Comment: , 3 children   Other Topics Concern   •  Service Yes   • Blood Transfusions No   • Caffeine Concern No   • Occupational Exposure No   • Hobby Hazards No   • Sleep Concern Yes   • Stress Concern Yes   • Weight Concern Yes   • Special Diet No     Comment: no fried   • Back Care Yes   • Exercise Yes   • Bike Helmet No     Comment: does not ride bike   • Seat Belt Not Asked   • Self-Exams Yes   Social History Narrative    He was born in [place]. He worked in the [employer] industry, he retired at age 65. He  Luann in 2016 after both of their spouses passed away. He has children in [place] and  several grandchildren and great grandchildren. He is an avid traveler.     Social Determinants of Health     Financial Resource Strain:    • Difficulty of Paying Living Expenses:    Food Insecurity:    • Worried About Running Out of Food in the Last Year:    • Ran Out of Food in the Last Year:    Transportation Needs:    • Lack of Transportation (Medical):    • Lack of Transportation (Non-Medical):    Physical Activity:    • Days of Exercise per Week:    • Minutes of Exercise per Session:    Stress:    • Feeling of Stress :    Social Connections:    • Frequency of Communication with Friends and Family:    • Frequency of Social Gatherings with Friends and Family:    • Attends Confucianism Services:    • Active Member of Clubs or Organizations:    • Attends Club or Organization Meetings:    • Marital Status:    Intimate Partner Violence:    • Fear of Current or Ex-Partner:    • Emotionally Abused:    • Physically Abused:    • Sexually Abused:          EXAMINATION     Physical Exam:   Vitals: /68 (BP Location: Right arm, Patient Position: Sitting, BP Cuff Size: Adult long)   Pulse 96   Temp 36.3 °C (97.3 °F) (Temporal)   Ht 1.829 m (6')   Wt 115 kg (253 lb 4.9 oz)   SpO2 96%     Constitutional:   Body Habitus: Body mass index is 34.35 kg/m².  Cooperation: Fully cooperates with exam  Appearance: Well-groomed, well-nourished, not disheveled, in no acute distress    Eyes: No scleral icterus, no proptosis     ENT -no obvious auditory deficits, wearing a face mask    Skin -no rashes or lesions noted    Respiratory-  breathing comfortable on room air, no audible wheezing    Cardiovascular- No lower extremity edema is noted.     Psychiatric- alert and oriented ×3. Normal affect.     Gait - normal gait, no use of ambulatory device, minimally antalgic.     Spine  Functional ROM of the lumbar spine without pain in flexion, no pain in extension and quadrant loading.  Negative side bending bilaterally    Tenderness over  the right PSIS, positive nancie's test on the right for back pain.  Negative on the left    No focal motor or sensory deficits in the lower extremities bilaterally    SLR is negative bilaterally    MEDICAL DECISION MAKING    Medical records review: see under HPI section.     DATA    Labs:   Lab Results   Component Value Date/Time    SODIUM 139 07/26/2021 10:55 AM    POTASSIUM 5.0 07/26/2021 10:55 AM    CHLORIDE 103 07/26/2021 10:55 AM    CO2 25 07/26/2021 10:55 AM    ANION 11.0 07/26/2021 10:55 AM    GLUCOSE 90 07/26/2021 10:55 AM    BUN 18 07/26/2021 10:55 AM    CREATININE 0.90 07/26/2021 10:55 AM    CREATININE 0.93 02/20/2013 07:20 AM    CALCIUM 9.8 07/26/2021 10:55 AM    ASTSGOT 22 07/26/2021 10:55 AM    ALTSGPT 29 07/26/2021 10:55 AM    TBILIRUBIN 0.6 07/26/2021 10:55 AM    ALBUMIN 4.7 07/26/2021 10:55 AM    TOTPROTEIN 7.2 07/26/2021 10:55 AM    GLOBULIN 2.5 07/26/2021 10:55 AM    AGRATIO 1.9 07/26/2021 10:55 AM       Lab Results   Component Value Date/Time    PROTHROMBTM 14.8 (H) 12/05/2018 07:00 AM    INR 1.17 (H) 12/05/2018 07:00 AM        Lab Results   Component Value Date/Time    WBC 5.7 08/31/2021 08:25 AM    RBC 5.01 08/31/2021 08:25 AM    HEMOGLOBIN 15.6 08/31/2021 08:25 AM    HEMATOCRIT 47.8 08/31/2021 08:25 AM    MCV 95.4 08/31/2021 08:25 AM    MCH 31.1 08/31/2021 08:25 AM    MCHC 32.6 (L) 08/31/2021 08:25 AM    MPV 11.4 08/31/2021 08:25 AM    NEUTSPOLYS 66.20 08/31/2021 08:25 AM    LYMPHOCYTES 18.40 (L) 08/31/2021 08:25 AM    MONOCYTES 12.60 08/31/2021 08:25 AM    EOSINOPHILS 1.40 08/31/2021 08:25 AM    BASOPHILS 0.90 08/31/2021 08:25 AM        Lab Results   Component Value Date/Time    HBA1C 6.1 (H) 08/31/2021 08:25 AM        Imaging: I personally reviewed following images, these are my reads  MRI lumbar spine 07/26/2021  At L1-2, no central or foraminal stenosis  At L2-3, disc bulge, facet arthropathy and mild foraminal stenosis bilaterally  At L3-4, moderate central canal stenosis, mod-severe  foraminal stenosis bilaterally, facet arthropathy  At L4-5, grade I anterolisthesis, disc bulge, severe facet arthropathy, moderate foraminal stenosis bilaterally  At L5-S1, mod-severe facet arthropathy with right paracentral annular tear.  No central or foraminal stenosis      MRI lumbar spine 10/29/2020  At L5-S1, there is bilateral facet arthropathy without central canal narrowing.  Mild foraminal stenosis bilaterally   At L4-5, mild central canal stenosis second to disc bulge and severe facet arthropathy.  Moderate foraminal stenosis bilaterally  At L3-4, disc bulge with moderate facet arthropathy and mild-moderate central canal stenosis.  Severe foraminal stenosis bilaterally  At L2-3, posterior disc bulge with facet arthropathy and mild central canal narrowing.  Mild foraminal stenosis bilaterally  At L1-2, mild facet arthropathy bilaterally, mild foraminal stenosis bilaterally.  Prominent epidural fat    Xray lumbar spine 08/07/2020  There is multilevel retrolisthesis at L1-2, L2-3, and L3-4  Grade I anterolisthesis at L4-5, stable on flexion and extension  Degenerative changes with multilevel facet arthropathy    Xray lumbar spine 10/04/2018  There is severe degenerative change in the lumbar spine; there is note of multilevel retrolisthesis at L1-2, L3-4 and L2-3  Mild movement of anterolisthesis at L4-5 with flexion    MRI left knee 07/10/2020  There is note of a small joint effusion.  Cyst posterior to the PCL.  No ligamentous or meniscal tears  Tricompartmental degenerative changes, greatest medial compartment    Xray hip right 10/04/2018  Moderate hip osteoarthritis noted bilaterally     MRI left knee 07/22/2013  There is note of moderate joint effusion.  The medial collateral ligament has mild edema.  There is note of osteophytes in the medial and lateral compartments.  Tendinopathy in the quadriceps tendon noted.    IMAGING radiology reads. I reviewed the following radiology reads   MRI lumbar spine  07/26/2021     IMPRESSION:     1.  New right paracentral L5/S1 annular tear.  2.  Otherwise no significant change in moderate spondylosis with up to moderate to severe L3/4 foraminal stenoses  3.  Greatest the central stenoses are mild to moderate at L3/4  4.  Facet arthropathy greater than degenerative disc disease  5.  Degenerative L4/5 anterolisthesis where there is facet arthropathy and a moderate right facet joint effusion as before    MRI lumbar spine 10/29/2020  IMPRESSION:  1.  Minimal anterolisthesis of L4-5.  2.  Multilevel degenerative disease and facet arthropathy with resultant central canal and foraminal narrowing as described.    Xray lumbar spine 08/07/2020  IMPRESSION:  1.  Multilevel degenerative disc disease and facet degeneration.  2.  Mild anterior subluxation at L4-5. This is stable with flexion and extension.  3.  Again seen multilevel degenerative retrolisthesis. This does not change significantly extension.    Xray lumbar spine 10/04/2018  IMPRESSION:     Mild retrolisthesis of L1-2, L2-3 and L3-4 that are worse with extension.  Mild anterolisthesis of L4-5 that is worse with flexion.  There is partial disc space fusion of the lower thoracic spine.    MRI left knee 07/10/2020  IMPRESSION:  1.  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  2.  Intact ligaments and menisci.  3.  Small joint effusion.  4.  [place] synovial or ganglion cyst immediately posterior to the posterior cruciate ligament.      MRI left knee 07/22/2013       Impression        1. Tendinopathy of the quadriceps tendon.     2. Medial collateral ligament sprain.     3. Osteoarthritis, most severe in the medial compartment.     4. Fraying of the inner rim of the bodies of the lateral and medial menisci.     5. Moderate joint effusion.      Xray hips 10/04/2018  Impression:  No acute osseous abnormality                         Results for orders placed during the  hospital encounter of 10/04/18   DX-LUMBAR SPINE-4+ VIEWS    Impression Mild retrolisthesis of L1-2, L2-3 and L3-4 that are worse with extension.    Mild anterolisthesis of L4-5 that is worse with flexion.    There is partial disc space fusion of the lower thoracic spine.                                         Diagnosis   Visit Diagnoses     ICD-10-CM   1. Sacroiliac joint pain  M53.3   2. Lumbosacral pain  M54.50   3. Spinal stenosis of lumbar region, unspecified whether neurogenic claudication present  M48.061   4. Spondylolisthesis of lumbar region  M43.16   5. Primary osteoarthritis of both knees  M17.0   6. Lumbar spondylosis  M47.816   7. Muscle spasm  M62.838         ASSESSMENT:  Curt Blair 70 y.o. male seen for above     Curt was seen today for follow-up.    Diagnoses and all orders for this visit:    Sacroiliac joint pain    Lumbosacral pain  -     cyclobenzaprine (FLEXERIL) 10 mg Tab; Take 1 Tablet by mouth at bedtime as needed for Muscle Spasms for up to 30 days.    Spinal stenosis of lumbar region, unspecified whether neurogenic claudication present    Spondylolisthesis of lumbar region    Primary osteoarthritis of both knees    Lumbar spondylosis    Muscle spasm  -     cyclobenzaprine (FLEXERIL) 10 mg Tab; Take 1 Tablet by mouth at bedtime as needed for Muscle Spasms for up to 30 days.         1.  Discussed that he will continue with physical therapy.  2.  Reviewed the symptoms that he is having and findings on imaging studies.  He has continued improvement in leg symptoms after epidural.  Exam suggest sacroiliac joint dysfunction and pain.  Encouraged physical therapy.  3.  Discussed that we can consider right sacroiliac joint injection if this does not improve, reviewed the number of steroid injections that he has had this year.  He would like to continue with physical therapy for now.  4.  Reviewed the difficulty he has been having with sleep due to pain.  Trial flexeril 10mg at bedtime  for muscle spasms and to assist with sleep.       Follow-up: Return in about 3 weeks (around 10/27/2021).     My total time spent caring for the patient on the day of the encounter was 40-54 minutes.     Thank you very much for asking me to participate in Curt Blair's care.  Please contact me with any questions or concerns.    Please note that this dictation was created using voice recognition software. I have made every reasonable attempt to correct obvious errors but there may be errors of grammar and content that I may have overlooked prior to finalization of this note.      Sav Rick MD  Physical Medicine and Rehabilitation  Interventional Spine and Sports Physiatry  [facility]    CC: Shila Mullen, DO

## 2021-10-19 ENCOUNTER — OFFICE VISIT (OUTPATIENT)
Dept: MEDICAL GROUP | Facility: PHYSICIAN GROUP | Age: 71
End: 2021-10-19
Payer: MEDICARE

## 2021-10-19 ENCOUNTER — TELEPHONE (OUTPATIENT)
Dept: MEDICAL GROUP | Facility: PHYSICIAN GROUP | Age: 71
End: 2021-10-19

## 2021-10-19 VITALS
OXYGEN SATURATION: 96 % | HEART RATE: 88 BPM | TEMPERATURE: 98.1 F | BODY MASS INDEX: 34.17 KG/M2 | HEIGHT: 72 IN | DIASTOLIC BLOOD PRESSURE: 76 MMHG | RESPIRATION RATE: 16 BRPM | WEIGHT: 252.3 LBS | SYSTOLIC BLOOD PRESSURE: 130 MMHG

## 2021-10-19 DIAGNOSIS — T14.8XXA MUSCLE STRAIN: ICD-10-CM

## 2021-10-19 PROCEDURE — 99215 OFFICE O/P EST HI 40 MIN: CPT | Performed by: FAMILY MEDICINE

## 2021-10-19 ASSESSMENT — ENCOUNTER SYMPTOMS
PALPITATIONS: 0
EYES NEGATIVE: 1
BACK PAIN: 1
SHORTNESS OF BREATH: 0
HEADACHES: 0
TINGLING: 0
FEVER: 0
TREMORS: 0
GASTROINTESTINAL NEGATIVE: 1
PSYCHIATRIC NEGATIVE: 1
COUGH: 0
CLAUDICATION: 0
CHILLS: 0
SENSORY CHANGE: 0
FALLS: 0
WEAKNESS: 0
WEIGHT LOSS: 0
WHEEZING: 0
DIZZINESS: 0

## 2021-10-19 ASSESSMENT — FIBROSIS 4 INDEX: FIB4 SCORE: 1.24

## 2021-10-19 NOTE — PROGRESS NOTES
Subjective:     CC:   Chief Complaint   Patient presents with   • Spasms     left sciatica pain since last night       HPI:   Curt presents today with left sided back/thigh  pain x 1 day.     Muscle strain  New acute problem. Patient states his left lower back and thigh feel like there is a giant cramp per patient, states every time he takes a step his lower back down to his foot cramps up. He has not tried anything for it at this time. The only exacerbating factors he can think of are getting in and out of a [place] last night for dinner or he did hit his left thigh on his kitchen island very hard the day prior. Has tried doing stretching exercises given by PT but still waking up with cramps in legs.   He does see Dr. Rick at physiatry but cannot get in until next week. He is currently in PT over the last 9 weeks but unsure if he would like to continue since he has had minimal results but has noticed some small improvements. They started him on a maintenance program.   Flexion and extension ROM normal, does elicit slight pain at left lower back. Strength 5/5 normal. On palpation he has some muscle tightness on left lower back paravertebral. Denies incontinence of bladder or bowel, saddle paraesthesias, radiating pain, or falls.            Current Outpatient Medications Ordered in Epic   Medication Sig Dispense Refill   • ALPRAZolam (XANAX) 0.5 MG Tab Take 1 Tablet by mouth at bedtime as needed for Sleep for up to 90 days. 90 Tablet 0   • diclofenac DR (VOLTAREN) 75 MG Tablet Delayed Response TAKE 1 TABLET BY MOUTH TWICE A  tablet 3   • Turmeric (QC TUMERIC COMPLEX PO) Take 1,500 mg by mouth every day.     • atorvastatin (LIPITOR) 10 MG Tab TAKE 1 TABLET BY MOUTH EVERY  tablet 3   • nitroglycerin (NITROSTAT) 0.4 MG SL Tab PLACE 1 TABLET UNDER THE TONGUE EVERY 5 MINUTES UP TO 3 DOSES AS NEEDED FOR CHEST PAIN). 25 tablet 0   • amlodipine-benazepril (LOTREL) 5-10 MG per capsule TAKE 1 CAPSULE BY MOUTH  EVERY  Cap 3   • Multiple Vitamins-Minerals (MULTIVITAMIN PO) Take 1 Tab by mouth every morning.     • Coenzyme Q10 (CO Q 10 PO) Take 1 Tab by mouth every morning.     • MEGARED OMEGA-3 KRILL OIL PO Take 1 Tab by mouth every morning.     • CALCIUM PO Take 1 Tab by mouth every day.     • aspirin 81 MG tablet Take 81 mg by mouth every day.       • cyclobenzaprine (FLEXERIL) 10 mg Tab Take 1 Tablet by mouth at bedtime as needed for Muscle Spasms for up to 30 days. (Patient not taking: Reported on 10/19/2021) 30 Tablet 0     No current Epic-ordered facility-administered medications on file.       Review of Systems   Constitutional: Negative for chills, fever, malaise/fatigue and weight loss.   HENT: Negative.    Eyes: Negative.    Respiratory: Negative for cough, shortness of breath and wheezing.    Cardiovascular: Negative for chest pain, palpitations, claudication and leg swelling.   Gastrointestinal: Negative.    Genitourinary: Negative.    Musculoskeletal: Positive for back pain and joint pain. Negative for falls.   Skin: Negative.    Neurological: Negative for dizziness, tingling, tremors, sensory change, weakness and headaches.   Psychiatric/Behavioral: Negative.          Objective:     Exam:  /76 (BP Location: Left arm, Patient Position: Sitting, BP Cuff Size: Adult)   Pulse 88   Temp 36.7 °C (98.1 °F) (Temporal)   Resp 16   Ht 1.829 m (6')   Wt 114 kg (252 lb 4.8 oz)   SpO2 96%   BMI 34.22 kg/m²  Body mass index is 34.22 kg/m².    Physical Exam  Vitals reviewed.   Constitutional:       Appearance: Normal appearance.   Eyes:      Conjunctiva/sclera: Conjunctivae normal.      Pupils: Pupils are equal, round, and reactive to light.   Cardiovascular:      Rate and Rhythm: Normal rate and regular rhythm.      Pulses: Normal pulses.      Heart sounds: Normal heart sounds. No murmur heard.     Pulmonary:      Effort: Pulmonary effort is normal. No respiratory distress.      Breath sounds: Normal  breath sounds. No wheezing, rhonchi or rales.   Chest:      Chest wall: No tenderness.   Musculoskeletal:         General: Tenderness present. No swelling or signs of injury.      Right lower leg: No edema.      Left lower leg: No edema.      Comments: Left paravertebral tenderness, thigh tenderness    Skin:     General: Skin is warm and dry.      Coloration: Skin is not jaundiced or pale.   Neurological:      General: No focal deficit present.      Mental Status: He is alert and oriented to person, place, and time.      Motor: No weakness.      Gait: Gait normal.      Deep Tendon Reflexes: Reflexes normal.   Psychiatric:         Mood and Affect: Mood normal.         Behavior: Behavior normal.          A chaperone was offered to the patient during today's exam. Patient declined chaperone.      Assessment & Plan:     71 y.o. male with the following -     1. Muscle strain  New, acute issue. Patient will try Flexeril 10 mg, up to TID. Tylenol PRN, heating pad, OTC muscle rubs to try.  Patient will update us via [facility] if Flexeril and Tylenol are improving pain. Discussed side effects of Flexeril, and to no drive or drink alcohol while taking it. Gave ED/UC precautions. Patient is agreeable with plan. F/u with Dr. Rick next week.      I spent a total of 40 minutes with record review, exam, communication with the patient, communication with other providers, and documentation of this encounter.      Return if symptoms worsen or fail to improve.    Please note that this dictation was created using voice recognition software. I have made every reasonable attempt to correct obvious errors, but I expect that there are errors of grammar and possibly content that I did not discover before finalizing the note.

## 2021-10-19 NOTE — TELEPHONE ENCOUNTER
Pt called c/o left-sided sciatica that started last night. Requesting pcp appt- none available this week. Pt had tried to get in with Dr. Rick- she has been treating pt for right-sided pain, but she had no openings.     Scheduled with FLORENTIN Granda for tomorrow, advised if pcp has cancellation we will call pt and offer appt.

## 2021-10-19 NOTE — ASSESSMENT & PLAN NOTE
New acute problem. Patient states his left lower back and thigh feel like there is a giant cramp per patient, states every time he takes a step his lower back down to his foot cramps up. He has not tried anything for it at this time. The only exacerbating factors he can think of are getting in and out of a [place] last night for dinner or he did hit his left thigh on his kitchen island very hard the day prior. Has tried doing stretching exercises given by PT but still waking up with cramps in legs.   He does see Dr. Rick at physiatry but cannot get in until next week. He is currently in PT over the last 9 weeks but unsure if he would like to continue since he has had minimal results but has noticed some small improvements. They started him on a maintenance program.   Flexion and extension ROM normal, does elicit slight pain at left lower back. Strength 5/5 normal. On palpation he has some muscle tightness on left lower back paravertebral. Denies incontinence of bladder or bowel, saddle paraesthesias, radiating pain, or falls.

## 2021-10-19 NOTE — PATIENT INSTRUCTIONS
Try Flexeril 10 mg as needed, can have up to 3 x a day, if you need the script renewed please let us know.  Tylenol as needed  Heating pad   Muscle rubs OTC

## 2021-10-25 ENCOUNTER — APPOINTMENT (RX ONLY)
Dept: URBAN - METROPOLITAN AREA CLINIC 20 | Facility: CLINIC | Age: 71
Setting detail: DERMATOLOGY
End: 2021-10-25

## 2021-10-25 DIAGNOSIS — D22 MELANOCYTIC NEVI: ICD-10-CM

## 2021-10-25 DIAGNOSIS — L81.4 OTHER MELANIN HYPERPIGMENTATION: ICD-10-CM

## 2021-10-25 DIAGNOSIS — L57.0 ACTINIC KERATOSIS: ICD-10-CM

## 2021-10-25 DIAGNOSIS — D18.0 HEMANGIOMA: ICD-10-CM

## 2021-10-25 DIAGNOSIS — Z85.828 PERSONAL HISTORY OF OTHER MALIGNANT NEOPLASM OF SKIN: ICD-10-CM

## 2021-10-25 DIAGNOSIS — Z71.89 OTHER SPECIFIED COUNSELING: ICD-10-CM

## 2021-10-25 DIAGNOSIS — L82.1 OTHER SEBORRHEIC KERATOSIS: ICD-10-CM

## 2021-10-25 PROBLEM — D22.39 MELANOCYTIC NEVI OF OTHER PARTS OF FACE: Status: ACTIVE | Noted: 2021-10-25

## 2021-10-25 PROBLEM — D22.72 MELANOCYTIC NEVI OF LEFT LOWER LIMB, INCLUDING HIP: Status: ACTIVE | Noted: 2021-10-25

## 2021-10-25 PROBLEM — D22.61 MELANOCYTIC NEVI OF RIGHT UPPER LIMB, INCLUDING SHOULDER: Status: ACTIVE | Noted: 2021-10-25

## 2021-10-25 PROBLEM — D22.5 MELANOCYTIC NEVI OF TRUNK: Status: ACTIVE | Noted: 2021-10-25

## 2021-10-25 PROBLEM — D18.01 HEMANGIOMA OF SKIN AND SUBCUTANEOUS TISSUE: Status: ACTIVE | Noted: 2021-10-25

## 2021-10-25 PROBLEM — D22.62 MELANOCYTIC NEVI OF LEFT UPPER LIMB, INCLUDING SHOULDER: Status: ACTIVE | Noted: 2021-10-25

## 2021-10-25 PROBLEM — D22.71 MELANOCYTIC NEVI OF RIGHT LOWER LIMB, INCLUDING HIP: Status: ACTIVE | Noted: 2021-10-25

## 2021-10-25 PROCEDURE — ? OBSERVATION

## 2021-10-25 PROCEDURE — 99213 OFFICE O/P EST LOW 20 MIN: CPT | Mod: 25

## 2021-10-25 PROCEDURE — 17000 DESTRUCT PREMALG LESION: CPT

## 2021-10-25 PROCEDURE — ? LIQUID NITROGEN

## 2021-10-25 PROCEDURE — ? OBSERVATION AND MEASURE

## 2021-10-25 PROCEDURE — ? COUNSELING

## 2021-10-25 PROCEDURE — ? SUNSCREEN RECOMMENDATIONS

## 2021-10-25 ASSESSMENT — LOCATION DETAILED DESCRIPTION DERM
LOCATION DETAILED: RIGHT INFERIOR MEDIAL UPPER BACK
LOCATION DETAILED: INFERIOR THORACIC SPINE
LOCATION DETAILED: LEFT DISTAL POSTERIOR THIGH
LOCATION DETAILED: LEFT LATERAL PROXIMAL PRETIBIAL REGION
LOCATION DETAILED: RIGHT PROXIMAL PRETIBIAL REGION
LOCATION DETAILED: RIGHT VENTRAL PROXIMAL FOREARM
LOCATION DETAILED: RIGHT DISTAL POSTERIOR THIGH
LOCATION DETAILED: RIGHT DISTAL POSTERIOR UPPER ARM
LOCATION DETAILED: RIGHT PROXIMAL RADIAL DORSAL FOREARM
LOCATION DETAILED: LEFT LATERAL FOREHEAD
LOCATION DETAILED: RIGHT INFERIOR MEDIAL MIDBACK
LOCATION DETAILED: RIGHT INFERIOR CENTRAL MALAR CHEEK
LOCATION DETAILED: MID POSTERIOR NECK
LOCATION DETAILED: LEFT PROXIMAL POSTERIOR UPPER ARM
LOCATION DETAILED: LEFT VENTRAL PROXIMAL FOREARM
LOCATION DETAILED: RIGHT INFERIOR FOREHEAD
LOCATION DETAILED: RIGHT SUPERIOR UPPER BACK

## 2021-10-25 ASSESSMENT — LOCATION SIMPLE DESCRIPTION DERM
LOCATION SIMPLE: RIGHT CHEEK
LOCATION SIMPLE: LEFT FOREHEAD
LOCATION SIMPLE: RIGHT POSTERIOR UPPER ARM
LOCATION SIMPLE: RIGHT FOREHEAD
LOCATION SIMPLE: UPPER BACK
LOCATION SIMPLE: RIGHT LOWER BACK
LOCATION SIMPLE: LEFT POSTERIOR THIGH
LOCATION SIMPLE: RIGHT PRETIBIAL REGION
LOCATION SIMPLE: LEFT PRETIBIAL REGION
LOCATION SIMPLE: RIGHT UPPER BACK
LOCATION SIMPLE: POSTERIOR NECK
LOCATION SIMPLE: LEFT POSTERIOR UPPER ARM
LOCATION SIMPLE: RIGHT POSTERIOR THIGH
LOCATION SIMPLE: RIGHT FOREARM
LOCATION SIMPLE: LEFT FOREARM

## 2021-10-25 ASSESSMENT — LOCATION ZONE DERM
LOCATION ZONE: FACE
LOCATION ZONE: ARM
LOCATION ZONE: TRUNK
LOCATION ZONE: NECK
LOCATION ZONE: LEG

## 2021-10-25 NOTE — PROCEDURE: LIQUID NITROGEN
Render Post-Care Instructions In Note?: yes
Consent: The patient's consent was obtained including but not limited to risks of crusting, scabbing, blistering, scarring, darker or lighter pigmentary change, recurrence, incomplete removal and infection. RTC in 2 months if lesion(s) persistent.
Detail Level: Detailed
Render Note In Bullet Format When Appropriate: No
Number Of Freeze-Thaw Cycles: 2 freeze-thaw cycles
Post-Care Instructions: I reviewed with the patient in detail post-care instructions. Patient is to wear sunprotection, and avoid picking at any of the treated lesions. Pt may apply Vaseline to crusted or scabbing areas.
Duration Of Freeze Thaw-Cycle (Seconds): 10

## 2021-10-26 ENCOUNTER — APPOINTMENT (OUTPATIENT)
Dept: PHYSICAL MEDICINE AND REHAB | Facility: MEDICAL CENTER | Age: 71
End: 2021-10-26
Payer: MEDICARE

## 2021-11-09 ENCOUNTER — OFFICE VISIT (OUTPATIENT)
Dept: PHYSICAL MEDICINE AND REHAB | Facility: MEDICAL CENTER | Age: 71
End: 2021-11-09
Payer: MEDICARE

## 2021-11-09 VITALS
BODY MASS INDEX: 34.43 KG/M2 | WEIGHT: 254.19 LBS | DIASTOLIC BLOOD PRESSURE: 70 MMHG | TEMPERATURE: 97.9 F | SYSTOLIC BLOOD PRESSURE: 132 MMHG | HEIGHT: 72 IN | HEART RATE: 97 BPM | OXYGEN SATURATION: 93 %

## 2021-11-09 DIAGNOSIS — M43.16 SPONDYLOLISTHESIS OF LUMBAR REGION: ICD-10-CM

## 2021-11-09 DIAGNOSIS — M54.50 LOW BACK PAIN WITH RADIATION: ICD-10-CM

## 2021-11-09 DIAGNOSIS — M54.50 LUMBOSACRAL PAIN: ICD-10-CM

## 2021-11-09 DIAGNOSIS — M62.838 MUSCLE SPASM: ICD-10-CM

## 2021-11-09 DIAGNOSIS — M48.061 NEURAL FORAMINAL STENOSIS OF LUMBAR SPINE: ICD-10-CM

## 2021-11-09 DIAGNOSIS — M48.061 SPINAL STENOSIS OF LUMBAR REGION, UNSPECIFIED WHETHER NEUROGENIC CLAUDICATION PRESENT: ICD-10-CM

## 2021-11-09 PROCEDURE — 99214 OFFICE O/P EST MOD 30 MIN: CPT | Performed by: PHYSICAL MEDICINE & REHABILITATION

## 2021-11-09 RX ORDER — CYCLOBENZAPRINE HCL 10 MG
10 TABLET ORAL 3 TIMES DAILY PRN
COMMUNITY
End: 2021-11-09 | Stop reason: SDUPTHER

## 2021-11-09 RX ORDER — CYCLOBENZAPRINE HCL 10 MG
10 TABLET ORAL 3 TIMES DAILY PRN
Qty: 45 TABLET | Refills: 1 | Status: SHIPPED | OUTPATIENT
Start: 2021-11-09 | End: 2021-12-09

## 2021-11-09 ASSESSMENT — PATIENT HEALTH QUESTIONNAIRE - PHQ9
5. POOR APPETITE OR OVEREATING: 0 - NOT AT ALL
CLINICAL INTERPRETATION OF PHQ2 SCORE: 1

## 2021-11-09 ASSESSMENT — FIBROSIS 4 INDEX: FIB4 SCORE: 1.24

## 2021-11-09 ASSESSMENT — PAIN SCALES - GENERAL: PAINLEVEL: 7=MODERATE-SEVERE PAIN

## 2021-11-09 NOTE — H&P (VIEW-ONLY)
Follow-up patient note, patient previously seen by Dr. Porter     Physiatry (physical medicine and  Rehabilitation), interventional spine and sports medicine    Date of Service: 11/09/2021    Chief complaint:   Chief Complaint   Patient presents with   • Follow-Up     Lower back and leg pain       HISTORY    HPI: Curt Blair 71 y.o. male who presents today for follow-up evaluation.    Curt reports that he had been continuing with his home exercise program from PT.  This seems to help somewhat, but he has been having some gradual return of cramping in his legs..  Continues to have low back and bilateral leg pain with right greater than left.    Pain is 5-7/10 on the NRS.      Sleep is very difficult again, having trouble sleeping more than a few hours a night as pain wakes him.      Right L4-5 interlaminar epidural steroid injection on 7/28/2021 had helped, but pain is now relatively constant in the right gluteal region.  The cramping in the legs that he had prior to epidural has not returned to previous level    Knee pain is stable, still not bothering him during the day.    Medical records review:    Reviewed records from Dr Clovis Alicea 07/2013 At that visit, he assessed that MRI of left knee was indicated.  On 07/30/2013, they reviewed the study and the patient had a left knee corticosteroid injection    Previous treatments:    Physical Therapy: No    Medications the patient is tried: NSAIDs    Previous interventions: none, recently     Previous surgeries to relieve the above pain:  none      ROS:   Eyes: cataract surgery, recently  CV: history of MI at 41  Red Flags ROS:   Fever, Chills, Sweats: Denies  Involuntary Weight Loss: Denies  Bladder Incontinence: Denies  Bowel Incontinence: Denies  Saddle Anesthesia: Denies    All other systems reviewed and negative.       PMHx:   Past Medical History:   Diagnosis Date   • Allergies 12/24/2019   • Arteriosclerotic vascular disease 1/25/2019   • Arthritis      Right Foot   • CAD (coronary artery disease)    • Elevated CPK 8/16/2020   • Generalized anxiety disorder 10/11/2017   • Hyperlipidemia    • Hypertension    • Inflamed sebaceous cyst 2/15/2018   • Intrinsic eczema 2/7/2020   • Prediabetes 7/19/2019   • S/P coronary artery stent placement     2 stents       PSHx:   Past Surgical History:   Procedure Laterality Date   • CT INJ LUMBAR/SACRAL,W/ IMAGING Right 7/28/2021    Procedure: RIGHT L4-5 interlaminar epidural steroid injection with sedation;  Surgeon: Sav Rick M.D.;  [facility];  Service: Pain Management   • CT FLUOROSCOPIC GUIDANCE NEEDLE PLACEMENT Left 6/16/2021    Procedure: LEFT genicular nerve blocks, diagnostic;  Surgeon: Sav Rick M.D.;  Location: [facility];  Service: Pain Management   • CT INJ AA/STRD GNCLR NRV BRNCH W/IMG Left 6/16/2021    Procedure: GENICULAR NERVE BRANCHES INCLUDING IMAGING GUIDANCE WITH A REQUIRED MINIMUM THREE NERVE BRANCHES;  Surgeon: Sav Rick M.D.;  Location[facility];  Service: Pain Management   • APPENDECTOMY     • CHOLECYSTECTOMY     • ENDARTERECTOMY      corornary   • STENT PLACEMENT     • VASECTOMY         Family history   Family History   Problem Relation Age of Onset   • Lung Disease Mother         copd   • Hypertension Mother    • Heart Disease Father         heart attack and heart disease   • Cancer Brother         neck   • Heart Disease Paternal Grandfather         Heart attack   • Other Sister         non-malignant brain tumor   • Diabetes Neg Hx          Medications:   Current Outpatient Medications   Medication   • cyclobenzaprine (FLEXERIL) 10 mg Tab   • ALPRAZolam (XANAX) 0.5 MG Tab   • diclofenac DR (VOLTAREN) 75 MG Tablet Delayed Response   • Turmeric (QC TUMERIC COMPLEX PO)   • atorvastatin (LIPITOR) 10 MG Tab   • nitroglycerin (NITROSTAT) 0.4 MG SL Tab   • amlodipine-benazepril (LOTREL) 5-10 MG per capsule   • Multiple  Vitamins-Minerals (MULTIVITAMIN PO)   • Coenzyme Q10 (CO Q 10 PO)   • MEGARED OMEGA-3 KRILL OIL PO   • CALCIUM PO   • aspirin 81 MG tablet     No current facility-administered medications for this visit.       Allergies:   Allergies   Allergen Reactions   • Atorvastatin Myalgia     Ok to take 10 mg  Elevated CPK  Only at 40 Mg does this happen.   • Lyrica [Pregabalin]    • Rosuvastatin      muscle cramps elevated CPK       Social Hx:   Social History     Socioeconomic History   • Marital status:      Spouse name: Not on file   • Number of children: Not on file   • Years of education: Not on file   • Highest education level: 12th grade   Occupational History   • Not on file   Tobacco Use   • Smoking status: Former Smoker     Packs/day: 2.00     Years: 25.00     Pack years: 50.00     Types: Cigarettes     Quit date: 1992     Years since quittin.8   • Smokeless tobacco: Never Used   • Tobacco comment: avoid all tobacco products   Vaping Use   • Vaping Use: Never used   Substance and Sexual Activity   • Alcohol use: Yes     Alcohol/week: 0.6 oz     Types: 1 Standard drinks or equivalent per week     Comment: occ   • Drug use: No   • Sexual activity: Yes     Partners: Female     Comment: , 3 children   Other Topics Concern   •  Service Yes   • Blood Transfusions No   • Caffeine Concern No   • Occupational Exposure No   • Hobby Hazards No   • Sleep Concern Yes   • Stress Concern Yes   • Weight Concern Yes   • Special Diet No     Comment: no fried   • Back Care Yes   • Exercise Yes   • Bike Helmet No     Comment: does not ride bike   • Seat Belt Not Asked   • Self-Exams Yes   Social History Narrative    He was born in [place]. He worked in the [employer] industry, he retired at age 65. He  Luann in 2016 after both of their spouses passed away. He has children in [place] and several grandchildren and great grandchildren. He is an avid traveler.     Social Determinants of Health      Financial Resource Strain:    • Difficulty of Paying Living Expenses: Not on file   Food Insecurity:    • Worried About Running Out of Food in the Last Year: Not on file   • Ran Out of Food in the Last Year: Not on file   Transportation Needs:    • Lack of Transportation (Medical): Not on file   • Lack of Transportation (Non-Medical): Not on file   Physical Activity:    • Days of Exercise per Week: Not on file   • Minutes of Exercise per Session: Not on file   Stress:    • Feeling of Stress : Not on file   Social Connections:    • Frequency of Communication with Friends and Family: Not on file   • Frequency of Social Gatherings with Friends and Family: Not on file   • Attends [employer] Services: Not on file   • Active Member of Clubs or Organizations: Not on file   • Attends Club or Organization Meetings: Not on file   • Marital Status: Not on file   Intimate Partner Violence:    • Fear of Current or Ex-Partner: Not on file   • Emotionally Abused: Not on file   • Physically Abused: Not on file   • Sexually Abused: Not on file   Housing Stability:    • Unable to Pay for Housing in the Last Year: Not on file   • Number of Places Lived in the Last Year: Not on file   • Unstable Housing in the Last Year: Not on file         EXAMINATION     Physical Exam:   Vitals: /70 (BP Location: Right arm, Patient Position: Sitting, BP Cuff Size: Adult long)   Pulse 97   Temp 36.6 °C (97.9 °F) (Temporal)   Ht 1.829 m (6')   Wt 115 kg (254 lb 3.1 oz)   SpO2 93%     Constitutional:   Body Habitus: Body mass index is 34.47 kg/m².  Cooperation: Fully cooperates with exam  Appearance: Well-groomed, well-nourished, not disheveled, in no acute distress    Eyes: No scleral icterus, no proptosis     ENT -no obvious auditory deficits, wearing a face mask    Skin -no rashes or lesions noted    Respiratory-  breathing comfortable on room air, no audible wheezing    Cardiovascular- No lower extremity edema is noted.      Psychiatric- alert and oriented ×3. Normal affect.     Gait - normal gait, no use of ambulatory device, minimally antalgic.     Spine  Functional ROM of the lumbar spine without pain in flexion, no pain in extension and quadrant loading.     Tenderness over the right PSIS, positive nancie's test on the right for back pain.  Negative on the left    No focal motor or sensory deficits in the lower extremities bilaterally    SLR is negative bilaterally    MEDICAL DECISION MAKING    Medical records review: see under HPI section.     DATA    Labs:   Lab Results   Component Value Date/Time    SODIUM 139 07/26/2021 10:55 AM    POTASSIUM 5.0 07/26/2021 10:55 AM    CHLORIDE 103 07/26/2021 10:55 AM    CO2 25 07/26/2021 10:55 AM    ANION 11.0 07/26/2021 10:55 AM    GLUCOSE 90 07/26/2021 10:55 AM    BUN 18 07/26/2021 10:55 AM    CREATININE 0.90 07/26/2021 10:55 AM    CREATININE 0.93 02/20/2013 07:20 AM    CALCIUM 9.8 07/26/2021 10:55 AM    ASTSGOT 22 07/26/2021 10:55 AM    ALTSGPT 29 07/26/2021 10:55 AM    TBILIRUBIN 0.6 07/26/2021 10:55 AM    ALBUMIN 4.7 07/26/2021 10:55 AM    TOTPROTEIN 7.2 07/26/2021 10:55 AM    GLOBULIN 2.5 07/26/2021 10:55 AM    AGRATIO 1.9 07/26/2021 10:55 AM       Lab Results   Component Value Date/Time    PROTHROMBTM 14.8 (H) 12/05/2018 07:00 AM    INR 1.17 (H) 12/05/2018 07:00 AM        Lab Results   Component Value Date/Time    WBC 5.7 08/31/2021 08:25 AM    RBC 5.01 08/31/2021 08:25 AM    HEMOGLOBIN 15.6 08/31/2021 08:25 AM    HEMATOCRIT 47.8 08/31/2021 08:25 AM    MCV 95.4 08/31/2021 08:25 AM    MCH 31.1 08/31/2021 08:25 AM    MCHC 32.6 (L) 08/31/2021 08:25 AM    MPV 11.4 08/31/2021 08:25 AM    NEUTSPOLYS 66.20 08/31/2021 08:25 AM    LYMPHOCYTES 18.40 (L) 08/31/2021 08:25 AM    MONOCYTES 12.60 08/31/2021 08:25 AM    EOSINOPHILS 1.40 08/31/2021 08:25 AM    BASOPHILS 0.90 08/31/2021 08:25 AM        Lab Results   Component Value Date/Time    HBA1C 6.1 (H) 08/31/2021 08:25 AM        Imaging: I  personally reviewed following images, these are my reads  MRI lumbar spine 07/26/2021  At L1-2, no central or foraminal stenosis  At L2-3, disc bulge, facet arthropathy and mild foraminal stenosis bilaterally  At L3-4, moderate central canal stenosis, mod-severe foraminal stenosis bilaterally, facet arthropathy  At L4-5, grade I anterolisthesis, disc bulge, severe facet arthropathy, moderate foraminal stenosis bilaterally  At L5-S1, mod-severe facet arthropathy with right paracentral annular tear.  No central or foraminal stenosis      MRI lumbar spine 10/29/2020  At L5-S1, there is bilateral facet arthropathy without central canal narrowing.  Mild foraminal stenosis bilaterally   At L4-5, mild central canal stenosis second to disc bulge and severe facet arthropathy.  Moderate foraminal stenosis bilaterally  At L3-4, disc bulge with moderate facet arthropathy and mild-moderate central canal stenosis.  Severe foraminal stenosis bilaterally  At L2-3, posterior disc bulge with facet arthropathy and mild central canal narrowing.  Mild foraminal stenosis bilaterally  At L1-2, mild facet arthropathy bilaterally, mild foraminal stenosis bilaterally.  Prominent epidural fat    Xray lumbar spine 08/07/2020  There is multilevel retrolisthesis at L1-2, L2-3, and L3-4  Grade I anterolisthesis at L4-5, stable on flexion and extension  Degenerative changes with multilevel facet arthropathy    Xray lumbar spine 10/04/2018  There is severe degenerative change in the lumbar spine; there is note of multilevel retrolisthesis at L1-2, L3-4 and L2-3  Mild movement of anterolisthesis at L4-5 with flexion    MRI left knee 07/10/2020  There is note of a small joint effusion.  Cyst posterior to the PCL.  No ligamentous or meniscal tears  Tricompartmental degenerative changes, greatest medial compartment    Xray hip right 10/04/2018  Moderate hip osteoarthritis noted bilaterally     MRI left knee 07/22/2013  There is note of moderate joint  effusion.  The medial collateral ligament has mild edema.  There is note of osteophytes in the medial and lateral compartments.  Tendinopathy in the quadriceps tendon noted.    IMAGING radiology reads. I reviewed the following radiology reads   MRI lumbar spine 07/26/2021     IMPRESSION:     1.  New right paracentral L5/S1 annular tear.  2.  Otherwise no significant change in moderate spondylosis with up to moderate to severe L3/4 foraminal stenoses  3.  Greatest the central stenoses are mild to moderate at L3/4  4.  Facet arthropathy greater than degenerative disc disease  5.  Degenerative L4/5 anterolisthesis where there is facet arthropathy and a moderate right facet joint effusion as before    MRI lumbar spine 10/29/2020  IMPRESSION:  1.  Minimal anterolisthesis of L4-5.  2.  Multilevel degenerative disease and facet arthropathy with resultant central canal and foraminal narrowing as described.    Xray lumbar spine 08/07/2020  IMPRESSION:  1.  Multilevel degenerative disc disease and facet degeneration.  2.  Mild anterior subluxation at L4-5. This is stable with flexion and extension.  3.  Again seen multilevel degenerative retrolisthesis. This does not change significantly extension.    Xray lumbar spine 10/04/2018  IMPRESSION:     Mild retrolisthesis of L1-2, L2-3 and L3-4 that are worse with extension.  Mild anterolisthesis of L4-5 that is worse with flexion.  There is partial disc space fusion of the lower thoracic spine.    MRI left knee 07/10/2020  IMPRESSION:  1.  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  2.  Intact ligaments and menisci.  3.  Small joint effusion.  4.  [address] synovial or ganglion cyst immediately posterior to the posterior cruciate ligament.      MRI left knee 07/22/2013       Impression        1. Tendinopathy of the quadriceps tendon.     2. Medial collateral ligament sprain.     3. Osteoarthritis, most severe in the  medial compartment.     4. Fraying of the inner rim of the bodies of the lateral and medial menisci.     5. Moderate joint effusion.      Xray hips 10/04/2018  Impression:  No acute osseous abnormality                         Results for orders placed during the hospital encounter of 10/04/18   DX-LUMBAR SPINE-4+ VIEWS    Impression Mild retrolisthesis of L1-2, L2-3 and L3-4 that are worse with extension.    Mild anterolisthesis of L4-5 that is worse with flexion.    There is partial disc space fusion of the lower thoracic spine.                                         Diagnosis   Visit Diagnoses     ICD-10-CM   1. Spinal stenosis of lumbar region, unspecified whether neurogenic claudication present  M48.061   2. Spondylolisthesis of lumbar region  M43.16   3. Neural foraminal stenosis of lumbar spine  M48.061   4. Low back pain with radiation  M54.50   5. Muscle spasm  M62.838   6. Lumbosacral pain  M54.50         ASSESSMENT:  Curt Blair 71 y.o. male seen for above     Curt was seen today for follow-up.    Diagnoses and all orders for this visit:    Spinal stenosis of lumbar region, unspecified whether neurogenic claudication present  -     Referral to Pain Clinic    Spondylolisthesis of lumbar region  -     Referral to Pain Clinic    Neural foraminal stenosis of lumbar spine  -     Referral to Pain Clinic    Low back pain with radiation  -     Referral to Pain Clinic    Muscle spasm  -     cyclobenzaprine (FLEXERIL) 10 mg Tab; Take 1 Tablet by mouth 3 times a day as needed for Muscle Spasms for up to 30 days.    Lumbosacral pain  -     cyclobenzaprine (FLEXERIL) 10 mg Tab; Take 1 Tablet by mouth 3 times a day as needed for Muscle Spasms for up to 30 days.         1. Discussed that he feels that he is about 50% better than he was, cramping in legs has returned, but is less.  Symptoms are not as severe.  2. Plan for right L4-5 interlaminar epidural steroid injection.  The risks benefits and alternatives to  this procedure were discussed and the patient wishes to proceed with the procedure. Risks include but are not limited to damage to surrounding structures, infection, bleeding, worsening of pain which can be permanent, weakness which can be permanent. Benefits include pain relief, improved function. Alternatives includes not doing the procedure.   3. Continue with flexeril at bedtime for muscle spasms and to help with sleep.        Follow-up: Return for Hospital injection.     My total time spent caring for the patient on the day of the encounter was 40-54 minutes.     Thank you very much for asking me to participate in Curt Lamonte Rossy's care.  Please contact me with any questions or concerns.    Please note that this dictation was created using voice recognition software. I have made every reasonable attempt to correct obvious errors but there may be errors of grammar and content that I may have overlooked prior to finalization of this note.      Sav Rick MD  Physical Medicine and Rehabilitation  Interventional Spine and Sports Physiatry  [facility]    CC: Shila Mullen,

## 2021-11-09 NOTE — PROGRESS NOTES
Follow-up patient note, patient previously seen by Dr. Porter     Physiatry (physical medicine and  Rehabilitation), interventional spine and sports medicine    Date of Service: 11/09/2021    Chief complaint:   Chief Complaint   Patient presents with   • Follow-Up     Lower back and leg pain       HISTORY    HPI: Curt Blair 71 y.o. male who presents today for follow-up evaluation.    Curt reports that he had been continuing with his home exercise program from PT.  This seems to help somewhat, but he has been having some gradual return of cramping in his legs..  Continues to have low back and bilateral leg pain with right greater than left.    Pain is 5-7/10 on the NRS.      Sleep is very difficult again, having trouble sleeping more than a few hours a night as pain wakes him.      Right L4-5 interlaminar epidural steroid injection on 7/28/2021 had helped, but pain is now relatively constant in the right gluteal region.  The cramping in the legs that he had prior to epidural has not returned to previous level    Knee pain is stable, still not bothering him during the day.    Medical records review:    Reviewed records from Dr Clovis Alicea 07/2013 At that visit, he assessed that MRI of left knee was indicated.  On 07/30/2013, they reviewed the study and the patient had a left knee corticosteroid injection    Previous treatments:    Physical Therapy: No    Medications the patient is tried: NSAIDs    Previous interventions: none, recently     Previous surgeries to relieve the above pain:  none      ROS:   Eyes: cataract surgery, recently  CV: history of MI at 41  Red Flags ROS:   Fever, Chills, Sweats: Denies  Involuntary Weight Loss: Denies  Bladder Incontinence: Denies  Bowel Incontinence: Denies  Saddle Anesthesia: Denies    All other systems reviewed and negative.       PMHx:   Past Medical History:   Diagnosis Date   • Allergies 12/24/2019   • Arteriosclerotic vascular disease 1/25/2019   • Arthritis      Right Foot   • CAD (coronary artery disease)    • Elevated CPK 8/16/2020   • Generalized anxiety disorder 10/11/2017   • Hyperlipidemia    • Hypertension    • Inflamed sebaceous cyst 2/15/2018   • Intrinsic eczema 2/7/2020   • Prediabetes 7/19/2019   • S/P coronary artery stent placement     2 stents       PSHx:   Past Surgical History:   Procedure Laterality Date   • MI INJ LUMBAR/SACRAL,W/ IMAGING Right 7/28/2021    Procedure: RIGHT L4-5 interlaminar epidural steroid injection with sedation;  Surgeon: Sav Rick M.D.;  [facility];  Service: Pain Management   • MI FLUOROSCOPIC GUIDANCE NEEDLE PLACEMENT Left 6/16/2021    Procedure: LEFT genicular nerve blocks, diagnostic;  Surgeon: Sav Rick M.D.;  Location: [facility];  Service: Pain Management   • MI INJ AA/STRD GNCLR NRV BRNCH W/IMG Left 6/16/2021    Procedure: GENICULAR NERVE BRANCHES INCLUDING IMAGING GUIDANCE WITH A REQUIRED MINIMUM THREE NERVE BRANCHES;  Surgeon: Sav Rick M.D.;  [facility];  Service: Pain Management   • APPENDECTOMY     • CHOLECYSTECTOMY     • ENDARTERECTOMY      corornary   • STENT PLACEMENT     • VASECTOMY         Family history   Family History   Problem Relation Age of Onset   • Lung Disease Mother         copd   • Hypertension Mother    • Heart Disease Father         heart attack and heart disease   • Cancer Brother         neck   • Heart Disease Paternal Grandfather         Heart attack   • Other Sister         non-malignant brain tumor   • Diabetes Neg Hx          Medications:   Current Outpatient Medications   Medication   • cyclobenzaprine (FLEXERIL) 10 mg Tab   • ALPRAZolam (XANAX) 0.5 MG Tab   • diclofenac DR (VOLTAREN) 75 MG Tablet Delayed Response   • Turmeric (QC TUMERIC COMPLEX PO)   • atorvastatin (LIPITOR) 10 MG Tab   • nitroglycerin (NITROSTAT) 0.4 MG SL Tab   • amlodipine-benazepril (LOTREL) 5-10 MG per capsule   • Multiple  Vitamins-Minerals (MULTIVITAMIN PO)   • Coenzyme Q10 (CO Q 10 PO)   • MEGARED OMEGA-3 KRILL OIL PO   • CALCIUM PO   • aspirin 81 MG tablet     No current facility-administered medications for this visit.       Allergies:   Allergies   Allergen Reactions   • Atorvastatin Myalgia     Ok to take 10 mg  Elevated CPK  Only at 40 Mg does this happen.   • Lyrica [Pregabalin]    • Rosuvastatin      muscle cramps elevated CPK       Social Hx:   Social History     Socioeconomic History   • Marital status:      Spouse name: Not on file   • Number of children: Not on file   • Years of education: Not on file   • Highest education level: 12th grade   Occupational History   • Not on file   Tobacco Use   • Smoking status: Former Smoker     Packs/day: 2.00     Years: 25.00     Pack years: 50.00     Types: Cigarettes     Quit date: 1992     Years since quittin.8   • Smokeless tobacco: Never Used   • Tobacco comment: avoid all tobacco products   Vaping Use   • Vaping Use: Never used   Substance and Sexual Activity   • Alcohol use: Yes     Alcohol/week: 0.6 oz     Types: 1 Standard drinks or equivalent per week     Comment: occ   • Drug use: No   • Sexual activity: Yes     Partners: Female     Comment: , 3 children   Other Topics Concern   •  Service Yes   • Blood Transfusions No   • Caffeine Concern No   • Occupational Exposure No   • Hobby Hazards No   • Sleep Concern Yes   • Stress Concern Yes   • Weight Concern Yes   • Special Diet No     Comment: no fried   • Back Care Yes   • Exercise Yes   • Bike Helmet No     Comment: does not ride bike   • Seat Belt Not Asked   • Self-Exams Yes   Social History Narrative    He was born in [place]. He worked in the [employer] industry, he retired at age 65. He  Luann in 2016 after both of their spouses passed away. He has children in [place] and several grandchildren and great grandchildren. He is an avid traveler.     Social Determinants of Health      Financial Resource Strain:    • Difficulty of Paying Living Expenses: Not on file   Food Insecurity:    • Worried About Running Out of Food in the Last Year: Not on file   • Ran Out of Food in the Last Year: Not on file   Transportation Needs:    • Lack of Transportation (Medical): Not on file   • Lack of Transportation (Non-Medical): Not on file   Physical Activity:    • Days of Exercise per Week: Not on file   • Minutes of Exercise per Session: Not on file   Stress:    • Feeling of Stress : Not on file   Social Connections:    • Frequency of Communication with Friends and Family: Not on file   • Frequency of Social Gatherings with Friends and Family: Not on file   • Attends Church Services: Not on file   • Active Member of Clubs or Organizations: Not on file   • Attends Club or Organization Meetings: Not on file   • Marital Status: Not on file   Intimate Partner Violence:    • Fear of Current or Ex-Partner: Not on file   • Emotionally Abused: Not on file   • Physically Abused: Not on file   • Sexually Abused: Not on file   Housing Stability:    • Unable to Pay for Housing in the Last Year: Not on file   • Number of Places Lived in the Last Year: Not on file   • Unstable Housing in the Last Year: Not on file         EXAMINATION     Physical Exam:   Vitals: /70 (BP Location: Right arm, Patient Position: Sitting, BP Cuff Size: Adult long)   Pulse 97   Temp 36.6 °C (97.9 °F) (Temporal)   Ht 1.829 m (6')   Wt 115 kg (254 lb 3.1 oz)   SpO2 93%     Constitutional:   Body Habitus: Body mass index is 34.47 kg/m².  Cooperation: Fully cooperates with exam  Appearance: Well-groomed, well-nourished, not disheveled, in no acute distress    Eyes: No scleral icterus, no proptosis     ENT -no obvious auditory deficits, wearing a face mask    Skin -no rashes or lesions noted    Respiratory-  breathing comfortable on room air, no audible wheezing    Cardiovascular- No lower extremity edema is noted.      Psychiatric- alert and oriented ×3. Normal affect.     Gait - normal gait, no use of ambulatory device, minimally antalgic.     Spine  Functional ROM of the lumbar spine without pain in flexion, no pain in extension and quadrant loading.     Tenderness over the right PSIS, positive nancie's test on the right for back pain.  Negative on the left    No focal motor or sensory deficits in the lower extremities bilaterally    SLR is negative bilaterally    MEDICAL DECISION MAKING    Medical records review: see under HPI section.     DATA    Labs:   Lab Results   Component Value Date/Time    SODIUM 139 07/26/2021 10:55 AM    POTASSIUM 5.0 07/26/2021 10:55 AM    CHLORIDE 103 07/26/2021 10:55 AM    CO2 25 07/26/2021 10:55 AM    ANION 11.0 07/26/2021 10:55 AM    GLUCOSE 90 07/26/2021 10:55 AM    BUN 18 07/26/2021 10:55 AM    CREATININE 0.90 07/26/2021 10:55 AM    CREATININE 0.93 02/20/2013 07:20 AM    CALCIUM 9.8 07/26/2021 10:55 AM    ASTSGOT 22 07/26/2021 10:55 AM    ALTSGPT 29 07/26/2021 10:55 AM    TBILIRUBIN 0.6 07/26/2021 10:55 AM    ALBUMIN 4.7 07/26/2021 10:55 AM    TOTPROTEIN 7.2 07/26/2021 10:55 AM    GLOBULIN 2.5 07/26/2021 10:55 AM    AGRATIO 1.9 07/26/2021 10:55 AM       Lab Results   Component Value Date/Time    PROTHROMBTM 14.8 (H) 12/05/2018 07:00 AM    INR 1.17 (H) 12/05/2018 07:00 AM        Lab Results   Component Value Date/Time    WBC 5.7 08/31/2021 08:25 AM    RBC 5.01 08/31/2021 08:25 AM    HEMOGLOBIN 15.6 08/31/2021 08:25 AM    HEMATOCRIT 47.8 08/31/2021 08:25 AM    MCV 95.4 08/31/2021 08:25 AM    MCH 31.1 08/31/2021 08:25 AM    MCHC 32.6 (L) 08/31/2021 08:25 AM    MPV 11.4 08/31/2021 08:25 AM    NEUTSPOLYS 66.20 08/31/2021 08:25 AM    LYMPHOCYTES 18.40 (L) 08/31/2021 08:25 AM    MONOCYTES 12.60 08/31/2021 08:25 AM    EOSINOPHILS 1.40 08/31/2021 08:25 AM    BASOPHILS 0.90 08/31/2021 08:25 AM        Lab Results   Component Value Date/Time    HBA1C 6.1 (H) 08/31/2021 08:25 AM        Imaging: I  personally reviewed following images, these are my reads  MRI lumbar spine 07/26/2021  At L1-2, no central or foraminal stenosis  At L2-3, disc bulge, facet arthropathy and mild foraminal stenosis bilaterally  At L3-4, moderate central canal stenosis, mod-severe foraminal stenosis bilaterally, facet arthropathy  At L4-5, grade I anterolisthesis, disc bulge, severe facet arthropathy, moderate foraminal stenosis bilaterally  At L5-S1, mod-severe facet arthropathy with right paracentral annular tear.  No central or foraminal stenosis      MRI lumbar spine 10/29/2020  At L5-S1, there is bilateral facet arthropathy without central canal narrowing.  Mild foraminal stenosis bilaterally   At L4-5, mild central canal stenosis second to disc bulge and severe facet arthropathy.  Moderate foraminal stenosis bilaterally  At L3-4, disc bulge with moderate facet arthropathy and mild-moderate central canal stenosis.  Severe foraminal stenosis bilaterally  At L2-3, posterior disc bulge with facet arthropathy and mild central canal narrowing.  Mild foraminal stenosis bilaterally  At L1-2, mild facet arthropathy bilaterally, mild foraminal stenosis bilaterally.  Prominent epidural fat    Xray lumbar spine 08/07/2020  There is multilevel retrolisthesis at L1-2, L2-3, and L3-4  Grade I anterolisthesis at L4-5, stable on flexion and extension  Degenerative changes with multilevel facet arthropathy    Xray lumbar spine 10/04/2018  There is severe degenerative change in the lumbar spine; there is note of multilevel retrolisthesis at L1-2, L3-4 and L2-3  Mild movement of anterolisthesis at L4-5 with flexion    MRI left knee 07/10/2020  There is note of a small joint effusion.  Cyst posterior to the PCL.  No ligamentous or meniscal tears  Tricompartmental degenerative changes, greatest medial compartment    Xray hip right 10/04/2018  Moderate hip osteoarthritis noted bilaterally     MRI left knee 07/22/2013  There is note of moderate joint  effusion.  The medial collateral ligament has mild edema.  There is note of osteophytes in the medial and lateral compartments.  Tendinopathy in the quadriceps tendon noted.    IMAGING radiology reads. I reviewed the following radiology reads   MRI lumbar spine 07/26/2021     IMPRESSION:     1.  New right paracentral L5/S1 annular tear.  2.  Otherwise no significant change in moderate spondylosis with up to moderate to severe L3/4 foraminal stenoses  3.  Greatest the central stenoses are mild to moderate at L3/4  4.  Facet arthropathy greater than degenerative disc disease  5.  Degenerative L4/5 anterolisthesis where there is facet arthropathy and a moderate right facet joint effusion as before    MRI lumbar spine 10/29/2020  IMPRESSION:  1.  Minimal anterolisthesis of L4-5.  2.  Multilevel degenerative disease and facet arthropathy with resultant central canal and foraminal narrowing as described.    Xray lumbar spine 08/07/2020  IMPRESSION:  1.  Multilevel degenerative disc disease and facet degeneration.  2.  Mild anterior subluxation at L4-5. This is stable with flexion and extension.  3.  Again seen multilevel degenerative retrolisthesis. This does not change significantly extension.    Xray lumbar spine 10/04/2018  IMPRESSION:     Mild retrolisthesis of L1-2, L2-3 and L3-4 that are worse with extension.  Mild anterolisthesis of L4-5 that is worse with flexion.  There is partial disc space fusion of the lower thoracic spine.    MRI left knee 07/10/2020  IMPRESSION:  1.  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  2.  Intact ligaments and menisci.  3.  Small joint effusion.  4.  Girard synovial or ganglion cyst immediately posterior to the posterior cruciate ligament.      MRI left knee 07/22/2013       Impression        1. Tendinopathy of the quadriceps tendon.     2. Medial collateral ligament sprain.     3. Osteoarthritis, most severe in the  medial compartment.     4. Fraying of the inner rim of the bodies of the lateral and medial menisci.     5. Moderate joint effusion.      Xray hips 10/04/2018  Impression:  No acute osseous abnormality                         Results for orders placed during the hospital encounter of 10/04/18   DX-LUMBAR SPINE-4+ VIEWS    Impression Mild retrolisthesis of L1-2, L2-3 and L3-4 that are worse with extension.    Mild anterolisthesis of L4-5 that is worse with flexion.    There is partial disc space fusion of the lower thoracic spine.                                         Diagnosis   Visit Diagnoses     ICD-10-CM   1. Spinal stenosis of lumbar region, unspecified whether neurogenic claudication present  M48.061   2. Spondylolisthesis of lumbar region  M43.16   3. Neural foraminal stenosis of lumbar spine  M48.061   4. Low back pain with radiation  M54.50   5. Muscle spasm  M62.838   6. Lumbosacral pain  M54.50         ASSESSMENT:  Curt Blair 71 y.o. male seen for above     Curt was seen today for follow-up.    Diagnoses and all orders for this visit:    Spinal stenosis of lumbar region, unspecified whether neurogenic claudication present  -     Referral to Pain Clinic    Spondylolisthesis of lumbar region  -     Referral to Pain Clinic    Neural foraminal stenosis of lumbar spine  -     Referral to Pain Clinic    Low back pain with radiation  -     Referral to Pain Clinic    Muscle spasm  -     cyclobenzaprine (FLEXERIL) 10 mg Tab; Take 1 Tablet by mouth 3 times a day as needed for Muscle Spasms for up to 30 days.    Lumbosacral pain  -     cyclobenzaprine (FLEXERIL) 10 mg Tab; Take 1 Tablet by mouth 3 times a day as needed for Muscle Spasms for up to 30 days.         1. Discussed that he feels that he is about 50% better than he was, cramping in legs has returned, but is less.  Symptoms are not as severe.  2. Plan for right L4-5 interlaminar epidural steroid injection.  The risks benefits and alternatives to  this procedure were discussed and the patient wishes to proceed with the procedure. Risks include but are not limited to damage to surrounding structures, infection, bleeding, worsening of pain which can be permanent, weakness which can be permanent. Benefits include pain relief, improved function. Alternatives includes not doing the procedure.   3. Continue with flexeril at bedtime for muscle spasms and to help with sleep.        Follow-up: Return for Hospital injection.     My total time spent caring for the patient on the day of the encounter was 40-54 minutes.     Thank you very much for asking me to participate in Curt Lamonte Rossy's care.  Please contact me with any questions or concerns.    Please note that this dictation was created using voice recognition software. I have made every reasonable attempt to correct obvious errors but there may be errors of grammar and content that I may have overlooked prior to finalization of this note.      Sav Rick MD  Physical Medicine and Rehabilitation  Interventional Spine and Sports Physiatry  [facility]    CC: Shila Mullen,

## 2021-11-10 NOTE — PATIENT INSTRUCTIONS
Your procedure will be at the [facility] special procedure suite.    [facility] [place] [address]       PRE-PROCEDURE INSTRUCTIONS  You may take your regular medications except:   · No Anti-inflammatories 5 days prior to your procedure. Anti-inflammatories include medicines such as  ibuprofen (Motrin, Advil), Excedrin, Naproxen (Aleve, Anaprox, Naprelan, Naprosyn), Celecoxib (Celebrex), Diclofenac (Voltaren-XR tab), and Meloxicam (Mobic).   · No Glucophage or Metformin 24 hours before your procedure. You may resume next day after your procedure.  · Call the physiatry office if you are taking or prescribed anti-biotics within five days of procedure.  · Please ask provider if you are taking any new diabetes medication.  · CONTINUE TAKING BLOOD PRESSURE MEDICATIONS AS PRESCRIBED.  · Pain medications will not be prescribed on the procedure day. Procedural pain medication may be used by your provider   · Call your doctor's office performing the procedure if you have a fever, chills, rash or new illness prior to your procedure    Anticoagulation/antiplatelet medications  No Blood thinning medications such as Coumadin or Plavix 5 days prior to procedure unless your doctor said to continue these medications. Call your doctor if a new medication is prescribed in this class.     Restrictions for eating before procedure:   · If you are getting procedural sedation, then do not eat to for 8 hours prior to procedure appointment time. Do not drink fluids for four hours prior to your procedure time.   · If you are not having procedural sedation, then Skip the meal prior to your procedure. If you have a morning procedure then skip breakfast. If you have an afternoon procedure then skip lunch.   · You may drink clear liquids up to 2 hours prior to your procedure  · You must have a  the day of procedure to accompany you home.      POST PROCEDURE INSTRUCTIONS   · No heavy lifting, strenuous bending or  strenuous exercise for 3 days after your procedure.  · No hot tubs, baths, swimming for 3 days after your procedure  · You can remove the bandage the day after the procedure.  · IF YOU RECEIVED A STEROID INJECTION. PLEASE NOTE THAT THERE MAY BE A DELAY FOR THE INJECTION TO START WORKING, THE DELAY MAY BE UP TO TWO WEEKS. IF YOU HAVE DIABETES, PLEASE NOTE THAT YOUR SUGAR LEVELS MAY BE ELEVATED FOR 1-2 DAYS AFTER A STEROID INJECTION.  THE STEROID MAY CAUSE TEMPORARY SYMPTOMS WHICH USUALLY RESOLVE ON THEIR OWN WITHIN 1 TO 2 DAYS INCLUDING FACIAL FLUSHING OR A FEELING OF WARMTH ON THE FACE, TEMPORARY INCREASES IN BLOOD SUGAR, INSOMNIA, INCREASED HUNGER  · IF YOU RECEIVED A DIAGNOSTIC PROCEDURE (SUCH AS A MEDIAL BRANCH BLOCK), PLEASE NOTE THAT WE DO EXPECT THIS INJECTION TO WEAR OFF.  IT IS IMPORTANT TO COMPLETE THE PAIN DIARY AND LIST THE PAIN SCORE ONLY FOR THE REGION WHERE THE PROCEDURE WAS AND BRING THIS TO YOUR FOLLOW UP VISIT.  · IF YOU RECEIVED A RADIOFREQUENCY PROCEDURE, THERE MAY BE SOME SORENESS AFTER THE PROCEDURE.  THIS IS NORMAL.  · IF YOU EXPERIENCE PROLONGED WEAKNESS LONGER THAN ONE DAY, BOWEL OR BLADDER INCONTINENCE THEN PLEASE CALL THE PHYSIATRY OFFICE.  · Your leg may feel heavy, weak and numb for up to 1-2 days. Be very careful walking.   ·  You may resume normal activities 3 days after procedure.

## 2021-11-22 DIAGNOSIS — I10 ESSENTIAL HYPERTENSION, BENIGN: ICD-10-CM

## 2021-11-23 RX ORDER — AMLODIPINE BESYLATE AND BENAZEPRIL HYDROCHLORIDE 5; 10 MG/1; MG/1
1 CAPSULE ORAL
Qty: 100 CAPSULE | Refills: 2 | Status: SHIPPED | OUTPATIENT
Start: 2021-11-23 | End: 2022-08-23

## 2021-11-23 NOTE — TELEPHONE ENCOUNTER
Medication Refill  Dennis Britton Ass't  You Yesterday (10:03 AM)         Plan requires a 100 day supply. Thank you      Routing comment      Medication sent to preferred pharmacy as requested.

## 2021-12-02 ENCOUNTER — APPOINTMENT (OUTPATIENT)
Dept: RADIOLOGY | Facility: REHABILITATION | Age: 71
End: 2021-12-02
Attending: PHYSICAL MEDICINE & REHABILITATION
Payer: MEDICARE

## 2021-12-02 ENCOUNTER — HOSPITAL ENCOUNTER (OUTPATIENT)
Facility: REHABILITATION | Age: 71
End: 2021-12-02
Attending: PHYSICAL MEDICINE & REHABILITATION | Admitting: PHYSICAL MEDICINE & REHABILITATION
Payer: MEDICARE

## 2021-12-02 VITALS
SYSTOLIC BLOOD PRESSURE: 137 MMHG | RESPIRATION RATE: 16 BRPM | BODY MASS INDEX: 33.74 KG/M2 | DIASTOLIC BLOOD PRESSURE: 83 MMHG | WEIGHT: 249.12 LBS | HEART RATE: 82 BPM | TEMPERATURE: 97.6 F | OXYGEN SATURATION: 96 % | HEIGHT: 72 IN

## 2021-12-02 PROCEDURE — 62323 NJX INTERLAMINAR LMBR/SAC: CPT

## 2021-12-02 PROCEDURE — 99152 MOD SED SAME PHYS/QHP 5/>YRS: CPT

## 2021-12-02 PROCEDURE — 700101 HCHG RX REV CODE 250

## 2021-12-02 PROCEDURE — 700117 HCHG RX CONTRAST REV CODE 255

## 2021-12-02 PROCEDURE — 700111 HCHG RX REV CODE 636 W/ 250 OVERRIDE (IP)

## 2021-12-02 RX ORDER — DEXAMETHASONE SODIUM PHOSPHATE 10 MG/ML
INJECTION, SOLUTION INTRAMUSCULAR; INTRAVENOUS
Status: COMPLETED
Start: 2021-12-02 | End: 2021-12-02

## 2021-12-02 RX ORDER — LIDOCAINE HYDROCHLORIDE 20 MG/ML
INJECTION, SOLUTION EPIDURAL; INFILTRATION; INTRACAUDAL; PERINEURAL
Status: COMPLETED
Start: 2021-12-02 | End: 2021-12-02

## 2021-12-02 RX ORDER — MIDAZOLAM HYDROCHLORIDE 1 MG/ML
INJECTION INTRAMUSCULAR; INTRAVENOUS
Status: COMPLETED
Start: 2021-12-02 | End: 2021-12-02

## 2021-12-02 RX ADMIN — FENTANYL CITRATE 25 MCG: 50 INJECTION, SOLUTION INTRAMUSCULAR; INTRAVENOUS at 10:09

## 2021-12-02 RX ADMIN — DEXAMETHASONE SODIUM PHOSPHATE 10 MG: 10 INJECTION, SOLUTION INTRAMUSCULAR; INTRAVENOUS at 10:20

## 2021-12-02 RX ADMIN — LIDOCAINE HYDROCHLORIDE 10 ML: 20 INJECTION, SOLUTION EPIDURAL; INFILTRATION; INTRACAUDAL; PERINEURAL at 10:20

## 2021-12-02 RX ADMIN — MIDAZOLAM HYDROCHLORIDE 1 MG: 1 INJECTION, SOLUTION INTRAMUSCULAR; INTRAVENOUS at 10:09

## 2021-12-02 RX ADMIN — IOHEXOL 2 ML: 240 INJECTION, SOLUTION INTRATHECAL; INTRAVASCULAR; INTRAVENOUS; ORAL at 10:19

## 2021-12-02 RX ADMIN — MIDAZOLAM HYDROCHLORIDE 0.5 MG: 1 INJECTION, SOLUTION INTRAMUSCULAR; INTRAVENOUS at 10:11

## 2021-12-02 ASSESSMENT — PAIN DESCRIPTION - PAIN TYPE
TYPE: CHRONIC PAIN
TYPE: CHRONIC PAIN

## 2021-12-02 ASSESSMENT — FIBROSIS 4 INDEX: FIB4 SCORE: 1.24

## 2021-12-02 NOTE — PROGRESS NOTES
Admitting health assessment, (HTN, CAD).  Current medications reviewed with patient, see medication reconciliation form.  Patient denies taking aspirin, NSAIDS or blood thinners.  Patient has a ride post procedure.  Printed and verbal discharge instructions given with patient understanding.

## 2021-12-02 NOTE — PROGRESS NOTES
1007 AM.     Onto procedure table  on prone positioned and necessary monitoring equipment connected ( oxygen, EKG pad,pulse oximeter, BP ) with the help by  team  ( CST, X-ray TECH and RN ). Hands supported with stool underneath headrest of the bed , lower extremities supported with pillow.      1012 AM.        Identified patient, medication allergies, site and procedure confirmed and mehdi by Dr. Sanders. Reviewed medical health history  ( CAD, HTN, MI, cardiac stents ).  Aspirin and Voltaren off for 6 days. .Timed out agreed by team and patient.

## 2021-12-02 NOTE — PROGRESS NOTES
Taking fluids well, no nausea.  Dr. Rick in to see patient, discharge order received.  Steady gait.

## 2021-12-02 NOTE — INTERVAL H&P NOTE
H&P reviewed. The patient was examined and there are no changes to the H&P      /81   Pulse 84   Temp 36.4 °C (97.6 °F) (Skin)   Resp 18   Ht 1.829 m (6')   Wt 113 kg (249 lb 1.9 oz)   SpO2 96%     CV: RRR, Normal S1, S2  RESP: Clear to auscultation bilaterally    Continue with injection as planned.    Sav Rick MD  Physical Medicine and Rehabilitation  Interventional Spine and Sports Physiatry  [facility]

## 2021-12-02 NOTE — OP REPORT
"Date of procedure: 12/2/2021    Type of procedure: Right L4-5 interlaminar epidural steroid injection     Pre-Operative Diagnosis:   M48.061 (ICD-10-CM) - Spinal stenosis of lumbar region, unspecified whether neurogenic claudication present   M43.16 (ICD-10-CM) - Spondylolisthesis of lumbar region   M48.061 (ICD-10-CM) - Neural foraminal stenosis of lumbar spine   M54.5 (ICD-10-CM) - Low back pain with radiation       Post-Operative Diagnoses: Same    Type of Anesthesia: Local anesthesia and conscious sedation     Physician: Sav Rick MD     Blood Loss: None     Method of Procedure:     The patient signed an informed consent in the pre-op area after all risks and complications were explained and all questions were answered.  Blood pressure, heart rate, pulse oximetry, and EKG monitoring were performed before and after the procedure.      The patient was prepped and draped in a sterile fashion in the prone position.   The patient's spine was surveyed under fluoroscopic visualization and anatomical landmarks were identified.    Moderation sedation was achieved with Versed (1.5mg) and Fentanyl (25mcg). Monitoring of the patients vital signs and respiratory status was provided by trained independent registered nurse during the entire course of the procedures and under my supervision and recoded in the patient’s medical record. The duration of sedation was over 10 minutes.      Right L4-5 Lumbar Interlaminar Epidural Steroid Injection:     The region overlying the right L5 lamina was localized and the soft tissues overlying this structure were infiltrated with 1% Lidocaine without Epinephrine.  A 20G 6 inch, Tuohy needle was inserted through the anesthetized tract of tissue to the left sacrum base.  The needle was \"walked off\" the lamina and then loss of resistance was obtained.  After negative aspiration for blood, Omnipaque was used to confirm needle location and AP and lateral films were obtained confirming needle " placement in the epidural space.  Then, an injection was performed using 1cc dexamethasone (10mg/cc), 1.5cc of 1% lidocaine without epinephrine and 1.5cc of preservative-free normal saline.  The patient tolerated the procedure well.     Complications: None     Disposition:     1. The patient was discharged to the recovery area in good condition.  2. Discharge instructions were provided.  3. Call with any questions or problems  4. Apply ice to injection site and keep clean and dry for 24 hours.     Sav Rick MD  Physical Medicine and Rehabilitation  Interventional Spine and Sports Physiatry  [facility]    CPT: [id number], [id number]

## 2021-12-03 ENCOUNTER — TELEPHONE (OUTPATIENT)
Dept: PHYSICAL MEDICINE AND REHAB | Facility: MEDICAL CENTER | Age: 71
End: 2021-12-03

## 2021-12-03 NOTE — TELEPHONE ENCOUNTER
I called patient to follow up after his Special procedure Right L4-5 interlaminar epidural steroid injection with sedation and he stated he is doing good.

## 2021-12-16 ENCOUNTER — OFFICE VISIT (OUTPATIENT)
Dept: PHYSICAL MEDICINE AND REHAB | Facility: MEDICAL CENTER | Age: 71
End: 2021-12-16
Payer: MEDICARE

## 2021-12-16 VITALS
HEIGHT: 72 IN | TEMPERATURE: 97.6 F | OXYGEN SATURATION: 95 % | DIASTOLIC BLOOD PRESSURE: 64 MMHG | BODY MASS INDEX: 34.58 KG/M2 | WEIGHT: 255.29 LBS | SYSTOLIC BLOOD PRESSURE: 124 MMHG | HEART RATE: 99 BPM

## 2021-12-16 DIAGNOSIS — M54.50 LOW BACK PAIN WITH RADIATION: ICD-10-CM

## 2021-12-16 DIAGNOSIS — M48.061 NEURAL FORAMINAL STENOSIS OF LUMBAR SPINE: ICD-10-CM

## 2021-12-16 DIAGNOSIS — M43.16 SPONDYLOLISTHESIS OF LUMBAR REGION: ICD-10-CM

## 2021-12-16 DIAGNOSIS — M48.061 SPINAL STENOSIS OF LUMBAR REGION, UNSPECIFIED WHETHER NEUROGENIC CLAUDICATION PRESENT: ICD-10-CM

## 2021-12-16 DIAGNOSIS — M47.816 LUMBAR SPONDYLOSIS: ICD-10-CM

## 2021-12-16 PROCEDURE — 99215 OFFICE O/P EST HI 40 MIN: CPT | Performed by: PHYSICAL MEDICINE & REHABILITATION

## 2021-12-16 RX ORDER — DULOXETIN HYDROCHLORIDE 60 MG/1
60 CAPSULE, DELAYED RELEASE ORAL DAILY
Qty: 30 CAPSULE | Refills: 2 | Status: SHIPPED | OUTPATIENT
Start: 2021-12-16 | End: 2022-01-12

## 2021-12-16 RX ORDER — DULOXETIN HYDROCHLORIDE 30 MG/1
30 CAPSULE, DELAYED RELEASE ORAL DAILY
Qty: 7 CAPSULE | Refills: 0 | Status: SHIPPED | OUTPATIENT
Start: 2021-12-16 | End: 2021-12-23

## 2021-12-16 ASSESSMENT — PAIN SCALES - GENERAL: PAINLEVEL: 3=SLIGHT PAIN

## 2021-12-16 ASSESSMENT — FIBROSIS 4 INDEX: FIB4 SCORE: 1.24

## 2021-12-16 ASSESSMENT — PATIENT HEALTH QUESTIONNAIRE - PHQ9: CLINICAL INTERPRETATION OF PHQ2 SCORE: 0

## 2021-12-16 NOTE — PROGRESS NOTES
Follow-up patient note, patient previously seen by Dr. Porter     Physiatry (physical medicine and  Rehabilitation), interventional spine and sports medicine    Date of Service: 12/16/2021    Chief complaint:   Chief Complaint   Patient presents with   • Follow-Up     Back pain       HISTORY    HPI: Curt Blair 71 y.o. male who presents today for follow-up evaluation.    Curt reports that he did very well after the procedure, right L4-5 interlaminar epidural steroid on 12/2/2021.  He reports that he had significant improvement and relief of cramps, slept well for a few days.  The daytime is still better.  Sitting a lot longer without pain.  Getting up and walking makes the pain better.  Walking is not painful now.  Knees are not bothering him now.  These were bothersome before this last injection.    Nocturnal symptoms are the most difficult.  He takes 1/2 pill at 6PM and then a whole one at bedtime.  Then, back and leg cramping starts.  This makes it difficult to sleep.  Without the medication, he is up at midnight.    He reports that he has pain in the low back and into the legs bilaterally.    Medical records review:    Reviewed records from Dr Clovis Alicea 07/2013 At that visit, he assessed that MRI of left knee was indicated.  On 07/30/2013, they reviewed the study and the patient had a left knee corticosteroid injection    Previous treatments:    Physical Therapy: No    Medications the patient is tried: NSAIDs    Previous interventions: none, recently     Previous surgeries to relieve the above pain:  none      ROS:   Eyes: cataract surgery, recently  CV: history of MI at 41  Red Flags ROS:   Fever, Chills, Sweats: Denies  Involuntary Weight Loss: Denies  Bladder Incontinence: Denies  Bowel Incontinence: Denies  Saddle Anesthesia: Denies    All other systems reviewed and negative.       PMHx:   Past Medical History:   Diagnosis Date   • Allergies 12/24/2019   • Arteriosclerotic vascular disease  1/25/2019   • Arthritis     Right Foot   • CAD (coronary artery disease)    • Elevated CPK 8/16/2020   • Generalized anxiety disorder 10/11/2017   • Hyperlipidemia    • Hypertension    • Inflamed sebaceous cyst 2/15/2018   • Intrinsic eczema 2/7/2020   • Prediabetes 7/19/2019   • S/P coronary artery stent placement     2 stents       PSHx:   Past Surgical History:   Procedure Laterality Date   • VT INJ LUMBAR/SACRAL,W/ IMAGING Right 12/2/2021    Procedure: RIGHT L4-5 interlaminar epidural steroid injection with sedation;  Surgeon: Sav Rick M.D.;  [facility];  Service: Pain Management   • VT INJ LUMBAR/SACRAL,W/ IMAGING Right 7/28/2021    Procedure: RIGHT L4-5 interlaminar epidural steroid injection with sedation;  Surgeon: Sav Rick M.D.;  Location: [facility];  Service: Pain Management   • VT FLUOROSCOPIC GUIDANCE NEEDLE PLACEMENT Left 6/16/2021    Procedure: LEFT genicular nerve blocks, diagnostic;  Surgeon: Sav Rick M.D.;  Location: [facility];  Service: Pain Management   • VT INJ AA/STRD GNCLR NRV BRNCH W/IMG Left 6/16/2021    Procedure: GENICULAR NERVE BRANCHES INCLUDING IMAGING GUIDANCE WITH A REQUIRED MINIMUM THREE NERVE BRANCHES;  Surgeon: Sav Rick M.D.;  Location: [facility];  Service: Pain Management   • APPENDECTOMY     • CHOLECYSTECTOMY     • ENDARTERECTOMY      corornary   • STENT PLACEMENT     • VASECTOMY         Family history   Family History   Problem Relation Age of Onset   • Lung Disease Mother         copd   • Hypertension Mother    • Heart Disease Father         heart attack and heart disease   • Cancer Brother         neck   • Heart Disease Paternal Grandfather         Heart attack   • Other Sister         non-malignant brain tumor   • Diabetes Neg Hx          Medications:   Current Outpatient Medications   Medication   • DULoxetine (CYMBALTA) 30 MG Cap DR Particles   • DULoxetine  (CYMBALTA) 60 MG Cap DR Particles delayed-release capsule   • amlodipine-benazepril (LOTREL) 5-10 MG per capsule   • ALPRAZolam (XANAX) 0.5 MG Tab   • diclofenac DR (VOLTAREN) 75 MG Tablet Delayed Response   • Turmeric (QC TUMERIC COMPLEX PO)   • atorvastatin (LIPITOR) 10 MG Tab   • nitroglycerin (NITROSTAT) 0.4 MG SL Tab   • Multiple Vitamins-Minerals (MULTIVITAMIN PO)   • Coenzyme Q10 (CO Q 10 PO)   • MEGARED OMEGA-3 KRILL OIL PO   • CALCIUM PO   • aspirin 81 MG tablet     No current facility-administered medications for this visit.       Allergies:   Allergies   Allergen Reactions   • Atorvastatin Myalgia     Ok to take 10 mg  Elevated CPK  Only at 40 Mg does this happen.   • Lyrica [Pregabalin]    • Rosuvastatin      muscle cramps elevated CPK       Social Hx:   Social History     Socioeconomic History   • Marital status:      Spouse name: Not on file   • Number of children: Not on file   • Years of education: Not on file   • Highest education level: 12th grade   Occupational History   • Not on file   Tobacco Use   • Smoking status: Former Smoker     Packs/day: 2.00     Years: 25.00     Pack years: 50.00     Types: Cigarettes     Quit date: 1992     Years since quittin.9   • Smokeless tobacco: Never Used   • Tobacco comment: avoid all tobacco products   Vaping Use   • Vaping Use: Never used   Substance and Sexual Activity   • Alcohol use: Yes     Alcohol/week: 0.6 oz     Types: 1 Standard drinks or equivalent per week     Comment: occ   • Drug use: No   • Sexual activity: Yes     Partners: Female     Comment: , 3 children   Other Topics Concern   •  Service Yes   • Blood Transfusions No   • Caffeine Concern No   • Occupational Exposure No   • Hobby Hazards No   • Sleep Concern Yes   • Stress Concern Yes   • Weight Concern Yes   • Special Diet No     Comment: no fried   • Back Care Yes   • Exercise Yes   • Bike Helmet No     Comment: does not ride bike   • Seat Belt Not Asked   •  Self-Exams Yes   Social History Narrative    He was born in [place]. He worked in the [employer] industry, he retired at age 65. He  Luann in 2016 after both of their spouses passed away. He has children in [place] and several grandchildren and great grandchildren. He is an avid traveler.     Social Determinants of Health     Financial Resource Strain:    • Difficulty of Paying Living Expenses: Not on file   Food Insecurity:    • Worried About Running Out of Food in the Last Year: Not on file   • Ran Out of Food in the Last Year: Not on file   Transportation Needs:    • Lack of Transportation (Medical): Not on file   • Lack of Transportation (Non-Medical): Not on file   Physical Activity:    • Days of Exercise per Week: Not on file   • Minutes of Exercise per Session: Not on file   Stress:    • Feeling of Stress : Not on file   Social Connections:    • Frequency of Communication with Friends and Family: Not on file   • Frequency of Social Gatherings with Friends and Family: Not on file   • Attends Taoism Services: Not on file   • Active Member of Clubs or Organizations: Not on file   • Attends Club or Organization Meetings: Not on file   • Marital Status: Not on file   Intimate Partner Violence:    • Fear of Current or Ex-Partner: Not on file   • Emotionally Abused: Not on file   • Physically Abused: Not on file   • Sexually Abused: Not on file   Housing Stability:    • Unable to Pay for Housing in the Last Year: Not on file   • Number of Places Lived in the Last Year: Not on file   • Unstable Housing in the Last Year: Not on file         EXAMINATION     Physical Exam:   Vitals: /64 (BP Location: Right arm, Patient Position: Sitting, BP Cuff Size: Adult long)   Pulse 99   Temp 36.4 °C (97.6 °F) (Temporal)   Ht 1.829 m (6')   Wt 116 kg (255 lb 4.7 oz)   SpO2 95%     Constitutional:   Body Habitus: Body mass index is 34.62 kg/m².  Cooperation: Fully cooperates with exam  Appearance:  Well-groomed, well-nourished, not disheveled, in no acute distress    Eyes: No scleral icterus, no proptosis     ENT -no obvious auditory deficits, wearing a face mask    Skin -no rashes or lesions noted, no warmth or erythema was noted at the injection site    Respiratory-  breathing comfortable on room air, no audible wheezing    Cardiovascular- No lower extremity edema is noted.     Psychiatric- alert and oriented ×3. Normal affect.     Gait - normal gait, no use of ambulatory device, minimally antalgic.     Spine  No tenderness over the PSIS or lumbar paraspinals bilaterally    No focal motor or sensory deficits in the lower extremities bilaterally    Seated SLR is negative bilaterally    MEDICAL DECISION MAKING    Medical records review: see under HPI section.     DATA    Labs:   Lab Results   Component Value Date/Time    SODIUM 139 07/26/2021 10:55 AM    POTASSIUM 5.0 07/26/2021 10:55 AM    CHLORIDE 103 07/26/2021 10:55 AM    CO2 25 07/26/2021 10:55 AM    ANION 11.0 07/26/2021 10:55 AM    GLUCOSE 90 07/26/2021 10:55 AM    BUN 18 07/26/2021 10:55 AM    CREATININE 0.90 07/26/2021 10:55 AM    CREATININE 0.93 02/20/2013 07:20 AM    CALCIUM 9.8 07/26/2021 10:55 AM    ASTSGOT 22 07/26/2021 10:55 AM    ALTSGPT 29 07/26/2021 10:55 AM    TBILIRUBIN 0.6 07/26/2021 10:55 AM    ALBUMIN 4.7 07/26/2021 10:55 AM    TOTPROTEIN 7.2 07/26/2021 10:55 AM    GLOBULIN 2.5 07/26/2021 10:55 AM    AGRATIO 1.9 07/26/2021 10:55 AM       Lab Results   Component Value Date/Time    PROTHROMBTM 14.8 (H) 12/05/2018 07:00 AM    INR 1.17 (H) 12/05/2018 07:00 AM        Lab Results   Component Value Date/Time    WBC 5.7 08/31/2021 08:25 AM    RBC 5.01 08/31/2021 08:25 AM    HEMOGLOBIN 15.6 08/31/2021 08:25 AM    HEMATOCRIT 47.8 08/31/2021 08:25 AM    MCV 95.4 08/31/2021 08:25 AM    MCH 31.1 08/31/2021 08:25 AM    MCHC 32.6 (L) 08/31/2021 08:25 AM    MPV 11.4 08/31/2021 08:25 AM    NEUTSPOLYS 66.20 08/31/2021 08:25 AM    LYMPHOCYTES 18.40 (L)  08/31/2021 08:25 AM    MONOCYTES 12.60 08/31/2021 08:25 AM    EOSINOPHILS 1.40 08/31/2021 08:25 AM    BASOPHILS 0.90 08/31/2021 08:25 AM        Lab Results   Component Value Date/Time    HBA1C 6.1 (H) 08/31/2021 08:25 AM        Imaging: I personally reviewed following images, these are my reads  MRI lumbar spine 07/26/2021  At L1-2, no central or foraminal stenosis  At L2-3, disc bulge, facet arthropathy and mild foraminal stenosis bilaterally  At L3-4, moderate central canal stenosis, mod-severe foraminal stenosis bilaterally, facet arthropathy  At L4-5, grade I anterolisthesis, disc bulge, severe facet arthropathy, moderate foraminal stenosis bilaterally  At L5-S1, mod-severe facet arthropathy with right paracentral annular tear.  No central or foraminal stenosis      MRI lumbar spine 10/29/2020  At L5-S1, there is bilateral facet arthropathy without central canal narrowing.  Mild foraminal stenosis bilaterally   At L4-5, mild central canal stenosis second to disc bulge and severe facet arthropathy.  Moderate foraminal stenosis bilaterally  At L3-4, disc bulge with moderate facet arthropathy and mild-moderate central canal stenosis.  Severe foraminal stenosis bilaterally  At L2-3, posterior disc bulge with facet arthropathy and mild central canal narrowing.  Mild foraminal stenosis bilaterally  At L1-2, mild facet arthropathy bilaterally, mild foraminal stenosis bilaterally.  Prominent epidural fat    Xray lumbar spine 08/07/2020  There is multilevel retrolisthesis at L1-2, L2-3, and L3-4  Grade I anterolisthesis at L4-5, stable on flexion and extension  Degenerative changes with multilevel facet arthropathy    Xray lumbar spine 10/04/2018  There is severe degenerative change in the lumbar spine; there is note of multilevel retrolisthesis at L1-2, L3-4 and L2-3  Mild movement of anterolisthesis at L4-5 with flexion    MRI left knee 07/10/2020  There is note of a small joint effusion.  Cyst posterior to the  PCL.  No ligamentous or meniscal tears  Tricompartmental degenerative changes, greatest medial compartment    Xray hip right 10/04/2018  Moderate hip osteoarthritis noted bilaterally     MRI left knee 07/22/2013  There is note of moderate joint effusion.  The medial collateral ligament has mild edema.  There is note of osteophytes in the medial and lateral compartments.  Tendinopathy in the quadriceps tendon noted.    IMAGING radiology reads. I reviewed the following radiology reads   MRI lumbar spine 07/26/2021     IMPRESSION:     1.  New right paracentral L5/S1 annular tear.  2.  Otherwise no significant change in moderate spondylosis with up to moderate to severe L3/4 foraminal stenoses  3.  Greatest the central stenoses are mild to moderate at L3/4  4.  Facet arthropathy greater than degenerative disc disease  5.  Degenerative L4/5 anterolisthesis where there is facet arthropathy and a moderate right facet joint effusion as before    MRI lumbar spine 10/29/2020  IMPRESSION:  1.  Minimal anterolisthesis of L4-5.  2.  Multilevel degenerative disease and facet arthropathy with resultant central canal and foraminal narrowing as described.    Xray lumbar spine 08/07/2020  IMPRESSION:  1.  Multilevel degenerative disc disease and facet degeneration.  2.  Mild anterior subluxation at L4-5. This is stable with flexion and extension.  3.  Again seen multilevel degenerative retrolisthesis. This does not change significantly extension.    Xray lumbar spine 10/04/2018  IMPRESSION:     Mild retrolisthesis of L1-2, L2-3 and L3-4 that are worse with extension.  Mild anterolisthesis of L4-5 that is worse with flexion.  There is partial disc space fusion of the lower thoracic spine.    MRI left knee 07/10/2020  IMPRESSION:  1.  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  2.  Intact ligaments and menisci.  3.  Small joint effusion.  4.  Leivasy synovial or  ganglion cyst immediately posterior to the posterior cruciate ligament.      MRI left knee 07/22/2013       Impression        1. Tendinopathy of the quadriceps tendon.     2. Medial collateral ligament sprain.     3. Osteoarthritis, most severe in the medial compartment.     4. Fraying of the inner rim of the bodies of the lateral and medial menisci.     5. Moderate joint effusion.      Xray hips 10/04/2018  Impression:  No acute osseous abnormality                         Results for orders placed during the hospital encounter of 10/04/18   DX-LUMBAR SPINE-4+ VIEWS    Impression Mild retrolisthesis of L1-2, L2-3 and L3-4 that are worse with extension.    Mild anterolisthesis of L4-5 that is worse with flexion.    There is partial disc space fusion of the lower thoracic spine.                                         Diagnosis   Visit Diagnoses     ICD-10-CM   1. Spinal stenosis of lumbar region, unspecified whether neurogenic claudication present  M48.061   2. Neural foraminal stenosis of lumbar spine  M48.061   3. Spondylolisthesis of lumbar region  M43.16   4. Low back pain with radiation  M54.50   5. Lumbar spondylosis  M47.816         ASSESSMENT:  Curt Blair 71 y.o. male seen for above     Curt was seen today for follow-up.    Diagnoses and all orders for this visit:    Spinal stenosis of lumbar region, unspecified whether neurogenic claudication present  -     DULoxetine (CYMBALTA) 30 MG Cap DR Particles; Take 1 Capsule by mouth every day for 7 days.  -     DULoxetine (CYMBALTA) 60 MG Cap DR Particles delayed-release capsule; Take 1 Capsule by mouth every day for 30 days.  -     Referral to Spine Surgery    Neural foraminal stenosis of lumbar spine  -     DULoxetine (CYMBALTA) 30 MG Cap DR Particles; Take 1 Capsule by mouth every day for 7 days.  -     DULoxetine (CYMBALTA) 60 MG Cap DR Particles delayed-release capsule; Take 1 Capsule by mouth every day for 30 days.  -     Referral to Spine  Surgery    Spondylolisthesis of lumbar region  -     DULoxetine (CYMBALTA) 30 MG Cap DR Particles; Take 1 Capsule by mouth every day for 7 days.  -     DULoxetine (CYMBALTA) 60 MG Cap DR Particles delayed-release capsule; Take 1 Capsule by mouth every day for 30 days.  -     Referral to Spine Surgery    Low back pain with radiation  -     Referral to Spine Surgery    Lumbar spondylosis  -     Referral to Spine Surgery       1.  Reviewed MRI lumbar spine and his ongoing symptoms.  Discussed that he has had improvement after the right L4-5 interlaminar epidural steroid injection with some improvement in leg symptoms during the day and improved mobility.  Pain at night continues to be limiting and he has been limiting his activity.  2. Discussed trial of duloxetine as an option.  Discussed that he is no longer taking flexeril.  3. Reviewed limitations of epidural steroid injections, discussed medications and procedural treatment options.  4. Consult to Dr. Cardona to discuss possible surgical options.         Follow-up: Return in about 2 months (around 2/16/2022).     My total time spent caring for the patient on the day of the encounter was 40-54 minutes.     Thank you very much for asking me to participate in Curt Blair's care.  Please contact me with any questions or concerns.    Please note that this dictation was created using voice recognition software. I have made every reasonable attempt to correct obvious errors but there may be errors of grammar and content that I may have overlooked prior to finalization of this note.      Sav Rick MD  Physical Medicine and Rehabilitation  Interventional Spine and Sports Physiatry  [facility]    CC: Shila Mullen,

## 2021-12-27 ENCOUNTER — TELEPHONE (OUTPATIENT)
Dept: MEDICAL GROUP | Facility: PHYSICIAN GROUP | Age: 71
End: 2021-12-27

## 2021-12-27 NOTE — TELEPHONE ENCOUNTER
Pt called reporting  Prabhjot started him on Duloxetine 30mg X7 days, then 60mg daily. Pt stated he has taken the 30mg X7 days and has not slept well since taking the medication. Questions whether he can: 1. Stop the med (Prabhjot is out of the office) 2. Start Xanax again to help with sleep.

## 2021-12-28 NOTE — TELEPHONE ENCOUNTER
Is the only side effect the worsening insomnia or has he noticed any other issues? Normally takes about 4 weeks to build up the dose for this medicine. He could take xanax at night and see if he can give it a little longer to let the duloxetine build up unless he is having other side effects. Also, how much xanax does he have left? Normally makes a 3 month supply last ~1 year.

## 2021-12-28 NOTE — TELEPHONE ENCOUNTER
Called pt. He states he was told to take Duloxetine at HS by MD Jinny. Pt has finished the first 7 days of 30mg med and has started the 60mg at [facility]. Last night pt was unable to fall asleep until 3am, slept until 5am.   Pt is aware it takes approx 4 weeks for Duloxetine to maximize effects. Pt will try taking Duloxetine earlier in the day, then take 0.25mg Xanax at bedtime to assist with sleep.   Will call in 48 hrs to check-in on how pt is doing- pt is aware he should call the office though if other sx arise or he has no improvement with earlier Duloxetine and later Xanax combo.

## 2021-12-30 ENCOUNTER — PATIENT MESSAGE (OUTPATIENT)
Dept: MEDICAL GROUP | Facility: PHYSICIAN GROUP | Age: 71
End: 2021-12-30

## 2022-01-08 DIAGNOSIS — M43.16 SPONDYLOLISTHESIS OF LUMBAR REGION: ICD-10-CM

## 2022-01-08 DIAGNOSIS — M48.061 SPINAL STENOSIS OF LUMBAR REGION, UNSPECIFIED WHETHER NEUROGENIC CLAUDICATION PRESENT: ICD-10-CM

## 2022-01-08 DIAGNOSIS — M48.061 NEURAL FORAMINAL STENOSIS OF LUMBAR SPINE: ICD-10-CM

## 2022-01-12 RX ORDER — DULOXETIN HYDROCHLORIDE 60 MG/1
CAPSULE, DELAYED RELEASE ORAL
Qty: 30 CAPSULE | Refills: 2 | Status: SHIPPED | OUTPATIENT
Start: 2022-01-12 | End: 2022-02-18 | Stop reason: SDUPTHER

## 2022-02-08 ENCOUNTER — OFFICE VISIT (OUTPATIENT)
Dept: MEDICAL GROUP | Facility: PHYSICIAN GROUP | Age: 72
End: 2022-02-08
Payer: MEDICARE

## 2022-02-08 ENCOUNTER — PATIENT MESSAGE (OUTPATIENT)
Dept: HEALTH INFORMATION MANAGEMENT | Facility: OTHER | Age: 72
End: 2022-02-08

## 2022-02-08 VITALS
BODY MASS INDEX: 34 KG/M2 | OXYGEN SATURATION: 97 % | DIASTOLIC BLOOD PRESSURE: 60 MMHG | TEMPERATURE: 97.8 F | RESPIRATION RATE: 16 BRPM | WEIGHT: 251 LBS | HEART RATE: 96 BPM | HEIGHT: 72 IN | SYSTOLIC BLOOD PRESSURE: 132 MMHG

## 2022-02-08 DIAGNOSIS — H92.01 RIGHT EAR PAIN: ICD-10-CM

## 2022-02-08 DIAGNOSIS — H61.23 BILATERAL IMPACTED CERUMEN: ICD-10-CM

## 2022-02-08 PROCEDURE — 99213 OFFICE O/P EST LOW 20 MIN: CPT | Mod: 25 | Performed by: FAMILY MEDICINE

## 2022-02-08 PROCEDURE — 69209 REMOVE IMPACTED EAR WAX UNI: CPT | Mod: 50 | Performed by: FAMILY MEDICINE

## 2022-02-08 ASSESSMENT — PATIENT HEALTH QUESTIONNAIRE - PHQ9: CLINICAL INTERPRETATION OF PHQ2 SCORE: 0

## 2022-02-08 ASSESSMENT — FIBROSIS 4 INDEX: FIB4 SCORE: 1.24

## 2022-02-08 NOTE — PROCEDURES
Ear Wax Removal    Date/Time: 2/8/2022 9:05 AM  Performed by: FRANSISCO Ding  Authorized by: FRANSISCO Ding     Anesthesia:  Local Anesthetic: none  Location details: right ear and left ear  Patient tolerance: patient tolerated the procedure well with no immediate complications  Comments: Procedure: Cerumen Removal  Risks and benefits of procedure discussed with patient.  Cerumen removed with  lavage   Patient tolerated the procedure well  Pt educated about proper care of ear canal. Q-tip cleaning discouraged, use of debrox and warm water lavage discussed.  Post lavage curette performed by provider, Post procedure exam with clear canal and normal TM.      Procedure type: irrigation   Sedation:  Patient sedated: no

## 2022-02-08 NOTE — PROGRESS NOTES
"Subjective:     CC:   Chief Complaint   Patient presents with   • Otalgia     R/ Feels full/ sharp px, 02/07         HPI:   Curt presents today with right sided ear pain that started yesterday. States he has had an ear infection in the past and wanted to get it looked at sooner rather than later as he is going out of town to [place] next week. States that it is a dull ache and occasionally it \"cramps\" and then will resolve on its own. States he has occassional ear wax build up.States he had a bad cold before christmas, and his sinuses have been backed up a lot lately and has been coughing up a lot of mucous in the morning. He has been taking allergy tablets daily which only moderately helps.  Denies decreased hearing, fever, chills, pain radiating into his mastoid, or drainage coming from his ears.    No problem-specific Assessment & Plan notes found for this encounter.      Current Outpatient Medications Ordered in Epic   Medication Sig Dispense Refill   • DULoxetine (CYMBALTA) 60 MG Cap DR Particles delayed-release capsule TAKE 1 CAPSULE BY MOUTH EVERY DAY 30 Capsule 2   • amlodipine-benazepril (LOTREL) 5-10 MG per capsule Take 1 Capsule by mouth every day. 100 Capsule 2   • diclofenac DR (VOLTAREN) 75 MG Tablet Delayed Response TAKE 1 TABLET BY MOUTH TWICE A  tablet 3   • atorvastatin (LIPITOR) 10 MG Tab TAKE 1 TABLET BY MOUTH EVERY  tablet 3   • nitroglycerin (NITROSTAT) 0.4 MG SL Tab PLACE 1 TABLET UNDER THE TONGUE EVERY 5 MINUTES UP TO 3 DOSES AS NEEDED FOR CHEST PAIN). 25 tablet 0   • Multiple Vitamins-Minerals (MULTIVITAMIN PO) Take 1 Tab by mouth every morning.     • Coenzyme Q10 (CO Q 10 PO) Take 1 Tab by mouth every morning.     • MEGARED OMEGA-3 KRILL OIL PO Take 1 Tab by mouth every morning.     • CALCIUM PO Take 1 Tab by mouth every day.     • aspirin 81 MG tablet Take 81 mg by mouth every day.         No current Epic-ordered facility-administered medications on file.       Health " Maintenance: Completed      Objective:     Exam:  /60 (BP Location: Left arm, Patient Position: Sitting, BP Cuff Size: Adult)   Pulse 96   Temp 36.6 °C (97.8 °F) (Temporal)   Resp 16   Ht 1.829 m (6')   Wt 114 kg (251 lb)   SpO2 97%   BMI 34.04 kg/m²  Body mass index is 34.04 kg/m².  Physical Exam  Vitals reviewed.   Constitutional:       General: He is not in acute distress.     Appearance: Normal appearance. He is not ill-appearing.   HENT:      Right Ear: There is impacted cerumen.      Left Ear: There is impacted cerumen.      Ears:      Comments: Post ear lavage, no TM bulging or erythema in internal or external canal to right or left     Nose: Congestion and rhinorrhea present.      Mouth/Throat:      Mouth: Mucous membranes are moist.      Pharynx: Oropharynx is clear. No oropharyngeal exudate or posterior oropharyngeal erythema.   Eyes:      Conjunctiva/sclera: Conjunctivae normal.      Pupils: Pupils are equal, round, and reactive to light.   Pulmonary:      Effort: Pulmonary effort is normal.   Musculoskeletal:      Right lower leg: No edema.      Left lower leg: No edema.   Neurological:      Mental Status: He is alert and oriented to person, place, and time.   Psychiatric:         Mood and Affect: Mood normal.         Behavior: Behavior normal.           A chaperone was offered to the patient during today's exam. Patient declined chaperone.        Assessment & Plan:     71 y.o. male with the following -     1. Bilateral impacted cerumen  Chronic, states he typically gets his ears lavaged once a year due to impacted cerumen bilaterally.  On exam today he has bilateral impacted cerumen, states his last lavage was only 3 to 4 months ago.  Ears lavaged in clinic today by DICK Fernández, patient tolerated well.    2. Right ear pain  New acute problem, patient concerned for right-sided ear infection.  On exam after right ear lavage was completed there was no TM bulging, erythema of TM or internal or  external ear canal.  No tenderness on palpation of pinna and tragus.  Patient did say his ear felt better post ear lavage.  Discussed signs and symptoms of an acute ear infection, discussed if his symptoms worsen or he has symptoms of an infection to let us know and I will send an antibiotic to his pharmacy as he is going to be out of town this upcoming week in [place].  Discussed he can continue to take his over-the-counter antihistamine, however he should try Flonase 1 to 2 sprays in each nostril daily to help decrease inflammation in sinuses and postnasal drip which may also help relieve his intermittent right ear pain as he is likely getting congestion behind his tympanic membrane at times.    I spent a total of 23 minutes with record review, exam, communication with the patient, communication with other providers, and documentation of this encounter.      Return if symptoms worsen or fail to improve.    Please note that this dictation was created using voice recognition software. I have made every reasonable attempt to correct obvious errors, but I expect that there are errors of grammar and possibly content that I did not discover before finalizing the note.

## 2022-02-18 ENCOUNTER — OFFICE VISIT (OUTPATIENT)
Dept: PHYSICAL MEDICINE AND REHAB | Facility: MEDICAL CENTER | Age: 72
End: 2022-02-18
Payer: MEDICARE

## 2022-02-18 VITALS
HEIGHT: 72 IN | DIASTOLIC BLOOD PRESSURE: 86 MMHG | OXYGEN SATURATION: 93 % | WEIGHT: 254.41 LBS | BODY MASS INDEX: 34.46 KG/M2 | SYSTOLIC BLOOD PRESSURE: 128 MMHG | HEART RATE: 84 BPM | TEMPERATURE: 97.3 F

## 2022-02-18 DIAGNOSIS — M48.061 SPINAL STENOSIS OF LUMBAR REGION, UNSPECIFIED WHETHER NEUROGENIC CLAUDICATION PRESENT: ICD-10-CM

## 2022-02-18 DIAGNOSIS — M43.16 SPONDYLOLISTHESIS OF LUMBAR REGION: ICD-10-CM

## 2022-02-18 DIAGNOSIS — M17.11 PRIMARY OSTEOARTHRITIS OF RIGHT KNEE: ICD-10-CM

## 2022-02-18 DIAGNOSIS — M23.304 DEGENERATION DISEASE OF MEDIAL MENISCUS OF LEFT KNEE: ICD-10-CM

## 2022-02-18 DIAGNOSIS — M47.816 LUMBAR SPONDYLOSIS: ICD-10-CM

## 2022-02-18 DIAGNOSIS — M48.061 NEURAL FORAMINAL STENOSIS OF LUMBAR SPINE: ICD-10-CM

## 2022-02-18 PROCEDURE — 99214 OFFICE O/P EST MOD 30 MIN: CPT | Performed by: PHYSICAL MEDICINE & REHABILITATION

## 2022-02-18 RX ORDER — DULOXETIN HYDROCHLORIDE 60 MG/1
60 CAPSULE, DELAYED RELEASE ORAL
Qty: 90 CAPSULE | Refills: 0 | Status: SHIPPED | OUTPATIENT
Start: 2022-02-18 | End: 2022-05-10 | Stop reason: SDUPTHER

## 2022-02-18 ASSESSMENT — PATIENT HEALTH QUESTIONNAIRE - PHQ9: CLINICAL INTERPRETATION OF PHQ2 SCORE: 0

## 2022-02-18 ASSESSMENT — FIBROSIS 4 INDEX: FIB4 SCORE: 1.24

## 2022-02-18 ASSESSMENT — PAIN SCALES - GENERAL: PAINLEVEL: 3=SLIGHT PAIN

## 2022-02-18 NOTE — PROGRESS NOTES
Follow-up patient note, patient previously seen by Dr. Porter     Physiatry (physical medicine and  Rehabilitation), interventional spine and sports medicine    Date of Service: 02/18/2022    Chief complaint:   Chief Complaint   Patient presents with   • Follow-Up     Right knee       HISTORY    HPI: Curt Blair 71 y.o. male who presents today for follow-up evaluation.    Curt returns after seeing Dr. Cardona.  They discussed his case and possible repeat injection, continuing cymbalta. Surgery would be offered if conservative measures failed.    He feels like he has been making progress.  Pain is a 3/10 on the NRS.  More intermittent.  Pain is about 50% better.  Pain is aching in the back with pain into the legs.  This is less.  Changed this to 5PM as this made him feel some mild insomnia.  This improved.  Helping with pain after about 6 weeks.   Currently, taking duloxetine about 8:15PM.  Sleeping about 4 hours and then around 1-2:30AM goes to the bathroom.  He is able to get back to sleep.    Previously, did well with right L4-5 interlaminar epidural steroid injection on 12/2/2021.    No bowel or bladder changes.  He does have some nocturia.    Medical records review:    Reviewed records from Dr Clovis Alicea 07/2013 At that visit, he assessed that MRI of left knee was indicated.  On 07/30/2013, they reviewed the study and the patient had a left knee corticosteroid injection    Previous treatments:    Physical Therapy: No    Medications the patient is tried: NSAIDs    Previous interventions: none, recently     Previous surgeries to relieve the above pain:  none      ROS:   Eyes: cataract surgery, recently  CV: history of MI at 41  Red Flags ROS:   Fever, Chills, Sweats: Denies  Involuntary Weight Loss: Denies  Bladder Incontinence: Denies  Bowel Incontinence: Denies  Saddle Anesthesia: Denies    All other systems reviewed and negative.       PMHx:   Past Medical History:   Diagnosis Date   • Allergies  12/24/2019   • Arteriosclerotic vascular disease 1/25/2019   • Arthritis     Right Foot   • CAD (coronary artery disease)    • Elevated CPK 8/16/2020   • Generalized anxiety disorder 10/11/2017   • Hyperlipidemia    • Hypertension    • Inflamed sebaceous cyst 2/15/2018   • Intrinsic eczema 2/7/2020   • Prediabetes 7/19/2019   • S/P coronary artery stent placement     2 stents       PSHx:   Past Surgical History:   Procedure Laterality Date   • IA INJ LUMBAR/SACRAL,W/ IMAGING Right 12/2/2021    Procedure: RIGHT L4-5 interlaminar epidural steroid injection with sedation;  Surgeon: Sav Rick M.D.;  [facility];  Service: Pain Management   • IA INJ LUMBAR/SACRAL,W/ IMAGING Right 7/28/2021    Procedure: RIGHT L4-5 interlaminar epidural steroid injection with sedation;  Surgeon: Sav Rick M.D.;  [facility];  Service: Pain Management   • IA FLUOROSCOPIC GUIDANCE NEEDLE PLACEMENT Left 6/16/2021    Procedure: LEFT genicular nerve blocks, diagnostic;  Surgeon: Sav Rick M.D.;  Location: [facility];  Service: Pain Management   • IA INJ AA/STRD GNCLR NRV BRNCH W/IMG Left 6/16/2021    Procedure: GENICULAR NERVE BRANCHES INCLUDING IMAGING GUIDANCE WITH A REQUIRED MINIMUM THREE NERVE BRANCHES;  Surgeon: Sav Rick M.D.;  [facility];  Service: Pain Management   • APPENDECTOMY     • CHOLECYSTECTOMY     • ENDARTERECTOMY      corornary   • STENT PLACEMENT     • VASECTOMY         Family history   Family History   Problem Relation Age of Onset   • Lung Disease Mother         copd   • Hypertension Mother    • Heart Disease Father         heart attack and heart disease   • Cancer Brother         neck   • Heart Disease Paternal Grandfather         Heart attack   • Other Sister         non-malignant brain tumor   • Diabetes Neg Hx          Medications:   Current Outpatient Medications   Medication   • DULoxetine  (CYMBALTA) 60 MG Cap DR Particles delayed-release capsule   • amlodipine-benazepril (LOTREL) 5-10 MG per capsule   • diclofenac DR (VOLTAREN) 75 MG Tablet Delayed Response   • atorvastatin (LIPITOR) 10 MG Tab   • Multiple Vitamins-Minerals (MULTIVITAMIN PO)   • Coenzyme Q10 (CO Q 10 PO)   • MEGARED OMEGA-3 KRILL OIL PO   • CALCIUM PO   • aspirin 81 MG tablet   • nitroglycerin (NITROSTAT) 0.4 MG SL Tab     No current facility-administered medications for this visit.       Allergies:   Allergies   Allergen Reactions   • Atorvastatin Myalgia     Ok to take 10 mg  Elevated CPK  Only at 40 Mg does this happen.   • Lyrica [Pregabalin]    • Rosuvastatin      muscle cramps elevated CPK       Social Hx:   Social History     Socioeconomic History   • Marital status:      Spouse name: Not on file   • Number of children: Not on file   • Years of education: Not on file   • Highest education level: 12th grade   Occupational History   • Not on file   Tobacco Use   • Smoking status: Former Smoker     Packs/day: 2.00     Years: 25.00     Pack years: 50.00     Types: Cigarettes     Quit date: 1992     Years since quittin.1   • Smokeless tobacco: Never Used   • Tobacco comment: avoid all tobacco products   Vaping Use   • Vaping Use: Never used   Substance and Sexual Activity   • Alcohol use: Yes     Alcohol/week: 0.6 oz     Types: 1 Standard drinks or equivalent per week     Comment: 1-2 DRINKS WEEKLY    • Drug use: No   • Sexual activity: Yes     Partners: Female     Comment: , 3 children   Other Topics Concern   •  Service Yes   • Blood Transfusions No   • Caffeine Concern No   • Occupational Exposure No   • Hobby Hazards No   • Sleep Concern Yes   • Stress Concern Yes   • Weight Concern Yes   • Special Diet No     Comment: no fried   • Back Care Yes   • Exercise Yes   • Bike Helmet No     Comment: does not ride bike   • Seat Belt Not Asked   • Self-Exams Yes   Social History Narrative    He was born  in [place]. He worked in the [employer] industry, he retired at age 65. He  Luann in 2016 after both of their spouses passed away. He has children in [place] and several grandchildren and great grandchildren. He is an avid traveler.     Social Determinants of Health     Financial Resource Strain: Not on file   Food Insecurity: Not on file   Transportation Needs: Not on file   Physical Activity: Not on file   Stress: Not on file   Social Connections: Not on file   Intimate Partner Violence: Not on file   Housing Stability: Not on file         EXAMINATION     Physical Exam:   Vitals: /86 (BP Location: Right arm, Patient Position: Sitting, BP Cuff Size: Adult long)   Pulse 84   Temp 36.3 °C (97.3 °F) (Temporal)   Ht 1.829 m (6')   Wt 115 kg (254 lb 6.6 oz)   SpO2 93%     Constitutional:   Body Habitus: Body mass index is 34.5 kg/m².  Cooperation: Fully cooperates with exam  Appearance: Well-groomed, well-nourished, not disheveled, in no acute distress    Eyes: No scleral icterus, no proptosis     ENT -no obvious auditory deficits, wearing a face mask    Skin -no rashes or lesions noted, no warmth or erythema was noted at the injection site    Respiratory-  breathing comfortable on room air, no audible wheezing    Cardiovascular- No lower extremity edema is noted.     Psychiatric- alert and oriented ×3. Normal affect.     Gait - normal gait, no use of ambulatory device, minimally antalgic.     Spine  No tenderness over the PSIS or lumbar paraspinals bilaterally    No focal motor or sensory deficits in the lower extremities bilaterally    Seated SLR is negative bilaterally    MEDICAL DECISION MAKING    Medical records review: see under HPI section.     DATA    Labs:   Lab Results   Component Value Date/Time    SODIUM 139 07/26/2021 10:55 AM    POTASSIUM 5.0 07/26/2021 10:55 AM    CHLORIDE 103 07/26/2021 10:55 AM    CO2 25 07/26/2021 10:55 AM    ANION 11.0 07/26/2021 10:55 AM    GLUCOSE 90 07/26/2021  10:55 AM    BUN 18 07/26/2021 10:55 AM    CREATININE 0.90 07/26/2021 10:55 AM    CREATININE 0.93 02/20/2013 07:20 AM    CALCIUM 9.8 07/26/2021 10:55 AM    ASTSGOT 22 07/26/2021 10:55 AM    ALTSGPT 29 07/26/2021 10:55 AM    TBILIRUBIN 0.6 07/26/2021 10:55 AM    ALBUMIN 4.7 07/26/2021 10:55 AM    TOTPROTEIN 7.2 07/26/2021 10:55 AM    GLOBULIN 2.5 07/26/2021 10:55 AM    AGRATIO 1.9 07/26/2021 10:55 AM       Lab Results   Component Value Date/Time    PROTHROMBTM 14.8 (H) 12/05/2018 07:00 AM    INR 1.17 (H) 12/05/2018 07:00 AM        Lab Results   Component Value Date/Time    WBC 5.7 08/31/2021 08:25 AM    RBC 5.01 08/31/2021 08:25 AM    HEMOGLOBIN 15.6 08/31/2021 08:25 AM    HEMATOCRIT 47.8 08/31/2021 08:25 AM    MCV 95.4 08/31/2021 08:25 AM    MCH 31.1 08/31/2021 08:25 AM    MCHC 32.6 (L) 08/31/2021 08:25 AM    MPV 11.4 08/31/2021 08:25 AM    NEUTSPOLYS 66.20 08/31/2021 08:25 AM    LYMPHOCYTES 18.40 (L) 08/31/2021 08:25 AM    MONOCYTES 12.60 08/31/2021 08:25 AM    EOSINOPHILS 1.40 08/31/2021 08:25 AM    BASOPHILS 0.90 08/31/2021 08:25 AM        Lab Results   Component Value Date/Time    HBA1C 6.1 (H) 08/31/2021 08:25 AM        Imaging: I personally reviewed following images, these are my reads  MRI lumbar spine 07/26/2021  At L1-2, no central or foraminal stenosis  At L2-3, disc bulge, facet arthropathy and mild foraminal stenosis bilaterally  At L3-4, moderate central canal stenosis, mod-severe foraminal stenosis bilaterally, facet arthropathy  At L4-5, grade I anterolisthesis, disc bulge, severe facet arthropathy, moderate foraminal stenosis bilaterally  At L5-S1, mod-severe facet arthropathy with right paracentral annular tear.  No central or foraminal stenosis      MRI lumbar spine 10/29/2020  At L5-S1, there is bilateral facet arthropathy without central canal narrowing.  Mild foraminal stenosis bilaterally   At L4-5, mild central canal stenosis second to disc bulge and severe facet arthropathy.  Moderate  foraminal stenosis bilaterally  At L3-4, disc bulge with moderate facet arthropathy and mild-moderate central canal stenosis.  Severe foraminal stenosis bilaterally  At L2-3, posterior disc bulge with facet arthropathy and mild central canal narrowing.  Mild foraminal stenosis bilaterally  At L1-2, mild facet arthropathy bilaterally, mild foraminal stenosis bilaterally.  Prominent epidural fat    Xray lumbar spine 08/07/2020  There is multilevel retrolisthesis at L1-2, L2-3, and L3-4  Grade I anterolisthesis at L4-5, stable on flexion and extension  Degenerative changes with multilevel facet arthropathy    Xray lumbar spine 10/04/2018  There is severe degenerative change in the lumbar spine; there is note of multilevel retrolisthesis at L1-2, L3-4 and L2-3  Mild movement of anterolisthesis at L4-5 with flexion    MRI left knee 07/10/2020  There is note of a small joint effusion.  Cyst posterior to the PCL.  No ligamentous or meniscal tears  Tricompartmental degenerative changes, greatest medial compartment    Xray hip right 10/04/2018  Moderate hip osteoarthritis noted bilaterally     MRI left knee 07/22/2013  There is note of moderate joint effusion.  The medial collateral ligament has mild edema.  There is note of osteophytes in the medial and lateral compartments.  Tendinopathy in the quadriceps tendon noted.    IMAGING radiology reads. I reviewed the following radiology reads   MRI lumbar spine 07/26/2021     IMPRESSION:     1.  New right paracentral L5/S1 annular tear.  2.  Otherwise no significant change in moderate spondylosis with up to moderate to severe L3/4 foraminal stenoses  3.  Greatest the central stenoses are mild to moderate at L3/4  4.  Facet arthropathy greater than degenerative disc disease  5.  Degenerative L4/5 anterolisthesis where there is facet arthropathy and a moderate right facet joint effusion as before    MRI lumbar spine 10/29/2020  IMPRESSION:  1.  Minimal anterolisthesis of L4-5.  2.   Multilevel degenerative disease and facet arthropathy with resultant central canal and foraminal narrowing as described.    Xray lumbar spine 08/07/2020  IMPRESSION:  1.  Multilevel degenerative disc disease and facet degeneration.  2.  Mild anterior subluxation at L4-5. This is stable with flexion and extension.  3.  Again seen multilevel degenerative retrolisthesis. This does not change significantly extension.    Xray lumbar spine 10/04/2018  IMPRESSION:     Mild retrolisthesis of L1-2, L2-3 and L3-4 that are worse with extension.  Mild anterolisthesis of L4-5 that is worse with flexion.  There is partial disc space fusion of the lower thoracic spine.    MRI left knee 07/10/2020  IMPRESSION:  1.  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  2.  Intact ligaments and menisci.  3.  Small joint effusion.  4.  [place] synovial or ganglion cyst immediately posterior to the posterior cruciate ligament.      MRI left knee 07/22/2013       Impression        1. Tendinopathy of the quadriceps tendon.     2. Medial collateral ligament sprain.     3. Osteoarthritis, most severe in the medial compartment.     4. Fraying of the inner rim of the bodies of the lateral and medial menisci.     5. Moderate joint effusion.      Xray hips 10/04/2018  Impression:  No acute osseous abnormality                         Results for orders placed during the hospital encounter of 10/04/18   DX-LUMBAR SPINE-4+ VIEWS    Impression Mild retrolisthesis of L1-2, L2-3 and L3-4 that are worse with extension.    Mild anterolisthesis of L4-5 that is worse with flexion.    There is partial disc space fusion of the lower thoracic spine.                                         Diagnosis   Visit Diagnoses     ICD-10-CM   1. Spinal stenosis of lumbar region, unspecified whether neurogenic claudication present  M48.061   2. Spondylolisthesis of lumbar region  M43.16   3. Neural foraminal  stenosis of lumbar spine  M48.061   4. Lumbar spondylosis  M47.816   5. 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 Primary osteoarthritis of right knee  M17.11         ASSESSMENT:  Curt Blair 71 y.o. male seen for above     Curt was seen today for follow-up.    Diagnoses and all orders for this visit:    Spinal stenosis of lumbar region, unspecified whether neurogenic claudication present  -     DULoxetine (CYMBALTA) 60 MG Cap DR Particles delayed-release capsule; Take 1 Capsule by mouth every day for 90 days.    Spondylolisthesis of lumbar region  -     DULoxetine (CYMBALTA) 60 MG Cap DR Particles delayed-release capsule; Take 1 Capsule by mouth every day for 90 days.    Neural foraminal stenosis of lumbar spine  -     DULoxetine (CYMBALTA) 60 MG Cap DR Particles delayed-release capsule; Take 1 Capsule by mouth every day for 90 days.    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  Discussed his visit with Dr. Cardona and I have reviewed his consultation..  2.  He has had significant improvement in symptoms during the day.  He is exploring a new bed to see if this helps with his low back and has tried one at a hotel that seems to improve the pain he does continue to have at night.  3.  Continue duloxetine 60 mg daily.  Refill given  4.  Discussed that symptoms are improved since right L4-5 interlaminar epidural steroid injection and addition of duloxetine.  No plans for repeat injection at this time, but we discussed repeating this if symptoms should worsen.  5.  Continue home program and activity as tolerated        Follow-up: Return in about 5 months (around 7/18/2022), or if symptoms worsen or fail to improve.       Thank you very much for asking me to participate in Curt Blair's care.  Please contact me with any questions or concerns.    Please note that  this dictation was created using voice recognition software. I have made every reasonable attempt to correct obvious errors but there may be errors of grammar and content that I may have overlooked prior to finalization of this note.      Sav Rick MD  Physical Medicine and Rehabilitation  Interventional Spine and Sports Physiatry  [facility]    CC: Shila Mullen, DO

## 2022-03-15 ENCOUNTER — OFFICE VISIT (OUTPATIENT)
Dept: PHYSICAL MEDICINE AND REHAB | Facility: MEDICAL CENTER | Age: 72
End: 2022-03-15
Payer: MEDICARE

## 2022-03-15 VITALS
WEIGHT: 253.97 LBS | DIASTOLIC BLOOD PRESSURE: 68 MMHG | SYSTOLIC BLOOD PRESSURE: 126 MMHG | HEART RATE: 87 BPM | HEIGHT: 72 IN | TEMPERATURE: 98.3 F | OXYGEN SATURATION: 92 % | BODY MASS INDEX: 34.4 KG/M2

## 2022-03-15 DIAGNOSIS — M43.16 SPONDYLOLISTHESIS OF LUMBAR REGION: ICD-10-CM

## 2022-03-15 DIAGNOSIS — M17.0 PRIMARY OSTEOARTHRITIS OF BOTH KNEES: ICD-10-CM

## 2022-03-15 DIAGNOSIS — M47.816 LUMBAR SPONDYLOSIS: ICD-10-CM

## 2022-03-15 DIAGNOSIS — M48.061 SPINAL STENOSIS OF LUMBAR REGION, UNSPECIFIED WHETHER NEUROGENIC CLAUDICATION PRESENT: ICD-10-CM

## 2022-03-15 DIAGNOSIS — M48.061 NEURAL FORAMINAL STENOSIS OF LUMBAR SPINE: ICD-10-CM

## 2022-03-15 PROCEDURE — 99214 OFFICE O/P EST MOD 30 MIN: CPT | Performed by: PHYSICAL MEDICINE & REHABILITATION

## 2022-03-15 ASSESSMENT — PATIENT HEALTH QUESTIONNAIRE - PHQ9: CLINICAL INTERPRETATION OF PHQ2 SCORE: 0

## 2022-03-15 ASSESSMENT — PAIN SCALES - GENERAL: PAINLEVEL: 4=SLIGHT-MODERATE PAIN

## 2022-03-15 ASSESSMENT — FIBROSIS 4 INDEX: FIB4 SCORE: 1.24

## 2022-03-15 NOTE — PROGRESS NOTES
Follow-up patient note, patient previously seen by Dr. Porter     Physiatry (physical medicine and  Rehabilitation), interventional spine and sports medicine    Date of Service: 03/15/2022    Chief complaint:   Chief Complaint   Patient presents with   • Follow-Up     Right knee pain       HISTORY    HPI: Curt Blair 71 y.o. male who presents today for follow-up evaluation.    Right knee with more swelling, no known injury.  He had a right knee injection in August 2021 with Dr. Smith.  He does not report any locking or giving way.    He also has been continuing duloxetine 60 mg daily.  For pain he does take diclofenac twice a day.  He does take Tylenol as needed at night.    He also reports that after about 2 or 3 hours he has been waking at night with pain in his low back and legs.  Urinating seems to reduce these cramps and they are not as severe as the symptoms he has had in the past.  3 hours later he has the same symptoms with resolution after urinating.  Then when he gets up around 4-4:30AM he has low back and leg pain walking around the house he has a bowel movement after about 30 minutes and then the pain decreases in his back and legs.  His preference is for conservative measures.    Previously, did well with right L4-5 interlaminar epidural steroid injection on 12/2/2021.    No bowel or bladder changes.  He does have some nocturia.    Medical records review:    Reviewed records from Dr Clovis Alicea 07/2013 At that visit, he assessed that MRI of left knee was indicated.  On 07/30/2013, they reviewed the study and the patient had a left knee corticosteroid injection    Previous treatments:    Physical Therapy: No    Medications the patient is tried: NSAIDs    Previous interventions: none, recently     Previous surgeries to relieve the above pain:  none      ROS:   Eyes: cataract surgery, recently  CV: history of MI at 41  Red Flags ROS:   Fever, Chills, Sweats: Denies  Involuntary Weight Loss:  Denies  Bladder Incontinence: Denies  Bowel Incontinence: Denies  Saddle Anesthesia: Denies    All other systems reviewed and negative.       PMHx:   Past Medical History:   Diagnosis Date   • Allergies 12/24/2019   • Arteriosclerotic vascular disease 1/25/2019   • Arthritis     Right Foot   • CAD (coronary artery disease)    • Elevated CPK 8/16/2020   • Generalized anxiety disorder 10/11/2017   • Hyperlipidemia    • Hypertension    • Inflamed sebaceous cyst 2/15/2018   • Intrinsic eczema 2/7/2020   • Prediabetes 7/19/2019   • S/P coronary artery stent placement     2 stents       PSHx:   Past Surgical History:   Procedure Laterality Date   • IL INJ LUMBAR/SACRAL,W/ IMAGING Right 12/2/2021    Procedure: RIGHT L4-5 interlaminar epidural steroid injection with sedation;  Surgeon: Sav Rick M.D.;  [facility];  Service: Pain Management   • IL INJ LUMBAR/SACRAL,W/ IMAGING Right 7/28/2021    Procedure: RIGHT L4-5 interlaminar epidural steroid injection with sedation;  Surgeon: Sav Rick M.D.;  [facility];  Service: Pain Management   • IL FLUOROSCOPIC GUIDANCE NEEDLE PLACEMENT Left 6/16/2021    Procedure: LEFT genicular nerve blocks, diagnostic;  Surgeon: Sav Rick M.D.;  Location: [facility];  Service: Pain Management   • IL INJ AA/STRD GNCLR NRV BRNCH W/IMG Left 6/16/2021    Procedure: GENICULAR NERVE BRANCHES INCLUDING IMAGING GUIDANCE WITH A REQUIRED MINIMUM THREE NERVE BRANCHES;  Surgeon: Sav Rick M.D.;  [facility];  Service: Pain Management   • APPENDECTOMY     • CHOLECYSTECTOMY     • ENDARTERECTOMY      corornary   • STENT PLACEMENT     • VASECTOMY         Family history   Family History   Problem Relation Age of Onset   • Lung Disease Mother         copd   • Hypertension Mother    • Heart Disease Father         heart attack and heart disease   • Cancer Brother         neck   • Heart  Disease Paternal Grandfather         Heart attack   • Other Sister         non-malignant brain tumor   • Diabetes Neg Hx          Medications:   Current Outpatient Medications   Medication   • DULoxetine (CYMBALTA) 60 MG Cap DR Particles delayed-release capsule   • amlodipine-benazepril (LOTREL) 5-10 MG per capsule   • diclofenac DR (VOLTAREN) 75 MG Tablet Delayed Response   • atorvastatin (LIPITOR) 10 MG Tab   • nitroglycerin (NITROSTAT) 0.4 MG SL Tab   • Multiple Vitamins-Minerals (MULTIVITAMIN PO)   • Coenzyme Q10 (CO Q 10 PO)   • MEGARED OMEGA-3 KRILL OIL PO   • CALCIUM PO   • aspirin 81 MG tablet     No current facility-administered medications for this visit.       Allergies:   Allergies   Allergen Reactions   • Atorvastatin Myalgia     Ok to take 10 mg  Elevated CPK  Only at 40 Mg does this happen.   • Lyrica [Pregabalin]    • Rosuvastatin      muscle cramps elevated CPK       Social Hx:   Social History     Socioeconomic History   • Marital status:      Spouse name: Not on file   • Number of children: Not on file   • Years of education: Not on file   • Highest education level: 12th grade   Occupational History   • Not on file   Tobacco Use   • Smoking status: Former Smoker     Packs/day: 2.00     Years: 25.00     Pack years: 50.00     Types: Cigarettes     Quit date: 1992     Years since quittin.2   • Smokeless tobacco: Never Used   • Tobacco comment: avoid all tobacco products   Vaping Use   • Vaping Use: Never used   Substance and Sexual Activity   • Alcohol use: Yes     Alcohol/week: 0.6 oz     Types: 1 Standard drinks or equivalent per week     Comment: 1-2 DRINKS WEEKLY    • Drug use: No   • Sexual activity: Yes     Partners: Female     Comment: , 3 children   Other Topics Concern   •  Service Yes   • Blood Transfusions No   • Caffeine Concern No   • Occupational Exposure No   • Hobby Hazards No   • Sleep Concern Yes   • Stress Concern Yes   • Weight Concern Yes   •  Special Diet No     Comment: no fried   • Back Care Yes   • Exercise Yes   • Bike Helmet No     Comment: does not ride bike   • Seat Belt Not Asked   • Self-Exams Yes   Social History Narrative    He was born in [place]. He worked in the [employer] industry, he retired at age 65. He  Luann in 2016 after both of their spouses passed away. He has children in [place] and several grandchildren and great grandchildren. He is an avid traveler.     Social Determinants of Health     Financial Resource Strain: Not on file   Food Insecurity: Not on file   Transportation Needs: Not on file   Physical Activity: Not on file   Stress: Not on file   Social Connections: Not on file   Intimate Partner Violence: Not on file   Housing Stability: Not on file         EXAMINATION     Physical Exam:   Vitals: /68 (BP Location: Right arm, Patient Position: Sitting, BP Cuff Size: Adult long)   Pulse 87   Temp 36.8 °C (98.3 °F) (Temporal)   Ht 1.829 m (6')   Wt 115 kg (253 lb 15.5 oz)   SpO2 92%     Constitutional:   Body Habitus: Body mass index is 34.44 kg/m².  Cooperation: Fully cooperates with exam  Appearance: Well-groomed, well-nourished, not disheveled, in no acute distress    Eyes: No scleral icterus, no proptosis     ENT -no obvious auditory deficits, wearing a face mask    Skin -no rashes or lesions noted, no warmth or erythema was noted at the injection site    Respiratory-  breathing comfortable on room air, no audible wheezing    Cardiovascular- No lower extremity edema is noted.     Psychiatric- alert and oriented ×3. Normal affect.     Gait - normal gait, no use of ambulatory device, minimally antalgic.     Spine  No tenderness over the PSIS or lumbar paraspinals bilaterally    No focal motor or sensory deficits in the lower extremities bilaterally    SLR is negative bilaterally    Right knee with mild-mod effusion.  Medial and lateral joint line tenderness.  No medial or lateral instability.  Negative  Lachman's.    MEDICAL DECISION MAKING    Medical records review: see under HPI section.     DATA    Labs:   Lab Results   Component Value Date/Time    SODIUM 139 07/26/2021 10:55 AM    POTASSIUM 5.0 07/26/2021 10:55 AM    CHLORIDE 103 07/26/2021 10:55 AM    CO2 25 07/26/2021 10:55 AM    ANION 11.0 07/26/2021 10:55 AM    GLUCOSE 90 07/26/2021 10:55 AM    BUN 18 07/26/2021 10:55 AM    CREATININE 0.90 07/26/2021 10:55 AM    CREATININE 0.93 02/20/2013 07:20 AM    CALCIUM 9.8 07/26/2021 10:55 AM    ASTSGOT 22 07/26/2021 10:55 AM    ALTSGPT 29 07/26/2021 10:55 AM    TBILIRUBIN 0.6 07/26/2021 10:55 AM    ALBUMIN 4.7 07/26/2021 10:55 AM    TOTPROTEIN 7.2 07/26/2021 10:55 AM    GLOBULIN 2.5 07/26/2021 10:55 AM    AGRATIO 1.9 07/26/2021 10:55 AM       Lab Results   Component Value Date/Time    PROTHROMBTM 14.8 (H) 12/05/2018 07:00 AM    INR 1.17 (H) 12/05/2018 07:00 AM        Lab Results   Component Value Date/Time    WBC 5.7 08/31/2021 08:25 AM    RBC 5.01 08/31/2021 08:25 AM    HEMOGLOBIN 15.6 08/31/2021 08:25 AM    HEMATOCRIT 47.8 08/31/2021 08:25 AM    MCV 95.4 08/31/2021 08:25 AM    MCH 31.1 08/31/2021 08:25 AM    MCHC 32.6 (L) 08/31/2021 08:25 AM    MPV 11.4 08/31/2021 08:25 AM    NEUTSPOLYS 66.20 08/31/2021 08:25 AM    LYMPHOCYTES 18.40 (L) 08/31/2021 08:25 AM    MONOCYTES 12.60 08/31/2021 08:25 AM    EOSINOPHILS 1.40 08/31/2021 08:25 AM    BASOPHILS 0.90 08/31/2021 08:25 AM        Lab Results   Component Value Date/Time    HBA1C 6.1 (H) 08/31/2021 08:25 AM        Imaging: I personally reviewed following images, these are my reads  MRI lumbar spine 07/26/2021  At L1-2, no central or foraminal stenosis  At L2-3, disc bulge, facet arthropathy and mild foraminal stenosis bilaterally  At L3-4, moderate central canal stenosis, mod-severe foraminal stenosis bilaterally, facet arthropathy  At L4-5, grade I anterolisthesis, disc bulge, severe facet arthropathy, moderate foraminal stenosis bilaterally  At L5-S1, mod-severe  facet arthropathy with right paracentral annular tear.  No central or foraminal stenosis      MRI lumbar spine 10/29/2020  At L5-S1, there is bilateral facet arthropathy without central canal narrowing.  Mild foraminal stenosis bilaterally   At L4-5, mild central canal stenosis second to disc bulge and severe facet arthropathy.  Moderate foraminal stenosis bilaterally  At L3-4, disc bulge with moderate facet arthropathy and mild-moderate central canal stenosis.  Severe foraminal stenosis bilaterally  At L2-3, posterior disc bulge with facet arthropathy and mild central canal narrowing.  Mild foraminal stenosis bilaterally  At L1-2, mild facet arthropathy bilaterally, mild foraminal stenosis bilaterally.  Prominent epidural fat    Xray lumbar spine 08/07/2020  There is multilevel retrolisthesis at L1-2, L2-3, and L3-4  Grade I anterolisthesis at L4-5, stable on flexion and extension  Degenerative changes with multilevel facet arthropathy    Xray lumbar spine 10/04/2018  There is severe degenerative change in the lumbar spine; there is note of multilevel retrolisthesis at L1-2, L3-4 and L2-3  Mild movement of anterolisthesis at L4-5 with flexion    MRI left knee 07/10/2020  There is note of a small joint effusion.  Cyst posterior to the PCL.  No ligamentous or meniscal tears  Tricompartmental degenerative changes, greatest medial compartment    Xray hip right 10/04/2018  Moderate hip osteoarthritis noted bilaterally     MRI left knee 07/22/2013  There is note of moderate joint effusion.  The medial collateral ligament has mild edema.  There is note of osteophytes in the medial and lateral compartments.  Tendinopathy in the quadriceps tendon noted.    IMAGING radiology reads. I reviewed the following radiology reads   MRI lumbar spine 07/26/2021     IMPRESSION:     1.  New right paracentral L5/S1 annular tear.  2.  Otherwise no significant change in moderate spondylosis with up to moderate to severe L3/4 foraminal  stenoses  3.  Greatest the central stenoses are mild to moderate at L3/4  4.  Facet arthropathy greater than degenerative disc disease  5.  Degenerative L4/5 anterolisthesis where there is facet arthropathy and a moderate right facet joint effusion as before    MRI lumbar spine 10/29/2020  IMPRESSION:  1.  Minimal anterolisthesis of L4-5.  2.  Multilevel degenerative disease and facet arthropathy with resultant central canal and foraminal narrowing as described.    Xray lumbar spine 08/07/2020  IMPRESSION:  1.  Multilevel degenerative disc disease and facet degeneration.  2.  Mild anterior subluxation at L4-5. This is stable with flexion and extension.  3.  Again seen multilevel degenerative retrolisthesis. This does not change significantly extension.    Xray lumbar spine 10/04/2018  IMPRESSION:     Mild retrolisthesis of L1-2, L2-3 and L3-4 that are worse with extension.  Mild anterolisthesis of L4-5 that is worse with flexion.  There is partial disc space fusion of the lower thoracic spine.    MRI left knee 07/10/2020  IMPRESSION:  1.  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  2.  Intact ligaments and menisci.  3.  Small joint effusion.  4.  [place] synovial or ganglion cyst immediately posterior to the posterior cruciate ligament.      MRI left knee 07/22/2013       Impression        1. Tendinopathy of the quadriceps tendon.     2. Medial collateral ligament sprain.     3. Osteoarthritis, most severe in the medial compartment.     4. Fraying of the inner rim of the bodies of the lateral and medial menisci.     5. Moderate joint effusion.      Xray hips 10/04/2018  Impression:  No acute osseous abnormality                         Results for orders placed during the hospital encounter of 10/04/18   DX-LUMBAR SPINE-4+ VIEWS    Impression Mild retrolisthesis of L1-2, L2-3 and L3-4 that are worse with extension.    Mild anterolisthesis of L4-5 that  is worse with flexion.    There is partial disc space fusion of the lower thoracic spine.                                         Diagnosis   Visit Diagnoses     ICD-10-CM   1. Spinal stenosis of lumbar region, unspecified whether neurogenic claudication present  M48.061   2. Spondylolisthesis of lumbar region  M43.16   3. Neural foraminal stenosis of lumbar spine  M48.061   4. Lumbar spondylosis  M47.816   5. Primary osteoarthritis of both knees  M17.0         ASSESSMENT:  Curt Blair 71 y.o. male seen for above     Curt was seen today for follow-up.    Diagnoses and all orders for this visit:    Spinal stenosis of lumbar region, unspecified whether neurogenic claudication present    Spondylolisthesis of lumbar region    Neural foraminal stenosis of lumbar spine    Lumbar spondylosis    Primary osteoarthritis of both knees         1.  Discussed treatment options for his right knee.  He does not currently have any locking or instability.  We discussed the known arthritis in his right knee which may be contributing to this effusion as well as the possibility of a degenerative meniscus tear although Moiz's is negative bilaterally.  If he develops new symptoms of locking or giving way he will let me know and we will order MRI of the right knee.  He declines aspiration and consideration of injection because he wants to limit the number of steroid injections that he has each year, preferring to use these for his back pain.  Trial right knee sleeve  2.  Regarding his back pain he will continue duloxetine 60 mg.  Refilled should be on file at the pharmacy.  3.  He continues to prefer conservative care.  Continue home exercise and activity as tolerated.  4.  We will consider repeat right L4-5 interlaminar epidural steroid injection if symptoms should worsen.        Follow-up: Return in about 2 months (around 5/15/2022), or if symptoms worsen or fail to improve.       Thank you very much for asking me to  participate in Curt Blair's care.  Please contact me with any questions or concerns.    Please note that this dictation was created using voice recognition software. I have made every reasonable attempt to correct obvious errors but there may be errors of grammar and content that I may have overlooked prior to finalization of this note.      Sav Rick MD  Physical Medicine and Rehabilitation  Interventional Spine and Sports Physiatry  [facility]    CC: Shila Mullen, DO

## 2022-03-28 ENCOUNTER — TELEPHONE (OUTPATIENT)
Dept: MEDICAL GROUP | Facility: PHYSICIAN GROUP | Age: 72
End: 2022-03-28
Payer: MEDICARE

## 2022-03-28 NOTE — TELEPHONE ENCOUNTER
,ESTABLISHED PATIENT PRE-VISIT PLANNING     Patient was NOT contacted to complete PVP.     Note: Patient will not be contacted if there is no indication to call.     1.  Reviewed notes from the last few office visits within the medical group: Yes    2.  If any orders were placed at last visit or intended to be done for this visit (i.e. 6 mos follow-up), do we have Results/Consult Notes?         •  Labs - Labs were not ordered at last office visit.  Note: If patient appointment is for lab review and patient did not complete labs, check with provider if OK to reschedule patient until labs completed.       •  Imaging - Imaging was not ordered at last office visit.       •  Referrals - No referrals were ordered at last office visit.    3. Is this appointment scheduled as a Hospital Follow-Up? No    4.  Immunizations were updated in Epic using Reconcile Outside Information activity? Yes    5.  Patient is due for the following Health Maintenance Topics:   There are no preventive care reminders to display for this patient.    - Patient is up-to-date on all Health Maintenance topics. No records have been requested at this time.    6.  AHA (Pulse8) form printed for Provider? Yes

## 2022-03-29 ENCOUNTER — APPOINTMENT (OUTPATIENT)
Dept: MEDICAL GROUP | Facility: PHYSICIAN GROUP | Age: 72
End: 2022-03-29
Payer: MEDICARE

## 2022-04-05 ENCOUNTER — OFFICE VISIT (OUTPATIENT)
Dept: MEDICAL GROUP | Facility: PHYSICIAN GROUP | Age: 72
End: 2022-04-05
Payer: MEDICARE

## 2022-04-05 VITALS
HEIGHT: 72 IN | OXYGEN SATURATION: 97 % | SYSTOLIC BLOOD PRESSURE: 108 MMHG | HEART RATE: 75 BPM | DIASTOLIC BLOOD PRESSURE: 60 MMHG | TEMPERATURE: 97.3 F | RESPIRATION RATE: 12 BRPM | WEIGHT: 252 LBS | BODY MASS INDEX: 34.13 KG/M2

## 2022-04-05 DIAGNOSIS — I10 ESSENTIAL HYPERTENSION, BENIGN: ICD-10-CM

## 2022-04-05 DIAGNOSIS — G89.29 CHRONIC BILATERAL LOW BACK PAIN WITH RIGHT-SIDED SCIATICA: ICD-10-CM

## 2022-04-05 DIAGNOSIS — G89.29 CHRONIC PAIN OF LEFT KNEE: ICD-10-CM

## 2022-04-05 DIAGNOSIS — M25.562 CHRONIC PAIN OF LEFT KNEE: ICD-10-CM

## 2022-04-05 DIAGNOSIS — M54.41 CHRONIC BILATERAL LOW BACK PAIN WITH RIGHT-SIDED SCIATICA: ICD-10-CM

## 2022-04-05 DIAGNOSIS — E78.5 DYSLIPIDEMIA: ICD-10-CM

## 2022-04-05 DIAGNOSIS — Z12.5 ENCOUNTER FOR SCREENING FOR MALIGNANT NEOPLASM OF PROSTATE: ICD-10-CM

## 2022-04-05 DIAGNOSIS — R73.03 PREDIABETES: ICD-10-CM

## 2022-04-05 PROCEDURE — 99214 OFFICE O/P EST MOD 30 MIN: CPT | Performed by: INTERNAL MEDICINE

## 2022-04-05 RX ORDER — DICLOFENAC SODIUM 75 MG/1
75 TABLET, DELAYED RELEASE ORAL 2 TIMES DAILY
Qty: 200 TABLET | Refills: 3 | Status: SHIPPED | OUTPATIENT
Start: 2022-04-05 | End: 2022-10-25

## 2022-04-05 RX ORDER — ATORVASTATIN CALCIUM 10 MG/1
10 TABLET, FILM COATED ORAL
Qty: 100 TABLET | Refills: 3 | Status: SHIPPED | OUTPATIENT
Start: 2022-04-05 | End: 2023-04-20

## 2022-04-05 ASSESSMENT — FIBROSIS 4 INDEX: FIB4 SCORE: 1.24

## 2022-04-05 NOTE — PROGRESS NOTES
Subjective:   Chief Complaint/History of Present Illness:  Curt Blair is a 71 y.o. male established patient who presents today to discuss medical problems as listed below. Curt is unaccompanied for today's visit.    Problem   Chronic Bilateral Low Back Pain With Right-Sided Sciatica    Curt reports low back pain with radiation of pain into the right buttock. This is made worse by sitting, starts cramping after 30 minutes. This makes it difficult for him to sit on a flight. In the past this has stopped him from playing golf, which he loves. He is able to stand/walk without issue. He sleeps on his left side or back which has been helpful. He has completed PT, follows with Dr. Rick of physiatry, and met with Dr. Cardona for surgical opinion.     Current regimen: diclofenac 75 mg twice daily and duloxetine 60 mg daily     Chronic Pain of Left Knee    MRI left knee (7/2020):  1.  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  2.  Intact ligaments and menisci.  3.  Small joint effusion.  4.  [place] synovial or ganglion cyst immediately posterior to the posterior cruciate ligament.    S/p 3 injections into the left knee, most recently completed in February 2021. He tried conservative measures and initially had improved with increased support at night on his bed settings but then worsened again. Pain is only at night at this time, but wakes him up predictably between 2-3 AM.  He was initially trialed on gabapentin but he had a negative reaction and was transitioned to Lyrica and then finally duloxetine.     Prediabetes       Ref. Range 8/31/2021 08:25   Glycohemoglobin Latest Ref Range: 4.0 - 5.6 % 6.1 (H)   Estim. Avg Glu Latest Units: mg/dL 128     He has a history of prediabetes.  He reports it has been present for a long time and has never progressed.  No significant side effect such as blurred vision, polyuria, or polydipsia.  No prior use of  glucose lowering medications.     Dyslipidemia       Ref. Range 8/31/2021 08:25   Cholesterol,Tot Latest Ref Range: 100 - 199 mg/dL 162   Triglycerides Latest Ref Range: 0 - 149 mg/dL 94   HDL Latest Ref Range: >=40 mg/dL 70   LDL Latest Ref Range: <100 mg/dL 73     He had elevations in his CPK following increase in statin medicine, now improved/resolved.    Current regimen: atorvastatin 10 mg daily     Essential Hypertension, Benign      He reports longstanding history of hypertension.  It has been well controlled on same regiment for many years.  No signs or symptoms of orthostasis.  No angina equivalents.  He follows with cardiology on an annual basis.    Current regimen: amlodipine-benazepril 5-10 mg daily    Blood pressure in clinic ranges 108-128/60-86          Current Medications:  Current Outpatient Medications Ordered in Epic   Medication Sig Dispense Refill   • atorvastatin (LIPITOR) 10 MG Tab Take 1 Tablet by mouth every day. 100 Tablet 3   • diclofenac DR (VOLTAREN) 75 MG Tablet Delayed Response Take 1 Tablet by mouth 2 times a day. 200 Tablet 3   • DULoxetine (CYMBALTA) 60 MG Cap DR Particles delayed-release capsule Take 1 Capsule by mouth every day for 90 days. 90 Capsule 0   • amlodipine-benazepril (LOTREL) 5-10 MG per capsule Take 1 Capsule by mouth every day. 100 Capsule 2   • nitroglycerin (NITROSTAT) 0.4 MG SL Tab PLACE 1 TABLET UNDER THE TONGUE EVERY 5 MINUTES UP TO 3 DOSES AS NEEDED FOR CHEST PAIN). 25 tablet 0   • Multiple Vitamins-Minerals (MULTIVITAMIN PO) Take 1 Tab by mouth every morning.     • Coenzyme Q10 (CO Q 10 PO) Take 1 Tab by mouth every morning.     • MEGARED OMEGA-3 KRILL OIL PO Take 1 Tab by mouth every morning.     • CALCIUM PO Take 1 Tab by mouth every day.     • aspirin 81 MG tablet Take 81 mg by mouth every day.         No current Epic-ordered facility-administered medications on file.          Objective:   Physical Exam:    Vitals: /60 (BP Location: Right arm, Patient  Position: Sitting, BP Cuff Size: Adult)   Pulse 75   Temp 36.3 °C (97.3 °F) (Temporal)   Resp 12   Ht 1.829 m (6')   Wt 114 kg (252 lb)   SpO2 97%    BMI: Body mass index is 34.18 kg/m².  Physical Exam  Constitutional:       General: He is not in acute distress.     Appearance: Normal appearance. He is obese. He is not ill-appearing.   HENT:      Right Ear: There is no impacted cerumen.      Left Ear: There is no impacted cerumen.   Eyes:      General: No scleral icterus.     Conjunctiva/sclera: Conjunctivae normal.   Cardiovascular:      Rate and Rhythm: Normal rate and regular rhythm.      Pulses: Normal pulses.   Pulmonary:      Effort: Pulmonary effort is normal. No respiratory distress.      Breath sounds: Normal breath sounds. No wheezing or rhonchi.   Abdominal:      General: Bowel sounds are normal.      Palpations: Abdomen is soft.      Tenderness: There is no abdominal tenderness.      Comments: +umbilical hernia   Skin:     General: Skin is warm and dry.      Findings: No bruising or rash.   Psychiatric:         Mood and Affect: Mood normal.         Behavior: Behavior normal.         Thought Content: Thought content normal.         Judgment: Judgment normal.          Assessment and Plan:   Curt is a 71 y.o. male with the following:  Problem List Items Addressed This Visit     Essential hypertension, benign     Chronic and stable problem, blood pressure at goal, appropriate to continue on amlodipine-benazepril 5-10 mg daily. Update lab work before next visit.         Relevant Medications    atorvastatin (LIPITOR) 10 MG Tab    Other Relevant Orders    CBC WITH DIFFERENTIAL    Comp Metabolic Panel    Dyslipidemia     Chronic and ongoing problem, lipids at goal as of Aug 2021, continue atorvastatin 10 mg daily in the meantime and recheck lipids in Fall 2022.         Relevant Medications    atorvastatin (LIPITOR) 10 MG Tab    Other Relevant Orders    Lipid Profile    VITAMIN D,25 HYDROXY    TSH WITH  REFLEX TO FT4    Prediabetes     Chronic and ongoing problem, update A1c to ensure stability, no indication to start pharmacotherapy at this time.         Relevant Orders    HEMOGLOBIN A1C    VITAMIN B12    Chronic pain of left knee    Relevant Medications    diclofenac DR (VOLTAREN) 75 MG Tablet Delayed Response    Chronic bilateral low back pain with right-sided sciatica     Chronic and ongoing problem, continues to experience low back pain with leg cramps, often in the middle of the night or early morning when he is up to use the restroom. Did not tolerate gabapentin or lyrica. Currently doing well on diclofenac and cymbalta, continue at this time. Continue with injections per Dr. Rick of physiatry. Met with Dr. Cardona who discussed surgery but Curt declines at this time.         Relevant Medications    diclofenac DR (VOLTAREN) 75 MG Tablet Delayed Response      Other Visit Diagnoses     Encounter for screening for malignant neoplasm of prostate        Relevant Orders    PROSTATE SPECIFIC AG SCREENING             Annual Health Assessment Questions:    1.  Are you currently engaging in any exercise or physical activity? Yes  PT exercises at home   Walking   2.  How would you describe your mood or emotional well-being today? good    3.  Have you had any falls in the last year? No    4.  Have you noticed any problems with your balance or had difficulty walking? Yes  Knees and back are shot  But keeps walking     5.  In the last six months have you experienced any leakage of urine? No    6. DPA/Advanced Directive: Patient has Advanced Directive on file.        RTC: Return in about 6 months (around 10/5/2022).    I spent a total of 34 minutes with record review, exam, communication with the patient, communication with other providers, and documentation of this encounter.    PLEASE NOTE: This dictation was created using voice recognition software. I have made every reasonable attempt to correct obvious errors, but I  expect that there are errors of grammar and possibly content that I did not discover before finalizing the note.      Shila Mullen, DO  Geriatric and Internal Medicine  [facility]

## 2022-04-06 NOTE — ASSESSMENT & PLAN NOTE
Chronic and ongoing problem, update A1c to ensure stability, no indication to start pharmacotherapy at this time.

## 2022-04-06 NOTE — ASSESSMENT & PLAN NOTE
Chronic and ongoing problem, continues to experience low back pain with leg cramps, often in the middle of the night or early morning when he is up to use the restroom. Did not tolerate gabapentin or lyrica. Currently doing well on diclofenac and cymbalta, continue at this time. Continue with injections per Dr. Rick of physiatry. Met with Dr. Cardona who discussed surgery but Curt declines at this time.

## 2022-04-06 NOTE — ASSESSMENT & PLAN NOTE
Chronic and stable problem, blood pressure at goal, appropriate to continue on amlodipine-benazepril 5-10 mg daily. Update lab work before next visit.

## 2022-04-06 NOTE — ASSESSMENT & PLAN NOTE
Chronic and ongoing problem, lipids at goal as of Aug 2021, continue atorvastatin 10 mg daily in the meantime and recheck lipids in Fall 2022.

## 2022-04-08 ENCOUNTER — OFFICE VISIT (OUTPATIENT)
Dept: PHYSICAL MEDICINE AND REHAB | Facility: MEDICAL CENTER | Age: 72
End: 2022-04-08
Payer: MEDICARE

## 2022-04-08 ENCOUNTER — HOSPITAL ENCOUNTER (OUTPATIENT)
Dept: RADIOLOGY | Facility: MEDICAL CENTER | Age: 72
End: 2022-04-08
Attending: PHYSICAL MEDICINE & REHABILITATION
Payer: MEDICARE

## 2022-04-08 VITALS
HEIGHT: 72 IN | HEART RATE: 98 BPM | WEIGHT: 253.09 LBS | SYSTOLIC BLOOD PRESSURE: 124 MMHG | BODY MASS INDEX: 34.28 KG/M2 | TEMPERATURE: 97.3 F | DIASTOLIC BLOOD PRESSURE: 60 MMHG | OXYGEN SATURATION: 93 %

## 2022-04-08 DIAGNOSIS — M79.89 RIGHT LEG SWELLING: ICD-10-CM

## 2022-04-08 DIAGNOSIS — M17.11 PRIMARY OSTEOARTHRITIS OF RIGHT KNEE: ICD-10-CM

## 2022-04-08 DIAGNOSIS — M48.061 SPINAL STENOSIS OF LUMBAR REGION, UNSPECIFIED WHETHER NEUROGENIC CLAUDICATION PRESENT: ICD-10-CM

## 2022-04-08 PROCEDURE — 99214 OFFICE O/P EST MOD 30 MIN: CPT | Performed by: PHYSICAL MEDICINE & REHABILITATION

## 2022-04-08 PROCEDURE — 93971 EXTREMITY STUDY: CPT | Mod: RT

## 2022-04-08 ASSESSMENT — PAIN SCALES - GENERAL: PAINLEVEL: 8=MODERATE-SEVERE PAIN

## 2022-04-08 ASSESSMENT — PATIENT HEALTH QUESTIONNAIRE - PHQ9: CLINICAL INTERPRETATION OF PHQ2 SCORE: 0

## 2022-04-08 ASSESSMENT — FIBROSIS 4 INDEX: FIB4 SCORE: 1.24

## 2022-04-08 NOTE — PROGRESS NOTES
Follow-up patient note, patient previously seen by Dr. Porter     Physiatry (physical medicine and  Rehabilitation), interventional spine and sports medicine    Date of Service: 04/08/2022    Chief complaint:   Chief Complaint   Patient presents with   • Follow-Up     Knee and leg pain       HISTORY    HPI: Curt Blair 71 y.o. male who presents today for follow-up evaluation.    No significant changes in right knee swelling.  No locking.  Reports that the knee brace did not help.  Calf swelling and pain noted yesterday, his wife also noted this.      Constipation with increased back pain, relieved by suppository, but this relieved back pain.  Otherwise back pain has been stable.    Sleeping well at night.    He also has been continuing duloxetine 60 mg daily.  For pain he does take diclofenac twice a day.  He does take Tylenol as needed at night, usually once a night.    Previously, did well with right L4-5 interlaminar epidural steroid injection on 12/2/2021.  Dr. Smith did right knee injection in August 2021.      Medical records review:    Reviewed records from Dr Clovis Alicea 07/2013 At that visit, he assessed that MRI of left knee was indicated.  On 07/30/2013, they reviewed the study and the patient had a left knee corticosteroid injection    Previous treatments:    Physical Therapy: No    Medications the patient is tried: NSAIDs    Previous interventions: none, recently     Previous surgeries to relieve the above pain:  none      ROS:   Eyes: cataract surgery, recently  CV: history of MI at 41  Red Flags ROS:   Fever, Chills, Sweats: Denies  Involuntary Weight Loss: Denies  Bladder Incontinence: Denies  Bowel Incontinence: Denies  Saddle Anesthesia: Denies    All other systems reviewed and negative.       PMHx:   Past Medical History:   Diagnosis Date   • Allergies 12/24/2019   • Arteriosclerotic vascular disease 1/25/2019   • Arthritis     Right Foot   • CAD (coronary artery disease)    • Elevated  CPK 8/16/2020   • Generalized anxiety disorder 10/11/2017   • Hyperlipidemia    • Hypertension    • Inflamed sebaceous cyst 2/15/2018   • Intrinsic eczema 2/7/2020   • Prediabetes 7/19/2019   • S/P coronary artery stent placement     2 stents       PSHx:   Past Surgical History:   Procedure Laterality Date   • IL INJ LUMBAR/SACRAL,W/ IMAGING Right 12/2/2021    Procedure: RIGHT L4-5 interlaminar epidural steroid injection with sedation;  Surgeon: Sav Rick M.D.;  [facility];  Service: Pain Management   • IL INJ LUMBAR/SACRAL,W/ IMAGING Right 7/28/2021    Procedure: RIGHT L4-5 interlaminar epidural steroid injection with sedation;  Surgeon: Sav Rick M.D.;  Location: [facility];  Service: Pain Management   • IL FLUOROSCOPIC GUIDANCE NEEDLE PLACEMENT Left 6/16/2021    Procedure: LEFT genicular nerve blocks, diagnostic;  Surgeon: Sav Rick M.D.;  Location: [facility];  Service: Pain Management   • IL INJ AA/STRD GNCLR NRV BRNCH W/IMG Left 6/16/2021    Procedure: GENICULAR NERVE BRANCHES INCLUDING IMAGING GUIDANCE WITH A REQUIRED MINIMUM THREE NERVE BRANCHES;  Surgeon: Sav Rick M.D.;  Location[facility];  Service: Pain Management   • APPENDECTOMY     • CHOLECYSTECTOMY     • ENDARTERECTOMY      corornary   • STENT PLACEMENT     • VASECTOMY         Family history   Family History   Problem Relation Age of Onset   • Lung Disease Mother         copd   • Hypertension Mother    • Heart Disease Father         heart attack and heart disease   • Cancer Brother         neck   • Heart Disease Paternal Grandfather         Heart attack   • Other Sister         non-malignant brain tumor   • Diabetes Neg Hx          Medications:   Current Outpatient Medications   Medication   • atorvastatin (LIPITOR) 10 MG Tab   • diclofenac DR (VOLTAREN) 75 MG Tablet Delayed Response   • DULoxetine (CYMBALTA) 60 MG Cap DR Particles  delayed-release capsule   • amlodipine-benazepril (LOTREL) 5-10 MG per capsule   • nitroglycerin (NITROSTAT) 0.4 MG SL Tab   • Multiple Vitamins-Minerals (MULTIVITAMIN PO)   • Coenzyme Q10 (CO Q 10 PO)   • MEGARED OMEGA-3 KRILL OIL PO   • CALCIUM PO   • aspirin 81 MG tablet     No current facility-administered medications for this visit.       Allergies:   Allergies   Allergen Reactions   • Atorvastatin Myalgia     Ok to take 10 mg  Elevated CPK  Only at 40 Mg does this happen.   • Lyrica [Pregabalin]    • Rosuvastatin      muscle cramps elevated CPK       Social Hx:   Social History     Socioeconomic History   • Marital status:      Spouse name: Not on file   • Number of children: Not on file   • Years of education: Not on file   • Highest education level: 12th grade   Occupational History   • Not on file   Tobacco Use   • Smoking status: Former Smoker     Packs/day: 2.00     Years: 25.00     Pack years: 50.00     Types: Cigarettes     Quit date: 1992     Years since quittin.2   • Smokeless tobacco: Never Used   • Tobacco comment: avoid all tobacco products   Vaping Use   • Vaping Use: Never used   Substance and Sexual Activity   • Alcohol use: Yes     Alcohol/week: 0.6 oz     Types: 1 Standard drinks or equivalent per week     Comment: 1-2 DRINKS WEEKLY    • Drug use: No   • Sexual activity: Yes     Partners: Female     Comment: , 3 children   Other Topics Concern   •  Service Yes   • Blood Transfusions No   • Caffeine Concern No   • Occupational Exposure No   • Hobby Hazards No   • Sleep Concern Yes   • Stress Concern Yes   • Weight Concern Yes   • Special Diet No     Comment: no fried   • Back Care Yes   • Exercise Yes   • Bike Helmet No     Comment: does not ride bike   • Seat Belt Not Asked   • Self-Exams Yes   Social History Narrative    He was born in [place]. He worked in the [id number] industry, he retired at age 65. He  Luann in 2016 after both of their spouses  passed away. He has children in [place] and several grandchildren and great grandchildren. He is an avid traveler.     Social Determinants of Health     Financial Resource Strain: Not on file   Food Insecurity: Not on file   Transportation Needs: Not on file   Physical Activity: Not on file   Stress: Not on file   Social Connections: Not on file   Intimate Partner Violence: Not on file   Housing Stability: Not on file         EXAMINATION     Physical Exam:   Vitals: /60 (BP Location: Right arm, Patient Position: Sitting, BP Cuff Size: Adult long)   Pulse 98   Temp 36.3 °C (97.3 °F) (Temporal)   Ht 1.829 m (6')   Wt 115 kg (253 lb 1.4 oz)   SpO2 93%     Constitutional:   Body Habitus: Body mass index is 34.32 kg/m².  Cooperation: Fully cooperates with exam  Appearance: Well-groomed, well-nourished, not disheveled, in no acute distress    Eyes: No scleral icterus, no proptosis     ENT -no obvious auditory deficits, wearing a face mask    Skin -no rashes or lesions noted    Respiratory-  breathing comfortable on room air, no audible wheezing    Cardiovascular- No pitting edema lower extremity edema is noted.     Psychiatric- alert and oriented ×3. Normal affect.     Gait - normal gait, no use of ambulatory device, minimally antalgic.     Spine  No focal motor or sensory deficits in the lower extremities bilaterally    S eated SLR is negative bilaterally    Right knee with mild-mod effusion.  Medial and lateral joint line tenderness.  No medial or lateral instability.  Negative Lachman's.    Calf tenderness on the right.      MEDICAL DECISION MAKING    Medical records review: see under HPI section.     DATA    Labs:   Lab Results   Component Value Date/Time    SODIUM 139 07/26/2021 10:55 AM    POTASSIUM 5.0 07/26/2021 10:55 AM    CHLORIDE 103 07/26/2021 10:55 AM    CO2 25 07/26/2021 10:55 AM    ANION 11.0 07/26/2021 10:55 AM    GLUCOSE 90 07/26/2021 10:55 AM    BUN 18 07/26/2021 10:55 AM    CREATININE 0.90  07/26/2021 10:55 AM    CREATININE 0.93 02/20/2013 07:20 AM    CALCIUM 9.8 07/26/2021 10:55 AM    ASTSGOT 22 07/26/2021 10:55 AM    ALTSGPT 29 07/26/2021 10:55 AM    TBILIRUBIN 0.6 07/26/2021 10:55 AM    ALBUMIN 4.7 07/26/2021 10:55 AM    TOTPROTEIN 7.2 07/26/2021 10:55 AM    GLOBULIN 2.5 07/26/2021 10:55 AM    AGRATIO 1.9 07/26/2021 10:55 AM       Lab Results   Component Value Date/Time    PROTHROMBTM 14.8 (H) 12/05/2018 07:00 AM    INR 1.17 (H) 12/05/2018 07:00 AM        Lab Results   Component Value Date/Time    WBC 5.7 08/31/2021 08:25 AM    RBC 5.01 08/31/2021 08:25 AM    HEMOGLOBIN 15.6 08/31/2021 08:25 AM    HEMATOCRIT 47.8 08/31/2021 08:25 AM    MCV 95.4 08/31/2021 08:25 AM    MCH 31.1 08/31/2021 08:25 AM    MCHC 32.6 (L) 08/31/2021 08:25 AM    MPV 11.4 08/31/2021 08:25 AM    NEUTSPOLYS 66.20 08/31/2021 08:25 AM    LYMPHOCYTES 18.40 (L) 08/31/2021 08:25 AM    MONOCYTES 12.60 08/31/2021 08:25 AM    EOSINOPHILS 1.40 08/31/2021 08:25 AM    BASOPHILS 0.90 08/31/2021 08:25 AM        Lab Results   Component Value Date/Time    HBA1C 6.1 (H) 08/31/2021 08:25 AM        Imaging: I personally reviewed following images, these are my reads  MRI lumbar spine 07/26/2021  At L1-2, no central or foraminal stenosis  At L2-3, disc bulge, facet arthropathy and mild foraminal stenosis bilaterally  At L3-4, moderate central canal stenosis, mod-severe foraminal stenosis bilaterally, facet arthropathy  At L4-5, grade I anterolisthesis, disc bulge, severe facet arthropathy, moderate foraminal stenosis bilaterally  At L5-S1, mod-severe facet arthropathy with right paracentral annular tear.  No central or foraminal stenosis      MRI lumbar spine 10/29/2020  At L5-S1, there is bilateral facet arthropathy without central canal narrowing.  Mild foraminal stenosis bilaterally   At L4-5, mild central canal stenosis second to disc bulge and severe facet arthropathy.  Moderate foraminal stenosis bilaterally  At L3-4, disc bulge with moderate  facet arthropathy and mild-moderate central canal stenosis.  Severe foraminal stenosis bilaterally  At L2-3, posterior disc bulge with facet arthropathy and mild central canal narrowing.  Mild foraminal stenosis bilaterally  At L1-2, mild facet arthropathy bilaterally, mild foraminal stenosis bilaterally.  Prominent epidural fat    Xray lumbar spine 08/07/2020  There is multilevel retrolisthesis at L1-2, L2-3, and L3-4  Grade I anterolisthesis at L4-5, stable on flexion and extension  Degenerative changes with multilevel facet arthropathy    Xray lumbar spine 10/04/2018  There is severe degenerative change in the lumbar spine; there is note of multilevel retrolisthesis at L1-2, L3-4 and L2-3  Mild movement of anterolisthesis at L4-5 with flexion    MRI left knee 07/10/2020  There is note of a small joint effusion.  Cyst posterior to the PCL.  No ligamentous or meniscal tears  Tricompartmental degenerative changes, greatest medial compartment    Xray hip right 10/04/2018  Moderate hip osteoarthritis noted bilaterally     MRI left knee 07/22/2013  There is note of moderate joint effusion.  The medial collateral ligament has mild edema.  There is note of osteophytes in the medial and lateral compartments.  Tendinopathy in the quadriceps tendon noted.    IMAGING radiology reads. I reviewed the following radiology reads   MRI lumbar spine 07/26/2021     IMPRESSION:     1.  New right paracentral L5/S1 annular tear.  2.  Otherwise no significant change in moderate spondylosis with up to moderate to severe L3/4 foraminal stenoses  3.  Greatest the central stenoses are mild to moderate at L3/4  4.  Facet arthropathy greater than degenerative disc disease  5.  Degenerative L4/5 anterolisthesis where there is facet arthropathy and a moderate right facet joint effusion as before    MRI lumbar spine 10/29/2020  IMPRESSION:  1.  Minimal anterolisthesis of L4-5.  2.  Multilevel degenerative disease and facet arthropathy with  resultant central canal and foraminal narrowing as described.    Xray lumbar spine 08/07/2020  IMPRESSION:  1.  Multilevel degenerative disc disease and facet degeneration.  2.  Mild anterior subluxation at L4-5. This is stable with flexion and extension.  3.  Again seen multilevel degenerative retrolisthesis. This does not change significantly extension.    Xray lumbar spine 10/04/2018  IMPRESSION:     Mild retrolisthesis of L1-2, L2-3 and L3-4 that are worse with extension.  Mild anterolisthesis of L4-5 that is worse with flexion.  There is partial disc space fusion of the lower thoracic spine.    MRI left knee 07/10/2020  IMPRESSION:  1.  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  2.  Intact ligaments and menisci.  3.  Small joint effusion.  4.  [place] synovial or ganglion cyst immediately posterior to the posterior cruciate ligament.      MRI left knee 07/22/2013       Impression        1. Tendinopathy of the quadriceps tendon.     2. Medial collateral ligament sprain.     3. Osteoarthritis, most severe in the medial compartment.     4. Fraying of the inner rim of the bodies of the lateral and medial menisci.     5. Moderate joint effusion.      Xray hips 10/04/2018  Impression:  No acute osseous abnormality                         Results for orders placed during the hospital encounter of 10/04/18   DX-LUMBAR SPINE-4+ VIEWS    Impression Mild retrolisthesis of L1-2, L2-3 and L3-4 that are worse with extension.    Mild anterolisthesis of L4-5 that is worse with flexion.    There is partial disc space fusion of the lower thoracic spine.                                         Diagnosis   Visit Diagnoses     ICD-10-CM   1. Spinal stenosis of lumbar region, unspecified whether neurogenic claudication present  M48.061   2. Right leg swelling  M79.89   3. Primary osteoarthritis of right knee  M17.11         ASSESSMENT:  Curt Blair 71 y.o.  male seen for above     Curt was seen today for follow-up.    Diagnoses and all orders for this visit:    Spinal stenosis of lumbar region, unspecified whether neurogenic claudication present    Right leg swelling  -     US-EXTREMITY VENOUS LOWER UNILAT RIGHT; Future    Primary osteoarthritis of right knee       1. Discussed ruling out DVT of the right lower extremity.  Swelling and calf tenderness.  2. Encouraged maintaining bowel regularity.  3. Discussed right knee.  For now, not significant change.  Hold on MRI for now.  No locking or giving out.  4. Continue activity as tolerated.  5. Continue duloxetine 60mg   6.  We will consider repeat right L4-5 interlaminar epidural steroid injection if symptoms should worsen.  Hold for now.        Follow-up: Return in about 4 weeks (around 5/6/2022).       Thank you very much for asking me to participate in Curt Blair's care.  Please contact me with any questions or concerns.    Please note that this dictation was created using voice recognition software. I have made every reasonable attempt to correct obvious errors but there may be errors of grammar and content that I may have overlooked prior to finalization of this note.      Sav Rick MD  Physical Medicine and Rehabilitation  Interventional Spine and Sports Physiatry  [facility]    CC: Shila Mullen, DO

## 2022-05-04 ENCOUNTER — HOSPITAL ENCOUNTER (OUTPATIENT)
Facility: MEDICAL CENTER | Age: 72
End: 2022-05-04
Attending: INTERNAL MEDICINE
Payer: MEDICARE

## 2022-05-04 ENCOUNTER — OFFICE VISIT (OUTPATIENT)
Dept: MEDICAL GROUP | Facility: PHYSICIAN GROUP | Age: 72
End: 2022-05-04
Payer: MEDICARE

## 2022-05-04 VITALS
HEART RATE: 88 BPM | SYSTOLIC BLOOD PRESSURE: 122 MMHG | TEMPERATURE: 97 F | OXYGEN SATURATION: 93 % | BODY MASS INDEX: 33.72 KG/M2 | WEIGHT: 249 LBS | DIASTOLIC BLOOD PRESSURE: 64 MMHG | HEIGHT: 72 IN | RESPIRATION RATE: 12 BRPM

## 2022-05-04 DIAGNOSIS — R19.7 DIARRHEA OF PRESUMED INFECTIOUS ORIGIN: ICD-10-CM

## 2022-05-04 DIAGNOSIS — K57.32 SIGMOID DIVERTICULITIS: ICD-10-CM

## 2022-05-04 LAB
C DIFF DNA SPEC QL NAA+PROBE: NEGATIVE
C DIFF TOX GENS STL QL NAA+PROBE: NEGATIVE

## 2022-05-04 PROCEDURE — 87493 C DIFF AMPLIFIED PROBE: CPT

## 2022-05-04 PROCEDURE — 99214 OFFICE O/P EST MOD 30 MIN: CPT | Performed by: INTERNAL MEDICINE

## 2022-05-04 RX ORDER — AMOXICILLIN 500 MG/1
CAPSULE ORAL
COMMUNITY
Start: 2022-04-06 | End: 2022-05-04

## 2022-05-04 RX ORDER — AMOXICILLIN AND CLAVULANATE POTASSIUM 875; 125 MG/1; MG/1
1 TABLET, FILM COATED ORAL 2 TIMES DAILY
Qty: 14 TABLET | Refills: 0 | Status: SHIPPED | OUTPATIENT
Start: 2022-05-04 | End: 2022-05-23

## 2022-05-04 ASSESSMENT — FIBROSIS 4 INDEX: FIB4 SCORE: 1.24

## 2022-05-04 NOTE — PROGRESS NOTES
Subjective:   Chief Complaint/History of Present Illness:  Curt Blair is a 71 y.o. male established patient who presents today to discuss medical problems as listed below. Curt is unaccompanied for today's visit.    Problem   Sigmoid Diverticulitis    He has known history of moderate sigmoid diverticulosis with history of diverticulitis in 2019. Please see under diarrhea for full details.     Diarrhea of Presumed Infectious Origin    He reports change in bowels starting on Friday night, 4/29/2022. He went out to dinner for his daughter's birthday to a [employer] restaurant where he had cooked shrimp. He also had his 4th Covid shot on 4/25/2022.     Normally he has 2 BM's every morning and then 1 BM in the evening or overnight. He is s/p open cholecystectomy around 1997. No issues with dumping syndrome.     Starting Friday into Saturday night he starting having frequent episodes of watery diarrhea. He started peptobismol on Saturday into Sunday which slowed down the diarrhea but did no resolve it. He then developed burning pain and started famotidine which was helpful. No rectal bleeding. No fevers, chills, nausea/vomiting, abdominal cramping. He notes rectal urgency and had an episode of incontinence which is abnormal for him. He has not tried loperamide as that usually leads to constipation for several days. He notes prior episode of diverticulitis in 2019. He had exposure to amoxicillin from dental procedures around December and again in April. No prior episodes of Cdiff.          Current Medications:  Current Outpatient Medications Ordered in Epic   Medication Sig Dispense Refill   • amoxicillin-clavulanate (AUGMENTIN) 875-125 MG Tab Take 1 Tablet by mouth 2 times a day. 14 Tablet 0   • atorvastatin (LIPITOR) 10 MG Tab Take 1 Tablet by mouth every day. 100 Tablet 3   • diclofenac DR (VOLTAREN) 75 MG Tablet Delayed Response Take 1 Tablet by mouth 2 times a day. 200 Tablet 3   • DULoxetine (CYMBALTA) 60 MG Cap  DR Particles delayed-release capsule Take 1 Capsule by mouth every day for 90 days. 90 Capsule 0   • amlodipine-benazepril (LOTREL) 5-10 MG per capsule Take 1 Capsule by mouth every day. 100 Capsule 2   • nitroglycerin (NITROSTAT) 0.4 MG SL Tab PLACE 1 TABLET UNDER THE TONGUE EVERY 5 MINUTES UP TO 3 DOSES AS NEEDED FOR CHEST PAIN). 25 tablet 0   • Multiple Vitamins-Minerals (MULTIVITAMIN PO) Take 1 Tab by mouth every morning.     • Coenzyme Q10 (CO Q 10 PO) Take 1 Tab by mouth every morning.     • MEGARED OMEGA-3 KRILL OIL PO Take 1 Tab by mouth every morning.     • CALCIUM PO Take 1 Tab by mouth every day.     • aspirin 81 MG tablet Take 81 mg by mouth every day.         No current Epic-ordered facility-administered medications on file.          Objective:   Physical Exam:    Vitals: /64 (BP Location: Right arm, Patient Position: Sitting, BP Cuff Size: Adult)   Pulse 88   Temp 36.1 °C (97 °F) (Temporal)   Resp 12   Ht 1.829 m (6')   Wt 113 kg (249 lb)   SpO2 93%    BMI: Body mass index is 33.77 kg/m².  Physical Exam  Constitutional:       General: He is not in acute distress.     Appearance: Normal appearance. He is not ill-appearing.   HENT:      Ears:      Comments: Mild cerumen bilaterally, not full obstruction.  Eyes:      General: No scleral icterus.     Conjunctiva/sclera: Conjunctivae normal.   Cardiovascular:      Rate and Rhythm: Normal rate and regular rhythm.      Pulses: Normal pulses.   Pulmonary:      Effort: Pulmonary effort is normal. No respiratory distress.      Breath sounds: Normal breath sounds. No wheezing or rhonchi.   Abdominal:      General: Bowel sounds are normal. There is no distension.      Palpations: Abdomen is soft.      Tenderness: There is abdominal tenderness. There is no guarding or rebound.      Comments: Left lower quadrant point tenderness on deep palpation.   Skin:     General: Skin is warm and dry.      Findings: No rash.   Psychiatric:         Mood and Affect:  Mood normal.         Behavior: Behavior normal.         Thought Content: Thought content normal.         Judgment: Judgment normal.          Assessment and Plan:   Curt is a 71 y.o. male with the following:  Problem List Items Addressed This Visit     Sigmoid diverticulitis     New and decompensated problem. Presumed sigmoid diverticulitis but will also rule out Cdiff due to recent antibiotic exposure. He will keep me updated over [employer].         Relevant Medications    amoxicillin-clavulanate (AUGMENTIN) 875-125 MG Tab    Diarrhea of presumed infectious origin     New and decompensated problem. Exam consistent with sigmoid diverticulitis. Will check for Cdiff due to recent antibiotic exposure. Start Augmentin 875-125 mg twice daily for 7 days. Have imodium on hand. Took Cipro/Flagyl with last episode in 2019.         Relevant Orders    C Diff by PCR rflx Toxin          RTC: Return if symptoms worsen or fail to improve.    I spent a total of 30 minutes with record review, exam, communication with the patient, communication with other providers, and documentation of this encounter.    PLEASE NOTE: This dictation was created using voice recognition software. I have made every reasonable attempt to correct obvious errors, but I expect that there are errors of grammar and possibly content that I did not discover before finalizing the note.      Shila Mullen, DO  Geriatric and Internal Medicine  [facility]

## 2022-05-04 NOTE — ASSESSMENT & PLAN NOTE
New and decompensated problem. Exam consistent with sigmoid diverticulitis. Will check for Cdiff due to recent antibiotic exposure. Start Augmentin 875-125 mg twice daily for 7 days. Have imodium on hand. Took Cipro/Flagyl with last episode in 2019.

## 2022-05-04 NOTE — ASSESSMENT & PLAN NOTE
New and decompensated problem. Presumed sigmoid diverticulitis but will also rule out Cdiff due to recent antibiotic exposure. He will keep me updated over mychart.

## 2022-05-05 ENCOUNTER — OFFICE VISIT (OUTPATIENT)
Dept: CARDIOLOGY | Facility: MEDICAL CENTER | Age: 72
End: 2022-05-05
Payer: MEDICARE

## 2022-05-05 VITALS
RESPIRATION RATE: 20 BRPM | HEIGHT: 72 IN | HEART RATE: 91 BPM | DIASTOLIC BLOOD PRESSURE: 62 MMHG | SYSTOLIC BLOOD PRESSURE: 116 MMHG | WEIGHT: 249 LBS | OXYGEN SATURATION: 94 % | BODY MASS INDEX: 33.72 KG/M2

## 2022-05-05 DIAGNOSIS — E78.5 DYSLIPIDEMIA: ICD-10-CM

## 2022-05-05 DIAGNOSIS — I25.10 CORONARY ARTERY DISEASE DUE TO LIPID RICH PLAQUE: ICD-10-CM

## 2022-05-05 DIAGNOSIS — I49.1 PAC (PREMATURE ATRIAL CONTRACTION): ICD-10-CM

## 2022-05-05 DIAGNOSIS — E66.9 OBESITY (BMI 30-39.9): ICD-10-CM

## 2022-05-05 DIAGNOSIS — Z95.5 STATUS POST CORONARY ARTERY STENT PLACEMENT: ICD-10-CM

## 2022-05-05 DIAGNOSIS — I25.83 CORONARY ARTERY DISEASE DUE TO LIPID RICH PLAQUE: ICD-10-CM

## 2022-05-05 DIAGNOSIS — I10 ESSENTIAL HYPERTENSION, BENIGN: ICD-10-CM

## 2022-05-05 DIAGNOSIS — I25.119 CORONARY ARTERY DISEASE WITH ANGINA PECTORIS, UNSPECIFIED VESSEL OR LESION TYPE, UNSPECIFIED WHETHER NATIVE OR TRANSPLANTED HEART (HCC): ICD-10-CM

## 2022-05-05 PROCEDURE — 99214 OFFICE O/P EST MOD 30 MIN: CPT | Performed by: INTERNAL MEDICINE

## 2022-05-05 RX ORDER — NITROGLYCERIN 0.4 MG/1
0.4 TABLET SUBLINGUAL PRN
Qty: 25 TABLET | Refills: 11 | Status: SHIPPED | OUTPATIENT
Start: 2022-05-05

## 2022-05-05 ASSESSMENT — FIBROSIS 4 INDEX: FIB4 SCORE: 1.24

## 2022-05-05 NOTE — PATIENT INSTRUCTIONS
A - Antiplatelet - Aspirin 81 mg daily or other antiplatelets (clopidogrel (PLAVIX), prasrugrel (EFFIENT), ticagrelor (BRILINTA)) reduce your risk.  B - Blood Pressure Control - reduces your risk or heart attack and stroke.  C - Cholesterol Management - statins reduce your risk; for those that are intolerant to statins, there are alternatives.  D - Diet - Cardiac rehab diets, [email or web address].org.  E - Exercise - at least 5 hours of moderate exercise weekly  (typical brisk walking or similar activity).  F - Fats - VASCEPA,  or Fish oil (if Vascepa too expensive) for elevated triglycerides (REDUCE IT trial showed reduction from 22% 5 year MACE to 17%).  G - Good Vibes - meditation, exercise, yoga, mindfulness, stress reduction.  H - Heart Failure - betablockers, sucubatril (ENTRESTO) 16% less risk of dying over 3 years, spironolactone, dapagliflozin (FARXIGA) 17% less risk of dying over 2 years, CRT +/- ICD.  I - Inflammation - Colchicine in the LoDoCo2 study in 2020 reduced the risk of heart attack by 30% in 2.5 year follow up.  R - Rehab - Cardiac rehab reduces risk of dying by 13-24% and need to go to the hospital by 30% within the first year.  S - Smoking - never smoke, if you do smoke ask for help to quit for good.  T - Type II Diabetes - pills empagliflozin (JARDIANCE) 38% less risk of dying over 4 years, and/or weekly injections liraglutide (Victoza), semaglutide (Ozempic), dulaglutide (Trulicity) ~26% less risk of MACE in 2 years.  V - Vaccines - Annual flu shot and COVID vaccine reduces the risk of serious cardiovascular complications from these deadly infections.  W - Weight - maintain a healthy weight.      Work on at least 1.5 - 5 hours a week of moderate exercise    Please look into the following diets and incorporate them into your diet  LOW SALT DIET   KEEP YOUR SODIUM EQUAL TO CALORIES AND NO MORE THAN DOUBLE THE CALORIES FOR A LOW SALT DIET    Cardiosmart.org - great resource for American College of  Cardiology on heart disease prevention and treatment    FOR TREATMENT OR PREVENTION OF CORONARY ARTERY DISEASE  These three programs are approved by Medicare/Insurers for those with heart disease  [facility]  Dr. Glasgow's Program for Reversing Heart Disease - Jordy Garces's Cardiologist vegetarian-based  [facility]    This is a commonly referenced Program  Dr Sidhu - [place] [facility] [employer] (book and documentary) - vegetarian-based    FOR TREATMENT OF BLOOD PRESSURE  DASH DIET - American Heart Association for treatment of HYPERTENSION    FOR TREATMENT OF BAD CHOLESTEROL/FATS  REDUCE PROCESSED SUGAR AS MUCH AS POSSIBLE  INCREASE WHOLE GRAINS/VEGETABLES  INCREASE FIBER    Lowering total cholesterol and LDL (bad) cholesterol:  - Eat leaner cuts of meat, or eliminate altogether if possible red meat, and frequently substitute fish or chicken.  - Limit saturated fat to no more than 7-10% of total calories no more than 10 g per day is recommended. Some sources of saturated fat include butter, animal fats, hydrogenated vegetable fats and oils, many desserts, whole milk dairy products.  - Replaced saturated fats with polyunsaturated fats and monounsaturated fats. Foods high in monounsaturated fat include nuts, canola oil, avocados, and olives.  - Limit trans fat (processed foods) and replaced with fresh fruits and vegetables  - Recommend nonfat dairy products  - Increase substantially the amount of soluble fiber intake (legumes such as beans, fruit, whole grains).  - Consider nutritional supplements: plant sterile spreads such as Benecol, fish oil,  flaxseed oil, omega-3 acids capsules 1000 mg twice a day, or viscous fiber such as Metamucil  - Attain ideal weight and regular exercise (at least 30 minutes per day of moderate exercise)  ASK ABOUT STATIN OR NON STATIN MEDICATION TO REDUCE YOUR LDL AND HEART RISK    Lowering  triglycerides:  - Reduce intake of simple sugar: Desserts, candy, pastries, honey, sodas, sugared cereals, yogurt, Gatorade, sports bars, canned fruit, smoothies, fruit juice, coffee drinks  - Reduced intake of refined starches: Refined Pasta, most bread  - Reduce or abstain from alcohol  - Increase omega-3 fatty acids: [place], Trout, Mackerel, [place]ring, [place] tuna and supplements  - Attain ideal weight and regular exercise (at least 30 minutes per day of moderate exercise)  ASK ABOUT PURIFIED OMEGA 3 EPA or FISH OIL TO REDUCE YOUR TG AND HEART RISK    Elevating HDL (good) cholesterol:  - Increase physical activity  - Increase omega-3 fatty acids and supplements as listed above  - Incorporating appropriate amounts of monounsaturated fats such as nuts, olive oil, canola oil, avocados, olives  - Stop smoking  - Attain ideal weight and regular exercise (at least 30 minutes per day of moderate exercise)

## 2022-05-05 NOTE — PROGRESS NOTES
Chief Complaint   Patient presents with   • Coronary Artery Disease     F/V Dx: Coronary artery disease due to lipid rich plaque       Subjective     Curt Blair is a 71 y.o. male who presents today for follow-up of his history of coronary disease status post stenting    Is done well over the past year tolerating his medications well statin with history of statin side effects    HAS HAD DIARRHEA    Past Medical History:   Diagnosis Date   • Allergies 12/24/2019   • Arteriosclerotic vascular disease 1/25/2019   • Arthritis     Right Foot   • CAD (coronary artery disease)    • Elevated CPK 8/16/2020   • Generalized anxiety disorder 10/11/2017   • Hyperlipidemia    • Hypertension    • Inflamed sebaceous cyst 2/15/2018   • Intrinsic eczema 2/7/2020   • Prediabetes 7/19/2019   • S/P coronary artery stent placement     2 stents     Past Surgical History:   Procedure Laterality Date   • PA INJ LUMBAR/SACRAL,W/ IMAGING Right 12/2/2021    Procedure: RIGHT L4-5 interlaminar epidural steroid injection with sedation;  Surgeon: Sav Rick M.D.;  [facility];  Service: Pain Management   • PA INJ LUMBAR/SACRAL,W/ IMAGING Right 7/28/2021    Procedure: RIGHT L4-5 interlaminar epidural steroid injection with sedation;  Surgeon: Sav Rick M.D.;  Location: [facility];  Service: Pain Management   • PA FLUOROSCOPIC GUIDANCE NEEDLE PLACEMENT Left 6/16/2021    Procedure: LEFT genicular nerve blocks, diagnostic;  Surgeon: Sav Rick M.D.;  Location: [facility];  Service: Pain Management   • PA INJ AA/STRD GNCLR NRV BRNCH W/IMG Left 6/16/2021    Procedure: GENICULAR NERVE BRANCHES INCLUDING IMAGING GUIDANCE WITH A REQUIRED MINIMUM THREE NERVE BRANCHES;  Surgeon: Sav Rick M.D.;  Location: [facility];  Service: Pain Management   • APPENDECTOMY     • CHOLECYSTECTOMY     • ENDARTERECTOMY      corornary   • STENT PLACEMENT     •  VASECTOMY       Family History   Problem Relation Age of Onset   • Lung Disease Mother         copd   • Hypertension Mother    • Heart Disease Father         heart attack and heart disease   • Cancer Brother         neck   • Heart Disease Paternal Grandfather         Heart attack   • Other Sister         non-malignant brain tumor   • Diabetes Neg Hx      Social History     Socioeconomic History   • Marital status:      Spouse name: Not on file   • Number of children: Not on file   • Years of education: Not on file   • Highest education level: 12th grade   Occupational History   • Not on file   Tobacco Use   • Smoking status: Former Smoker     Packs/day: 2.00     Years: 25.00     Pack years: 50.00     Types: Cigarettes     Quit date: 1992     Years since quittin.3   • Smokeless tobacco: Never Used   • Tobacco comment: avoid all tobacco products   Vaping Use   • Vaping Use: Never used   Substance and Sexual Activity   • Alcohol use: Yes     Alcohol/week: 1.2 oz     Types: 2 Standard drinks or equivalent per week     Comment: 1-2 DRINKS WEEKLY    • Drug use: No   • Sexual activity: Yes     Partners: Female     Comment: , 3 children   Other Topics Concern   •  Service Yes   • Blood Transfusions No   • Caffeine Concern No   • Occupational Exposure No   • Hobby Hazards No   • Sleep Concern Yes   • Stress Concern Yes   • Weight Concern Yes   • Special Diet No     Comment: no fried   • Back Care Yes   • Exercise Yes   • Bike Helmet No     Comment: does not ride bike   • Seat Belt Not Asked   • Self-Exams Yes   Social History Narrative    He was born in [place]. He worked in the [employer] industry, he retired at age 65. He  Luann in 2016 after both of their spouses passed away. He has children in [place] and several grandchildren and great grandchildren. He is an avid traveler.     Social Determinants of Health     Financial Resource Strain: Not on file   Food Insecurity: Not on file    Transportation Needs: Not on file   Physical Activity: Not on file   Stress: Not on file   Social Connections: Not on file   Intimate Partner Violence: Not on file   Housing Stability: Not on file     Allergies   Allergen Reactions   • Atorvastatin Myalgia     Ok to take 10 mg  Elevated CPK  Only at 40 Mg does this happen.   • Lyrica [Pregabalin]    • Rosuvastatin      muscle cramps elevated CPK     Outpatient Encounter Medications as of 5/5/2022   Medication Sig Dispense Refill   • amoxicillin-clavulanate (AUGMENTIN) 875-125 MG Tab Take 1 Tablet by mouth 2 times a day. (Patient taking differently: Take 1 Tablet by mouth 2 times a day. FOR 1 WEEK) 14 Tablet 0   • atorvastatin (LIPITOR) 10 MG Tab Take 1 Tablet by mouth every day. 100 Tablet 3   • diclofenac DR (VOLTAREN) 75 MG Tablet Delayed Response Take 1 Tablet by mouth 2 times a day. 200 Tablet 3   • DULoxetine (CYMBALTA) 60 MG Cap DR Particles delayed-release capsule Take 1 Capsule by mouth every day for 90 days. 90 Capsule 0   • amlodipine-benazepril (LOTREL) 5-10 MG per capsule Take 1 Capsule by mouth every day. 100 Capsule 2   • nitroglycerin (NITROSTAT) 0.4 MG SL Tab PLACE 1 TABLET UNDER THE TONGUE EVERY 5 MINUTES UP TO 3 DOSES AS NEEDED FOR CHEST PAIN). 25 tablet 0   • Multiple Vitamins-Minerals (MULTIVITAMIN PO) Take 1 Tab by mouth every morning.     • Coenzyme Q10 (CO Q 10 PO) Take 1 Tab by mouth every morning.     • MEGARED OMEGA-3 KRILL OIL PO Take 1 Tab by mouth every morning.     • CALCIUM PO Take 1 Tab by mouth every day.     • aspirin 81 MG tablet Take 81 mg by mouth every day.         No facility-administered encounter medications on file as of 5/5/2022.     ROS           Objective     /62 (BP Location: Left arm, Patient Position: Sitting, BP Cuff Size: Adult long)   Pulse 91   Resp 20   Ht 1.829 m (6')   Wt 113 kg (249 lb)   SpO2 94%   BMI 33.77 kg/m²     Physical Exam  Constitutional:       General: He is not in acute distress.      Appearance: He is not diaphoretic.   Eyes:      General: No scleral icterus.  Neck:      Vascular: No JVD.   Cardiovascular:      Rate and Rhythm: Normal rate.      Heart sounds: Normal heart sounds. No murmur heard.    No friction rub. No gallop.   Pulmonary:      Effort: No respiratory distress.      Breath sounds: No wheezing or rales.   Abdominal:      General: Bowel sounds are normal.      Palpations: Abdomen is soft.   Skin:     Findings: No rash.   Neurological:      Mental Status: He is alert.            We reviewed in person the most recent labs    Assessment & Plan     1. Coronary artery disease due to lipid rich plaque     2. Status post coronary artery stent placement     3. Dyslipidemia     4. Essential hypertension, benign     5. PAC (premature atrial contraction)     6. Coronary artery disease with angina pectoris, unspecified vessel or lesion type, unspecified whether native or transplanted heart (HCC)  nitroglycerin (NITROSTAT) 0.4 MG SL Tab   7. Obesity (BMI 30-39.9)         Medical Decision Making: Today's Assessment/Status/Plan:        It was my pleasure to meet with Mr. Blair.    We addressed the management of hypertension at today's visit. Blood pressure is well controlled.  We specifically assessed the labs on hypertension treatment    We addressed the management of dyslipidemia at today's visit. He is on appropriate statin.      We addressed the management of coronary artery disease.  He is on proper antiplatelet, cholesterol management and beta-blockers as appropriate.  We addressed the potential side effects and laboratory follow-up for these medications.      I will see Mr. Blair back in 1 year time and encouraged him to follow up with us over the phone or electronically using my MyChart as issues arise.    It is my pleasure to participate in the care of Mr. Blair.  Please do not hesitate to contact me with questions or concerns.    Iván Otoole MD PhD FACC  Cardiologist [facility]  [facility]    Please note that this dictation was created using voice recognition software. There may be errors I did not discover before finalizing the note.

## 2022-05-10 ENCOUNTER — OFFICE VISIT (OUTPATIENT)
Dept: PHYSICAL MEDICINE AND REHAB | Facility: MEDICAL CENTER | Age: 72
End: 2022-05-10
Payer: MEDICARE

## 2022-05-10 VITALS
WEIGHT: 250.88 LBS | SYSTOLIC BLOOD PRESSURE: 124 MMHG | BODY MASS INDEX: 33.98 KG/M2 | TEMPERATURE: 96.7 F | OXYGEN SATURATION: 94 % | HEART RATE: 88 BPM | DIASTOLIC BLOOD PRESSURE: 60 MMHG | HEIGHT: 72 IN

## 2022-05-10 DIAGNOSIS — M43.16 SPONDYLOLISTHESIS OF LUMBAR REGION: ICD-10-CM

## 2022-05-10 DIAGNOSIS — M48.061 NEURAL FORAMINAL STENOSIS OF LUMBAR SPINE: ICD-10-CM

## 2022-05-10 DIAGNOSIS — M48.061 SPINAL STENOSIS OF LUMBAR REGION, UNSPECIFIED WHETHER NEUROGENIC CLAUDICATION PRESENT: ICD-10-CM

## 2022-05-10 DIAGNOSIS — M17.0 PRIMARY OSTEOARTHRITIS OF BOTH KNEES: ICD-10-CM

## 2022-05-10 PROCEDURE — 99214 OFFICE O/P EST MOD 30 MIN: CPT | Performed by: PHYSICAL MEDICINE & REHABILITATION

## 2022-05-10 RX ORDER — DULOXETIN HYDROCHLORIDE 60 MG/1
60 CAPSULE, DELAYED RELEASE ORAL
Qty: 90 CAPSULE | Refills: 1 | Status: SHIPPED | OUTPATIENT
Start: 2022-05-20 | End: 2022-11-16

## 2022-05-10 ASSESSMENT — PATIENT HEALTH QUESTIONNAIRE - PHQ9: CLINICAL INTERPRETATION OF PHQ2 SCORE: 0

## 2022-05-10 ASSESSMENT — FIBROSIS 4 INDEX: FIB4 SCORE: 1.24

## 2022-05-10 ASSESSMENT — PAIN SCALES - GENERAL: PAINLEVEL: 3=SLIGHT PAIN

## 2022-05-10 NOTE — PROGRESS NOTES
Follow-up patient note, patient previously seen by Dr. Porter     Physiatry (physical medicine and  Rehabilitation), interventional spine and sports medicine    Date of Service:05/10/2022    Chief complaint:   Chief Complaint   Patient presents with   • Follow-Up     Knee pain       HISTORY    HPI: Curt Blair 71 y.o. male who presents today for follow-up evaluation.    Since his last visit, he reports that he has not been having cramps in his legs.  Sleeping well.  Some left knee pain that is primarily around the patella, but this resolves.  Feeling pretty good about that.  Right knee is not painful.    Sleep is fine.  Not waking up.  Taking 6-7/10 on the NRS.      Takes duloxetine 60mg daily.  For pain he does take diclofenac twice a day.  He does take Tylenol as needed at night, usually once a night.    Previously, did well with right L4-5 interlaminar epidural steroid injection on 12/2/2021.  Dr. Smith did right knee injection in August 2021.    Since the last visit he has seen his primary care physician for an episode of diverticulosis.  He has been improving after treatment recommendations from Dr. Mullen      Medical records review:    Reviewed records from Dr Clovis Alicea 07/2013 At that visit, he assessed that MRI of left knee was indicated.  On 07/30/2013, they reviewed the study and the patient had a left knee corticosteroid injection    Previous treatments:    Physical Therapy: No    Medications the patient is tried: NSAIDs    Previous interventions: none, recently     Previous surgeries to relieve the above pain:  none      ROS:   Eyes: cataract surgery, recently  CV: history of MI at 41  Red Flags ROS:   Fever, Chills, Sweats: Denies  Involuntary Weight Loss: Denies  Bladder Incontinence: Denies  Bowel Incontinence: Denies  Saddle Anesthesia: Denies    All other systems reviewed and negative.       PMHx:   Past Medical History:   Diagnosis Date   • Allergies 12/24/2019   • Arteriosclerotic  vascular disease 1/25/2019   • Arthritis     Right Foot   • CAD (coronary artery disease)    • Elevated CPK 8/16/2020   • Generalized anxiety disorder 10/11/2017   • Hyperlipidemia    • Hypertension    • Inflamed sebaceous cyst 2/15/2018   • Intrinsic eczema 2/7/2020   • Prediabetes 7/19/2019   • S/P coronary artery stent placement     2 stents       PSHx:   Past Surgical History:   Procedure Laterality Date   • RI INJ LUMBAR/SACRAL,W/ IMAGING Right 12/2/2021    Procedure: RIGHT L4-5 interlaminar epidural steroid injection with sedation;  Surgeon: Sav Rick M.D.;  [facility];  Service: Pain Management   • RI INJ LUMBAR/SACRAL,W/ IMAGING Right 7/28/2021    Procedure: RIGHT L4-5 interlaminar epidural steroid injection with sedation;  Surgeon: Sav Rick M.D.;  [facility];  Service: Pain Management   • RI FLUOROSCOPIC GUIDANCE NEEDLE PLACEMENT Left 6/16/2021    Procedure: LEFT genicular nerve blocks, diagnostic;  Surgeon: Sav Rick M.D.;  Location: [facility];  Service: Pain Management   • RI INJ AA/STRD GNCLR NRV BRNCH W/IMG Left 6/16/2021    Procedure: GENICULAR NERVE BRANCHES INCLUDING IMAGING GUIDANCE WITH A REQUIRED MINIMUM THREE NERVE BRANCHES;  Surgeon: Sav Rick M.D.;  [facility];  Service: Pain Management   • APPENDECTOMY     • CHOLECYSTECTOMY     • ENDARTERECTOMY      corornary   • STENT PLACEMENT     • VASECTOMY         Family history   Family History   Problem Relation Age of Onset   • Lung Disease Mother         copd   • Hypertension Mother    • Heart Disease Father         heart attack and heart disease   • Cancer Brother         neck   • Heart Disease Paternal Grandfather         Heart attack   • Other Sister         non-malignant brain tumor   • Diabetes Neg Hx          Medications:   Current Outpatient Medications   Medication   • [START ON 5/20/2022] DULoxetine (CYMBALTA) 60  MG Cap DR Particles delayed-release capsule   • nitroglycerin (NITROSTAT) 0.4 MG SL Tab   • amoxicillin-clavulanate (AUGMENTIN) 875-125 MG Tab   • atorvastatin (LIPITOR) 10 MG Tab   • diclofenac DR (VOLTAREN) 75 MG Tablet Delayed Response   • amlodipine-benazepril (LOTREL) 5-10 MG per capsule   • Multiple Vitamins-Minerals (MULTIVITAMIN PO)   • Coenzyme Q10 (CO Q 10 PO)   • MEGARED OMEGA-3 KRILL OIL PO   • CALCIUM PO   • aspirin 81 MG tablet     No current facility-administered medications for this visit.       Allergies:   Allergies   Allergen Reactions   • Atorvastatin Myalgia     Ok to take 10 mg  Elevated CPK  Only at 40 Mg does this happen.   • Lyrica [Pregabalin]    • Rosuvastatin      muscle cramps elevated CPK       Social Hx:   Social History     Socioeconomic History   • Marital status:      Spouse name: Not on file   • Number of children: Not on file   • Years of education: Not on file   • Highest education level: 12th grade   Occupational History   • Not on file   Tobacco Use   • Smoking status: Former Smoker     Packs/day: 2.00     Years: 25.00     Pack years: 50.00     Types: Cigarettes     Quit date: 1992     Years since quittin.3   • Smokeless tobacco: Never Used   • Tobacco comment: avoid all tobacco products   Vaping Use   • Vaping Use: Never used   Substance and Sexual Activity   • Alcohol use: Yes     Alcohol/week: 1.2 oz     Types: 2 Standard drinks or equivalent per week     Comment: 1-2 DRINKS WEEKLY    • Drug use: No   • Sexual activity: Yes     Partners: Female     Comment: , 3 children   Other Topics Concern   •  Service Yes   • Blood Transfusions No   • Caffeine Concern No   • Occupational Exposure No   • Hobby Hazards No   • Sleep Concern Yes   • Stress Concern Yes   • Weight Concern Yes   • Special Diet No     Comment: no fried   • Back Care Yes   • Exercise Yes   • Bike Helmet No     Comment: does not ride bike   • Seat Belt Not Asked   • Self-Exams Yes    Social History Narrative    He was born in [place]. He worked in the [employer] industry, he retired at age 65. He  Luann in 2016 after both of their spouses passed away. He has children in [place] and several grandchildren and great grandchildren. He is an avid traveler.     Social Determinants of Health     Financial Resource Strain: Not on file   Food Insecurity: Not on file   Transportation Needs: Not on file   Physical Activity: Not on file   Stress: Not on file   Social Connections: Not on file   Intimate Partner Violence: Not on file   Housing Stability: Not on file         EXAMINATION     Physical Exam:   Vitals: /60 (BP Location: Right arm, Patient Position: Sitting, BP Cuff Size: Adult long)   Pulse 88   Temp 35.9 °C (96.7 °F) (Temporal)   Ht 1.829 m (6')   Wt 114 kg (250 lb 14.1 oz)   SpO2 94%     Constitutional:   Body Habitus: Body mass index is 34.03 kg/m².  Cooperation: Fully cooperates with exam  Appearance: Well-groomed, well-nourished, not disheveled, in no acute distress    Eyes: No scleral icterus, no proptosis     ENT -no obvious auditory deficits, wearing a face mask    Skin -no rashes or lesions noted    Respiratory-  breathing comfortable on room air, no audible wheezing    Cardiovascular- No pitting edema lower extremity edema is noted.     Psychiatric- alert and oriented ×3. Normal affect.     Gait - normal gait, no use of ambulatory device, minimally antalgic.     Spine  No focal motor or sensory deficits in the lower extremities bilaterally    Seated SLR is negative bilaterally      MEDICAL DECISION MAKING    Medical records review: see under HPI section.     DATA    Labs:   Lab Results   Component Value Date/Time    SODIUM 139 07/26/2021 10:55 AM    POTASSIUM 5.0 07/26/2021 10:55 AM    CHLORIDE 103 07/26/2021 10:55 AM    CO2 25 07/26/2021 10:55 AM    ANION 11.0 07/26/2021 10:55 AM    GLUCOSE 90 07/26/2021 10:55 AM    BUN 18 07/26/2021 10:55 AM    CREATININE 0.90  07/26/2021 10:55 AM    CREATININE 0.93 02/20/2013 07:20 AM    CALCIUM 9.8 07/26/2021 10:55 AM    ASTSGOT 22 07/26/2021 10:55 AM    ALTSGPT 29 07/26/2021 10:55 AM    TBILIRUBIN 0.6 07/26/2021 10:55 AM    ALBUMIN 4.7 07/26/2021 10:55 AM    TOTPROTEIN 7.2 07/26/2021 10:55 AM    GLOBULIN 2.5 07/26/2021 10:55 AM    AGRATIO 1.9 07/26/2021 10:55 AM       Lab Results   Component Value Date/Time    PROTHROMBTM 14.8 (H) 12/05/2018 07:00 AM    INR 1.17 (H) 12/05/2018 07:00 AM        Lab Results   Component Value Date/Time    WBC 5.7 08/31/2021 08:25 AM    RBC 5.01 08/31/2021 08:25 AM    HEMOGLOBIN 15.6 08/31/2021 08:25 AM    HEMATOCRIT 47.8 08/31/2021 08:25 AM    MCV 95.4 08/31/2021 08:25 AM    MCH 31.1 08/31/2021 08:25 AM    MCHC 32.6 (L) 08/31/2021 08:25 AM    MPV 11.4 08/31/2021 08:25 AM    NEUTSPOLYS 66.20 08/31/2021 08:25 AM    LYMPHOCYTES 18.40 (L) 08/31/2021 08:25 AM    MONOCYTES 12.60 08/31/2021 08:25 AM    EOSINOPHILS 1.40 08/31/2021 08:25 AM    BASOPHILS 0.90 08/31/2021 08:25 AM        Lab Results   Component Value Date/Time    HBA1C 6.1 (H) 08/31/2021 08:25 AM        Imaging: I personally reviewed following images, these are my reads  MRI lumbar spine 07/26/2021  At L1-2, no central or foraminal stenosis  At L2-3, disc bulge, facet arthropathy and mild foraminal stenosis bilaterally  At L3-4, moderate central canal stenosis, mod-severe foraminal stenosis bilaterally, facet arthropathy  At L4-5, grade I anterolisthesis, disc bulge, severe facet arthropathy, moderate foraminal stenosis bilaterally  At L5-S1, mod-severe facet arthropathy with right paracentral annular tear.  No central or foraminal stenosis      MRI lumbar spine 10/29/2020  At L5-S1, there is bilateral facet arthropathy without central canal narrowing.  Mild foraminal stenosis bilaterally   At L4-5, mild central canal stenosis second to disc bulge and severe facet arthropathy.  Moderate foraminal stenosis bilaterally  At L3-4, disc bulge with moderate  facet arthropathy and mild-moderate central canal stenosis.  Severe foraminal stenosis bilaterally  At L2-3, posterior disc bulge with facet arthropathy and mild central canal narrowing.  Mild foraminal stenosis bilaterally  At L1-2, mild facet arthropathy bilaterally, mild foraminal stenosis bilaterally.  Prominent epidural fat    Xray lumbar spine 08/07/2020  There is multilevel retrolisthesis at L1-2, L2-3, and L3-4  Grade I anterolisthesis at L4-5, stable on flexion and extension  Degenerative changes with multilevel facet arthropathy    Xray lumbar spine 10/04/2018  There is severe degenerative change in the lumbar spine; there is note of multilevel retrolisthesis at L1-2, L3-4 and L2-3  Mild movement of anterolisthesis at L4-5 with flexion    MRI left knee 07/10/2020  There is note of a small joint effusion.  Cyst posterior to the PCL.  No ligamentous or meniscal tears  Tricompartmental degenerative changes, greatest medial compartment    Xray hip right 10/04/2018  Moderate hip osteoarthritis noted bilaterally     MRI left knee 07/22/2013  There is note of moderate joint effusion.  The medial collateral ligament has mild edema.  There is note of osteophytes in the medial and lateral compartments.  Tendinopathy in the quadriceps tendon noted.    IMAGING radiology reads. I reviewed the following radiology reads     Ultrasound right lower extremity April 8, 2022     PROCEDURES:   Right lower extremity venous duplex imaging.    The following venous structures were evaluated: common femoral, deep    femoral, proximal great saphenous, femoral, popliteal, peroneal, and    posterior tibial veins.    Serial compression, color, and spectral Doppler flow evaluations were    performed.       FINDINGS:   RIGHT LEG:   No deep venous thrombosis.    All veins demonstrate complete color filling and compressibility with    normal venous flow dynamics including spontaneous flow and respiratory    phasicity.    There is a  non-vascular anechoic area in the right popliteal fossa    measuring 3.8 x 1.3 cm.    MRI lumbar spine 07/26/2021     IMPRESSION:     1.  New right paracentral L5/S1 annular tear.  2.  Otherwise no significant change in moderate spondylosis with up to moderate to severe L3/4 foraminal stenoses  3.  Greatest the central stenoses are mild to moderate at L3/4  4.  Facet arthropathy greater than degenerative disc disease  5.  Degenerative L4/5 anterolisthesis where there is facet arthropathy and a moderate right facet joint effusion as before    MRI lumbar spine 10/29/2020  IMPRESSION:  1.  Minimal anterolisthesis of L4-5.  2.  Multilevel degenerative disease and facet arthropathy with resultant central canal and foraminal narrowing as described.    Xray lumbar spine 08/07/2020  IMPRESSION:  1.  Multilevel degenerative disc disease and facet degeneration.  2.  Mild anterior subluxation at L4-5. This is stable with flexion and extension.  3.  Again seen multilevel degenerative retrolisthesis. This does not change significantly extension.    Xray lumbar spine 10/04/2018  IMPRESSION:     Mild retrolisthesis of L1-2, L2-3 and L3-4 that are worse with extension.  Mild anterolisthesis of L4-5 that is worse with flexion.  There is partial disc space fusion of the lower thoracic spine.    MRI left knee 07/10/2020  IMPRESSION:  1.  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  2.  Intact ligaments and menisci.  3.  Small joint effusion.  4.  Melbeta synovial or ganglion cyst immediately posterior to the posterior cruciate ligament.      MRI left knee 07/22/2013       Impression        1. Tendinopathy of the quadriceps tendon.     2. Medial collateral ligament sprain.     3. Osteoarthritis, most severe in the medial compartment.     4. Fraying of the inner rim of the bodies of the lateral and medial menisci.     5. Moderate joint effusion.      Xray hips  10/04/2018  Impression:  No acute osseous abnormality                         Results for orders placed during the hospital encounter of 10/04/18   DX-LUMBAR SPINE-4+ VIEWS    Impression Mild retrolisthesis of L1-2, L2-3 and L3-4 that are worse with extension.    Mild anterolisthesis of L4-5 that is worse with flexion.    There is partial disc space fusion of the lower thoracic spine.                                         Diagnosis   Visit Diagnoses     ICD-10-CM   1. Spinal stenosis of lumbar region, unspecified whether neurogenic claudication present  M48.061   2. Spondylolisthesis of lumbar region  M43.16   3. Neural foraminal stenosis of lumbar spine  M48.061   4. Primary osteoarthritis of both knees  M17.0         ASSESSMENT:  Curt Blair 71 y.o. male seen for above     Curt was seen today for follow-up.    Diagnoses and all orders for this visit:    Spinal stenosis of lumbar region, unspecified whether neurogenic claudication present  -     DULoxetine (CYMBALTA) 60 MG Cap DR Particles delayed-release capsule; Take 1 Capsule by mouth every day for 180 days.    Spondylolisthesis of lumbar region  -     DULoxetine (CYMBALTA) 60 MG Cap DR Particles delayed-release capsule; Take 1 Capsule by mouth every day for 180 days.    Neural foraminal stenosis of lumbar spine  -     DULoxetine (CYMBALTA) 60 MG Cap DR Particles delayed-release capsule; Take 1 Capsule by mouth every day for 180 days.    Primary osteoarthritis of both knees         1. Discussed that he is doing well, overall.  Feels like he is doing well with duloxetine 60mg.  Script sent to hold for 90 days with 1 refill.  2. Continue home exercise program as tolerated.  He has not been golfing due to weather but anticipates returning to this  3.  Discussed that he has had some continued improvement after right L4-5 interlaminar epidural steroid injection if symptoms should worsen.  Hold for now.  4.  Overall his knee pain is manageable at this time.   Hold off on any further treatment  5.  Continue diclofenac.  Patient denies symptoms.  Advised that he continue to follow-up with Dr. Gonzalez regarding general medical care and the possibility of gastric protectant use long-term.        Follow-up: Return in about 6 months (around 11/10/2022).       Thank you very much for asking me to participate in Curt Blair's care.  Please contact me with any questions or concerns.    Please note that this dictation was created using voice recognition software. I have made every reasonable attempt to correct obvious errors but there may be errors of grammar and content that I may have overlooked prior to finalization of this note.      Sav Rick MD  Physical Medicine and Rehabilitation  Interventional Spine and Sports Physiatry  [facility]    CC: Shila Mullen, DO

## 2022-05-23 ENCOUNTER — OFFICE VISIT (OUTPATIENT)
Dept: MEDICAL GROUP | Facility: PHYSICIAN GROUP | Age: 72
End: 2022-05-23
Payer: MEDICARE

## 2022-05-23 VITALS
BODY MASS INDEX: 33.59 KG/M2 | DIASTOLIC BLOOD PRESSURE: 68 MMHG | WEIGHT: 248 LBS | TEMPERATURE: 96.8 F | HEIGHT: 72 IN | SYSTOLIC BLOOD PRESSURE: 108 MMHG | RESPIRATION RATE: 18 BRPM | HEART RATE: 85 BPM | OXYGEN SATURATION: 96 %

## 2022-05-23 DIAGNOSIS — U07.1 LAB TEST POSITIVE FOR DETECTION OF COVID-19 VIRUS: ICD-10-CM

## 2022-05-23 DIAGNOSIS — Z20.822 EXPOSURE TO CONFIRMED CASE OF COVID-19: ICD-10-CM

## 2022-05-23 LAB
EXTERNAL QUALITY CONTROL: ABNORMAL
SARS-COV+SARS-COV-2 AG RESP QL IA.RAPID: POSITIVE

## 2022-05-23 PROCEDURE — 99214 OFFICE O/P EST MOD 30 MIN: CPT | Mod: CS | Performed by: FAMILY MEDICINE

## 2022-05-23 PROCEDURE — 87426 SARSCOV CORONAVIRUS AG IA: CPT | Performed by: FAMILY MEDICINE

## 2022-05-23 ASSESSMENT — ENCOUNTER SYMPTOMS
SPUTUM PRODUCTION: 1
FEVER: 0
CHILLS: 0
PALPITATIONS: 0
NAUSEA: 0
SHORTNESS OF BREATH: 0
ABDOMINAL PAIN: 0
COUGH: 1
SORE THROAT: 0
VOMITING: 0
MYALGIAS: 0
WHEEZING: 0
SINUS PAIN: 0
DIARRHEA: 0
HEADACHES: 0
DIZZINESS: 0

## 2022-05-23 ASSESSMENT — FIBROSIS 4 INDEX: FIB4 SCORE: 1.24

## 2022-05-23 NOTE — PROGRESS NOTES
Subjective:     CC:   Chief Complaint   Patient presents with   • Cough     Minimal coughing, some mucus        HPI:   Curt presents today with COVID 19 symptoms. Recent travel with his wife to [place] 2 weeks ago, came back Saturday 5/14/22, he went to see his granddaughter graduate from [employer], he states that he and his late wife raised his granddaughter and she got into a graduate program there and they wanted to go see her graduation ceremony. His  wife started having symptoms the following Monday on 5/16/22 with a sore throat and progressed to coughing, she was seen in clinic was + for COVID. On 5/21/22 he started having sore throat and he thought he had post nasal drip from his allergies and continued with his Flonase. However still continued with cough and congestion and one night of night sweats. Denies fever, chills, shortness of breath, chest pains, loss of taste or smell, or dizziness.  He is interested in antiviral therapy as he states this is working quite well for his wife.  He notes that he was seen earlier in the month by Dr. Mullen for his sigmoid diverticulitis, he states that he was on Augmentin to which he finished full course and also took Imodium for his diarrhea.  States that his diarrhea and abdominal pain did clear up and he is having normal bowel movements.  He denies any tenderness in his lower abdomen, diarrhea, fevers.         No problem-specific Assessment & Plan notes found for this encounter.      Current Outpatient Medications Ordered in Epic   Medication Sig Dispense Refill   • DULoxetine (CYMBALTA) 60 MG Cap DR Particles delayed-release capsule Take 1 Capsule by mouth every day for 180 days. 90 Capsule 1   • nitroglycerin (NITROSTAT) 0.4 MG SL Tab Place 1 Tablet under the tongue as needed for Chest Pain. 25 Tablet 11   • atorvastatin (LIPITOR) 10 MG Tab Take 1 Tablet by mouth every day. 100 Tablet 3   • diclofenac DR (VOLTAREN) 75 MG Tablet Delayed Response Take 1 Tablet by  mouth 2 times a day. 200 Tablet 3   • amlodipine-benazepril (LOTREL) 5-10 MG per capsule Take 1 Capsule by mouth every day. 100 Capsule 2   • Multiple Vitamins-Minerals (MULTIVITAMIN PO) Take 1 Tab by mouth every morning.     • Coenzyme Q10 (CO Q 10 PO) Take 1 Tab by mouth every morning.     • MEGARED OMEGA-3 KRILL OIL PO Take 1 Tab by mouth every morning.     • CALCIUM PO Take 1 Tab by mouth every day.     • aspirin 81 MG tablet Take 81 mg by mouth every day.         No current Epic-ordered facility-administered medications on file.       Health Maintenance: Completed    Review of Systems   Constitutional: Positive for malaise/fatigue. Negative for chills and fever.   HENT: Positive for congestion. Negative for ear pain, sinus pain, sore throat and tinnitus.    Respiratory: Positive for cough and sputum production. Negative for shortness of breath and wheezing.    Cardiovascular: Negative for chest pain, palpitations and leg swelling.   Gastrointestinal: Negative for abdominal pain, diarrhea, nausea and vomiting.   Genitourinary: Negative.    Musculoskeletal: Negative for myalgias.   Neurological: Negative for dizziness and headaches.         Objective:     Exam:  /68 (BP Location: Right arm, Patient Position: Sitting, BP Cuff Size: Adult)   Pulse 85   Temp 36 °C (96.8 °F) (Temporal)   Resp 18   Ht 1.829 m (6')   Wt 112 kg (248 lb)   SpO2 96%   BMI 33.63 kg/m²  Body mass index is 33.63 kg/m².    Physical Exam  Vitals reviewed.   Constitutional:       General: He is not in acute distress.     Appearance: Normal appearance.   HENT:      Ears:      Comments: Cerumen bilaterally, unable to visualize TM.  Declines ear lavage today and would like to wait till his next visit.     Mouth/Throat:      Mouth: Mucous membranes are moist.      Pharynx: Oropharynx is clear. No oropharyngeal exudate or posterior oropharyngeal erythema.   Eyes:      Conjunctiva/sclera: Conjunctivae normal.      Pupils: Pupils are  equal, round, and reactive to light.   Cardiovascular:      Rate and Rhythm: Normal rate.      Pulses: Normal pulses.      Heart sounds: No murmur heard.  Pulmonary:      Effort: Pulmonary effort is normal. No respiratory distress.      Breath sounds: Normal breath sounds. No stridor. No wheezing, rhonchi or rales.   Chest:      Chest wall: No tenderness.   Abdominal:      General: Bowel sounds are normal.      Tenderness: There is no abdominal tenderness. There is no guarding.      Hernia: A hernia is present.   Musculoskeletal:      Right lower leg: No edema.      Left lower leg: No edema.   Neurological:      Mental Status: He is alert and oriented to person, place, and time.   Psychiatric:         Mood and Affect: Mood normal.         Behavior: Behavior normal.          A chaperone was offered to the patient during today's exam. Patient declined chaperone.      Assessment & Plan:     71 y.o. male with the following -     1. Exposure to confirmed case of COVID-19  - POCT SARS-COV Antigen JAMES (Symptomatic only)    2. Lab test positive for detection of COVID-19 virus  POCT in clinic positive for COVID-19.  Vitals stable in clinic as well as physical exam.  He is interested in antiviral therapy, which we will send to his pharmacy if they have it in stock otherwise we will send to [facility]'s pharmacy.  Discussed stopping his statin therapy while taking this medication.  Also discussed increasing his fluid intake and quarantining for 5 days from the onset of his symptoms.  Discussed if his symptoms worsen to please let us know or go to the ER.  Patient verbalized understanding.    I spent a total of 34 minutes with record review, exam, communication with the patient, communication with other providers, and documentation of this encounter.      Return if symptoms worsen or fail to improve.    Please note that this dictation was created using voice recognition software. I have made every reasonable attempt to  correct obvious errors, but I expect that there are errors of grammar and possibly content that I did not discover before finalizing the note.

## 2022-06-27 ENCOUNTER — APPOINTMENT (RX ONLY)
Dept: URBAN - METROPOLITAN AREA CLINIC 20 | Facility: CLINIC | Age: 72
Setting detail: DERMATOLOGY
End: 2022-06-27

## 2022-06-27 DIAGNOSIS — Z85.828 PERSONAL HISTORY OF OTHER MALIGNANT NEOPLASM OF SKIN: ICD-10-CM

## 2022-06-27 DIAGNOSIS — D22 MELANOCYTIC NEVI: ICD-10-CM

## 2022-06-27 DIAGNOSIS — Z71.89 OTHER SPECIFIED COUNSELING: ICD-10-CM

## 2022-06-27 DIAGNOSIS — L82.1 OTHER SEBORRHEIC KERATOSIS: ICD-10-CM

## 2022-06-27 DIAGNOSIS — L57.0 ACTINIC KERATOSIS: ICD-10-CM

## 2022-06-27 DIAGNOSIS — D18.0 HEMANGIOMA: ICD-10-CM

## 2022-06-27 DIAGNOSIS — L81.4 OTHER MELANIN HYPERPIGMENTATION: ICD-10-CM | Status: STABLE

## 2022-06-27 PROBLEM — D22.39 MELANOCYTIC NEVI OF OTHER PARTS OF FACE: Status: ACTIVE | Noted: 2022-06-27

## 2022-06-27 PROBLEM — D18.01 HEMANGIOMA OF SKIN AND SUBCUTANEOUS TISSUE: Status: ACTIVE | Noted: 2022-06-27

## 2022-06-27 PROBLEM — D22.5 MELANOCYTIC NEVI OF TRUNK: Status: ACTIVE | Noted: 2022-06-27

## 2022-06-27 PROBLEM — D22.72 MELANOCYTIC NEVI OF LEFT LOWER LIMB, INCLUDING HIP: Status: ACTIVE | Noted: 2022-06-27

## 2022-06-27 PROBLEM — D22.71 MELANOCYTIC NEVI OF RIGHT LOWER LIMB, INCLUDING HIP: Status: ACTIVE | Noted: 2022-06-27

## 2022-06-27 PROBLEM — D22.61 MELANOCYTIC NEVI OF RIGHT UPPER LIMB, INCLUDING SHOULDER: Status: ACTIVE | Noted: 2022-06-27

## 2022-06-27 PROBLEM — D22.62 MELANOCYTIC NEVI OF LEFT UPPER LIMB, INCLUDING SHOULDER: Status: ACTIVE | Noted: 2022-06-27

## 2022-06-27 PROCEDURE — 99213 OFFICE O/P EST LOW 20 MIN: CPT | Mod: 25

## 2022-06-27 PROCEDURE — ? OBSERVATION

## 2022-06-27 PROCEDURE — ? SUNSCREEN RECOMMENDATIONS

## 2022-06-27 PROCEDURE — ? OBSERVATION AND MEASURE

## 2022-06-27 PROCEDURE — ? LIQUID NITROGEN

## 2022-06-27 PROCEDURE — ? COUNSELING

## 2022-06-27 PROCEDURE — 17003 DESTRUCT PREMALG LES 2-14: CPT

## 2022-06-27 PROCEDURE — 17000 DESTRUCT PREMALG LESION: CPT

## 2022-06-27 ASSESSMENT — LOCATION DETAILED DESCRIPTION DERM
LOCATION DETAILED: RIGHT INFERIOR MEDIAL UPPER BACK
LOCATION DETAILED: INFERIOR THORACIC SPINE
LOCATION DETAILED: RIGHT INFERIOR FOREHEAD
LOCATION DETAILED: RIGHT DISTAL POSTERIOR UPPER ARM
LOCATION DETAILED: RIGHT SUPERIOR FOREHEAD
LOCATION DETAILED: LEFT LATERAL PROXIMAL PRETIBIAL REGION
LOCATION DETAILED: LEFT CENTRAL MALAR CHEEK
LOCATION DETAILED: LEFT DISTAL POSTERIOR THIGH
LOCATION DETAILED: RIGHT SUPERIOR UPPER BACK
LOCATION DETAILED: LEFT LATERAL FOREHEAD
LOCATION DETAILED: RIGHT VENTRAL PROXIMAL FOREARM
LOCATION DETAILED: RIGHT PROXIMAL PRETIBIAL REGION
LOCATION DETAILED: RIGHT INFERIOR CENTRAL MALAR CHEEK
LOCATION DETAILED: LEFT DISTAL DORSAL FOREARM
LOCATION DETAILED: RIGHT CENTRAL ZYGOMA
LOCATION DETAILED: LEFT SUPERIOR FRONTAL SCALP
LOCATION DETAILED: RIGHT CENTRAL TEMPLE
LOCATION DETAILED: MID POSTERIOR NECK
LOCATION DETAILED: LEFT VENTRAL PROXIMAL FOREARM
LOCATION DETAILED: RIGHT PROXIMAL RADIAL DORSAL FOREARM
LOCATION DETAILED: LEFT PROXIMAL POSTERIOR UPPER ARM
LOCATION DETAILED: RIGHT INFERIOR MEDIAL MIDBACK
LOCATION DETAILED: RIGHT DISTAL POSTERIOR THIGH
LOCATION DETAILED: RIGHT SUPERIOR LATERAL FOREHEAD

## 2022-06-27 ASSESSMENT — LOCATION SIMPLE DESCRIPTION DERM
LOCATION SIMPLE: LEFT POSTERIOR THIGH
LOCATION SIMPLE: POSTERIOR NECK
LOCATION SIMPLE: SCALP
LOCATION SIMPLE: UPPER BACK
LOCATION SIMPLE: RIGHT CHEEK
LOCATION SIMPLE: RIGHT POSTERIOR THIGH
LOCATION SIMPLE: RIGHT POSTERIOR UPPER ARM
LOCATION SIMPLE: RIGHT FOREARM
LOCATION SIMPLE: LEFT POSTERIOR UPPER ARM
LOCATION SIMPLE: RIGHT FOREHEAD
LOCATION SIMPLE: RIGHT TEMPLE
LOCATION SIMPLE: RIGHT UPPER BACK
LOCATION SIMPLE: LEFT PRETIBIAL REGION
LOCATION SIMPLE: LEFT FOREHEAD
LOCATION SIMPLE: RIGHT PRETIBIAL REGION
LOCATION SIMPLE: LEFT FOREARM
LOCATION SIMPLE: RIGHT ZYGOMA
LOCATION SIMPLE: RIGHT LOWER BACK
LOCATION SIMPLE: LEFT CHEEK

## 2022-06-27 ASSESSMENT — LOCATION ZONE DERM
LOCATION ZONE: SCALP
LOCATION ZONE: ARM
LOCATION ZONE: NECK
LOCATION ZONE: TRUNK
LOCATION ZONE: LEG
LOCATION ZONE: FACE

## 2022-06-27 NOTE — PROCEDURE: LIQUID NITROGEN
Duration Of Freeze Thaw-Cycle (Seconds): 10
Post-Care Instructions: I reviewed with the patient in detail post-care instructions. Patient is to wear sunprotection, and avoid picking at any of the treated lesions. Pt may apply Vaseline to crusted or scabbing areas.
Consent: The patient's consent was obtained including but not limited to risks of crusting, scabbing, blistering, scarring, darker or lighter pigmentary change, recurrence, incomplete removal and infection. RTC in 2 months if lesion(s) persistent.
Show Aperture Variable?: Yes
Detail Level: Detailed
Number Of Freeze-Thaw Cycles: 2 freeze-thaw cycles
Render Note In Bullet Format When Appropriate: No

## 2022-07-05 ENCOUNTER — HOSPITAL ENCOUNTER (OUTPATIENT)
Dept: LAB | Facility: MEDICAL CENTER | Age: 72
End: 2022-07-05
Attending: INTERNAL MEDICINE
Payer: MEDICARE

## 2022-07-05 DIAGNOSIS — R19.7 DIARRHEA, UNSPECIFIED TYPE: ICD-10-CM

## 2022-07-05 DIAGNOSIS — K52.839 MICROSCOPIC COLITIS, UNSPECIFIED MICROSCOPIC COLITIS TYPE: ICD-10-CM

## 2022-07-05 LAB
ALBUMIN SERPL BCP-MCNC: 4.2 G/DL (ref 3.2–4.9)
ALBUMIN/GLOB SERPL: 1.4 G/DL
ALP SERPL-CCNC: 74 U/L (ref 30–99)
ALT SERPL-CCNC: 30 U/L (ref 2–50)
ANION GAP SERPL CALC-SCNC: 14 MMOL/L (ref 7–16)
AST SERPL-CCNC: 26 U/L (ref 12–45)
BASOPHILS # BLD AUTO: 1 % (ref 0–1.8)
BASOPHILS # BLD: 0.05 K/UL (ref 0–0.12)
BILIRUB SERPL-MCNC: 0.3 MG/DL (ref 0.1–1.5)
BUN SERPL-MCNC: 18 MG/DL (ref 8–22)
C DIFF DNA SPEC QL NAA+PROBE: NEGATIVE
C DIFF TOX GENS STL QL NAA+PROBE: NEGATIVE
CALCIUM SERPL-MCNC: 9.1 MG/DL (ref 8.5–10.5)
CHLORIDE SERPL-SCNC: 101 MMOL/L (ref 96–112)
CO2 SERPL-SCNC: 21 MMOL/L (ref 20–33)
CREAT SERPL-MCNC: 1.07 MG/DL (ref 0.5–1.4)
CRP SERPL HS-MCNC: 1.89 MG/DL (ref 0–0.75)
EOSINOPHIL # BLD AUTO: 0.04 K/UL (ref 0–0.51)
EOSINOPHIL NFR BLD: 0.8 % (ref 0–6.9)
ERYTHROCYTE [DISTWIDTH] IN BLOOD BY AUTOMATED COUNT: 45 FL (ref 35.9–50)
GFR SERPLBLD CREATININE-BSD FMLA CKD-EPI: 73 ML/MIN/1.73 M 2
GLOBULIN SER CALC-MCNC: 3 G/DL (ref 1.9–3.5)
GLUCOSE SERPL-MCNC: 137 MG/DL (ref 65–99)
HCT VFR BLD AUTO: 46.6 % (ref 42–52)
HGB BLD-MCNC: 15.4 G/DL (ref 14–18)
IMM GRANULOCYTES # BLD AUTO: 0.02 K/UL (ref 0–0.11)
IMM GRANULOCYTES NFR BLD AUTO: 0.4 % (ref 0–0.9)
LYMPHOCYTES # BLD AUTO: 1.21 K/UL (ref 1–4.8)
LYMPHOCYTES NFR BLD: 23.7 % (ref 22–41)
MCH RBC QN AUTO: 30.9 PG (ref 27–33)
MCHC RBC AUTO-ENTMCNC: 33 G/DL (ref 33.7–35.3)
MCV RBC AUTO: 93.4 FL (ref 81.4–97.8)
MONOCYTES # BLD AUTO: 0.95 K/UL (ref 0–0.85)
MONOCYTES NFR BLD AUTO: 18.6 % (ref 0–13.4)
NEUTROPHILS # BLD AUTO: 2.83 K/UL (ref 1.82–7.42)
NEUTROPHILS NFR BLD: 55.5 % (ref 44–72)
NRBC # BLD AUTO: 0 K/UL
NRBC BLD-RTO: 0 /100 WBC
PLATELET # BLD AUTO: 315 K/UL (ref 164–446)
PMV BLD AUTO: 10.9 FL (ref 9–12.9)
POTASSIUM SERPL-SCNC: 4.4 MMOL/L (ref 3.6–5.5)
PROT SERPL-MCNC: 7.2 G/DL (ref 6–8.2)
RBC # BLD AUTO: 4.99 M/UL (ref 4.7–6.1)
SODIUM SERPL-SCNC: 136 MMOL/L (ref 135–145)
WBC # BLD AUTO: 5.1 K/UL (ref 4.8–10.8)

## 2022-07-05 PROCEDURE — 87493 C DIFF AMPLIFIED PROBE: CPT

## 2022-07-05 PROCEDURE — 80053 COMPREHEN METABOLIC PANEL: CPT

## 2022-07-05 PROCEDURE — 86140 C-REACTIVE PROTEIN: CPT

## 2022-07-05 PROCEDURE — 85025 COMPLETE CBC W/AUTO DIFF WBC: CPT

## 2022-07-05 PROCEDURE — 36415 COLL VENOUS BLD VENIPUNCTURE: CPT

## 2022-07-08 RX ORDER — BUDESONIDE 3 MG/1
CAPSULE, COATED PELLETS ORAL
Qty: 126 CAPSULE | Refills: 0 | Status: SHIPPED | OUTPATIENT
Start: 2022-07-08 | End: 2022-09-02

## 2022-07-21 ENCOUNTER — TELEPHONE (OUTPATIENT)
Dept: MEDICAL GROUP | Facility: PHYSICIAN GROUP | Age: 72
End: 2022-07-21
Payer: MEDICARE

## 2022-07-21 NOTE — TELEPHONE ENCOUNTER
ESTABLISHED PATIENT PRE-VISIT PLANNING     Patient was NOT contacted to complete PVP.     Note: Patient will not be contacted if there is no indication to call.     1.  Reviewed notes from the last few office visits within the medical group: Yes    2.  If any orders were placed at last visit or intended to be done for this visit (i.e. 6 mos follow-up), do we have Results/Consult Notes?         •  Labs - Labs ordered, completed on 5/23/2022 and results are in chart.  Note: If patient appointment is for lab review and patient did not complete labs, check with provider if OK to reschedule patient until labs completed.       •  Imaging - Imaging was not ordered at last office visit.       •  Referrals - No referrals were ordered at last office visit.    3. Is this appointment scheduled as a Hospital Follow-Up? No    4.  Immunizations were updated in Epic using Reconcile Outside Information activity? Yes    5.  Patient is due for the following Health Maintenance Topics:   There are no preventive care reminders to display for this patient.        6.  AHA (Pulse8) form printed for Provider? No, already completed

## 2022-07-22 ENCOUNTER — OFFICE VISIT (OUTPATIENT)
Dept: MEDICAL GROUP | Facility: PHYSICIAN GROUP | Age: 72
End: 2022-07-22
Payer: MEDICARE

## 2022-07-22 VITALS
BODY MASS INDEX: 33.59 KG/M2 | RESPIRATION RATE: 16 BRPM | DIASTOLIC BLOOD PRESSURE: 60 MMHG | OXYGEN SATURATION: 94 % | WEIGHT: 248 LBS | SYSTOLIC BLOOD PRESSURE: 122 MMHG | HEIGHT: 72 IN | TEMPERATURE: 98.5 F | HEART RATE: 98 BPM

## 2022-07-22 DIAGNOSIS — R19.7 DIARRHEA OF PRESUMED INFECTIOUS ORIGIN: ICD-10-CM

## 2022-07-22 PROCEDURE — 99213 OFFICE O/P EST LOW 20 MIN: CPT | Performed by: FAMILY MEDICINE

## 2022-07-22 ASSESSMENT — FIBROSIS 4 INDEX: FIB4 SCORE: 1.09

## 2022-07-22 NOTE — PROGRESS NOTES
Subjective:     CC:   Chief Complaint   Patient presents with   • Diarrhea     Rx helping, no stomach cramping        HPI:   Curt presents today with follow up on diarrhea.     Diarrhea of presumed infectious origin  Started in May/2022. He was having profuse watery stools, he tried peptobismol and imodium which was ineffective.  He was put on Budesonide by PCP due to not being able to get a colonoscopy for a few more months due to GI appointments being so far out, and it is suspected he has microscopic colitis,  this was greatly impacting his quality of life. Cdiff negative, CRP slightly elevated.  Has been taking Budesonide for 2 weeks and his diarrhea has stopped. States he normally has 3-5 BM daily, formed/soft stools,   Has increased gas, however no abdominal pain/cramping, states after the 5th day of taking the medication he began having his normal bowel regimen.    Denies diarrhea, cramping, abdominal pain, nausea, or vomiting. Has an appt with GI the beginning of September for a consultation as his previous GI MD has retired.          Current Outpatient Medications Ordered in Epic   Medication Sig Dispense Refill   • budesonide (ENTOCORT EC) 3 MG Cap DR Particles capsule Take 3 Capsules by mouth every morning for 28 days, THEN 2 Capsules every morning for 14 days, THEN 1 Capsule every morning for 14 days. 126 Capsule 0   • DULoxetine (CYMBALTA) 60 MG Cap DR Particles delayed-release capsule Take 1 Capsule by mouth every day for 180 days. 90 Capsule 1   • nitroglycerin (NITROSTAT) 0.4 MG SL Tab Place 1 Tablet under the tongue as needed for Chest Pain. 25 Tablet 11   • atorvastatin (LIPITOR) 10 MG Tab Take 1 Tablet by mouth every day. 100 Tablet 3   • diclofenac DR (VOLTAREN) 75 MG Tablet Delayed Response Take 1 Tablet by mouth 2 times a day. 200 Tablet 3   • amlodipine-benazepril (LOTREL) 5-10 MG per capsule Take 1 Capsule by mouth every day. 100 Capsule 2   • Multiple Vitamins-Minerals (MULTIVITAMIN PO)  Take 1 Tab by mouth every morning.     • Coenzyme Q10 (CO Q 10 PO) Take 1 Tab by mouth every morning.     • MEGARED OMEGA-3 KRILL OIL PO Take 1 Tab by mouth every morning.     • CALCIUM PO Take 1 Tab by mouth every day.     • aspirin 81 MG tablet Take 81 mg by mouth every day.         No current Epic-ordered facility-administered medications on file.       Health Maintenance: Completed        Objective:     Exam:  /60 (BP Location: Left arm, Patient Position: Sitting, BP Cuff Size: Adult)   Pulse 98   Temp 36.9 °C (98.5 °F) (Temporal)   Resp 16   Ht 1.829 m (6')   Wt 112 kg (248 lb)   SpO2 94%   BMI 33.63 kg/m²  Body mass index is 33.63 kg/m².    Physical Exam  Vitals reviewed.   Constitutional:       Appearance: Normal appearance.   Eyes:      Conjunctiva/sclera: Conjunctivae normal.      Pupils: Pupils are equal, round, and reactive to light.   Cardiovascular:      Rate and Rhythm: Normal rate and regular rhythm.      Pulses: Normal pulses.      Heart sounds: Normal heart sounds. No murmur heard.  Pulmonary:      Effort: Pulmonary effort is normal. No respiratory distress.      Breath sounds: Normal breath sounds. No stridor. No wheezing, rhonchi or rales.   Chest:      Chest wall: No tenderness.   Abdominal:      General: Bowel sounds are normal.      Tenderness: There is no abdominal tenderness.   Musculoskeletal:      Right lower leg: No edema.      Left lower leg: No edema.   Neurological:      Mental Status: He is alert and oriented to person, place, and time.   Psychiatric:         Mood and Affect: Mood normal.         Behavior: Behavior normal.          A chaperone was offered to the patient during today's exam. Patient declined chaperone.      Assessment & Plan:     71 y.o. male with the following -     1. Diarrhea of presumed infectious origin  Improved on Budesonide, will continue for the full 8 week taper and f/u with GI in September. If s/s return he will let us know as well. Will cont. to  monitor.      I spent a total of 20 minutes with record review, exam, communication with the patient, communication with other providers, and documentation of this encounter.      No follow-ups on file.    Please note that this dictation was created using voice recognition software. I have made every reasonable attempt to correct obvious errors, but I expect that there are errors of grammar and possibly content that I did not discover before finalizing the note.

## 2022-07-22 NOTE — ASSESSMENT & PLAN NOTE
Started in May/2022. He was having profuse watery stools, he tried peptobismol and imodium which was ineffective.  He was put on Budesonide by PCP due to not being able to get a colonoscopy for a few more months due to GI appointments being so far out, and it is suspected he has microscopic colitis,  this was greatly impacting his quality of life. Cdiff negative, CRP slightly elevated.  Has been taking Budesonide for 2 weeks and his diarrhea has stopped. States he normally has 3-5 BM daily, formed/soft stools,   Has increased gas, however no abdominal pain/cramping, states after the 5th day of taking the medication he began having his normal bowel regimen.    Denies diarrhea, cramping, abdominal pain, nausea, or vomiting. Has an appt with GI the beginning of September for a consultation as his previous GI MD has retired.

## 2022-07-26 ENCOUNTER — TELEPHONE (OUTPATIENT)
Dept: HEALTH INFORMATION MANAGEMENT | Facility: OTHER | Age: 72
End: 2022-07-26
Payer: MEDICARE

## 2022-08-10 ENCOUNTER — TELEPHONE (OUTPATIENT)
Dept: MEDICAL GROUP | Facility: PHYSICIAN GROUP | Age: 72
End: 2022-08-10
Payer: MEDICARE

## 2022-08-22 DIAGNOSIS — I10 ESSENTIAL HYPERTENSION, BENIGN: ICD-10-CM

## 2022-08-23 RX ORDER — AMLODIPINE BESYLATE AND BENAZEPRIL HYDROCHLORIDE 5; 10 MG/1; MG/1
1 CAPSULE ORAL
Qty: 100 CAPSULE | Refills: 2 | Status: SHIPPED | OUTPATIENT
Start: 2022-08-23 | End: 2023-08-08

## 2022-09-09 ENCOUNTER — HOSPITAL ENCOUNTER (OUTPATIENT)
Dept: LAB | Facility: MEDICAL CENTER | Age: 72
End: 2022-09-09
Attending: PHYSICIAN ASSISTANT
Payer: MEDICARE

## 2022-09-09 LAB — CRP SERPL HS-MCNC: <0.3 MG/DL (ref 0–0.75)

## 2022-09-09 PROCEDURE — 86140 C-REACTIVE PROTEIN: CPT

## 2022-09-09 PROCEDURE — 36415 COLL VENOUS BLD VENIPUNCTURE: CPT

## 2022-09-09 PROCEDURE — 82784 ASSAY IGA/IGD/IGG/IGM EACH: CPT

## 2022-09-09 PROCEDURE — 86364 TISS TRNSGLTMNASE EA IG CLAS: CPT

## 2022-09-10 LAB
IGA SERPL-MCNC: 361 MG/DL (ref 68–408)
TTG IGA SER IA-ACNC: <2 U/ML (ref 0–3)

## 2022-09-13 ENCOUNTER — HOSPITAL ENCOUNTER (OUTPATIENT)
Facility: MEDICAL CENTER | Age: 72
End: 2022-09-13
Attending: PHYSICIAN ASSISTANT
Payer: MEDICARE

## 2022-09-13 PROCEDURE — 83993 ASSAY FOR CALPROTECTIN FECAL: CPT

## 2022-09-13 PROCEDURE — 83630 LACTOFERRIN FECAL (QUAL): CPT

## 2022-09-14 LAB — LACTOFERRIN STL QL IA: POSITIVE

## 2022-09-15 LAB — CALPROTECTIN STL-MCNT: 217 UG/G

## 2022-09-22 ENCOUNTER — HOSPITAL ENCOUNTER (OUTPATIENT)
Dept: LAB | Facility: MEDICAL CENTER | Age: 72
End: 2022-09-22
Attending: INTERNAL MEDICINE
Payer: MEDICARE

## 2022-09-22 DIAGNOSIS — I10 ESSENTIAL HYPERTENSION, BENIGN: ICD-10-CM

## 2022-09-22 DIAGNOSIS — E78.5 DYSLIPIDEMIA: ICD-10-CM

## 2022-09-22 DIAGNOSIS — Z12.5 ENCOUNTER FOR SCREENING FOR MALIGNANT NEOPLASM OF PROSTATE: ICD-10-CM

## 2022-09-22 DIAGNOSIS — R73.03 PREDIABETES: ICD-10-CM

## 2022-09-22 LAB
25(OH)D3 SERPL-MCNC: 23 NG/ML (ref 30–100)
ALBUMIN SERPL BCP-MCNC: 4.2 G/DL (ref 3.2–4.9)
ALBUMIN/GLOB SERPL: 1.2 G/DL
ALP SERPL-CCNC: 69 U/L (ref 30–99)
ALT SERPL-CCNC: 25 U/L (ref 2–50)
ANION GAP SERPL CALC-SCNC: 11 MMOL/L (ref 7–16)
AST SERPL-CCNC: 20 U/L (ref 12–45)
BASOPHILS # BLD AUTO: 0.7 % (ref 0–1.8)
BASOPHILS # BLD: 0.04 K/UL (ref 0–0.12)
BILIRUB SERPL-MCNC: 0.6 MG/DL (ref 0.1–1.5)
BUN SERPL-MCNC: 18 MG/DL (ref 8–22)
CALCIUM SERPL-MCNC: 9.1 MG/DL (ref 8.4–10.2)
CHLORIDE SERPL-SCNC: 101 MMOL/L (ref 96–112)
CHOLEST SERPL-MCNC: 155 MG/DL (ref 100–199)
CO2 SERPL-SCNC: 25 MMOL/L (ref 20–33)
CREAT SERPL-MCNC: 0.85 MG/DL (ref 0.5–1.4)
EOSINOPHIL # BLD AUTO: 0.06 K/UL (ref 0–0.51)
EOSINOPHIL NFR BLD: 1.1 % (ref 0–6.9)
ERYTHROCYTE [DISTWIDTH] IN BLOOD BY AUTOMATED COUNT: 45.5 FL (ref 35.9–50)
EST. AVERAGE GLUCOSE BLD GHB EST-MCNC: 128 MG/DL
FASTING STATUS PATIENT QL REPORTED: NORMAL
GFR SERPLBLD CREATININE-BSD FMLA CKD-EPI: 92 ML/MIN/1.73 M 2
GLOBULIN SER CALC-MCNC: 3.4 G/DL (ref 1.9–3.5)
GLUCOSE SERPL-MCNC: 103 MG/DL (ref 65–99)
HBA1C MFR BLD: 6.1 % (ref 4–5.6)
HCT VFR BLD AUTO: 46.7 % (ref 42–52)
HDLC SERPL-MCNC: 63 MG/DL
HGB BLD-MCNC: 15.6 G/DL (ref 14–18)
IMM GRANULOCYTES # BLD AUTO: 0.02 K/UL (ref 0–0.11)
IMM GRANULOCYTES NFR BLD AUTO: 0.4 % (ref 0–0.9)
LDLC SERPL CALC-MCNC: 67 MG/DL
LYMPHOCYTES # BLD AUTO: 0.93 K/UL (ref 1–4.8)
LYMPHOCYTES NFR BLD: 16.7 % (ref 22–41)
MCH RBC QN AUTO: 31 PG (ref 27–33)
MCHC RBC AUTO-ENTMCNC: 33.4 G/DL (ref 33.7–35.3)
MCV RBC AUTO: 92.7 FL (ref 81.4–97.8)
MONOCYTES # BLD AUTO: 0.98 K/UL (ref 0–0.85)
MONOCYTES NFR BLD AUTO: 17.6 % (ref 0–13.4)
NEUTROPHILS # BLD AUTO: 3.55 K/UL (ref 1.82–7.42)
NEUTROPHILS NFR BLD: 63.5 % (ref 44–72)
NRBC # BLD AUTO: 0 K/UL
NRBC BLD-RTO: 0 /100 WBC
PLATELET # BLD AUTO: 255 K/UL (ref 164–446)
PMV BLD AUTO: 10.3 FL (ref 9–12.9)
POTASSIUM SERPL-SCNC: 4.3 MMOL/L (ref 3.6–5.5)
PROT SERPL-MCNC: 7.6 G/DL (ref 6–8.2)
PSA SERPL-MCNC: 1.08 NG/ML (ref 0–4)
RBC # BLD AUTO: 5.04 M/UL (ref 4.7–6.1)
SODIUM SERPL-SCNC: 137 MMOL/L (ref 135–145)
TRIGL SERPL-MCNC: 127 MG/DL (ref 0–149)
TSH SERPL DL<=0.005 MIU/L-ACNC: 2.4 UIU/ML (ref 0.38–5.33)
VIT B12 SERPL-MCNC: 780 PG/ML (ref 211–911)
WBC # BLD AUTO: 5.6 K/UL (ref 4.8–10.8)

## 2022-09-22 PROCEDURE — 36415 COLL VENOUS BLD VENIPUNCTURE: CPT

## 2022-09-22 PROCEDURE — 85025 COMPLETE CBC W/AUTO DIFF WBC: CPT

## 2022-09-22 PROCEDURE — 84153 ASSAY OF PSA TOTAL: CPT

## 2022-09-22 PROCEDURE — 80053 COMPREHEN METABOLIC PANEL: CPT

## 2022-09-22 PROCEDURE — 82306 VITAMIN D 25 HYDROXY: CPT

## 2022-09-22 PROCEDURE — 84443 ASSAY THYROID STIM HORMONE: CPT

## 2022-09-22 PROCEDURE — 80061 LIPID PANEL: CPT

## 2022-09-22 PROCEDURE — 82607 VITAMIN B-12: CPT

## 2022-09-22 PROCEDURE — 83036 HEMOGLOBIN GLYCOSYLATED A1C: CPT

## 2022-10-11 ENCOUNTER — OFFICE VISIT (OUTPATIENT)
Dept: MEDICAL GROUP | Facility: PHYSICIAN GROUP | Age: 72
End: 2022-10-11
Payer: MEDICARE

## 2022-10-11 VITALS
OXYGEN SATURATION: 92 % | RESPIRATION RATE: 12 BRPM | HEART RATE: 93 BPM | WEIGHT: 250 LBS | DIASTOLIC BLOOD PRESSURE: 60 MMHG | SYSTOLIC BLOOD PRESSURE: 132 MMHG | BODY MASS INDEX: 33.86 KG/M2 | TEMPERATURE: 97.6 F | HEIGHT: 72 IN

## 2022-10-11 DIAGNOSIS — K52.3 INDETERMINATE COLITIS: ICD-10-CM

## 2022-10-11 DIAGNOSIS — F41.1 GENERALIZED ANXIETY DISORDER: ICD-10-CM

## 2022-10-11 PROCEDURE — 99214 OFFICE O/P EST MOD 30 MIN: CPT | Performed by: INTERNAL MEDICINE

## 2022-10-11 RX ORDER — BUDESONIDE 3 MG/1
CAPSULE, COATED PELLETS ORAL
COMMUNITY
Start: 2022-10-04 | End: 2023-04-07

## 2022-10-11 RX ORDER — MESALAMINE 0.38 G/1
CAPSULE, EXTENDED RELEASE ORAL
COMMUNITY
Start: 2022-10-07 | End: 2023-03-10

## 2022-10-11 ASSESSMENT — FIBROSIS 4 INDEX: FIB4 SCORE: 1.13

## 2022-10-11 NOTE — ASSESSMENT & PLAN NOTE
Relatively new problem, still unclear etiology.  Initially I was suspicious for microscopic colitis as diarrhea was always watery by colonoscopy findings more concerning for acute colitis, and clear if due to inflammatory bowel disease or a medication side effect.  We have given him a treatment with budesonide in July due to severity of symptoms and he had almost immediate relief of the diarrhea.  Colonoscopy was done in October 2022 and due to findings of active colitis he was recommended to go back on budesonide again and also was started on mesalamine.  He was recommended to follow-up in 6 weeks which should be mid November 2022 but is scheduled out to the end of January 2023.  We will need to reach out to GI to get sooner follow-up as it is not clear what the next steps are regarding this new diagnosis.  We need to know if he has to stop the diclofenac, in the meantime I advised him to cut back to once daily for a week and then completely thereafter until we hear from GI and also stop his other OTC supplements.

## 2022-10-11 NOTE — NON-PROVIDER
Bilateral ear lavage performed.  Right ear had three rock hard cerumen removed and left ear had large softer blockage of cerumen removed.   Used 2/3 warm water and 1/3 peroxide.  Patient felt great and could hear much better. No complaints or concerns.

## 2022-10-11 NOTE — PROGRESS NOTES
"Subjective:   Chief Complaint/History of Present Illness:  Curt Blair is a 71 y.o. male established patient who presents today to discuss medical problems as listed below. Curt is unaccompanied for today's visit.    Problem   Indeterminate Colitis    Diarrhea started distinctly on 2022. He went out to dinner for his daughter's birthday to a [employer] restaurant where he had cooked shrimp. He also had his 4th Covid shot on 2022.     Normally he has 2 BM's every morning and then 1 BM in the evening or overnight. He is s/p open cholecystectomy around . No issues with dumping syndrome.     He developed multiple episodes of watery diarrhea. He started peptobismol which slowed down the diarrhea then imodium which stopped the diarrhea but only short term when he took the medicine.     He notes prior episode of diverticulitis in . He had exposure to amoxicillin from dental procedures around December and again in April. No prior episodes of Cdiff. Due to elevated inflammation markers and long delay to get into GI we started him empirically on budesonide which led to immediate relief of symptoms. He saw GI who performed colonoscopy which was concerning for inflammatory bowel disease versus indeterminate colitis (secondary to NSAIDs or duloxetine?). No hematochezia through this episode.    Colonoscopy (10/2022) with active colitis and GI recommendations from Dr. Francois- \"inflammation from your colon is most likely due to inflammatory bowel disease.  Ordering daily medicine called mesalamine take 4 tablets by mouth once daily.  Continue the budesonide for now as well.  Follow-up in clinic in approximately 6 to 8 weeks.  Impression- indeterminate colitis. Plan-  mesalamine 0.375 g 4 tablets by mouth once daily and follow up in clinic in 6 weeks\".     Generalized Anxiety Disorder    He developed generalized anxiety at bedtime around the time of his wife's passing.  She  in 2016 from complications of " COPD.  He has never slept 7 or 8 hours per night, he normally averages 4 to 5 hours per evening which is sufficient for him.  His main challenge was initiation of sleep.  He started with over-the-counter attempts with melatonin and Benadryl with no significant improvement.  He was then started on low-dose alprazolam 0.25 to 0.5 mg nightly.  Prior to the COVID-19 pandemic he was using this 3-4 nights per week.  Due to increased stress related to the pandemic he has been using it more regularly.  He will still try to use the lower dose but will sometimes require the second dose.  He understands the inherent risk with chronic use of benzodiazepines including cognitive impairment as well as withdrawal symptoms and those with regular use.  He feels it is helpful and does not have any negative sequelae such as morning grogginess or gait disturbance.  He would like to continue with this strategy.          Current Medications:  Current Outpatient Medications Ordered in Epic   Medication Sig Dispense Refill    ALPRAZolam (XANAX) 0.5 MG Tab Take 1 Tablet by mouth at bedtime as needed for Sleep for up to 180 days. 90 Tablet 0    Mesalamine 0.375 GM CAPSULE SR 24 HR       budesonide (ENTOCORT EC) 3 MG Cap DR Particles capsule PLEASE SEE ATTACHED FOR DETAILED DIRECTIONS      amlodipine-benazepril (LOTREL) 5-10 MG per capsule TAKE 1 CAPSULE BY MOUTH EVERY  Capsule 2    DULoxetine (CYMBALTA) 60 MG Cap DR Particles delayed-release capsule Take 1 Capsule by mouth every day for 180 days. 90 Capsule 1    nitroglycerin (NITROSTAT) 0.4 MG SL Tab Place 1 Tablet under the tongue as needed for Chest Pain. 25 Tablet 11    atorvastatin (LIPITOR) 10 MG Tab Take 1 Tablet by mouth every day. 100 Tablet 3    diclofenac DR (VOLTAREN) 75 MG Tablet Delayed Response Take 1 Tablet by mouth 2 times a day. 200 Tablet 3    Multiple Vitamins-Minerals (MULTIVITAMIN PO) Take 1 Tab by mouth every morning.      Coenzyme Q10 (CO Q 10 PO) Take 1 Tab by  mouth every morning.      MEGARED OMEGA-3 KRILL OIL PO Take 1 Tab by mouth every morning.      CALCIUM PO Take 1 Tab by mouth every day.      aspirin 81 MG tablet Take 81 mg by mouth every day.         No current Epic-ordered facility-administered medications on file.          Objective:   Physical Exam:    Vitals: /60 (BP Location: Right arm, Patient Position: Sitting, BP Cuff Size: Adult)   Pulse 93   Temp 36.4 °C (97.6 °F) (Temporal)   Resp 12   Ht 1.829 m (6')   Wt 113 kg (250 lb)   SpO2 92%    BMI: Body mass index is 33.91 kg/m².  Physical Exam  Constitutional:       General: He is not in acute distress.     Appearance: He is not ill-appearing.   HENT:      Right Ear: There is impacted cerumen.      Left Ear: There is impacted cerumen.   Eyes:      General: No scleral icterus.     Conjunctiva/sclera: Conjunctivae normal.   Pulmonary:      Effort: Pulmonary effort is normal. No respiratory distress.   Musculoskeletal:      Right lower leg: No edema.      Left lower leg: No edema.   Skin:     General: Skin is warm and dry.      Findings: Bruising present.      Comments: Right forearm   Neurological:      Gait: Gait normal.   Psychiatric:         Mood and Affect: Mood normal.         Behavior: Behavior normal.         Thought Content: Thought content normal.         Judgment: Judgment normal.        Assessment and Plan:   Curt is a 71 y.o. male with the following:  Problem List Items Addressed This Visit       Generalized anxiety disorder     Chronic and ongoing problem, he continues to do well with low dose alprazolam for refractory anxiety causing insomnia at night. He can typically make a 90 day supply last 1 year. No negative sequelae.    Obtained and reviewed patient utilization report from [facility] pharmacy database on 10/19/2022 8:36 AM  prior to writing prescription for controlled substance II, III or IV per [place] bill . Based on assessment of the report, the prescription is  "medically necessary.     Patient understands this prescription is a controlled substance which is potentially habit-forming and its use is regulated by the [employer]. We also discussed the new \"black box\" warning regarding the lethal combination of opioids and benzodiazepines. Refills are subject to terms of a controlled substance agreement and patient has an updated one on file. Most recent UDS is appropriate. Any refill requires an office visit. Narcotics have may adverse effects and the risks of addiction, accidental overdose and death were emphasized. Provided prescriptions for the next three months.         Relevant Medications    ALPRAZolam (XANAX) 0.5 MG Tab    Other Relevant Orders    Controlled Substance Treatment Agreement    URINE DRUG SCREEN    Indeterminate colitis     Relatively new problem, still unclear etiology.  Initially I was suspicious for microscopic colitis as diarrhea was always watery by colonoscopy findings more concerning for acute colitis, and clear if due to inflammatory bowel disease or a medication side effect.  We have given him a treatment with budesonide in July due to severity of symptoms and he had almost immediate relief of the diarrhea.  Colonoscopy was done in October 2022 and due to findings of active colitis he was recommended to go back on budesonide again and also was started on mesalamine.  He was recommended to follow-up in 6 weeks which should be mid November 2022 but is scheduled out to the end of January 2023.  We will need to reach out to GI to get sooner follow-up as it is not clear what the next steps are regarding this new diagnosis.  We need to know if he has to stop the diclofenac, in the meantime I advised him to cut back to once daily for a week and then completely thereafter until we hear from GI and also stop his other OTC supplements.               RTC: Return in about 6 months (around 4/11/2023).    I spent a total of 38 minutes with record review, exam, " communication with the patient, communication with other providers, and documentation of this encounter.    PLEASE NOTE: This dictation was created using voice recognition software. I have made every reasonable attempt to correct obvious errors, but I expect that there are errors of grammar and possibly content that I did not discover before finalizing the note.      Shila Mullen, DO  Geriatric and Internal Medicine  [facility]

## 2022-10-19 RX ORDER — ALPRAZOLAM 0.5 MG/1
0.5 TABLET ORAL NIGHTLY PRN
Qty: 90 TABLET | Refills: 0 | Status: SHIPPED | OUTPATIENT
Start: 2022-10-19 | End: 2023-04-17

## 2022-10-19 NOTE — ASSESSMENT & PLAN NOTE
"Chronic and ongoing problem, he continues to do well with low dose alprazolam for refractory anxiety causing insomnia at night. He can typically make a 90 day supply last 1 year. No negative sequelae.    Obtained and reviewed patient utilization report from [facility] pharmacy database on 10/19/2022 8:36 AM  prior to writing prescription for controlled substance II, III or IV per [place] bill . Based on assessment of the report, the prescription is medically necessary.     Patient understands this prescription is a controlled substance which is potentially habit-forming and its use is regulated by the [employer]. We also discussed the new \"black box\" warning regarding the lethal combination of opioids and benzodiazepines. Refills are subject to terms of a controlled substance agreement and patient has an updated one on file. Most recent UDS is appropriate. Any refill requires an office visit. Narcotics have may adverse effects and the risks of addiction, accidental overdose and death were emphasized. Provided prescriptions for the next three months.  "

## 2022-10-20 ENCOUNTER — HOSPITAL ENCOUNTER (OUTPATIENT)
Facility: MEDICAL CENTER | Age: 72
End: 2022-10-20
Attending: INTERNAL MEDICINE
Payer: MEDICARE

## 2022-10-20 DIAGNOSIS — F41.1 GENERALIZED ANXIETY DISORDER: ICD-10-CM

## 2022-10-20 LAB
AMPHET UR QL SCN: NEGATIVE
BARBITURATES UR QL SCN: NEGATIVE
BENZODIAZ UR QL SCN: NEGATIVE
BZE UR QL SCN: NEGATIVE
CANNABINOIDS UR QL SCN: NEGATIVE
METHADONE UR QL SCN: NEGATIVE
OPIATES UR QL SCN: NEGATIVE
OXYCODONE UR QL SCN: NEGATIVE
PCP UR QL SCN: NEGATIVE
PROPOXYPH UR QL SCN: NEGATIVE

## 2022-10-20 PROCEDURE — 80307 DRUG TEST PRSMV CHEM ANLYZR: CPT

## 2022-10-25 DIAGNOSIS — M19.90 ARTHRITIS: ICD-10-CM

## 2022-10-25 RX ORDER — CELECOXIB 200 MG/1
200 CAPSULE ORAL DAILY
Qty: 100 CAPSULE | Refills: 3 | Status: SHIPPED | OUTPATIENT
Start: 2022-10-25 | End: 2022-12-07

## 2022-12-07 ENCOUNTER — OFFICE VISIT (OUTPATIENT)
Dept: PHYSICAL MEDICINE AND REHAB | Facility: MEDICAL CENTER | Age: 72
End: 2022-12-07
Payer: MEDICARE

## 2022-12-07 VITALS
SYSTOLIC BLOOD PRESSURE: 124 MMHG | WEIGHT: 254.8 LBS | HEIGHT: 71 IN | TEMPERATURE: 96.9 F | OXYGEN SATURATION: 92 % | DIASTOLIC BLOOD PRESSURE: 80 MMHG | HEART RATE: 86 BPM | BODY MASS INDEX: 35.67 KG/M2

## 2022-12-07 DIAGNOSIS — M17.0 PRIMARY OSTEOARTHRITIS OF BOTH KNEES: ICD-10-CM

## 2022-12-07 DIAGNOSIS — M48.061 SPINAL STENOSIS OF LUMBAR REGION, UNSPECIFIED WHETHER NEUROGENIC CLAUDICATION PRESENT: ICD-10-CM

## 2022-12-07 DIAGNOSIS — M43.16 SPONDYLOLISTHESIS OF LUMBAR REGION: ICD-10-CM

## 2022-12-07 DIAGNOSIS — M48.061 NEURAL FORAMINAL STENOSIS OF LUMBAR SPINE: ICD-10-CM

## 2022-12-07 PROCEDURE — 99214 OFFICE O/P EST MOD 30 MIN: CPT | Performed by: PHYSICAL MEDICINE & REHABILITATION

## 2022-12-07 RX ORDER — DULOXETIN HYDROCHLORIDE 60 MG/1
60 CAPSULE, DELAYED RELEASE ORAL DAILY
Qty: 90 CAPSULE | Refills: 1 | Status: SHIPPED | OUTPATIENT
Start: 2022-12-07 | End: 2023-05-24 | Stop reason: SDUPTHER

## 2022-12-07 RX ORDER — DICLOFENAC SODIUM 75 MG/1
75 TABLET, DELAYED RELEASE ORAL 2 TIMES DAILY
COMMUNITY
End: 2023-01-16 | Stop reason: SDUPTHER

## 2022-12-07 ASSESSMENT — PATIENT HEALTH QUESTIONNAIRE - PHQ9: CLINICAL INTERPRETATION OF PHQ2 SCORE: 0

## 2022-12-07 ASSESSMENT — FIBROSIS 4 INDEX: FIB4 SCORE: 1.15

## 2022-12-07 ASSESSMENT — PAIN SCALES - GENERAL: PAINLEVEL: 5=MODERATE PAIN

## 2022-12-08 NOTE — PROGRESS NOTES
Follow-up patient note, patient previously seen by Dr. Porter     Physiatry (physical medicine and  Rehabilitation), interventional spine and sports medicine    Date of Service:12/7/2022    Chief complaint:   Chief Complaint   Patient presents with    Follow-Up     Left knee pain       HISTORY    HPI: Curt Blair 72 y.o. male who presents today for follow-up evaluation.    Since the last visit, Curt reports that he reports that he has been doing pretty well until he returned from [place] and developed COVID.  This got treated.  Reports that he had a pretty mild case.  He developed diarrhea.  This has been ongoing.  He was seen by Dr. Mullen, work-up and then went to see GI.  He has essentially been diagnosed with inflammatory bowel disease, unspecified, by Dr. Francois, and he is now taking mesalamine.    Duloxetine has been controlling the cramping at night.  This has been doing pretty well.  He stopped diclofenac over concern that it could be causing IBD.  Stopped diclofenac and started celebrex.    Bowel problems now have resolved.    Back pain is present on the low back, resolves with bowel movement in the morning.  Otherwise this does not seem to be bothering him too much.      Medical records review:    Reviewed records from Dr Clovis Alicea 07/2013 At that visit, he assessed that MRI of left knee was indicated.  On 07/30/2013, they reviewed the study and the patient had a left knee corticosteroid injection    Previous treatments:    Physical Therapy: No    Medications the patient is tried: NSAIDs    Previous interventions: none, recently     Previous surgeries to relieve the above pain:  none      ROS:   Eyes: cataract surgery, recently  CV: history of MI at 41  Red Flags ROS:   Fever, Chills, Sweats: Denies  Involuntary Weight Loss: Denies  Bladder Incontinence: Denies  Bowel Incontinence: Denies  Saddle Anesthesia: Denies    All other systems reviewed and negative.       PMHx:   Past Medical  History:   Diagnosis Date    Allergies 12/24/2019    Arteriosclerotic vascular disease 1/25/2019    Arthritis     Right Foot    CAD (coronary artery disease)     Elevated CPK 8/16/2020    Generalized anxiety disorder 10/11/2017    Hyperlipidemia     Hypertension     Inflamed sebaceous cyst 2/15/2018    Intrinsic eczema 2/7/2020    Prediabetes 7/19/2019    S/P coronary artery stent placement     2 stents       PSHx:   Past Surgical History:   Procedure Laterality Date    SC INJ LUMBAR/SACRAL,W/ IMAGING Right 12/2/2021    Procedure: RIGHT L4-5 interlaminar epidural steroid injection with sedation;  Surgeon: Sav Rick M.D.;  [facility];  Service: Pain Management    SC INJ LUMBAR/SACRAL,W/ IMAGING Right 7/28/2021    Procedure: RIGHT L4-5 interlaminar epidural steroid injection with sedation;  Surgeon: Sav Rick M.D.;  [facility];  Service: Pain Management    SC FLUOROSCOPIC GUIDANCE NEEDLE PLACEMENT Left 6/16/2021    Procedure: LEFT genicular nerve blocks, diagnostic;  Surgeon: Sav Rick M.D.;  [facility]: [facility];  Service: Pain Management    SC INJ AA/STRD GNCLR NRV BRNCH W/IMG Left 6/16/2021    Procedure: GENICULAR NERVE BRANCHES INCLUDING IMAGING GUIDANCE WITH A REQUIRED MINIMUM THREE NERVE BRANCHES;  Surgeon: Sav Rick M.D.;  [facility];  Service: Pain Management    APPENDECTOMY      CHOLECYSTECTOMY      ENDARTERECTOMY      corornary    STENT PLACEMENT      VASECTOMY         Family history   Family History   Problem Relation Age of Onset    Lung Disease Mother         copd    Hypertension Mother     Heart Disease Father         heart attack and heart disease    Cancer Brother         neck    Heart Disease Paternal Grandfather         Heart attack    Other Sister         non-malignant brain tumor    Diabetes Neg Hx          Medications:   Current Outpatient Medications   Medication     diclofenac DR (VOLTAREN) 75 MG Tablet Delayed Response    DULoxetine (CYMBALTA) 60 MG Cap DR Particles delayed-release capsule    ALPRAZolam (XANAX) 0.5 MG Tab    Mesalamine 0.375 GM CAPSULE SR 24 HR    budesonide (ENTOCORT EC) 3 MG Cap DR Particles capsule    amlodipine-benazepril (LOTREL) 5-10 MG per capsule    nitroglycerin (NITROSTAT) 0.4 MG SL Tab    atorvastatin (LIPITOR) 10 MG Tab    Multiple Vitamins-Minerals (MULTIVITAMIN PO)    CALCIUM PO    aspirin 81 MG tablet     No current facility-administered medications for this visit.       Allergies:   Allergies   Allergen Reactions    Atorvastatin Myalgia     Ok to take 10 mg  Elevated CPK  Only at 40 Mg does this happen.    Lyrica [Pregabalin]     Rosuvastatin      muscle cramps elevated CPK       Social Hx:   Social History     Socioeconomic History    Marital status:      Spouse name: Not on file    Number of children: Not on file    Years of education: Not on file    Highest education level: 12th grade   Occupational History    Not on file   Tobacco Use    Smoking status: Former     Packs/day: 2.00     Years: 25.00     Pack years: 50.00     Types: Cigarettes     Quit date: 1992     Years since quittin.9    Smokeless tobacco: Never    Tobacco comments:     avoid all tobacco products   Vaping Use    Vaping Use: Never used   Substance and Sexual Activity    Alcohol use: Yes     Alcohol/week: 1.2 oz     Types: 2 Standard drinks or equivalent per week     Comment: 1-2 DRINKS WEEKLY     Drug use: No    Sexual activity: Yes     Partners: Female     Comment: , 3 children   Other Topics Concern     Service Yes    Blood Transfusions No    Caffeine Concern No    Occupational Exposure No    Hobby Hazards No    Sleep Concern Yes    Stress Concern Yes    Weight Concern Yes    Special Diet No     Comment: no fried    Back Care Yes    Exercise Yes    Bike Helmet No     Comment: does not ride bike    Seat Belt Not Asked    Self-Exams Yes  "  Social History Narrative    He was born in [place]. He worked in the [employer] industry, he retired at age 65. He  Luann in 2016 after both of their spouses passed away. He has children in [place] and several grandchildren and great grandchildren. He is an avid traveler.     Social Determinants of Health     Financial Resource Strain: Not on file   Food Insecurity: Not on file   Transportation Needs: Not on file   Physical Activity: Not on file   Stress: Not on file   Social Connections: Not on file   Intimate Partner Violence: Not on file   Housing Stability: Not on file         EXAMINATION     Physical Exam:   Vitals: /80 (BP Location: Right arm, Patient Position: Sitting, BP Cuff Size: Adult long)   Pulse 86   Temp 36.1 °C (96.9 °F) (Temporal)   Ht 1.803 m (5' 11\")   Wt 116 kg (254 lb 12.8 oz)   SpO2 92%     Constitutional:   Body Habitus: Body mass index is 35.54 kg/m².  Cooperation: Fully cooperates with exam  Appearance: Well-groomed, well-nourished, not disheveled, in no acute distress    Eyes: No scleral icterus, no proptosis     ENT -no obvious auditory deficits, wearing a face mask    Skin -no rashes or lesions noted    Respiratory-  breathing comfortable on room air, no audible wheezing    Cardiovascular- No pitting edema lower extremity edema is noted.     Psychiatric- alert and oriented ×3. Normal affect.     Gait - normal gait, no use of ambulatory device, minimally antalgic.     Spine  No focal motor or sensory deficits in the lower extremities bilaterally    Seated SLR is negative bilaterally      MEDICAL DECISION MAKING    Medical records review: see under HPI section.     DATA    Labs:   Lab Results   Component Value Date/Time    SODIUM 137 09/22/2022 07:09 AM    POTASSIUM 4.3 09/22/2022 07:09 AM    CHLORIDE 101 09/22/2022 07:09 AM    CO2 25 09/22/2022 07:09 AM    ANION 11.0 09/22/2022 07:09 AM    GLUCOSE 103 (H) 09/22/2022 07:09 AM    BUN 18 09/22/2022 07:09 AM    CREATININE " 0.85 09/22/2022 07:09 AM    CREATININE 0.93 02/20/2013 07:20 AM    CALCIUM 9.1 09/22/2022 07:09 AM    ASTSGOT 20 09/22/2022 07:09 AM    ALTSGPT 25 09/22/2022 07:09 AM    TBILIRUBIN 0.6 09/22/2022 07:09 AM    ALBUMIN 4.2 09/22/2022 07:09 AM    TOTPROTEIN 7.6 09/22/2022 07:09 AM    GLOBULIN 3.4 09/22/2022 07:09 AM    AGRATIO 1.2 09/22/2022 07:09 AM       Lab Results   Component Value Date/Time    PROTHROMBTM 14.8 (H) 12/05/2018 07:00 AM    INR 1.17 (H) 12/05/2018 07:00 AM        Lab Results   Component Value Date/Time    WBC 5.6 09/22/2022 07:09 AM    RBC 5.04 09/22/2022 07:09 AM    HEMOGLOBIN 15.6 09/22/2022 07:09 AM    HEMATOCRIT 46.7 09/22/2022 07:09 AM    MCV 92.7 09/22/2022 07:09 AM    MCH 31.0 09/22/2022 07:09 AM    MCHC 33.4 (L) 09/22/2022 07:09 AM    MPV 10.3 09/22/2022 07:09 AM    NEUTSPOLYS 63.50 09/22/2022 07:09 AM    LYMPHOCYTES 16.70 (L) 09/22/2022 07:09 AM    MONOCYTES 17.60 (H) 09/22/2022 07:09 AM    EOSINOPHILS 1.10 09/22/2022 07:09 AM    BASOPHILS 0.70 09/22/2022 07:09 AM        Lab Results   Component Value Date/Time    HBA1C 6.1 (H) 09/22/2022 07:09 AM        Imaging: I personally reviewed following images, these are my reads  MRI lumbar spine 07/26/2021  At L1-2, no central or foraminal stenosis  At L2-3, disc bulge, facet arthropathy and mild foraminal stenosis bilaterally  At L3-4, moderate central canal stenosis, mod-severe foraminal stenosis bilaterally, facet arthropathy  At L4-5, grade I anterolisthesis, disc bulge, severe facet arthropathy, moderate foraminal stenosis bilaterally  At L5-S1, mod-severe facet arthropathy with right paracentral annular tear.  No central or foraminal stenosis      MRI lumbar spine 10/29/2020  At L5-S1, there is bilateral facet arthropathy without central canal narrowing.  Mild foraminal stenosis bilaterally   At L4-5, mild central canal stenosis second to disc bulge and severe facet arthropathy.  Moderate foraminal stenosis bilaterally  At L3-4, disc bulge with  moderate facet arthropathy and mild-moderate central canal stenosis.  Severe foraminal stenosis bilaterally  At L2-3, posterior disc bulge with facet arthropathy and mild central canal narrowing.  Mild foraminal stenosis bilaterally  At L1-2, mild facet arthropathy bilaterally, mild foraminal stenosis bilaterally.  Prominent epidural fat    Xray lumbar spine 08/07/2020  There is multilevel retrolisthesis at L1-2, L2-3, and L3-4  Grade I anterolisthesis at L4-5, stable on flexion and extension  Degenerative changes with multilevel facet arthropathy    Xray lumbar spine 10/04/2018  There is severe degenerative change in the lumbar spine; there is note of multilevel retrolisthesis at L1-2, L3-4 and L2-3  Mild movement of anterolisthesis at L4-5 with flexion    MRI left knee 07/10/2020  There is note of a small joint effusion.  Cyst posterior to the PCL.  No ligamentous or meniscal tears  Tricompartmental degenerative changes, greatest medial compartment    Xray hip right 10/04/2018  Moderate hip osteoarthritis noted bilaterally     MRI left knee 07/22/2013  There is note of moderate joint effusion.  The medial collateral ligament has mild edema.  There is note of osteophytes in the medial and lateral compartments.  Tendinopathy in the quadriceps tendon noted.    IMAGING radiology reads. I reviewed the following radiology reads     Ultrasound right lower extremity April 8, 2022     PROCEDURES:   Right lower extremity venous duplex imaging.    The following venous structures were evaluated: common femoral, deep    femoral, proximal great saphenous, femoral, popliteal, peroneal, and    posterior tibial veins.    Serial compression, color, and spectral Doppler flow evaluations were    performed.       FINDINGS:   RIGHT LEG:   No deep venous thrombosis.    All veins demonstrate complete color filling and compressibility with    normal venous flow dynamics including spontaneous flow and respiratory    phasicity.    There is  a non-vascular anechoic area in the right popliteal fossa    measuring 3.8 x 1.3 cm.    MRI lumbar spine 07/26/2021     IMPRESSION:     1.  New right paracentral L5/S1 annular tear.  2.  Otherwise no significant change in moderate spondylosis with up to moderate to severe L3/4 foraminal stenoses  3.  Greatest the central stenoses are mild to moderate at L3/4  4.  Facet arthropathy greater than degenerative disc disease  5.  Degenerative L4/5 anterolisthesis where there is facet arthropathy and a moderate right facet joint effusion as before    MRI lumbar spine 10/29/2020  IMPRESSION:  1.  Minimal anterolisthesis of L4-5.  2.  Multilevel degenerative disease and facet arthropathy with resultant central canal and foraminal narrowing as described.    Xray lumbar spine 08/07/2020  IMPRESSION:  1.  Multilevel degenerative disc disease and facet degeneration.  2.  Mild anterior subluxation at L4-5. This is stable with flexion and extension.  3.  Again seen multilevel degenerative retrolisthesis. This does not change significantly extension.    Xray lumbar spine 10/04/2018  IMPRESSION:     Mild retrolisthesis of L1-2, L2-3 and L3-4 that are worse with extension.  Mild anterolisthesis of L4-5 that is worse with flexion.  There is partial disc space fusion of the lower thoracic spine.    MRI left knee 07/10/2020  IMPRESSION:  1.  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  2.  Intact ligaments and menisci.  3.  Small joint effusion.  4.  Milton synovial or ganglion cyst immediately posterior to the posterior cruciate ligament.      MRI left knee 07/22/2013       Impression        1. Tendinopathy of the quadriceps tendon.     2. Medial collateral ligament sprain.     3. Osteoarthritis, most severe in the medial compartment.     4. Fraying of the inner rim of the bodies of the lateral and medial menisci.     5. Moderate joint effusion.      Xray hips  10/04/2018  Impression:  No acute osseous abnormality                         Results for orders placed during the hospital encounter of 10/04/18   DX-LUMBAR SPINE-4+ VIEWS    Impression Mild retrolisthesis of L1-2, L2-3 and L3-4 that are worse with extension.    Mild anterolisthesis of L4-5 that is worse with flexion.    There is partial disc space fusion of the lower thoracic spine.                                         Diagnosis   Visit Diagnoses     ICD-10-CM   1. Primary osteoarthritis of both knees  M17.0   2. Spinal stenosis of lumbar region, unspecified whether neurogenic claudication present  M48.061   3. Spondylolisthesis of lumbar region  M43.16   4. Neural foraminal stenosis of lumbar spine  M48.061           ASSESSMENT:  Curt Blair 72 y.o. male seen for above     Curt was seen today for follow-up.    Diagnoses and all orders for this visit:    Primary osteoarthritis of both knees  -     DULoxetine (CYMBALTA) 60 MG Cap DR Particles delayed-release capsule; Take 1 Capsule by mouth every day for 180 days.    Spinal stenosis of lumbar region, unspecified whether neurogenic claudication present  -     DULoxetine (CYMBALTA) 60 MG Cap DR Particles delayed-release capsule; Take 1 Capsule by mouth every day for 180 days.    Spondylolisthesis of lumbar region  -     DULoxetine (CYMBALTA) 60 MG Cap DR Particles delayed-release capsule; Take 1 Capsule by mouth every day for 180 days.    Neural foraminal stenosis of lumbar spine  -     DULoxetine (CYMBALTA) 60 MG Cap DR Particles delayed-release capsule; Take 1 Capsule by mouth every day for 180 days.       Discussed that he has had significant improvement in his GI symptoms since the onset and is under the management of GI and his PCP.  He reports that his bowel movements are normal now.  Given that Celebrex was not successful and encouraged him to return to use of diclofenac.  He reports that he has returned to using diclofenac for about the last week  without any worsening of symptoms.  He continues to have improvement in his low back pain and cramping in the legs with use of duloxetine.  Plan to continue duloxetine 60 mg.  Prescription given for the next 6 months.  Discussed left knee pain which has started to resolve.  He has been seen at [facility] and encouraged him to go to physical therapy.  This seems like a reasonable start.  He can consider injections or other options if symptoms do not continue to improve.        Follow-up: Return in about 6 months (around 6/7/2023), or if symptoms worsen or fail to improve.       Thank you very much for asking me to participate in Curt Blair's care.  Please contact me with any questions or concerns.    Please note that this dictation was created using voice recognition software. I have made every reasonable attempt to correct obvious errors but there may be errors of grammar and content that I may have overlooked prior to finalization of this note.      Sav Rick MD  Physical Medicine and Rehabilitation  Interventional Spine and Sports Physiatry  [facility]    CC: Shila Mullen,

## 2022-12-27 ENCOUNTER — HOSPITAL ENCOUNTER (OUTPATIENT)
Facility: MEDICAL CENTER | Age: 72
End: 2022-12-27
Attending: INTERNAL MEDICINE
Payer: MEDICARE

## 2022-12-27 ENCOUNTER — OFFICE VISIT (OUTPATIENT)
Dept: MEDICAL GROUP | Facility: PHYSICIAN GROUP | Age: 72
End: 2022-12-27
Payer: MEDICARE

## 2022-12-27 VITALS
BODY MASS INDEX: 35.7 KG/M2 | TEMPERATURE: 97.9 F | HEART RATE: 69 BPM | HEIGHT: 71 IN | OXYGEN SATURATION: 95 % | WEIGHT: 255 LBS | RESPIRATION RATE: 14 BRPM | DIASTOLIC BLOOD PRESSURE: 72 MMHG | SYSTOLIC BLOOD PRESSURE: 128 MMHG

## 2022-12-27 DIAGNOSIS — R05.1 ACUTE COUGH: ICD-10-CM

## 2022-12-27 LAB
EXTERNAL QUALITY CONTROL: NORMAL
FLUAV+FLUBV AG SPEC QL IA: NEGATIVE
INT CON NEG: NORMAL
INT CON NEG: NORMAL
INT CON POS: NORMAL
INT CON POS: NORMAL
SARS-COV+SARS-COV-2 AG RESP QL IA.RAPID: NEGATIVE

## 2022-12-27 PROCEDURE — U0003 INFECTIOUS AGENT DETECTION BY NUCLEIC ACID (DNA OR RNA); SEVERE ACUTE RESPIRATORY SYNDROME CORONAVIRUS 2 (SARS-COV-2) (CORONAVIRUS DISEASE [COVID-19]), AMPLIFIED PROBE TECHNIQUE, MAKING USE OF HIGH THROUGHPUT TECHNOLOGIES AS DESCRIBED BY CMS-2020-01-R: HCPCS

## 2022-12-27 PROCEDURE — 87426 SARSCOV CORONAVIRUS AG IA: CPT | Performed by: INTERNAL MEDICINE

## 2022-12-27 PROCEDURE — 99214 OFFICE O/P EST MOD 30 MIN: CPT | Mod: CS | Performed by: INTERNAL MEDICINE

## 2022-12-27 PROCEDURE — 87804 INFLUENZA ASSAY W/OPTIC: CPT | Performed by: INTERNAL MEDICINE

## 2022-12-27 PROCEDURE — U0005 INFEC AGEN DETEC AMPLI PROBE: HCPCS

## 2022-12-27 RX ORDER — BENZONATATE 100 MG/1
100 CAPSULE ORAL 3 TIMES DAILY PRN
Qty: 60 CAPSULE | Refills: 1 | Status: SHIPPED | OUTPATIENT
Start: 2022-12-27 | End: 2023-03-10

## 2022-12-27 ASSESSMENT — FIBROSIS 4 INDEX: FIB4 SCORE: 1.15

## 2022-12-27 NOTE — ASSESSMENT & PLAN NOTE
New decompensated problem, suspect viral etiology.  Recommend Tylenol alternating with Voltaren to help with myalgias and throat pain.  Can use over-the-counter cold and flu medicine to help with the congestion and cough.  We will also send in for Tessalon Perles 100 mg up to 3 times daily to help relax the laryngeal reflex associated with his postnasal drip.  Lung exam and heart exam are reassuring in clinic today.  No indication for antibiotics at this time.  Rapid influenza and rapid COVID testing both negative.  We will send out COVID PCR due to his use of budesonide which may blunt his response on the rapid testing.  I advised him to keep me updated on his progress over the coming days and signs/symptoms of bacterial infection to monitor for.  He was appreciative of the visit and will plan to keep me updated over MyChart.  Advised him to keep the Tessalon Perles away from any children.  Due to age and immunocompromise status on budesonide would have a low threshold to refer him to urgent care or ER if he would have drop in oxygen saturations or deterioration of heart rate/blood pressure.

## 2022-12-27 NOTE — PROGRESS NOTES
Subjective:   Chief Complaint/History of Present Illness:  Curt Blair is a 72 y.o. male established patient who presents today to discuss medical problems as listed below. Curt is unaccompanied for today's visit    Problem   Acute Cough    He reports abrupt onset of infectious symptoms on December 26, 2022.  He is experiencing dry cough and nasal congestion with postnasal drip.  He also has significant myalgias and overall lethargy.  He had exposures to RSV, COVID, and influenza while traveling last weekend.  His vital signs are stable in clinic, no hypoxia, afebrile, normotensive, and normal heart rate with respirations.  He uses diclofenac for severe joint pain, also has utilize Tylenol.  Has not tried any formal or over-the-counter medicines at this point.  His wife is not yet sick.  He has some tightness in his chest from the coughing as well as throat pain from coughing but otherwise no other significant symptoms at this time.          Current Medications:  Current Outpatient Medications Ordered in Epic   Medication Sig Dispense Refill    benzonatate (TESSALON) 100 MG Cap Take 1 Capsule by mouth 3 times a day as needed for Cough. Keep away from children 60 Capsule 1    diclofenac DR (VOLTAREN) 75 MG Tablet Delayed Response Take 75 mg by mouth 2 times a day.      DULoxetine (CYMBALTA) 60 MG Cap DR Particles delayed-release capsule Take 1 Capsule by mouth every day for 180 days. 90 Capsule 1    ALPRAZolam (XANAX) 0.5 MG Tab Take 1 Tablet by mouth at bedtime as needed for Sleep for up to 180 days. 90 Tablet 0    Mesalamine 0.375 GM CAPSULE SR 24 HR       budesonide (ENTOCORT EC) 3 MG Cap DR Particles capsule PLEASE SEE ATTACHED FOR DETAILED DIRECTIONS      amlodipine-benazepril (LOTREL) 5-10 MG per capsule TAKE 1 CAPSULE BY MOUTH EVERY  Capsule 2    nitroglycerin (NITROSTAT) 0.4 MG SL Tab Place 1 Tablet under the tongue as needed for Chest Pain. 25 Tablet 11    atorvastatin (LIPITOR) 10 MG Tab  "Take 1 Tablet by mouth every day. 100 Tablet 3    Multiple Vitamins-Minerals (MULTIVITAMIN PO) Take 1 Tab by mouth every morning.      CALCIUM PO Take 1 Tab by mouth every day.      aspirin 81 MG tablet Take 81 mg by mouth every day.         No current Epic-ordered facility-administered medications on file.          Objective:   Physical Exam:    Vitals: /72   Pulse 69   Temp 36.6 °C (97.9 °F) (Temporal)   Resp 14   Ht 1.803 m (5' 11\")   Wt 116 kg (255 lb)   SpO2 95%    BMI: Body mass index is 35.57 kg/m².  Physical Exam  Constitutional:       Appearance: He is not toxic-appearing or diaphoretic.      Comments: Appears fatigued   HENT:      Ears:      Comments: Mild cerumen in bilateral ear canals     Nose: Congestion present.      Mouth/Throat:      Pharynx: Posterior oropharyngeal erythema present. No oropharyngeal exudate.   Eyes:      Conjunctiva/sclera: Conjunctivae normal.   Cardiovascular:      Rate and Rhythm: Normal rate and regular rhythm.      Pulses: Normal pulses.   Pulmonary:      Effort: Pulmonary effort is normal. No respiratory distress.      Breath sounds: No wheezing, rhonchi or rales.      Comments: Dry cough  Musculoskeletal:      Comments: Stable LE edema, pretibial 1+   Skin:     General: Skin is warm and dry.      Findings: No bruising or rash.   Psychiatric:         Mood and Affect: Mood normal.         Behavior: Behavior normal.         Thought Content: Thought content normal.         Judgment: Judgment normal.        Assessment and Plan:   Curt is a 72 y.o. male with the following:  Problem List Items Addressed This Visit       Acute cough     New decompensated problem, suspect viral etiology.  Recommend Tylenol alternating with Voltaren to help with myalgias and throat pain.  Can use over-the-counter cold and flu medicine to help with the congestion and cough.  We will also send in for Tessalon Perles 100 mg up to 3 times daily to help relax the laryngeal reflex associated " with his postnasal drip.  Lung exam and heart exam are reassuring in clinic today.  No indication for antibiotics at this time.  Rapid influenza and rapid COVID testing both negative.  We will send out COVID PCR due to his use of budesonide which may blunt his response on the rapid testing.  I advised him to keep me updated on his progress over the coming days and signs/symptoms of bacterial infection to monitor for.  He was appreciative of the visit and will plan to keep me updated over MyChart.  Advised him to keep the Tessalon Perles away from any children.  Due to age and immunocompromise status on budesonide would have a low threshold to refer him to urgent care or ER if he would have drop in oxygen saturations or deterioration of heart rate/blood pressure.         Relevant Medications    benzonatate (TESSALON) 100 MG Cap    Other Relevant Orders    POCT Influenza A/B (Completed)    POCT SARS-COV Antigen JAMES (Symptomatic only) (Completed)    SARS-CoV-2 PCR (24 hour In-House): Collect NP swab in [facility] Form    Diagnosis: E66.01 - Morbid (severe) obesity due to excess calories ([facility])  Z68.35 - Body mass index (BMI) 35.0-35.9, adult  Comorbidity Diagnosis: PAC (premature atrial contraction)  The current BMI is 35.57 kg/m2 as of 12/27/22 09:22 PST  Assessment and plan: Chronic, stable. Encouraged healthy diet and physical activity changes with a goal of weight loss. Follow up at least annually. The comorbid condition is stable based on today's assessment. Continue current treatment plan including control of risk factors. Follow up in 4 months.  Last edited 12/27/22 09:22 PST by Shila Mullen D.O.          RTC: Return if symptoms worsen or fail to improve.    I spent a total of 31 minutes with record review, exam, communication with the patient, communication with other providers, and documentation of this encounter.    PLEASE NOTE: This dictation was created using voice recognition software. I have  made every reasonable attempt to correct obvious errors, but I expect that there are errors of grammar and possibly content that I did not discover before finalizing the note.      Shila Mullen, DO  Geriatric and Internal Medicine  [facility]

## 2022-12-28 DIAGNOSIS — R05.1 ACUTE COUGH: ICD-10-CM

## 2022-12-29 LAB
SARS-COV-2 RNA RESP QL NAA+PROBE: NOTDETECTED
SPECIMEN SOURCE: NORMAL

## 2023-01-04 DIAGNOSIS — J40 BRONCHITIS: ICD-10-CM

## 2023-01-04 RX ORDER — ALBUTEROL SULFATE 90 UG/1
2 AEROSOL, METERED RESPIRATORY (INHALATION) EVERY 4 HOURS PRN
Qty: 1 EACH | Refills: 1 | Status: SHIPPED | OUTPATIENT
Start: 2023-01-04 | End: 2023-03-17

## 2023-01-04 RX ORDER — AZITHROMYCIN 250 MG/1
250-500 TABLET, FILM COATED ORAL DAILY
Qty: 6 TABLET | Refills: 0 | Status: SHIPPED | OUTPATIENT
Start: 2023-01-04 | End: 2023-03-17

## 2023-01-15 ENCOUNTER — PATIENT MESSAGE (OUTPATIENT)
Dept: MEDICAL GROUP | Facility: PHYSICIAN GROUP | Age: 73
End: 2023-01-15
Payer: MEDICARE

## 2023-01-16 RX ORDER — DICLOFENAC SODIUM 75 MG/1
75 TABLET, DELAYED RELEASE ORAL 2 TIMES DAILY
Qty: 200 TABLET | Refills: 3 | Status: SHIPPED | OUTPATIENT
Start: 2023-01-16 | End: 2023-09-06

## 2023-01-16 NOTE — PATIENT COMMUNICATION
Received request via: Pharmacy    Was the patient seen in the last year in this department? Yes    Does the patient have an active prescription (recently filled or refills available) for medication(s) requested? No    Does the patient have half-way Plus and need 100 day supply (blood pressure, diabetes and cholesterol meds only)? Yes, quantity updated to 100 days

## 2023-01-20 ENCOUNTER — HOSPITAL ENCOUNTER (OUTPATIENT)
Facility: MEDICAL CENTER | Age: 73
End: 2023-01-20
Attending: PHYSICIAN ASSISTANT
Payer: MEDICARE

## 2023-01-20 PROCEDURE — 83993 ASSAY FOR CALPROTECTIN FECAL: CPT

## 2023-01-23 LAB — CALPROTECTIN STL-MCNT: 657 UG/G

## 2023-01-30 ENCOUNTER — HOSPITAL ENCOUNTER (OUTPATIENT)
Facility: MEDICAL CENTER | Age: 73
End: 2023-01-30
Attending: PHYSICIAN ASSISTANT
Payer: MEDICARE

## 2023-01-30 LAB
G LAMBLIA+C PARVUM AG STL QL RAPID: NORMAL
SIGNIFICANT IND 70042: NORMAL
SITE SITE: NORMAL
SOURCE SOURCE: NORMAL

## 2023-01-30 PROCEDURE — 87329 GIARDIA AG IA: CPT

## 2023-01-30 PROCEDURE — 87045 FECES CULTURE AEROBIC BACT: CPT

## 2023-01-30 PROCEDURE — 87899 AGENT NOS ASSAY W/OPTIC: CPT

## 2023-01-30 PROCEDURE — 87328 CRYPTOSPORIDIUM AG IA: CPT

## 2023-01-31 LAB
E COLI SXT1+2 STL IA: NORMAL
SIGNIFICANT IND 70042: NORMAL
SITE SITE: NORMAL
SOURCE SOURCE: NORMAL

## 2023-02-01 LAB
BACTERIA STL CULT: NORMAL
C JEJUNI+C COLI AG STL QL: NORMAL
E COLI SXT1+2 STL IA: NORMAL
SIGNIFICANT IND 70042: NORMAL
SITE SITE: NORMAL
SOURCE SOURCE: NORMAL

## 2023-03-01 ENCOUNTER — APPOINTMENT (RX ONLY)
Dept: URBAN - METROPOLITAN AREA CLINIC 20 | Facility: CLINIC | Age: 73
Setting detail: DERMATOLOGY
End: 2023-03-01

## 2023-03-01 DIAGNOSIS — D22 MELANOCYTIC NEVI: ICD-10-CM

## 2023-03-01 DIAGNOSIS — L82.1 OTHER SEBORRHEIC KERATOSIS: ICD-10-CM

## 2023-03-01 DIAGNOSIS — L81.4 OTHER MELANIN HYPERPIGMENTATION: ICD-10-CM

## 2023-03-01 DIAGNOSIS — Z85.828 PERSONAL HISTORY OF OTHER MALIGNANT NEOPLASM OF SKIN: ICD-10-CM

## 2023-03-01 DIAGNOSIS — D18.0 HEMANGIOMA: ICD-10-CM

## 2023-03-01 DIAGNOSIS — Z71.89 OTHER SPECIFIED COUNSELING: ICD-10-CM

## 2023-03-01 DIAGNOSIS — L57.0 ACTINIC KERATOSIS: ICD-10-CM

## 2023-03-01 PROBLEM — D18.01 HEMANGIOMA OF SKIN AND SUBCUTANEOUS TISSUE: Status: ACTIVE | Noted: 2023-03-01

## 2023-03-01 PROBLEM — D22.61 MELANOCYTIC NEVI OF RIGHT UPPER LIMB, INCLUDING SHOULDER: Status: ACTIVE | Noted: 2023-03-01

## 2023-03-01 PROBLEM — D22.5 MELANOCYTIC NEVI OF TRUNK: Status: ACTIVE | Noted: 2023-03-01

## 2023-03-01 PROBLEM — D22.72 MELANOCYTIC NEVI OF LEFT LOWER LIMB, INCLUDING HIP: Status: ACTIVE | Noted: 2023-03-01

## 2023-03-01 PROBLEM — D48.5 NEOPLASM OF UNCERTAIN BEHAVIOR OF SKIN: Status: ACTIVE | Noted: 2023-03-01

## 2023-03-01 PROBLEM — D22.39 MELANOCYTIC NEVI OF OTHER PARTS OF FACE: Status: ACTIVE | Noted: 2023-03-01

## 2023-03-01 PROBLEM — D22.62 MELANOCYTIC NEVI OF LEFT UPPER LIMB, INCLUDING SHOULDER: Status: ACTIVE | Noted: 2023-03-01

## 2023-03-01 PROBLEM — D22.71 MELANOCYTIC NEVI OF RIGHT LOWER LIMB, INCLUDING HIP: Status: ACTIVE | Noted: 2023-03-01

## 2023-03-01 PROCEDURE — ? ADDITIONAL NOTES

## 2023-03-01 PROCEDURE — ? BIOPSY BY SHAVE METHOD

## 2023-03-01 PROCEDURE — 17003 DESTRUCT PREMALG LES 2-14: CPT

## 2023-03-01 PROCEDURE — 11103 TANGNTL BX SKIN EA SEP/ADDL: CPT

## 2023-03-01 PROCEDURE — ? LIQUID NITROGEN

## 2023-03-01 PROCEDURE — 17000 DESTRUCT PREMALG LESION: CPT | Mod: 59

## 2023-03-01 PROCEDURE — 99213 OFFICE O/P EST LOW 20 MIN: CPT | Mod: 25

## 2023-03-01 PROCEDURE — ? OBSERVATION

## 2023-03-01 PROCEDURE — ? OBSERVATION AND MEASURE

## 2023-03-01 PROCEDURE — ? COUNSELING

## 2023-03-01 PROCEDURE — ? SUNSCREEN RECOMMENDATIONS

## 2023-03-01 PROCEDURE — 11102 TANGNTL BX SKIN SINGLE LES: CPT

## 2023-03-01 ASSESSMENT — LOCATION SIMPLE DESCRIPTION DERM
LOCATION SIMPLE: RIGHT POSTERIOR UPPER ARM
LOCATION SIMPLE: RIGHT PRETIBIAL REGION
LOCATION SIMPLE: RIGHT CHEEK
LOCATION SIMPLE: RIGHT POSTERIOR THIGH
LOCATION SIMPLE: LEFT FOREARM
LOCATION SIMPLE: RIGHT UPPER BACK
LOCATION SIMPLE: RIGHT LOWER BACK
LOCATION SIMPLE: RIGHT FOREHEAD
LOCATION SIMPLE: UPPER BACK
LOCATION SIMPLE: RIGHT FOREARM
LOCATION SIMPLE: LEFT POSTERIOR UPPER ARM
LOCATION SIMPLE: LEFT CHEEK
LOCATION SIMPLE: LEFT POSTERIOR THIGH
LOCATION SIMPLE: POSTERIOR NECK
LOCATION SIMPLE: LEFT PRETIBIAL REGION

## 2023-03-01 ASSESSMENT — LOCATION DETAILED DESCRIPTION DERM
LOCATION DETAILED: RIGHT VENTRAL PROXIMAL FOREARM
LOCATION DETAILED: LEFT PROXIMAL POSTERIOR UPPER ARM
LOCATION DETAILED: RIGHT DISTAL POSTERIOR UPPER ARM
LOCATION DETAILED: LEFT DISTAL POSTERIOR THIGH
LOCATION DETAILED: INFERIOR THORACIC SPINE
LOCATION DETAILED: RIGHT SUPERIOR CENTRAL BUCCAL CHEEK
LOCATION DETAILED: LEFT SUPERIOR CENTRAL MALAR CHEEK
LOCATION DETAILED: LEFT LATERAL PROXIMAL PRETIBIAL REGION
LOCATION DETAILED: LEFT VENTRAL PROXIMAL FOREARM
LOCATION DETAILED: RIGHT INFERIOR MEDIAL MIDBACK
LOCATION DETAILED: MID POSTERIOR NECK
LOCATION DETAILED: RIGHT DISTAL POSTERIOR THIGH
LOCATION DETAILED: RIGHT INFERIOR LATERAL MALAR CHEEK
LOCATION DETAILED: RIGHT INFERIOR MEDIAL UPPER BACK
LOCATION DETAILED: RIGHT PROXIMAL PRETIBIAL REGION
LOCATION DETAILED: RIGHT INFERIOR FOREHEAD
LOCATION DETAILED: RIGHT SUPERIOR UPPER BACK
LOCATION DETAILED: RIGHT PROXIMAL RADIAL DORSAL FOREARM
LOCATION DETAILED: RIGHT INFERIOR CENTRAL MALAR CHEEK

## 2023-03-01 ASSESSMENT — LOCATION ZONE DERM
LOCATION ZONE: TRUNK
LOCATION ZONE: ARM
LOCATION ZONE: LEG
LOCATION ZONE: FACE
LOCATION ZONE: NECK

## 2023-03-01 NOTE — PROCEDURE: LIQUID NITROGEN
Detail Level: Detailed
Render Post-Care Instructions In Note?: yes
Render Note In Bullet Format When Appropriate: No
Number Of Freeze-Thaw Cycles: 2 freeze-thaw cycles
Post-Care Instructions: I reviewed with the patient in detail post-care instructions. Patient is to wear sunprotection, and avoid picking at any of the treated lesions. Pt may apply Vaseline to crusted or scabbing areas.
Duration Of Freeze Thaw-Cycle (Seconds): 10
Consent: The patient's consent was obtained including but not limited to risks of crusting, scabbing, blistering, scarring, darker or lighter pigmentary change, recurrence, incomplete removal and infection. RTC in 2 months if lesion(s) persistent.

## 2023-03-10 ENCOUNTER — OFFICE VISIT (OUTPATIENT)
Dept: PHYSICAL MEDICINE AND REHAB | Facility: MEDICAL CENTER | Age: 73
End: 2023-03-10
Payer: MEDICARE

## 2023-03-10 VITALS
DIASTOLIC BLOOD PRESSURE: 80 MMHG | TEMPERATURE: 97 F | HEART RATE: 99 BPM | WEIGHT: 248 LBS | HEIGHT: 72 IN | SYSTOLIC BLOOD PRESSURE: 130 MMHG | BODY MASS INDEX: 33.59 KG/M2 | OXYGEN SATURATION: 93 %

## 2023-03-10 DIAGNOSIS — M17.12 PRIMARY OSTEOARTHRITIS OF LEFT KNEE: ICD-10-CM

## 2023-03-10 DIAGNOSIS — M43.16 SPONDYLOLISTHESIS OF LUMBAR REGION: ICD-10-CM

## 2023-03-10 DIAGNOSIS — E66.9 OBESITY (BMI 30-39.9): ICD-10-CM

## 2023-03-10 DIAGNOSIS — M48.061 SPINAL STENOSIS OF LUMBAR REGION, UNSPECIFIED WHETHER NEUROGENIC CLAUDICATION PRESENT: ICD-10-CM

## 2023-03-10 PROCEDURE — 99214 OFFICE O/P EST MOD 30 MIN: CPT | Performed by: PHYSICAL MEDICINE & REHABILITATION

## 2023-03-10 RX ORDER — MESALAMINE 1.2 G/1
4.8 TABLET, DELAYED RELEASE ORAL
COMMUNITY
Start: 2023-01-31 | End: 2024-02-13

## 2023-03-10 ASSESSMENT — PAIN SCALES - GENERAL: PAINLEVEL: 8=MODERATE-SEVERE PAIN

## 2023-03-10 ASSESSMENT — FIBROSIS 4 INDEX: FIB4 SCORE: 1.15

## 2023-03-10 ASSESSMENT — PATIENT HEALTH QUESTIONNAIRE - PHQ9: CLINICAL INTERPRETATION OF PHQ2 SCORE: 0

## 2023-03-10 NOTE — PROGRESS NOTES
Follow-up patient note, patient previously seen by Dr. Porter     Physiatry (physical medicine and  Rehabilitation), interventional spine and sports medicine    Date of Service:03/10/2023    Chief complaint:   Chief Complaint   Patient presents with    Follow-Up     Knee pain       HISTORY    HPI: Curt Blair 72 y.o. male who presents today for follow-up evaluation.    Curt reports that his left knee has been bothering him more again.  Cramping and aching in the left knee.  Pain is 8/10 on the NRS.  Left knee injection had been helpful in the past.  None recently.  Had positive left genicular nerve block [id number], but did not have RFA.  Low back pain is not particularly bothering him at this time.     Duloxetine unchanged, not helping with cramping as much.    Resumed diclofenac, celebrex was not helpful.    Reports flare of colitis again.      Medical records review:    Reviewed records from Dr Clovis Alicea 07/2013 At that visit, he assessed that MRI of left knee was indicated.  On 07/30/2013, they reviewed the study and the patient had a left knee corticosteroid injection    Previous treatments:    Physical Therapy: Not recently    Medications the patient is tried: NSAIDs    Previous interventions: none, recently     Previous surgeries to relieve the above pain:  none      ROS:   Eyes: cataract surgery, recently  CV: history of MI at 41  Red Flags ROS:   Fever, Chills, Sweats: Denies  Involuntary Weight Loss: Denies  Bladder Incontinence: Denies  Bowel Incontinence: Denies  Saddle Anesthesia: Denies    All other systems reviewed and negative.       PMHx:   Past Medical History:   Diagnosis Date    Allergies 12/24/2019    Arteriosclerotic vascular disease 1/25/2019    Arthritis     Right Foot    CAD (coronary artery disease)     Elevated CPK 8/16/2020    Generalized anxiety disorder 10/11/2017    Hyperlipidemia     Hypertension     Inflamed sebaceous cyst 2/15/2018    Intrinsic eczema 2/7/2020     Prediabetes 7/19/2019    S/P coronary artery stent placement     2 stents       PSHx:   Past Surgical History:   Procedure Laterality Date    NM INJ LUMBAR/SACRAL,W/ IMAGING Right 12/2/2021    Procedure: RIGHT L4-5 interlaminar epidural steroid injection with sedation;  Surgeon: Sav Rick M.D.;  [facility];  Service: Pain Management    NM INJ LUMBAR/SACRAL,W/ IMAGING Right 7/28/2021    Procedure: RIGHT L4-5 interlaminar epidural steroid injection with sedation;  Surgeon: Sav Rick M.D.;  Location: [facility];  Service: Pain Management    NM FLUOROSCOPIC GUIDANCE NEEDLE PLACEMENT Left 6/16/2021    Procedure: LEFT genicular nerve blocks, diagnostic;  Surgeon: Sav Rick M.D.;  Location: [facility];  Service: Pain Management    NM INJ AA/STRD GNCLR NRV BRNCH W/IMG Left 6/16/2021    Procedure: GENICULAR NERVE BRANCHES INCLUDING IMAGING GUIDANCE WITH A REQUIRED MINIMUM THREE NERVE BRANCHES;  Surgeon: Sav Rick M.D.;  Location: [facility];  Service: Pain Management    APPENDECTOMY      CHOLECYSTECTOMY      ENDARTERECTOMY      corornary    STENT PLACEMENT      VASECTOMY         Family history   Family History   Problem Relation Age of Onset    Lung Disease Mother         copd    Hypertension Mother     Heart Disease Father         heart attack and heart disease    Cancer Brother         neck    Heart Disease Paternal Grandfather         Heart attack    Other Sister         non-malignant brain tumor    Diabetes Neg Hx          Medications:   Current Outpatient Medications   Medication    diclofenac DR (VOLTAREN) 75 MG Tablet Delayed Response    azithromycin (ZITHROMAX Z-JURGEN) 250 MG Tab    albuterol 108 (90 Base) MCG/ACT Aero Soln inhalation aerosol    DULoxetine (CYMBALTA) 60 MG Cap DR Particles delayed-release capsule    ALPRAZolam (XANAX) 0.5 MG Tab    budesonide (ENTOCORT EC) 3 MG Cap DR Particles capsule     amlodipine-benazepril (LOTREL) 5-10 MG per capsule    nitroglycerin (NITROSTAT) 0.4 MG SL Tab    atorvastatin (LIPITOR) 10 MG Tab    Multiple Vitamins-Minerals (MULTIVITAMIN PO)    CALCIUM PO    aspirin 81 MG tablet    mesalamine (LIALDA) 1.2 GM Tablet Delayed Response     No current facility-administered medications for this visit.       Allergies:   Allergies   Allergen Reactions    Atorvastatin Myalgia     Ok to take 10 mg  Elevated CPK  Only at 40 Mg does this happen.    Lyrica [Pregabalin]     Rosuvastatin      muscle cramps elevated CPK       Social Hx:   Social History     Socioeconomic History    Marital status:      Spouse name: Not on file    Number of children: Not on file    Years of education: Not on file    Highest education level: 12th grade   Occupational History    Not on file   Tobacco Use    Smoking status: Former     Packs/day: 2.00     Years: 25.00     Pack years: 50.00     Types: Cigarettes     Quit date: 1992     Years since quittin.2    Smokeless tobacco: Never    Tobacco comments:     avoid all tobacco products   Vaping Use    Vaping Use: Never used   Substance and Sexual Activity    Alcohol use: Yes     Alcohol/week: 1.2 oz     Types: 2 Standard drinks or equivalent per week     Comment: 1-2 DRINKS WEEKLY     Drug use: No    Sexual activity: Yes     Partners: Female     Comment: , 3 children   Other Topics Concern     Service Yes    Blood Transfusions No    Caffeine Concern No    Occupational Exposure No    Hobby Hazards No    Sleep Concern Yes    Stress Concern Yes    Weight Concern Yes    Special Diet No     Comment: no fried    Back Care Yes    Exercise Yes    Bike Helmet No     Comment: does not ride bike    Seat Belt Not Asked    Self-Exams Yes   Social History Narrative    He was born in [place]. He worked in the [employer] industry, he retired at age 65. He  Luann in 2016 after both of their spouses passed away. He has children in [employer] and  several grandchildren and great grandchildren. He is an avid traveler.     Social Determinants of Health     Financial Resource Strain: Not on file   Food Insecurity: Not on file   Transportation Needs: Not on file   Physical Activity: Not on file   Stress: Not on file   Social Connections: Not on file   Intimate Partner Violence: Not on file   Housing Stability: Not on file         EXAMINATION     Physical Exam:   Vitals: /80 (BP Location: Right arm, Patient Position: Sitting, BP Cuff Size: Adult long)   Pulse 99   Temp 36.1 °C (97 °F) (Temporal)   Ht 1.829 m (6')   Wt 112 kg (248 lb)   SpO2 93%     Constitutional:   Body Habitus: Body mass index is 33.63 kg/m².  Cooperation: Fully cooperates with exam  Appearance: Well-groomed, well-nourished, not disheveled, in no acute distress    Eyes: No scleral icterus, no proptosis     ENT -no obvious auditory deficits, wearing a face mask    Skin -no rashes or lesions noted    Respiratory-  breathing comfortable on room air, no audible wheezing    Cardiovascular- No pitting edema lower extremity edema is noted.     Psychiatric- alert and oriented ×3. Normal affect.     Gait - normal gait, no use of ambulatory device, minimally antalgic.     Spine  No focal motor or sensory deficits in the lower extremities bilaterally    Seated SLR is negative bilaterally    Left knee  Mild effusion, tenderness medial and lateral joint line.  No medial or lateral instability.    MEDICAL DECISION MAKING    Medical records review: see under HPI section.     DATA    Labs:   Lab Results   Component Value Date/Time    SODIUM 137 09/22/2022 07:09 AM    POTASSIUM 4.3 09/22/2022 07:09 AM    CHLORIDE 101 09/22/2022 07:09 AM    CO2 25 09/22/2022 07:09 AM    ANION 11.0 09/22/2022 07:09 AM    GLUCOSE 103 (H) 09/22/2022 07:09 AM    BUN 18 09/22/2022 07:09 AM    CREATININE 0.85 09/22/2022 07:09 AM    CREATININE 0.93 02/20/2013 07:20 AM    CALCIUM 9.1 09/22/2022 07:09 AM    ASTSGOT 20  09/22/2022 07:09 AM    ALTSGPT 25 09/22/2022 07:09 AM    TBILIRUBIN 0.6 09/22/2022 07:09 AM    ALBUMIN 4.2 09/22/2022 07:09 AM    TOTPROTEIN 7.6 09/22/2022 07:09 AM    GLOBULIN 3.4 09/22/2022 07:09 AM    AGRATIO 1.2 09/22/2022 07:09 AM       Lab Results   Component Value Date/Time    PROTHROMBTM 14.8 (H) 12/05/2018 07:00 AM    INR 1.17 (H) 12/05/2018 07:00 AM        Lab Results   Component Value Date/Time    WBC 5.6 09/22/2022 07:09 AM    RBC 5.04 09/22/2022 07:09 AM    HEMOGLOBIN 15.6 09/22/2022 07:09 AM    HEMATOCRIT 46.7 09/22/2022 07:09 AM    MCV 92.7 09/22/2022 07:09 AM    MCH 31.0 09/22/2022 07:09 AM    MCHC 33.4 (L) 09/22/2022 07:09 AM    MPV 10.3 09/22/2022 07:09 AM    NEUTSPOLYS 63.50 09/22/2022 07:09 AM    LYMPHOCYTES 16.70 (L) 09/22/2022 07:09 AM    MONOCYTES 17.60 (H) 09/22/2022 07:09 AM    EOSINOPHILS 1.10 09/22/2022 07:09 AM    BASOPHILS 0.70 09/22/2022 07:09 AM        Lab Results   Component Value Date/Time    HBA1C 6.1 (H) 09/22/2022 07:09 AM        Imaging: I personally reviewed following images, these are my reads  MRI lumbar spine 07/26/2021  At L1-2, no central or foraminal stenosis  At L2-3, disc bulge, facet arthropathy and mild foraminal stenosis bilaterally  At L3-4, moderate central canal stenosis, mod-severe foraminal stenosis bilaterally, facet arthropathy  At L4-5, grade I anterolisthesis, disc bulge, severe facet arthropathy, moderate foraminal stenosis bilaterally  At L5-S1, mod-severe facet arthropathy with right paracentral annular tear.  No central or foraminal stenosis      MRI lumbar spine 10/29/2020  At L5-S1, there is bilateral facet arthropathy without central canal narrowing.  Mild foraminal stenosis bilaterally   At L4-5, mild central canal stenosis second to disc bulge and severe facet arthropathy.  Moderate foraminal stenosis bilaterally  At L3-4, disc bulge with moderate facet arthropathy and mild-moderate central canal stenosis.  Severe foraminal stenosis bilaterally  At  L2-3, posterior disc bulge with facet arthropathy and mild central canal narrowing.  Mild foraminal stenosis bilaterally  At L1-2, mild facet arthropathy bilaterally, mild foraminal stenosis bilaterally.  Prominent epidural fat    Xray lumbar spine 08/07/2020  There is multilevel retrolisthesis at L1-2, L2-3, and L3-4  Grade I anterolisthesis at L4-5, stable on flexion and extension  Degenerative changes with multilevel facet arthropathy    Xray lumbar spine 10/04/2018  There is severe degenerative change in the lumbar spine; there is note of multilevel retrolisthesis at L1-2, L3-4 and L2-3  Mild movement of anterolisthesis at L4-5 with flexion    MRI left knee 07/10/2020  There is note of a small joint effusion.  Cyst posterior to the PCL.  No ligamentous or meniscal tears  Tricompartmental degenerative changes, greatest medial compartment    Xray hip right 10/04/2018  Moderate hip osteoarthritis noted bilaterally     MRI left knee 07/22/2013  There is note of moderate joint effusion.  The medial collateral ligament has mild edema.  There is note of osteophytes in the medial and lateral compartments.  Tendinopathy in the quadriceps tendon noted.    IMAGING radiology reads. I reviewed the following radiology reads     Ultrasound right lower extremity April 8, 2022     PROCEDURES:   Right lower extremity venous duplex imaging.    The following venous structures were evaluated: common femoral, deep    femoral, proximal great saphenous, femoral, popliteal, peroneal, and    posterior tibial veins.    Serial compression, color, and spectral Doppler flow evaluations were    performed.       FINDINGS:   RIGHT LEG:   No deep venous thrombosis.    All veins demonstrate complete color filling and compressibility with    normal venous flow dynamics including spontaneous flow and respiratory    phasicity.    There is a non-vascular anechoic area in the right popliteal fossa    measuring 3.8 x 1.3 cm.    MRI lumbar spine  07/26/2021     IMPRESSION:     1.  New right paracentral L5/S1 annular tear.  2.  Otherwise no significant change in moderate spondylosis with up to moderate to severe L3/4 foraminal stenoses  3.  Greatest the central stenoses are mild to moderate at L3/4  4.  Facet arthropathy greater than degenerative disc disease  5.  Degenerative L4/5 anterolisthesis where there is facet arthropathy and a moderate right facet joint effusion as before    MRI lumbar spine 10/29/2020  IMPRESSION:  1.  Minimal anterolisthesis of L4-5.  2.  Multilevel degenerative disease and facet arthropathy with resultant central canal and foraminal narrowing as described.    Xray lumbar spine 08/07/2020  IMPRESSION:  1.  Multilevel degenerative disc disease and facet degeneration.  2.  Mild anterior subluxation at L4-5. This is stable with flexion and extension.  3.  Again seen multilevel degenerative retrolisthesis. This does not change significantly extension.    Xray lumbar spine 10/04/2018  IMPRESSION:     Mild retrolisthesis of L1-2, L2-3 and L3-4 that are worse with extension.  Mild anterolisthesis of L4-5 that is worse with flexion.  There is partial disc space fusion of the lower thoracic spine.    MRI left knee 07/10/2020  IMPRESSION:  1.  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  2.  Intact ligaments and menisci.  3.  Small joint effusion.  4.  [place] synovial or ganglion cyst immediately posterior to the posterior cruciate ligament.      MRI left knee 07/22/2013       Impression        1. Tendinopathy of the quadriceps tendon.     2. Medial collateral ligament sprain.     3. Osteoarthritis, most severe in the medial compartment.     4. Fraying of the inner rim of the bodies of the lateral and medial menisci.     5. Moderate joint effusion.      Xray hips 10/04/2018  Impression:  No acute osseous abnormality                         Results for orders placed during the  hospital encounter of 10/04/18   DX-LUMBAR SPINE-4+ VIEWS    Impression Mild retrolisthesis of L1-2, L2-3 and L3-4 that are worse with extension.    Mild anterolisthesis of L4-5 that is worse with flexion.    There is partial disc space fusion of the lower thoracic spine.                                         Diagnosis   Visit Diagnoses     ICD-10-CM   1. Primary osteoarthritis of left knee  M17.12   2. Spondylolisthesis of lumbar region  M43.16   3. Spinal stenosis of lumbar region, unspecified whether neurogenic claudication present  M48.061   4. Obesity (BMI 30-39.9)  E66.9             ASSESSMENT:  Curt Blair 72 y.o. male seen for above     Curt was seen today for follow-up.    Diagnoses and all orders for this visit:    Primary osteoarthritis of left knee  -     Referral to Pain Clinic    Spondylolisthesis of lumbar region    Spinal stenosis of lumbar region, unspecified whether neurogenic claudication present    Obesity (BMI 30-39.9)  -     Patient identified as having weight management issue.  Appropriate orders and counseling given.        Discussed left knee intra-articular joint injection with ultrasound.  The risks benefits and alternatives to this procedure were discussed and the patient wishes to proceed with the procedure. Risks include but are not limited to damage to surrounding structures, infection, bleeding, worsening of pain which can be permanent, weakness which can be permanent. Benefits include pain relief, improved function. Alternatives includes not doing the procedure.  He would like to try injection.  If this is not successful, we can consider genicular nerve block and possible RFA.  He would like to avoid surgery.  Continue duloxetine.  Continue to work on weight management.  Back pain is stable.  Activity as tolerated.      Follow-up: Return for Office injection.       Thank you very much for asking me to participate in Curt Blair's care.  Please contact me with any  questions or concerns.    Please note that this dictation was created using voice recognition software. I have made every reasonable attempt to correct obvious errors but there may be errors of grammar and content that I may have overlooked prior to finalization of this note.      Sav Rick MD  Physical Medicine and Rehabilitation  Interventional Spine and Sports Physiatry  [facility]

## 2023-03-17 ENCOUNTER — OFFICE VISIT (OUTPATIENT)
Dept: PHYSICAL MEDICINE AND REHAB | Facility: MEDICAL CENTER | Age: 73
End: 2023-03-17
Payer: MEDICARE

## 2023-03-17 VITALS
SYSTOLIC BLOOD PRESSURE: 124 MMHG | BODY MASS INDEX: 33.29 KG/M2 | HEART RATE: 93 BPM | DIASTOLIC BLOOD PRESSURE: 64 MMHG | WEIGHT: 245.8 LBS | OXYGEN SATURATION: 95 % | TEMPERATURE: 96.9 F | HEIGHT: 72 IN

## 2023-03-17 DIAGNOSIS — M17.12 PRIMARY OSTEOARTHRITIS OF LEFT KNEE: ICD-10-CM

## 2023-03-17 PROCEDURE — 20611 DRAIN/INJ JOINT/BURSA W/US: CPT | Mod: LT | Performed by: PHYSICAL MEDICINE & REHABILITATION

## 2023-03-17 RX ORDER — DEXAMETHASONE SODIUM PHOSPHATE 4 MG/ML
4 INJECTION, SOLUTION INTRA-ARTICULAR; INTRALESIONAL; INTRAMUSCULAR; INTRAVENOUS; SOFT TISSUE ONCE
Status: COMPLETED | OUTPATIENT
Start: 2023-03-17 | End: 2023-03-17

## 2023-03-17 RX ADMIN — DEXAMETHASONE SODIUM PHOSPHATE 4 MG: 4 INJECTION, SOLUTION INTRA-ARTICULAR; INTRALESIONAL; INTRAMUSCULAR; INTRAVENOUS; SOFT TISSUE at 16:30

## 2023-03-17 ASSESSMENT — FIBROSIS 4 INDEX: FIB4 SCORE: 1.15

## 2023-03-17 ASSESSMENT — PAIN SCALES - GENERAL: PAINLEVEL: 7=MODERATE-SEVERE PAIN

## 2023-03-17 NOTE — PROCEDURES
Date of Service: 3/17/2023    Physician/s: Sav Rick MD    Pre-operative Diagnosis:  M17.12 (ICD-10-CM) - Primary osteoarthritis of left knee    Post-operative Diagnosis:  M17.12 (ICD-10-CM) - Primary osteoarthritis of left knee    Procedure: left knee injection ultrasound-guided    Description of procedure:    The risks, benefits, and alternatives of the procedure were reviewed and discussed with the patient.  Written informed consent was freely obtained. A pre-procedural time-out was conducted by the physician verifying patient’s identity, procedure to be performed, procedure site and side, and allergy verification. Appropriate equipment was determined to be in place for the procedure.     No sedation was used for this procedure.     In the office suite the patient was placed in a supine position, and the knee was prepped and draped in the usual sterile fashion. The ultrasound probe was placed over the suprapatellar joint recess.  The target for injection was marked laterally, and a 27g 1.25 inch needle was placed into skin and advanced under ultrasound guidance into the joint space. Following negative aspiration, approx 3ml of 2% lidocaine, 1ml of preservative-free normal saline and 1cc of 4mg/mL dexamethasone was then injected into the joint, and the needle was subsequently removed intact. The patient's knee was wiped with a 4x4 gauze, the area was cleansed with alcohol prep, and a bandaid was applied. There were no complications noted.     The patient noted improvement in his knee pain prior to discharge, although he did report some cramping that he has noted previously in the knee.   Ultrasound was used, but images could not be saved and so will not be charged.    Sav Rick MD  Physical Medicine and Rehabilitation  Interventional Spine and Sports Physiatry  [facility]

## 2023-03-26 NOTE — ASSESSMENT & PLAN NOTE
History reviewed. No pertinent past medical history.  No past surgical history on file.  No family history on file.  Social History     Tobacco Use    Smoking status: Former   Substance Use Topics    Alcohol use: Yes     Review of patient's allergies indicates:  No Known Allergies    Medications: I have reviewed the current medication administration record.    Current Outpatient Medications   Medication Instructions    buPROPion (WELLBUTRIN XL) 150 mg, Daily         Review of Systems   Unable to perform ROS: Patient nonverbal   HENT:  Positive for drooling.    Gastrointestinal:  Negative for diarrhea and vomiting.   Neurological:  Positive for facial asymmetry, speech difficulty, weakness and numbness.   Psychiatric/Behavioral:  Positive for confusion.    Objective:     Vital Signs (Most Recent):  Pulse: 69 (03/26/23 0204)  Resp: (!) 37 (03/26/23 0204)  BP: (!) 165/82 (03/26/23 0204)  SpO2: 96 % (03/26/23 0204)    Vital Signs Range (Last 24H):  Pulse:  [69]   Resp:  [16-37]   BP: (150-165)/(82-83)   SpO2:  [95 %-96 %]     Physical Exam  Vitals and nursing note reviewed.   Constitutional:       General: He is not in acute distress.     Appearance: He is well-developed. He is not diaphoretic.   HENT:      Head: Normocephalic and atraumatic.      Right Ear: External ear normal.      Left Ear: External ear normal.      Nose: Nose normal.      Mouth/Throat:      Mouth: Mucous membranes are moist.   Eyes:      General: No scleral icterus.        Right eye: No discharge.         Left eye: No discharge.      Extraocular Movements:      Right eye: Abnormal extraocular motion present.      Left eye: Abnormal extraocular motion present.      Conjunctiva/sclera: Conjunctivae normal.      Pupils: Pupils are equal, round, and reactive to light.      Comments: Left gaze preference   Cardiovascular:      Rate and Rhythm: Normal rate and regular rhythm.      Heart sounds: Normal heart sounds.   Pulmonary:      Effort: Pulmonary  States currently he is back taking diclofenac.  States Celebrex was not effective.  Took it enough weeks to know.  States that diclofenac works well.   effort is normal. No respiratory distress.   Abdominal:      General: There is no distension.      Palpations: Abdomen is soft.      Tenderness: There is no abdominal tenderness.   Musculoskeletal:      Cervical back: Normal range of motion and neck supple.   Skin:     General: Skin is warm and dry.      Capillary Refill: Capillary refill takes less than 2 seconds.   Neurological:      Mental Status: He is alert. He is disoriented.      Cranial Nerves: Cranial nerve deficit present.      Sensory: Sensory deficit present.      Motor: Weakness present.       Neurological Exam:   LOC: alert  Attention Span: Good   Language: Expressive aphasia  Articulation: Mute/Anarthric  Orientation: Untestable due to severe aphasia   Visual Fields: Hemianopsia right  EOM (CN III, IV, VI): Gaze preference  left  Pupils (CN II, III): PERRL  Facial Movement (CN VII): Lower facial weakness on the Right  Motor: Arm left  Normal 5/5  Leg left  Normal 5/5  Arm right  Plegia 0/5  Leg right Paresis: 2/5  Sensation: Alex-anesthesia right      Laboratory:  CMP: No results for input(s): GLUCOSE, CALCIUM, ALBUMIN, PROT, NA, K, CO2, CL, BUN, CREATININE, ALKPHOS, ALT, AST, BILITOT in the last 24 hours.  CBC:   Recent Labs   Lab 03/26/23  0143   WBC 5.84   RBC 4.31*   HGB 12.8*   HCT 37.5*      MCV 87   MCH 29.7   MCHC 34.1     Lipid Panel: No results for input(s): CHOL, LDLCALC, HDL, TRIG in the last 168 hours.  Coagulation: No results for input(s): PT, INR, APTT in the last 168 hours.  Hgb A1C: No results for input(s): HGBA1C in the last 168 hours.  TSH: No results for input(s): TSH in the last 168 hours.    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    Cardiac Evaluation:   TTE pending

## 2023-04-07 ENCOUNTER — OFFICE VISIT (OUTPATIENT)
Dept: PHYSICAL MEDICINE AND REHAB | Facility: MEDICAL CENTER | Age: 73
End: 2023-04-07
Payer: MEDICARE

## 2023-04-07 VITALS
WEIGHT: 249.4 LBS | BODY MASS INDEX: 33.78 KG/M2 | HEIGHT: 72 IN | OXYGEN SATURATION: 94 % | TEMPERATURE: 95.5 F | SYSTOLIC BLOOD PRESSURE: 126 MMHG | DIASTOLIC BLOOD PRESSURE: 54 MMHG | HEART RATE: 89 BPM

## 2023-04-07 DIAGNOSIS — E66.9 OBESITY (BMI 30-39.9): ICD-10-CM

## 2023-04-07 DIAGNOSIS — M48.061 SPINAL STENOSIS OF LUMBAR REGION, UNSPECIFIED WHETHER NEUROGENIC CLAUDICATION PRESENT: ICD-10-CM

## 2023-04-07 DIAGNOSIS — M17.12 PRIMARY OSTEOARTHRITIS OF LEFT KNEE: ICD-10-CM

## 2023-04-07 DIAGNOSIS — M43.16 SPONDYLOLISTHESIS OF LUMBAR REGION: ICD-10-CM

## 2023-04-07 PROCEDURE — 99214 OFFICE O/P EST MOD 30 MIN: CPT | Performed by: PHYSICAL MEDICINE & REHABILITATION

## 2023-04-07 ASSESSMENT — FIBROSIS 4 INDEX: FIB4 SCORE: 1.15

## 2023-04-07 ASSESSMENT — PAIN SCALES - GENERAL: PAINLEVEL: 8=MODERATE-SEVERE PAIN

## 2023-04-07 ASSESSMENT — PATIENT HEALTH QUESTIONNAIRE - PHQ9: CLINICAL INTERPRETATION OF PHQ2 SCORE: 0

## 2023-04-07 NOTE — PROGRESS NOTES
Follow-up patient note, patient previously seen by Dr. Porter     Physiatry (physical medicine and  Rehabilitation), interventional spine and sports medicine    Date of Service:04/07/2023    Chief complaint:   Chief Complaint   Patient presents with    Follow-Up     Primary osteoarthritis of left knee       HISTORY    HPI: Curt Blair 72 y.o. male who presents today for follow-up evaluation.    Curt reports that the left knee injection helped for a few days and then pain returned.  He continues to have cramping and aching in the left knee.  No significant back pain at this time.  Pain is 8/10 on the NRS.    Continues to take duloxetine and diclofenac.  Celebrex was not helpful for hand pain.    Had positive left genicular nerve block [id number], but did not have RFA.      Medical records review:    Reviewed records from Dr Clovis Alicea 07/2013 At that visit, he assessed that MRI of left knee was indicated.  On 07/30/2013, they reviewed the study and the patient had a left knee corticosteroid injection    Previous treatments:    Physical Therapy: Not recently    Medications the patient is tried: NSAIDs    Previous interventions: none, recently     Previous surgeries to relieve the above pain:  none      ROS:   Eyes: cataract surgery, recently  CV: history of MI at 41  Red Flags ROS:   Fever, Chills, Sweats: Denies  Involuntary Weight Loss: Denies  Bladder Incontinence: Denies  Bowel Incontinence: Denies  Saddle Anesthesia: Denies    All other systems reviewed and negative.       PMHx:   Past Medical History:   Diagnosis Date    Allergies 12/24/2019    Arteriosclerotic vascular disease 1/25/2019    Arthritis     Right Foot    CAD (coronary artery disease)     Elevated CPK 8/16/2020    Generalized anxiety disorder 10/11/2017    Hyperlipidemia     Hypertension     Inflamed sebaceous cyst 2/15/2018    Intrinsic eczema 2/7/2020    Prediabetes 7/19/2019    S/P coronary artery stent placement     2 stents        PSHx:   Past Surgical History:   Procedure Laterality Date    [place] INJ LUMBAR/SACRAL,W/ IMAGING Right 12/2/2021    Procedure: RIGHT L4-5 interlaminar epidural steroid injection with sedation;  Surgeon: Sav Rick M.D.;  [facility];  Service: Pain Management    WV INJ LUMBAR/SACRAL,W/ IMAGING Right 7/28/2021    Procedure: RIGHT L4-5 interlaminar epidural steroid injection with sedation;  Surgeon: Sav Rick M.D.;  Location[facility];  Service: Pain Management    WV FLUOROSCOPIC GUIDANCE NEEDLE PLACEMENT Left 6/16/2021    Procedure: LEFT genicular nerve blocks, diagnostic;  Surgeon: Sav Rick M.D.;  Location: [facility];  Service: Pain Management    WV INJ AA/STRD GNCLR NRV BRNCH W/IMG Left 6/16/2021    Procedure: GENICULAR NERVE BRANCHES INCLUDING IMAGING GUIDANCE WITH A REQUIRED MINIMUM THREE NERVE BRANCHES;  Surgeon: Sav Rick M.D.;  Location[facility];  Service: Pain Management    APPENDECTOMY      CHOLECYSTECTOMY      ENDARTERECTOMY      corornary    STENT PLACEMENT      VASECTOMY         Family history   Family History   Problem Relation Age of Onset    Lung Disease Mother         copd    Hypertension Mother     Heart Disease Father         heart attack and heart disease    Cancer Brother         neck    Heart Disease Paternal Grandfather         Heart attack    Other Sister         non-malignant brain tumor    Diabetes Neg Hx          Medications:   Current Outpatient Medications   Medication    mesalamine (LIALDA) 1.2 GM Tablet Delayed Response    diclofenac DR (VOLTAREN) 75 MG Tablet Delayed Response    DULoxetine (CYMBALTA) 60 MG Cap DR Particles delayed-release capsule    ALPRAZolam (XANAX) 0.5 MG Tab    amlodipine-benazepril (LOTREL) 5-10 MG per capsule    nitroglycerin (NITROSTAT) 0.4 MG SL Tab    atorvastatin (LIPITOR) 10 MG Tab    CALCIUM PO    aspirin 81 MG tablet    Multiple  Vitamins-Minerals (MULTIVITAMIN PO)     No current facility-administered medications for this visit.       Allergies:   Allergies   Allergen Reactions    Atorvastatin Myalgia     Ok to take 10 mg  Elevated CPK  Only at 40 Mg does this happen.    Lyrica [Pregabalin]     Rosuvastatin      muscle cramps elevated CPK       Social Hx:   Social History     Socioeconomic History    Marital status:      Spouse name: Not on file    Number of children: Not on file    Years of education: Not on file    Highest education level: 12th grade   Occupational History    Not on file   Tobacco Use    Smoking status: Former     Packs/day: 2.00     Years: 25.00     Pack years: 50.00     Types: Cigarettes     Quit date: 1992     Years since quittin.2    Smokeless tobacco: Never    Tobacco comments:     avoid all tobacco products   Vaping Use    Vaping Use: Never used   Substance and Sexual Activity    Alcohol use: Yes     Alcohol/week: 1.2 oz     Types: 2 Standard drinks or equivalent per week     Comment: 1-2 DRINKS WEEKLY     Drug use: No    Sexual activity: Yes     Partners: Female     Comment: , 3 children   Other Topics Concern     Service Yes    Blood Transfusions No    Caffeine Concern No    Occupational Exposure No    Hobby Hazards No    Sleep Concern Yes    Stress Concern Yes    Weight Concern Yes    Special Diet No     Comment: no fried    Back Care Yes    Exercise Yes    Bike Helmet No     Comment: does not ride bike    Seat Belt Not Asked    Self-Exams Yes   Social History Narrative    He was born in [place]. He worked in the [employer] industry, he retired at age 65. He  Luann in 2016 after both of their spouses passed away. He has children in [place] and several grandchildren and great grandchildren. He is an avid traveler.     Social Determinants of Health     Financial Resource Strain: Not on file   Food Insecurity: Not on file   Transportation Needs: Not on file   Physical Activity:  Not on file   Stress: Not on file   Social Connections: Not on file   Intimate Partner Violence: Not on file   Housing Stability: Not on file         EXAMINATION     Physical Exam:   Vitals: /54 (BP Location: Right arm, Patient Position: Sitting, BP Cuff Size: Large adult)   Pulse 89   Temp (!) 35.3 °C (95.5 °F) (Temporal)   Ht 1.829 m (6')   Wt 113 kg (249 lb 6.4 oz)   SpO2 94%     Constitutional:   Body Habitus: Body mass index is 33.82 kg/m².  Cooperation: Fully cooperates with exam  Appearance: Well-groomed, well-nourished, not disheveled, in no acute distress    Eyes: No scleral icterus, no proptosis     ENT -no obvious auditory deficits, wearing a face mask    Skin -no rashes or lesions noted, no warmth or erythema was noted at the injection site    Respiratory-  breathing comfortable on room air, no audible wheezing    Cardiovascular- No pitting edema lower extremity edema is noted.     Psychiatric- alert and oriented ×3. Normal affect.     Gait - normal gait, no use of ambulatory device, minimally antalgic.     Spine  No focal motor or sensory deficits in the lower extremities bilaterally    Seated SLR is negative bilaterally    Left knee  Mild effusion, tenderness medial and lateral joint line.  No medial or lateral instability.    MEDICAL DECISION MAKING    Medical records review: see under HPI section.     DATA    Labs:   Lab Results   Component Value Date/Time    SODIUM 137 09/22/2022 07:09 AM    POTASSIUM 4.3 09/22/2022 07:09 AM    CHLORIDE 101 09/22/2022 07:09 AM    CO2 25 09/22/2022 07:09 AM    ANION 11.0 09/22/2022 07:09 AM    GLUCOSE 103 (H) 09/22/2022 07:09 AM    BUN 18 09/22/2022 07:09 AM    CREATININE 0.85 09/22/2022 07:09 AM    CREATININE 0.93 02/20/2013 07:20 AM    CALCIUM 9.1 09/22/2022 07:09 AM    ASTSGOT 20 09/22/2022 07:09 AM    ALTSGPT 25 09/22/2022 07:09 AM    TBILIRUBIN 0.6 09/22/2022 07:09 AM    ALBUMIN 4.2 09/22/2022 07:09 AM    TOTPROTEIN 7.6 09/22/2022 07:09 AM    GLOBULIN  3.4 09/22/2022 07:09 AM    AGRATIO 1.2 09/22/2022 07:09 AM       Lab Results   Component Value Date/Time    PROTHROMBTM 14.8 (H) 12/05/2018 07:00 AM    INR 1.17 (H) 12/05/2018 07:00 AM        Lab Results   Component Value Date/Time    WBC 5.6 09/22/2022 07:09 AM    RBC 5.04 09/22/2022 07:09 AM    HEMOGLOBIN 15.6 09/22/2022 07:09 AM    HEMATOCRIT 46.7 09/22/2022 07:09 AM    MCV 92.7 09/22/2022 07:09 AM    MCH 31.0 09/22/2022 07:09 AM    MCHC 33.4 (L) 09/22/2022 07:09 AM    MPV 10.3 09/22/2022 07:09 AM    NEUTSPOLYS 63.50 09/22/2022 07:09 AM    LYMPHOCYTES 16.70 (L) 09/22/2022 07:09 AM    MONOCYTES 17.60 (H) 09/22/2022 07:09 AM    EOSINOPHILS 1.10 09/22/2022 07:09 AM    BASOPHILS 0.70 09/22/2022 07:09 AM        Lab Results   Component Value Date/Time    HBA1C 6.1 (H) 09/22/2022 07:09 AM        Imaging: I personally reviewed following images, these are my reads  MRI lumbar spine 07/26/2021  At L1-2, no central or foraminal stenosis  At L2-3, disc bulge, facet arthropathy and mild foraminal stenosis bilaterally  At L3-4, moderate central canal stenosis, mod-severe foraminal stenosis bilaterally, facet arthropathy  At L4-5, grade I anterolisthesis, disc bulge, severe facet arthropathy, moderate foraminal stenosis bilaterally  At L5-S1, mod-severe facet arthropathy with right paracentral annular tear.  No central or foraminal stenosis      MRI lumbar spine 10/29/2020  At L5-S1, there is bilateral facet arthropathy without central canal narrowing.  Mild foraminal stenosis bilaterally   At L4-5, mild central canal stenosis second to disc bulge and severe facet arthropathy.  Moderate foraminal stenosis bilaterally  At L3-4, disc bulge with moderate facet arthropathy and mild-moderate central canal stenosis.  Severe foraminal stenosis bilaterally  At L2-3, posterior disc bulge with facet arthropathy and mild central canal narrowing.  Mild foraminal stenosis bilaterally  At L1-2, mild facet arthropathy bilaterally, mild  foraminal stenosis bilaterally.  Prominent epidural fat    Xray lumbar spine 08/07/2020  There is multilevel retrolisthesis at L1-2, L2-3, and L3-4  Grade I anterolisthesis at L4-5, stable on flexion and extension  Degenerative changes with multilevel facet arthropathy    Xray lumbar spine 10/04/2018  There is severe degenerative change in the lumbar spine; there is note of multilevel retrolisthesis at L1-2, L3-4 and L2-3  Mild movement of anterolisthesis at L4-5 with flexion    MRI left knee 07/10/2020  There is note of a small joint effusion.  Cyst posterior to the PCL.  No ligamentous or meniscal tears  Tricompartmental degenerative changes, greatest medial compartment    Xray hip right 10/04/2018  Moderate hip osteoarthritis noted bilaterally     MRI left knee 07/22/2013  There is note of moderate joint effusion.  The medial collateral ligament has mild edema.  There is note of osteophytes in the medial and lateral compartments.  Tendinopathy in the quadriceps tendon noted.    IMAGING radiology reads. I reviewed the following radiology reads     Ultrasound right lower extremity April 8, 2022     PROCEDURES:   Right lower extremity venous duplex imaging.    The following venous structures were evaluated: common femoral, deep    femoral, proximal great saphenous, femoral, popliteal, peroneal, and    posterior tibial veins.    Serial compression, color, and spectral Doppler flow evaluations were    performed.       FINDINGS:   RIGHT LEG:   No deep venous thrombosis.    All veins demonstrate complete color filling and compressibility with    normal venous flow dynamics including spontaneous flow and respiratory    phasicity.    There is a non-vascular anechoic area in the right popliteal fossa    measuring 3.8 x 1.3 cm.    MRI lumbar spine 07/26/2021     IMPRESSION:     1.  New right paracentral L5/S1 annular tear.  2.  Otherwise no significant change in moderate spondylosis with up to moderate to severe L3/4  foraminal stenoses  3.  Greatest the central stenoses are mild to moderate at L3/4  4.  Facet arthropathy greater than degenerative disc disease  5.  Degenerative L4/5 anterolisthesis where there is facet arthropathy and a moderate right facet joint effusion as before    MRI lumbar spine 10/29/2020  IMPRESSION:  1.  Minimal anterolisthesis of L4-5.  2.  Multilevel degenerative disease and facet arthropathy with resultant central canal and foraminal narrowing as described.    Xray lumbar spine 08/07/2020  IMPRESSION:  1.  Multilevel degenerative disc disease and facet degeneration.  2.  Mild anterior subluxation at L4-5. This is stable with flexion and extension.  3.  Again seen multilevel degenerative retrolisthesis. This does not change significantly extension.    Xray lumbar spine 10/04/2018  IMPRESSION:     Mild retrolisthesis of L1-2, L2-3 and L3-4 that are worse with extension.  Mild anterolisthesis of L4-5 that is worse with flexion.  There is partial disc space fusion of the lower thoracic spine.    MRI left knee 07/10/2020  IMPRESSION:  1.  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  2.  Intact ligaments and menisci.  3.  Small joint effusion.  4.  Poston synovial or ganglion cyst immediately posterior to the posterior cruciate ligament.      MRI left knee 07/22/2013       Impression        1. Tendinopathy of the quadriceps tendon.     2. Medial collateral ligament sprain.     3. Osteoarthritis, most severe in the medial compartment.     4. Fraying of the inner rim of the bodies of the lateral and medial menisci.     5. Moderate joint effusion.      Xray hips 10/04/2018  Impression:  No acute osseous abnormality                         Results for orders placed during the hospital encounter of 10/04/18   DX-LUMBAR SPINE-4+ VIEWS    Impression Mild retrolisthesis of L1-2, L2-3 and L3-4 that are worse with extension.    Mild anterolisthesis of  L4-5 that is worse with flexion.    There is partial disc space fusion of the lower thoracic spine.                                         Diagnosis   Visit Diagnoses     ICD-10-CM   1. Primary osteoarthritis of left knee  M17.12   2. Spondylolisthesis of lumbar region  M43.16   3. Spinal stenosis of lumbar region, unspecified whether neurogenic claudication present  M48.061   4. Obesity (BMI 30-39.9)  E66.9               ASSESSMENT:  Curt Blair 72 y.o. male seen for above     Curt was seen today for follow-up.    Diagnoses and all orders for this visit:    Primary osteoarthritis of left knee  -     Referral to Orthopedics  -     Referral to Pain Clinic    Spondylolisthesis of lumbar region    Spinal stenosis of lumbar region, unspecified whether neurogenic claudication present    Obesity (BMI 30-39.9)          Discussed left knee intra-articular joint injection was not helpful.  Discussed synvisc injections.  The risks benefits and alternatives to this procedure were discussed and the patient wishes to proceed with the procedure. Risks include but are not limited to damage to surrounding structures, infection, bleeding, worsening of pain which can be permanent, weakness which can be permanent. Benefits include pain relief, improved function. Alternatives includes not doing the procedure.  He would like to try injection.  If this is not successful, we can consider genicular nerve block and possible RFA.  He would like to avoid surgery.  Referral to orthopedics discussed, particularly if synvisc is not helpful.  Continue duloxetine and diclofenac.  Continue to work on weight management.  Weight is stable.  Back pain is stable.  No worsening.    Activity as tolerated.      Follow-up: Return for Office injection.       Thank you very much for asking me to participate in Curt Blair's care.  Please contact me with any questions or concerns.    Please note that this dictation was created using voice  recognition software. I have made every reasonable attempt to correct obvious errors but there may be errors of grammar and content that I may have overlooked prior to finalization of this note.      Sav Rick MD  Physical Medicine and Rehabilitation  Interventional Spine and Sports Physiatry  [facility]

## 2023-04-11 ENCOUNTER — APPOINTMENT (OUTPATIENT)
Dept: PHYSICAL MEDICINE AND REHAB | Facility: MEDICAL CENTER | Age: 73
End: 2023-04-11
Payer: MEDICARE

## 2023-04-12 ENCOUNTER — OFFICE VISIT (OUTPATIENT)
Dept: MEDICAL GROUP | Facility: PHYSICIAN GROUP | Age: 73
End: 2023-04-12
Payer: MEDICARE

## 2023-04-12 VITALS
TEMPERATURE: 97.8 F | OXYGEN SATURATION: 94 % | DIASTOLIC BLOOD PRESSURE: 60 MMHG | HEART RATE: 85 BPM | WEIGHT: 247.7 LBS | SYSTOLIC BLOOD PRESSURE: 122 MMHG | RESPIRATION RATE: 16 BRPM | HEIGHT: 72 IN | BODY MASS INDEX: 33.55 KG/M2

## 2023-04-12 DIAGNOSIS — R73.03 PREDIABETES: ICD-10-CM

## 2023-04-12 DIAGNOSIS — Z00.00 ENCOUNTER FOR SUBSEQUENT ANNUAL WELLNESS VISIT (AWV) IN MEDICARE PATIENT: Primary | ICD-10-CM

## 2023-04-12 DIAGNOSIS — G89.29 CHRONIC PAIN OF LEFT KNEE: ICD-10-CM

## 2023-04-12 DIAGNOSIS — M25.562 CHRONIC PAIN OF LEFT KNEE: ICD-10-CM

## 2023-04-12 DIAGNOSIS — E78.5 DYSLIPIDEMIA: ICD-10-CM

## 2023-04-12 PROCEDURE — G0439 PPPS, SUBSEQ VISIT: HCPCS | Performed by: INTERNAL MEDICINE

## 2023-04-12 RX ORDER — DICLOFENAC SODIUM 30 MG/G
1 GEL TOPICAL
Qty: 100 G | Refills: 0 | Status: SHIPPED | OUTPATIENT
Start: 2023-04-12 | End: 2023-04-12

## 2023-04-12 ASSESSMENT — PATIENT HEALTH QUESTIONNAIRE - PHQ9: CLINICAL INTERPRETATION OF PHQ2 SCORE: 0

## 2023-04-12 ASSESSMENT — FIBROSIS 4 INDEX: FIB4 SCORE: 1.15

## 2023-04-12 ASSESSMENT — PAIN SCALES - GENERAL: PAINLEVEL: 8=MODERATE-SEVERE PAIN

## 2023-04-12 ASSESSMENT — ACTIVITIES OF DAILY LIVING (ADL): BATHING_REQUIRES_ASSISTANCE: 0

## 2023-04-12 ASSESSMENT — ENCOUNTER SYMPTOMS: GENERAL WELL-BEING: GOOD

## 2023-04-12 NOTE — PROGRESS NOTES
Chief Complaint   Patient presents with    Annual Exam     Annual    LOV 12/27/22    Left knee pain   Sees Dr. Rick   Going to [place] in two days          HPI:  Curt Blair is a 72 y.o. male here for Medicare Annual Wellness Visit     Problem   Chronic Pain of Left Knee    MRI left knee (7/2020):  1.  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  2.  Intact ligaments and menisci.  3.  Small joint effusion.  4.  Buxton synovial or ganglion cyst immediately posterior to the posterior cruciate ligament.    S/p multiple steroid injections into the left knee, most recently completed in February 2021. He tried conservative measures and initially had improved with increased support at night on his bed settings but then worsened again..  He was initially trialed on gabapentin but he had a negative reaction and was transitioned to Lyrica and then finally duloxetine which has worked well. Next plan is to try SynVisc and he will see Dr. Pineda in early May.     Prediabetes     Latest Reference Range & Units 09/22/22 07:09   Glycohemoglobin 4.0 - 5.6 % 6.1 (H)   Estim. Avg Glu mg/dL 128     He has a history of prediabetes.  He reports it has been present for a long time and has never progressed.  No significant side effect such as blurred vision, polyuria, or polydipsia.  No prior use of glucose lowering medications.     Dyslipidemia     Latest Reference Range & Units 09/22/22 07:09   Cholesterol,Tot 100 - 199 mg/dL 155   Triglycerides 0 - 149 mg/dL 127   HDL >=40 mg/dL 63   LDL <100 mg/dL 67       He had elevations in his CPK following increase in statin medicine, now improved/resolved.    Current regimen: atorvastatin 10 mg daily              Current supplements as per medication list.       Allergies: Atorvastatin, Lyrica [pregabalin], and Rosuvastatin    Current social contact/activities: traveling    He  reports that he quit smoking about 31  years ago. His smoking use included cigarettes. He has a 50.00 pack-year smoking history. He has never used smokeless tobacco. He reports current alcohol use of about 1.2 oz per week. He reports that he does not use drugs.  Counseling given: Not Answered  Tobacco comments: avoid all tobacco products      DPA/Advanced Directive:  Patient has Advance Directive on file.     ROS:    Gait: Uses no assistive device  Ostomy: No  Other tubes: No  Amputations: No  Chronic oxygen use: No  Last eye exam: Cataract surgery 2 years ago and then saw Eye Dr a year ago  Sept 2022 [facility] exam   Wears hearing aids: No   : Denies any urinary leakage during the last 6 months    Screening:    Depression Screening  Little interest or pleasure in doing things?  0 - not at all  Feeling down, depressed , or hopeless? 0 - not at all  Trouble falling or staying asleep, or sleeping too much?     Feeling tired or having little energy?     Poor appetite or overeating?     Feeling bad about yourself - or that you are a failure or have let yourself or your family down?    Trouble concentrating on things, such as reading the newspaper or watching television?    Moving or speaking so slowly that other people could have noticed.  Or the opposite - being so fidgety or restless that you have been moving around a lot more than usual?     Thoughts that you would be better off dead, or of hurting yourself?     Patient Health Questionnaire Score:      If depressive symptoms identified deferred to follow up visit unless specifically addressed in assessment and plan.    Interpretation of PHQ-9 Total Score   Score Severity   1-4 No Depression   5-9 Mild Depression   10-14 Moderate Depression   15-19 Moderately Severe Depression   20-27 Severe Depression    Screening for Cognitive Impairment  Three Minute Recall (daughter, heaven, mountain) 3/3    Edilberto clock face with all 12 numbers and set the hands to show 10 past 11.  Yes    Cognitive concerns identified  deferred for follow up unless specifically addressed in assessment and plan.    Fall Risk Assessment  Has the patient had two or more falls in the last year or any fall with injury in the last year?  No    Safety Assessment  Throw rugs on floor.  Yes  Handrails on all stairs.  No  Good lighting in all hallways.  Yes  Difficulty hearing.  No  Patient counseled about all safety risks that were identified.    Functional Assessment ADLs  Are there any barriers preventing you from cooking for yourself or meeting nutritional needs?  No.    Are there any barriers preventing you from driving safely or obtaining transportation?  No.    Are there any barriers preventing you from using a telephone or calling for help?  No    Are there any barriers preventing you from shopping?  No.    Are there any barriers preventing you from taking care of your own finances?  No    Are there any barriers preventing you from managing your medications?  No    Are there any barriers preventing you from showering, bathing or dressing yourself? No    Are you currently engaging in any exercise or physical activity?  Yes. Walks when knee isn't bad   What is your perception of your health? Good    Advance Care Planning  Do you have an Advance Directive, Living Will, Durable Power of , or POLST? Yes  Advance Directive       filed in EHR      Health Maintenance Summary            Overdue - Annual Wellness Visit (Every 366 Days) Overdue since 9/30/2022 09/29/2021  Level of Service: MO ANNUAL WELLNESS VISIT-INCLUDES PPPS SUBSEQUE*    10/01/2020  Done    10/04/2019  Subsequent Annual Wellness Visit - Includes PPPS ()    10/11/2017  Visit Dx: Medicare annual wellness visit, subsequent    10/19/2016  Visit Dx: Medicare annual wellness visit, subsequent    Only the first 5 history entries have been loaded, but more history exists.              Overdue - COVID-19 Vaccine (5 - Booster for Moderna series) Overdue since 12/14/2022       10/19/2022  Imm Admin: MODERNA BIVALENT BOOSTER SARS-COV-2 VACCINE (6+)    04/25/2022  Imm Admin: MODERNA SARS-COV-2 VACCINE (12+)    11/18/2021  Imm Admin: MODERNA SARS-COV-2 VACCINE (12+)    02/25/2021  Imm Admin: MODERNA SARS-COV-2 VACCINE (12+)    01/28/2021  Imm Admin: MODERNA SARS-COV-2 VACCINE (12+)              IMM DTaP/Tdap/Td Vaccine (2 - Td or Tdap) Next due on 4/23/2024 04/23/2014  Imm Admin: Tdap Vaccine              COLORECTAL CANCER SCREENING (COLONOSCOPY - Every 10 Years) Tentatively due on 10/4/2032      10/04/2022  AMB EXTERNAL COLONOSCOPY RESULTS    03/13/2019  REFERRAL TO GI FOR COLONOSCOPY    05/10/2018  OCCULT BLOOD FECES IMMUNOASSAY    05/09/2017  OCCULT BLOOD FECES IMMUNOASSAY    01/08/2016  COLONOSCOPY (Patient Declined)    Only the first 5 history entries have been loaded, but more history exists.              IMM PNEUMOCOCCAL VACCINE: 65+ Years (Series Information) Completed      10/19/2016  Imm Admin: Pneumococcal polysaccharide vaccine (PPSV-23)    10/01/2015  Imm Admin: Pneumococcal Conjugate Vaccine (Prevnar/PCV-13)              ABDOMINAL AORTIC ANEURYSM (AAA) SCREEN  Completed      12/05/2018  CT-ABDOMEN-PELVIS WITH              HEPATITIS C SCREENING  Completed      10/09/2019  HEP C VIRUS ANTIBODY              IMM ZOSTER VACCINES (Series Information) Completed      04/14/2020  Imm Admin: Zoster Vaccine Recombinant (RZV) (SHINGRIX)    02/07/2020  Imm Admin: Zoster Vaccine Recombinant (RZV) (SHINGRIX)    11/05/2015  Imm Admin: Zoster Vaccine Live (ZVL) (Zostavax) - HISTORICAL DATA              IMM INFLUENZA (Series Information) Completed      09/28/2022  Imm Admin: Influenza Vaccine Adult HD    09/29/2021  Imm Admin: Influenza Vaccine Adult HD    09/03/2020  Imm Admin: INFLUENZA TIV (IM)    09/03/2020  Imm Admin: Influenza Vaccine Adult HD    09/19/2019  Imm Admin: INFLUENZA TIV (IM)    Only the first 5 history entries have been loaded, but more history exists.              IMM  HEP B VACCINE (Series Information) Aged Out      No completion history exists for this topic.              IMM MENINGOCOCCAL ACWY VACCINE (Series Information) Aged Out      No completion history exists for this topic.                    Patient Care Team:  Shila Mullen D.O. as PCP - General (Internal Medicine)  FRANSISCO Gandara as PCP - [facility] Paneled  Iván Otoole M.D. as Consulting Physician (Cardiovascular Disease (Cardiology))  Irving Patel O.D. as Consulting Physician (Optometry)  Dustin Null, PT, DPT as Physical Therapist (Physical Therapy)  Rodolfo Fernandes M.D. as Consulting Physician (Gastroenterology)      Social History     Tobacco Use    Smoking status: Former     Packs/day: 2.00     Years: 25.00     Pack years: 50.00     Types: Cigarettes     Quit date: 1992     Years since quittin.2    Smokeless tobacco: Never    Tobacco comments:     avoid all tobacco products   Vaping Use    Vaping Use: Never used   Substance Use Topics    Alcohol use: Yes     Alcohol/week: 1.2 oz     Types: 2 Standard drinks or equivalent per week     Comment: 1-2 DRINKS WEEKLY     Drug use: No     Family History   Problem Relation Age of Onset    Lung Disease Mother         copd    Hypertension Mother     Heart Disease Father         heart attack and heart disease    Cancer Brother         neck    Heart Disease Paternal Grandfather         Heart attack    Other Sister         non-malignant brain tumor    Diabetes Neg Hx      He  has a past medical history of Allergies (2019), Arteriosclerotic vascular disease (2019), Arthritis, CAD (coronary artery disease), Elevated CPK (2020), Generalized anxiety disorder (10/11/2017), Hyperlipidemia, Hypertension, Inflamed sebaceous cyst (2/15/2018), Intrinsic eczema (2020), Prediabetes (2019), and S/P coronary artery stent placement.   Past Surgical History:   Procedure Laterality Date    [place] INJ LUMBAR/SACRAL,W/ IMAGING Right  12/2/2021    Procedure: RIGHT L4-5 interlaminar epidural steroid injection with sedation;  Surgeon: Sav Rick M.D.;  [facility];  Service: Pain Management    [place] INJ LUMBAR/SACRAL,W/ IMAGING Right 7/28/2021    Procedure: RIGHT L4-5 interlaminar epidural steroid injection with sedation;  Surgeon: Sav Rick M.D.;  [facility];  Service: Pain Management    [place] FLUOROSCOPIC GUIDANCE NEEDLE PLACEMENT Left 6/16/2021    Procedure: LEFT genicular nerve blocks, diagnostic;  Surgeon: Sav Rick M.D.;  Location: [facility];  Service: Pain Management    [place] INJ AA/STRD GNCLR NRV BRNCH W/IMG Left 6/16/2021    Procedure: GENICULAR NERVE BRANCHES INCLUDING IMAGING GUIDANCE WITH A REQUIRED MINIMUM THREE NERVE BRANCHES;  Surgeon: Sav Rick M.D.;  Location[facility];  Service: Pain Management    APPENDECTOMY      CHOLECYSTECTOMY      ENDARTERECTOMY      corornary    STENT PLACEMENT      VASECTOMY         Exam:   /60 (BP Location: Right arm, Patient Position: Sitting, BP Cuff Size: Adult)   Pulse 85   Temp 36.6 °C (97.8 °F) (Temporal)   Resp 16   Ht 1.829 m (6')   Wt 112 kg (247 lb 11.2 oz)   SpO2 94%  Body mass index is 33.59 kg/m².    Hearing good.    Dentition good  Alert, oriented in no acute distress.  Eye contact is good, speech goal directed, affect calm  Physical Exam  Constitutional:       Appearance: Normal appearance.      Comments: Appears uncomfortable in the chair due to knee pain   HENT:      Ears:      Comments: Mild-moderate cerumen in external ear canals  Eyes:      Conjunctiva/sclera: Conjunctivae normal.   Cardiovascular:      Rate and Rhythm: Normal rate and regular rhythm.      Pulses: Normal pulses.   Pulmonary:      Effort: Pulmonary effort is normal. No respiratory distress.      Breath sounds: No wheezing.   Musculoskeletal:         General: Swelling and tenderness present.    Skin:     General: Skin is warm and dry.      Findings: No erythema or rash.   Neurological:      Comments: Antalgic gait   Psychiatric:         Mood and Affect: Mood normal.         Behavior: Behavior normal.         Thought Content: Thought content normal.         Judgment: Judgment normal.        Assessment and Plan. The following treatment and monitoring plan is recommended:       Curt is a 72 y.o. male with the following:  Problem List Items Addressed This Visit       Chronic pain of left knee     Chronic decompensated problem, still with very bothersome knee pain, plan for SynVisc at the end of the month. Checked on topical Voltaren 3% and it's nearly $1000 without insurance. He will use duloxetine and aspercreme in the interim. Encouraged him to use a wheelchair in the airport to help prevent further pain exacerbation. Will reach out to Dr. Rick to see if there is a compounded topical option he can use int he meantime as well.         Dyslipidemia     Chronic stable problem, update lipid panel to ensure stability, continue atorvastatin 10 mg daily.         Relevant Orders    CBC WITH DIFFERENTIAL    Comp Metabolic Panel    VITAMIN D,25 HYDROXY (DEFICIENCY)    VITAMIN B12    TSH WITH REFLEX TO FT4    Lipid Profile    Prediabetes     Chronic stable problem, A1c stable, update again to ensure no progression of prediabetes with steroid treatment for colitis.         Relevant Orders    HEMOGLOBIN A1C     Other Visit Diagnoses       Encounter for subsequent annual wellness visit (AWV) in Medicare patient    -  Primary             Services suggested: No services needed at this time  Health Care Screening: Age-appropriate preventive services recommended by USPTF and ACIP covered by Medicare were discussed today. Services ordered if indicated and agreed upon by the patient.  Referrals offered: Community-based lifestyle interventions to reduce health risks and promote self-management and wellness, fall prevention,  nutrition, physical activity, tobacco-use cessation, weight loss, and mental health services as per orders if indicated.    Discussion today about general wellness and lifestyle habits:    Prevent falls and reduce trip hazards; Cautioned about securing or removing rugs.  Have a working fire alarm and carbon monoxide detector;   Engage in regular physical activity and social activities     Follow-up: Return in about 6 months (around 10/12/2023).    I spent a total of 34 minutes with record review, exam, communication with the patient, communication with other providers, and documentation of this encounter.       PLEASE NOTE: This dictation was created using voice recognition software. I have made every reasonable attempt to correct obvious errors, but I expect that there are errors of grammar and possibly content that I did not discover before finalizing the note.      Shila Mullen, DO  Geriatric and Internal Medicine  [facility]

## 2023-04-13 NOTE — ASSESSMENT & PLAN NOTE
Chronic stable problem, update lipid panel to ensure stability, continue atorvastatin 10 mg daily.

## 2023-04-13 NOTE — ASSESSMENT & PLAN NOTE
Chronic decompensated problem, still with very bothersome knee pain, plan for SynVisc at the end of the month. Checked on topical Voltaren 3% and it's nearly $1000 without insurance. He will use duloxetine and aspercreme in the interim. Encouraged him to use a wheelchair in the airport to help prevent further pain exacerbation. Will reach out to Dr. Rick to see if there is a compounded topical option he can use int he meantime as well.

## 2023-04-13 NOTE — ASSESSMENT & PLAN NOTE
Chronic stable problem, A1c stable, update again to ensure no progression of prediabetes with steroid treatment for colitis.

## 2023-04-18 DIAGNOSIS — G89.29 CHRONIC PAIN OF LEFT KNEE: ICD-10-CM

## 2023-04-18 DIAGNOSIS — M25.562 CHRONIC PAIN OF LEFT KNEE: ICD-10-CM

## 2023-04-20 DIAGNOSIS — E78.5 DYSLIPIDEMIA: ICD-10-CM

## 2023-04-20 RX ORDER — ATORVASTATIN CALCIUM 10 MG/1
10 TABLET, FILM COATED ORAL
Qty: 100 TABLET | Refills: 3 | Status: SHIPPED | OUTPATIENT
Start: 2023-04-20 | End: 2023-10-05

## 2023-04-25 ENCOUNTER — OFFICE VISIT (OUTPATIENT)
Dept: PHYSICAL MEDICINE AND REHAB | Facility: MEDICAL CENTER | Age: 73
End: 2023-04-25
Payer: MEDICARE

## 2023-04-25 VITALS
OXYGEN SATURATION: 93 % | HEART RATE: 87 BPM | DIASTOLIC BLOOD PRESSURE: 60 MMHG | BODY MASS INDEX: 33.64 KG/M2 | TEMPERATURE: 96.2 F | WEIGHT: 248.4 LBS | SYSTOLIC BLOOD PRESSURE: 118 MMHG | HEIGHT: 72 IN

## 2023-04-25 DIAGNOSIS — M17.12 PRIMARY OSTEOARTHRITIS OF LEFT KNEE: ICD-10-CM

## 2023-04-25 PROCEDURE — 20610 DRAIN/INJ JOINT/BURSA W/O US: CPT | Mod: LT | Performed by: PHYSICAL MEDICINE & REHABILITATION

## 2023-04-25 ASSESSMENT — FIBROSIS 4 INDEX: FIB4 SCORE: 1.15

## 2023-04-25 NOTE — PROCEDURES
Date of Service: 4/25/2023    Physician/s: Sav Rick MD    Pre-operative Diagnosis: left knee osteoarthritis    Post-operative Diagnosis: left knee osteoarthritis    Procedure: left Knee injection ultrasound-guided    Description of procedure:    The risks, benefits, and alternatives of the procedure were reviewed and discussed with the patient.  Written informed consent was freely obtained. A pre-procedural time-out was conducted by the physician verifying patient’s identity, procedure to be performed, procedure site and side, and allergy verification. Appropriate equipment was determined to be in place for the procedure.     No sedation was used for this procedure.     In the office suite the patient was placed in a supine position. The ultrasound was used to assess the joint, but ultimately, did not use the ultrasound for injection guidance and no images were saved.  His left knee was prepped and draped in the usual sterile fashion. The target for injection was marked, and a 22g 1.5 inch needle was placed into skin and advanced into the joint space. Following negative aspiration, 2ml of synvisc (16mg/2ml) was injected, and the needle was subsequently removed intact. The patient's knee was wiped with a 4x4 gauze, the area was cleansed with alcohol prep, and a bandaid was applied. There were no complications noted.     The patient noted some improvement in his knee pain when walking down the hallway after the injection.    Return for synvisc injection 2 of 3 next week.    Sav Rick MD  Physical Medicine and Rehabilitation  Interventional Spine and Sports Physiatry  [facility]

## 2023-04-28 ENCOUNTER — TELEPHONE (OUTPATIENT)
Dept: HEALTH INFORMATION MANAGEMENT | Facility: OTHER | Age: 73
End: 2023-04-28
Payer: MEDICARE

## 2023-04-28 ENCOUNTER — APPOINTMENT (RX ONLY)
Dept: URBAN - METROPOLITAN AREA CLINIC 20 | Facility: CLINIC | Age: 73
Setting detail: DERMATOLOGY
End: 2023-04-28

## 2023-04-28 DIAGNOSIS — L57.0 ACTINIC KERATOSIS: ICD-10-CM

## 2023-04-28 DIAGNOSIS — Z71.89 OTHER SPECIFIED COUNSELING: ICD-10-CM

## 2023-04-28 DIAGNOSIS — D485 NEOPLASM OF UNCERTAIN BEHAVIOR OF SKIN: ICD-10-CM

## 2023-04-28 PROBLEM — D48.5 NEOPLASM OF UNCERTAIN BEHAVIOR OF SKIN: Status: ACTIVE | Noted: 2023-04-28

## 2023-04-28 PROCEDURE — 17003 DESTRUCT PREMALG LES 2-14: CPT

## 2023-04-28 PROCEDURE — ? LIQUID NITROGEN

## 2023-04-28 PROCEDURE — 17000 DESTRUCT PREMALG LESION: CPT

## 2023-04-28 PROCEDURE — 99212 OFFICE O/P EST SF 10 MIN: CPT | Mod: 25

## 2023-04-28 PROCEDURE — ? OBSERVATION AND MEASURE

## 2023-04-28 PROCEDURE — ? COUNSELING

## 2023-04-28 ASSESSMENT — LOCATION DETAILED DESCRIPTION DERM
LOCATION DETAILED: RIGHT INFERIOR LATERAL FOREHEAD
LOCATION DETAILED: RIGHT INFERIOR FOREHEAD
LOCATION DETAILED: LEFT CENTRAL POSTAURICULAR SKIN
LOCATION DETAILED: RIGHT SUPERIOR PARIETAL SCALP
LOCATION DETAILED: POSTERIOR MID-PARIETAL SCALP

## 2023-04-28 ASSESSMENT — LOCATION SIMPLE DESCRIPTION DERM
LOCATION SIMPLE: SCALP
LOCATION SIMPLE: POSTERIOR SCALP
LOCATION SIMPLE: RIGHT FOREHEAD

## 2023-04-28 ASSESSMENT — LOCATION ZONE DERM
LOCATION ZONE: FACE
LOCATION ZONE: SCALP

## 2023-04-28 NOTE — PROCEDURE: COUNSELING
Detail Level: Zone
Detail Level: Detailed
Patient Specific Counseling (Will Not Stick From Patient To Patient): Appears benign in nature- discussed biopsy for definitive diagnosis, pt opts to monitor for now

## 2023-04-28 NOTE — PROCEDURE: LIQUID NITROGEN
Detail Level: Detailed
Show Applicator Variable?: Yes
Duration Of Freeze Thaw-Cycle (Seconds): 10
Post-Care Instructions: I reviewed with the patient in detail post-care instructions. Patient is to wear sunprotection, and avoid picking at any of the treated lesions. Pt may apply Vaseline to crusted or scabbing areas.
Render Note In Bullet Format When Appropriate: No
Number Of Freeze-Thaw Cycles: 2 freeze-thaw cycles
Consent: The patient's consent was obtained including but not limited to risks of crusting, scabbing, blistering, scarring, darker or lighter pigmentary change, recurrence, incomplete removal and infection. RTC in 2 months if lesion(s) persistent.

## 2023-05-01 DIAGNOSIS — G89.29 CHRONIC PAIN OF LEFT KNEE: ICD-10-CM

## 2023-05-01 DIAGNOSIS — M25.562 CHRONIC PAIN OF LEFT KNEE: ICD-10-CM

## 2023-05-02 ENCOUNTER — OFFICE VISIT (OUTPATIENT)
Dept: PHYSICAL MEDICINE AND REHAB | Facility: MEDICAL CENTER | Age: 73
End: 2023-05-02
Payer: MEDICARE

## 2023-05-02 VITALS
BODY MASS INDEX: 33.74 KG/M2 | HEART RATE: 90 BPM | SYSTOLIC BLOOD PRESSURE: 112 MMHG | TEMPERATURE: 96.3 F | DIASTOLIC BLOOD PRESSURE: 60 MMHG | HEIGHT: 72 IN | WEIGHT: 249.12 LBS | OXYGEN SATURATION: 95 %

## 2023-05-02 DIAGNOSIS — M17.12 PRIMARY OSTEOARTHRITIS OF LEFT KNEE: ICD-10-CM

## 2023-05-02 PROCEDURE — 20610 DRAIN/INJ JOINT/BURSA W/O US: CPT | Mod: LT | Performed by: PHYSICAL MEDICINE & REHABILITATION

## 2023-05-02 ASSESSMENT — PATIENT HEALTH QUESTIONNAIRE - PHQ9: CLINICAL INTERPRETATION OF PHQ2 SCORE: 0

## 2023-05-02 ASSESSMENT — FIBROSIS 4 INDEX: FIB4 SCORE: 1.15

## 2023-05-02 ASSESSMENT — PAIN SCALES - GENERAL: PAINLEVEL: 5=MODERATE PAIN

## 2023-05-02 NOTE — PROCEDURES
Date of Service: 05/02/2023    Physician/s: Sav Rick MD    Pre-operative Diagnosis: Left knee osteoarthritis    Post-operative Diagnosis: Left knee osteoarthritis    Procedure: Left Knee synvisc injection    Description of procedure:    The risks, benefits, and alternatives of the procedure were reviewed and discussed with the patient.  Written informed consent was freely obtained. A pre-procedural time-out was conducted by the physician verifying patient’s identity, procedure to be performed, procedure site and side, and allergy verification. Appropriate equipment was determined to be in place for the procedure.     No sedation was used for this procedure.     In the office suite the patient was placed in a supine position. His left knee was prepped and draped in the usual sterile fashion. The target for injection was marked, and a 22g 1.5 inch needle was placed into skin and advanced into the joint space from a lateral approach. Following negative aspiration, 2ml of synvisc (16mg/2ml) was injected, and the needle was subsequently removed intact. The patient's knee was wiped with a 4x4 gauze, the area was cleansed with alcohol prep, and a bandaid was applied. There were no complications noted.     The patient reports that he has started to feel some improvement since the first injection on 04/25/2023.  Reports also using a topical compounding cream.    Return for synvisc injection 3 of 3 next week.    Sav Rick MD  Physical Medicine and Rehabilitation  Interventional Spine and Sports Physiatry  [facility]

## 2023-05-08 ENCOUNTER — OFFICE VISIT (OUTPATIENT)
Dept: CARDIOLOGY | Facility: MEDICAL CENTER | Age: 73
End: 2023-05-08
Payer: MEDICARE

## 2023-05-08 VITALS
WEIGHT: 247 LBS | DIASTOLIC BLOOD PRESSURE: 74 MMHG | SYSTOLIC BLOOD PRESSURE: 116 MMHG | HEART RATE: 78 BPM | OXYGEN SATURATION: 95 % | BODY MASS INDEX: 33.46 KG/M2 | RESPIRATION RATE: 12 BRPM | HEIGHT: 72 IN

## 2023-05-08 DIAGNOSIS — R74.8 ELEVATED CPK: ICD-10-CM

## 2023-05-08 DIAGNOSIS — I25.10 CORONARY ARTERY DISEASE DUE TO LIPID RICH PLAQUE: ICD-10-CM

## 2023-05-08 DIAGNOSIS — Z95.5 STATUS POST CORONARY ARTERY STENT PLACEMENT: ICD-10-CM

## 2023-05-08 DIAGNOSIS — I25.83 CORONARY ARTERY DISEASE DUE TO LIPID RICH PLAQUE: ICD-10-CM

## 2023-05-08 DIAGNOSIS — E78.5 DYSLIPIDEMIA: ICD-10-CM

## 2023-05-08 PROCEDURE — 99214 OFFICE O/P EST MOD 30 MIN: CPT | Performed by: INTERNAL MEDICINE

## 2023-05-08 ASSESSMENT — FIBROSIS 4 INDEX: FIB4 SCORE: 1.15

## 2023-05-08 NOTE — PROGRESS NOTES
Chief Complaint   Patient presents with    Coronary Artery Disease     F/V Dx: Coronary artery disease due to lipid rich plaque       Subjective     Curt Blair is a 72 y.o. male who presents today with CAD post PCI      Had colitis and colonoscopy      Sees [facility] for LT knee    Past Medical History:   Diagnosis Date    Allergies 12/24/2019    Arteriosclerotic vascular disease 1/25/2019    Arthritis     Right Foot    CAD (coronary artery disease)     Elevated CPK 8/16/2020    Generalized anxiety disorder 10/11/2017    Hyperlipidemia     Hypertension     Inflamed sebaceous cyst 2/15/2018    Intrinsic eczema 2/7/2020    Prediabetes 7/19/2019    S/P coronary artery stent placement     2 stents     Past Surgical History:   Procedure Laterality Date    OK INJ LUMBAR/SACRAL,W/ IMAGING Right 12/2/2021    Procedure: RIGHT L4-5 interlaminar epidural steroid injection with sedation;  Surgeon: Sav Rick M.D.;  Location[facility];  Service: Pain Management    OK INJ LUMBAR/SACRAL,W/ IMAGING Right 7/28/2021    Procedure: RIGHT L4-5 interlaminar epidural steroid injection with sedation;  Surgeon: Sav Rick M.D.;  Location: [facility];  Service: Pain Management    OK FLUOROSCOPIC GUIDANCE NEEDLE PLACEMENT Left 6/16/2021    Procedure: LEFT genicular nerve blocks, diagnostic;  Surgeon: Sav Rick M.D.;  Location: [facility];  Service: Pain Management    OK INJ AA/STRD GNCLR NRV BRNCH W/IMG Left 6/16/2021    Procedure: GENICULAR NERVE BRANCHES INCLUDING IMAGING GUIDANCE WITH A REQUIRED MINIMUM THREE NERVE BRANCHES;  Surgeon: Sav Rick M.D.;  Location: [facility];  Service: Pain Management    APPENDECTOMY      CHOLECYSTECTOMY      ENDARTERECTOMY      corornary    STENT PLACEMENT      VASECTOMY       Family History   Problem Relation Age of Onset    Lung Disease Mother         copd    Hypertension Mother     Heart Disease Father          heart attack and heart disease    Cancer Brother         neck    Heart Disease Paternal Grandfather         Heart attack    Other Sister         non-malignant brain tumor    Diabetes Neg Hx      Social History     Socioeconomic History    Marital status:      Spouse name: Not on file    Number of children: Not on file    Years of education: Not on file    Highest education level: 12th grade   Occupational History    Not on file   Tobacco Use    Smoking status: Former     Packs/day: 2.00     Years: 25.00     Pack years: 50.00     Types: Cigarettes     Quit date: 1992     Years since quittin.3    Smokeless tobacco: Never    Tobacco comments:     avoid all tobacco products   Vaping Use    Vaping Use: Never used   Substance and Sexual Activity    Alcohol use: Yes     Alcohol/week: 1.2 oz     Types: 2 Standard drinks or equivalent per week     Comment: 1-2 DRINKS WEEKLY     Drug use: No    Sexual activity: Yes     Partners: Female     Comment: , 3 children   Other Topics Concern     Service Yes    Blood Transfusions No    Caffeine Concern No    Occupational Exposure No    Hobby Hazards No    Sleep Concern Yes    Stress Concern Yes    Weight Concern Yes    Special Diet No     Comment: no fried    Back Care Yes    Exercise Yes    Bike Helmet No     Comment: does not ride bike    Seat Belt Not Asked    Self-Exams Yes   Social History Narrative    He was born in [place]. He worked in the [employer] industry, he retired at age 65. He  Luann in 2016 after both of their spouses passed away. He has children in [place] and several grandchildren and great grandchildren. He is an avid traveler.     Social Determinants of Health     Financial Resource Strain: Not on file   Food Insecurity: Not on file   Transportation Needs: Not on file   Physical Activity: Not on file   Stress: Not on file   Social Connections: Not on file   Intimate Partner Violence: Not on file   Housing Stability:  Not on file     Allergies   Allergen Reactions    Atorvastatin Myalgia     Ok to take 10 mg  Elevated CPK  Only at 40 Mg does this happen.    Lyrica [Pregabalin]     Rosuvastatin      muscle cramps elevated CPK     Outpatient Encounter Medications as of 5/8/2023   Medication Sig Dispense Refill    Coenzyme Q10 (CO Q 10 PO) Take  by mouth.      Gabapentin Powder 1 g 4 times a day as needed (left knee pain). Gabapentin 6%, lidocaine 10%, ketoprofen 10%, nortriptyline 2% 100 g 3    atorvastatin (LIPITOR) 10 MG Tab TAKE 1 TABLET BY MOUTH EVERY  Tablet 3    mesalamine (LIALDA) 1.2 GM Tablet Delayed Response Take 4.8 g by mouth every day.      diclofenac DR (VOLTAREN) 75 MG Tablet Delayed Response Take 1 Tablet by mouth 2 times a day. 200 Tablet 3    DULoxetine (CYMBALTA) 60 MG Cap DR Particles delayed-release capsule Take 1 Capsule by mouth every day for 180 days. 90 Capsule 1    amlodipine-benazepril (LOTREL) 5-10 MG per capsule TAKE 1 CAPSULE BY MOUTH EVERY  Capsule 2    nitroglycerin (NITROSTAT) 0.4 MG SL Tab Place 1 Tablet under the tongue as needed for Chest Pain. 25 Tablet 11    Multiple Vitamins-Minerals (MULTIVITAMIN PO) Take 1 Tab by mouth every morning.      CALCIUM PO Take 1 Tab by mouth every day.      aspirin 81 MG tablet Take 81 mg by mouth every day.         No facility-administered encounter medications on file as of 5/8/2023.     ROS           Objective     /74 (BP Location: Left arm, Patient Position: Sitting, BP Cuff Size: Adult)   Pulse 78   Resp 12   Ht 1.829 m (6')   Wt 112 kg (247 lb)   SpO2 95%   BMI 33.50 kg/m²     Physical Exam  Constitutional:       General: He is not in acute distress.     Appearance: He is not diaphoretic.   Eyes:      General: No scleral icterus.  Neck:      Vascular: No JVD.   Cardiovascular:      Rate and Rhythm: Normal rate.      Heart sounds: Normal heart sounds. No murmur heard.    No friction rub. No gallop.   Pulmonary:      Effort: No  respiratory distress.      Breath sounds: No wheezing or rales.   Abdominal:      General: Bowel sounds are normal.      Palpations: Abdomen is soft.   Musculoskeletal:      Right lower leg: No edema.      Left lower leg: No edema.   Skin:     Findings: No rash.   Neurological:      Mental Status: He is alert. Mental status is at baseline.   Psychiatric:         Mood and Affect: Mood normal.          We reviewed in person the most recent labs  Recent Results (from the past 5040 hour(s))   URINE DRUG SCREEN    Collection Time: 10/20/22 11:10 AM   Result Value Ref Range    Amphetamines Urine Negative Negative    Barbiturates Negative Negative    Benzodiazepines Negative Negative    Cocaine Metabolite Negative Negative    Methadone Negative Negative    Opiates Negative Negative    Oxycodone Negative Negative    Phencyclidine -Pcp Negative Negative    Propoxyphene Negative Negative    Cannabinoid Metab Negative Negative   SARS-CoV-2 PCR (24 hour In-House): Collect NP swab in VTM    Collection Time: 12/27/22  8:25 AM    Specimen: Respirate   Result Value Ref Range    SARS-CoV-2 Source Nasal Swab     SARS-CoV-2 by PCR NotDetected    POCT Influenza A/B    Collection Time: 12/27/22  8:29 AM   Result Value Ref Range    Rapid Influenza A-B Negative     Internal Control Positive Valid     Internal Control Negative Valid    POCT SARS-COV Antigen JAMES (Symptomatic only)    Collection Time: 12/27/22  8:29 AM   Result Value Ref Range    Internal  Valid     SARS-COV ANTIGEN JAMES Negative Negative, Indeterminate, None Detected, Not Detected, Detected, NotDetected, Valid, Invalid, Pass    Internal Control Positive Valid     Internal Control Negative Valid    CALPROTECTIN,FECAL    Collection Time: 01/20/23 11:00 AM   Result Value Ref Range    Calprotectin, Fecal 657 (H) <=49 ug/g   CULTURE STOOL    Collection Time: 01/30/23  4:30 AM    Specimen: Stool   Result Value Ref Range    Significant Indicator NEG     Source STL      Site Stool     Culture Result       No enteric pathogens isolated.  NOTE:  Stool cultures are screened for Shiga Toxins 1 and 2,  Salmonella, Shigella, Campylobacter, Aeromonas,  Plesiomonas, and Vibrio.      EHEC Negative for Shiga Toxin 1 and 2.     Campylobactor Antigen       Negative for Campylobacter Antigen.  Test results are to be used in conjunction with information  available from the patient clinical evaluation and other  diagnostic procedures.     CRYPTO/GIARDIA RAPID ASSAY    Collection Time: 01/30/23  4:30 AM    Specimen: Stool   Result Value Ref Range    Significant Indicator NEG     Source STL     Site Stool     Ova And Parasites Antigen Eia       Negative for Giardia lamblia antigen.  Negative for Cryptosporidium parvum antigen.  NOTE:  The Cryptosporidium/Giardia assay is a rapid test for the  presence or absence of these specific antigens.  In special  circumstances, a physician may need to request a complete  ova and parasite procedure when the patient meets certain  criteria. For example, recent travel abroad,immunosupression,  recent immigration, persistent undiagnosed diarrhea, or  persistent unexplained eosinophilia may be conditions to  warrant a complete ova and parasite examination.  In these  special cases, or if the physician suspects another specific  gastrointestinal parasite,the Microbiology Department can  perform a complete ova and parasite microscopic examination.  The request for a complete ova and parasite examination must  come directly from the physician (or designee) within the  seven days of the original stool specimen being received in  the Microbiology Department.  Stool specimens are discarded  after  seven days of storage.     EHEC(Shiga Toxin)Detection    Collection Time: 01/30/23  4:30 AM    Specimen: Stool   Result Value Ref Range    Significant Indicator NEG     Source STL     Site Stool     EHEC Negative for Shiga Toxin 1 and 2.          Assessment & Plan     1.  Coronary artery disease due to lipid rich plaque  CPK - TOTAL SERUM      2. Status post coronary artery stent placement  CPK - TOTAL SERUM      3. Dyslipidemia        4. Elevated CPK  CPK - TOTAL SERUM          Medical Decision Making: Today's Assessment/Status/Plan:      It was my pleasure to meet with Mr. Blair.    We addressed the management of hypertension at today's visit. Blood pressure is well controlled.  We specifically assessed the labs on hypertension treatment    We addressed the management of dyslipidemia and atherosclerosis at today's visit. He is on appropriate statin.    We addressed the management of atherosclerotic artery disease.  He is on proper antiplatelet, cholesterol management and beta-blockers as appropriate.  We addressed the potential side effects and laboratory follow-up for these medications.    IF IT COMES TO KNEE SURGERY He can proceed with the proposed procedure or surgery from a cardiac standpoint, no modifiable cardiovascular risk, no further cardiac testing required, hold antiplatelet as necessary.    It is my pleasure to participate in the care of Mr. Blair.  Please do not hesitate to contact me with questions or concerns. [facility] is available 24/7 for consultative services at [phone] in the perioperative period.    I will see Mr. Blair back in 1 year time and encouraged him to follow up with us over the phone or electronically using my MyChart as issues arise.    It is my pleasure to participate in the care of Mr. Blair.  Please do not hesitate to contact me with questions or concerns.    Iván Otoole MD PhD FACC  Cardiologist [facility]    Please note that this dictation was created using voice recognition software. There may be errors I did not discover before finalizing the note.

## 2023-05-08 NOTE — PATIENT INSTRUCTIONS
A - Antiplatelet - Aspirin 81 mg daily or other antiplatelets (clopidogrel (PLAVIX), prasrugrel (EFFIENT), ticagrelor (BRILINTA)) reduce your risk.  B - Blood Pressure Control - reduces your risk or heart attack and stroke.  C - Cholesterol Management - statins reduce your risk; for those that are intolerant to statins, there are alternatives.  D - Diet - Cardiac rehab diets, [email or web address].  E - Exercise - at least 5 hours of moderate exercise weekly  (typical brisk walking or similar activity).  F - Fats - VASCEPA,  or Fish oil (if Vascepa too expensive) for elevated triglycerides (REDUCE IT trial showed reduction from 22% 5 year MACE to 17%).  G - Good Vibes - meditation, exercise, yoga, mindfulness, Neio-Chi, stress reduction.  H - Heart Failure - betablockers, sucubatril (ENTRESTO) 16% less risk of dying over 3 years, spironolactone, dapagliflozin (FARXIGA) 17% less risk of dying over 2 years, CRT +/- ICD.  I - Inflammation - Colchicine in the LoDoCo2 study in 2020 reduced the risk of heart attack by 30% in 2.5 year follow up.  R - Rehab - Cardiac rehab reduces risk of dying by 13-24% and need to go to the hospital by 30% within the first year.  S - Smoking - never smoke, if you do smoke ask for help to quit for good. Patients who quit smoking after heart attack have 36% less likely risk of dying.  T - Type II Diabetes - pills empagliflozin (JARDIANCE) 38% less risk of dying over 4 years, and/or weekly injections liraglutide (Victoza), semaglutide (Ozempic), dulaglutide (Trulicity) ~26% less risk of MACE in 2 years.  V - Vaccines - Annual flu shot and COVID vaccine reduces the risk of serious cardiovascular complications from these deadly infections.  W - Weight - maintain a healthy weight.      Work on at least 1.5 - 5 hours a week of moderate exercise    Please look into the following diets and incorporate them into your diet  LOW SALT DIET   KEEP YOUR SODIUM EQUAL TO CALORIES AND NO MORE THAN DOUBLE THE  CALORIES FOR A LOW SALT DIET    Cardiosmart.org - great resource for American College of Cardiology on heart disease prevention and treatment    FOR TREATMENT OR PREVENTION OF CORONARY ARTERY DISEASE  These three programs are approved by Medicare/Insurers for those with heart disease  [facility]  Dr. Glasgow's Program for Reversing Heart Disease - Jordy Garces's Cardiologist vegetarian-based  [facility] - [place]-based mind-body Program    This is a commonly referenced Program  Dr Sidhu - [place] over [place] (book and documentary) - vegetarian-based    FOR TREATMENT OF BLOOD PRESSURE  DASH DIET - American Heart Association for treatment of HYPERTENSION    FOR TREATMENT OF BAD CHOLESTEROL/FATS  REDUCE PROCESSED SUGAR AS MUCH AS POSSIBLE  INCREASE WHOLE GRAINS/VEGETABLES  INCREASE FIBER    Lowering total cholesterol and LDL (bad) cholesterol:  - Eat leaner cuts of meat, or eliminate altogether if possible red meat, and frequently substitute fish or chicken.  - Limit saturated fat to no more than 7-10% of total calories no more than 10 g per day is recommended. Some sources of saturated fat include butter, animal fats, hydrogenated vegetable fats and oils, many desserts, whole milk dairy products.  - Replaced saturated fats with polyunsaturated fats and monounsaturated fats. Foods high in monounsaturated fat include nuts, canola oil, avocados, and olives.  - Limit trans fat (processed foods) and replaced with fresh fruits and vegetables  - Recommend nonfat dairy products  - Increase substantially the amount of soluble fiber intake (legumes such as beans, fruit, whole grains).  - Consider nutritional supplements: plant sterile spreads such as Benecol, fish oil,  flaxseed oil, omega-3 acids capsules 1000 mg twice a day, or viscous fiber such as Metamucil  - Attain ideal weight and regular exercise (at least 30 minutes per day of moderate exercise)  ASK  ABOUT STATIN OR NON STATIN MEDICATION TO REDUCE YOUR LDL AND HEART RISK    Lowering triglycerides:  - Reduce intake of simple sugar: Desserts, candy, pastries, honey, sodas, sugared cereals, yogurt, Gatorade, sports bars, canned fruit, smoothies, fruit juice, coffee drinks  - Reduced intake of refined starches: Refined Pasta, most bread  - Reduce or abstain from alcohol  - Increase omega-3 fatty acids: [place], Trout, Mackerel, [place], [place] tuna and supplements  - Attain ideal weight and regular exercise (at least 30 minutes per day of moderate exercise)  ASK ABOUT PURIFIED OMEGA 3 EPA or FISH OIL TO REDUCE YOUR TG AND HEART RISK    Elevating HDL (good) cholesterol:  - Increase physical activity  - Increase omega-3 fatty acids and supplements as listed above  - Incorporating appropriate amounts of monounsaturated fats such as nuts, olive oil, canola oil, avocados, olives  - Stop smoking  - Attain ideal weight and regular exercise (at least 30 minutes per day of moderate exercise)

## 2023-05-09 ENCOUNTER — OFFICE VISIT (OUTPATIENT)
Dept: PHYSICAL MEDICINE AND REHAB | Facility: MEDICAL CENTER | Age: 73
End: 2023-05-09
Payer: MEDICARE

## 2023-05-09 VITALS
HEART RATE: 88 BPM | BODY MASS INDEX: 33.64 KG/M2 | HEIGHT: 72 IN | OXYGEN SATURATION: 93 % | TEMPERATURE: 96.6 F | DIASTOLIC BLOOD PRESSURE: 52 MMHG | WEIGHT: 248.4 LBS | SYSTOLIC BLOOD PRESSURE: 120 MMHG

## 2023-05-09 DIAGNOSIS — M17.12 PRIMARY OSTEOARTHRITIS OF LEFT KNEE: ICD-10-CM

## 2023-05-09 PROCEDURE — 20610 DRAIN/INJ JOINT/BURSA W/O US: CPT | Mod: LT | Performed by: PHYSICAL MEDICINE & REHABILITATION

## 2023-05-09 ASSESSMENT — FIBROSIS 4 INDEX: FIB4 SCORE: 1.15

## 2023-05-09 ASSESSMENT — PATIENT HEALTH QUESTIONNAIRE - PHQ9: CLINICAL INTERPRETATION OF PHQ2 SCORE: 0

## 2023-05-09 ASSESSMENT — PAIN SCALES - GENERAL: PAINLEVEL: 3=SLIGHT PAIN

## 2023-05-09 NOTE — PROCEDURES
Date of Service: 05/09/2023    Physician/s: Sav Rick MD    Pre-operative Diagnosis: Left knee osteoarthritis    Post-operative Diagnosis: Left knee osteoarthritis    Procedure: Left Knee synvisc injection #3    Description of procedure:    The risks, benefits, and alternatives of the procedure were reviewed and discussed with the patient.  Written informed consent was freely obtained. A pre-procedural time-out was conducted by the physician verifying patient’s identity, procedure to be performed, procedure site and side, and allergy verification. Appropriate equipment was determined to be in place for the procedure.     No sedation was used for this procedure.     In the office suite the patient was placed in a supine position. His left knee was prepped and draped in the usual sterile fashion. The target for injection was marked, and a 22g 1.5 inch needle was placed into skin and advanced into the joint space from a lateral approach. Following negative aspiration, 2ml of synvisc (16mg/2ml) was injected, and the needle was subsequently removed intact. The patient's knee was wiped with a 4x4 gauze, the area was cleansed with alcohol prep, and a bandaid was applied. There were no complications noted.     The patient reports that he has started to feel some improvement since the first injection on 04/25/2023.  He reports that he has noted significant reduction in pain in the last week, not using topical compounding cream now    Sav Rick MD  Physical Medicine and Rehabilitation  Interventional Spine and Sports Physiatry  [facility]

## 2023-05-24 ENCOUNTER — OFFICE VISIT (OUTPATIENT)
Dept: PHYSICAL MEDICINE AND REHAB | Facility: MEDICAL CENTER | Age: 73
End: 2023-05-24
Payer: MEDICARE

## 2023-05-24 VITALS
OXYGEN SATURATION: 96 % | DIASTOLIC BLOOD PRESSURE: 62 MMHG | SYSTOLIC BLOOD PRESSURE: 122 MMHG | HEART RATE: 84 BPM | HEIGHT: 72 IN | WEIGHT: 247.2 LBS | TEMPERATURE: 96.3 F | BODY MASS INDEX: 33.48 KG/M2

## 2023-05-24 DIAGNOSIS — M48.061 NEURAL FORAMINAL STENOSIS OF LUMBAR SPINE: ICD-10-CM

## 2023-05-24 DIAGNOSIS — M17.12 PRIMARY OSTEOARTHRITIS OF LEFT KNEE: ICD-10-CM

## 2023-05-24 DIAGNOSIS — M43.16 SPONDYLOLISTHESIS OF LUMBAR REGION: ICD-10-CM

## 2023-05-24 DIAGNOSIS — M17.0 PRIMARY OSTEOARTHRITIS OF BOTH KNEES: ICD-10-CM

## 2023-05-24 DIAGNOSIS — M48.061 SPINAL STENOSIS OF LUMBAR REGION, UNSPECIFIED WHETHER NEUROGENIC CLAUDICATION PRESENT: ICD-10-CM

## 2023-05-24 DIAGNOSIS — E66.9 OBESITY (BMI 30-39.9): ICD-10-CM

## 2023-05-24 PROCEDURE — 3078F DIAST BP <80 MM HG: CPT | Performed by: PHYSICAL MEDICINE & REHABILITATION

## 2023-05-24 PROCEDURE — 99214 OFFICE O/P EST MOD 30 MIN: CPT | Performed by: PHYSICAL MEDICINE & REHABILITATION

## 2023-05-24 PROCEDURE — 3074F SYST BP LT 130 MM HG: CPT | Performed by: PHYSICAL MEDICINE & REHABILITATION

## 2023-05-24 RX ORDER — DULOXETIN HYDROCHLORIDE 60 MG/1
60 CAPSULE, DELAYED RELEASE ORAL DAILY
Qty: 90 CAPSULE | Refills: 1 | Status: SHIPPED | OUTPATIENT
Start: 2023-05-24 | End: 2023-11-20

## 2023-05-24 ASSESSMENT — PATIENT HEALTH QUESTIONNAIRE - PHQ9: CLINICAL INTERPRETATION OF PHQ2 SCORE: 0

## 2023-05-24 ASSESSMENT — FIBROSIS 4 INDEX: FIB4 SCORE: 1.15

## 2023-05-24 NOTE — PROGRESS NOTES
Follow-up patient note, patient previously seen by Dr. Porter     Physiatry (physical medicine and  Rehabilitation), interventional spine and sports medicine    Date of Service:05/24/2023    Chief complaint:   Chief Complaint   Patient presents with    Follow-Up       HISTORY    HPI: Curt Blair 72 y.o. male who presents today for follow-up evaluation.  Curt returns for follow-up.  He reports that he has had reduction in pain after Synvisc injections completed on May 9, 2023.  He has been able to stop using the salve.  Pain has reduced but he does intermittently notice left knee pain during the day.  He has consulted with Dr. Pineda and is strongly considering knee replacement.    His back pain has been stable.  He continues to take duloxetine.  He does have some back pain morning but this resolves after morning bowel movements.    He continues to take diclofenac for osteoarthritis, particularly helps his hands.      Medical records review:    Reviewed records from Dr Clovis Alicea 07/2013 At that visit, he assessed that MRI of left knee was indicated.  On 07/30/2013, they reviewed the study and the patient had a left knee corticosteroid injection    Previous treatments:    Physical Therapy: Not recently    Medications the patient is tried: NSAIDs    Previous interventions: none, recently     Previous surgeries to relieve the above pain:  none      ROS:   Eyes: cataract surgery, recently  CV: history of MI at 41  Red Flags ROS:   Fever, Chills, Sweats: Denies  Involuntary Weight Loss: Denies  Bladder Incontinence: Denies  Bowel Incontinence: Denies  Saddle Anesthesia: Denies    All other systems reviewed and negative.       PMHx:   Past Medical History:   Diagnosis Date    Allergies 12/24/2019    Arteriosclerotic vascular disease 1/25/2019    Arthritis     Right Foot    CAD (coronary artery disease)     Elevated CPK 8/16/2020    Generalized anxiety disorder 10/11/2017    Hyperlipidemia     Hypertension      Inflamed sebaceous cyst 2/15/2018    Intrinsic eczema 2/7/2020    Prediabetes 7/19/2019    S/P coronary artery stent placement     2 stents       PSHx:   Past Surgical History:   Procedure Laterality Date    PA INJ LUMBAR/SACRAL,W/ IMAGING Right 12/2/2021    Procedure: RIGHT L4-5 interlaminar epidural steroid injection with sedation;  Surgeon: Sav Rick M.D.;  [facility];  Service: Pain Management    PA INJ LUMBAR/SACRAL,W/ IMAGING Right 7/28/2021    Procedure: RIGHT L4-5 interlaminar epidural steroid injection with sedation;  Surgeon: Sav Rick M.D.;  [facility];  Service: Pain Management    PA FLUOROSCOPIC GUIDANCE NEEDLE PLACEMENT Left 6/16/2021    Procedure: LEFT genicular nerve blocks, diagnostic;  Surgeon: Sav Rick M.D.;  [facility]: [facility];  Service: Pain Management    PA INJ AA/STRD GNCLR NRV BRNCH W/IMG Left 6/16/2021    Procedure: GENICULAR NERVE BRANCHES INCLUDING IMAGING GUIDANCE WITH A REQUIRED MINIMUM THREE NERVE BRANCHES;  Surgeon: Sav Rick M.D.;  [facility];  Service: Pain Management    APPENDECTOMY      CHOLECYSTECTOMY      ENDARTERECTOMY      corornary    STENT PLACEMENT      VASECTOMY         Family history   Family History   Problem Relation Age of Onset    Lung Disease Mother         copd    Hypertension Mother     Heart Disease Father         heart attack and heart disease    Cancer Brother         neck    Heart Disease Paternal Grandfather         Heart attack    Other Sister         non-malignant brain tumor    Diabetes Neg Hx          Medications:   Current Outpatient Medications   Medication    DULoxetine (CYMBALTA) 60 MG Cap DR Particles delayed-release capsule    Coenzyme Q10 300 MG Cap    Gabapentin Powder    atorvastatin (LIPITOR) 10 MG Tab    mesalamine (LIALDA) 1.2 GM Tablet Delayed Response    diclofenac DR (VOLTAREN) 75 MG Tablet Delayed Response     amlodipine-benazepril (LOTREL) 5-10 MG per capsule    nitroglycerin (NITROSTAT) 0.4 MG SL Tab    Multiple Vitamins-Minerals (MULTIVITAMIN PO)    CALCIUM PO    aspirin 81 MG tablet     No current facility-administered medications for this visit.       Allergies:   Allergies   Allergen Reactions    Atorvastatin Myalgia     Ok to take 10 mg  Elevated CPK  Only at 40 Mg does this happen.    Lyrica [Pregabalin]     Rosuvastatin      muscle cramps elevated CPK       Social Hx:   Social History     Socioeconomic History    Marital status:      Spouse name: Not on file    Number of children: Not on file    Years of education: Not on file    Highest education level: 12th grade   Occupational History    Not on file   Tobacco Use    Smoking status: Former     Packs/day: 2.00     Years: 25.00     Pack years: 50.00     Types: Cigarettes     Quit date: 1992     Years since quittin.4    Smokeless tobacco: Never    Tobacco comments:     avoid all tobacco products   Vaping Use    Vaping Use: Never used   Substance and Sexual Activity    Alcohol use: Yes     Alcohol/week: 1.2 oz     Types: 2 Standard drinks or equivalent per week     Comment: 1-2 DRINKS WEEKLY     Drug use: No    Sexual activity: Yes     Partners: Female     Comment: , 3 children   Other Topics Concern     Service Yes    Blood Transfusions No    Caffeine Concern No    Occupational Exposure No    Hobby Hazards No    Sleep Concern Yes    Stress Concern Yes    Weight Concern Yes    Special Diet No     Comment: no fried    Back Care Yes    Exercise Yes    Bike Helmet No     Comment: does not ride bike    Seat Belt Not Asked    Self-Exams Yes   Social History Narrative    He was born in [place]. He worked in the [employer] industry, he retired at age 65. He  Luann in 2016 after both of their spouses passed away. He has children in [place] and several grandchildren and great grandchildren. He is an avid traveler.     Social  Determinants of Health     Financial Resource Strain: Low Risk  (9/24/2020)    Overall Financial Resource Strain (CARDIA)     Difficulty of Paying Living Expenses: Not hard at all   Food Insecurity: No Food Insecurity (9/24/2020)    Hunger Vital Sign     Worried About Running Out of Food in the Last Year: Never true     Ran Out of Food in the Last Year: Never true   Transportation Needs: No Transportation Needs (9/24/2020)    PRAPARE - Transportation     Lack of Transportation (Medical): No     Lack of Transportation (Non-Medical): No   Physical Activity: Inactive (9/24/2020)    Exercise Vital Sign     Days of Exercise per Week: 0 days     Minutes of Exercise per Session: 0 min   Stress: Stress Concern Present (9/24/2020)    [place] [employer] - Occupational Stress Questionnaire     Feeling of Stress : To some extent   Social Connections: Moderately Integrated (9/24/2020)    Social Connection and Isolation Panel [NHANES]     Frequency of Communication with Friends and Family: More than three times a week     Frequency of Social Gatherings with Friends and Family: Once a week     Attends Protestant Services: Never     Active Member of Clubs or Organizations: Yes     Attends Club or Organization Meetings: 1 to 4 times per year     Marital Status:    Intimate Partner Violence: Not on file   Housing Stability: Low Risk  (9/24/2020)    Housing Stability Vital Sign     Unable to Pay for Housing in the Last Year: No     Number of Places Lived in the Last Year: 1     Unstable Housing in the Last Year: No         EXAMINATION     Physical Exam:   Vitals: /62 (BP Location: Right arm, Patient Position: Sitting, BP Cuff Size: Large adult)   Pulse 84   Temp (!) 35.7 °C (96.3 °F) (Temporal)   Ht 1.829 m (6')   Wt 112 kg (247 lb 3.2 oz)   SpO2 96%     Constitutional:   Body Habitus: Body mass index is 33.53 kg/m².  Cooperation: Fully cooperates with exam  Appearance: Well-groomed,  well-nourished, not disheveled, in no acute distress    Eyes: No scleral icterus, no proptosis     ENT -no obvious auditory deficits, wearing a face mask    Skin -no rashes or lesions noted, no warmth or erythema was noted at the injection site    Respiratory-  breathing comfortable on room air, no audible wheezing    Cardiovascular- No pitting edema lower extremity edema is noted.     Psychiatric- alert and oriented ×3. Normal affect.     Gait - normal gait, no use of ambulatory device, minimally antalgic.     Spine  No focal motor or sensory deficits in the lower extremities bilaterally    Seated SLR is negative bilaterally    Left knee  Mild effusion, tenderness medial and lateral joint line.  No medial or lateral instability.    MEDICAL DECISION MAKING    Medical records review: see under HPI section.     DATA    Labs:   Lab Results   Component Value Date/Time    SODIUM 137 09/22/2022 07:09 AM    POTASSIUM 4.3 09/22/2022 07:09 AM    CHLORIDE 101 09/22/2022 07:09 AM    CO2 25 09/22/2022 07:09 AM    ANION 11.0 09/22/2022 07:09 AM    GLUCOSE 103 (H) 09/22/2022 07:09 AM    BUN 18 09/22/2022 07:09 AM    CREATININE 0.85 09/22/2022 07:09 AM    CREATININE 0.93 02/20/2013 07:20 AM    CALCIUM 9.1 09/22/2022 07:09 AM    ASTSGOT 20 09/22/2022 07:09 AM    ALTSGPT 25 09/22/2022 07:09 AM    TBILIRUBIN 0.6 09/22/2022 07:09 AM    ALBUMIN 4.2 09/22/2022 07:09 AM    TOTPROTEIN 7.6 09/22/2022 07:09 AM    GLOBULIN 3.4 09/22/2022 07:09 AM    AGRATIO 1.2 09/22/2022 07:09 AM       Lab Results   Component Value Date/Time    PROTHROMBTM 14.8 (H) 12/05/2018 07:00 AM    INR 1.17 (H) 12/05/2018 07:00 AM        Lab Results   Component Value Date/Time    WBC 5.6 09/22/2022 07:09 AM    RBC 5.04 09/22/2022 07:09 AM    HEMOGLOBIN 15.6 09/22/2022 07:09 AM    HEMATOCRIT 46.7 09/22/2022 07:09 AM    MCV 92.7 09/22/2022 07:09 AM    MCH 31.0 09/22/2022 07:09 AM    MCHC 33.4 (L) 09/22/2022 07:09 AM    MPV 10.3 09/22/2022 07:09 AM    NEUTSPOLYS 63.50  09/22/2022 07:09 AM    LYMPHOCYTES 16.70 (L) 09/22/2022 07:09 AM    MONOCYTES 17.60 (H) 09/22/2022 07:09 AM    EOSINOPHILS 1.10 09/22/2022 07:09 AM    BASOPHILS 0.70 09/22/2022 07:09 AM        Lab Results   Component Value Date/Time    HBA1C 6.1 (H) 09/22/2022 07:09 AM        Imaging: I personally reviewed following images, these are my reads  MRI lumbar spine 07/26/2021  At L1-2, no central or foraminal stenosis  At L2-3, disc bulge, facet arthropathy and mild foraminal stenosis bilaterally  At L3-4, moderate central canal stenosis, mod-severe foraminal stenosis bilaterally, facet arthropathy  At L4-5, grade I anterolisthesis, disc bulge, severe facet arthropathy, moderate foraminal stenosis bilaterally  At L5-S1, mod-severe facet arthropathy with right paracentral annular tear.  No central or foraminal stenosis      MRI lumbar spine 10/29/2020  At L5-S1, there is bilateral facet arthropathy without central canal narrowing.  Mild foraminal stenosis bilaterally   At L4-5, mild central canal stenosis second to disc bulge and severe facet arthropathy.  Moderate foraminal stenosis bilaterally  At L3-4, disc bulge with moderate facet arthropathy and mild-moderate central canal stenosis.  Severe foraminal stenosis bilaterally  At L2-3, posterior disc bulge with facet arthropathy and mild central canal narrowing.  Mild foraminal stenosis bilaterally  At L1-2, mild facet arthropathy bilaterally, mild foraminal stenosis bilaterally.  Prominent epidural fat    Xray lumbar spine 08/07/2020  There is multilevel retrolisthesis at L1-2, L2-3, and L3-4  Grade I anterolisthesis at L4-5, stable on flexion and extension  Degenerative changes with multilevel facet arthropathy    Xray lumbar spine 10/04/2018  There is severe degenerative change in the lumbar spine; there is note of multilevel retrolisthesis at L1-2, L3-4 and L2-3  Mild movement of anterolisthesis at L4-5 with flexion    MRI left knee 07/10/2020  There is note of a  small joint effusion.  Cyst posterior to the PCL.  No ligamentous or meniscal tears  Tricompartmental degenerative changes, greatest medial compartment    Xray hip right 10/04/2018  Moderate hip osteoarthritis noted bilaterally     MRI left knee 07/22/2013  There is note of moderate joint effusion.  The medial collateral ligament has mild edema.  There is note of osteophytes in the medial and lateral compartments.  Tendinopathy in the quadriceps tendon noted.    IMAGING radiology reads. I reviewed the following radiology reads     Ultrasound right lower extremity April 8, 2022     PROCEDURES:   Right lower extremity venous duplex imaging.    The following venous structures were evaluated: common femoral, deep    femoral, proximal great saphenous, femoral, popliteal, peroneal, and    posterior tibial veins.    Serial compression, color, and spectral Doppler flow evaluations were    performed.       FINDINGS:   RIGHT LEG:   No deep venous thrombosis.    All veins demonstrate complete color filling and compressibility with    normal venous flow dynamics including spontaneous flow and respiratory    phasicity.    There is a non-vascular anechoic area in the right popliteal fossa    measuring 3.8 x 1.3 cm.    MRI lumbar spine 07/26/2021     IMPRESSION:     1.  New right paracentral L5/S1 annular tear.  2.  Otherwise no significant change in moderate spondylosis with up to moderate to severe L3/4 foraminal stenoses  3.  Greatest the central stenoses are mild to moderate at L3/4  4.  Facet arthropathy greater than degenerative disc disease  5.  Degenerative L4/5 anterolisthesis where there is facet arthropathy and a moderate right facet joint effusion as before    MRI lumbar spine 10/29/2020  IMPRESSION:  1.  Minimal anterolisthesis of L4-5.  2.  Multilevel degenerative disease and facet arthropathy with resultant central canal and foraminal narrowing as described.    Xray lumbar spine 08/07/2020  IMPRESSION:  1.   Multilevel degenerative disc disease and facet degeneration.  2.  Mild anterior subluxation at L4-5. This is stable with flexion and extension.  3.  Again seen multilevel degenerative retrolisthesis. This does not change significantly extension.    Xray lumbar spine 10/04/2018  IMPRESSION:     Mild retrolisthesis of L1-2, L2-3 and L3-4 that are worse with extension.  Mild anterolisthesis of L4-5 that is worse with flexion.  There is partial disc space fusion of the lower thoracic spine.    MRI left knee 07/10/2020  IMPRESSION:  1.  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  2.  Intact ligaments and menisci.  3.  Small joint effusion.  4.  [place] synovial or ganglion cyst immediately posterior to the posterior cruciate ligament.      MRI left knee 07/22/2013       Impression        1. Tendinopathy of the quadriceps tendon.     2. Medial collateral ligament sprain.     3. Osteoarthritis, most severe in the medial compartment.     4. Fraying of the inner rim of the bodies of the lateral and medial menisci.     5. Moderate joint effusion.      Xray hips 10/04/2018  Impression:  No acute osseous abnormality                         Results for orders placed during the hospital encounter of 10/04/18   DX-LUMBAR SPINE-4+ VIEWS    Impression Mild retrolisthesis of L1-2, L2-3 and L3-4 that are worse with extension.    Mild anterolisthesis of L4-5 that is worse with flexion.    There is partial disc space fusion of the lower thoracic spine.                                         Diagnosis   Visit Diagnoses     ICD-10-CM   1. Primary osteoarthritis of left knee  M17.12   2. Spondylolisthesis of lumbar region  M43.16   3. Spinal stenosis of lumbar region, unspecified whether neurogenic claudication present  M48.061   4. Obesity (BMI 30-39.9)  E66.9   5. Primary osteoarthritis of both knees  M17.0   6. Neural foraminal stenosis of lumbar spine  M48.061                  ASSESSMENT:  Curt Blair 72 y.o. male seen for above     Curt was seen today for follow-up.    Diagnoses and all orders for this visit:    Primary osteoarthritis of left knee    Spondylolisthesis of lumbar region  -     DULoxetine (CYMBALTA) 60 MG Cap DR Particles delayed-release capsule; Take 1 Capsule by mouth every day for 180 days.    Spinal stenosis of lumbar region, unspecified whether neurogenic claudication present  -     DULoxetine (CYMBALTA) 60 MG Cap DR Particles delayed-release capsule; Take 1 Capsule by mouth every day for 180 days.    Obesity (BMI 30-39.9)    Primary osteoarthritis of both knees  -     DULoxetine (CYMBALTA) 60 MG Cap DR Particles delayed-release capsule; Take 1 Capsule by mouth every day for 180 days.    Neural foraminal stenosis of lumbar spine  -     DULoxetine (CYMBALTA) 60 MG Cap DR Particles delayed-release capsule; Take 1 Capsule by mouth every day for 180 days.          Discussed that he has had some reduction in pain after the left Synvisc injections.  Pain does continue to be variable.  He is considering total knee replacement, as pain is ongoing Dr. Pineda did not feel arthroscopy would adequately address left knee pathology.  He is contemplating this but is leaning towards knee replacement.  Continue activity as tolerated.  Continue duloxetine 60 mg daily.  Prescription given for the next 6 months.  This has been stable and can manage by his PCP in the future.  He continues diclofenac.  For now we discussed that his back pain was pretty stable and continue with duloxetine.  Symptoms progress he will contact the office.      Follow-up: Return if symptoms worsen or fail to improve.       Thank you very much for asking me to participate in Curt Blair's care.  Please contact me with any questions or concerns.    Please note that this dictation was created using voice recognition software. I have made every reasonable attempt to correct obvious  errors but there may be errors of grammar and content that I may have overlooked prior to finalization of this note.      Sav Rick MD  Physical Medicine and Rehabilitation  Interventional Spine and Sports Physiatry  [facility]

## 2023-06-23 PROBLEM — M17.12 TRICOMPARTMENT OSTEOARTHRITIS OF LEFT KNEE: Status: ACTIVE | Noted: 2023-06-23

## 2023-07-05 ENCOUNTER — TELEPHONE (OUTPATIENT)
Dept: MEDICAL GROUP | Facility: PHYSICIAN GROUP | Age: 73
End: 2023-07-05

## 2023-07-05 ENCOUNTER — OFFICE VISIT (OUTPATIENT)
Dept: MEDICAL GROUP | Facility: PHYSICIAN GROUP | Age: 73
End: 2023-07-05
Payer: MEDICARE

## 2023-07-05 VITALS
TEMPERATURE: 97.2 F | DIASTOLIC BLOOD PRESSURE: 50 MMHG | WEIGHT: 243 LBS | RESPIRATION RATE: 16 BRPM | SYSTOLIC BLOOD PRESSURE: 102 MMHG | OXYGEN SATURATION: 98 % | BODY MASS INDEX: 32.91 KG/M2 | HEIGHT: 72 IN | HEART RATE: 94 BPM

## 2023-07-05 DIAGNOSIS — H61.23 BILATERAL IMPACTED CERUMEN: ICD-10-CM

## 2023-07-05 DIAGNOSIS — M25.562 CHRONIC PAIN OF LEFT KNEE: ICD-10-CM

## 2023-07-05 DIAGNOSIS — K52.3 INDETERMINATE COLITIS: ICD-10-CM

## 2023-07-05 DIAGNOSIS — G89.29 CHRONIC PAIN OF LEFT KNEE: ICD-10-CM

## 2023-07-05 DIAGNOSIS — I10 ESSENTIAL HYPERTENSION, BENIGN: ICD-10-CM

## 2023-07-05 PROCEDURE — 3078F DIAST BP <80 MM HG: CPT | Performed by: INTERNAL MEDICINE

## 2023-07-05 PROCEDURE — 3074F SYST BP LT 130 MM HG: CPT | Performed by: INTERNAL MEDICINE

## 2023-07-05 PROCEDURE — 99214 OFFICE O/P EST MOD 30 MIN: CPT | Performed by: INTERNAL MEDICINE

## 2023-07-05 ASSESSMENT — FIBROSIS 4 INDEX: FIB4 SCORE: 1.15

## 2023-07-05 NOTE — ASSESSMENT & PLAN NOTE
Chronic decompensated problem.  Bilateral ear lavage completed in clinic by medical assistant, Kenya ARGUETA.  Patient tolerated without issue and postprocedure examination showed clear ear canals with no significant erythema or abnormality.

## 2023-07-05 NOTE — PROGRESS NOTES
Subjective:   Chief Complaint/History of Present Illness:  Curt Blair is a 72 y.o. male established patient who presents today to discuss medical problems as listed below. Curt is unaccompanied for today's visit.    Problem   Indeterminate Colitis    Diarrhea started distinctly on 4/29/2022. He went out to dinner for his daughter's birthday to a [employer] restaurant where he had cooked shrimp. He also had his 4th Covid shot on 4/25/2022.     Normally he has 2 BM's every morning and then 1 BM in the evening or overnight. He is s/p open cholecystectomy around 1997. No issues with dumping syndrome.     He developed multiple episodes of watery diarrhea. He started peptobismol which slowed down the diarrhea then imodium which stopped the diarrhea but only short term when he took the medicine.     He notes prior episode of diverticulitis in 2019. He had exposure to amoxicillin from dental procedures around December and again in April. No prior episodes of Cdiff. Due to elevated inflammation markers and long delay to get into GI we started him empirically on budesonide which led to immediate relief of symptoms. He saw GI who performed colonoscopy which was concerning for inflammatory bowel disease versus indeterminate colitis (secondary to NSAIDs or duloxetine?). No hematochezia through this episode.    Colonoscopy (10/2022) with active colitis and GI recommendations from Dr. Francois- inflammation from your colon is most likely due to inflammatory bowel disease.      He was initially on budesonide and started on mesalamine, now on maintenance therapy with mesalamine.    Current regimen: Mesalamine 4.8 g daily     Chronic Pain of Left Knee    MRI left knee (7/2020):  1.  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  2.  Intact ligaments and menisci.  3.  Small joint effusion.  4.  Hines synovial or ganglion cyst immediately posterior to the posterior  cruciate ligament.    S/p multiple steroid injections into the left knee, most recently completed in February 2021. He tried conservative measures and initially had improved with increased support at night on his bed settings but then worsened again..  He was initially trialed on gabapentin but he had a negative reaction and was transitioned to Lyrica and then finally duloxetine which has worked well.     He met with Dr. Pineda at [facility] who recommends left total knee arthroplasty. This is planned for July 17th 2023, he has completed his preoperative assessment.     Bilateral Impacted Cerumen    He has longstanding history of wax buildup in his ears, needs them lavaged about every year.  He has some devices at home he uses but they are not very effective.     Essential Hypertension, Benign      He reports longstanding history of hypertension.  It has been well controlled on same regiment for many years.  No signs or symptoms of orthostasis.  No angina equivalents.  He follows with cardiology on an annual basis.    Current regimen: amlodipine-benazepril 5-10 mg daily    Blood pressure in clinic ranges 108-128/60-86          Current Medications:  Current Outpatient Medications Ordered in Epic   Medication Sig Dispense Refill    DULoxetine (CYMBALTA) 60 MG Cap DR Particles delayed-release capsule Take 1 Capsule by mouth every day for 180 days. 90 Capsule 1    Coenzyme Q10 300 MG Cap Take 1 Capsule by mouth every day. 30 Capsule     Gabapentin Powder 1 g 4 times a day as needed (left knee pain). Gabapentin 6%, lidocaine 10%, ketoprofen 10%, nortriptyline 2% (Patient taking differently: 1 g 4 times a day as needed (left knee pain). HAS NOT USED IN AWHILE -- Gabapentin 6%, lidocaine 10%, ketoprofen 10%, nortriptyline 2%) 100 g 3    atorvastatin (LIPITOR) 10 MG Tab TAKE 1 TABLET BY MOUTH EVERY  Tablet 3    mesalamine (LIALDA) 1.2 GM Tablet Delayed Response Take 4.8 g by mouth every day.      diclofenac  (VOLTAREN)  75 MG Tablet Delayed Response Take 1 Tablet by mouth 2 times a day. 200 Tablet 3    amlodipine-benazepril (LOTREL) 5-10 MG per capsule TAKE 1 CAPSULE BY MOUTH EVERY  Capsule 2    nitroglycerin (NITROSTAT) 0.4 MG SL Tab Place 1 Tablet under the tongue as needed for Chest Pain. (Patient taking differently: Place 0.4 mg under the tongue as needed for Chest Pain. CARRY'S IT JUST IN CASE. NEVER HAD TO USE) 25 Tablet 11    Multiple Vitamins-Minerals (MULTIVITAMIN PO) Take 1 Tab by mouth every morning.      CALCIUM PO Take 1 Tab by mouth every day.      aspirin 81 MG tablet Take 81 mg by mouth every day.         No current Epic-ordered facility-administered medications on file.          Objective:   Physical Exam:    Vitals: /50 (BP Location: Right arm, Patient Position: Sitting, BP Cuff Size: Adult)   Pulse 94   Temp 36.2 °C (97.2 °F) (Temporal)   Resp 16   Ht 1.829 m (6')   Wt 110 kg (243 lb)   SpO2 98%    BMI: Body mass index is 32.96 kg/m².  Physical Exam  Constitutional:       General: He is not in acute distress.     Appearance: Normal appearance. He is not ill-appearing.   HENT:      Right Ear: Ear canal and external ear normal. There is no impacted cerumen.      Left Ear: Ear canal and external ear normal. There is no impacted cerumen.      Ears:      Comments: Saw after ear lavage and all wax removed with no redness  Eyes:      General: No scleral icterus.     Conjunctiva/sclera: Conjunctivae normal.   Cardiovascular:      Rate and Rhythm: Normal rate and regular rhythm.      Pulses: Normal pulses.      Heart sounds: No murmur heard.  Pulmonary:      Effort: Pulmonary effort is normal. No respiratory distress.      Breath sounds: Normal breath sounds. No wheezing, rhonchi or rales.   Abdominal:      General: Bowel sounds are normal. There is no distension.      Palpations: Abdomen is soft.      Tenderness: There is no abdominal tenderness.   Skin:     General: Skin is warm and dry.       Findings: No rash.   Psychiatric:         Mood and Affect: Mood normal.         Behavior: Behavior normal.         Thought Content: Thought content normal.         Judgment: Judgment normal.            Assessment and Plan:   Curt is a 72 y.o. male with the following:  Problem List Items Addressed This Visit       Bilateral impacted cerumen     Chronic decompensated problem.  Bilateral ear lavage completed in clinic by medical assistant, Kenya ARGUETA.  Patient tolerated without issue and postprocedure examination showed clear ear canals with no significant erythema or abnormality.         Chronic pain of left knee     Chronic decompensated problem.  He has elected to proceed with left total knee arthroplasty.  This is scheduled for July 17, 2023.  He saw his cardiologist in May who did not recommend any additional testing before surgery.  We discussed consideration for an ice machine especially if he is planning to have the other knee done in the near future.  He will hold his mesalamine and Voltaren before surgery per his surgeon's instructions.  He will reach out if he has any concerns postoperatively but is looking forward to getting this taken care of.         Essential hypertension, benign     Chronic stable problem.  Blood pressure well controlled on current medical therapy, continue amlodipine-benazepril 5-10 mg daily.         Indeterminate colitis     Chronic stable issue, since mesalamine has been added his diarrhea has essentially resolved.  He does have to hold before his knee surgery for 5 days and is worried the diarrhea may return, advised him to reach out to GI if this happens and they can guide him but I am hopeful that holding it short-term will not cause any issue.  Continue follow-up with GI as recommended.               RTC: Return in about 3 months (around 10/5/2023).    I spent a total of 36 minutes with record review, exam, communication with the patient, communication with other providers, and  documentation of this encounter.    PLEASE NOTE: This dictation was created using voice recognition software. I have made every reasonable attempt to correct obvious errors, but I expect that there are errors of grammar and possibly content that I did not discover before finalizing the note.      Shila Mullen, DO  Geriatric and Internal Medicine  [facility]

## 2023-07-05 NOTE — ASSESSMENT & PLAN NOTE
Chronic stable issue, since mesalamine has been added his diarrhea has essentially resolved.  He does have to hold before his knee surgery for 5 days and is worried the diarrhea may return, advised him to reach out to GI if this happens and they can guide him but I am hopeful that holding it short-term will not cause any issue.  Continue follow-up with GI as recommended.

## 2023-07-05 NOTE — ASSESSMENT & PLAN NOTE
Chronic stable problem.  Blood pressure well controlled on current medical therapy, continue amlodipine-benazepril 5-10 mg daily.

## 2023-07-05 NOTE — TELEPHONE ENCOUNTER
1. Caller Name: Curt Blair                          Call Back Number: [phone] (home)         How would the patient prefer to be contacted with a response: Phone call OK to leave a detailed message    Called and spoke to patient he will be here at 9:20 this am

## 2023-07-05 NOTE — ASSESSMENT & PLAN NOTE
Chronic decompensated problem.  He has elected to proceed with left total knee arthroplasty.  This is scheduled for July 17, 2023.  He saw his cardiologist in May who did not recommend any additional testing before surgery.  We discussed consideration for an ice machine especially if he is planning to have the other knee done in the near future.  He will hold his mesalamine and Voltaren before surgery per his surgeon's instructions.  He will reach out if he has any concerns postoperatively but is looking forward to getting this taken care of.

## 2023-08-05 DIAGNOSIS — I10 ESSENTIAL HYPERTENSION, BENIGN: ICD-10-CM

## 2023-08-07 NOTE — TELEPHONE ENCOUNTER
Is the patient due for a refill? Yes    Was the patient seen the past year? Yes    Date of last office visit: 5/8/2023    Does the patient have an upcoming appointment?  No   If yes, When?     Provider to refill:CW    Does the patients insurance require a 100 day supply?  Yes

## 2023-08-08 RX ORDER — AMLODIPINE BESYLATE AND BENAZEPRIL HYDROCHLORIDE 5; 10 MG/1; MG/1
1 CAPSULE ORAL
Qty: 100 CAPSULE | Refills: 2 | Status: SHIPPED | OUTPATIENT
Start: 2023-08-08

## 2023-09-05 ENCOUNTER — APPOINTMENT (RX ONLY)
Dept: URBAN - METROPOLITAN AREA CLINIC 20 | Facility: CLINIC | Age: 73
Setting detail: DERMATOLOGY
End: 2023-09-05

## 2023-09-05 DIAGNOSIS — D18.0 HEMANGIOMA: ICD-10-CM

## 2023-09-05 DIAGNOSIS — L81.4 OTHER MELANIN HYPERPIGMENTATION: ICD-10-CM

## 2023-09-05 DIAGNOSIS — D22 MELANOCYTIC NEVI: ICD-10-CM

## 2023-09-05 DIAGNOSIS — Z85.828 PERSONAL HISTORY OF OTHER MALIGNANT NEOPLASM OF SKIN: ICD-10-CM

## 2023-09-05 DIAGNOSIS — L82.1 OTHER SEBORRHEIC KERATOSIS: ICD-10-CM

## 2023-09-05 DIAGNOSIS — Z71.89 OTHER SPECIFIED COUNSELING: ICD-10-CM

## 2023-09-05 PROBLEM — D22.71 MELANOCYTIC NEVI OF RIGHT LOWER LIMB, INCLUDING HIP: Status: ACTIVE | Noted: 2023-09-05

## 2023-09-05 PROBLEM — D22.72 MELANOCYTIC NEVI OF LEFT LOWER LIMB, INCLUDING HIP: Status: ACTIVE | Noted: 2023-09-05

## 2023-09-05 PROBLEM — D22.62 MELANOCYTIC NEVI OF LEFT UPPER LIMB, INCLUDING SHOULDER: Status: ACTIVE | Noted: 2023-09-05

## 2023-09-05 PROBLEM — D22.5 MELANOCYTIC NEVI OF TRUNK: Status: ACTIVE | Noted: 2023-09-05

## 2023-09-05 PROBLEM — D18.01 HEMANGIOMA OF SKIN AND SUBCUTANEOUS TISSUE: Status: ACTIVE | Noted: 2023-09-05

## 2023-09-05 PROBLEM — D48.5 NEOPLASM OF UNCERTAIN BEHAVIOR OF SKIN: Status: ACTIVE | Noted: 2023-09-05

## 2023-09-05 PROBLEM — D22.61 MELANOCYTIC NEVI OF RIGHT UPPER LIMB, INCLUDING SHOULDER: Status: ACTIVE | Noted: 2023-09-05

## 2023-09-05 PROBLEM — D22.39 MELANOCYTIC NEVI OF OTHER PARTS OF FACE: Status: ACTIVE | Noted: 2023-09-05

## 2023-09-05 PROCEDURE — 11102 TANGNTL BX SKIN SINGLE LES: CPT

## 2023-09-05 PROCEDURE — ? COUNSELING

## 2023-09-05 PROCEDURE — ? SUNSCREEN RECOMMENDATIONS

## 2023-09-05 PROCEDURE — ? OBSERVATION

## 2023-09-05 PROCEDURE — 99213 OFFICE O/P EST LOW 20 MIN: CPT | Mod: 25

## 2023-09-05 PROCEDURE — ? BIOPSY BY SHAVE METHOD

## 2023-09-05 ASSESSMENT — LOCATION SIMPLE DESCRIPTION DERM
LOCATION SIMPLE: LEFT POSTERIOR THIGH
LOCATION SIMPLE: RIGHT UPPER BACK
LOCATION SIMPLE: POSTERIOR NECK
LOCATION SIMPLE: RIGHT POSTERIOR THIGH
LOCATION SIMPLE: RIGHT FOREARM
LOCATION SIMPLE: RIGHT PRETIBIAL REGION
LOCATION SIMPLE: LEFT POSTERIOR UPPER ARM
LOCATION SIMPLE: UPPER BACK
LOCATION SIMPLE: RIGHT POSTERIOR UPPER ARM
LOCATION SIMPLE: LEFT FOREARM
LOCATION SIMPLE: RIGHT CHEEK
LOCATION SIMPLE: LEFT PRETIBIAL REGION
LOCATION SIMPLE: RIGHT LOWER BACK
LOCATION SIMPLE: RIGHT FOREHEAD

## 2023-09-05 ASSESSMENT — LOCATION DETAILED DESCRIPTION DERM
LOCATION DETAILED: INFERIOR THORACIC SPINE
LOCATION DETAILED: LEFT VENTRAL PROXIMAL FOREARM
LOCATION DETAILED: RIGHT DISTAL POSTERIOR THIGH
LOCATION DETAILED: LEFT DISTAL POSTERIOR THIGH
LOCATION DETAILED: RIGHT PROXIMAL PRETIBIAL REGION
LOCATION DETAILED: RIGHT INFERIOR MEDIAL MIDBACK
LOCATION DETAILED: LEFT PROXIMAL POSTERIOR UPPER ARM
LOCATION DETAILED: RIGHT SUPERIOR UPPER BACK
LOCATION DETAILED: RIGHT INFERIOR CENTRAL MALAR CHEEK
LOCATION DETAILED: MID POSTERIOR NECK
LOCATION DETAILED: RIGHT DISTAL POSTERIOR UPPER ARM
LOCATION DETAILED: LEFT LATERAL PROXIMAL PRETIBIAL REGION
LOCATION DETAILED: RIGHT VENTRAL PROXIMAL FOREARM
LOCATION DETAILED: RIGHT INFERIOR FOREHEAD
LOCATION DETAILED: RIGHT INFERIOR MEDIAL UPPER BACK

## 2023-09-05 ASSESSMENT — LOCATION ZONE DERM
LOCATION ZONE: NECK
LOCATION ZONE: TRUNK
LOCATION ZONE: FACE
LOCATION ZONE: ARM
LOCATION ZONE: LEG

## 2023-09-05 NOTE — PROCEDURE: BIOPSY BY SHAVE METHOD
Detail Level: Detailed
Depth Of Biopsy: dermis
Was A Bandage Applied: Yes
Size Of Lesion In Cm: 0.3
X Size Of Lesion In Cm: 0
Biopsy Type: H and E
Biopsy Method: Personna blade
Anesthesia Type: 1% lidocaine with 1:100,000 epinephrine and a 1:12 solution of 8.4% sodium bicarbonate
Anesthesia Volume In Cc: 1
Hemostasis: Drysol and Electrocautery
Wound Care: Bacitracin
Dressing: Band-Aid
Destruction After The Procedure: No
Type Of Destruction Used: Curettage
Lab: [id number]
Lab Facility: 
Consent: Written consent was obtained and risks were reviewed including but not limited to scarring, infection, bleeding, scabbing, incomplete removal, nerve damage and allergy to anesthesia.
Post-Care Instructions: I reviewed with the patient in detail post-care instructions. Patient is to keep the biopsy site clean once daily and then apply petroleum and bandaid  until healed.
Notification Instructions: Patient will be notified of biopsy results. However, patient instructed to call the office if not contacted within 2 weeks.
Billing Type: Third-Party Bill
Information: Selecting Yes will display possible errors in your note based on the variables you have selected. This validation is only offered as a suggestion for you. PLEASE NOTE THAT THE VALIDATION TEXT WILL BE REMOVED WHEN YOU FINALIZE YOUR NOTE. IF YOU WANT TO FAX A PRELIMINARY NOTE YOU WILL NEED TO TOGGLE THIS TO 'NO' IF YOU DO NOT WANT IT IN YOUR FAXED NOTE.

## 2023-09-06 ENCOUNTER — OFFICE VISIT (OUTPATIENT)
Dept: MEDICAL GROUP | Facility: PHYSICIAN GROUP | Age: 73
End: 2023-09-06
Payer: MEDICARE

## 2023-09-06 VITALS
HEART RATE: 96 BPM | HEIGHT: 72 IN | RESPIRATION RATE: 16 BRPM | BODY MASS INDEX: 32.37 KG/M2 | SYSTOLIC BLOOD PRESSURE: 110 MMHG | TEMPERATURE: 97.4 F | WEIGHT: 239 LBS | OXYGEN SATURATION: 97 % | DIASTOLIC BLOOD PRESSURE: 60 MMHG

## 2023-09-06 DIAGNOSIS — R35.1 NOCTURIA: ICD-10-CM

## 2023-09-06 DIAGNOSIS — Z12.5 SCREENING FOR MALIGNANT NEOPLASM OF PROSTATE: ICD-10-CM

## 2023-09-06 DIAGNOSIS — M17.12 TRICOMPARTMENT OSTEOARTHRITIS OF LEFT KNEE: ICD-10-CM

## 2023-09-06 DIAGNOSIS — K52.3 INDETERMINATE COLITIS: ICD-10-CM

## 2023-09-06 DIAGNOSIS — I10 ESSENTIAL HYPERTENSION, BENIGN: ICD-10-CM

## 2023-09-06 LAB
APPEARANCE UR: NORMAL
BILIRUB UR STRIP-MCNC: NEGATIVE MG/DL
COLOR UR AUTO: NORMAL
GLUCOSE UR STRIP.AUTO-MCNC: NEGATIVE MG/DL
KETONES UR STRIP.AUTO-MCNC: NEGATIVE MG/DL
LEUKOCYTE ESTERASE UR QL STRIP.AUTO: NEGATIVE
NITRITE UR QL STRIP.AUTO: NEGATIVE
PH UR STRIP.AUTO: 5.5 [PH] (ref 5–8)
PROT UR QL STRIP: NEGATIVE MG/DL
RBC UR QL AUTO: NEGATIVE
SP GR UR STRIP.AUTO: 1.02
UROBILINOGEN UR STRIP-MCNC: 0.2 MG/DL

## 2023-09-06 PROCEDURE — 3074F SYST BP LT 130 MM HG: CPT | Performed by: INTERNAL MEDICINE

## 2023-09-06 PROCEDURE — 81002 URINALYSIS NONAUTO W/O SCOPE: CPT | Performed by: INTERNAL MEDICINE

## 2023-09-06 PROCEDURE — 3078F DIAST BP <80 MM HG: CPT | Performed by: INTERNAL MEDICINE

## 2023-09-06 PROCEDURE — 99214 OFFICE O/P EST MOD 30 MIN: CPT | Performed by: INTERNAL MEDICINE

## 2023-09-06 RX ORDER — AMOXICILLIN 500 MG/1
2000 CAPSULE ORAL
COMMUNITY
Start: 2023-08-28

## 2023-09-06 ASSESSMENT — FIBROSIS 4 INDEX: FIB4 SCORE: 1.15

## 2023-09-06 NOTE — ASSESSMENT & PLAN NOTE
Stable problem.  Blood pressure continues to be very well controlled on current medical therapy, continue amlodipine-benazepril 5-10 mg daily.  Continue follow-up with cardiology as recommended.

## 2023-09-06 NOTE — PROGRESS NOTES
Subjective:   Chief Complaint/History of Present Illness:  Curt Blair is a 72 y.o. male established patient who presents today to discuss medical problems as listed below. Curt is unaccompanied for today's visit.    Problem   Nocturia    He developed 2 weeks of consistent nocturia.  He was worried that he may have developed an issue with the bladder or prostate.  He normally would urinate twice per night often when getting up to take his dog outside at night.  For that 2-week period he was getting up 5-6 times per night, he reported sleeping 3 hours initially and then was up every hour thereafter or vice versa.  No dysuria or change in urine color.  He was placed on Bactrim around that time for superficial skin infection at the incision site of his left knee arthroplasty.  Point-of-care urinalysis in clinic today was unremarkable.     Tricompartment Osteoarthritis of Left Knee    He is s/p left total knee arthroplasty with Dr. Pineda.  Surgery went well, minor skin infection around the incisional site treated with a week of Bactrim which has since resolved.     Indeterminate Colitis    Diarrhea started distinctly on 4/29/2022. He went out to dinner for his daughter's birthday to a [employer] restaurant where he had cooked shrimp. He also had his 4th Covid shot on 4/25/2022.     Normally he has 2 BM's every morning and then 1 BM in the evening or overnight. He is s/p open cholecystectomy around 1997. No issues with dumping syndrome.     He developed multiple episodes of watery diarrhea. He started peptobismol which slowed down the diarrhea then imodium which stopped the diarrhea but only short term when he took the medicine.     He notes prior episode of diverticulitis in 2019. He had exposure to amoxicillin from dental procedures around December and again in April. No prior episodes of Cdiff. Due to elevated inflammation markers and long delay to get into GI we started him empirically on budesonide which led  to immediate relief of symptoms. He saw GI who performed colonoscopy which was concerning for inflammatory bowel disease versus indeterminate colitis (secondary to NSAIDs or duloxetine?). No hematochezia through this episode.    Colonoscopy (10/2022) with active colitis and GI recommendations from Dr. Francois- inflammation from your colon is most likely due to inflammatory bowel disease.      He was initially on budesonide and started on mesalamine, now on maintenance therapy with mesalamine.    Current regimen: Mesalamine 4.8 g daily     Essential Hypertension, Benign      He reports longstanding history of hypertension.  It has been well controlled on same regiment for many years.  No signs or symptoms of orthostasis.  No angina equivalents.  He follows with cardiology on an annual basis.    Current regimen: amlodipine-benazepril 5-10 mg daily    Blood pressure in clinic ranges 108-128/60-86          Current Medications:  Current Outpatient Medications Ordered in Epic   Medication Sig Dispense Refill    amlodipine-benazepril (LOTREL) 5-10 MG per capsule TAKE 1 CAPSULE BY MOUTH EVERY  Capsule 2    DULoxetine (CYMBALTA) 60 MG Cap DR Particles delayed-release capsule Take 1 Capsule by mouth every day for 180 days. 90 Capsule 1    Coenzyme Q10 300 MG Cap Take 1 Capsule by mouth every day. 30 Capsule     atorvastatin (LIPITOR) 10 MG Tab TAKE 1 TABLET BY MOUTH EVERY  Tablet 3    mesalamine (LIALDA) 1.2 GM Tablet Delayed Response Take 4.8 g by mouth every day.      nitroglycerin (NITROSTAT) 0.4 MG SL Tab Place 1 Tablet under the tongue as needed for Chest Pain. (Patient taking differently: Place 0.4 mg under the tongue as needed for Chest Pain. CARRY'S IT JUST IN CASE. NEVER HAD TO USE) 25 Tablet 11    Multiple Vitamins-Minerals (MULTIVITAMIN PO) Take 1 Tab by mouth every morning.      CALCIUM PO Take 1 Tab by mouth every day.      aspirin 81 MG tablet Take 81 mg by mouth every day.        amoxicillin  (AMOXIL) 500 MG Cap Take 2,000 mg by mouth one time as needed for Other. 1 hour before dental procedures       No current [facility]-ordered facility-administered medications on file.          Objective:   Physical Exam:    Vitals: /60 (BP Location: Left arm, Patient Position: Sitting, BP Cuff Size: Adult)   Pulse 96   Temp 36.3 °C (97.4 °F) (Temporal)   Resp 16   Ht 1.829 m (6')   Wt 108 kg (239 lb)   SpO2 97%    BMI: Body mass index is 32.41 kg/m².  Physical Exam  Constitutional:       General: He is not in acute distress.     Appearance: Normal appearance. He is not ill-appearing.   HENT:      Right Ear: External ear normal.      Left Ear: External ear normal.   Eyes:      General: No scleral icterus.     Conjunctiva/sclera: Conjunctivae normal.   Cardiovascular:      Rate and Rhythm: Normal rate and regular rhythm.      Pulses: Normal pulses.      Heart sounds: No murmur heard.  Pulmonary:      Effort: Pulmonary effort is normal. No respiratory distress.      Breath sounds: Normal breath sounds. No wheezing, rhonchi or rales.   Abdominal:      General: Bowel sounds are normal.      Palpations: Abdomen is soft.   Musculoskeletal:         General: Swelling present.      Right lower leg: No edema.      Left lower leg: No edema.      Comments: Left knee joint   Lymphadenopathy:      Cervical: No cervical adenopathy.   Skin:     General: Skin is warm and dry.      Findings: No rash.      Comments: Well healed vertical incision over left knee with dry scab, no drainage or erythema   Neurological:      Comments: Still with slightly abnormal gait   Psychiatric:         Mood and Affect: Mood normal.         Behavior: Behavior normal.         Thought Content: Thought content normal.         Judgment: Judgment normal.               Assessment and Plan:   Curt is a 72 y.o. male with the following:  Problem List Items Addressed This Visit       Essential hypertension, benign     Stable problem.  Blood pressure  continues to be very well controlled on current medical therapy, continue amlodipine-benazepril 5-10 mg daily.  Continue follow-up with cardiology as recommended.         Indeterminate colitis     Neck stable problem.  Bowels have been very stable since starting on the mesalamine, no noted side effects, continue mesalamine 4.8 g daily and follow-up with gastroenterology as recommended.         Nocturia     New problem, resolved last Friday before scheduled appointment today on Wednesday.  Lasted for about 2 weeks and was quite irritating for his sleep schedule.  Suspect it could have been related to the Bactrim he was taking for superficial skin infection versus his body try and eliminate some of the excess fluid around the left knee joint following recent total knee arthroplasty.  We will make sure we have a PSA level and upcoming blood work to follow the trajectory as this was normal last year. Point of care of care urinalysis reviewed with no sediment, protein, infection, or blood.         Relevant Orders    POCT Urinalysis (Completed)    Tricompartment osteoarthritis of left knee     He has improved following total knee arthroplasty, he continues to work with PT twice weekly.  He has some residual swelling but pain is very manageable.  He anticipates he will need to get the right side done in the future but will try to start with noninvasive approaches.          Other Visit Diagnoses       Screening for malignant neoplasm of prostate        Relevant Orders    PROSTATE SPECIFIC AG SCREENING               RTC: Return if symptoms worsen or fail to improve.    I spent a total of 32 minutes with record review, exam, communication with the patient, communication with other providers, and documentation of this encounter.    PLEASE NOTE: This dictation was created using voice recognition software. I have made every reasonable attempt to correct obvious errors, but I expect that there are errors of grammar and possibly  content that I did not discover before finalizing the note.      Shila Mullen, DO  Geriatric and Internal Medicine  [facility]

## 2023-09-06 NOTE — ASSESSMENT & PLAN NOTE
He has improved following total knee arthroplasty, he continues to work with PT twice weekly.  He has some residual swelling but pain is very manageable.  He anticipates he will need to get the right side done in the future but will try to start with noninvasive approaches.

## 2023-09-06 NOTE — ASSESSMENT & PLAN NOTE
New problem, resolved last Friday before scheduled appointment today on Wednesday.  Lasted for about 2 weeks and was quite irritating for his sleep schedule.  Suspect it could have been related to the Bactrim he was taking for superficial skin infection versus his body try and eliminate some of the excess fluid around the left knee joint following recent total knee arthroplasty.  We will make sure we have a PSA level and upcoming blood work to follow the trajectory as this was normal last year. Point of care of care urinalysis reviewed with no sediment, protein, infection, or blood.

## 2023-09-06 NOTE — ASSESSMENT & PLAN NOTE
Neck stable problem.  Bowels have been very stable since starting on the mesalamine, no noted side effects, continue mesalamine 4.8 g daily and follow-up with gastroenterology as recommended.

## 2023-09-20 ENCOUNTER — PATIENT MESSAGE (OUTPATIENT)
Dept: CARDIOLOGY | Facility: MEDICAL CENTER | Age: 73
End: 2023-09-20
Payer: MEDICARE

## 2023-09-20 ENCOUNTER — HOSPITAL ENCOUNTER (OUTPATIENT)
Dept: LAB | Facility: MEDICAL CENTER | Age: 73
End: 2023-09-20
Attending: INTERNAL MEDICINE
Payer: MEDICARE

## 2023-09-20 ENCOUNTER — TELEPHONE (OUTPATIENT)
Dept: CARDIOLOGY | Facility: MEDICAL CENTER | Age: 73
End: 2023-09-20
Payer: MEDICARE

## 2023-09-20 DIAGNOSIS — I25.83 CORONARY ARTERY DISEASE DUE TO LIPID RICH PLAQUE: ICD-10-CM

## 2023-09-20 DIAGNOSIS — I25.10 CORONARY ARTERY DISEASE DUE TO LIPID RICH PLAQUE: ICD-10-CM

## 2023-09-20 DIAGNOSIS — E78.5 DYSLIPIDEMIA: ICD-10-CM

## 2023-09-20 DIAGNOSIS — R74.8 ELEVATED CPK: ICD-10-CM

## 2023-09-20 DIAGNOSIS — Z12.5 SCREENING FOR MALIGNANT NEOPLASM OF PROSTATE: ICD-10-CM

## 2023-09-20 DIAGNOSIS — Z95.5 STATUS POST CORONARY ARTERY STENT PLACEMENT: ICD-10-CM

## 2023-09-20 DIAGNOSIS — R73.03 PREDIABETES: ICD-10-CM

## 2023-09-20 LAB
25(OH)D3 SERPL-MCNC: 24 NG/ML (ref 30–100)
ALBUMIN SERPL BCP-MCNC: 4.3 G/DL (ref 3.2–4.9)
ALBUMIN/GLOB SERPL: 1.3 G/DL
ALP SERPL-CCNC: 103 U/L (ref 30–99)
ALT SERPL-CCNC: 20 U/L (ref 2–50)
ANION GAP SERPL CALC-SCNC: 11 MMOL/L (ref 7–16)
AST SERPL-CCNC: 22 U/L (ref 12–45)
BASOPHILS # BLD AUTO: 1 % (ref 0–1.8)
BASOPHILS # BLD: 0.06 K/UL (ref 0–0.12)
BILIRUB SERPL-MCNC: 0.6 MG/DL (ref 0.1–1.5)
BUN SERPL-MCNC: 19 MG/DL (ref 8–22)
CALCIUM ALBUM COR SERPL-MCNC: 9.2 MG/DL (ref 8.5–10.5)
CALCIUM SERPL-MCNC: 9.4 MG/DL (ref 8.5–10.5)
CHLORIDE SERPL-SCNC: 101 MMOL/L (ref 96–112)
CHOLEST SERPL-MCNC: 144 MG/DL (ref 100–199)
CK SERPL-CCNC: 637 U/L (ref 0–154)
CO2 SERPL-SCNC: 25 MMOL/L (ref 20–33)
CREAT SERPL-MCNC: 0.84 MG/DL (ref 0.5–1.4)
EOSINOPHIL # BLD AUTO: 0.08 K/UL (ref 0–0.51)
EOSINOPHIL NFR BLD: 1.3 % (ref 0–6.9)
ERYTHROCYTE [DISTWIDTH] IN BLOOD BY AUTOMATED COUNT: 49.2 FL (ref 35.9–50)
EST. AVERAGE GLUCOSE BLD GHB EST-MCNC: 146 MG/DL
FASTING STATUS PATIENT QL REPORTED: NORMAL
GFR SERPLBLD CREATININE-BSD FMLA CKD-EPI: 92 ML/MIN/1.73 M 2
GLOBULIN SER CALC-MCNC: 3.2 G/DL (ref 1.9–3.5)
GLUCOSE SERPL-MCNC: 127 MG/DL (ref 65–99)
HBA1C MFR BLD: 6.7 % (ref 4–5.6)
HCT VFR BLD AUTO: 46.1 % (ref 42–52)
HDLC SERPL-MCNC: 58 MG/DL
HGB BLD-MCNC: 14.6 G/DL (ref 14–18)
IMM GRANULOCYTES # BLD AUTO: 0.02 K/UL (ref 0–0.11)
IMM GRANULOCYTES NFR BLD AUTO: 0.3 % (ref 0–0.9)
LDLC SERPL CALC-MCNC: 60 MG/DL
LYMPHOCYTES # BLD AUTO: 1.15 K/UL (ref 1–4.8)
LYMPHOCYTES NFR BLD: 18.4 % (ref 22–41)
MCH RBC QN AUTO: 29.3 PG (ref 27–33)
MCHC RBC AUTO-ENTMCNC: 31.7 G/DL (ref 32.3–36.5)
MCV RBC AUTO: 92.6 FL (ref 81.4–97.8)
MONOCYTES # BLD AUTO: 0.84 K/UL (ref 0–0.85)
MONOCYTES NFR BLD AUTO: 13.4 % (ref 0–13.4)
NEUTROPHILS # BLD AUTO: 4.1 K/UL (ref 1.82–7.42)
NEUTROPHILS NFR BLD: 65.6 % (ref 44–72)
NRBC # BLD AUTO: 0 K/UL
NRBC BLD-RTO: 0 /100 WBC (ref 0–0.2)
PLATELET # BLD AUTO: 331 K/UL (ref 164–446)
PMV BLD AUTO: 10.9 FL (ref 9–12.9)
POTASSIUM SERPL-SCNC: 4.6 MMOL/L (ref 3.6–5.5)
PROT SERPL-MCNC: 7.5 G/DL (ref 6–8.2)
PSA SERPL-MCNC: 1.05 NG/ML (ref 0–4)
RBC # BLD AUTO: 4.98 M/UL (ref 4.7–6.1)
SODIUM SERPL-SCNC: 137 MMOL/L (ref 135–145)
TRIGL SERPL-MCNC: 131 MG/DL (ref 0–149)
TSH SERPL DL<=0.005 MIU/L-ACNC: 3.71 UIU/ML (ref 0.38–5.33)
VIT B12 SERPL-MCNC: 627 PG/ML (ref 211–911)
WBC # BLD AUTO: 6.3 K/UL (ref 4.8–10.8)

## 2023-09-20 PROCEDURE — 82550 ASSAY OF CK (CPK): CPT

## 2023-09-20 PROCEDURE — 83036 HEMOGLOBIN GLYCOSYLATED A1C: CPT

## 2023-09-20 PROCEDURE — 82306 VITAMIN D 25 HYDROXY: CPT

## 2023-09-20 PROCEDURE — 80053 COMPREHEN METABOLIC PANEL: CPT

## 2023-09-20 PROCEDURE — 85025 COMPLETE CBC W/AUTO DIFF WBC: CPT

## 2023-09-20 PROCEDURE — 80061 LIPID PANEL: CPT

## 2023-09-20 PROCEDURE — 84443 ASSAY THYROID STIM HORMONE: CPT

## 2023-09-20 PROCEDURE — 84153 ASSAY OF PSA TOTAL: CPT

## 2023-09-20 PROCEDURE — 36415 COLL VENOUS BLD VENIPUNCTURE: CPT

## 2023-09-20 PROCEDURE — 82607 VITAMIN B-12: CPT

## 2023-10-05 ENCOUNTER — NON-PROVIDER VISIT (OUTPATIENT)
Dept: MEDICAL GROUP | Facility: MEDICAL CENTER | Age: 73
End: 2023-10-05
Payer: MEDICARE

## 2023-10-05 DIAGNOSIS — I25.10 CORONARY ARTERY DISEASE DUE TO LIPID RICH PLAQUE: ICD-10-CM

## 2023-10-05 DIAGNOSIS — E78.5 DYSLIPIDEMIA: ICD-10-CM

## 2023-10-05 DIAGNOSIS — I25.83 CORONARY ARTERY DISEASE DUE TO LIPID RICH PLAQUE: ICD-10-CM

## 2023-10-05 PROCEDURE — 99211 OFF/OP EST MAY X REQ PHY/QHP: CPT | Performed by: STUDENT IN AN ORGANIZED HEALTH CARE EDUCATION/TRAINING PROGRAM

## 2023-10-05 RX ORDER — DICLOFENAC SODIUM 75 MG/1
1 TABLET, DELAYED RELEASE ORAL 2 TIMES DAILY
COMMUNITY
End: 2023-11-16 | Stop reason: SDUPTHER

## 2023-10-05 RX ORDER — BEMPEDOIC ACID AND EZETIMIBE 180; 10 MG/1; MG/1
1 TABLET, FILM COATED ORAL DAILY
Qty: 30 TABLET | Refills: 11 | Status: SHIPPED | OUTPATIENT
Start: 2023-10-05

## 2023-10-05 NOTE — PROGRESS NOTES
[facility] - FollowUp Visit  Date of Service: 10/05/23    START: 3:15 pm  END: 4:00 pm    Curt Blair is here for follow up of dyslipidemia.    Subjective    HPI  Pertinent Interval History since last visit:   Pt's initial visit to establish care.  Current Prescription Lipid Lowering Medications - including dose:   Statin: Atorvastatin 10 mg once daily (has been on for 25 yrs now)  Non-Statin: None  Current Lipid Lowering and Related Supplements:   CoQ10 + vitamin D  Any Current Side Effects Potentially Related to Lipid Lowering therapy?   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 Reports total compliance prior to DC.  Any Previous History of Statin Intolerance?   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 Also noted elevated CPK along w/ myopathy.  Baseline Lipids Prior to Treatment:  Unknown or unavailable    SOCIAL HISTORY  Social History     Tobacco Use   Smoking Status Former    Current packs/day: 0.00    Average packs/day: 2.0 packs/day for 25.0 years (50.0 ttl pk-yrs)    Types: Cigarettes    Start date: 1967    Quit date: 1992    Years since quittin.7   Smokeless Tobacco Never   Tobacco Comments    avoid all tobacco products      Change in weight:  Pt down ~ 6 lbs since last OV - Actively working to lose weight.    Exercise habits: Goes to rehab (post TKA) twice weekly. Does home exercises as directed by PT for 30 min at a time.  Diet: DASH diet, one steak once weekly.      Objective    There were no vitals filed for this visit.   Physical Exam    DATA REVIEW  Most Recent Lipid Panel:   Lab Results   Component Value Date    CHOLSTRLTOT 144 2023    TRIGLYCERIDE 131 2023    HDL 58 2023    LDL 60 2023       Other Pertinent Blood Work:   Lab Results   Component Value Date    SODIUM 137 2023     POTASSIUM 4.6 09/20/2023    CHLORIDE 101 09/20/2023    CO2 25 09/20/2023    ANION 11.0 09/20/2023    GLUCOSE 127 (H) 09/20/2023    BUN 19 09/20/2023    CREATININE 0.84 09/20/2023    CALCIUM 9.4 09/20/2023    ASTSGOT 22 09/20/2023    ALTSGPT 20 09/20/2023    ALKPHOSPHAT 103 (H) 09/20/2023    TBILIRUBIN 0.6 09/20/2023    ALBUMIN 4.3 09/20/2023    AGRATIO 1.3 09/20/2023    CREACTPROT <0.30 09/09/2022    TSHULTRASEN 3.710 09/20/2023    CPKTOTAL 637 (H) 09/20/2023       Other:  NA    Recent Imaging Studies:    None since last visit      ASSESSMENT AND PLAN  Patient Type, check all that apply:   Secondary Prevention  Established Atherosclerotic Cardiovascular Disease (ASCVD)  Yes, Details: MI at the age of 41 that required artherectomy at [place]. 2nd event noted in his late 50's for which he underwent PCI to RCA.  Other Established (non-atherosclerotic) Vascular Disease, if Present:    None  Evidence of Heterozygous Familial Hypercholesterolemia (FH): Possible. Pt's father and grandfather had MI in their 60's  ACC/AHA Indication for Statin Therapy, mehdi all that apply:   Established ASCVD: Indication for High intensity statin    Calculated Risk for ASCVD, if applicable:  N/A  Other Significant Risk Markers, if any, mehdi all that apply:  Family history of premature ASCVD in first degree relative  National Lipid Association (NLA) Goal (if applicable):  LDL-C:   <70 mg/dL  Lifestyle Recommendations From Today’s Visit:   Eating Plan: Concentrate on  DASH/Med Style diet, Exercise: Increase as tolerated, and Weight Management: Continue to work to decrease  Statin Recommendations from Today's Visit  None - for now. Will consider re-challenge of statin in the future.  Non-Statin Medications Recommendations from Today’s Visit:   START Nexlizet 180-10 mg once daily. Counseled pt regarding MOA, SE, and administration  Indication for PCSK9 Inhibitor, if applicable:  Not currently indicated  Supplements Recommended at this  visit:  Continue CoQ10 once daily  Recommendations for Other Cardiovascular Risk Factors, mehdi all that apply:   Hypertension- Managed by PCP/Cards and Diabetes/Impaired Fasting Glucose- Managed by PCP  Other Issues:  Pt presents to clinic to establish care - he has hx of ASCVD and statin intolerance. Goal LDL < 70. Most recent LDL well w/in goal.  Of note, pt had left knee replacement ~ 10 weeks ago - noted elevated CPK after the fact. Pt states he has arthritis pain as well as myalgias. Stopped atorvastatin on 9/21 d/t elevated CPK - notes minor improvement in myalgia sx, but also started going to rehab around this time and feels this may also be relieving his sx. Planning to have right knee done soon.  Given pt experienced myalgia to atorvastatin (previously tolerated 10 mg dose well w/ no issues and all lipid markers at goal) we will refrain from re-challenge at this time. May consider re-challenge once pt has completed his knee replacements.   Pt amenable to initiation of Nexlizet today - counseled him regarding MOA, SE, and administration. Based on Epic copay estimate, this medication appears to be affordable to pt.  We unfortunately do not have baseline lipid panel for pt so unable to clearly discern efficacy of Nexlizet for pt - if his LDL is above goal upon f/u we will consider re-challenge of moderate intensity statin or initiation of PCSK9i.  Given LDL at goal w/ just moderate intensity statin (lowers LDL ~ 30-50%) it is likely that Nexlizet (lowers LDL ~ 40%) will keep LDL at or very close to goal.    Studies Ordered at Todays Visit:  None   Blood Work Ordered At Today’s visit:   CMP, lipid panel, lipoA  Follow-Up:   10 weeks    Raul Haddad, PharmD, BCACP    CC:  Shila Mullen D.O.  Xiang, Iván SEGOVIA, *

## 2023-10-10 ENCOUNTER — NON-PROVIDER VISIT (OUTPATIENT)
Dept: MEDICAL GROUP | Facility: PHYSICIAN GROUP | Age: 73
End: 2023-10-10
Payer: MEDICARE

## 2023-10-10 DIAGNOSIS — Z23 NEED FOR VACCINATION: ICD-10-CM

## 2023-10-10 PROCEDURE — G0008 ADMIN INFLUENZA VIRUS VAC: HCPCS | Performed by: STUDENT IN AN ORGANIZED HEALTH CARE EDUCATION/TRAINING PROGRAM

## 2023-10-10 PROCEDURE — 90662 IIV NO PRSV INCREASED AG IM: CPT | Performed by: STUDENT IN AN ORGANIZED HEALTH CARE EDUCATION/TRAINING PROGRAM

## 2023-10-10 NOTE — PROGRESS NOTES
"Curt Blair is a 72 y.o. male here for a non-provider visit for:   FLU    Reason for immunization: Annual Flu Vaccine  Immunization records indicate need for vaccine: Yes, confirmed with Epic  Minimum interval has been met for this vaccine: Yes  ABN completed: Not Indicated    VIS Dated  [id number] was given to patient: Yes  All IAC Questionnaire questions were answered \"No.\"    Patient tolerated injection and no adverse effects were observed or reported: Yes    Pt scheduled for next dose in series: Not Indicated  "

## 2023-10-19 ENCOUNTER — TELEPHONE (OUTPATIENT)
Dept: MEDICAL GROUP | Facility: MEDICAL CENTER | Age: 73
End: 2023-10-19
Payer: MEDICARE

## 2023-10-19 NOTE — TELEPHONE ENCOUNTER
Patient states he started Nexlizet and got leg cramps (also got the COVID vaccine around the same time). Patient willing to retry and see if he can further tolerate. Will call back if he experiences adverse effects.     Amee Smith, RegD

## 2023-10-26 ENCOUNTER — HOSPITAL ENCOUNTER (OUTPATIENT)
Facility: MEDICAL CENTER | Age: 73
End: 2023-10-26
Attending: PHYSICIAN ASSISTANT
Payer: MEDICARE

## 2023-10-26 PROCEDURE — 83993 ASSAY FOR CALPROTECTIN FECAL: CPT

## 2023-10-28 LAB — CALPROTECTIN STL-MCNT: 518 UG/G

## 2023-11-02 ENCOUNTER — APPOINTMENT (RX ONLY)
Dept: URBAN - METROPOLITAN AREA CLINIC 20 | Facility: CLINIC | Age: 73
Setting detail: DERMATOLOGY
End: 2023-11-02

## 2023-11-02 DIAGNOSIS — L82.0 INFLAMED SEBORRHEIC KERATOSIS: ICD-10-CM

## 2023-11-02 DIAGNOSIS — Z71.89 OTHER SPECIFIED COUNSELING: ICD-10-CM

## 2023-11-02 DIAGNOSIS — Z85.828 PERSONAL HISTORY OF OTHER MALIGNANT NEOPLASM OF SKIN: ICD-10-CM

## 2023-11-02 PROCEDURE — ? LIQUID NITROGEN

## 2023-11-02 PROCEDURE — ? OBSERVATION

## 2023-11-02 PROCEDURE — 17110 DESTRUCTION B9 LES UP TO 14: CPT

## 2023-11-02 PROCEDURE — 99212 OFFICE O/P EST SF 10 MIN: CPT | Mod: 25

## 2023-11-02 PROCEDURE — ? COUNSELING

## 2023-11-02 ASSESSMENT — LOCATION ZONE DERM
LOCATION ZONE: EAR
LOCATION ZONE: SCALP
LOCATION ZONE: NECK

## 2023-11-02 ASSESSMENT — LOCATION DETAILED DESCRIPTION DERM
LOCATION DETAILED: RIGHT POSTAURICULAR CREASE
LOCATION DETAILED: RIGHT CENTRAL POSTAURICULAR SKIN
LOCATION DETAILED: RIGHT SUPERIOR LATERAL NECK
LOCATION DETAILED: RIGHT INFERIOR POSTAURICULAR SKIN
LOCATION DETAILED: RIGHT POSTAURICULAR SKIN
LOCATION DETAILED: MID POSTERIOR NECK

## 2023-11-02 ASSESSMENT — LOCATION SIMPLE DESCRIPTION DERM
LOCATION SIMPLE: RIGHT EAR
LOCATION SIMPLE: NECK
LOCATION SIMPLE: POSTERIOR SCALP
LOCATION SIMPLE: POSTERIOR NECK
LOCATION SIMPLE: SCALP

## 2023-11-02 NOTE — PROCEDURE: LIQUID NITROGEN
Show Topical Anesthesia Variable?: Yes
Consent: The patient's consent was obtained including but not limited to risks of crusting, scabbing, blistering, scarring, darker or lighter pigmentary change, recurrence, incomplete removal and infection.
Add 52 Modifier (Optional): no
Medical Necessity Clause: This procedure was medically necessary because the lesions that were treated were:
Application Tool (Optional): Forceps
Number Of Freeze-Thaw Cycles: 2 freeze-thaw cycles
Detail Level: Detailed
Spray Paint Text: The liquid nitrogen was applied to the skin utilizing a spray paint frosting technique.
Medical Necessity Information: It is in your best interest to select a reason for this procedure from the list below. All of these items fulfill various CMS LCD requirements except the new and changing color options.
Duration Of Freeze Thaw-Cycle (Seconds): 5-10
Post-Care Instructions: I reviewed with the patient in detail post-care instructions. Patient is to wear sunprotection, and avoid picking at any of the treated lesions. Pt may apply Vaseline to crusted or scabbing areas.

## 2023-11-06 ENCOUNTER — OFFICE VISIT (OUTPATIENT)
Dept: MEDICAL GROUP | Facility: PHYSICIAN GROUP | Age: 73
End: 2023-11-06
Payer: MEDICARE

## 2023-11-06 VITALS
TEMPERATURE: 96.6 F | DIASTOLIC BLOOD PRESSURE: 64 MMHG | HEART RATE: 82 BPM | HEIGHT: 72 IN | WEIGHT: 232.6 LBS | SYSTOLIC BLOOD PRESSURE: 124 MMHG | OXYGEN SATURATION: 95 % | BODY MASS INDEX: 31.51 KG/M2 | RESPIRATION RATE: 16 BRPM

## 2023-11-06 DIAGNOSIS — K52.3 INDETERMINATE COLITIS: ICD-10-CM

## 2023-11-06 DIAGNOSIS — R74.8 ELEVATED CPK: Chronic | ICD-10-CM

## 2023-11-06 DIAGNOSIS — M25.561 PAIN AND SWELLING OF RIGHT KNEE: ICD-10-CM

## 2023-11-06 DIAGNOSIS — E78.5 DYSLIPIDEMIA: ICD-10-CM

## 2023-11-06 DIAGNOSIS — R73.09 ELEVATED HEMOGLOBIN A1C: ICD-10-CM

## 2023-11-06 DIAGNOSIS — M25.461 PAIN AND SWELLING OF RIGHT KNEE: ICD-10-CM

## 2023-11-06 DIAGNOSIS — I10 ESSENTIAL HYPERTENSION, BENIGN: ICD-10-CM

## 2023-11-06 DIAGNOSIS — F51.01 PRIMARY INSOMNIA: ICD-10-CM

## 2023-11-06 PROBLEM — K51.00 ULCERATIVE PANCOLITIS (HCC): Status: ACTIVE | Noted: 2023-11-06

## 2023-11-06 PROCEDURE — 3078F DIAST BP <80 MM HG: CPT | Performed by: INTERNAL MEDICINE

## 2023-11-06 PROCEDURE — 3074F SYST BP LT 130 MM HG: CPT | Performed by: INTERNAL MEDICINE

## 2023-11-06 PROCEDURE — 99214 OFFICE O/P EST MOD 30 MIN: CPT | Performed by: INTERNAL MEDICINE

## 2023-11-06 ASSESSMENT — FIBROSIS 4 INDEX: FIB4 SCORE: 1.1

## 2023-11-07 RX ORDER — ALPRAZOLAM 0.5 MG/1
0.5 TABLET ORAL NIGHTLY PRN
Qty: 30 TABLET | Refills: 0 | Status: SHIPPED | OUTPATIENT
Start: 2023-11-07 | End: 2024-02-05

## 2023-11-07 NOTE — ASSESSMENT & PLAN NOTE
New problem, may be situational from budesonide for his colitis and steroid injections for knee pain.  He has made significant dietary changes and so we will plan to recheck for talk about adding any additional medicine.  Repeat A1c in clinic to be done on December 20.

## 2023-11-07 NOTE — ASSESSMENT & PLAN NOTE
Chronic stable problem, blood pressure well controlled on dual therapy, continue amlodipine-benazepril 5-10 mg daily.

## 2023-11-07 NOTE — ASSESSMENT & PLAN NOTE
Chronic ongoing problem, lipids well controlled but CPK level elevated, fortunately asymptomatic.  Now off statin therapy and on Nexlizet with repeat lab draw planned for next month.

## 2023-11-07 NOTE — ASSESSMENT & PLAN NOTE
Chronic stable problem, the inflammatory marker in his stool still elevated but his bowel movements are much better, is eating a lot of fiber, and no recent diarrhea.  Continue mesalamine 4.8 g daily.

## 2023-11-07 NOTE — ASSESSMENT & PLAN NOTE
Chronic stable problem, utilizes very infrequently for breakthrough insomnia and stress.  His wife is can be undergoing heart catheterization next week and he has had more stressors recently from medical challenges and would appreciate a refill to use over the next 6 months.  We will send in for alprazolam 0.5 mg nightly as needed for insomnia or breakthrough anxiety.

## 2023-11-07 NOTE — ASSESSMENT & PLAN NOTE
Chronic compensated issue, ordered by cardiology, advised to go off statin therapy and see lipid pharmacist, now on Nexlizet.  Will have follow-up lipid panel next month.

## 2023-11-07 NOTE — ASSESSMENT & PLAN NOTE
Chronic decompensated problem, unfortunately most recent injection was unsuccessful to improve knee pain, he plans for right total knee arthroplasty in early 2024.

## 2023-11-07 NOTE — PROGRESS NOTES
Subjective:   Chief Complaint/History of Present Illness:  Curt Blair is a 73 y.o. male established patient who presents today to discuss medical problems as listed below. Curt is unaccompanied for today's visit.    Problem   Ulcerative Pancolitis (Hcc)   Indeterminate Colitis    Diarrhea started distinctly on 2022. He went out to dinner for his daughter's birthday to a [employer] restaurant where he had cooked shrimp. He also had his 4th Covid shot on 2022.     Normally he has 2 BM's every morning and then 1 BM in the evening or overnight. He is s/p open cholecystectomy around . No issues with dumping syndrome.     He developed multiple episodes of watery diarrhea. He started peptobismol which slowed down the diarrhea then imodium which stopped the diarrhea but only short term when he took the medicine.     He notes prior episode of diverticulitis in . He had exposure to amoxicillin from dental procedures around December and again in April. No prior episodes of Cdiff. Due to elevated inflammation markers and long delay to get into GI we started him empirically on budesonide which led to immediate relief of symptoms. He saw GI who performed colonoscopy which was concerning for inflammatory bowel disease versus indeterminate colitis (secondary to NSAIDs or duloxetine?). No hematochezia through this episode.    Colonoscopy (10/2022) with active colitis and GI recommendations from Dr. Francois- inflammation from your colon is most likely due to inflammatory bowel disease.      He was initially on budesonide and started on mesalamine, now on maintenance therapy with mesalamine.    Current regimen: Mesalamine 4.8 g daily     Primary Insomnia    He developed generalized anxiety at bedtime around the time of his wife's passing.  She  in 2016 from complications of COPD.  He has never slept 7 or 8 hours per night, he normally averages 4 to 5 hours per evening which is sufficient for him.  His main  challenge was initiation of sleep.  He started with over-the-counter attempts with melatonin and Benadryl with no significant improvement.  He was then started on low-dose alprazolam 0.25 to 0.5 mg nightly.  Prior to the COVID-19 pandemic he was using this 3-4 nights per week.  Due to increased stress related to the pandemic he has been using it more regularly.  He will still try to use the lower dose but will sometimes require the second dose.  He understands the inherent risk with chronic use of benzodiazepines including cognitive impairment as well as withdrawal symptoms and those with regular use.  He feels it is helpful and does not have any negative sequelae such as morning grogginess or gait disturbance.  He would like to continue with this strategy, he makes a 90 days prescription last approximately 1 year.    Current regimen: alprazolam 0.5 mg nightly as needed     Pain and Swelling of Right Knee    He has had recent deterioration of pain in the right knee, he has bone-on-bone arthritis.  Recently had Monovisc injection which was painful and unfortunately did not improve his symptoms.  He plans to have total knee arthroplasty in early 2024.     Elevated Cpk       Latest Reference Range & Units Most Recent   CPK Total 0 - 154 U/L 637 (H)  9/20/23 08:02     He had a recent exacerbation of myalgias related to an increased dose of his statin.  He also had this occur in the past when his statin medicine was increased.  Since it has been reduced his symptoms have abated.     He had some normal CPK levels and the most recent follow-up cardiology showed large elevation.  He was recommended to go off statin therapy.  He was asymptomatic at the time of this elevation.     Elevated Hemoglobin A1c     Latest Reference Range & Units 09/20/23 08:10   Glycohemoglobin 4.0 - 5.6 % 6.7 (H)   Estim. Avg Glu mg/dL 146     He has a history of prediabetes.  He reports it has been present for a long time and has never progressed.   No significant side effect such as blurred vision, polyuria, or polydipsia.  No prior use of glucose lowering medications.    In fall 2023 A1c became elevated, he has had multiple rounds of budesonide for colitis as well as steroid injections from orthopedics.     Dyslipidemia     Latest Reference Range & Units 09/22/22 07:09   Cholesterol,Tot 100 - 199 mg/dL 155   Triglycerides 0 - 149 mg/dL 127   HDL >=40 mg/dL 63   LDL <100 mg/dL 67       He had elevations in his CPK following increase in statin medicine which recurred, advised to go off statin therapy and now on Nexlizet.    Current regimen: Nexlizet 180-10 mg daily  Previous regimen: atorvastatin 10 mg daily     Essential Hypertension, Benign      He reports longstanding history of hypertension.  It has been well controlled on same regiment for many years.  No signs or symptoms of orthostasis.  No angina equivalents.  He follows with cardiology on an annual basis.    Current regimen: amlodipine-benazepril 5-10 mg daily    Blood pressure in clinic ranges 108-128/60-86          Current Medications:  Current Outpatient Medications Ordered in Epic   Medication Sig Dispense Refill    ALPRAZolam (XANAX) 0.5 MG Tab Take 1 Tablet by mouth at bedtime as needed for Sleep for up to 90 days. 30 Tablet 0    diclofenac DR (VOLTAREN) 75 MG Tablet Delayed Response Take 1 Tablet by mouth 2 times a day.      CALCIUM-VITAMIN D PO Take  by mouth.      Bempedoic Acid-Ezetimibe (NEXLIZET) 180-10 MG Tab Take 1 Tablet by mouth every day. 30 Tablet 11    amoxicillin (AMOXIL) 500 MG Cap Take 2,000 mg by mouth one time as needed for Other. 1 hour before dental procedures      amlodipine-benazepril (LOTREL) 5-10 MG per capsule TAKE 1 CAPSULE BY MOUTH EVERY  Capsule 2    DULoxetine (CYMBALTA) 60 MG Cap DR Particles delayed-release capsule Take 1 Capsule by mouth every day for 180 days. 90 Capsule 1    Coenzyme Q10 300 MG Cap Take 1 Capsule by mouth every day. 30 Capsule      mesalamine (LIALDA) 1.2 GM Tablet Delayed Response Take 4.8 g by mouth every day.      nitroglycerin (NITROSTAT) 0.4 MG SL Tab Place 1 Tablet under the tongue as needed for Chest Pain. (Patient taking differently: Place 0.4 mg under the tongue as needed for Chest Pain. CARRY'S IT JUST IN CASE. NEVER HAD TO USE) 25 Tablet 11    Multiple Vitamins-Minerals (MULTIVITAMIN PO) Take 1 Tab by mouth every morning.      aspirin 81 MG tablet Take 81 mg by mouth every day.         No current Epic-ordered facility-administered medications on file.          Objective:   Physical Exam:    Vitals: /64 (BP Location: Right arm, Patient Position: Sitting, BP Cuff Size: Adult)   Pulse 82   Temp 35.9 °C (96.6 °F) (Temporal)   Resp 16   Ht 1.829 m (6')   Wt 106 kg (232 lb 9.6 oz)   SpO2 95%    BMI: Body mass index is 31.55 kg/m².  Physical Exam  Constitutional:       General: He is not in acute distress.     Appearance: Normal appearance. He is not ill-appearing.   HENT:      Right Ear: External ear normal.      Left Ear: External ear normal.   Eyes:      General: No scleral icterus.     Conjunctiva/sclera: Conjunctivae normal.   Cardiovascular:      Rate and Rhythm: Normal rate and regular rhythm.      Pulses: Normal pulses.      Heart sounds: No murmur heard.  Pulmonary:      Effort: Pulmonary effort is normal. No respiratory distress.      Breath sounds: Normal breath sounds. No wheezing, rhonchi or rales.   Abdominal:      General: Bowel sounds are normal. There is no distension.      Palpations: Abdomen is soft.   Musculoskeletal:      Right lower leg: No edema.      Left lower leg: No edema.   Lymphadenopathy:      Cervical: No cervical adenopathy.   Skin:     General: Skin is warm and dry.      Findings: No rash.   Neurological:      Gait: Gait normal.   Psychiatric:         Behavior: Behavior normal.         Thought Content: Thought content normal.         Judgment: Judgment normal.      Comments: More stressors  recently               Assessment and Plan:   Curt is a 73 y.o. male with the following:  Problem List Items Addressed This Visit       Elevated CPK (Chronic)     Chronic compensated issue, ordered by cardiology, advised to go off statin therapy and see lipid pharmacist, now on Nexlizet.  Will have follow-up lipid panel next month.         Dyslipidemia     Chronic ongoing problem, lipids well controlled but CPK level elevated, fortunately asymptomatic.  Now off statin therapy and on Nexlizet with repeat lab draw planned for next month.         Elevated hemoglobin A1c     New problem, may be situational from budesonide for his colitis and steroid injections for knee pain.  He has made significant dietary changes and so we will plan to recheck for talk about adding any additional medicine.  Repeat A1c in clinic to be done on December 20.         Essential hypertension, benign     Chronic stable problem, blood pressure well controlled on dual therapy, continue amlodipine-benazepril 5-10 mg daily.         Indeterminate colitis     Chronic stable problem, the inflammatory marker in his stool still elevated but his bowel movements are much better, is eating a lot of fiber, and no recent diarrhea.  Continue mesalamine 4.8 g daily.         Pain and swelling of right knee     Chronic decompensated problem, unfortunately most recent injection was unsuccessful to improve knee pain, he plans for right total knee arthroplasty in early 2024.         Primary insomnia     Chronic stable problem, utilizes very infrequently for breakthrough insomnia and stress.  His wife is can be undergoing heart catheterization next week and he has had more stressors recently from medical challenges and would appreciate a refill to use over the next 6 months.  We will send in for alprazolam 0.5 mg nightly as needed for insomnia or breakthrough anxiety.         Relevant Medications    ALPRAZolam (XANAX) 0.5 MG Tab          RTC: Return in about 3  months (around 2/6/2024).    I spent a total of 32 minutes with record review, exam, communication with the patient, communication with other providers, and documentation of this encounter.    PLEASE NOTE: This dictation was created using voice recognition software. I have made every reasonable attempt to correct obvious errors, but I expect that there are errors of grammar and possibly content that I did not discover before finalizing the note.      Shila Mullen, DO  Geriatric and Internal Medicine  [facility]

## 2023-11-08 ENCOUNTER — TELEPHONE (OUTPATIENT)
Dept: VASCULAR LAB | Facility: MEDICAL CENTER | Age: 73
End: 2023-11-08
Payer: MEDICARE

## 2023-11-08 NOTE — TELEPHONE ENCOUNTER
Called pt regarding Nexlizet use. He reports complete adherence to the medication. SE's previously noted have dissipated. Medication is affordable to pt at this time.    Will maintain f/u as scheduled on 12/14/23 w/ repeat lipid panel and CMP.    Raul Haddad, PharmD, BCACP     CC: Dr. Bloch

## 2023-11-16 ENCOUNTER — PATIENT MESSAGE (OUTPATIENT)
Dept: MEDICAL GROUP | Facility: PHYSICIAN GROUP | Age: 73
End: 2023-11-16
Payer: MEDICARE

## 2023-11-16 RX ORDER — DICLOFENAC SODIUM 75 MG/1
75 TABLET, DELAYED RELEASE ORAL 2 TIMES DAILY
Qty: 200 TABLET | Refills: 3 | Status: SHIPPED | OUTPATIENT
Start: 2023-11-16

## 2023-11-16 NOTE — PATIENT COMMUNICATION
Received request via: Pharmacy    Was the patient seen in the last year in this department? Yes  11/6/2023  Does the patient have an active prescription (recently filled or refills available) for medication(s) requested? No    Does the patient have half-way Plus and need 100 day supply (blood pressure, diabetes and cholesterol meds only)? Yes, quantity updated to 100 days

## 2023-11-28 ENCOUNTER — HOSPITAL ENCOUNTER (OUTPATIENT)
Dept: LAB | Facility: MEDICAL CENTER | Age: 73
End: 2023-11-28
Attending: NURSE PRACTITIONER
Payer: MEDICARE

## 2023-11-28 DIAGNOSIS — I25.10 CORONARY ARTERY DISEASE DUE TO LIPID RICH PLAQUE: ICD-10-CM

## 2023-11-28 DIAGNOSIS — I25.83 CORONARY ARTERY DISEASE DUE TO LIPID RICH PLAQUE: ICD-10-CM

## 2023-11-28 DIAGNOSIS — E78.5 DYSLIPIDEMIA: ICD-10-CM

## 2023-11-28 LAB
ALBUMIN SERPL BCP-MCNC: 4.2 G/DL (ref 3.2–4.9)
ALBUMIN/GLOB SERPL: 1.2 G/DL
ALP SERPL-CCNC: 78 U/L (ref 30–99)
ALT SERPL-CCNC: 18 U/L (ref 2–50)
ANION GAP SERPL CALC-SCNC: 10 MMOL/L (ref 7–16)
AST SERPL-CCNC: 21 U/L (ref 12–45)
BILIRUB SERPL-MCNC: 0.8 MG/DL (ref 0.1–1.5)
BUN SERPL-MCNC: 22 MG/DL (ref 8–22)
CALCIUM ALBUM COR SERPL-MCNC: 8.6 MG/DL (ref 8.5–10.5)
CALCIUM SERPL-MCNC: 8.8 MG/DL (ref 8.4–10.2)
CHLORIDE SERPL-SCNC: 101 MMOL/L (ref 96–112)
CHOLEST SERPL-MCNC: 123 MG/DL (ref 100–199)
CO2 SERPL-SCNC: 26 MMOL/L (ref 20–33)
CREAT SERPL-MCNC: 0.85 MG/DL (ref 0.5–1.4)
FASTING STATUS PATIENT QL REPORTED: NORMAL
GFR SERPLBLD CREATININE-BSD FMLA CKD-EPI: 91 ML/MIN/1.73 M 2
GLOBULIN SER CALC-MCNC: 3.4 G/DL (ref 1.9–3.5)
GLUCOSE SERPL-MCNC: 122 MG/DL (ref 65–99)
HDLC SERPL-MCNC: 58 MG/DL
LDLC SERPL CALC-MCNC: 45 MG/DL
POTASSIUM SERPL-SCNC: 4.5 MMOL/L (ref 3.6–5.5)
PROT SERPL-MCNC: 7.6 G/DL (ref 6–8.2)
SODIUM SERPL-SCNC: 137 MMOL/L (ref 135–145)
TRIGL SERPL-MCNC: 102 MG/DL (ref 0–149)

## 2023-11-28 PROCEDURE — 36415 COLL VENOUS BLD VENIPUNCTURE: CPT

## 2023-11-28 PROCEDURE — 80061 LIPID PANEL: CPT

## 2023-11-28 PROCEDURE — 83695 ASSAY OF LIPOPROTEIN(A): CPT

## 2023-11-28 PROCEDURE — 80053 COMPREHEN METABOLIC PANEL: CPT

## 2023-11-29 LAB — LPA SERPL-MCNC: 9 MG/DL

## 2023-12-14 ENCOUNTER — NON-PROVIDER VISIT (OUTPATIENT)
Dept: MEDICAL GROUP | Facility: MEDICAL CENTER | Age: 73
End: 2023-12-14
Payer: MEDICARE

## 2023-12-14 VITALS — BODY MASS INDEX: 29.97 KG/M2 | WEIGHT: 221 LBS

## 2023-12-14 DIAGNOSIS — I25.10 CORONARY ARTERY DISEASE DUE TO LIPID RICH PLAQUE: ICD-10-CM

## 2023-12-14 DIAGNOSIS — Z95.5 STATUS POST CORONARY ARTERY STENT PLACEMENT: ICD-10-CM

## 2023-12-14 DIAGNOSIS — E78.5 DYSLIPIDEMIA: ICD-10-CM

## 2023-12-14 DIAGNOSIS — I25.83 CORONARY ARTERY DISEASE DUE TO LIPID RICH PLAQUE: ICD-10-CM

## 2023-12-14 PROCEDURE — 99211 OFF/OP EST MAY X REQ PHY/QHP: CPT | Performed by: NURSE PRACTITIONER

## 2023-12-14 ASSESSMENT — FIBROSIS 4 INDEX: FIB4 SCORE: 1.11

## 2023-12-14 NOTE — PROGRESS NOTES
[facility] - FollowUp Visit  Date of Service: 10/05/23    START: 9:22 am  END: 9:52 am    Curt Blair is here for follow up of dyslipidemia.    Subjective    HPI  Pertinent Interval History since last visit:   Pt was able to obtain and start Nexlizet w/ no issues or noted SE.  Current Prescription Lipid Lowering Medications - including dose:   Statin: None  Non-Statin: Nexlizet 180-10 mg once daily  Current Lipid Lowering and Related Supplements:   CoQ10 + vitamin D, Metamucil  Any Current Side Effects Potentially Related to Lipid Lowering therapy?   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 Also noted elevated CPK along w/ myopathy.  Baseline Lipids Prior to Treatment:  Unknown or unavailable    SOCIAL HISTORY  Social History     Tobacco Use   Smoking Status Former    Current packs/day: 0.00    Average packs/day: 2.0 packs/day for 25.0 years (50.0 ttl pk-yrs)    Types: Cigarettes    Start date: 1967    Quit date: 1992    Years since quittin.9   Smokeless Tobacco Never   Tobacco Comments    avoid all tobacco products      Change in weight: Pt down ~ 11 lbs since last OV - Actively working to lose weight.   Exercise habits: Goes to rehab (post TKA) twice weekly. Does home exercises as directed by PT for 30 min at a time.  Diet: DASH diet, one steak once weekly, cut back on carbs since last visit. Working to decrease portions. Trying to consume more fiber on a daily basis.      Objective    Vitals:    23 0934   Weight: 100 kg (221 lb)      Physical Exam    DATA REVIEW  Most Recent Lipid Panel:   Lab Results   Component Value Date    CHOLSTRLTOT 123 2023    TRIGLYCERIDE 102 2023    HDL 58 2023    LDL 45 2023       Other Pertinent Blood Work:    Lab Results   Component Value Date    SODIUM 137 11/28/2023    POTASSIUM 4.5 11/28/2023    CHLORIDE 101 11/28/2023    CO2 26 11/28/2023    ANION 10.0 11/28/2023    GLUCOSE 122 (H) 11/28/2023    BUN 22 11/28/2023    CREATININE 0.85 11/28/2023    CALCIUM 8.8 11/28/2023    ASTSGOT 21 11/28/2023    ALTSGPT 18 11/28/2023    ALKPHOSPHAT 78 11/28/2023    TBILIRUBIN 0.8 11/28/2023    ALBUMIN 4.2 11/28/2023    AGRATIO 1.2 11/28/2023    CREACTPROT <0.30 09/09/2022    LIPOPROTA 9 11/28/2023    TSHULTRASEN 3.710 09/20/2023    CPKTOTAL 637 (H) 09/20/2023       Other:  NA    Recent Imaging Studies:    None since last visit      ASSESSMENT AND PLAN  Patient Type, check all that apply:   Secondary Prevention  Established Atherosclerotic Cardiovascular Disease (ASCVD)  Yes, Details: MI at the age of 41 that required atherectomy at [facility]. 2nd event noted in his late 50's for which he underwent PCI to RCA.  Other Established (non-atherosclerotic) Vascular Disease, if Present:    None  Evidence of Heterozygous Familial Hypercholesterolemia (FH): Possible. Pt's father and grandfather had MI in their 60's  ACC/AHA Indication for Statin Therapy, mehdi all that apply:   Established ASCVD: Indication for High intensity statin    Calculated Risk for ASCVD, if applicable:  N/A  Other Significant Risk Markers, if any, mehdi all that apply:  Family history of premature ASCVD in first degree relative  National Lipid Association (NLA) Goal (if applicable):  LDL-C:   <70 mg/dL  Lifestyle Recommendations From Today’s Visit:   Eating Plan: Concentrate on  DASH/Med Style diet, Exercise: Increase as tolerated, and Weight Management: Continue to work to decrease  Statin Recommendations from Today's Visit  None - for now. Will consider re-challenge of statin in the future.  Non-Statin Medications Recommendations from Today’s Visit:   Nexlizet 180-10 mg once daily  Indication for PCSK9 Inhibitor, if applicable:  Not currently indicated  Supplements  Recommended at this visit:  Continue CoQ10 once daily  Recommendations for Other Cardiovascular Risk Factors, mehdi all that apply:   Hypertension- Managed by PCP/Cards and Diabetes/Impaired Fasting Glucose- Managed by PCP  Other Issues:  Since pt's last appt, he was able to obtain and start Nexlizet 180-10 mg once daily w/ no issues or SE. It is affordable to pt at this time.  Repeat lipid panel shows all markers w/in goal range at this time.  LipoA WNL - will maintain LDL goal < 70.  Discussed re-assessment of pt's CPK to ensure this level has decreased - he declines today which is reasonable given his myopathy appears to be resolved.  Pt continues to struggle w/ his knees, he hopes to have right TKA in the near future. Once completed and pt back to baseline we will consider addition of low dose statin back to lipid regimen for pleiotropic benefit.    Studies Ordered at Todays Visit:  None   Blood Work Ordered At Today’s visit:   CMP & lipid panel  Follow-Up:   6 months    Raul Haddad, Fernando, BCACP    CC:  Shila Mullen D.O.  Xiang, Iván SEGOVIA, *

## 2023-12-20 PROBLEM — M17.11 TRICOMPARTMENT OSTEOARTHRITIS OF RIGHT KNEE: Status: ACTIVE | Noted: 2023-12-20

## 2023-12-20 PROBLEM — M25.561 CHRONIC PAIN OF RIGHT KNEE: Status: ACTIVE | Noted: 2023-12-20

## 2023-12-20 PROBLEM — G89.29 CHRONIC PAIN OF RIGHT KNEE: Status: ACTIVE | Noted: 2023-12-20

## 2024-01-09 ENCOUNTER — NON-PROVIDER VISIT (OUTPATIENT)
Dept: MEDICAL GROUP | Facility: PHYSICIAN GROUP | Age: 74
End: 2024-01-09
Payer: MEDICARE

## 2024-01-09 DIAGNOSIS — R73.03 PREDIABETES: ICD-10-CM

## 2024-01-09 LAB
HBA1C MFR BLD: 6.4 % (ref ?–5.8)
POCT INT CON NEG: NEGATIVE
POCT INT CON POS: POSITIVE

## 2024-01-09 PROCEDURE — 83036 HEMOGLOBIN GLYCOSYLATED A1C: CPT | Performed by: INTERNAL MEDICINE

## 2024-01-09 NOTE — PROGRESS NOTES
Curt Lamonte Blair is a 73 y.o. male here for a non-provider visit for A1C    If abnormal was an in office provider notified today (if so, indicate provider)? Yes    Routed to PCP? Yes

## 2024-01-15 ENCOUNTER — OFFICE VISIT (OUTPATIENT)
Dept: MEDICAL GROUP | Facility: PHYSICIAN GROUP | Age: 74
End: 2024-01-15
Payer: MEDICARE

## 2024-01-15 ENCOUNTER — HOSPITAL ENCOUNTER (OUTPATIENT)
Facility: MEDICAL CENTER | Age: 74
End: 2024-01-15
Attending: PHYSICIAN ASSISTANT
Payer: MEDICARE

## 2024-01-15 VITALS
TEMPERATURE: 96.9 F | HEIGHT: 72 IN | OXYGEN SATURATION: 96 % | HEART RATE: 94 BPM | DIASTOLIC BLOOD PRESSURE: 64 MMHG | BODY MASS INDEX: 30.28 KG/M2 | WEIGHT: 223.6 LBS | RESPIRATION RATE: 12 BRPM | SYSTOLIC BLOOD PRESSURE: 126 MMHG

## 2024-01-15 DIAGNOSIS — G89.29 CHRONIC BILATERAL LOW BACK PAIN WITH RIGHT-SIDED SCIATICA: ICD-10-CM

## 2024-01-15 DIAGNOSIS — R73.09 ELEVATED HEMOGLOBIN A1C: ICD-10-CM

## 2024-01-15 DIAGNOSIS — E78.5 DYSLIPIDEMIA: ICD-10-CM

## 2024-01-15 DIAGNOSIS — M17.11 TRICOMPARTMENT OSTEOARTHRITIS OF RIGHT KNEE: ICD-10-CM

## 2024-01-15 DIAGNOSIS — K51.00 ULCERATIVE PANCOLITIS (HCC): ICD-10-CM

## 2024-01-15 DIAGNOSIS — M54.41 CHRONIC BILATERAL LOW BACK PAIN WITH RIGHT-SIDED SCIATICA: ICD-10-CM

## 2024-01-15 DIAGNOSIS — R35.1 NOCTURIA: ICD-10-CM

## 2024-01-15 PROCEDURE — 99214 OFFICE O/P EST MOD 30 MIN: CPT | Performed by: INTERNAL MEDICINE

## 2024-01-15 PROCEDURE — 3078F DIAST BP <80 MM HG: CPT | Performed by: INTERNAL MEDICINE

## 2024-01-15 PROCEDURE — 83993 ASSAY FOR CALPROTECTIN FECAL: CPT

## 2024-01-15 PROCEDURE — 3074F SYST BP LT 130 MM HG: CPT | Performed by: INTERNAL MEDICINE

## 2024-01-15 RX ORDER — DULOXETIN HYDROCHLORIDE 60 MG/1
60 CAPSULE, DELAYED RELEASE ORAL DAILY
Qty: 100 CAPSULE | Refills: 3 | Status: SHIPPED | OUTPATIENT
Start: 2024-01-15

## 2024-01-15 ASSESSMENT — PATIENT HEALTH QUESTIONNAIRE - PHQ9: CLINICAL INTERPRETATION OF PHQ2 SCORE: 0

## 2024-01-15 ASSESSMENT — FIBROSIS 4 INDEX: FIB4 SCORE: 1.11

## 2024-01-15 NOTE — PROGRESS NOTES
Subjective:   Chief Complaint/History of Present Illness:  Curt Blair is a 73 y.o. male established patient who presents today to discuss medical problems as listed below. Curt is unaccompanied for today's visit.    Problem   Tricompartment Osteoarthritis of Right Knee    He has right knee pain with tricompartment osteoarthritis and has plans for right total knee arthroplasty next week with Dr. Pineda at [facility].     Ulcerative Pancolitis (Hcc)    Diarrhea started distinctly on 4/29/2022. He went out to dinner for his daughter's birthday to a [employer] restaurant where he had cooked shrimp. He also had his 4th Covid shot on 4/25/2022.      Normally he has 2 BM's every morning and then 1 BM in the evening or overnight. He is s/p open cholecystectomy around 1997. No issues with dumping syndrome.      He developed multiple episodes of watery diarrhea. He started peptobismol which slowed down the diarrhea then imodium which stopped the diarrhea but only short term when he took the medicine.      He notes prior episode of diverticulitis in 2019. He had exposure to amoxicillin from dental procedures around December and again in April. No prior episodes of Cdiff. Due to elevated inflammation markers and long delay to get into GI we started him empirically on budesonide which led to immediate relief of symptoms. He saw GI who performed colonoscopy which was concerning for inflammatory bowel disease versus indeterminate colitis (secondary to NSAIDs or duloxetine?). No hematochezia through this episode.     Colonoscopy (10/2022) with active colitis and GI recommendations from Dr. Francois- inflammation from your colon is most likely due to inflammatory bowel disease.       He was initially on budesonide and started on mesalamine, now on maintenance therapy with mesalamine.     Current regimen: Mesalamine 4.8 g daily     Nocturia    He developed 2 weeks of consistent nocturia.  He was worried that he may have developed an  issue with the bladder or prostate.  He normally would urinate twice per night often when getting up to take his dog outside at night.  For that 2-week period he was getting up 5-6 times per night, he reported sleeping 3 hours initially and then was up every hour thereafter or vice versa.  No dysuria or change in urine color.  He was placed on Bactrim around that time for superficial skin infection at the incision site of his left knee arthroplasty.  Point-of-care urinalysis in clinic today was unremarkable.    Decompensated again in early January 2024.  Occurred when he was taken off diclofenac before upcoming right knee surgery and completed a repeat steroid taper with budesonide.     Chronic Bilateral Low Back Pain With Right-Sided Sciatica    Curt reports low back pain with radiation of pain into the right buttock. This is made worse by sitting, starts cramping after 30 minutes. This makes it difficult for him to sit on a flight. In the past this has stopped him from playing golf, which he loves. He is able to stand/walk without issue. He sleeps on his left side or back which has been helpful. He has completed PT, followed with Dr. Rick of physiatry, and met with Dr. Cardona for surgical opinion.     Current regimen: diclofenac 75 mg twice daily and duloxetine 60 mg daily     Elevated Hemoglobin A1c     Latest Reference Range & Units 09/20/23 08:10 01/09/24 10:28   Glycohemoglobin 5.8 % 6.7 (H) 6.4 !     He has a history of prediabetes.  He reports it has been present for a long time and has never progressed.  No significant side effect such as blurred vision, polyuria, or polydipsia.  No prior use of glucose lowering medications.    In fall 2023 A1c became elevated, he has had multiple rounds of budesonide for colitis as well as steroid injections from orthopedics.     Dyslipidemia     Latest Reference Range & Units 11/28/23 07:20   Cholesterol,Tot 100 - 199 mg/dL 123   Triglycerides 0 - 149 mg/dL 102   HDL  >=40 mg/dL 58   LDL <100 mg/dL 45   Lipoprotein (a) <=29 mg/dL 9     He had elevations in his CPK following increase in statin medicine which recurred, advised to go off statin therapy and now on Nexlizet.    Current regimen: Nexlizet 180-10 mg daily  Previous regimen: atorvastatin 10 mg daily          Current Medications:  Current Outpatient Medications Ordered in Epic   Medication Sig Dispense Refill    DULoxetine (CYMBALTA) 60 MG Cap DR Particles delayed-release capsule Take 1 Capsule by mouth every day. 100 Capsule 3    diclofenac DR (VOLTAREN) 75 MG Tablet Delayed Response Take 1 Tablet by mouth 2 times a day. 200 Tablet 3    ALPRAZolam (XANAX) 0.5 MG Tab Take 1 Tablet by mouth at bedtime as needed for Sleep for up to 90 days. 30 Tablet 0    CALCIUM-VITAMIN D PO Take  by mouth.      Bempedoic Acid-Ezetimibe (NEXLIZET) 180-10 MG Tab Take 1 Tablet by mouth every day. 30 Tablet 11    amoxicillin (AMOXIL) 500 MG Cap Take 2,000 mg by mouth one time as needed for Other. 1 hour before dental procedures      amlodipine-benazepril (LOTREL) 5-10 MG per capsule TAKE 1 CAPSULE BY MOUTH EVERY  Capsule 2    Coenzyme Q10 300 MG Cap Take 1 Capsule by mouth every day. 30 Capsule     mesalamine (LIALDA) 1.2 GM Tablet Delayed Response Take 4.8 g by mouth every day.      nitroglycerin (NITROSTAT) 0.4 MG SL Tab Place 1 Tablet under the tongue as needed for Chest Pain. (Patient taking differently: Place 0.4 mg under the tongue as needed for Chest Pain. CARRY'S IT JUST IN CASE. NEVER HAD TO USE) 25 Tablet 11    Multiple Vitamins-Minerals (MULTIVITAMIN PO) Take 1 Tab by mouth every morning.      aspirin 81 MG tablet Take 81 mg by mouth every day.         No current Epic-ordered facility-administered medications on file.          Objective:   Physical Exam:    Vitals: /64 (BP Location: Right arm, Patient Position: Sitting, BP Cuff Size: Adult)   Pulse 94   Temp 36.1 °C (96.9 °F) (Temporal)   Resp 12   Ht 1.829 m (6')    Wt 101 kg (223 lb 9.6 oz)   SpO2 96%    BMI: Body mass index is 30.33 kg/m².  Physical Exam  Constitutional:       General: He is not in acute distress.     Appearance: Normal appearance. He is not ill-appearing.   Eyes:      General: No scleral icterus.     Conjunctiva/sclera: Conjunctivae normal.   Cardiovascular:      Rate and Rhythm: Normal rate.   Pulmonary:      Effort: Pulmonary effort is normal. No respiratory distress.   Musculoskeletal:         General: Swelling present.      Comments: Right knee   Skin:     General: Skin is warm and dry.      Findings: No rash.   Neurological:      Gait: Gait abnormal.      Comments: antalgic   Psychiatric:         Mood and Affect: Mood normal.         Behavior: Behavior normal.         Thought Content: Thought content normal.         Judgment: Judgment normal.            Assessment and Plan:   Curt is a 73 y.o. male with the following:  Problem List Items Addressed This Visit       Chronic bilateral low back pain with right-sided sciatica     Chronic ongoing problem, currently holding diclofenac for upcoming right total knee arthroplasty planned for next week.  Finds duloxetine has been helpful, continue duloxetine 60 mg daily and Tylenol as needed.         Relevant Medications    DULoxetine (CYMBALTA) 60 MG Cap DR Particles delayed-release capsule    Dyslipidemia     Chronic improved problem, lipid levels look great on current regimen however may be cost prohibitive.  In the meantime he will continue on Nexlizet 180-10 mg daily.         Elevated hemoglobin A1c     Chronic improved problem, much of his elevated A1c is likely related to chronic improved problem, much of his elevated A1c is likely related to repeat steroid treatments for pancolitis per GI.         Nocturia     Chronic decompensated issue, had something similar in September which self resolved, occurred again now in early January 2024.  No symptoms concerning for infection.  May be related to recent  treatment with budesonide and being taken off diclofenac for upcoming knee surgery.  Will continue to observe at this time and if ongoing can initiate additional workup.  Urine checked last September when this first happened and was bland.         Tricompartment osteoarthritis of right knee     Chronic ongoing issue, elective right total knee arthroplasty planned for next week, He is looking forward to this to help improve mobility and reduce pain.  Currently off diclofenac in preparation.         Ulcerative pancolitis (HCC)     Chronic ongoing issue, continues to have elevations of calprotectin levels, symptom wise he is feeling good with normal bowel movements.  Continues on mesalamine 4.8 g daily and recently completed budesonide taper, submitted calprotectin test this morning.  He is between GI practitioners at this time.               RTC: Return if symptoms worsen or fail to improve.    I spent a total of 32 minutes with record review, exam, communication with the patient, communication with other providers, and documentation of this encounter.    PLEASE NOTE: This dictation was created using voice recognition software. I have made every reasonable attempt to correct obvious errors, but I expect that there are errors of grammar and possibly content that I did not discover before finalizing the note.      Shila Mullen, DO  Geriatric and Internal Medicine  [facility]

## 2024-01-15 NOTE — ASSESSMENT & PLAN NOTE
Chronic ongoing issue, elective right total knee arthroplasty planned for next week, He is looking forward to this to help improve mobility and reduce pain.  Currently off diclofenac in preparation.

## 2024-01-15 NOTE — ASSESSMENT & PLAN NOTE
Chronic decompensated issue, had something similar in September which self resolved, occurred again now in early January 2024.  No symptoms concerning for infection.  May be related to recent treatment with budesonide and being taken off diclofenac for upcoming knee surgery.  Will continue to observe at this time and if ongoing can initiate additional workup.  Urine checked last September when this first happened and was bland.

## 2024-01-15 NOTE — ASSESSMENT & PLAN NOTE
Chronic improved problem, much of his elevated A1c is likely related to chronic improved problem, much of his elevated A1c is likely related to repeat steroid treatments for pancolitis per GI.

## 2024-01-15 NOTE — ASSESSMENT & PLAN NOTE
Chronic ongoing problem, currently holding diclofenac for upcoming right total knee arthroplasty planned for next week.  Finds duloxetine has been helpful, continue duloxetine 60 mg daily and Tylenol as needed.

## 2024-01-15 NOTE — ASSESSMENT & PLAN NOTE
Chronic improved problem, lipid levels look great on current regimen however may be cost prohibitive.  In the meantime he will continue on Nexlizet 180-10 mg daily.

## 2024-01-15 NOTE — ASSESSMENT & PLAN NOTE
Chronic ongoing issue, continues to have elevations of calprotectin levels, symptom wise he is feeling good with normal bowel movements.  Continues on mesalamine 4.8 g daily and recently completed budesonide taper, submitted calprotectin test this morning.  He is between GI practitioners at this time.

## 2024-01-16 LAB — CALPROTECTIN STL-MCNT: 509 UG/G

## 2024-01-22 ENCOUNTER — APPOINTMENT (OUTPATIENT)
Dept: MEDICAL GROUP | Facility: PHYSICIAN GROUP | Age: 74
End: 2024-01-22
Payer: MEDICARE

## 2024-01-24 ENCOUNTER — TELEPHONE (OUTPATIENT)
Dept: CARDIOLOGY | Facility: MEDICAL CENTER | Age: 74
End: 2024-01-24
Payer: MEDICARE

## 2024-01-24 ENCOUNTER — TELEPHONE (OUTPATIENT)
Dept: VASCULAR LAB | Facility: MEDICAL CENTER | Age: 74
End: 2024-01-24
Payer: MEDICARE

## 2024-01-24 NOTE — TELEPHONE ENCOUNTER
CW    Caller: Curt Blair     Topic/issue: Pt states he is needing to have an authorization sent in for his Bempedoic Acid-Ezetimibe (NEXLIZET) 180-10 MG Tab [[id number]]   The insurance originally sent this to Dr. Mullen but states it was denied and that CW is the one who wants him to take this medication. The insurance is  needing to know why he is needing to take this medication. He states that this medication is working a lot better than his Lipitor did and definitely wants to continue. He would like a call to discuss what needs to be done    Callback Number: PH:  [phone]    Liza TUCKER

## 2024-01-24 NOTE — TELEPHONE ENCOUNTER
Called patient to inform him this medication has been prescribed by heart and vascular. Message was routed to pool for them to complete PA.

## 2024-01-24 NOTE — TELEPHONE ENCOUNTER
Caller: Curt Blair     Topic/issue: Patient called because his insurance is going to need prior authorization for his medication Bempedoic Acid-Ezetimibe (NEXLIZET) 180-10 MG Tab.    Please advise.     Callback Number: [phone]    Thank you,     Nenita TIRADO

## 2024-01-30 ENCOUNTER — DOCUMENTATION (OUTPATIENT)
Dept: VASCULAR LAB | Facility: MEDICAL CENTER | Age: 74
End: 2024-01-30
Payer: MEDICARE

## 2024-01-30 NOTE — TELEPHONE ENCOUNTER
Caller: Curt Blair     Topic/issue: Patient is following up on his request for a prior auth to be completed.    Callback Number: [phone]      Thank you  Jessica CARDENAS

## 2024-01-30 NOTE — TELEPHONE ENCOUNTER
Called Curt back asking for new insurance info:  ID [id number]  RXBIN [id number]  RXPCN [id number]  RXGRP [id number]    CMM states PA may not be required, but submitted one anyway since pt states pharmacy needed PA done    Submitted PA via Cover My Meds KEY# [id number]    Will await determination.    Sally Angelo, PharmD

## 2024-01-30 NOTE — PROGRESS NOTES
PA via Cover My Meds KEY# [id number] for Nexlizet was denied.        Pt needs to stay on Nexlizet d/t hx of ASCVD (MI at 41 years old) and multiple statin intolerances to atorvastatin and rosuvastatin.    See clinical documentation from 12/14/2023 with clinical lipid pharmacist.    Faxed this note to Appeals Depatment @ [phone].    Sally Angelo, PharmD

## 2024-02-01 NOTE — PROGRESS NOTES
Per Sasha AKBAR: I called the appeals dept @ [phone] and it looks like the status for the appeals letter is currently under review. They will get back with me either fax or call regarding with the final determination. Normally, the turn around times for those appeals will take atleast 24-72 business hours or less.     Will await determination    Reg ColeD

## 2024-02-06 ENCOUNTER — APPOINTMENT (OUTPATIENT)
Dept: MEDICAL GROUP | Facility: PHYSICIAN GROUP | Age: 74
End: 2024-02-06
Payer: MEDICARE

## 2024-02-06 ENCOUNTER — TELEPHONE (OUTPATIENT)
Dept: VASCULAR LAB | Facility: MEDICAL CENTER | Age: 74
End: 2024-02-06

## 2024-02-06 NOTE — TELEPHONE ENCOUNTER
----- Message from Samantha Jay sent at 2/6/2024 12:11 PM PST -----  Regarding: RE: Nexlizet appeal?  It looks like it has been approved! I'm reaching out to [facility]ela [facility] to obtain a fax letter copy of the approval.    Sasha Salinas   Vascular Pharmacy Liaison (Rx Coordinator)    ----- Message -----  From: Sally Angelo PharmD  Sent: 2/6/2024  11:52 AM PST  To: Samantha Jay  Subject: RE: Nexlizet appeal?                             Hi Sasha!  Did you hear back from Appeals yet?    ----- Message -----  From: Samantha Jay  Sent: 2/1/2024   9:59 AM PST  To: Sally Angelo PharmD  Subject: RE: Nexlizet appeal?                             Camilo Ms. Zavala,     I called the appeals dept @ [phone] and it looks like the status for the appeals letter is currently under review. They will get back with me either fax or call regarding with the final determination. Normally, the turn around times for those appeals will take atleast 24-72 business hours or less. Let me know if you need anything else. :)    Sasha Salinas   Vascular Pharmacy Liaison (Rx Coordinator)    ----- Message -----  From: Sally Angelo PharmD  Sent: 2/1/2024   8:12 AM PST  To: Samantha Jay  Subject: FW: Nexlizet appeal?                             Hiiiii Sasha!!    I had to fax an appeal for this pt's Nexlizet on 1/30, can you help me follow up on the status?  TYSM <3    ----- Message -----  From: Sally Angelo PharmD  Sent: 2/1/2024  12:00 AM PST  To: Sally Angelo PharmD  Subject: Nexlizet appeal?

## 2024-02-12 ENCOUNTER — APPOINTMENT (OUTPATIENT)
Dept: MEDICAL GROUP | Facility: PHYSICIAN GROUP | Age: 74
End: 2024-02-12
Payer: MEDICARE

## 2024-02-13 ENCOUNTER — OFFICE VISIT (OUTPATIENT)
Dept: MEDICAL GROUP | Facility: PHYSICIAN GROUP | Age: 74
End: 2024-02-13
Payer: MEDICARE

## 2024-02-13 VITALS
OXYGEN SATURATION: 96 % | RESPIRATION RATE: 16 BRPM | WEIGHT: 225.2 LBS | HEART RATE: 94 BPM | HEIGHT: 71 IN | DIASTOLIC BLOOD PRESSURE: 50 MMHG | SYSTOLIC BLOOD PRESSURE: 122 MMHG | TEMPERATURE: 96.7 F | BODY MASS INDEX: 31.53 KG/M2

## 2024-02-13 DIAGNOSIS — R35.1 NOCTURIA: ICD-10-CM

## 2024-02-13 DIAGNOSIS — I10 ESSENTIAL HYPERTENSION, BENIGN: ICD-10-CM

## 2024-02-13 DIAGNOSIS — T81.30XA WOUND DEHISCENCE: ICD-10-CM

## 2024-02-13 DIAGNOSIS — K51.00 ULCERATIVE PANCOLITIS (HCC): ICD-10-CM

## 2024-02-13 DIAGNOSIS — H61.23 BILATERAL IMPACTED CERUMEN: ICD-10-CM

## 2024-02-13 PROBLEM — K52.3 INDETERMINATE COLITIS: Status: RESOLVED | Noted: 2022-05-04 | Resolved: 2024-02-13

## 2024-02-13 PROCEDURE — 3074F SYST BP LT 130 MM HG: CPT | Performed by: INTERNAL MEDICINE

## 2024-02-13 PROCEDURE — 99214 OFFICE O/P EST MOD 30 MIN: CPT | Performed by: INTERNAL MEDICINE

## 2024-02-13 PROCEDURE — 3078F DIAST BP <80 MM HG: CPT | Performed by: INTERNAL MEDICINE

## 2024-02-13 RX ORDER — MESALAMINE 1.2 G/1
4.8 TABLET, DELAYED RELEASE ORAL
Qty: 400 TABLET | Refills: 3
Start: 2024-02-13

## 2024-02-13 RX ORDER — MESALAMINE 500 MG/1
2000 CAPSULE, EXTENDED RELEASE ORAL DAILY
COMMUNITY
Start: 2024-02-05 | End: 2024-02-13

## 2024-02-13 ASSESSMENT — FIBROSIS 4 INDEX: FIB4 SCORE: 1.11

## 2024-02-13 NOTE — ASSESSMENT & PLAN NOTE
Chronic ongoing problem, calprotectin level is still increased, recommended to continue mesalamine 4.8 grams daily for another 6 months and then recheck the calprotectin level again at that time. [facility].

## 2024-02-13 NOTE — ASSESSMENT & PLAN NOTE
Relatively new problem, likely due to hyper flexion of the right knee post-operatively, continue Keflex for additional week per orthopedics and topical wound care, no PT at this time until clear by orthopedics.

## 2024-02-13 NOTE — ASSESSMENT & PLAN NOTE
Chronic ongoing problem, continue medical therapy with amlodipine-benazepril 5-10 mg daily, blood pressure remains at goal in the perioperative time frame.

## 2024-02-13 NOTE — PROGRESS NOTES
Subjective:   Chief Complaint/History of Present Illness:  Curt Blair is a 73 y.o. male established patient who presents today to discuss medical problems as listed below. Curt is accompanied by his wife for today's visit.    Problem   Wound Dehiscence    He unfortunately sustained minimal wound dehiscence following right TKA in Jan 2024. He is following with orthopedics and currently holding of PT and taking Keflex until the wound closes.      Ulcerative Pancolitis (Hcc)    Diarrhea started distinctly on 4/29/2022. He went out to dinner for his daughter's birthday to a [employer] restaurant where he had cooked shrimp. He also had his 4th Covid shot on 4/25/2022.      Normally he has 2 BM's every morning and then 1 BM in the evening or overnight. He is s/p open cholecystectomy around 1997. No issues with dumping syndrome.      He developed multiple episodes of watery diarrhea. He started peptobismol which slowed down the diarrhea then imodium which stopped the diarrhea but only short term when he took the medicine.      He notes prior episode of diverticulitis in 2019. He had exposure to amoxicillin from dental procedures around December and again in April. No prior episodes of Cdiff. Due to elevated inflammation markers and long delay to get into GI we started him empirically on budesonide which led to immediate relief of symptoms. He saw GI who performed colonoscopy which was concerning for inflammatory bowel disease versus indeterminate colitis (secondary to NSAIDs or duloxetine?). No hematochezia through this episode.     Colonoscopy (10/2022) with active colitis and GI recommendations from Dr. Francois- inflammation from your colon is most likely due to inflammatory bowel disease.       He was initially on budesonide and started on mesalamine, now on maintenance therapy with mesalamine.     Current regimen: Mesalamine 4.8 g daily     Nocturia    He developed 2 weeks of consistent nocturia.  He was worried  that he may have developed an issue with the bladder or prostate.  He normally would urinate twice per night often when getting up to take his dog outside at night.  For that 2-week period he was getting up 5-6 times per night, he reported sleeping 3 hours initially and then was up every hour thereafter or vice versa.  No dysuria or change in urine color.  He was placed on Bactrim around that time for superficial skin infection at the incision site of his left knee arthroplasty.  Point-of-care urinalysis in clinic today was unremarkable.    Decompensated again in early January 2024.  Occurred when he was taken off diclofenac before upcoming right knee surgery and completed a repeat steroid taper with budesonide.     Essential Hypertension, Benign      He reports longstanding history of hypertension.  It has been well controlled on same regiment for many years.  No signs or symptoms of orthostasis.  No angina equivalents.  He follows with cardiology on an annual basis.    Current regimen: amlodipine-benazepril 5-10 mg daily    Blood pressure in clinic ranges 108-128/60-86     Indeterminate Colitis (Resolved)    Diarrhea started distinctly on 4/29/2022. He went out to dinner for his daughter's birthday to a [employer] restaurant where he had cooked shrimp. He also had his 4th Covid shot on 4/25/2022.     Normally he has 2 BM's every morning and then 1 BM in the evening or overnight. He is s/p open cholecystectomy around 1997. No issues with dumping syndrome.     He developed multiple episodes of watery diarrhea. He started peptobismol which slowed down the diarrhea then imodium which stopped the diarrhea but only short term when he took the medicine.     He notes prior episode of diverticulitis in 2019. He had exposure to amoxicillin from dental procedures around December and again in April. No prior episodes of Cdiff. Due to elevated inflammation markers and long delay to get into GI we started him empirically on  budesonide which led to immediate relief of symptoms. He saw GI who performed colonoscopy which was concerning for inflammatory bowel disease versus indeterminate colitis (secondary to NSAIDs or duloxetine?). No hematochezia through this episode.    Colonoscopy (10/2022) with active colitis and GI recommendations from Dr. Francois- inflammation from your colon is most likely due to inflammatory bowel disease.      He was initially on budesonide and started on mesalamine, now on maintenance therapy with mesalamine.    Current regimen: Mesalamine 4.8 g daily          Current Medications:  Current Outpatient Medications Ordered in Epic   Medication Sig Dispense Refill    cephALEXin (KEFLEX) 500 MG Cap Take 1 Capsule by mouth 4 times a day for 7 days. 28 Capsule 0    mesalamine (LIALDA) 1.2 GM Tablet Delayed Response Take 4 Tablets by mouth every day. 400 Tablet 3    cyclobenzaprine (FLEXERIL) 10 mg Tab Take 1 Tablet by mouth 3 times a day as needed for Muscle Spasms. 30 Tablet 0    DULoxetine (CYMBALTA) 60 MG Cap DR Particles delayed-release capsule Take 1 Capsule by mouth every day. 100 Capsule 3    diclofenac DR (VOLTAREN) 75 MG Tablet Delayed Response Take 1 Tablet by mouth 2 times a day. 200 Tablet 3    CALCIUM-VITAMIN D PO Take  by mouth.      Bempedoic Acid-Ezetimibe (NEXLIZET) 180-10 MG Tab Take 1 Tablet by mouth every day. 30 Tablet 11    amoxicillin (AMOXIL) 500 MG Cap Take 2,000 mg by mouth one time as needed for Other. 1 hour before dental procedures      amlodipine-benazepril (LOTREL) 5-10 MG per capsule TAKE 1 CAPSULE BY MOUTH EVERY  Capsule 2    Coenzyme Q10 300 MG Cap Take 1 Capsule by mouth every day. 30 Capsule     nitroglycerin (NITROSTAT) 0.4 MG SL Tab Place 1 Tablet under the tongue as needed for Chest Pain. 25 Tablet 11    Multiple Vitamins-Minerals (MULTIVITAMIN PO) Take 1 Tab by mouth every morning.      aspirin 81 MG tablet Take 81 mg by mouth every day.         No current Epic-ordered  "facility-administered medications on file.          Objective:   Physical Exam:    Vitals: /50 (BP Location: Right arm, Patient Position: Sitting, BP Cuff Size: Adult)   Pulse 94   Temp 35.9 °C (96.7 °F) (Temporal)   Resp 16   Ht 1.803 m (5' 11\")   Wt 102 kg (225 lb 3.2 oz)   SpO2 96%    BMI: Body mass index is 31.41 kg/m².  Physical Exam  Constitutional:       General: He is not in acute distress.     Appearance: Normal appearance. He is not ill-appearing.   HENT:      Right Ear: External ear normal. There is impacted cerumen.      Left Ear: External ear normal. There is impacted cerumen.      Ears:      Comments: Clear canals on recheck after bilateral lavage  Eyes:      General: No scleral icterus.     Conjunctiva/sclera: Conjunctivae normal.   Cardiovascular:      Rate and Rhythm: Normal rate and regular rhythm.      Pulses: Normal pulses.   Pulmonary:      Effort: Pulmonary effort is normal. No respiratory distress.      Breath sounds: Normal breath sounds. No wheezing or rhonchi.   Abdominal:      General: Bowel sounds are normal. There is no distension.      Palpations: Abdomen is soft.      Comments: Small reducible umbilical hernia   Musculoskeletal:      Right lower leg: Edema present.      Left lower leg: No edema.      Comments: Trace RLE edema, pretibial   Skin:     General: Skin is warm and dry.   Neurological:      Gait: Gait abnormal.   Psychiatric:         Mood and Affect: Mood normal.         Behavior: Behavior normal.         Thought Content: Thought content normal.         Judgment: Judgment normal.          Assessment and Plan:   Curt is a 73 y.o. male with the following:  Problem List Items Addressed This Visit       Bilateral impacted cerumen    Relevant Orders    Ear Irrigation (MA Only)    Essential hypertension, benign     Chronic ongoing problem, continue medical therapy with amlodipine-benazepril 5-10 mg daily, blood pressure remains at goal in the perioperative time frame.   "       Nocturia     Chronic ongoing problem, reports he can go some nights with no nocturia and other times urinate up to 6 times. Not terribly bothersome at this time, prefers to monitor, could consider tamsulosin in the future depending on symptom progression.         Ulcerative pancolitis (HCC)     Chronic ongoing problem, calprotectin level is still increased, recommended to continue mesalamine 4.8 grams daily for another 6 months and then recheck the calprotectin level again at that time. [facility] follows with [facility].         Relevant Medications    mesalamine (LIALDA) 1.2 GM Tablet Delayed Response    Wound dehiscence     Relatively new problem, likely due to hyper flexion of the right knee post-operatively, continue Keflex for additional week per orthopedics and topical wound care, no PT at this time until clear by orthopedics.               RTC: Return in about 3 months (around 5/13/2024).    I spent a total of 32 minutes with record review, exam, communication with the patient, communication with other providers, and documentation of this encounter.    PLEASE NOTE: This dictation was created using voice recognition software. I have made every reasonable attempt to correct obvious errors, but I expect that there are errors of grammar and possibly content that I did not discover before finalizing the note.      Shila Mullen, DO  Geriatric and Internal Medicine  [facility]

## 2024-02-13 NOTE — ASSESSMENT & PLAN NOTE
Chronic ongoing problem, reports he can go some nights with no nocturia and other times urinate up to 6 times. Not terribly bothersome at this time, prefers to monitor, could consider tamsulosin in the future depending on symptom progression.

## 2024-03-08 ENCOUNTER — APPOINTMENT (RX ONLY)
Dept: URBAN - METROPOLITAN AREA CLINIC 20 | Facility: CLINIC | Age: 74
Setting detail: DERMATOLOGY
End: 2024-03-08

## 2024-03-08 DIAGNOSIS — D22 MELANOCYTIC NEVI: ICD-10-CM

## 2024-03-08 DIAGNOSIS — Z85.828 PERSONAL HISTORY OF OTHER MALIGNANT NEOPLASM OF SKIN: ICD-10-CM

## 2024-03-08 DIAGNOSIS — L82.1 OTHER SEBORRHEIC KERATOSIS: ICD-10-CM

## 2024-03-08 DIAGNOSIS — Z71.89 OTHER SPECIFIED COUNSELING: ICD-10-CM

## 2024-03-08 DIAGNOSIS — D18.0 HEMANGIOMA: ICD-10-CM

## 2024-03-08 DIAGNOSIS — L57.0 ACTINIC KERATOSIS: ICD-10-CM

## 2024-03-08 DIAGNOSIS — L81.4 OTHER MELANIN HYPERPIGMENTATION: ICD-10-CM

## 2024-03-08 PROBLEM — D22.72 MELANOCYTIC NEVI OF LEFT LOWER LIMB, INCLUDING HIP: Status: ACTIVE | Noted: 2024-03-08

## 2024-03-08 PROBLEM — D22.39 MELANOCYTIC NEVI OF OTHER PARTS OF FACE: Status: ACTIVE | Noted: 2024-03-08

## 2024-03-08 PROBLEM — D22.61 MELANOCYTIC NEVI OF RIGHT UPPER LIMB, INCLUDING SHOULDER: Status: ACTIVE | Noted: 2024-03-08

## 2024-03-08 PROBLEM — D18.01 HEMANGIOMA OF SKIN AND SUBCUTANEOUS TISSUE: Status: ACTIVE | Noted: 2024-03-08

## 2024-03-08 PROBLEM — D22.62 MELANOCYTIC NEVI OF LEFT UPPER LIMB, INCLUDING SHOULDER: Status: ACTIVE | Noted: 2024-03-08

## 2024-03-08 PROBLEM — D22.71 MELANOCYTIC NEVI OF RIGHT LOWER LIMB, INCLUDING HIP: Status: ACTIVE | Noted: 2024-03-08

## 2024-03-08 PROBLEM — D22.5 MELANOCYTIC NEVI OF TRUNK: Status: ACTIVE | Noted: 2024-03-08

## 2024-03-08 PROCEDURE — ? OBSERVATION

## 2024-03-08 PROCEDURE — 99213 OFFICE O/P EST LOW 20 MIN: CPT | Mod: 25

## 2024-03-08 PROCEDURE — 17000 DESTRUCT PREMALG LESION: CPT

## 2024-03-08 PROCEDURE — ? SUNSCREEN RECOMMENDATIONS

## 2024-03-08 PROCEDURE — 17003 DESTRUCT PREMALG LES 2-14: CPT

## 2024-03-08 PROCEDURE — ? COUNSELING

## 2024-03-08 PROCEDURE — ? LIQUID NITROGEN

## 2024-03-08 ASSESSMENT — LOCATION DETAILED DESCRIPTION DERM
LOCATION DETAILED: RIGHT CLAVICULAR SKIN
LOCATION DETAILED: RIGHT SUPERIOR UPPER BACK
LOCATION DETAILED: RIGHT VENTRAL PROXIMAL FOREARM
LOCATION DETAILED: RIGHT PROXIMAL PRETIBIAL REGION
LOCATION DETAILED: RIGHT INFERIOR MEDIAL MIDBACK
LOCATION DETAILED: RIGHT DISTAL POSTERIOR UPPER ARM
LOCATION DETAILED: RIGHT INFERIOR CENTRAL MALAR CHEEK
LOCATION DETAILED: RIGHT CENTRAL POSTAURICULAR SKIN
LOCATION DETAILED: LEFT LATERAL PROXIMAL PRETIBIAL REGION
LOCATION DETAILED: MID POSTERIOR NECK
LOCATION DETAILED: LEFT VENTRAL PROXIMAL FOREARM
LOCATION DETAILED: INFERIOR THORACIC SPINE
LOCATION DETAILED: LEFT DISTAL POSTERIOR THIGH
LOCATION DETAILED: RIGHT INFERIOR FOREHEAD
LOCATION DETAILED: RIGHT LATERAL FOREHEAD
LOCATION DETAILED: RIGHT INFERIOR MEDIAL UPPER BACK
LOCATION DETAILED: RIGHT DISTAL POSTERIOR THIGH
LOCATION DETAILED: LEFT PROXIMAL POSTERIOR UPPER ARM

## 2024-03-08 ASSESSMENT — LOCATION SIMPLE DESCRIPTION DERM
LOCATION SIMPLE: LEFT POSTERIOR THIGH
LOCATION SIMPLE: UPPER BACK
LOCATION SIMPLE: RIGHT PRETIBIAL REGION
LOCATION SIMPLE: RIGHT LOWER BACK
LOCATION SIMPLE: RIGHT POSTERIOR UPPER ARM
LOCATION SIMPLE: LEFT FOREARM
LOCATION SIMPLE: LEFT PRETIBIAL REGION
LOCATION SIMPLE: RIGHT UPPER BACK
LOCATION SIMPLE: POSTERIOR NECK
LOCATION SIMPLE: RIGHT CLAVICULAR SKIN
LOCATION SIMPLE: RIGHT CHEEK
LOCATION SIMPLE: RIGHT POSTERIOR THIGH
LOCATION SIMPLE: LEFT POSTERIOR UPPER ARM
LOCATION SIMPLE: SCALP
LOCATION SIMPLE: RIGHT FOREARM
LOCATION SIMPLE: RIGHT FOREHEAD

## 2024-03-08 ASSESSMENT — LOCATION ZONE DERM
LOCATION ZONE: FACE
LOCATION ZONE: NECK
LOCATION ZONE: LEG
LOCATION ZONE: ARM
LOCATION ZONE: TRUNK
LOCATION ZONE: SCALP

## 2024-03-08 NOTE — PROCEDURE: LIQUID NITROGEN
Show Applicator Variable?: Yes
Duration Of Freeze Thaw-Cycle (Seconds): 10
Number Of Freeze-Thaw Cycles: 2 freeze-thaw cycles
Post-Care Instructions: I reviewed with the patient in detail post-care instructions. Patient is to wear sunprotection, and avoid picking at any of the treated lesions. Pt may apply Vaseline to crusted or scabbing areas.
Render Note In Bullet Format When Appropriate: No
Detail Level: Detailed
Consent: The patient's consent was obtained including but not limited to risks of crusting, scabbing, blistering, scarring, darker or lighter pigmentary change, recurrence, incomplete removal and infection. RTC in 2 months if lesion(s) persistent.

## 2024-04-01 ENCOUNTER — TELEPHONE (OUTPATIENT)
Dept: HEALTH INFORMATION MANAGEMENT | Facility: OTHER | Age: 74
End: 2024-04-01
Payer: MEDICARE

## 2024-04-10 PROBLEM — M25.562 KNEE PAIN, LEFT: Status: ACTIVE | Noted: 2024-04-10

## 2024-04-10 PROBLEM — Z96.652 HISTORY OF TOTAL LEFT KNEE REPLACEMENT: Status: ACTIVE | Noted: 2024-04-10

## 2024-05-06 ENCOUNTER — OFFICE VISIT (OUTPATIENT)
Dept: CARDIOLOGY | Facility: MEDICAL CENTER | Age: 74
End: 2024-05-06
Attending: INTERNAL MEDICINE
Payer: MEDICARE

## 2024-05-06 VITALS
WEIGHT: 221 LBS | HEIGHT: 71 IN | BODY MASS INDEX: 30.94 KG/M2 | SYSTOLIC BLOOD PRESSURE: 128 MMHG | DIASTOLIC BLOOD PRESSURE: 68 MMHG | OXYGEN SATURATION: 99 % | HEART RATE: 80 BPM | RESPIRATION RATE: 16 BRPM

## 2024-05-06 DIAGNOSIS — E78.5 DYSLIPIDEMIA: ICD-10-CM

## 2024-05-06 DIAGNOSIS — I10 ESSENTIAL HYPERTENSION: ICD-10-CM

## 2024-05-06 DIAGNOSIS — Z95.5 STATUS POST CORONARY ARTERY STENT PLACEMENT: ICD-10-CM

## 2024-05-06 DIAGNOSIS — I25.10 CORONARY ARTERY DISEASE DUE TO LIPID RICH PLAQUE: ICD-10-CM

## 2024-05-06 DIAGNOSIS — R74.8 ELEVATED CPK: ICD-10-CM

## 2024-05-06 DIAGNOSIS — I25.83 CORONARY ARTERY DISEASE DUE TO LIPID RICH PLAQUE: ICD-10-CM

## 2024-05-06 PROCEDURE — 3074F SYST BP LT 130 MM HG: CPT | Performed by: INTERNAL MEDICINE

## 2024-05-06 PROCEDURE — 99214 OFFICE O/P EST MOD 30 MIN: CPT | Performed by: INTERNAL MEDICINE

## 2024-05-06 PROCEDURE — 3078F DIAST BP <80 MM HG: CPT | Performed by: INTERNAL MEDICINE

## 2024-05-06 RX ORDER — NITROGLYCERIN 0.4 MG/1
0.4 TABLET SUBLINGUAL PRN
Qty: 25 TABLET | Refills: 11 | Status: SHIPPED | OUTPATIENT
Start: 2024-05-06

## 2024-05-06 ASSESSMENT — FIBROSIS 4 INDEX: FIB4 SCORE: 1.11

## 2024-05-06 NOTE — PROGRESS NOTES
Chief Complaint   Patient presents with    Coronary Artery Disease     Follow up         Subjective     Curt Blair is a 73 y.o. male who presents today for our first meeting in follow-up of coronary artery disease characterized by remote PCI previously followed by Dr. Otoole changing providers.  Recently underwent knee surgery and is recovering from this.  Previously after his prior knee surgery his CPK was noted to be elevated which was likely sequela of his recent surgery but there was concern for statin induced myopathy.  He was counseled to stop his longstanding statin use and switch to a pelvic acid combination therapy.  He never had any muscle aches.  He has now been referred to the lipid clinic who are planning to recheck.  He has no exertional chest discomfort.  Blood pressure is well-controlled and medical therapy is appropriate    Past Medical History:   Diagnosis Date    Allergies 12/24/2019    Arteriosclerotic vascular disease 01/25/2019    Arthritis     Right Foot    CAD (coronary artery disease)     Elevated CPK 08/16/2020    Generalized anxiety disorder 10/11/2017    High cholesterol     Hyperlipidemia     Hypertension     Indeterminate colitis 05/04/2022    Diarrhea started distinctly on 4/29/2022. He went out to dinner for his daughter's birthday to a [email or web address] restaurant where he had cooked shrimp. He also had his 4th Covid shot on 4/25/2022.      Normally he has 2 BM's every morning and then 1 BM in the evening or overnight. He is s/p open cholecystectomy around 1997. No issues with dumping syndrome.      He developed multiple episodes of watery diarr    Inflamed sebaceous cyst 02/15/2018    Intrinsic eczema 02/07/2020    Prediabetes 07/19/2019    S/P coronary artery stent placement     2 stents     Past Surgical History:   Procedure Laterality Date    PB FIX INFRAPATELLA TENDON,PBIMARY Left 4/29/2024    Procedure: LEFT KNEE PATELLAR TENDON REPAIR;  Surgeon: Shreyas Pineda M.D.;  Location:  [facility];  Service: Orthopedics    PB TOTAL KNEE ARTHROPLASTY Right 1/22/2024    Procedure: RIGHT TOTAL KNEE ARTHROPLASTY;  Surgeon: Shreyas Pineda M.D.;  Location: [facility];  Service: Orthopedics    PB TOTAL KNEE ARTHROPLASTY Left 7/17/2023    Procedure: LEFT TOTAL KNEE ARTHROPLASTY;  Surgeon: Shreyas Pineda M.D.;  Location: [facility];  Service: Orthopedics    VT INJ LUMBAR/SACRAL,W/ IMAGING Right 12/2/2021    Procedure: RIGHT L4-5 interlaminar epidural steroid injection with sedation;  Surgeon: Sav Rick M.D.;  [facility];  Service: Pain Management    VT INJ LUMBAR/SACRAL,W/ IMAGING Right 7/28/2021    Procedure: RIGHT L4-5 interlaminar epidural steroid injection with sedation;  Surgeon: Sav Rick M.D.;  [facility]: [facility];  Service: Pain Management    VT FLUOROSCOPIC GUIDANCE NEEDLE PLACEMENT Left 6/16/2021    Procedure: LEFT genicular nerve blocks, diagnostic;  Surgeon: Sav Rick M.D.;  [facility]: [facility];  Service: Pain Management    VT INJ AA/STRD GNCLR NRV BRNCH W/IMG Left 6/16/2021    Procedure: GENICULAR NERVE BRANCHES INCLUDING IMAGING GUIDANCE WITH A REQUIRED MINIMUM THREE NERVE BRANCHES;  Surgeon: Sav Rick M.D.;  [facility];  Service: Pain Management    APPENDECTOMY      CHOLECYSTECTOMY      ENDARTERECTOMY      corornary    STENT PLACEMENT      VASECTOMY       Family History   Problem Relation Age of Onset    Lung Disease Mother         copd    Hypertension Mother     Heart Disease Father         heart attack and heart disease    Cancer Brother         neck    Heart Disease Paternal Grandfather         Heart attack    Other Sister         non-malignant brain tumor    Diabetes Neg Hx      Social History     Socioeconomic History    Marital status:      Spouse name: Not on file    Number of children: Not on file     Years of education: Not on file    Highest education level: 12th grade   Occupational History    Not on file   Tobacco Use    Smoking status: Former     Current packs/day: 0.00     Average packs/day: 2.0 packs/day for 25.0 years (50.0 ttl pk-yrs)     Types: Cigarettes     Start date: 1967     Quit date: 1992     Years since quittin.3    Smokeless tobacco: Never    Tobacco comments:     avoid all tobacco products   Vaping Use    Vaping Use: Never used   Substance and Sexual Activity    Alcohol use: Not Currently     Alcohol/week: 1.2 oz     Types: 2 Standard drinks or equivalent per week     Comment: 1-2 DRINKS WEEKLY     Drug use: No    Sexual activity: Yes     Partners: Female     Comment: , 3 children   Other Topics Concern     Service Yes    Blood Transfusions No    Caffeine Concern No    Occupational Exposure No    Hobby Hazards No    Sleep Concern Yes    Stress Concern Yes    Weight Concern Yes    Special Diet No     Comment: no fried    Back Care Yes    Exercise Yes    Bike Helmet No     Comment: does not ride bike    Seat Belt Not Asked    Self-Exams Yes   Social History Narrative    He was born in [place]. He worked in the [employer] industry, he retired at age 65. He  Luann in 2016 after both of their spouses passed away. He has children in [place] and several grandchildren and great grandchildren. He is an avid traveler.     Social Determinants of Health     Financial Resource Strain: Low Risk  (2020)    Overall Financial Resource Strain (CARDIA)     Difficulty of Paying Living Expenses: Not hard at all   Food Insecurity: No Food Insecurity (2020)    Hunger Vital Sign     Worried About Running Out of Food in the Last Year: Never true     Ran Out of Food in the Last Year: Never true   Transportation Needs: No Transportation Needs (2020)    PRAPARE - Transportation     Lack of Transportation (Medical): No     Lack of Transportation (Non-Medical): No    Physical Activity: Inactive (9/24/2020)    Exercise Vital Sign     Days of Exercise per Week: 0 days     Minutes of Exercise per Session: 0 min   Stress: Stress Concern Present (9/24/2020)    [employer] - Occupational Stress Questionnaire     Feeling of Stress : To some extent   Social Connections: Moderately Integrated (9/24/2020)    Social Connection and Isolation Panel [NHANES]     Frequency of Communication with Friends and Family: More than three times a week     Frequency of Social Gatherings with Friends and Family: Once a week     Attends Zoroastrian Services: Never     Active Member of Clubs or Organizations: Yes     Attends Club or Organization Meetings: 1 to 4 times per year     Marital Status:    Intimate Partner Violence: Not on file   Housing Stability: Low Risk  (9/24/2020)    Housing Stability Vital Sign     Unable to Pay for Housing in the Last Year: No     Number of Places Lived in the Last Year: 1     Unstable Housing in the Last Year: No     Allergies   Allergen Reactions    Atorvastatin Myalgia     Ok to take 10 mg  Elevated CPK  Only at 40 Mg does this happen.    Lyrica [Pregabalin]     Rosuvastatin      muscle cramps elevated CPK     Outpatient Encounter Medications as of 5/6/2024   Medication Sig Dispense Refill    nitroglycerin (NITROSTAT) 0.4 MG SL Tab Place 1 Tablet under the tongue as needed for Chest Pain. 25 Tablet 11    mesalamine (LIALDA) 1.2 GM Tablet Delayed Response Take 4 Tablets by mouth every day. 400 Tablet 3    DULoxetine (CYMBALTA) 60 MG Cap DR Particles delayed-release capsule Take 1 Capsule by mouth every day. 100 Capsule 3    diclofenac DR (VOLTAREN) 75 MG Tablet Delayed Response Take 1 Tablet by mouth 2 times a day. 200 Tablet 3    CALCIUM-VITAMIN D PO Take  by mouth.      Bempedoic Acid-Ezetimibe (NEXLIZET) 180-10 MG Tab Take 1 Tablet by mouth every day. 30 Tablet 11    amoxicillin (AMOXIL) 500 MG Cap Take 2,000 mg by mouth one time  "as needed for Other. 1 hour before dental procedures      amlodipine-benazepril (LOTREL) 5-10 MG per capsule TAKE 1 CAPSULE BY MOUTH EVERY  Capsule 2    Coenzyme Q10 300 MG Cap Take 1 Capsule by mouth every day. 30 Capsule     Multiple Vitamins-Minerals (MULTIVITAMIN PO) Take 1 Tab by mouth every morning.      aspirin 81 MG tablet Take 81 mg by mouth every day.        [DISCONTINUED] oxyCODONE-acetaminophen (PERCOCET) 5-325 MG Tab Take 1 Tablet by mouth every four hours as needed for Severe Pain for up to 7 days. (Patient not taking: Reported on 5/6/2024) 42 Tablet 0    [DISCONTINUED] nitroglycerin (NITROSTAT) 0.4 MG SL Tab Place 1 Tablet under the tongue as needed for Chest Pain. 25 Tablet 11     No facility-administered encounter medications on file as of 5/6/2024.     Review of Systems   All other systems reviewed and are negative.             Objective     /68 (BP Location: Left arm, Patient Position: Sitting)   Pulse 80   Resp 16   Ht 1.803 m (5' 11\")   Wt 100 kg (221 lb)   SpO2 99%   BMI 30.82 kg/m²     Physical Exam  Vitals and nursing note reviewed.   Constitutional:       General: He is not in acute distress.     Appearance: Normal appearance.   HENT:      Head: Normocephalic and atraumatic.      Right Ear: External ear normal.      Left Ear: External ear normal.      Nose: Nose normal.   Eyes:      Conjunctiva/sclera: Conjunctivae normal.   Cardiovascular:      Rate and Rhythm: Normal rate and regular rhythm.      Pulses: Normal pulses.      Heart sounds: No murmur heard.  Pulmonary:      Effort: Pulmonary effort is normal. No respiratory distress.      Breath sounds: Normal breath sounds.   Abdominal:      General: There is no distension.      Palpations: Abdomen is soft.   Musculoskeletal:      Cervical back: No rigidity or tenderness.      Right lower leg: No edema.      Left lower leg: No edema.   Skin:     General: Skin is warm and dry.      Capillary Refill: Capillary refill takes 2 " "to 3 seconds.   Neurological:      General: No focal deficit present.      Mental Status: He is alert and oriented to person, place, and time.   Psychiatric:         Mood and Affect: Mood normal.         Behavior: Behavior normal.         Thought Content: Thought content normal.       LABS:  Lab Results   Component Value Date/Time    CHOLSTRLTOT 123 11/28/2023 07:20 AM    LDL 45 11/28/2023 07:20 AM    HDL 58 11/28/2023 07:20 AM    TRIGLYCERIDE 102 11/28/2023 07:20 AM       Lab Results   Component Value Date/Time    WBC 6.3 09/20/2023 08:10 AM    RBC 4.98 09/20/2023 08:10 AM    HEMOGLOBIN 14.6 09/20/2023 08:10 AM    HEMATOCRIT 46.1 09/20/2023 08:10 AM    MCV 92.6 09/20/2023 08:10 AM    NEUTSPOLYS 65.60 09/20/2023 08:10 AM    LYMPHOCYTES 18.40 (L) 09/20/2023 08:10 AM    MONOCYTES 13.40 09/20/2023 08:10 AM    EOSINOPHILS 1.30 09/20/2023 08:10 AM    BASOPHILS 1.00 09/20/2023 08:10 AM     Lab Results   Component Value Date/Time    SODIUM 137 11/28/2023 07:20 AM    POTASSIUM 4.5 11/28/2023 07:20 AM    CHLORIDE 101 11/28/2023 07:20 AM    CO2 26 11/28/2023 07:20 AM    GLUCOSE 122 (H) 11/28/2023 07:20 AM    BUN 22 11/28/2023 07:20 AM    CREATININE 0.85 11/28/2023 07:20 AM    CREATININE 0.93 02/20/2013 07:20 AM    BUNCREATRAT 17 02/20/2013 07:20 AM     Lab Results   Component Value Date    HBA1C 6.4 (A) 01/09/2024      Lab Results   Component Value Date/Time    ALKPHOSPHAT 78 11/28/2023 07:20 AM    ASTSGOT 21 11/28/2023 07:20 AM    ALTSGPT 18 11/28/2023 07:20 AM    TBILIRUBIN 0.8 11/28/2023 07:20 AM      No results found for: \"BNPBTYPENAT\"   Lab Results   Component Value Date/Time    TSH 2.170 02/20/2013 07:20 AM     Lab Results   Component Value Date/Time    PROTHROMBTM 14.8 (H) 12/05/2018 07:00 AM    INR 1.17 (H) 12/05/2018 07:00 AM        Imaging reviewed           Assessment & Plan     1. Status post coronary artery stent placement        2. Coronary artery disease due to lipid rich plaque  nitroglycerin (NITROSTAT) 0.4 " MG SL Tab      3. Dyslipidemia        4. Elevated CPK        5. Essential hypertension            Medical Decision Making: Today's Assessment/Status/Plan:          Doing well from a cardiac standpoint.  Goal LDL less than 70 closer to 50.  We discussed that there are no long-term outcomes data with his current regimen but that it is promising and he is tolerating it well.  With regard to his CPK and statin, it is more likely this was related to the trauma of his coincident surgery but could be a late developing statin myopathy due to metabolism changes with age.  I defer management to the lipid clinic but a reasonable trial of restarting lipid therapy with statins due to their longstanding safety and outcomes data as well as lower cost which is an issue for him with his current treatment, is very appropriate with close monitoring of CPKs once he has recovered from his more recent surgery.  Nitroglycerin refilled per his request he does not use this and has no exertional chest discomfort or symptoms.  Continue with medical therapy follow-up routinely.

## 2024-05-15 ENCOUNTER — OFFICE VISIT (OUTPATIENT)
Dept: MEDICAL GROUP | Facility: PHYSICIAN GROUP | Age: 74
End: 2024-05-15
Payer: MEDICARE

## 2024-05-15 VITALS
BODY MASS INDEX: 30.93 KG/M2 | OXYGEN SATURATION: 95 % | HEART RATE: 80 BPM | SYSTOLIC BLOOD PRESSURE: 128 MMHG | DIASTOLIC BLOOD PRESSURE: 60 MMHG | TEMPERATURE: 96.6 F | HEIGHT: 71 IN | WEIGHT: 220.9 LBS | RESPIRATION RATE: 16 BRPM

## 2024-05-15 DIAGNOSIS — R73.09 ELEVATED HEMOGLOBIN A1C: ICD-10-CM

## 2024-05-15 DIAGNOSIS — F41.1 GENERALIZED ANXIETY DISORDER: ICD-10-CM

## 2024-05-15 DIAGNOSIS — E78.5 DYSLIPIDEMIA: ICD-10-CM

## 2024-05-15 DIAGNOSIS — Z23 NEED FOR TETANUS, DIPHTHERIA, AND ACELLULAR PERTUSSIS (TDAP) VACCINE: ICD-10-CM

## 2024-05-15 DIAGNOSIS — I10 ESSENTIAL HYPERTENSION, BENIGN: ICD-10-CM

## 2024-05-15 DIAGNOSIS — F51.01 PRIMARY INSOMNIA: ICD-10-CM

## 2024-05-15 DIAGNOSIS — K51.00 ULCERATIVE PANCOLITIS (HCC): ICD-10-CM

## 2024-05-15 DIAGNOSIS — M17.12 TRICOMPARTMENT OSTEOARTHRITIS OF LEFT KNEE: ICD-10-CM

## 2024-05-15 PROBLEM — T81.30XA WOUND DEHISCENCE: Status: RESOLVED | Noted: 2024-02-13 | Resolved: 2024-05-15

## 2024-05-15 PROCEDURE — G2211 COMPLEX E/M VISIT ADD ON: HCPCS | Performed by: INTERNAL MEDICINE

## 2024-05-15 PROCEDURE — 99214 OFFICE O/P EST MOD 30 MIN: CPT | Mod: 25 | Performed by: INTERNAL MEDICINE

## 2024-05-15 PROCEDURE — 3074F SYST BP LT 130 MM HG: CPT | Performed by: INTERNAL MEDICINE

## 2024-05-15 PROCEDURE — 90715 TDAP VACCINE 7 YRS/> IM: CPT | Performed by: INTERNAL MEDICINE

## 2024-05-15 PROCEDURE — 3078F DIAST BP <80 MM HG: CPT | Performed by: INTERNAL MEDICINE

## 2024-05-15 PROCEDURE — 90471 IMMUNIZATION ADMIN: CPT | Performed by: INTERNAL MEDICINE

## 2024-05-15 RX ORDER — ALPRAZOLAM 0.5 MG/1
0.5 TABLET ORAL NIGHTLY PRN
Qty: 90 TABLET | Refills: 0 | Status: SHIPPED | OUTPATIENT
Start: 2024-05-15 | End: 2024-08-13

## 2024-05-15 ASSESSMENT — FIBROSIS 4 INDEX: FIB4 SCORE: 1.11

## 2024-05-15 NOTE — ASSESSMENT & PLAN NOTE
Chronic and stable problem, blood pressure well-controlled on current medical therapy, continue amlodipine-benazepril 5-10 mg daily.

## 2024-05-15 NOTE — PROGRESS NOTES
Subjective:   Chief Complaint/History of Present Illness:  Curt Blair is a 73 y.o. male established patient who presents today to discuss medical problems as listed below. Curt is unaccompanied for today's visit.    Problem   Ulcerative Pancolitis (Hcc)    Diarrhea started distinctly on 4/29/2022. He went out to dinner for his daughter's birthday to a [employer] restaurant where he had cooked shrimp. He also had his 4th Covid shot on 4/25/2022.      Normally he has 2 BM's every morning and then 1 BM in the evening or overnight. He is s/p open cholecystectomy around 1997. No issues with dumping syndrome.      He developed multiple episodes of watery diarrhea. He started peptobismol which slowed down the diarrhea then imodium which stopped the diarrhea but only short term when he took the medicine.      He notes prior episode of diverticulitis in 2019. He had exposure to amoxicillin from dental procedures around December and again in April. No prior episodes of Cdiff. Due to elevated inflammation markers and long delay to get into GI we started him empirically on budesonide which led to immediate relief of symptoms. He saw GI who performed colonoscopy which was concerning for inflammatory bowel disease versus indeterminate colitis (secondary to NSAIDs or duloxetine?). No hematochezia through this episode.     Colonoscopy (10/2022) with active colitis and GI recommendations from Dr. Francois- inflammation from your colon is most likely due to inflammatory bowel disease.       He was initially on budesonide and started on mesalamine, now on maintenance therapy with mesalamine.     Current regimen: Mesalamine 4.8 g daily     Tricompartment Osteoarthritis of Left Knee    He is s/p left total knee arthroplasty with Dr. Pineda.  Surgery went well, minor skin infection around the incisional site treated with a week of Bactrim which has since resolved.    He did require revision of the previous left knee surgery April  29 with good results.     Primary Insomnia    He developed generalized anxiety at bedtime around the time of his wife's passing.  She  in 2016 from complications of COPD.  He has never slept 7 or 8 hours per night, he normally averages 4 to 5 hours per evening which is sufficient for him.  His main challenge was initiation of sleep.  He started with over-the-counter attempts with melatonin and Benadryl with no significant improvement.  He was then started on low-dose alprazolam 0.25 to 0.5 mg nightly.  Prior to the COVID-19 pandemic he was using this 3-4 nights per week.  Due to increased stress related to the pandemic he has been using it more regularly.  He will still try to use the lower dose but will sometimes require the second dose.  He understands the inherent risk with chronic use of benzodiazepines including cognitive impairment as well as withdrawal symptoms and those with regular use.  He feels it is helpful and does not have any negative sequelae such as morning grogginess or gait disturbance.  He would like to continue with this strategy, he makes a 90 days prescription last approximately 1 year.    Current regimen: alprazolam 0.5 mg nightly as needed     Elevated Hemoglobin A1c     Latest Reference Range & Units 23 08:10 24 10:28   Glycohemoglobin 5.8 % 6.7 (H) 6.4 !     He has a history of prediabetes.  He reports it has been present for a long time and has never progressed.  No significant side effect such as blurred vision, polyuria, or polydipsia.  No prior use of glucose lowering medications.    In 2023 A1c became elevated, he has had multiple rounds of budesonide for colitis as well as steroid injections from orthopedics.     Generalized Anxiety Disorder    He developed generalized anxiety at bedtime around the time of his wife's passing.  She  in 2016 from complications of COPD.  He has never slept 7 or 8 hours per night, he normally averages 4 to 5 hours per evening  which is sufficient for him.  His main challenge was initiation of sleep.  He started with over-the-counter attempts with melatonin and Benadryl with no significant improvement.  He was then started on low-dose alprazolam 0.25 to 0.5 mg nightly.  Prior to the COVID-19 pandemic he was using this 3-4 nights per week.  Due to increased stress related to the pandemic he has been using it more regularly.  He will still try to use the lower dose but will sometimes require the second dose.  He understands the inherent risk with chronic use of benzodiazepines including cognitive impairment as well as withdrawal symptoms and those with regular use.  He feels it is helpful and does not have any negative sequelae such as morning grogginess or gait disturbance.  He would like to continue with this strategy.     Dyslipidemia     Latest Reference Range & Units 11/28/23 07:20   Cholesterol,Tot 100 - 199 mg/dL 123   Triglycerides 0 - 149 mg/dL 102   HDL >=40 mg/dL 58   LDL <100 mg/dL 45   Lipoprotein (a) <=29 mg/dL 9     He had elevations in his CPK following increase in statin medicine which recurred, advised to go off statin therapy and now on Nexlizet.  After discussion with pharmacist and cardiology he plans to titrate off the Nexlizet and go back on atorvastatin which he tolerated well as the CPK elevation was likely related to surgery.    Current regimen: Nexlizet 180-10 mg daily  Previous regimen: atorvastatin 10 mg daily     Essential Hypertension, Benign      He reports longstanding history of hypertension.  It has been well controlled on same regiment for many years.  No signs or symptoms of orthostasis.  No angina equivalents.  He follows with cardiology on an annual basis.    Current regimen: amlodipine-benazepril 5-10 mg daily    Blood pressure in clinic ranges 108-128/60-86     Wound Dehiscence (Resolved)    He unfortunately sustained minimal wound dehiscence following right TKA in Jan 2024. He is following with  "orthopedics and currently holding of PT and taking Keflex until the wound closes.           Current Medications:  Current Outpatient Medications Ordered in Epic   Medication Sig Dispense Refill    ALPRAZolam (XANAX) 0.5 MG Tab Take 1 Tablet by mouth at bedtime as needed for Sleep for up to 90 days. 90 Tablet 0    nitroglycerin (NITROSTAT) 0.4 MG SL Tab Place 1 Tablet under the tongue as needed for Chest Pain. 25 Tablet 11    mesalamine (LIALDA) 1.2 GM Tablet Delayed Response Take 4 Tablets by mouth every day. 400 Tablet 3    DULoxetine (CYMBALTA) 60 MG Cap DR Particles delayed-release capsule Take 1 Capsule by mouth every day. 100 Capsule 3    diclofenac DR (VOLTAREN) 75 MG Tablet Delayed Response Take 1 Tablet by mouth 2 times a day. 200 Tablet 3    CALCIUM-VITAMIN D PO Take  by mouth.      Bempedoic Acid-Ezetimibe (NEXLIZET) 180-10 MG Tab Take 1 Tablet by mouth every day. 30 Tablet 11    amoxicillin (AMOXIL) 500 MG Cap Take 2,000 mg by mouth one time as needed for Other. 1 hour before dental procedures      amlodipine-benazepril (LOTREL) 5-10 MG per capsule TAKE 1 CAPSULE BY MOUTH EVERY  Capsule 2    Coenzyme Q10 300 MG Cap Take 1 Capsule by mouth every day. 30 Capsule     Multiple Vitamins-Minerals (MULTIVITAMIN PO) Take 1 Tab by mouth every morning.      aspirin 81 MG tablet Take 81 mg by mouth every day.         No current Epic-ordered facility-administered medications on file.          Objective:   Physical Exam:    Vitals: /60 (BP Location: Right arm, Patient Position: Sitting, BP Cuff Size: Adult)   Pulse 80   Temp 35.9 °C (96.6 °F) (Temporal)   Resp 16   Ht 1.803 m (5' 11\")   Wt 100 kg (220 lb 14.4 oz)   SpO2 95%    BMI: Body mass index is 30.81 kg/m².  Physical Exam  Constitutional:       General: He is not in acute distress.     Appearance: Normal appearance. He is not ill-appearing.   HENT:      Right Ear: External ear normal.      Left Ear: External ear normal.   Eyes:      General: " No scleral icterus.     Conjunctiva/sclera: Conjunctivae normal.   Cardiovascular:      Rate and Rhythm: Normal rate and regular rhythm.      Pulses: Normal pulses.   Pulmonary:      Effort: Pulmonary effort is normal. No respiratory distress.      Breath sounds: Normal breath sounds. No wheezing or rhonchi.   Musculoskeletal:      Comments: Steri-strips over left knee incision, well approximated, clean/dry/intact   Skin:     General: Skin is warm and dry.      Comments: Incision is c/d/i   Psychiatric:         Mood and Affect: Mood normal.         Behavior: Behavior normal.         Thought Content: Thought content normal.         Judgment: Judgment normal.               Assessment and Plan:   Curt is a 73 y.o. male with the following:  Problem List Items Addressed This Visit       Dyslipidemia     Chronic and ongoing problem, prefer to get back on Lipitor 10 mg daily due to significant cost of the Nexlizet, will wait until he is further out from his knee surgery and then will plan to retrial.         Relevant Orders    CBC WITH DIFFERENTIAL    VITAMIN D,25 HYDROXY (DEFICIENCY)    VITAMIN B12    TSH WITH REFLEX TO FT4    Elevated hemoglobin A1c     Chronic ongoing issue, will continue following A1c closely to ensure stability, no indication for pharmacotherapy at this time.         Relevant Orders    HEMOGLOBIN A1C    Essential hypertension, benign     Chronic and stable problem, blood pressure well-controlled on current medical therapy, continue amlodipine-benazepril 5-10 mg daily.         Generalized anxiety disorder     Chronic stable problem, continues to utilize alprazolam 0.25 to 0.5 mg 2-4 nights per week.  No deleterious side effects.  Over-the-counter sleep aids had led to other unwanted side effects.  Also maintains on maintenance with duloxetine 60 mg daily.  He understands ongoing risk with chronic benzodiazepine use.    Obtained and reviewed patient utilization report from [facility] pharmacy  "database on 5/15/2024 2:57 PM  prior to writing prescription for controlled substance II, III or IV per [place] bill . Based on assessment of the report, the prescription is medically necessary.     Patient understands this prescription is a controlled substance which is potentially habit-forming and its use is regulated by the [employer]. We also discussed the new \"black box\" warning regarding the lethal combination of opioids and benzodiazepines. Refills are subject to terms of a controlled substance agreement and patient has an updated one on file. Most recent UDS is appropriate. Any refill requires an office visit. Narcotics have may adverse effects and the risks of addiction, accidental overdose and death were emphasized. Provided prescriptions for the next three months.           Relevant Medications    ALPRAZolam (XANAX) 0.5 MG Tab    Primary insomnia     Chronic stable problem, continues to utilize alprazolam 0.25 to 0.5 mg 2-4 nights per week.  No deleterious side effects.  Over-the-counter sleep aids had led to other unwanted side effects.  Also maintains on maintenance with duloxetine 60 mg daily.  He understands ongoing risk with chronic benzodiazepine use.    Obtained and reviewed patient utilization report from [facility] pharmacy database on 5/15/2024 2:57 PM  prior to writing prescription for controlled substance II, III or IV per [place] bill . Based on assessment of the report, the prescription is medically necessary.     Patient understands this prescription is a controlled substance which is potentially habit-forming and its use is regulated by the [employer]. We also discussed the new \"black box\" warning regarding the lethal combination of opioids and benzodiazepines. Refills are subject to terms of a controlled substance agreement and patient has an updated one on file. Most recent UDS is appropriate. Any refill requires an office visit. Narcotics have may adverse effects and the risks of " addiction, accidental overdose and death were emphasized. Provided prescriptions for the next three months.           Relevant Medications    ALPRAZolam (XANAX) 0.5 MG Tab    Other Relevant Orders    Controlled Substance Treatment Agreement    Tricompartment osteoarthritis of left knee     Chronic improved problem, status post total knee arthroplasty which did require revision at the end of April, he has had good results and no further treatments will be needed.  Plans to get back into PT later this week.         Ulcerative pancolitis (HCC)     Chronic and ongoing issue, plans to recheck stool calprotectin level to follow-up on elevations, symptom wise he feels great and feels like his stools are back to normal.  Continues on mesalamine 4.8 g daily, had some issues with cost when his prescription was switched inadvertently from capsule to tablet.          Other Visit Diagnoses       Need for tetanus, diphtheria, and acellular pertussis (Tdap) vaccine        Relevant Orders    Tdap =>6yo IM (Completed)           Overall chronic medical conditions are stable, he is 2 weeks out from his left knee revision and will start with PT soon, no longer on opioids.  We will add back cholesterol medicine once he is further out from surgery in the form of Lipitor 10 mg daily.  He will get lab work and stool studies next week so we can follow-up on kidneys, liver, blood counts, etc.  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    RTC: Return in about 3 months (around 8/15/2024).    I spent a total of 32 minutes with record review, exam, communication with the patient, communication with other providers, and documentation of this encounter.    PLEASE NOTE: This dictation was created using voice recognition software. I have made every reasonable attempt to correct obvious errors, but I expect that there are errors of grammar and possibly content that I did not discover before finalizing the  note.      Shila Mullen,   Geriatric and Internal Medicine  [facility]

## 2024-05-15 NOTE — ASSESSMENT & PLAN NOTE
Chronic ongoing issue, will continue following A1c closely to ensure stability, no indication for pharmacotherapy at this time.

## 2024-05-15 NOTE — ASSESSMENT & PLAN NOTE
Chronic improved problem, status post total knee arthroplasty which did require revision at the end of April, he has had good results and no further treatments will be needed.  Plans to get back into PT later this week.

## 2024-05-15 NOTE — ASSESSMENT & PLAN NOTE
"Chronic stable problem, continues to utilize alprazolam 0.25 to 0.5 mg 2-4 nights per week.  No deleterious side effects.  Over-the-counter sleep aids had led to other unwanted side effects.  Also maintains on maintenance with duloxetine 60 mg daily.  He understands ongoing risk with chronic benzodiazepine use.    Obtained and reviewed patient utilization report from [facility] pharmacy database on 5/15/2024 2:57 PM  prior to writing prescription for controlled substance II, III or IV per [place] bill . Based on assessment of the report, the prescription is medically necessary.     Patient understands this prescription is a controlled substance which is potentially habit-forming and its use is regulated by the [employer]. We also discussed the new \"black box\" warning regarding the lethal combination of opioids and benzodiazepines. Refills are subject to terms of a controlled substance agreement and patient has an updated one on file. Most recent UDS is appropriate. Any refill requires an office visit. Narcotics have may adverse effects and the risks of addiction, accidental overdose and death were emphasized. Provided prescriptions for the next three months.    "

## 2024-05-15 NOTE — ASSESSMENT & PLAN NOTE
Chronic and ongoing issue, plans to recheck stool calprotectin level to follow-up on elevations, symptom wise he feels great and feels like his stools are back to normal.  Continues on mesalamine 4.8 g daily, had some issues with cost when his prescription was switched inadvertently from capsule to tablet.

## 2024-05-15 NOTE — ASSESSMENT & PLAN NOTE
Chronic and ongoing problem, prefer to get back on Lipitor 10 mg daily due to significant cost of the Nexlizet, will wait until he is further out from his knee surgery and then will plan to retrial.

## 2024-05-20 ENCOUNTER — TELEPHONE (OUTPATIENT)
Dept: HEALTH INFORMATION MANAGEMENT | Facility: OTHER | Age: 74
End: 2024-05-20
Payer: MEDICARE

## 2024-06-03 DIAGNOSIS — I10 ESSENTIAL HYPERTENSION, BENIGN: ICD-10-CM

## 2024-06-04 RX ORDER — AMLODIPINE BESYLATE AND BENAZEPRIL HYDROCHLORIDE 5; 10 MG/1; MG/1
1 CAPSULE ORAL
Qty: 100 CAPSULE | Refills: 2 | Status: SHIPPED | OUTPATIENT
Start: 2024-06-04

## 2024-06-04 NOTE — TELEPHONE ENCOUNTER
Is the patient due for a refill? Yes    Was the patient seen the past year? Yes    Date of last office visit: [id number]    Does the patient have an upcoming appointment?  No      Provider to refill:TW    Does the patients insurance require a 100 day supply?  Yes

## 2024-06-06 ENCOUNTER — HOSPITAL ENCOUNTER (OUTPATIENT)
Dept: LAB | Facility: MEDICAL CENTER | Age: 74
End: 2024-06-06
Attending: NURSE PRACTITIONER
Payer: MEDICARE

## 2024-06-06 DIAGNOSIS — R73.09 ELEVATED HEMOGLOBIN A1C: ICD-10-CM

## 2024-06-06 DIAGNOSIS — I25.83 CORONARY ARTERY DISEASE DUE TO LIPID RICH PLAQUE: ICD-10-CM

## 2024-06-06 DIAGNOSIS — I25.10 CORONARY ARTERY DISEASE DUE TO LIPID RICH PLAQUE: ICD-10-CM

## 2024-06-06 DIAGNOSIS — E78.5 DYSLIPIDEMIA: ICD-10-CM

## 2024-06-06 DIAGNOSIS — Z95.5 STATUS POST CORONARY ARTERY STENT PLACEMENT: ICD-10-CM

## 2024-06-06 LAB
25(OH)D3 SERPL-MCNC: 29 NG/ML (ref 30–100)
ALBUMIN SERPL BCP-MCNC: 4 G/DL (ref 3.2–4.9)
ALBUMIN/GLOB SERPL: 1.1 G/DL
ALP SERPL-CCNC: 73 U/L (ref 30–99)
ALT SERPL-CCNC: 13 U/L (ref 2–50)
ANION GAP SERPL CALC-SCNC: 11 MMOL/L (ref 7–16)
AST SERPL-CCNC: 16 U/L (ref 12–45)
BASOPHILS # BLD AUTO: 0.8 % (ref 0–1.8)
BASOPHILS # BLD: 0.06 K/UL (ref 0–0.12)
BILIRUB SERPL-MCNC: 0.4 MG/DL (ref 0.1–1.5)
BUN SERPL-MCNC: 30 MG/DL (ref 8–22)
CALCIUM ALBUM COR SERPL-MCNC: 9.2 MG/DL (ref 8.5–10.5)
CALCIUM SERPL-MCNC: 9.2 MG/DL (ref 8.4–10.2)
CHLORIDE SERPL-SCNC: 100 MMOL/L (ref 96–112)
CHOLEST SERPL-MCNC: 116 MG/DL (ref 100–199)
CO2 SERPL-SCNC: 24 MMOL/L (ref 20–33)
CREAT SERPL-MCNC: 0.99 MG/DL (ref 0.5–1.4)
CRP SERPL HS-MCNC: 1.13 MG/DL (ref 0–0.75)
EOSINOPHIL # BLD AUTO: 0.07 K/UL (ref 0–0.51)
EOSINOPHIL NFR BLD: 1 % (ref 0–6.9)
ERYTHROCYTE [DISTWIDTH] IN BLOOD BY AUTOMATED COUNT: 46.5 FL (ref 35.9–50)
EST. AVERAGE GLUCOSE BLD GHB EST-MCNC: 143 MG/DL
FASTING STATUS PATIENT QL REPORTED: NORMAL
GFR SERPLBLD CREATININE-BSD FMLA CKD-EPI: 80 ML/MIN/1.73 M 2
GLOBULIN SER CALC-MCNC: 3.7 G/DL (ref 1.9–3.5)
GLUCOSE SERPL-MCNC: 105 MG/DL (ref 65–99)
HBA1C MFR BLD: 6.6 % (ref 4–5.6)
HCT VFR BLD AUTO: 42.4 % (ref 42–52)
HDLC SERPL-MCNC: 50 MG/DL
HGB BLD-MCNC: 13.9 G/DL (ref 14–18)
IMM GRANULOCYTES # BLD AUTO: 0.03 K/UL (ref 0–0.11)
IMM GRANULOCYTES NFR BLD AUTO: 0.4 % (ref 0–0.9)
LDLC SERPL CALC-MCNC: 43 MG/DL
LYMPHOCYTES # BLD AUTO: 0.97 K/UL (ref 1–4.8)
LYMPHOCYTES NFR BLD: 13.7 % (ref 22–41)
MCH RBC QN AUTO: 28.8 PG (ref 27–33)
MCHC RBC AUTO-ENTMCNC: 32.8 G/DL (ref 32.3–36.5)
MCV RBC AUTO: 88 FL (ref 81.4–97.8)
MONOCYTES # BLD AUTO: 0.94 K/UL (ref 0–0.85)
MONOCYTES NFR BLD AUTO: 13.3 % (ref 0–13.4)
NEUTROPHILS # BLD AUTO: 4.99 K/UL (ref 1.82–7.42)
NEUTROPHILS NFR BLD: 70.8 % (ref 44–72)
NRBC # BLD AUTO: 0 K/UL
NRBC BLD-RTO: 0 /100 WBC (ref 0–0.2)
PLATELET # BLD AUTO: 404 K/UL (ref 164–446)
PMV BLD AUTO: 9.9 FL (ref 9–12.9)
POTASSIUM SERPL-SCNC: 5.3 MMOL/L (ref 3.6–5.5)
PROT SERPL-MCNC: 7.7 G/DL (ref 6–8.2)
RBC # BLD AUTO: 4.82 M/UL (ref 4.7–6.1)
SODIUM SERPL-SCNC: 135 MMOL/L (ref 135–145)
TRIGL SERPL-MCNC: 114 MG/DL (ref 0–149)
TSH SERPL DL<=0.005 MIU/L-ACNC: 3.33 UIU/ML (ref 0.38–5.33)
VIT B12 SERPL-MCNC: 585 PG/ML (ref 211–911)
WBC # BLD AUTO: 7.1 K/UL (ref 4.8–10.8)

## 2024-06-06 PROCEDURE — 85025 COMPLETE CBC W/AUTO DIFF WBC: CPT

## 2024-06-06 PROCEDURE — 80061 LIPID PANEL: CPT

## 2024-06-06 PROCEDURE — 80053 COMPREHEN METABOLIC PANEL: CPT

## 2024-06-06 PROCEDURE — 82306 VITAMIN D 25 HYDROXY: CPT

## 2024-06-06 PROCEDURE — 82607 VITAMIN B-12: CPT

## 2024-06-06 PROCEDURE — 36415 COLL VENOUS BLD VENIPUNCTURE: CPT

## 2024-06-06 PROCEDURE — 86140 C-REACTIVE PROTEIN: CPT

## 2024-06-06 PROCEDURE — 83036 HEMOGLOBIN GLYCOSYLATED A1C: CPT

## 2024-06-06 PROCEDURE — 84443 ASSAY THYROID STIM HORMONE: CPT

## 2024-06-07 ENCOUNTER — HOSPITAL ENCOUNTER (OUTPATIENT)
Facility: MEDICAL CENTER | Age: 74
End: 2024-06-07
Attending: PHYSICIAN ASSISTANT
Payer: MEDICARE

## 2024-06-07 PROCEDURE — 83993 ASSAY FOR CALPROTECTIN FECAL: CPT

## 2024-06-10 LAB — CALPROTECTIN STL-MCNT: 1110 UG/G

## 2024-06-13 ENCOUNTER — OFFICE VISIT (OUTPATIENT)
Dept: MEDICAL GROUP | Facility: MEDICAL CENTER | Age: 74
End: 2024-06-13
Payer: MEDICARE

## 2024-06-13 VITALS
DIASTOLIC BLOOD PRESSURE: 63 MMHG | SYSTOLIC BLOOD PRESSURE: 117 MMHG | HEART RATE: 75 BPM | OXYGEN SATURATION: 94 % | BODY MASS INDEX: 29.85 KG/M2 | WEIGHT: 214 LBS

## 2024-06-13 DIAGNOSIS — E78.5 DYSLIPIDEMIA: ICD-10-CM

## 2024-06-13 PROCEDURE — 99211 OFF/OP EST MAY X REQ PHY/QHP: CPT | Performed by: STUDENT IN AN ORGANIZED HEALTH CARE EDUCATION/TRAINING PROGRAM

## 2024-06-13 PROCEDURE — 3074F SYST BP LT 130 MM HG: CPT | Performed by: STUDENT IN AN ORGANIZED HEALTH CARE EDUCATION/TRAINING PROGRAM

## 2024-06-13 PROCEDURE — 3078F DIAST BP <80 MM HG: CPT | Performed by: STUDENT IN AN ORGANIZED HEALTH CARE EDUCATION/TRAINING PROGRAM

## 2024-06-13 RX ORDER — ATORVASTATIN CALCIUM 10 MG/1
10 TABLET, FILM COATED ORAL NIGHTLY
Qty: 90 TABLET | Refills: 4 | Status: SHIPPED | OUTPATIENT
Start: 2024-06-13

## 2024-06-13 ASSESSMENT — FIBROSIS 4 INDEX: FIB4 SCORE: 0.81

## 2024-06-13 NOTE — PROGRESS NOTES
[facility] - FollowUp Visit    START: 9:33 am  END: 9:57 am    Curt Blair is here for follow up of dyslipidemia.    Subjective    HPI  Pertinent Interval History since last visit:   Pt complaining of cost of Nexlizet - he is in donut hole with mesalamine + nexlizet prescriptions.   Current Prescription Lipid Lowering Medications - including dose:   Statin: None  Non-Statin: Nexlizet 180-10 mg once daily  Current Lipid Lowering and Related Supplements:   CoQ10 + vitamin D, Metamucil  Any Current Side Effects Potentially Related to Lipid Lowering therapy?   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 Also noted elevated CPK along w/ myopathy.  Baseline Lipids Prior to Treatment:  Unknown or unavailable    SOCIAL HISTORY  Social History     Tobacco Use   Smoking Status Former    Current packs/day: 0.00    Average packs/day: 2.0 packs/day for 25.0 years (50.0 ttl pk-yrs)    Types: Cigarettes    Start date: 1967    Quit date: 1992    Years since quittin.4   Smokeless Tobacco Never   Tobacco Comments    avoid all tobacco products      Change in weight: Pt continues to lose weight     Exercise habits: Recent TKA on both knees  Diet: Unchanged from previous -- DASH diet, one steak once weekly, cut back on carbs since last visit. Working to decrease portions. Trying to consume more fiber on a daily basis.      Objective    Vitals:    24 0953   BP: 117/63   Pulse: 75   SpO2: 94%   Weight: 97.1 kg (214 lb)        Physical Exam    DATA REVIEW  Most Recent Lipid Panel:   Lab Results   Component Value Date    CHOLSTRLTOT 116 2024    TRIGLYCERIDE 114 2024    HDL 50 2024    LDL 43 2024       Other Pertinent Blood Work:   Lab Results   Component Value Date     SODIUM 135 06/06/2024    POTASSIUM 5.3 06/06/2024    CHLORIDE 100 06/06/2024    CO2 24 06/06/2024    ANION 11.0 06/06/2024    GLUCOSE 105 (H) 06/06/2024    BUN 30 (H) 06/06/2024    CREATININE 0.99 06/06/2024    CALCIUM 9.2 06/06/2024    ASTSGOT 16 06/06/2024    ALTSGPT 13 06/06/2024    ALKPHOSPHAT 73 06/06/2024    TBILIRUBIN 0.4 06/06/2024    ALBUMIN 4.0 06/06/2024    AGRATIO 1.1 06/06/2024    CREACTPROT 1.13 (H) 06/06/2024    LIPOPROTA 9 11/28/2023    TSHULTRASEN 3.330 06/06/2024    CPKTOTAL 637 (H) 09/20/2023       Other:  NA    Recent Imaging Studies:    None since last visit      ASSESSMENT AND PLAN  Patient Type, check all that apply:   Secondary Prevention  Established Atherosclerotic Cardiovascular Disease (ASCVD)  Yes, Details: MI at the age of 41 that required atherectomy at [place]. 2nd event noted in his late 50's for which he underwent PCI to RCA.  Other Established (non-atherosclerotic) Vascular Disease, if Present:    None  Evidence of Heterozygous Familial Hypercholesterolemia (FH): Possible. Pt's father and grandfather had MI in their 60's  ACC/AHA Indication for Statin Therapy, mehdi all that apply:   Established ASCVD: Indication for High intensity statin    Calculated Risk for ASCVD, if applicable:  N/A  Other Significant Risk Markers, if any, mehdi all that apply:  Family history of premature ASCVD in first degree relative   Latest Reference Range & Units 11/28/23 07:20   Lipoprotein (a) <=29 mg/dL 9     National Lipid Association (NLA) Goal (if applicable):  LDL-C:   <70 mg/dL  Lifestyle Recommendations From Today’s Visit:   Eating Plan: Concentrate on  DASH/Med Style diet, Exercise: Increase as tolerated, and Weight Management: Continue to work to decrease  Statin Recommendations from Today's Visit  Re-challenge Atorvastatin 10 mg daily  Non-Statin Medications Recommendations from Today’s Visit:   STOP Nexlizet 180-10 mg once daily  Indication for PCSK9 Inhibitor, if applicable:  Not  currently indicated  Supplements Recommended at this visit:  Continue CoQ10 once daily  Recommendations for Other Cardiovascular Risk Factors, mehdi all that apply:   Hypertension- Managed by PCP/Cards and Diabetes/Impaired Fasting Glucose- Managed by PCP  Other Issues:  Patient complains of cost issues with Nexlizet - would like to retrial Atorvastatin. Previous history of elevated CPK as well as current elevations to inflammatory markers (CRP/Calprotectin). Will have patient obtain repeat CPK to help correlate with statin induced vs baseline.   May need higher dose of atorvastatin but will start with conservative dose to reduce risk for side effects CESIA's.    Studies Ordered at Todays Visit:  None   Blood Work Ordered At Today’s visit:   lipid panel, CPK  Follow-Up:   2 months    Amee Smith PharmD    CC:  SRIKANTH Jones, Iván SEGOVIA, *

## 2024-06-14 ENCOUNTER — HOSPITAL ENCOUNTER (OUTPATIENT)
Dept: LAB | Facility: MEDICAL CENTER | Age: 74
End: 2024-06-14
Attending: INTERNAL MEDICINE
Payer: MEDICARE

## 2024-06-14 DIAGNOSIS — E78.5 DYSLIPIDEMIA: ICD-10-CM

## 2024-06-14 LAB — CK SERPL-CCNC: 402 U/L (ref 0–154)

## 2024-06-14 PROCEDURE — 82550 ASSAY OF CK (CPK): CPT

## 2024-06-14 PROCEDURE — 36415 COLL VENOUS BLD VENIPUNCTURE: CPT

## 2024-06-17 ENCOUNTER — APPOINTMENT (OUTPATIENT)
Dept: LAB | Facility: MEDICAL CENTER | Age: 74
End: 2024-06-17
Payer: MEDICARE

## 2024-06-19 PROBLEM — S83.106A: Status: ACTIVE | Noted: 2024-06-19

## 2024-06-19 ASSESSMENT — FIBROSIS 4 INDEX: FIB4 SCORE: 0.81

## 2024-06-28 ENCOUNTER — APPOINTMENT (OUTPATIENT)
Dept: ADMISSIONS | Facility: MEDICAL CENTER | Age: 74
DRG: 468 | End: 2024-06-28
Attending: ORTHOPAEDIC SURGERY
Payer: MEDICARE

## 2024-07-03 ENCOUNTER — HOSPITAL ENCOUNTER (OUTPATIENT)
Dept: LAB | Facility: MEDICAL CENTER | Age: 74
End: 2024-07-03
Attending: INTERNAL MEDICINE
Payer: MEDICARE

## 2024-07-03 DIAGNOSIS — E78.5 DYSLIPIDEMIA: ICD-10-CM

## 2024-07-03 LAB
CHOLEST SERPL-MCNC: 136 MG/DL (ref 100–199)
CK SERPL-CCNC: 352 U/L (ref 0–154)
FASTING STATUS PATIENT QL REPORTED: NORMAL
HDLC SERPL-MCNC: 55 MG/DL
LDLC SERPL CALC-MCNC: 63 MG/DL
TRIGL SERPL-MCNC: 91 MG/DL (ref 0–149)

## 2024-07-03 PROCEDURE — 36415 COLL VENOUS BLD VENIPUNCTURE: CPT

## 2024-07-03 PROCEDURE — 82550 ASSAY OF CK (CPK): CPT

## 2024-07-03 PROCEDURE — 80061 LIPID PANEL: CPT

## 2024-07-05 ENCOUNTER — PRE-ADMISSION TESTING (OUTPATIENT)
Dept: ADMISSIONS | Facility: MEDICAL CENTER | Age: 74
DRG: 468 | End: 2024-07-05
Attending: ORTHOPAEDIC SURGERY
Payer: MEDICARE

## 2024-07-05 VITALS — HEIGHT: 71 IN | BODY MASS INDEX: 30.41 KG/M2

## 2024-07-09 NOTE — PROCEDURE: LIQUID NITROGEN
----- Message from Salma Cadet MD sent at 7/9/2024  1:29 PM CDT -----  Inform the patient:     pap is normal    Your pelvic exam will still need to occur once a year!    See you then!    Dr cadet  
Duration Of Freeze Thaw-Cycle (Seconds): 10
Number Of Freeze-Thaw Cycles: 2 freeze-thaw cycles
Consent: The patient's consent was obtained including but not limited to risks of crusting, scabbing, blistering, scarring, darker or lighter pigmentary change, recurrence, incomplete removal and infection. RTC in 2 months if lesion(s) persistent.
Post-Care Instructions: I reviewed with the patient in detail post-care instructions. Patient is to wear sunprotection, and avoid picking at any of the treated lesions. Pt may apply Vaseline to crusted or scabbing areas.
Render Post-Care Instructions In Note?: yes
Detail Level: Detailed
Render Note In Bullet Format When Appropriate: No

## 2024-07-10 ENCOUNTER — APPOINTMENT (OUTPATIENT)
Dept: ADMISSIONS | Facility: MEDICAL CENTER | Age: 74
DRG: 468 | End: 2024-07-10
Attending: ORTHOPAEDIC SURGERY
Payer: MEDICARE

## 2024-07-10 DIAGNOSIS — M17.9 OSTEOARTHRITIS OF KNEE, UNSPECIFIED: ICD-10-CM

## 2024-07-10 LAB
SCCMEC + MECA PNL NOSE NAA+PROBE: NEGATIVE
SCCMEC + MECA PNL NOSE NAA+PROBE: POSITIVE

## 2024-07-10 PROCEDURE — 87640 STAPH A DNA AMP PROBE: CPT

## 2024-07-10 PROCEDURE — 87641 MR-STAPH DNA AMP PROBE: CPT

## 2024-07-15 ENCOUNTER — APPOINTMENT (OUTPATIENT)
Dept: MEDICAL GROUP | Facility: PHYSICIAN GROUP | Age: 74
End: 2024-07-15
Payer: MEDICARE

## 2024-07-15 VITALS
SYSTOLIC BLOOD PRESSURE: 118 MMHG | HEIGHT: 71 IN | RESPIRATION RATE: 16 BRPM | OXYGEN SATURATION: 97 % | BODY MASS INDEX: 30.55 KG/M2 | HEART RATE: 82 BPM | WEIGHT: 218.2 LBS | DIASTOLIC BLOOD PRESSURE: 52 MMHG | TEMPERATURE: 96.9 F

## 2024-07-15 DIAGNOSIS — Z96.652 HISTORY OF TOTAL LEFT KNEE REPLACEMENT: ICD-10-CM

## 2024-07-15 DIAGNOSIS — Z00.00 ENCOUNTER FOR SUBSEQUENT ANNUAL WELLNESS VISIT (AWV) IN MEDICARE PATIENT: Primary | ICD-10-CM

## 2024-07-15 DIAGNOSIS — R74.8 ELEVATED CPK: Chronic | ICD-10-CM

## 2024-07-15 DIAGNOSIS — I10 ESSENTIAL HYPERTENSION, BENIGN: ICD-10-CM

## 2024-07-15 DIAGNOSIS — H61.23 BILATERAL IMPACTED CERUMEN: ICD-10-CM

## 2024-07-15 PROCEDURE — 3078F DIAST BP <80 MM HG: CPT | Performed by: INTERNAL MEDICINE

## 2024-07-15 PROCEDURE — G0439 PPPS, SUBSEQ VISIT: HCPCS | Performed by: INTERNAL MEDICINE

## 2024-07-15 PROCEDURE — 3074F SYST BP LT 130 MM HG: CPT | Performed by: INTERNAL MEDICINE

## 2024-07-15 ASSESSMENT — ENCOUNTER SYMPTOMS: GENERAL WELL-BEING: GOOD

## 2024-07-15 ASSESSMENT — ACTIVITIES OF DAILY LIVING (ADL): BATHING_REQUIRES_ASSISTANCE: 0

## 2024-07-15 ASSESSMENT — PATIENT HEALTH QUESTIONNAIRE - PHQ9: CLINICAL INTERPRETATION OF PHQ2 SCORE: 0

## 2024-07-15 ASSESSMENT — FIBROSIS 4 INDEX: FIB4 SCORE: 0.81

## 2024-07-17 ENCOUNTER — ANESTHESIA EVENT (OUTPATIENT)
Dept: SURGERY | Facility: MEDICAL CENTER | Age: 74
DRG: 468 | End: 2024-07-17
Payer: MEDICARE

## 2024-07-18 ENCOUNTER — ANESTHESIA (OUTPATIENT)
Dept: SURGERY | Facility: MEDICAL CENTER | Age: 74
DRG: 468 | End: 2024-07-18
Payer: MEDICARE

## 2024-07-18 ENCOUNTER — HOSPITAL ENCOUNTER (INPATIENT)
Facility: MEDICAL CENTER | Age: 74
LOS: 1 days | DRG: 468 | End: 2024-07-18
Attending: ORTHOPAEDIC SURGERY | Admitting: ORTHOPAEDIC SURGERY
Payer: MEDICARE

## 2024-07-18 ENCOUNTER — APPOINTMENT (OUTPATIENT)
Dept: RADIOLOGY | Facility: MEDICAL CENTER | Age: 74
DRG: 468 | End: 2024-07-18
Attending: ORTHOPAEDIC SURGERY
Payer: MEDICARE

## 2024-07-18 VITALS
SYSTOLIC BLOOD PRESSURE: 116 MMHG | RESPIRATION RATE: 16 BRPM | HEART RATE: 95 BPM | BODY MASS INDEX: 29.85 KG/M2 | OXYGEN SATURATION: 91 % | HEIGHT: 71 IN | TEMPERATURE: 97.3 F | WEIGHT: 213.19 LBS | DIASTOLIC BLOOD PRESSURE: 49 MMHG

## 2024-07-18 DIAGNOSIS — Z96.652 HISTORY OF TOTAL LEFT KNEE REPLACEMENT: ICD-10-CM

## 2024-07-18 PROCEDURE — 160046 HCHG PACU - 1ST 60 MINS PHASE II: Performed by: ORTHOPAEDIC SURGERY

## 2024-07-18 PROCEDURE — 0SRD0J9 REPLACEMENT OF LEFT KNEE JOINT WITH SYNTHETIC SUBSTITUTE, CEMENTED, OPEN APPROACH: ICD-10-PCS | Performed by: ORTHOPAEDIC SURGERY

## 2024-07-18 PROCEDURE — 700102 HCHG RX REV CODE 250 W/ 637 OVERRIDE(OP): Performed by: ANESTHESIOLOGY

## 2024-07-18 PROCEDURE — 160036 HCHG PACU - EA ADDL 30 MINS PHASE I: Performed by: ORTHOPAEDIC SURGERY

## 2024-07-18 PROCEDURE — 700101 HCHG RX REV CODE 250: Performed by: ANESTHESIOLOGY

## 2024-07-18 PROCEDURE — 160002 HCHG RECOVERY MINUTES (STAT): Performed by: ORTHOPAEDIC SURGERY

## 2024-07-18 PROCEDURE — C1776 JOINT DEVICE (IMPLANTABLE): HCPCS | Performed by: ORTHOPAEDIC SURGERY

## 2024-07-18 PROCEDURE — 700111 HCHG RX REV CODE 636 W/ 250 OVERRIDE (IP): Mod: JZ | Performed by: ANESTHESIOLOGY

## 2024-07-18 PROCEDURE — 0SPD0JZ REMOVAL OF SYNTHETIC SUBSTITUTE FROM LEFT KNEE JOINT, OPEN APPROACH: ICD-10-PCS | Performed by: ORTHOPAEDIC SURGERY

## 2024-07-18 PROCEDURE — A9270 NON-COVERED ITEM OR SERVICE: HCPCS | Performed by: ANESTHESIOLOGY

## 2024-07-18 PROCEDURE — 73560 X-RAY EXAM OF KNEE 1 OR 2: CPT | Mod: LT

## 2024-07-18 PROCEDURE — 700105 HCHG RX REV CODE 258: Performed by: ORTHOPAEDIC SURGERY

## 2024-07-18 PROCEDURE — C1713 ANCHOR/SCREW BN/BN,TIS/BN: HCPCS | Performed by: ORTHOPAEDIC SURGERY

## 2024-07-18 PROCEDURE — 160035 HCHG PACU - 1ST 60 MINS PHASE I: Performed by: ORTHOPAEDIC SURGERY

## 2024-07-18 PROCEDURE — 502000 HCHG MISC OR IMPLANTS RC 0278: Performed by: ORTHOPAEDIC SURGERY

## 2024-07-18 PROCEDURE — 160048 HCHG OR STATISTICAL LEVEL 1-5: Performed by: ORTHOPAEDIC SURGERY

## 2024-07-18 PROCEDURE — 700111 HCHG RX REV CODE 636 W/ 250 OVERRIDE (IP): Performed by: ORTHOPAEDIC SURGERY

## 2024-07-18 PROCEDURE — 700111 HCHG RX REV CODE 636 W/ 250 OVERRIDE (IP): Performed by: ANESTHESIOLOGY

## 2024-07-18 PROCEDURE — 160042 HCHG SURGERY MINUTES - EA ADDL 1 MIN LEVEL 5: Performed by: ORTHOPAEDIC SURGERY

## 2024-07-18 PROCEDURE — 700101 HCHG RX REV CODE 250: Performed by: ORTHOPAEDIC SURGERY

## 2024-07-18 PROCEDURE — 160025 RECOVERY II MINUTES (STATS): Performed by: ORTHOPAEDIC SURGERY

## 2024-07-18 PROCEDURE — 502240 HCHG MISC OR SUPPLY RC 0272: Performed by: ORTHOPAEDIC SURGERY

## 2024-07-18 PROCEDURE — 160009 HCHG ANES TIME/MIN: Performed by: ORTHOPAEDIC SURGERY

## 2024-07-18 PROCEDURE — 306637 HCHG MISC ORTHO ITEM RC 0274

## 2024-07-18 PROCEDURE — 97162 PT EVAL MOD COMPLEX 30 MIN: CPT

## 2024-07-18 PROCEDURE — 160031 HCHG SURGERY MINUTES - 1ST 30 MINS LEVEL 5: Performed by: ORTHOPAEDIC SURGERY

## 2024-07-18 PROCEDURE — 27487 REVISE/REPLACE KNEE JOINT: CPT | Mod: 78,LT | Performed by: ORTHOPAEDIC SURGERY

## 2024-07-18 DEVICE — IMPLANTABLE DEVICE: Type: IMPLANTABLE DEVICE | Site: KNEE | Status: FUNCTIONAL

## 2024-07-18 DEVICE — CEMENT ORTHOPEDIC HV US  (10/PK): Type: IMPLANTABLE DEVICE | Site: KNEE | Status: FUNCTIONAL

## 2024-07-18 DEVICE — STEM CEMENT LEGION STRAIGHT 14MMX120MM: Type: IMPLANTABLE DEVICE | Site: KNEE | Status: FUNCTIONAL

## 2024-07-18 DEVICE — STEM CEMENT LEGION STRAIGHT 12MMX120MM: Type: IMPLANTABLE DEVICE | Site: KNEE | Status: FUNCTIONAL

## 2024-07-18 RX ORDER — OXYCODONE HCL 5 MG/5 ML
10 SOLUTION, ORAL ORAL
Status: COMPLETED | OUTPATIENT
Start: 2024-07-18 | End: 2024-07-18

## 2024-07-18 RX ORDER — CEFAZOLIN SODIUM 1 G/3ML
INJECTION, POWDER, FOR SOLUTION INTRAMUSCULAR; INTRAVENOUS PRN
Status: DISCONTINUED | OUTPATIENT
Start: 2024-07-18 | End: 2024-07-18 | Stop reason: SURG

## 2024-07-18 RX ORDER — OXYCODONE HYDROCHLORIDE 5 MG/1
5 TABLET ORAL
Status: CANCELLED | OUTPATIENT
Start: 2024-07-18

## 2024-07-18 RX ORDER — BISACODYL 10 MG
10 SUPPOSITORY, RECTAL RECTAL
Status: CANCELLED | OUTPATIENT
Start: 2024-07-18

## 2024-07-18 RX ORDER — MIDAZOLAM HYDROCHLORIDE 1 MG/ML
INJECTION INTRAMUSCULAR; INTRAVENOUS PRN
Status: DISCONTINUED | OUTPATIENT
Start: 2024-07-18 | End: 2024-07-18 | Stop reason: SURG

## 2024-07-18 RX ORDER — HYDROMORPHONE HYDROCHLORIDE 1 MG/ML
0.1 INJECTION, SOLUTION INTRAMUSCULAR; INTRAVENOUS; SUBCUTANEOUS
Status: DISCONTINUED | OUTPATIENT
Start: 2024-07-18 | End: 2024-07-18 | Stop reason: HOSPADM

## 2024-07-18 RX ORDER — DIPHENHYDRAMINE HYDROCHLORIDE 50 MG/ML
25 INJECTION INTRAMUSCULAR; INTRAVENOUS EVERY 6 HOURS PRN
Status: CANCELLED | OUTPATIENT
Start: 2024-07-18

## 2024-07-18 RX ORDER — ONDANSETRON 2 MG/ML
4 INJECTION INTRAMUSCULAR; INTRAVENOUS
Status: DISCONTINUED | OUTPATIENT
Start: 2024-07-18 | End: 2024-07-18 | Stop reason: HOSPADM

## 2024-07-18 RX ORDER — HYDRALAZINE HYDROCHLORIDE 20 MG/ML
5 INJECTION INTRAMUSCULAR; INTRAVENOUS
Status: DISCONTINUED | OUTPATIENT
Start: 2024-07-18 | End: 2024-07-18 | Stop reason: HOSPADM

## 2024-07-18 RX ORDER — ONDANSETRON 2 MG/ML
INJECTION INTRAMUSCULAR; INTRAVENOUS PRN
Status: DISCONTINUED | OUTPATIENT
Start: 2024-07-18 | End: 2024-07-18 | Stop reason: SURG

## 2024-07-18 RX ORDER — ONDANSETRON 2 MG/ML
4 INJECTION INTRAMUSCULAR; INTRAVENOUS EVERY 4 HOURS PRN
Status: CANCELLED | OUTPATIENT
Start: 2024-07-18

## 2024-07-18 RX ORDER — SODIUM CHLORIDE, SODIUM LACTATE, POTASSIUM CHLORIDE, CALCIUM CHLORIDE 600; 310; 30; 20 MG/100ML; MG/100ML; MG/100ML; MG/100ML
INJECTION, SOLUTION INTRAVENOUS CONTINUOUS
Status: DISCONTINUED | OUTPATIENT
Start: 2024-07-18 | End: 2024-07-18 | Stop reason: HOSPADM

## 2024-07-18 RX ORDER — DOCUSATE SODIUM 100 MG/1
100 CAPSULE, LIQUID FILLED ORAL 2 TIMES DAILY
Status: CANCELLED | OUTPATIENT
Start: 2024-07-18

## 2024-07-18 RX ORDER — POLYETHYLENE GLYCOL 3350 17 G/17G
1 POWDER, FOR SOLUTION ORAL 2 TIMES DAILY PRN
Status: CANCELLED | OUTPATIENT
Start: 2024-07-18

## 2024-07-18 RX ORDER — OXYCODONE HCL 5 MG/5 ML
5 SOLUTION, ORAL ORAL
Status: COMPLETED | OUTPATIENT
Start: 2024-07-18 | End: 2024-07-18

## 2024-07-18 RX ORDER — TRANEXAMIC ACID 100 MG/ML
INJECTION, SOLUTION INTRAVENOUS PRN
Status: DISCONTINUED | OUTPATIENT
Start: 2024-07-18 | End: 2024-07-18 | Stop reason: SURG

## 2024-07-18 RX ORDER — DEXAMETHASONE SODIUM PHOSPHATE 4 MG/ML
INJECTION, SOLUTION INTRA-ARTICULAR; INTRALESIONAL; INTRAMUSCULAR; INTRAVENOUS; SOFT TISSUE PRN
Status: DISCONTINUED | OUTPATIENT
Start: 2024-07-18 | End: 2024-07-18 | Stop reason: SURG

## 2024-07-18 RX ORDER — AMOXICILLIN 250 MG
1 CAPSULE ORAL
Status: CANCELLED | OUTPATIENT
Start: 2024-07-18

## 2024-07-18 RX ORDER — SCOLOPAMINE TRANSDERMAL SYSTEM 1 MG/1
1 PATCH, EXTENDED RELEASE TRANSDERMAL
Status: CANCELLED | OUTPATIENT
Start: 2024-07-18

## 2024-07-18 RX ORDER — CEFAZOLIN SODIUM 1 G/3ML
2 INJECTION, POWDER, FOR SOLUTION INTRAMUSCULAR; INTRAVENOUS ONCE
Status: DISCONTINUED | OUTPATIENT
Start: 2024-07-18 | End: 2024-07-18 | Stop reason: HOSPADM

## 2024-07-18 RX ORDER — HALOPERIDOL 5 MG/ML
1 INJECTION INTRAMUSCULAR EVERY 6 HOURS PRN
Status: CANCELLED | OUTPATIENT
Start: 2024-07-18

## 2024-07-18 RX ORDER — MAGNESIUM SULFATE HEPTAHYDRATE 40 MG/ML
INJECTION, SOLUTION INTRAVENOUS PRN
Status: DISCONTINUED | OUTPATIENT
Start: 2024-07-18 | End: 2024-07-18 | Stop reason: SURG

## 2024-07-18 RX ORDER — SODIUM CHLORIDE, SODIUM LACTATE, POTASSIUM CHLORIDE, CALCIUM CHLORIDE 600; 310; 30; 20 MG/100ML; MG/100ML; MG/100ML; MG/100ML
INJECTION, SOLUTION INTRAVENOUS CONTINUOUS
Status: ACTIVE | OUTPATIENT
Start: 2024-07-18 | End: 2024-07-18

## 2024-07-18 RX ORDER — HYDROMORPHONE HYDROCHLORIDE 1 MG/ML
0.5 INJECTION, SOLUTION INTRAMUSCULAR; INTRAVENOUS; SUBCUTANEOUS
Status: CANCELLED | OUTPATIENT
Start: 2024-07-18

## 2024-07-18 RX ORDER — OXYCODONE HYDROCHLORIDE 10 MG/1
10 TABLET ORAL
Status: CANCELLED | OUTPATIENT
Start: 2024-07-18

## 2024-07-18 RX ORDER — BUPIVACAINE HYDROCHLORIDE AND EPINEPHRINE 5; 5 MG/ML; UG/ML
INJECTION, SOLUTION EPIDURAL; INTRACAUDAL; PERINEURAL
Status: DISCONTINUED | OUTPATIENT
Start: 2024-07-18 | End: 2024-07-18 | Stop reason: HOSPADM

## 2024-07-18 RX ORDER — HYDROMORPHONE HYDROCHLORIDE 2 MG/ML
INJECTION, SOLUTION INTRAMUSCULAR; INTRAVENOUS; SUBCUTANEOUS PRN
Status: DISCONTINUED | OUTPATIENT
Start: 2024-07-18 | End: 2024-07-18 | Stop reason: SURG

## 2024-07-18 RX ORDER — DEXAMETHASONE SODIUM PHOSPHATE 4 MG/ML
4 INJECTION, SOLUTION INTRA-ARTICULAR; INTRALESIONAL; INTRAMUSCULAR; INTRAVENOUS; SOFT TISSUE
Status: CANCELLED | OUTPATIENT
Start: 2024-07-18

## 2024-07-18 RX ORDER — HALOPERIDOL 5 MG/ML
1 INJECTION INTRAMUSCULAR
Status: DISCONTINUED | OUTPATIENT
Start: 2024-07-18 | End: 2024-07-18 | Stop reason: HOSPADM

## 2024-07-18 RX ORDER — ROCURONIUM BROMIDE 10 MG/ML
INJECTION, SOLUTION INTRAVENOUS PRN
Status: DISCONTINUED | OUTPATIENT
Start: 2024-07-18 | End: 2024-07-18 | Stop reason: SURG

## 2024-07-18 RX ORDER — HYDROMORPHONE HYDROCHLORIDE 1 MG/ML
0.2 INJECTION, SOLUTION INTRAMUSCULAR; INTRAVENOUS; SUBCUTANEOUS
Status: DISCONTINUED | OUTPATIENT
Start: 2024-07-18 | End: 2024-07-18 | Stop reason: HOSPADM

## 2024-07-18 RX ORDER — VANCOMYCIN HYDROCHLORIDE 1 G/20ML
INJECTION, POWDER, LYOPHILIZED, FOR SOLUTION INTRAVENOUS
Status: COMPLETED | OUTPATIENT
Start: 2024-07-18 | End: 2024-07-18

## 2024-07-18 RX ORDER — HYDROMORPHONE HYDROCHLORIDE 1 MG/ML
0.4 INJECTION, SOLUTION INTRAMUSCULAR; INTRAVENOUS; SUBCUTANEOUS
Status: DISCONTINUED | OUTPATIENT
Start: 2024-07-18 | End: 2024-07-18 | Stop reason: HOSPADM

## 2024-07-18 RX ORDER — EPHEDRINE SULFATE 50 MG/ML
5 INJECTION, SOLUTION INTRAVENOUS
Status: DISCONTINUED | OUTPATIENT
Start: 2024-07-18 | End: 2024-07-18 | Stop reason: HOSPADM

## 2024-07-18 RX ORDER — DEXMEDETOMIDINE HYDROCHLORIDE 100 UG/ML
INJECTION, SOLUTION INTRAVENOUS PRN
Status: DISCONTINUED | OUTPATIENT
Start: 2024-07-18 | End: 2024-07-18 | Stop reason: SURG

## 2024-07-18 RX ORDER — IPRATROPIUM BROMIDE AND ALBUTEROL SULFATE 2.5; .5 MG/3ML; MG/3ML
3 SOLUTION RESPIRATORY (INHALATION)
Status: DISCONTINUED | OUTPATIENT
Start: 2024-07-18 | End: 2024-07-18 | Stop reason: HOSPADM

## 2024-07-18 RX ORDER — AMOXICILLIN 250 MG
1 CAPSULE ORAL NIGHTLY
Status: CANCELLED | OUTPATIENT
Start: 2024-07-18

## 2024-07-18 RX ORDER — ENEMA 19; 7 G/133ML; G/133ML
1 ENEMA RECTAL
Status: CANCELLED | OUTPATIENT
Start: 2024-07-18

## 2024-07-18 RX ORDER — DIPHENHYDRAMINE HYDROCHLORIDE 50 MG/ML
12.5 INJECTION INTRAMUSCULAR; INTRAVENOUS
Status: DISCONTINUED | OUTPATIENT
Start: 2024-07-18 | End: 2024-07-18 | Stop reason: HOSPADM

## 2024-07-18 RX ORDER — LIDOCAINE HYDROCHLORIDE 20 MG/ML
INJECTION, SOLUTION EPIDURAL; INFILTRATION; INTRACAUDAL; PERINEURAL PRN
Status: DISCONTINUED | OUTPATIENT
Start: 2024-07-18 | End: 2024-07-18 | Stop reason: SURG

## 2024-07-18 RX ORDER — ASPIRIN 81 MG/1
81 TABLET ORAL 2 TIMES DAILY
Status: CANCELLED | OUTPATIENT
Start: 2024-07-18

## 2024-07-18 RX ADMIN — FENTANYL CITRATE 50 MCG: 50 INJECTION, SOLUTION INTRAMUSCULAR; INTRAVENOUS at 09:25

## 2024-07-18 RX ADMIN — FENTANYL CITRATE 50 MCG: 50 INJECTION, SOLUTION INTRAMUSCULAR; INTRAVENOUS at 12:09

## 2024-07-18 RX ADMIN — DEXMEDETOMIDINE HYDROCHLORIDE 25 MCG: 100 INJECTION, SOLUTION INTRAVENOUS at 10:13

## 2024-07-18 RX ADMIN — TRANEXAMIC ACID 1000 MG: 100 INJECTION, SOLUTION INTRAVENOUS at 09:55

## 2024-07-18 RX ADMIN — SODIUM CHLORIDE, POTASSIUM CHLORIDE, SODIUM LACTATE AND CALCIUM CHLORIDE: 600; 310; 30; 20 INJECTION, SOLUTION INTRAVENOUS at 10:49

## 2024-07-18 RX ADMIN — TRANEXAMIC ACID 1000 MG: 100 INJECTION, SOLUTION INTRAVENOUS at 11:33

## 2024-07-18 RX ADMIN — FENTANYL CITRATE 50 MCG: 50 INJECTION, SOLUTION INTRAMUSCULAR; INTRAVENOUS at 11:35

## 2024-07-18 RX ADMIN — ONDANSETRON 4 MG: 2 INJECTION INTRAMUSCULAR; INTRAVENOUS at 11:23

## 2024-07-18 RX ADMIN — HYDROMORPHONE HYDROCHLORIDE 0.5 MG: 2 INJECTION INTRAMUSCULAR; INTRAVENOUS; SUBCUTANEOUS at 10:19

## 2024-07-18 RX ADMIN — SODIUM CHLORIDE, POTASSIUM CHLORIDE, SODIUM LACTATE AND CALCIUM CHLORIDE: 600; 310; 30; 20 INJECTION, SOLUTION INTRAVENOUS at 08:55

## 2024-07-18 RX ADMIN — FENTANYL CITRATE 50 MCG: 50 INJECTION, SOLUTION INTRAMUSCULAR; INTRAVENOUS at 11:38

## 2024-07-18 RX ADMIN — HYDROMORPHONE HYDROCHLORIDE 0.5 MG: 2 INJECTION INTRAMUSCULAR; INTRAVENOUS; SUBCUTANEOUS at 10:14

## 2024-07-18 RX ADMIN — FENTANYL CITRATE 50 MCG: 50 INJECTION, SOLUTION INTRAMUSCULAR; INTRAVENOUS at 11:44

## 2024-07-18 RX ADMIN — TRANEXAMIC ACID 1000 MG: 100 INJECTION, SOLUTION INTRAVENOUS at 11:19

## 2024-07-18 RX ADMIN — FENTANYL CITRATE 50 MCG: 50 INJECTION, SOLUTION INTRAMUSCULAR; INTRAVENOUS at 12:23

## 2024-07-18 RX ADMIN — OXYCODONE HYDROCHLORIDE 10 MG: 5 SOLUTION ORAL at 12:09

## 2024-07-18 RX ADMIN — MAGNESIUM SULFATE HEPTAHYDRATE 2 G: 2 INJECTION, SOLUTION INTRAVENOUS at 10:17

## 2024-07-18 RX ADMIN — DEXMEDETOMIDINE HYDROCHLORIDE 25 MCG: 100 INJECTION, SOLUTION INTRAVENOUS at 09:55

## 2024-07-18 RX ADMIN — SUGAMMADEX 200 MG: 100 INJECTION, SOLUTION INTRAVENOUS at 11:23

## 2024-07-18 RX ADMIN — FENTANYL CITRATE 50 MCG: 50 INJECTION, SOLUTION INTRAMUSCULAR; INTRAVENOUS at 09:45

## 2024-07-18 RX ADMIN — SODIUM CHLORIDE, POTASSIUM CHLORIDE, SODIUM LACTATE AND CALCIUM CHLORIDE: 600; 310; 30; 20 INJECTION, SOLUTION INTRAVENOUS at 09:42

## 2024-07-18 RX ADMIN — CEFAZOLIN 2 G: 1 INJECTION, POWDER, FOR SOLUTION INTRAMUSCULAR; INTRAVENOUS at 09:50

## 2024-07-18 RX ADMIN — LIDOCAINE HYDROCHLORIDE 80 MG: 20 INJECTION, SOLUTION EPIDURAL; INFILTRATION; INTRACAUDAL at 09:49

## 2024-07-18 RX ADMIN — MIDAZOLAM HYDROCHLORIDE 1 MG: 2 INJECTION, SOLUTION INTRAMUSCULAR; INTRAVENOUS at 09:25

## 2024-07-18 RX ADMIN — DEXAMETHASONE SODIUM PHOSPHATE 8 MG: 4 INJECTION INTRA-ARTICULAR; INTRALESIONAL; INTRAMUSCULAR; INTRAVENOUS; SOFT TISSUE at 10:02

## 2024-07-18 RX ADMIN — PROPOFOL 150 MG: 10 INJECTION, EMULSION INTRAVENOUS at 09:49

## 2024-07-18 RX ADMIN — HYDROMORPHONE HYDROCHLORIDE 0.5 MG: 2 INJECTION INTRAMUSCULAR; INTRAVENOUS; SUBCUTANEOUS at 11:23

## 2024-07-18 RX ADMIN — HYDROMORPHONE HYDROCHLORIDE 0.5 MG: 2 INJECTION INTRAMUSCULAR; INTRAVENOUS; SUBCUTANEOUS at 11:30

## 2024-07-18 RX ADMIN — ROCURONIUM BROMIDE 60 MG: 50 INJECTION, SOLUTION INTRAVENOUS at 09:49

## 2024-07-18 RX ADMIN — VANCOMYCIN HYDROCHLORIDE 1500 MG: 5 INJECTION, POWDER, LYOPHILIZED, FOR SOLUTION INTRAVENOUS at 08:55

## 2024-07-18 RX ADMIN — FENTANYL CITRATE 50 MCG: 50 INJECTION, SOLUTION INTRAMUSCULAR; INTRAVENOUS at 11:41

## 2024-07-18 ASSESSMENT — COGNITIVE AND FUNCTIONAL STATUS - GENERAL
CLIMB 3 TO 5 STEPS WITH RAILING: A LITTLE
MOBILITY SCORE: 23
SUGGESTED CMS G CODE MODIFIER MOBILITY: CI

## 2024-07-18 ASSESSMENT — GAIT ASSESSMENTS
DISTANCE (FEET): 80
GAIT LEVEL OF ASSIST: STANDBY ASSIST
DEVIATION: STEP TO;ANTALGIC
ASSISTIVE DEVICE: FRONT WHEEL WALKER

## 2024-07-18 ASSESSMENT — PAIN SCALES - GENERAL: PAIN_LEVEL: 4

## 2024-07-18 ASSESSMENT — FIBROSIS 4 INDEX: FIB4 SCORE: 0.81

## 2024-07-18 ASSESSMENT — PAIN DESCRIPTION - PAIN TYPE: TYPE: SURGICAL PAIN

## 2024-08-05 ENCOUNTER — TELEPHONE (OUTPATIENT)
Dept: VASCULAR LAB | Facility: MEDICAL CENTER | Age: 74
End: 2024-08-05
Payer: MEDICARE

## 2024-08-05 DIAGNOSIS — E78.5 DYSLIPIDEMIA: ICD-10-CM

## 2024-08-05 NOTE — TELEPHONE ENCOUNTER
Caller: Curt Blair      Topic/issue: Patient was calling about the   atorvastatin (LIPITOR) 10 MG Tab medication and he was wanting to let us know that he was off of the medication due to the effects of it. Please advise      Callback Number: [phone]      Thank you    -Bertrand SPANN

## 2024-08-07 RX ORDER — BEMPEDOIC ACID AND EZETIMIBE 180; 10 MG/1; MG/1
1 TABLET, FILM COATED ORAL DAILY
Qty: 90 TABLET | Refills: 1 | Status: SHIPPED | OUTPATIENT
Start: 2024-08-07 | End: 2024-08-20

## 2024-08-07 NOTE — TELEPHONE ENCOUNTER
Returned patient call. Patient has had bilateral leg cramping, resolved with discontinuation. Patient would like to resume Nexlizet. Rx called into pharmacy for patient.     Amee Smith, RegD

## 2024-08-08 NOTE — TELEPHONE ENCOUNTER
Caller: Curt Blair     Topic/issue: Pt spoke to Amee yesterday and she was suppose to send a new script over to [facility] on [address] for the Bempedoic Acid-Ezetimibe (NEXLIZET) 180-10 MG Tab- Pharmacy states there is no prescription, please advise     Callback Number: [phone]    Thank You   Laura SHIELDS

## 2024-08-12 ENCOUNTER — APPOINTMENT (OUTPATIENT)
Dept: MEDICAL GROUP | Facility: MEDICAL CENTER | Age: 74
End: 2024-08-12
Payer: MEDICARE

## 2024-08-12 NOTE — TELEPHONE ENCOUNTER
Per verbal order from luc Quintanilla to enter orders for medical level of care opposed to intensive care. Primary RN updated.    Received EMR message from the provider to obtain information for the appeal that was submitted back in 1/30/2024.    Called appeals dept @ [phone], and was on hold for about an hour. Reached out to JOANNE Maher from [facility] to verify status. Per Nika, Pt has been approved since 1/31/2024-07/31/2024.    Notified and updated provider for the status.     New copay: $250/90DS ; $100/30DS. NEXLIZET FALLS UNDER TIER LEVEL 4 per Nika RUBIO.     NOE Jay, PhT  Vascular Pharmacy Liaison (Rx Coordinator)  P: [phone]  2/6/2024 2:22 PM

## 2024-08-20 ENCOUNTER — HOSPITAL ENCOUNTER (OUTPATIENT)
Facility: MEDICAL CENTER | Age: 74
End: 2024-08-20
Attending: INTERNAL MEDICINE
Payer: MEDICARE

## 2024-08-20 ENCOUNTER — OFFICE VISIT (OUTPATIENT)
Dept: MEDICAL GROUP | Facility: PHYSICIAN GROUP | Age: 74
End: 2024-08-20
Payer: MEDICARE

## 2024-08-20 VITALS
DIASTOLIC BLOOD PRESSURE: 42 MMHG | WEIGHT: 209.7 LBS | HEIGHT: 71 IN | RESPIRATION RATE: 12 BRPM | HEART RATE: 97 BPM | BODY MASS INDEX: 29.36 KG/M2 | SYSTOLIC BLOOD PRESSURE: 118 MMHG | TEMPERATURE: 96.4 F | OXYGEN SATURATION: 96 %

## 2024-08-20 DIAGNOSIS — R19.7 DIARRHEA, UNSPECIFIED TYPE: ICD-10-CM

## 2024-08-20 DIAGNOSIS — S83.105S CLOSED DISLOCATION OF LEFT KNEE, SEQUELA: ICD-10-CM

## 2024-08-20 DIAGNOSIS — M25.561 ARTHRALGIA OF BOTH KNEES: ICD-10-CM

## 2024-08-20 DIAGNOSIS — R74.8 ELEVATED CPK: Chronic | ICD-10-CM

## 2024-08-20 DIAGNOSIS — K51.00 ULCERATIVE PANCOLITIS (HCC): ICD-10-CM

## 2024-08-20 DIAGNOSIS — M19.90 ARTHRITIS: ICD-10-CM

## 2024-08-20 DIAGNOSIS — L27.0 DRUG RASH: ICD-10-CM

## 2024-08-20 DIAGNOSIS — Z96.652 HISTORY OF TOTAL LEFT KNEE REPLACEMENT: ICD-10-CM

## 2024-08-20 DIAGNOSIS — E78.5 DYSLIPIDEMIA: ICD-10-CM

## 2024-08-20 DIAGNOSIS — R74.8 ELEVATED CK: ICD-10-CM

## 2024-08-20 DIAGNOSIS — M25.562 ARTHRALGIA OF BOTH KNEES: ICD-10-CM

## 2024-08-20 LAB
AMBIGUOUS DTTM AMBI4: NORMAL
C DIFF DNA SPEC QL NAA+PROBE: NEGATIVE
C DIFF TOX GENS STL QL NAA+PROBE: NEGATIVE

## 2024-08-20 PROCEDURE — 83993 ASSAY FOR CALPROTECTIN FECAL: CPT

## 2024-08-20 PROCEDURE — 3074F SYST BP LT 130 MM HG: CPT | Performed by: INTERNAL MEDICINE

## 2024-08-20 PROCEDURE — 99214 OFFICE O/P EST MOD 30 MIN: CPT | Performed by: INTERNAL MEDICINE

## 2024-08-20 PROCEDURE — 3078F DIAST BP <80 MM HG: CPT | Performed by: INTERNAL MEDICINE

## 2024-08-20 PROCEDURE — 87493 C DIFF AMPLIFIED PROBE: CPT

## 2024-08-20 RX ORDER — BUDESONIDE 3 MG/1
CAPSULE, COATED PELLETS ORAL
Qty: 126 CAPSULE | Refills: 0 | Status: SHIPPED | OUTPATIENT
Start: 2024-08-20 | End: 2024-10-15

## 2024-08-20 ASSESSMENT — FIBROSIS 4 INDEX: FIB4 SCORE: 0.81

## 2024-08-20 NOTE — PROGRESS NOTES
Subjective:   Chief Complaint/History of Present Illness:  Curt Blair is a 73 y.o. male established patient who presents today to discuss medical problems as listed below. Curt is unaccompanied for today's visit.    History of Present Illness  The patient presents for evaluation of multiple medical concerns.    He reports recent revision of left knee replacement due to tendon detachment. His previous knee implant was replaced with a smaller, lighter one with reinforcement should there be tendon instability again.    He resumed taking Lipitor due to the cost of Nexlizet but experienced muscle cramps as a side effect. After discontinuing Lipitor, he returned to Nexlizet without any issues. However, he developed a rash on both legs two days after starting Nexlizet, which was initially attributed to dry skin from surgery. Despite applying lotion, the rash spread to his back within a few days. He discontinued Nexlizet and the rash resolved by the following Saturday night. He also noticed that the rash was raised on his back. He has been using hydrocortisone cream for relief. Upon further reading he noted that Nexlizet can contribute to tendon damage and wonders if that was the cause for the failed knee replacement.    He began experiencing diarrhea on Sunday. Prior to this, his bowel movements were regular and well-formed since his surgery. He also reports a sudden loss of appetite and weight loss of 1 to 2 pounds per week over the past several weeks with his surgeries, rash, and medication changes/intolerances.     His bowel movements have remained the same, with some changes in consistency. He reports no recent antibiotic use. He has had three episodes of fecal urgency. Current symptoms reminiscent of prior colitis. He underwent a colonoscopy at that time and was prescribed budesonide and mesalamine with subsequent improvement. He has an upcoming appointment with Dr. Tomer Meyer on 10/28/2024. He reports  no blood in his stool. He took loperamide last night and again at 6:00 AM this morning. He reports no abdominal pain. Calprotectin stool levels have been very high on subsequent rechecks but this is the first recurrence of symptoms he notes in recent time.      Current Medications:  Current Outpatient Medications Ordered in Epic   Medication Sig Dispense Refill    budesonide (ENTOCORT EC) 3 MG Cap DR Particles capsule Take 3 Capsules by mouth every morning for 28 days, THEN 2 Capsules every morning for 14 days, THEN 1 Capsule every morning for 14 days. 126 Capsule 0    amlodipine-benazepril (LOTREL) 5-10 MG per capsule Take 1 Capsule by mouth every day. 100 Capsule 2    nitroglycerin (NITROSTAT) 0.4 MG SL Tab Place 1 Tablet under the tongue as needed for Chest Pain. 25 Tablet 11    mesalamine (LIALDA) 1.2 GM Tablet Delayed Response Take 4 Tablets by mouth every day. 400 Tablet 3    DULoxetine (CYMBALTA) 60 MG Cap DR Particles delayed-release capsule Take 1 Capsule by mouth every day. 100 Capsule 3    diclofenac DR (VOLTAREN) 75 MG Tablet Delayed Response Take 1 Tablet by mouth 2 times a day. 200 Tablet 3    CALCIUM-VITAMIN D PO Take  by mouth. MEDICATION INSTRUCTIONS FOR SURGERY/PROCEDURE SCHEDULED FOR 7/18/24   DO NOT TAKE 7 DAYS PRIOR TO SURGERY      amoxicillin (AMOXIL) 500 MG Cap Take 2,000 mg by mouth one time as needed for Other. 1 hour before dental procedures      Coenzyme Q10 300 MG Cap Take 1 Capsule by mouth every day. 30 Capsule     Multiple Vitamins-Minerals (MULTIVITAMIN PO) Take 1 Tablet by mouth every morning. MEDICATION INSTRUCTIONS FOR SURGERY/PROCEDURE SCHEDULED FOR 7/18/24   DO NOT TAKE 7 DAYS PRIOR TO SURGERY      aspirin 81 MG tablet Take 81 mg by mouth every day. MEDICATION INSTRUCTIONS FOR SURGERY/PROCEDURE SCHEDULED FOR 7/18/24   DO NOT TAKE 5 DAYS PRIOR TO SURGERY       No current Epic-ordered facility-administered medications on file.          Objective:   Physical Exam:    Vitals: BP  "118/42 (BP Location: Left arm, Patient Position: Sitting, BP Cuff Size: Adult)   Pulse 97   Temp (!) 35.8 °C (96.4 °F) (Temporal)   Resp 12   Ht 1.803 m (5' 11\")   Wt 95.1 kg (209 lb 11.2 oz)   SpO2 96%    BMI: Body mass index is 29.25 kg/m².  Physical Exam  Constitutional:       General: He is not in acute distress.     Appearance: Normal appearance. He is not ill-appearing.   HENT:      Right Ear: External ear normal.      Left Ear: External ear normal.   Eyes:      General: No scleral icterus.     Conjunctiva/sclera: Conjunctivae normal.   Cardiovascular:      Rate and Rhythm: Normal rate and regular rhythm.      Pulses: Normal pulses.   Pulmonary:      Effort: Pulmonary effort is normal. No respiratory distress.      Breath sounds: Normal breath sounds. No wheezing or rhonchi.   Abdominal:      General: Bowel sounds are normal. There is no distension.      Palpations: Abdomen is soft.      Tenderness: There is no abdominal tenderness.   Musculoskeletal:         General: Swelling and deformity present. No tenderness.      Right lower leg: No edema.      Left lower leg: No edema.   Skin:     General: Skin is warm and dry.      Findings: Rash present.      Comments: Resolving macular rash on back. Area of xerosis on left anteromedial knee. Incision well healed.    Neurological:      Gait: Gait abnormal.   Psychiatric:         Mood and Affect: Mood normal.         Behavior: Behavior normal.         Thought Content: Thought content normal.         Judgment: Judgment normal.          Assessment & Plan  1. Left knee swelling due to recent left TKA revision  The knee swelling is likely due to fluid accumulation related to his recent surgery. A follow-up appointment with orthopedic surgery has been scheduled for next week to further evaluate the condition. Queries whether Nexlizet played a role in the tendon failure following the original knee arthroplasty. Swelling while marked is improving per his report as is " the pain. Continue Cymbalta.     2. Drug rash, likely from Nexlizet  3. Dyslipidemia, multiple medication intolerances, elevated CK level  The rash on his back appears to be a classic drug rash, possibly triggered by the cholesterol medication of Nexlizet. The timing of the rash onset post-medication is highly suspicious. He has been advised to discontinue the medication and monitor for any changes in the rash. Hydrocortisone cream has been used to manage the rash. CK levels were increased with atorvastatin with associated cramping. Will hold off on treating cholesterol until his joints and bowels have both stabilized.    4. Indeterminate colitis/pancolitis with exacerbation.  His symptoms are suggestive of recurrence of colitis, which may be causing dehydration and frequent bowel movements. A stool test will be conducted to assess the current inflammation marker levels of calprotectin level; latest reading was >1000. Rule out Cdiff due to recent surgeries and likely antibiotic administration.  Budesonide 3 mg will be prescribed for an 8-week course, starting with 3 pills daily for the first 4 weeks, then reducing to 2 pills daily for the next week, and finally 1 pill daily for the remaining duration. He has been informed about the potential side effects of budesonide, including fluid retention, increased appetite, and potential weight gain. A rheumatologic evaluation will also be conducted to rule out any autoimmune issues. If his symptoms resolve and bowel movements normalize within 2 weeks, the tapering of budesonide may be initiated earlier. The prescription will be sent to [facility]. Follow up with GI in October and can determine if additional testing is needed at that time.          Assessment and Plan:   Curt is a 73 y.o. 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    Ulcerative pancolitis (HCC)    Relevant Medications    budesonide (ENTOCORT EC) 3 MG Cap DR Particles capsule    Other Relevant Orders    CRP QUANTITIVE (NON-CARDIAC)    Sed Rate    FERRITIN    CBC WITH DIFFERENTIAL    Comp Metabolic Panel     Other Visit Diagnoses       Diarrhea, unspecified type        Relevant Medications    budesonide (ENTOCORT EC) 3 MG Cap DR Particles capsule    Other Relevant Orders    C Diff by PCR rflx Toxin    CALPROTECTIN,FECAL    HLA-B27    CRP QUANTITIVE (NON-CARDIAC)    Sed Rate    FERRITIN    Arthralgia of both knees        Relevant Orders    MADIE REFLEXIVE PROFILE    HLA-B27    RHEUMATOID ARTHRITIS FACTOR    CCP ANTIBODIES, IGG/IGA    Elevated CK        Relevant Orders    CREATINE KINASE               RTC: Return in about 3 months (around 11/20/2024).    I spent a total of 36 minutes with record review, exam, communication with the patient, communication with other providers, and documentation of this encounter.    Verbal consent was acquired by the patient to use [employer] ambient listening note generation during this visit Yes       PLEASE NOTE: This dictation was created using voice recognition software. I have made every reasonable attempt to correct obvious errors, but I expect that there are errors of grammar and possibly content that I did not discover before finalizing the note.      Shila Mullen, DO  Geriatric and Internal Medicine  [facility]

## 2024-08-21 ENCOUNTER — HOSPITAL ENCOUNTER (OUTPATIENT)
Dept: LAB | Facility: MEDICAL CENTER | Age: 74
End: 2024-08-21
Attending: INTERNAL MEDICINE
Payer: MEDICARE

## 2024-08-21 ENCOUNTER — APPOINTMENT (OUTPATIENT)
Dept: MEDICAL GROUP | Facility: PHYSICIAN GROUP | Age: 74
End: 2024-08-21
Payer: MEDICARE

## 2024-08-21 DIAGNOSIS — R19.7 DIARRHEA, UNSPECIFIED TYPE: ICD-10-CM

## 2024-08-21 DIAGNOSIS — M25.562 ARTHRALGIA OF BOTH KNEES: ICD-10-CM

## 2024-08-21 DIAGNOSIS — R74.8 ELEVATED CK: ICD-10-CM

## 2024-08-21 DIAGNOSIS — K51.00 ULCERATIVE PANCOLITIS (HCC): ICD-10-CM

## 2024-08-21 DIAGNOSIS — M25.561 ARTHRALGIA OF BOTH KNEES: ICD-10-CM

## 2024-08-21 LAB
ALBUMIN SERPL BCP-MCNC: 3.5 G/DL (ref 3.2–4.9)
ALBUMIN/GLOB SERPL: 0.9 G/DL
ALP SERPL-CCNC: 90 U/L (ref 30–99)
ALT SERPL-CCNC: 9 U/L (ref 2–50)
ANION GAP SERPL CALC-SCNC: 13 MMOL/L (ref 7–16)
AST SERPL-CCNC: 13 U/L (ref 12–45)
BASOPHILS # BLD AUTO: 1 % (ref 0–1.8)
BASOPHILS # BLD: 0.07 K/UL (ref 0–0.12)
BILIRUB SERPL-MCNC: 0.4 MG/DL (ref 0.1–1.5)
BUN SERPL-MCNC: 14 MG/DL (ref 8–22)
CALCIUM ALBUM COR SERPL-MCNC: 9.2 MG/DL (ref 8.5–10.5)
CALCIUM SERPL-MCNC: 8.8 MG/DL (ref 8.4–10.2)
CHLORIDE SERPL-SCNC: 95 MMOL/L (ref 96–112)
CK SERPL-CCNC: 279 U/L (ref 0–154)
CO2 SERPL-SCNC: 24 MMOL/L (ref 20–33)
CREAT SERPL-MCNC: 0.84 MG/DL (ref 0.5–1.4)
CRP SERPL HS-MCNC: 2.51 MG/DL (ref 0–0.75)
EOSINOPHIL # BLD AUTO: 0.08 K/UL (ref 0–0.51)
EOSINOPHIL NFR BLD: 1.2 % (ref 0–6.9)
ERYTHROCYTE [DISTWIDTH] IN BLOOD BY AUTOMATED COUNT: 46.5 FL (ref 35.9–50)
ERYTHROCYTE [SEDIMENTATION RATE] IN BLOOD BY WESTERGREN METHOD: 25 MM/HOUR (ref 0–20)
GFR SERPLBLD CREATININE-BSD FMLA CKD-EPI: 91 ML/MIN/1.73 M 2
GLOBULIN SER CALC-MCNC: 3.9 G/DL (ref 1.9–3.5)
GLUCOSE SERPL-MCNC: 87 MG/DL (ref 65–99)
HCT VFR BLD AUTO: 37 % (ref 42–52)
HGB BLD-MCNC: 11.8 G/DL (ref 14–18)
IMM GRANULOCYTES # BLD AUTO: 0.02 K/UL (ref 0–0.11)
IMM GRANULOCYTES NFR BLD AUTO: 0.3 % (ref 0–0.9)
LYMPHOCYTES # BLD AUTO: 0.76 K/UL (ref 1–4.8)
LYMPHOCYTES NFR BLD: 11 % (ref 22–41)
MCH RBC QN AUTO: 27.6 PG (ref 27–33)
MCHC RBC AUTO-ENTMCNC: 31.9 G/DL (ref 32.3–36.5)
MCV RBC AUTO: 86.7 FL (ref 81.4–97.8)
MONOCYTES # BLD AUTO: 1.31 K/UL (ref 0–0.85)
MONOCYTES NFR BLD AUTO: 18.9 % (ref 0–13.4)
NEUTROPHILS # BLD AUTO: 4.7 K/UL (ref 1.82–7.42)
NEUTROPHILS NFR BLD: 67.6 % (ref 44–72)
NRBC # BLD AUTO: 0 K/UL
NRBC BLD-RTO: 0 /100 WBC (ref 0–0.2)
PLATELET # BLD AUTO: 533 K/UL (ref 164–446)
PMV BLD AUTO: 8.4 FL (ref 9–12.9)
POTASSIUM SERPL-SCNC: 4.1 MMOL/L (ref 3.6–5.5)
PROT SERPL-MCNC: 7.4 G/DL (ref 6–8.2)
RBC # BLD AUTO: 4.27 M/UL (ref 4.7–6.1)
SODIUM SERPL-SCNC: 132 MMOL/L (ref 135–145)
WBC # BLD AUTO: 6.9 K/UL (ref 4.8–10.8)

## 2024-08-21 PROCEDURE — 85652 RBC SED RATE AUTOMATED: CPT

## 2024-08-21 PROCEDURE — 36415 COLL VENOUS BLD VENIPUNCTURE: CPT

## 2024-08-21 PROCEDURE — 86140 C-REACTIVE PROTEIN: CPT

## 2024-08-21 PROCEDURE — 86038 ANTINUCLEAR ANTIBODIES: CPT

## 2024-08-21 PROCEDURE — 82550 ASSAY OF CK (CPK): CPT

## 2024-08-21 PROCEDURE — 86812 HLA TYPING A B OR C: CPT

## 2024-08-21 PROCEDURE — 86200 CCP ANTIBODY: CPT

## 2024-08-21 PROCEDURE — 80053 COMPREHEN METABOLIC PANEL: CPT

## 2024-08-21 PROCEDURE — 85025 COMPLETE CBC W/AUTO DIFF WBC: CPT

## 2024-08-21 PROCEDURE — 86039 ANTINUCLEAR ANTIBODIES (ANA): CPT

## 2024-08-22 ENCOUNTER — APPOINTMENT (OUTPATIENT)
Dept: MEDICAL GROUP | Facility: PHYSICIAN GROUP | Age: 74
End: 2024-08-22
Payer: MEDICARE

## 2024-08-22 DIAGNOSIS — R19.7 DIARRHEA, UNSPECIFIED TYPE: ICD-10-CM

## 2024-08-22 LAB — CALPROTECTIN STL-MCNT: 2870 UG/G

## 2024-08-23 ENCOUNTER — HOSPITAL ENCOUNTER (EMERGENCY)
Facility: MEDICAL CENTER | Age: 74
End: 2024-08-23
Attending: EMERGENCY MEDICINE
Payer: MEDICARE

## 2024-08-23 ENCOUNTER — APPOINTMENT (OUTPATIENT)
Dept: RADIOLOGY | Facility: MEDICAL CENTER | Age: 74
End: 2024-08-23
Attending: EMERGENCY MEDICINE
Payer: MEDICARE

## 2024-08-23 VITALS
TEMPERATURE: 97.1 F | SYSTOLIC BLOOD PRESSURE: 135 MMHG | RESPIRATION RATE: 18 BRPM | DIASTOLIC BLOOD PRESSURE: 60 MMHG | HEART RATE: 94 BPM | OXYGEN SATURATION: 95 %

## 2024-08-23 DIAGNOSIS — R19.7 DIARRHEA, UNSPECIFIED TYPE: ICD-10-CM

## 2024-08-23 DIAGNOSIS — K52.9 COLITIS: ICD-10-CM

## 2024-08-23 LAB
ABO GROUP BLD: NORMAL
ALBUMIN SERPL BCP-MCNC: 3.5 G/DL (ref 3.2–4.9)
ALBUMIN/GLOB SERPL: 0.9 G/DL
ALP SERPL-CCNC: 95 U/L (ref 30–99)
ALT SERPL-CCNC: 9 U/L (ref 2–50)
ANION GAP SERPL CALC-SCNC: 14 MMOL/L (ref 7–16)
APTT PPP: 28.2 SEC (ref 24.7–36)
AST SERPL-CCNC: 12 U/L (ref 12–45)
BASOPHILS # BLD AUTO: 0.8 % (ref 0–1.8)
BASOPHILS # BLD: 0.07 K/UL (ref 0–0.12)
BILIRUB SERPL-MCNC: 0.4 MG/DL (ref 0.1–1.5)
BLD GP AB SCN SERPL QL: NORMAL
BUN SERPL-MCNC: 15 MG/DL (ref 8–22)
CALCIUM ALBUM COR SERPL-MCNC: 9.3 MG/DL (ref 8.5–10.5)
CALCIUM SERPL-MCNC: 8.9 MG/DL (ref 8.4–10.2)
CCP IGA+IGG SERPL IA-ACNC: 3 UNITS (ref 0–19)
CHLORIDE SERPL-SCNC: 95 MMOL/L (ref 96–112)
CO2 SERPL-SCNC: 23 MMOL/L (ref 20–33)
CREAT SERPL-MCNC: 0.88 MG/DL (ref 0.5–1.4)
EOSINOPHIL # BLD AUTO: 0.03 K/UL (ref 0–0.51)
EOSINOPHIL NFR BLD: 0.3 % (ref 0–6.9)
ERYTHROCYTE [DISTWIDTH] IN BLOOD BY AUTOMATED COUNT: 47 FL (ref 35.9–50)
FERRITIN SERPL-MCNC: 149 NG/ML (ref 22–322)
GFR SERPLBLD CREATININE-BSD FMLA CKD-EPI: 90 ML/MIN/1.73 M 2
GLOBULIN SER CALC-MCNC: 3.9 G/DL (ref 1.9–3.5)
GLUCOSE SERPL-MCNC: 123 MG/DL (ref 65–99)
HCT VFR BLD AUTO: 36.6 % (ref 42–52)
HGB BLD-MCNC: 11.6 G/DL (ref 14–18)
HLA-B27 QL FC: NEGATIVE
IMM GRANULOCYTES # BLD AUTO: 0.05 K/UL (ref 0–0.11)
IMM GRANULOCYTES NFR BLD AUTO: 0.6 % (ref 0–0.9)
INR PPP: 1.23 (ref 0.87–1.13)
LIPASE SERPL-CCNC: 12 U/L (ref 11–82)
LYMPHOCYTES # BLD AUTO: 0.67 K/UL (ref 1–4.8)
LYMPHOCYTES NFR BLD: 7.6 % (ref 22–41)
MCH RBC QN AUTO: 27.6 PG (ref 27–33)
MCHC RBC AUTO-ENTMCNC: 31.7 G/DL (ref 32.3–36.5)
MCV RBC AUTO: 86.9 FL (ref 81.4–97.8)
MONOCYTES # BLD AUTO: 1.36 K/UL (ref 0–0.85)
MONOCYTES NFR BLD AUTO: 15.5 % (ref 0–13.4)
NEUTROPHILS # BLD AUTO: 6.59 K/UL (ref 1.82–7.42)
NEUTROPHILS NFR BLD: 75.2 % (ref 44–72)
NRBC # BLD AUTO: 0 K/UL
NRBC BLD-RTO: 0 /100 WBC (ref 0–0.2)
NUCLEAR IGG SER QL IA: DETECTED
PLATELET # BLD AUTO: 639 K/UL (ref 164–446)
PMV BLD AUTO: 9.1 FL (ref 9–12.9)
POTASSIUM SERPL-SCNC: 4.9 MMOL/L (ref 3.6–5.5)
PROT SERPL-MCNC: 7.4 G/DL (ref 6–8.2)
PROTHROMBIN TIME: 16 SEC (ref 12–14.6)
RBC # BLD AUTO: 4.21 M/UL (ref 4.7–6.1)
RH BLD: NORMAL
RHEUMATOID FACT SER IA-ACNC: 16 IU/ML (ref 0–14)
SODIUM SERPL-SCNC: 132 MMOL/L (ref 135–145)
WBC # BLD AUTO: 8.8 K/UL (ref 4.8–10.8)

## 2024-08-23 PROCEDURE — 36415 COLL VENOUS BLD VENIPUNCTURE: CPT

## 2024-08-23 PROCEDURE — 85025 COMPLETE CBC W/AUTO DIFF WBC: CPT

## 2024-08-23 PROCEDURE — 85610 PROTHROMBIN TIME: CPT

## 2024-08-23 PROCEDURE — 80053 COMPREHEN METABOLIC PANEL: CPT

## 2024-08-23 PROCEDURE — 86850 RBC ANTIBODY SCREEN: CPT

## 2024-08-23 PROCEDURE — 86900 BLOOD TYPING SEROLOGIC ABO: CPT

## 2024-08-23 PROCEDURE — 99285 EMERGENCY DEPT VISIT HI MDM: CPT

## 2024-08-23 PROCEDURE — 86901 BLOOD TYPING SEROLOGIC RH(D): CPT

## 2024-08-23 PROCEDURE — 71045 X-RAY EXAM CHEST 1 VIEW: CPT

## 2024-08-23 PROCEDURE — 700105 HCHG RX REV CODE 258: Performed by: EMERGENCY MEDICINE

## 2024-08-23 PROCEDURE — 85730 THROMBOPLASTIN TIME PARTIAL: CPT

## 2024-08-23 PROCEDURE — 83690 ASSAY OF LIPASE: CPT

## 2024-08-23 PROCEDURE — 74177 CT ABD & PELVIS W/CONTRAST: CPT

## 2024-08-23 PROCEDURE — 700117 HCHG RX CONTRAST REV CODE 255: Performed by: EMERGENCY MEDICINE

## 2024-08-23 RX ORDER — ACETAMINOPHEN 500 MG
500-1000 TABLET ORAL EVERY 6 HOURS PRN
COMMUNITY

## 2024-08-23 RX ORDER — SODIUM CHLORIDE, SODIUM LACTATE, POTASSIUM CHLORIDE, CALCIUM CHLORIDE 600; 310; 30; 20 MG/100ML; MG/100ML; MG/100ML; MG/100ML
1000 INJECTION, SOLUTION INTRAVENOUS ONCE
Status: COMPLETED | OUTPATIENT
Start: 2024-08-23 | End: 2024-08-23

## 2024-08-23 RX ADMIN — IOHEXOL 100 ML: 350 INJECTION, SOLUTION INTRAVENOUS at 16:01

## 2024-08-23 RX ADMIN — SODIUM CHLORIDE, POTASSIUM CHLORIDE, SODIUM LACTATE AND CALCIUM CHLORIDE 1000 ML: 600; 310; 30; 20 INJECTION, SOLUTION INTRAVENOUS at 15:00

## 2024-08-23 ASSESSMENT — PAIN DESCRIPTION - DESCRIPTORS: DESCRIPTORS: ACHING

## 2024-08-23 NOTE — ED NOTES
Med Rec completed per patient   Allergies reviewed  No ORAL antibiotics in last 30 days    Patient is not taking anticoagulants     Patient started taking Budesonide on 8/19/2024

## 2024-08-23 NOTE — ED TRIAGE NOTES
Chief Complaint   Patient presents with    Abdominal Pain    Diarrhea    Bloody Stools      Sent by MD. Pt has history  of colitis. Has been having severe diarrhea for the past 5 days, bloody stool this morning .

## 2024-08-23 NOTE — ED PROVIDER NOTES
ED Provider Note    CHIEF COMPLAINT  Chief Complaint   Patient presents with    Abdominal Pain    Diarrhea    Bloody Stools       EXTERNAL RECORDS REVIEWED  Outpatient Notes most recent outpatient PCP note reviewed from 8/20/2024 when the patient was seen for multiple chronic medical problem oversight    HPI/ROS  LIMITATION TO HISTORY   Select: : None  OUTSIDE HISTORIAN(S):  Significant other at bedside`    Curt Blair is a 73 y.o. male who presents to the ER.  Chronic history of colitis.  Followed by PCP and [facility] group.  States that he has a follow-up GI appointment within the next 2 months to establish care with a new provider within the group.    Recently he has been experiencing increasing diarrhea and has again noted some blood in stools.  He has had some crampy diffuse abdominal pain.  This has been similar to prior episodes of his colitis flares.    PAST MEDICAL HISTORY   has a past medical history of Allergies (12/24/2019), Arteriosclerotic vascular disease (01/25/2019), Arthritis, CAD (coronary artery disease), Elevated CPK (08/16/2020), Generalized anxiety disorder (10/11/2017), High cholesterol, Hyperlipidemia, Hypertension, Indeterminate colitis (05/04/2022), Inflamed sebaceous cyst (02/15/2018), Intrinsic eczema (02/07/2020), Myocardial infarct (HCC), Prediabetes (07/19/2019), S/P coronary artery stent placement, and Wound dehiscence (02/13/2024).    SURGICAL HISTORY   has a past surgical history that includes cholecystectomy; appendectomy; stent placement; endarterectomy; vasectomy; fluoroscopic guidance needle placement (Left, 06/16/2021); inj aa/strd gnclr nrv brnch w/img (Left, 06/16/2021); inj lumbar/sacral,w/ imaging (Right, 07/28/2021); inj lumbar/sacral,w/ imaging (Right, 12/02/2021); total knee arthroplasty (Left, 07/17/2023); total knee arthroplasty (Right, 01/22/2024); fix infrapatella tendon,primary (Left, 04/29/2024); orif, knee (7/17/ 23 & 1- 4-29-24); knee revision total  (Left, 2024); and patellectomy (Left, 2024).    FAMILY HISTORY  Family History   Problem Relation Age of Onset    Lung Disease Mother         copd    Hypertension Mother     Heart Disease Father         heart attack and heart disease    Cancer Brother         neck    Heart Disease Paternal Grandfather         Heart attack    Other Sister         non-malignant brain tumor    Diabetes Neg Hx        SOCIAL HISTORY  Social History     Tobacco Use    Smoking status: Former     Current packs/day: 0.00     Average packs/day: 2.0 packs/day for 25.0 years (50.0 ttl pk-yrs)     Types: Cigarettes     Start date: 1967     Quit date: 1992     Years since quittin.6    Smokeless tobacco: Never    Tobacco comments:     avoid all tobacco products   Vaping Use    Vaping status: Never Used   Substance and Sexual Activity    Alcohol use: Not Currently     Alcohol/week: 1.2 oz     Types: 2 Standard drinks or equivalent per week    Drug use: No    Sexual activity: Yes     Partners: Female     Comment: , 3 children       CURRENT MEDICATIONS  Home Medications       Reviewed by Nerissa Odom PhT (Pharmacy Tech) on 24 at 1517  Med List Status: Complete     Medication Last Dose Status   acetaminophen (TYLENOL) 500 MG Tab FEW DAYS AGO Active   amlodipine-benazepril (LOTREL) 5-10 MG per capsule 2024 Active   aspirin 81 MG tablet 2024 Active   budesonide (ENTOCORT EC) 3 MG Cap DR Particles capsule 2024 Active   CALCIUM-VITAMIN D PO 2024 Active   Coenzyme Q10 300 MG Cap 2024 Active   diclofenac DR (VOLTAREN) 75 MG Tablet Delayed Response FEW DAYS AGO Active   DULoxetine (CYMBALTA) 60 MG Cap DR Particles delayed-release capsule 2024 Active   mesalamine (LIALDA) 1.2 GM Tablet Delayed Response 2024 Active   Multiple Vitamins-Minerals (MULTIVITAMIN PO) 2024 Active   nitroglycerin (NITROSTAT) 0.4 MG SL Tab PRN Active                    ALLERGIES  Allergies   Allergen  Reactions    Atorvastatin Myalgia     Ok to take 10 mg  Elevated CPK  Only at 40 Mg does this happen.    Lyrica [Pregabalin] Unspecified     Pt doesn't recall    Nexlizet [Bempedoic Acid-Ezetimibe] Myalgia    Rosuvastatin Myalgia     muscle cramps elevated CPK       PHYSICAL EXAM  VITAL SIGNS: /60   Pulse 94   Temp 36.2 °C (97.1 °F) (Temporal)   Resp 18   SpO2 95%      Pulse ox interpretation: I interpret this pulse ox as normal.  Constitutional: Alert in no apparent distress.  HENT: No signs of trauma, Bilateral external ears normal, Nose normal.   Eyes: Pupils are equal and reactive  Neck: Normal range of motion, No tenderness, Supple  Cardiovascular: Regular rate and rhythm, no murmurs.   Thorax & Lungs: Normal breath sounds, No respiratory distress, No wheezing, No chest tenderness.   Abdomen: Bowel sounds normal, Soft, mild diffuse tenderness  Skin: Warm, Dry, No erythema, No rash.    Musculoskeletal: Good range of motion in all major joints. No tenderness to palpation or major deformities noted.   Neurologic: Alert , Normal motor function, Normal sensory function, No focal deficits noted.   Psychiatric: Affect normal, Judgment normal, Mood normal.         EKG/LABS  Results for orders placed or performed during the hospital encounter of 08/23/24   COD (ADULT)   Result Value Ref Range    ABO Grouping Only O     Rh Grouping Only POS     Antibody Screen-Cod NEG    CBC WITH DIFFERENTIAL   Result Value Ref Range    WBC 8.8 4.8 - 10.8 K/uL    RBC 4.21 (L) 4.70 - 6.10 M/uL    Hemoglobin 11.6 (L) 14.0 - 18.0 g/dL    Hematocrit 36.6 (L) 42.0 - 52.0 %    MCV 86.9 81.4 - 97.8 fL    MCH 27.6 27.0 - 33.0 pg    MCHC 31.7 (L) 32.3 - 36.5 g/dL    RDW 47.0 35.9 - 50.0 fL    Platelet Count 639 (H) 164 - 446 K/uL    MPV 9.1 9.0 - 12.9 fL    Neutrophils-Polys 75.20 (H) 44.00 - 72.00 %    Lymphocytes 7.60 (L) 22.00 - 41.00 %    Monocytes 15.50 (H) 0.00 - 13.40 %    Eosinophils 0.30 0.00 - 6.90 %    Basophils 0.80 0.00 -  1.80 %    Immature Granulocytes 0.60 0.00 - 0.90 %    Nucleated RBC 0.00 0.00 - 0.20 /100 WBC    Neutrophils (Absolute) 6.59 1.82 - 7.42 K/uL    Lymphs (Absolute) 0.67 (L) 1.00 - 4.80 K/uL    Monos (Absolute) 1.36 (H) 0.00 - 0.85 K/uL    Eos (Absolute) 0.03 0.00 - 0.51 K/uL    Baso (Absolute) 0.07 0.00 - 0.12 K/uL    Immature Granulocytes (abs) 0.05 0.00 - 0.11 K/uL    NRBC (Absolute) 0.00 K/uL   COMP METABOLIC PANEL   Result Value Ref Range    Sodium 132 (L) 135 - 145 mmol/L    Potassium 4.9 3.6 - 5.5 mmol/L    Chloride 95 (L) 96 - 112 mmol/L    Co2 23 20 - 33 mmol/L    Anion Gap 14.0 7.0 - 16.0    Glucose 123 (H) 65 - 99 mg/dL    Bun 15 8 - 22 mg/dL    Creatinine 0.88 0.50 - 1.40 mg/dL    Calcium 8.9 8.4 - 10.2 mg/dL    Correct Calcium 9.3 8.5 - 10.5 mg/dL    AST(SGOT) 12 12 - 45 U/L    ALT(SGPT) 9 2 - 50 U/L    Alkaline Phosphatase 95 30 - 99 U/L    Total Bilirubin 0.4 0.1 - 1.5 mg/dL    Albumin 3.5 3.2 - 4.9 g/dL    Total Protein 7.4 6.0 - 8.2 g/dL    Globulin 3.9 (H) 1.9 - 3.5 g/dL    A-G Ratio 0.9 g/dL   LIPASE   Result Value Ref Range    Lipase 12 11 - 82 U/L   PROTHROMBIN TIME   Result Value Ref Range    PT 16.0 (H) 12.0 - 14.6 sec    INR 1.23 (H) 0.87 - 1.13   APTT   Result Value Ref Range    APTT 28.2 24.7 - 36.0 sec   ESTIMATED GFR   Result Value Ref Range    GFR (CKD-EPI) 90 >60 mL/min/1.73 m 2        RADIOLOGY/PROCEDURES   I have independently interpreted the diagnostic imaging associated with this visit and am waiting the final reading from the radiologist.   My preliminary interpretation is as follows: Chest x-ray without acute consolidative process    Radiologist interpretation:  CT-ABDOMEN-PELVIS WITH   Final Result         1. Atherosclerosis including coronary artery disease.   2. Status post cholecystectomy.   3. Left renal cyst.   4. Diverticulosis.   5. Transverse and descending colitis.   6. Enlarged prostate.      DX-CHEST-PORTABLE (1 VIEW)   Final Result      No acute cardiac or pulmonary  abnormalities are identified.          COURSE & MEDICAL DECISION MAKING    ASSESSMENT, COURSE AND PLAN  Care Narrative: 73-year-old male presenting to the emergency department with the above presentation.  Will get labs and advanced imaging to evaluate for possible recurrence or complications of colitis    DISPOSITION AND DISCUSSIONS  I have discussed management of the patient with the following physicians and KESHA's: None    Discussion of management with other [facility] or appropriate source(s): Pharmacy for medication verification      Escalation of care considered, and ultimately not performed:acute inpatient care management, however at this time, the patient is most appropriate for outpatient management    Barriers to care at this time, including but not limited to:  None .     Decision tools and prescription drugs considered including, but not limited to: Antibiotics empirically started on antibiotics for presumed infectious colitis .  Outpatient stool studies are pending    73-year-old male presenting to the emergency department with above presentation.  Findings as above.  Patient will be started on antibiotics for colitis.  He has to follow-up with PCP as well as GI.  Will return here to the ER with any change or worsening.  Understanding of diet to follow.    FINAL DIAGNOSIS  1. Colitis         Electronically signed by: Gerber Lobo M.D., 8/23/2024 2:41 PM

## 2024-08-24 LAB
ANA PAT SER IF-IMP: NORMAL
NUCLEAR IGG SER QL IF: NORMAL

## 2024-08-30 ENCOUNTER — TELEPHONE (OUTPATIENT)
Dept: CARDIOLOGY | Facility: MEDICAL CENTER | Age: 74
End: 2024-08-30
Payer: MEDICARE

## 2024-08-30 ENCOUNTER — TELEPHONE (OUTPATIENT)
Dept: VASCULAR LAB | Facility: MEDICAL CENTER | Age: 74
End: 2024-08-30
Payer: MEDICARE

## 2024-08-30 NOTE — TELEPHONE ENCOUNTER
Caller: Curt Blair      Topic/issue: Patient was calling to inform us that he has refrained from taking any cholesterol medications the post comp from the surgery and the colitis as well and he stated if there were any questions for him to call him back or message him on my chart      Callback Number: [phone]        Thank you    -Bertrand

## 2024-09-12 ENCOUNTER — HOSPITAL ENCOUNTER (OUTPATIENT)
Dept: LAB | Facility: MEDICAL CENTER | Age: 74
End: 2024-09-12
Attending: NURSE PRACTITIONER
Payer: MEDICARE

## 2024-09-12 DIAGNOSIS — E78.5 DYSLIPIDEMIA: ICD-10-CM

## 2024-09-12 LAB
CHOLEST SERPL-MCNC: 170 MG/DL (ref 100–199)
FASTING STATUS PATIENT QL REPORTED: NORMAL
HDLC SERPL-MCNC: 66 MG/DL
LDLC SERPL CALC-MCNC: 83 MG/DL
TRIGL SERPL-MCNC: 105 MG/DL (ref 0–149)

## 2024-09-12 PROCEDURE — 80061 LIPID PANEL: CPT

## 2024-09-12 PROCEDURE — 36415 COLL VENOUS BLD VENIPUNCTURE: CPT

## 2024-09-16 ENCOUNTER — OFFICE VISIT (OUTPATIENT)
Dept: MEDICAL GROUP | Facility: MEDICAL CENTER | Age: 74
End: 2024-09-16
Payer: MEDICARE

## 2024-09-16 VITALS
BODY MASS INDEX: 27.48 KG/M2 | OXYGEN SATURATION: 97 % | HEART RATE: 71 BPM | DIASTOLIC BLOOD PRESSURE: 53 MMHG | SYSTOLIC BLOOD PRESSURE: 115 MMHG | WEIGHT: 197 LBS

## 2024-09-16 DIAGNOSIS — E78.5 DYSLIPIDEMIA: ICD-10-CM

## 2024-09-16 PROBLEM — G72.0 STATIN MYOPATHY: Status: ACTIVE | Noted: 2024-09-16

## 2024-09-16 PROBLEM — T46.6X5A STATIN MYOPATHY: Status: ACTIVE | Noted: 2024-09-16

## 2024-09-16 PROCEDURE — 99211 OFF/OP EST MAY X REQ PHY/QHP: CPT

## 2024-09-16 ASSESSMENT — FIBROSIS 4 INDEX: FIB4 SCORE: 0.46

## 2024-09-16 NOTE — PROGRESS NOTES
[facility] - FollowUp Visit    START: 9:26 am  END: 9:49 am    Curt Blair is here for follow up of dyslipidemia.    Subjective    HPI  Pertinent Interval History since last visit:   Unable to tolerate statins again patient had cramping, developed rash on Nexletol has been on no medications since (for 3-4 weeks).    Current Prescription Lipid Lowering Medications - including dose:   Statin: None  Non-Statin: Nexlizet 180-10 mg once daily - stopped due to rash  Current Lipid Lowering and Related Supplements:   CoQ10 + vitamin D, Metamucil  Any Current Side Effects Potentially Related to Lipid Lowering therapy?   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 Also noted elevated CPK along w/ myopathy.  Baseline Lipids Prior to Treatment:  Unknown or unavailable    SOCIAL HISTORY  Social History     Tobacco Use   Smoking Status Former    Current packs/day: 0.00    Average packs/day: 2.0 packs/day for 25.0 years (50.0 ttl pk-yrs)    Types: Cigarettes    Start date: 1967    Quit date: 1992    Years since quittin.7   Smokeless Tobacco Never   Tobacco Comments    avoid all tobacco products      Change in weight: Pt continues to lose weight     Exercise habits: Recent TKA on both knees  Diet: Unchanged from previous -- DASH diet, one steak once weekly, cut back on carbs since last visit. Working to decrease portions. Trying to consume more fiber on a daily basis.      Objective    There were no vitals filed for this visit.       Physical Exam    DATA REVIEW  Most Recent Lipid Panel:   Lab Results   Component Value Date    CHOLSTRLTOT 170 2024    TRIGLYCERIDE 105 2024    HDL 66 2024    LDL 83 2024       Other Pertinent Blood Work:   Lab Results   Component  Value Date    SODIUM 132 (L) 08/23/2024    POTASSIUM 4.9 08/23/2024    CHLORIDE 95 (L) 08/23/2024    CO2 23 08/23/2024    ANION 14.0 08/23/2024    GLUCOSE 123 (H) 08/23/2024    BUN 15 08/23/2024    CREATININE 0.88 08/23/2024    CALCIUM 8.9 08/23/2024    ASTSGOT 12 08/23/2024    ALTSGPT 9 08/23/2024    ALKPHOSPHAT 95 08/23/2024    TBILIRUBIN 0.4 08/23/2024    ALBUMIN 3.5 08/23/2024    AGRATIO 0.9 08/23/2024    CREACTPROT 2.51 (H) 08/21/2024    LIPOPROTA 9 11/28/2023    TSHULTRASEN 3.330 06/06/2024    CPKTOTAL 279 (H) 08/21/2024       Other:  NA    Recent Imaging Studies:    None since last visit      ASSESSMENT AND PLAN  Patient Type, check all that apply:   Secondary Prevention  Established Atherosclerotic Cardiovascular Disease (ASCVD)  Yes, Details: MI at the age of 41 that required atherectomy at [place]. 2nd event noted in his late 50's for which he underwent PCI to RCA.  Other Established (non-atherosclerotic) Vascular Disease, if Present:    None  Evidence of Heterozygous Familial Hypercholesterolemia (FH): Possible. Pt's father and grandfather had MI in their 60's  ACC/AHA Indication for Statin Therapy, mehdi all that apply:   Established ASCVD: Indication for High intensity statin    Calculated Risk for ASCVD, if applicable:  N/A  Other Significant Risk Markers, if any, mehdi all that apply:  Family history of premature ASCVD in first degree relative   Latest Reference Range & Units 11/28/23 07:20   Lipoprotein (a) <=29 mg/dL 9     National Lipid Association (NLA) Goal (if applicable):  LDL-C:   <70 mg/dL  Lifestyle Recommendations From Today’s Visit:   Eating Plan: Concentrate on  DASH/Med Style diet, Exercise: Increase as tolerated, and Weight Management: Continue to work to decrease  Statin Recommendations from Today's Visit  Will not re-challenge - patient unable to tolerate any dose of statin without myalgias limiting mobility  Non-Statin Medications Recommendations from Today’s Visit:   Continue to HOLD  Nexlizet 180-10 mg once daily -   Indication for PCSK9 Inhibitor, if applicable:  Not currently indicated  Supplements Recommended at this visit:  Continue CoQ10 once daily  Recommendations for Other Cardiovascular Risk Factors, mehdi all that apply:   Hypertension- Managed by PCP/Cards and Diabetes/Impaired Fasting Glucose- Managed by PCP  Other Issues:  Patient developed rash and has had a flare of colitis requiring steroids. Discussed options with patient and he prefers to hold off on any further medications until he has a colonoscopy next week and has time to recover from his current flare. Based on shared decision making will have patient take a medication holiday for 1 month and then consider therapy with either PCSK9i or potentially Nexlizet and potentially premedicating with OTC allergy medication to help reduce an immune response.     Studies Ordered at Todays Visit:  None   Blood Work Ordered At Today’s visit:   lipid panel, ApoB  Follow-Up:   1 months    Reg LangD    Agree with above.   Given very young presentation of CAD and family history would consider targeting LDL <55 and apoB <60  Probably a good candidate for pcsk9i strategy at this point - either mab or potentially leqvio if he wants to limit injections (prior stated injection phobia)  Given very high risk ascvd would also check a CRP and if elevated consider colchicine.     Michael J Bloch, MD  Certified Clinical Lipid Specialist  Medial Director, [facility]    CC:  Shila Mullen D.O.  Xiang, Iván SEGOVIA, *

## 2024-09-17 PROCEDURE — RXMED WILLOW AMBULATORY MEDICATION CHARGE: Performed by: NURSE PRACTITIONER

## 2024-09-18 ENCOUNTER — PHARMACY VISIT (OUTPATIENT)
Dept: PHARMACY | Facility: MEDICAL CENTER | Age: 74
End: 2024-09-18
Payer: COMMERCIAL

## 2024-09-19 ENCOUNTER — APPOINTMENT (RX ONLY)
Dept: URBAN - METROPOLITAN AREA CLINIC 20 | Facility: CLINIC | Age: 74
Setting detail: DERMATOLOGY
End: 2024-09-19

## 2024-09-19 DIAGNOSIS — L57.0 ACTINIC KERATOSIS: ICD-10-CM

## 2024-09-19 DIAGNOSIS — D18.0 HEMANGIOMA: ICD-10-CM

## 2024-09-19 DIAGNOSIS — Z71.89 OTHER SPECIFIED COUNSELING: ICD-10-CM

## 2024-09-19 DIAGNOSIS — D22 MELANOCYTIC NEVI: ICD-10-CM

## 2024-09-19 DIAGNOSIS — L82.1 OTHER SEBORRHEIC KERATOSIS: ICD-10-CM

## 2024-09-19 DIAGNOSIS — Z85.828 PERSONAL HISTORY OF OTHER MALIGNANT NEOPLASM OF SKIN: ICD-10-CM

## 2024-09-19 DIAGNOSIS — L81.4 OTHER MELANIN HYPERPIGMENTATION: ICD-10-CM

## 2024-09-19 DIAGNOSIS — L91.8 OTHER HYPERTROPHIC DISORDERS OF THE SKIN: ICD-10-CM

## 2024-09-19 PROBLEM — D18.01 HEMANGIOMA OF SKIN AND SUBCUTANEOUS TISSUE: Status: ACTIVE | Noted: 2024-09-19

## 2024-09-19 PROBLEM — D22.62 MELANOCYTIC NEVI OF LEFT UPPER LIMB, INCLUDING SHOULDER: Status: ACTIVE | Noted: 2024-09-19

## 2024-09-19 PROBLEM — D22.5 MELANOCYTIC NEVI OF TRUNK: Status: ACTIVE | Noted: 2024-09-19

## 2024-09-19 PROBLEM — D22.61 MELANOCYTIC NEVI OF RIGHT UPPER LIMB, INCLUDING SHOULDER: Status: ACTIVE | Noted: 2024-09-19

## 2024-09-19 PROCEDURE — 17000 DESTRUCT PREMALG LESION: CPT

## 2024-09-19 PROCEDURE — ? OBSERVATION

## 2024-09-19 PROCEDURE — 99213 OFFICE O/P EST LOW 20 MIN: CPT | Mod: 25

## 2024-09-19 PROCEDURE — ? SUNSCREEN RECOMMENDATIONS

## 2024-09-19 PROCEDURE — 17003 DESTRUCT PREMALG LES 2-14: CPT

## 2024-09-19 PROCEDURE — ? LIQUID NITROGEN

## 2024-09-19 PROCEDURE — ? COUNSELING

## 2024-09-19 ASSESSMENT — LOCATION SIMPLE DESCRIPTION DERM
LOCATION SIMPLE: POSTERIOR NECK
LOCATION SIMPLE: ABDOMEN
LOCATION SIMPLE: RIGHT FOREARM
LOCATION SIMPLE: RIGHT FOREHEAD
LOCATION SIMPLE: LEFT FOREARM
LOCATION SIMPLE: SCALP
LOCATION SIMPLE: RIGHT HAND
LOCATION SIMPLE: LEFT HAND
LOCATION SIMPLE: UPPER BACK
LOCATION SIMPLE: RIGHT UPPER BACK

## 2024-09-19 ASSESSMENT — LOCATION DETAILED DESCRIPTION DERM
LOCATION DETAILED: RIGHT RADIAL DORSAL HAND
LOCATION DETAILED: RIGHT INFERIOR MEDIAL FOREHEAD
LOCATION DETAILED: MID POSTERIOR NECK
LOCATION DETAILED: LEFT VENTRAL PROXIMAL FOREARM
LOCATION DETAILED: RIGHT VENTRAL DISTAL FOREARM
LOCATION DETAILED: LEFT RADIAL DORSAL HAND
LOCATION DETAILED: INFERIOR THORACIC SPINE
LOCATION DETAILED: LEFT SUPERIOR FRONTAL SCALP
LOCATION DETAILED: LEFT RIB CAGE
LOCATION DETAILED: RIGHT INFERIOR UPPER BACK
LOCATION DETAILED: LEFT PROXIMAL DORSAL FOREARM
LOCATION DETAILED: RIGHT MEDIAL UPPER BACK
LOCATION DETAILED: SUPERIOR THORACIC SPINE
LOCATION DETAILED: RIGHT PROXIMAL DORSAL FOREARM

## 2024-09-19 ASSESSMENT — LOCATION ZONE DERM
LOCATION ZONE: TRUNK
LOCATION ZONE: NECK
LOCATION ZONE: HAND
LOCATION ZONE: FACE
LOCATION ZONE: SCALP
LOCATION ZONE: ARM

## 2024-09-19 NOTE — PROCEDURE: LIQUID NITROGEN
Show Applicator Variable?: Yes
Post-Care Instructions: I reviewed with the patient in detail post-care instructions. Patient is to wear sunprotection, and avoid picking at any of the treated lesions. Pt may apply Vaseline to crusted or scabbing areas.
Duration Of Freeze Thaw-Cycle (Seconds): 10
Number Of Freeze-Thaw Cycles: 2 freeze-thaw cycles
Detail Level: Detailed
Render Note In Bullet Format When Appropriate: No
Consent: The patient's consent was obtained including but not limited to risks of crusting, scabbing, blistering, scarring, darker or lighter pigmentary change, recurrence, incomplete removal and infection. RTC in 2 months if lesion(s) persistent.

## 2024-10-16 ENCOUNTER — HOSPITAL ENCOUNTER (OUTPATIENT)
Dept: LAB | Facility: MEDICAL CENTER | Age: 74
End: 2024-10-16
Attending: INTERNAL MEDICINE
Payer: MEDICARE

## 2024-10-16 LAB
CHOLEST SERPL-MCNC: 184 MG/DL (ref 100–199)
CRP SERPL HS-MCNC: 4.9 MG/L (ref 0–3)
FASTING STATUS PATIENT QL REPORTED: NORMAL
HDLC SERPL-MCNC: 66 MG/DL
LDLC SERPL CALC-MCNC: 94 MG/DL
TRIGL SERPL-MCNC: 121 MG/DL (ref 0–149)

## 2024-10-16 PROCEDURE — 36415 COLL VENOUS BLD VENIPUNCTURE: CPT

## 2024-10-16 PROCEDURE — 82172 ASSAY OF APOLIPOPROTEIN: CPT

## 2024-10-16 PROCEDURE — 80061 LIPID PANEL: CPT

## 2024-10-16 PROCEDURE — 86141 C-REACTIVE PROTEIN HS: CPT

## 2024-10-17 LAB — APO B100 SERPL-MCNC: 97 MG/DL (ref 66–133)

## 2024-10-21 ENCOUNTER — OFFICE VISIT (OUTPATIENT)
Dept: MEDICAL GROUP | Facility: MEDICAL CENTER | Age: 74
End: 2024-10-21
Payer: MEDICARE

## 2024-10-21 VITALS
DIASTOLIC BLOOD PRESSURE: 89 MMHG | SYSTOLIC BLOOD PRESSURE: 100 MMHG | HEART RATE: 84 BPM | RESPIRATION RATE: 15 BRPM | OXYGEN SATURATION: 96 %

## 2024-10-21 DIAGNOSIS — E78.5 DYSLIPIDEMIA: ICD-10-CM

## 2024-10-21 PROCEDURE — 3079F DIAST BP 80-89 MM HG: CPT | Performed by: INTERNAL MEDICINE

## 2024-10-21 PROCEDURE — 3074F SYST BP LT 130 MM HG: CPT | Performed by: INTERNAL MEDICINE

## 2024-10-21 PROCEDURE — 99211 OFF/OP EST MAY X REQ PHY/QHP: CPT | Performed by: INTERNAL MEDICINE

## 2024-10-22 ENCOUNTER — TELEPHONE (OUTPATIENT)
Dept: VASCULAR LAB | Facility: MEDICAL CENTER | Age: 74
End: 2024-10-22
Payer: MEDICARE

## 2024-10-30 ENCOUNTER — HOSPITAL ENCOUNTER (OUTPATIENT)
Dept: LAB | Facility: MEDICAL CENTER | Age: 74
End: 2024-10-30
Attending: NURSE PRACTITIONER
Payer: MEDICARE

## 2024-10-30 LAB
ALBUMIN SERPL BCP-MCNC: 3.9 G/DL (ref 3.2–4.9)
ALBUMIN/GLOB SERPL: 0.9 G/DL
ALP SERPL-CCNC: 99 U/L (ref 30–99)
ALT SERPL-CCNC: 8 U/L (ref 2–50)
ANION GAP SERPL CALC-SCNC: 11 MMOL/L (ref 7–16)
AST SERPL-CCNC: 16 U/L (ref 12–45)
BASOPHILS # BLD AUTO: 0.8 % (ref 0–1.8)
BASOPHILS # BLD: 0.06 K/UL (ref 0–0.12)
BILIRUB SERPL-MCNC: 0.4 MG/DL (ref 0.1–1.5)
BUN SERPL-MCNC: 13 MG/DL (ref 8–22)
CALCIUM ALBUM COR SERPL-MCNC: 9.4 MG/DL (ref 8.5–10.5)
CALCIUM SERPL-MCNC: 9.3 MG/DL (ref 8.4–10.2)
CHLORIDE SERPL-SCNC: 100 MMOL/L (ref 96–112)
CO2 SERPL-SCNC: 25 MMOL/L (ref 20–33)
CREAT SERPL-MCNC: 0.86 MG/DL (ref 0.5–1.4)
CRP SERPL HS-MCNC: 0.57 MG/DL (ref 0–0.75)
EOSINOPHIL # BLD AUTO: 0.05 K/UL (ref 0–0.51)
EOSINOPHIL NFR BLD: 0.7 % (ref 0–6.9)
ERYTHROCYTE [DISTWIDTH] IN BLOOD BY AUTOMATED COUNT: 51.6 FL (ref 35.9–50)
ERYTHROCYTE [SEDIMENTATION RATE] IN BLOOD BY WESTERGREN METHOD: 16 MM/HOUR (ref 0–20)
FASTING STATUS PATIENT QL REPORTED: NORMAL
GFR SERPLBLD CREATININE-BSD FMLA CKD-EPI: 90 ML/MIN/1.73 M 2
GLOBULIN SER CALC-MCNC: 4.2 G/DL (ref 1.9–3.5)
GLUCOSE SERPL-MCNC: 105 MG/DL (ref 65–99)
HAV IGM SERPL QL IA: NORMAL
HBV CORE IGM SER QL: NORMAL
HBV SURFACE AG SER QL: NORMAL
HCT VFR BLD AUTO: 41.7 % (ref 42–52)
HCV AB SER QL: NORMAL
HGB BLD-MCNC: 12.9 G/DL (ref 14–18)
IMM GRANULOCYTES # BLD AUTO: 0.04 K/UL (ref 0–0.11)
IMM GRANULOCYTES NFR BLD AUTO: 0.5 % (ref 0–0.9)
LYMPHOCYTES # BLD AUTO: 1.28 K/UL (ref 1–4.8)
LYMPHOCYTES NFR BLD: 17.1 % (ref 22–41)
MCH RBC QN AUTO: 25.6 PG (ref 27–33)
MCHC RBC AUTO-ENTMCNC: 30.9 G/DL (ref 32.3–36.5)
MCV RBC AUTO: 82.9 FL (ref 81.4–97.8)
MONOCYTES # BLD AUTO: 0.98 K/UL (ref 0–0.85)
MONOCYTES NFR BLD AUTO: 13.1 % (ref 0–13.4)
NEUTROPHILS # BLD AUTO: 5.07 K/UL (ref 1.82–7.42)
NEUTROPHILS NFR BLD: 67.8 % (ref 44–72)
NRBC # BLD AUTO: 0 K/UL
NRBC BLD-RTO: 0 /100 WBC (ref 0–0.2)
PLATELET # BLD AUTO: 474 K/UL (ref 164–446)
PMV BLD AUTO: 9.4 FL (ref 9–12.9)
POTASSIUM SERPL-SCNC: 4.6 MMOL/L (ref 3.6–5.5)
PROT SERPL-MCNC: 8.1 G/DL (ref 6–8.2)
RBC # BLD AUTO: 5.03 M/UL (ref 4.7–6.1)
SODIUM SERPL-SCNC: 136 MMOL/L (ref 135–145)
WBC # BLD AUTO: 7.5 K/UL (ref 4.8–10.8)

## 2024-10-30 PROCEDURE — 85025 COMPLETE CBC W/AUTO DIFF WBC: CPT

## 2024-10-30 PROCEDURE — 86480 TB TEST CELL IMMUN MEASURE: CPT

## 2024-10-30 PROCEDURE — 80074 ACUTE HEPATITIS PANEL: CPT

## 2024-10-30 PROCEDURE — 80053 COMPREHEN METABOLIC PANEL: CPT

## 2024-10-30 PROCEDURE — 85652 RBC SED RATE AUTOMATED: CPT

## 2024-10-30 PROCEDURE — 36415 COLL VENOUS BLD VENIPUNCTURE: CPT

## 2024-10-30 PROCEDURE — 86140 C-REACTIVE PROTEIN: CPT

## 2024-10-31 ENCOUNTER — HOSPITAL ENCOUNTER (OUTPATIENT)
Facility: MEDICAL CENTER | Age: 74
End: 2024-10-31
Attending: NURSE PRACTITIONER
Payer: MEDICARE

## 2024-10-31 LAB
GAMMA INTERFERON BACKGROUND BLD IA-ACNC: 0.12 IU/ML
LACTOFERRIN STL QL IA: POSITIVE
M TB IFN-G BLD-IMP: NEGATIVE
M TB IFN-G CD4+ BCKGRND COR BLD-ACNC: -0.01 IU/ML
MITOGEN IGNF BCKGRD COR BLD-ACNC: >10 IU/ML
QFT TB2 - NIL TBQ2: 0 IU/ML

## 2024-10-31 PROCEDURE — 83630 LACTOFERRIN FECAL (QUAL): CPT

## 2024-10-31 PROCEDURE — 83993 ASSAY FOR CALPROTECTIN FECAL: CPT

## 2024-11-04 LAB — CALPROTECTIN STL-MCNT: 1120 UG/G

## 2024-11-20 ENCOUNTER — APPOINTMENT (OUTPATIENT)
Dept: MEDICAL GROUP | Facility: PHYSICIAN GROUP | Age: 74
End: 2024-11-20
Payer: MEDICARE

## 2024-11-20 VITALS
HEIGHT: 71 IN | OXYGEN SATURATION: 95 % | SYSTOLIC BLOOD PRESSURE: 116 MMHG | BODY MASS INDEX: 28.71 KG/M2 | TEMPERATURE: 96.6 F | RESPIRATION RATE: 16 BRPM | HEART RATE: 90 BPM | WEIGHT: 205.1 LBS | DIASTOLIC BLOOD PRESSURE: 60 MMHG

## 2024-11-20 DIAGNOSIS — M25.562 CHRONIC PAIN OF LEFT KNEE: ICD-10-CM

## 2024-11-20 DIAGNOSIS — K51.00 ULCERATIVE PANCOLITIS (HCC): ICD-10-CM

## 2024-11-20 DIAGNOSIS — M19.90 ARTHRITIS: ICD-10-CM

## 2024-11-20 DIAGNOSIS — I10 ESSENTIAL HYPERTENSION, BENIGN: ICD-10-CM

## 2024-11-20 DIAGNOSIS — E78.5 DYSLIPIDEMIA: ICD-10-CM

## 2024-11-20 DIAGNOSIS — G89.29 CHRONIC PAIN OF LEFT KNEE: ICD-10-CM

## 2024-11-20 PROCEDURE — 99214 OFFICE O/P EST MOD 30 MIN: CPT | Performed by: INTERNAL MEDICINE

## 2024-11-20 PROCEDURE — 3074F SYST BP LT 130 MM HG: CPT | Performed by: INTERNAL MEDICINE

## 2024-11-20 PROCEDURE — 3078F DIAST BP <80 MM HG: CPT | Performed by: INTERNAL MEDICINE

## 2024-11-20 ASSESSMENT — FIBROSIS 4 INDEX: FIB4 SCORE: 0.9

## 2024-11-20 NOTE — PROGRESS NOTES
Subjective:   Chief Complaint/History of Present Illness:  Curt Blair is a 74 y.o. male established patient who presents today to discuss medical problems as listed below. Curt is unaccompanied for today's visit.    History of Present Illness  The patient is a 74-year-old male who presents for a regular follow-up.    A few months ago, he experienced a sudden flare-up of colitis, for which he was prescribed budesonide.  A colonoscopy performed in September 2024 showed no growths, but biopsies were taken due to swelling, suggesting possible Crohn's disease. He has been on mesalamine without any issues and was advised to continue it.  His bowel movements are soft and formed, and he maintains a high-fiber diet with fiber capsules. He has stopped taking his arthritis medication due to its potential impact on his colon.    He recalls raising concern from a prior CT abdomen scan revealed an enlarged prostate with granular features and evidence of atherosclerosis aside from the colitis. They were thought to be attributed to medications and the colitis. He has a cyst on one of his kidneys but reports no issues with urination.     He has undergone four knee surgeries, three on the right and one on the left. The left knee still causes significant discomfort. About a month ago, 50 cc of bloody fluid was drained from his knee, and he was informed it would take about a year for all the fluid to drain. He attends physical therapy twice a week and exercises at home.    He had a heart attack at 41, requiring a 9-day stay in intensive care and subsequent angioplasty. He had two more angioplasties and an atherectomy at [place]. He had two stents placed 20 years ago and has had no issues since. He sees his cardiologist annually and undergoes a stress test. He has been on Lipitor for 30 years, which was effective until the dosage was changed, causing cramps. He was then switched to Nexletol, which worked well until he  "developed hives after his last surgery. He is currently on Repatha injections, with the next one due on Tuesday.  He takes baby aspirin.    He is on duloxetine for back pain due to pinched nerves at L4 and L5. He has recently started taking turmeric, which seems to help. He continues to take vitamin D supplements and reports no numbness or tingling in his hands or feet.         Current Medications:  Current Outpatient Medications Ordered in Epic   Medication Sig Dispense Refill    Evolocumab (REPATHA) 140 MG/ML Solution Auto-injector SubQ injection pen Inject 1 mL under the skin every 14 days. 6 mL 3    amlodipine-benazepril (LOTREL) 5-10 MG per capsule Take 1 Capsule by mouth every day. 100 Capsule 2    nitroglycerin (NITROSTAT) 0.4 MG SL Tab Place 1 Tablet under the tongue as needed for Chest Pain. 25 Tablet 11    mesalamine (LIALDA) 1.2 GM Tablet Delayed Response Take 4 Tablets by mouth every day. (Patient taking differently: Take 2.4 g by mouth 2 times a day.) 400 Tablet 3    DULoxetine (CYMBALTA) 60 MG Cap DR Particles delayed-release capsule Take 1 Capsule by mouth every day. 100 Capsule 3    CALCIUM-VITAMIN D PO Take 1 Tablet by mouth every day.      Coenzyme Q10 300 MG Cap Take 1 Capsule by mouth every day. 30 Capsule     Multiple Vitamins-Minerals (MULTIVITAMIN PO) Take 1 Tablet by mouth every morning.      aspirin 81 MG tablet Take 81 mg by mouth every day.       No current [facility]-ordered facility-administered medications on file.          Objective:   Physical Exam:    Vitals: /60 (BP Location: Right arm, Patient Position: Sitting, BP Cuff Size: Adult)   Pulse 90   Temp 35.9 °C (96.6 °F) (Temporal)   Resp 16   Ht 1.803 m (5' 11\")   Wt 93 kg (205 lb 1.6 oz)   SpO2 95%    BMI: Body mass index is 28.61 kg/m².  Physical Exam  Vitals reviewed.   Constitutional:       Appearance: Normal appearance.   HENT:      Head: Normocephalic and atraumatic.      Comments: Bilateral ear canals: Moderate amount of " cerumen     Right Ear: Tympanic membrane normal.      Left Ear: Tympanic membrane normal.      Nose: Nose normal. No rhinorrhea.      Mouth/Throat:      Mouth: Mucous membranes are moist.      Pharynx: Oropharynx is clear.   Eyes:      General: No scleral icterus.     Extraocular Movements: Extraocular movements intact.      Conjunctiva/sclera: Conjunctivae normal.      Pupils: Pupils are equal, round, and reactive to light.   Cardiovascular:      Rate and Rhythm: Normal rate and regular rhythm.      Pulses: Normal pulses.      Heart sounds: Normal heart sounds. No murmur heard.  Pulmonary:      Effort: Pulmonary effort is normal.      Breath sounds: Normal breath sounds. No wheezing or rhonchi.   Abdominal:      General: Bowel sounds are normal. There is no distension.      Palpations: Abdomen is soft. There is no mass.      Tenderness: There is no abdominal tenderness. There is no right CVA tenderness, left CVA tenderness or guarding.      Hernia: No hernia is present.   Musculoskeletal:         General: No swelling (Left knee). Normal range of motion.      Right lower leg: No edema.      Left lower leg: Edema (non-pitting) present.   Lymphadenopathy:      Cervical: No cervical adenopathy.   Skin:     General: Skin is warm and dry.      Findings: No rash.   Neurological:      General: No focal deficit present.      Mental Status: He is alert and oriented to person, place, and time.   Psychiatric:         Mood and Affect: Mood normal.         Thought Content: Thought content normal.         Judgment: Judgment normal.           Results  Laboratory Studies  Total cholesterol 155. Vitamin D level 29. B12 level 585.    Imaging  CT scan showed enlarged prostate with some granular something and evidence of arterial sclerosis. Left renal cyst or kidney cyst, no hydronephrosis. Diverticulosis versus diverticulitis. Evidence of colitis on the transverse and descending. Enlarged prostate.    Testing  Colonoscopy showed no  growths, biopsies came back negative.    Assessment & Plan    1. Crohn's colitis.  He experienced a flare-up of colitis a few months ago and was prescribed mesalamine, which he has been taking without issues. A recent colonoscopy showed no growths, and biopsies came back normal.  He is advised to continue taking mesalamine. The possibility of Crohn's disease is still being considered, and he has a follow-up appointment with his gastroenterologist in January. Follow-up stool tests show elevated inflammatory stool marker. Entyvio at one point was suggested by GI midlevel by he was reassured by GI physician that he can continue on current regimen.    2. Atherosclerosis. History of MI/coronary stenting.  He has a history of atherosclerosis and has had two stents placed. He is currently on Repatha for cholesterol management and will have follow-up blood work in a couple of weeks to assess the effectiveness of the medication. He is also taking a baby aspirin daily. Continue follow up with lipid pharmacist.    3. Hypertension.  He is taking amlodipine-benazepril 5-10 mg daily for hypertension and reports no issues with the medication including lightheadedness. He is advised to continue his current regimen.    4. Arthritis.  He reports ongoing chronic pain in the Left knee more than the right. There is still significant swelling, and fluid was drained from the knee about a month ago. He is advised to continue physical therapy twice a week and perform exercises at home. The surgeon has released him from further follow-up but mentioned that it might take about a year for all the fluid to resolve. No further drainage is recommended due to the risk of infection and scar tissue formation.  He stopped taking his arthritis medication due to concerns about its effects on his colon. He is currently taking duloxetine for chronic pain, which helps with his back issues. He has also started taking turmeric supplements, which seem to  help with the arthritis pain. He is advised to continue with duloxetine and turmeric.    5. Vitamin D Deficiency.  His last vitamin D check in June showed a level of 29. He is advised to continue taking vitamin D supplements.        Assessment and Plan:   Curt is a 74 y.o. male with the following:  Problem List Items Addressed This Visit       Arthritis    Chronic pain of left knee    Dyslipidemia    Essential hypertension, benign    Ulcerative pancolitis (HCC)          RTC: Return in about 3 months (around 2/20/2025).    I spent a total of 30 minutes with record review, exam, communication with the patient, communication with other providers, and documentation of this encounter.    Verbal consent was acquired by the patient to use [employer] ambient listening note generation during this visit Yes       PLEASE NOTE: This dictation was created using voice recognition software. I have made every reasonable attempt to correct obvious errors, but I expect that there are errors of grammar and possibly content that I did not discover before finalizing the note.      Shila Mullen DO  Geriatric and Internal Medicine  [facility]    Kaylan Pelayo MD  Geriatric Medicine fellow

## 2024-12-11 ENCOUNTER — HOSPITAL ENCOUNTER (OUTPATIENT)
Dept: LAB | Facility: MEDICAL CENTER | Age: 74
End: 2024-12-11
Attending: INTERNAL MEDICINE
Payer: MEDICARE

## 2024-12-11 DIAGNOSIS — E78.5 DYSLIPIDEMIA: ICD-10-CM

## 2024-12-11 LAB
CHOLEST SERPL-MCNC: 135 MG/DL (ref 100–199)
CRP SERPL HS-MCNC: 4 MG/L (ref 0–3)
FASTING STATUS PATIENT QL REPORTED: NORMAL
HDLC SERPL-MCNC: 67 MG/DL
LDLC SERPL CALC-MCNC: 49 MG/DL
TRIGL SERPL-MCNC: 93 MG/DL (ref 0–149)

## 2024-12-11 PROCEDURE — 86141 C-REACTIVE PROTEIN HS: CPT

## 2024-12-11 PROCEDURE — 82172 ASSAY OF APOLIPOPROTEIN: CPT

## 2024-12-11 PROCEDURE — 36415 COLL VENOUS BLD VENIPUNCTURE: CPT

## 2024-12-11 PROCEDURE — 80061 LIPID PANEL: CPT

## 2024-12-13 LAB — APO B100 SERPL-MCNC: 60 MG/DL (ref 66–133)

## 2024-12-16 ENCOUNTER — OFFICE VISIT (OUTPATIENT)
Dept: MEDICAL GROUP | Facility: MEDICAL CENTER | Age: 74
End: 2024-12-16
Payer: MEDICARE

## 2024-12-16 VITALS — SYSTOLIC BLOOD PRESSURE: 115 MMHG | HEART RATE: 76 BPM | DIASTOLIC BLOOD PRESSURE: 60 MMHG | OXYGEN SATURATION: 96 %

## 2024-12-16 DIAGNOSIS — E78.5 DYSLIPIDEMIA: ICD-10-CM

## 2024-12-16 PROCEDURE — 99401 PREV MED CNSL INDIV APPRX 15: CPT

## 2024-12-16 PROCEDURE — 3078F DIAST BP <80 MM HG: CPT

## 2024-12-16 PROCEDURE — 3074F SYST BP LT 130 MM HG: CPT

## 2024-12-16 NOTE — PROGRESS NOTES
[facility]  Date of Service: 12/16/24    Start time: 9.20am   End time: 9.45 am     Curt Lamonte Blair presents for management of dyslipidemia    Referral Source: Dr. Iván Otoole  Date First Seen in Clinic 10/05/2023    PERTINENT HLD PMHX  Age at Initial Diagnosis of Dyslipidemia: 40s      Baseline Lipids Prior to Treatment: Unknown    History of ASCVD: History of prior MI >12 months ago: pt had MI at 41yrs old and then also 15 yrs later he had PCI to RCA    Previously Attempted Interventions for Lipids - including outcome  Statin: Atorvastatin      Outcome: myalgias and elevated   Non-Statin: Nexlizet   Outcome: other rash     Secondary causes/contributors (report to medical director if present):   Endocrine/Hypothyroidism:  none reported   Liver disease: none reported   Renal disease/nephrotic syndrome:  none reported  Dietary-induced (ketogenic, lean mass hyper-responder)? no  Medications: None    CURRENT MED MGMT  Current Lipid Lowering Meds:   Statin: None  Non-Statin: Repatha 140mg Q14d  Supplements: None  Current adverse drug reactions/side effects? no  Is Adherence an Issue? no    Any Family History Cardiovascular Disease? yes  Relationship and Age of Onset: grandfather and father MI in their 60s     Vitals:    12/16/24 0943   BP: 115/60   Pulse: 76   SpO2: 96%      BMI Readings from Last 1 Encounters:   11/20/24 28.61 kg/m²      Wt Readings from Last 3 Encounters:   11/20/24 93 kg (205 lb 1.6 oz)   09/16/24 89.4 kg (197 lb)   08/20/24 95.1 kg (209 lb 11.2 oz)     BP Readings from Last 2 Encounters:   12/16/24 115/60   11/20/24 116/60       DATA REVIEW:  Most Recent Lipid Panel:   Lab Results   Component Value Date/Time    CHOLSTRLTOT 135 12/11/2024 07:06 AM    LDL 49 12/11/2024 07:06 AM    HDL 67 12/11/2024 07:06 AM    TRIGLYCERIDE 93 12/11/2024 07:06 AM     Lab Results   Component Value Date/Time    LDL 49 12/11/2024 07:06 AM    LDL 94 10/16/2024 07:12 AM    LDL 83 09/12/2024 07:04 AM     "  No results found for: \"LDLCALC\"   Lab Results   Component Value Date/Time    LIPOPROTA 9 11/28/2023 07:20 AM      Lab Results   Component Value Date/Time    APOB 60 (L) 12/11/2024 07:06 AM      Lab Results   Component Value Date/Time    CRPHIGHSEN 4.0 (H) 12/11/2024 07:06 AM       Other Pertinent Blood Work:   Lab Results   Component Value Date    SODIUM 136 10/30/2024    POTASSIUM 4.6 10/30/2024    CHLORIDE 100 10/30/2024    CO2 25 10/30/2024    ANION 11.0 10/30/2024    GLUCOSE 105 (H) 10/30/2024    BUN 13 10/30/2024    CREATININE 0.86 10/30/2024    CALCIUM 9.3 10/30/2024    ASTSGOT 16 10/30/2024    ALTSGPT 8 10/30/2024    ALKPHOSPHAT 99 10/30/2024    TBILIRUBIN 0.4 10/30/2024    ALBUMIN 3.9 10/30/2024    AGRATIO 0.9 10/30/2024    TSHULTRASEN 3.330 06/06/2024    CPKTOTAL 279 (H) 08/21/2024       VASCULAR IMAGING:  CAC Score (if available): N/A  CIMT/Vascular screen (if available): N/A  Other (if applicable): N/A    ASSESSMENT AND PLAN    Patient Type, check all that apply: Secondary Prevention    Major ASCVD events: History of prior MI >12 months ago: pt had MI at age 41 then 2nd event at age 56 he underwent PCI to RCA    Evidence of genetic dyslipidemia (Familial Hyperlipidemia): Unknown     ASCVD risk calculations (if applicable)  N/A    ACC/AHA Indication for Statin Therapy:  Established ASCVD: Indication for High intensity statin     Other Significant Risk Markers:  High-risk conditions: >64yr old , Prior CABG or PCI outside of the major ASCVD event(s) , and Hypertension   Risk-enhancers: Famhx of premature ASCVD, Inflammatory diseases: RA, psoriasis, HIV, and hs-CRP >1.9  Lipoprotein(a): Favorable (<30 mg/dl or <75 nmol/L)  Most recent CAC percentile: unknown     Lipid Goals:  Primary: LDL-C <55 mg/dl  Secondary apoB <60 mg/dl  At goals? yes    LIFESTYLE INTERVENTIONS  TOBACCO: former smoker  Continued complete avoidance of all tobacco products   EtOH: rare usage   Men: No more than two standard servings per " day  Women: No more than one standard serving per day  PHYSICAL ACTIVITY:  no regular exercise reported   NUTRITION: DASH  diet    LIPID-LOWERING MEDICATION MANAGEMENT:     Statin Therapy:   Would not rechallenge at this time due to patient's intolerance    Non-Statin Meds:   EZETIMIBE: Not currently indicated  NEXLETOL/NEXLIZET (avoid simva >20, prava >40): Not currently indicated  BAS: Not currently indicated    OMEGA-3 FAs: Not currently indicated  FIBRATE:  Not currently indicated  PCSK9 mAb: Continue Repatha 140 mg subQ Q14D  Indication: ASCVD with suboptimal control of LDL-C despite maximally tolerated statin   LEQVIO: Not currently indicated      Recommended Supplements: None     Studies Ordered at Todays Visit: None   Blood Work To Be Obtained Prior to Next Visit: Lipid panel, CMP, and ApoB  Follow-Up: 6 months    Haylie FinneyD      CC:  Katherine A Thyssen, D.O. Michael Bloch, M.D.

## 2024-12-26 ENCOUNTER — PATIENT MESSAGE (OUTPATIENT)
Dept: MEDICAL GROUP | Facility: PHYSICIAN GROUP | Age: 74
End: 2024-12-26
Payer: MEDICARE

## 2024-12-26 DIAGNOSIS — J40 BRONCHITIS: ICD-10-CM

## 2025-01-15 ENCOUNTER — HOSPITAL ENCOUNTER (OUTPATIENT)
Dept: LAB | Facility: MEDICAL CENTER | Age: 75
End: 2025-01-15
Attending: INTERNAL MEDICINE
Payer: MEDICARE

## 2025-01-15 LAB
CRP SERPL HS-MCNC: 0.41 MG/DL (ref 0–0.75)
ERYTHROCYTE [SEDIMENTATION RATE] IN BLOOD BY WESTERGREN METHOD: 15 MM/HOUR (ref 0–20)

## 2025-01-15 PROCEDURE — 36415 COLL VENOUS BLD VENIPUNCTURE: CPT

## 2025-01-15 PROCEDURE — 85652 RBC SED RATE AUTOMATED: CPT

## 2025-01-15 PROCEDURE — 86140 C-REACTIVE PROTEIN: CPT

## 2025-01-16 ENCOUNTER — HOSPITAL ENCOUNTER (OUTPATIENT)
Facility: MEDICAL CENTER | Age: 75
End: 2025-01-16
Attending: NURSE PRACTITIONER
Payer: MEDICARE

## 2025-01-16 LAB — LACTOFERRIN STL QL IA: POSITIVE

## 2025-01-16 PROCEDURE — 83630 LACTOFERRIN FECAL (QUAL): CPT

## 2025-01-16 PROCEDURE — 83993 ASSAY FOR CALPROTECTIN FECAL: CPT

## 2025-01-18 LAB — CALPROTECTIN STL-MCNT: 767 UG/G

## 2025-02-17 DIAGNOSIS — K51.00 ULCERATIVE PANCOLITIS (HCC): ICD-10-CM

## 2025-02-17 PROCEDURE — RXMED WILLOW AMBULATORY MEDICATION CHARGE: Performed by: NURSE PRACTITIONER

## 2025-02-18 ENCOUNTER — PHARMACY VISIT (OUTPATIENT)
Dept: PHARMACY | Facility: MEDICAL CENTER | Age: 75
End: 2025-02-18
Payer: COMMERCIAL

## 2025-02-18 RX ORDER — MESALAMINE 1.2 G/1
4.8 TABLET, DELAYED RELEASE ORAL
Qty: 400 TABLET | Refills: 3 | Status: SHIPPED | OUTPATIENT
Start: 2025-02-18

## 2025-02-18 NOTE — TELEPHONE ENCOUNTER
Received request via: Patient    Was the patient seen in the last year in this department? Yes    Does the patient have an active prescription (recently filled or refills available) for medication(s) requested? No    Does the patient have shelter Plus and need 100-day supply? (This applies to ALL medications) Yes, quantity updated to 100 days

## 2025-02-20 ENCOUNTER — OFFICE VISIT (OUTPATIENT)
Dept: MEDICAL GROUP | Facility: PHYSICIAN GROUP | Age: 75
End: 2025-02-20
Payer: MEDICARE

## 2025-02-20 VITALS
HEIGHT: 71 IN | HEART RATE: 82 BPM | BODY MASS INDEX: 29.86 KG/M2 | WEIGHT: 213.3 LBS | DIASTOLIC BLOOD PRESSURE: 64 MMHG | TEMPERATURE: 96.7 F | OXYGEN SATURATION: 95 % | SYSTOLIC BLOOD PRESSURE: 98 MMHG | RESPIRATION RATE: 16 BRPM

## 2025-02-20 DIAGNOSIS — I10 ESSENTIAL HYPERTENSION, BENIGN: ICD-10-CM

## 2025-02-20 DIAGNOSIS — M15.9 GENERALIZED OSTEOARTHRITIS OF HAND: ICD-10-CM

## 2025-02-20 DIAGNOSIS — R35.1 NOCTURIA: ICD-10-CM

## 2025-02-20 DIAGNOSIS — G89.29 CHRONIC BILATERAL LOW BACK PAIN WITH RIGHT-SIDED SCIATICA: ICD-10-CM

## 2025-02-20 DIAGNOSIS — M25.562 CHRONIC PAIN OF LEFT KNEE: ICD-10-CM

## 2025-02-20 DIAGNOSIS — E78.5 DYSLIPIDEMIA: ICD-10-CM

## 2025-02-20 DIAGNOSIS — M54.41 CHRONIC BILATERAL LOW BACK PAIN WITH RIGHT-SIDED SCIATICA: ICD-10-CM

## 2025-02-20 DIAGNOSIS — G89.29 CHRONIC PAIN OF LEFT KNEE: ICD-10-CM

## 2025-02-20 DIAGNOSIS — K51.00 ULCERATIVE PANCOLITIS (HCC): ICD-10-CM

## 2025-02-20 PROCEDURE — 99214 OFFICE O/P EST MOD 30 MIN: CPT | Performed by: INTERNAL MEDICINE

## 2025-02-20 PROCEDURE — 3074F SYST BP LT 130 MM HG: CPT | Performed by: INTERNAL MEDICINE

## 2025-02-20 PROCEDURE — 3078F DIAST BP <80 MM HG: CPT | Performed by: INTERNAL MEDICINE

## 2025-02-20 RX ORDER — AMLODIPINE AND BENAZEPRIL HYDROCHLORIDE 5; 10 MG/1; MG/1
1 CAPSULE ORAL
Qty: 100 CAPSULE | Refills: 3 | Status: SHIPPED | OUTPATIENT
Start: 2025-02-20

## 2025-02-20 RX ORDER — DULOXETIN HYDROCHLORIDE 60 MG/1
60 CAPSULE, DELAYED RELEASE ORAL DAILY
Qty: 100 CAPSULE | Refills: 3 | Status: SHIPPED | OUTPATIENT
Start: 2025-02-20

## 2025-02-20 ASSESSMENT — FIBROSIS 4 INDEX: FIB4 SCORE: 0.9

## 2025-02-20 ASSESSMENT — PATIENT HEALTH QUESTIONNAIRE - PHQ9: CLINICAL INTERPRETATION OF PHQ2 SCORE: 0

## 2025-02-20 NOTE — PROGRESS NOTES
Subjective:   Chief Complaint/History of Present Illness:  Curt Blair is a 74 y.o. male established patient who presents today to discuss medical problems as listed below. Curt is unaccompanied for today's visit.    History of Present Illness  The patient presents for evaluation of bilateral knee pain, prostate enlargement, cholesterol management, blood pressure management, and medication management.    He continues to experience bilateral knee pain, which has shown some improvement. He is wearing a knee brace. He had a consultation with Dr. Varner last week, during which x-rays were performed on both knees, revealing that all components are appropriately positioned. However, there is persistent fluid accumulation in the left knee. Dr. Varner advised against aspiration, suggesting that the fluid would likely reaccumulate. He was informed that the recovery process could take up to a year, with his case approaching this timeline in July 2025. He was encouraged to continue physical therapy and stretching/strengthening exercises. He reports increased strength and has progressed from 1-inch to 6-inch step exercises. Initially, he experienced jerking movements during these exercises, but this has since resolved.    He has been advised by a NP at GI to start Entyvio infusions, citing potential risks of cancer and intestinal twisting if not initiated. However, he has expressed a desire to delay this treatment due to lack of symptoms and improved stool inflammation markers. He has been self-administering injections for cholesterol management without issue. Despite GI NP's concern that mesalamine would not be effective, he reports that it has been beneficial. Recent fecal and blood tests indicate a significant decrease in his numbers. He has an upcoming follow up in Spring 2025.    He reports severe arthritis in his hands, which has not responded to various treatments. He discontinued his long-term arthritis  medication due to potential interactions with other medications. He has been taking Omega XL, which he believes has provided some relief. He experiences pain at night, which has disrupted his sleep. He has also tried Voltaren cream, which provided temporary relief.    A CT scan in August 2024 revealed an enlarged prostate. He reports nocturia, waking up 4 to 5 times over two consecutive nights, and a slow urinary stream with incomplete bladder emptying. These symptoms have since resolved, but he experienced a recurrence last night. He has not taken any antihistamines or allergy medications recently. He has not previously been treated for prostate enlargement. He reports no dizziness or lightheadedness during exercise. He maintains an active sex life.    He has been on Repatha injections for cholesterol management, administered every 2 weeks. His last blood work was conducted approximately 8 to 10 weeks ago, and he plans to repeat it in June 2025. He has been advised to take over-the-counter vitamin D3 2000 units daily. He has been on baby aspirin for several years without any issues. He takes Tylenol at night. He has an appointment with his cardiologist in early May 2025. He underwent a stress test approximately a year ago. He has a history of myocardial infarction and he has had 2 stents placed.    He has 16 tablets of amoxicillin remaining and is scheduled for a deep dental cleaning. He has started using a Waterpik.    Supplemental Information  He has varicose veins in both legs. He has an umbilical hernia, which does not bother him. Dr. Louie advised him not to touch it and to go to the emergency room if he develops symptoms of incarceration.    MEDICATIONS  Current: Repatha, baby aspirin, Tylenol, duloxetine, mesalamine, amlodipine, benazepril, Flonase, Omega XL, Voltaren cream     Current Medications:  Current Outpatient Medications Ordered in Epic   Medication Sig Dispense Refill    DULoxetine (CYMBALTA)  "60 MG Cap DR Particles delayed-release capsule Take 1 Capsule by mouth every day. 100 Capsule 3    amlodipine-benazepril (LOTREL) 5-10 MG per capsule Take 1 Capsule by mouth every day. 100 Capsule 3    mesalamine (LIALDA) 1.2 GM Tablet Delayed Response Take 4 Tablets by mouth every day. 400 Tablet 3    chlorhexidine (PERIDEX) 0.12 % Solution Rinse 1/2 oz. twice daily for 30 seconds after breakfast and before bedtime for two weeks only. 473 mL 2    Evolocumab (REPATHA) 140 MG/ML Solution Auto-injector SubQ injection pen Inject 1 mL under the skin every 14 days. 6 mL 3    amoxicillin (AMOXIL) 500 MG Cap Take 4 Capsules by mouth 1 hour prior to dental appointment. 16 Capsule 1    nitroglycerin (NITROSTAT) 0.4 MG SL Tab Place 1 Tablet under the tongue as needed for Chest Pain. 25 Tablet 11    Multiple Vitamins-Minerals (MULTIVITAMIN PO) Take 1 Tablet by mouth every morning.      aspirin 81 MG tablet Take 81 mg by mouth every day.       No current [facility]-ordered facility-administered medications on file.          Objective:   Physical Exam:    Vitals: BP 98/64 (BP Location: Left arm, Patient Position: Sitting, BP Cuff Size: Adult)   Pulse 82   Temp 35.9 °C (96.7 °F) (Temporal)   Resp 16   Ht 1.803 m (5' 11\")   Wt 96.8 kg (213 lb 4.8 oz)   SpO2 95%    BMI: Body mass index is 29.75 kg/m².  Physical Exam  Constitutional:       General: He is not in acute distress.     Appearance: Normal appearance. He is not ill-appearing.   HENT:      Right Ear: Ear canal and external ear normal.      Left Ear: Ear canal and external ear normal.      Ears:      Comments: Light-moderate ear wax bilaterally  Eyes:      General: No scleral icterus.     Conjunctiva/sclera: Conjunctivae normal.   Cardiovascular:      Rate and Rhythm: Normal rate and regular rhythm.      Pulses: Normal pulses.   Pulmonary:      Effort: Pulmonary effort is normal. No respiratory distress.      Breath sounds: Normal breath sounds. No wheezing or rhonchi. "   Abdominal:      General: Bowel sounds are normal. There is no distension.      Palpations: Abdomen is soft.   Musculoskeletal:         General: Swelling and tenderness present.      Right lower leg: No edema.      Left lower leg: No edema.      Comments: Left knee   Skin:     General: Skin is warm and dry.      Findings: No rash.   Psychiatric:         Mood and Affect: Mood normal.         Behavior: Behavior normal.         Thought Content: Thought content normal.         Judgment: Judgment normal.           Results  Laboratory Studies  Cholesterol levels: LDL was down 49 and total was 135. Apolipoprotein B came down.    Imaging  X-rays of both knees show everything is in place, but there is still fluid present.  CT scan in August 2024 showed prostate was enlarged.    Assessment & Plan  1. Bilateral knee pain.  2. Chronic low back pain with right sided sciatica  The patient reports ongoing knee pain and swelling, with some improvement noted. X-rays confirmed that everything is in place, but there is still fluid present. He was advised by Dr. Varner to continue physical therapy and stretching exercises. If symptoms persist, he should follow up with Dr. Varner in July 2025.    3. Indeterminate ulcerative pancolitis.  The patient's condition has shown significant improvement, with a reduction in the stool inflammatory markers from 2800 to 700s. The trajectory of his condition suggests a return to baseline levels. He plans to continue with mesalamine and follow up with GI this Spring. No recent budesonide use. Stools are stable currently. Avoiding NSAIDs as this can be a potential trigger.    4. Hand arthritis.  The patient reports worsening arthritis in his hands and has stopped taking his previous arthritis medication due to potential conflicts with other treatments. He has been using Omega XL and Voltaren cream with some relief but still experiences significant pain, especially at night. He will continue with the  current treatment plan.    5. BPH with LUTS  The patient reports nocturia and a slow stream, with occasional episodes of feeling unable to empty his bladder. He has not been on any prostate-shrinking medications before. He was advised to monitor his symptoms and report any changes. If symptoms persist, a low dose of tamsulosin or Cialis may be considered, keeping in mind the potential side effects on blood pressure.    6. Dyslipidemia  The patient is currently on Repatha injections every 2 weeks, which has significantly lowered his LDL and total cholesterol levels. He will continue with the current treatment plan and follow up with his cardiologist in May 2025.    7. Hypertension, now overtreated?  The patient's blood pressure is on the lower end of normal, and he is currently on benazepril and amlodipine. He was advised to monitor his blood pressure at home and report any symptoms of lightheadedness or dizziness. Adjustments to his blood pressure medications may be considered in the future if needed.    8. Medication management.  The patient requested a renewal for duloxetine (Cymbalta), which was approved. His mesalamine prescription was renewed on February 18, 2025, and his Repatha prescription is active at the pharmacy. He was advised to continue using chlorhexidine mouthwash for his upcoming dental deep cleaning and to use amoxicillin as needed.    Follow-up  The patient will follow up in 3 months.    PROCEDURE  The patient has had 2 stents placed in the past.      Assessment and Plan:   Curt is a 74 y.o. male with the following:  Problem List Items Addressed This Visit       Chronic bilateral low back pain with right-sided sciatica    Relevant Medications    DULoxetine (CYMBALTA) 60 MG Cap DR Particles delayed-release capsule    Chronic pain of left knee    Relevant Medications    DULoxetine (CYMBALTA) 60 MG Cap DR Particles delayed-release capsule    Dyslipidemia    Essential hypertension, benign    Relevant  Medications    amlodipine-benazepril (LOTREL) 5-10 MG per capsule    Generalized osteoarthritis of hand    Nocturia    Ulcerative pancolitis (HCC)          RTC: Return in about 3 months (around 5/20/2025).    I spent a total of 32 minutes with record review, exam, communication with the patient, communication with other providers, and documentation of this encounter.    Verbal consent was acquired by the patient to use [employer] ambient listening note generation during this visit Yes       PLEASE NOTE: This dictation was created using voice recognition software. I have made every reasonable attempt to correct obvious errors, but I expect that there are errors of grammar and possibly content that I did not discover before finalizing the note.      Shila Mullen, DO  Geriatric and Internal Medicine  [facility]

## 2025-03-17 ENCOUNTER — APPOINTMENT (OUTPATIENT)
Dept: URBAN - METROPOLITAN AREA CLINIC 20 | Facility: CLINIC | Age: 75
Setting detail: DERMATOLOGY
End: 2025-03-17

## 2025-03-17 DIAGNOSIS — D22 MELANOCYTIC NEVI: ICD-10-CM

## 2025-03-17 DIAGNOSIS — Z85.828 PERSONAL HISTORY OF OTHER MALIGNANT NEOPLASM OF SKIN: ICD-10-CM

## 2025-03-17 DIAGNOSIS — L82.1 OTHER SEBORRHEIC KERATOSIS: ICD-10-CM

## 2025-03-17 DIAGNOSIS — L57.0 ACTINIC KERATOSIS: ICD-10-CM

## 2025-03-17 DIAGNOSIS — L81.4 OTHER MELANIN HYPERPIGMENTATION: ICD-10-CM

## 2025-03-17 DIAGNOSIS — D18.0 HEMANGIOMA: ICD-10-CM

## 2025-03-17 DIAGNOSIS — Z71.89 OTHER SPECIFIED COUNSELING: ICD-10-CM

## 2025-03-17 PROBLEM — D22.72 MELANOCYTIC NEVI OF LEFT LOWER LIMB, INCLUDING HIP: Status: ACTIVE | Noted: 2025-03-17

## 2025-03-17 PROBLEM — D18.01 HEMANGIOMA OF SKIN AND SUBCUTANEOUS TISSUE: Status: ACTIVE | Noted: 2025-03-17

## 2025-03-17 PROBLEM — D22.71 MELANOCYTIC NEVI OF RIGHT LOWER LIMB, INCLUDING HIP: Status: ACTIVE | Noted: 2025-03-17

## 2025-03-17 PROBLEM — D22.62 MELANOCYTIC NEVI OF LEFT UPPER LIMB, INCLUDING SHOULDER: Status: ACTIVE | Noted: 2025-03-17

## 2025-03-17 PROBLEM — D22.5 MELANOCYTIC NEVI OF TRUNK: Status: ACTIVE | Noted: 2025-03-17

## 2025-03-17 PROBLEM — D22.39 MELANOCYTIC NEVI OF OTHER PARTS OF FACE: Status: ACTIVE | Noted: 2025-03-17

## 2025-03-17 PROBLEM — D22.61 MELANOCYTIC NEVI OF RIGHT UPPER LIMB, INCLUDING SHOULDER: Status: ACTIVE | Noted: 2025-03-17

## 2025-03-17 PROCEDURE — 99213 OFFICE O/P EST LOW 20 MIN: CPT | Mod: 25

## 2025-03-17 PROCEDURE — ? COUNSELING

## 2025-03-17 PROCEDURE — ? SUNSCREEN RECOMMENDATIONS

## 2025-03-17 PROCEDURE — ? OBSERVATION

## 2025-03-17 PROCEDURE — ? LIQUID NITROGEN

## 2025-03-17 PROCEDURE — 17000 DESTRUCT PREMALG LESION: CPT

## 2025-03-17 PROCEDURE — 17003 DESTRUCT PREMALG LES 2-14: CPT

## 2025-03-17 ASSESSMENT — LOCATION DETAILED DESCRIPTION DERM
LOCATION DETAILED: LEFT VENTRAL PROXIMAL FOREARM
LOCATION DETAILED: RIGHT CLAVICULAR SKIN
LOCATION DETAILED: RIGHT SUPERIOR UPPER BACK
LOCATION DETAILED: RIGHT SUPERIOR CENTRAL MALAR CHEEK
LOCATION DETAILED: RIGHT INFERIOR MEDIAL MIDBACK
LOCATION DETAILED: LEFT INFERIOR ANTERIOR NECK
LOCATION DETAILED: RIGHT VENTRAL PROXIMAL FOREARM
LOCATION DETAILED: LEFT MID TEMPLE
LOCATION DETAILED: RIGHT DISTAL POSTERIOR UPPER ARM
LOCATION DETAILED: RIGHT INFERIOR CENTRAL MALAR CHEEK
LOCATION DETAILED: RIGHT INFERIOR MEDIAL UPPER BACK
LOCATION DETAILED: LEFT LATERAL PROXIMAL PRETIBIAL REGION
LOCATION DETAILED: RIGHT INFERIOR TEMPLE
LOCATION DETAILED: LEFT PROXIMAL POSTERIOR UPPER ARM
LOCATION DETAILED: MID POSTERIOR NECK
LOCATION DETAILED: RIGHT PROXIMAL PRETIBIAL REGION
LOCATION DETAILED: INFERIOR THORACIC SPINE
LOCATION DETAILED: RIGHT NASAL SIDEWALL
LOCATION DETAILED: RIGHT INFERIOR FOREHEAD
LOCATION DETAILED: LEFT DISTAL POSTERIOR THIGH
LOCATION DETAILED: RIGHT DISTAL POSTERIOR THIGH

## 2025-03-17 ASSESSMENT — LOCATION SIMPLE DESCRIPTION DERM
LOCATION SIMPLE: LEFT POSTERIOR UPPER ARM
LOCATION SIMPLE: RIGHT TEMPLE
LOCATION SIMPLE: POSTERIOR NECK
LOCATION SIMPLE: RIGHT FOREHEAD
LOCATION SIMPLE: RIGHT CLAVICULAR SKIN
LOCATION SIMPLE: LEFT POSTERIOR THIGH
LOCATION SIMPLE: RIGHT NOSE
LOCATION SIMPLE: LEFT PRETIBIAL REGION
LOCATION SIMPLE: RIGHT FOREARM
LOCATION SIMPLE: LEFT ANTERIOR NECK
LOCATION SIMPLE: RIGHT UPPER BACK
LOCATION SIMPLE: UPPER BACK
LOCATION SIMPLE: LEFT TEMPLE
LOCATION SIMPLE: RIGHT POSTERIOR THIGH
LOCATION SIMPLE: LEFT FOREARM
LOCATION SIMPLE: RIGHT CHEEK
LOCATION SIMPLE: RIGHT POSTERIOR UPPER ARM
LOCATION SIMPLE: RIGHT PRETIBIAL REGION
LOCATION SIMPLE: RIGHT LOWER BACK

## 2025-03-17 ASSESSMENT — LOCATION ZONE DERM
LOCATION ZONE: TRUNK
LOCATION ZONE: ARM
LOCATION ZONE: NECK
LOCATION ZONE: NOSE
LOCATION ZONE: FACE
LOCATION ZONE: LEG

## 2025-03-17 NOTE — PROCEDURE: LIQUID NITROGEN
Show Aperture Variable?: Yes
Render Note In Bullet Format When Appropriate: No
Duration Of Freeze Thaw-Cycle (Seconds): 10
Post-Care Instructions: I reviewed with the patient in detail post-care instructions. Patient is to wear sunprotection, and avoid picking at any of the treated lesions. Pt may apply Vaseline to crusted or scabbing areas.
Number Of Freeze-Thaw Cycles: 2 freeze-thaw cycles
Detail Level: Detailed
Consent: The patient's consent was obtained including but not limited to risks of crusting, scabbing, blistering, scarring, darker or lighter pigmentary change, recurrence, incomplete removal and infection. RTC in 2 months if lesion(s) persistent.

## 2025-03-20 ENCOUNTER — PATIENT MESSAGE (OUTPATIENT)
Dept: HEALTH INFORMATION MANAGEMENT | Facility: OTHER | Age: 75
End: 2025-03-20

## 2025-04-01 ENCOUNTER — OFFICE VISIT (OUTPATIENT)
Dept: MEDICAL GROUP | Facility: PHYSICIAN GROUP | Age: 75
End: 2025-04-01
Payer: MEDICARE

## 2025-04-01 VITALS
TEMPERATURE: 97.3 F | OXYGEN SATURATION: 95 % | WEIGHT: 215 LBS | HEART RATE: 74 BPM | DIASTOLIC BLOOD PRESSURE: 64 MMHG | SYSTOLIC BLOOD PRESSURE: 128 MMHG | HEIGHT: 71 IN | RESPIRATION RATE: 14 BRPM | BODY MASS INDEX: 30.1 KG/M2

## 2025-04-01 DIAGNOSIS — R35.1 BENIGN PROSTATIC HYPERPLASIA WITH NOCTURIA: ICD-10-CM

## 2025-04-01 DIAGNOSIS — I10 ESSENTIAL HYPERTENSION, BENIGN: ICD-10-CM

## 2025-04-01 DIAGNOSIS — H61.23 BILATERAL IMPACTED CERUMEN: ICD-10-CM

## 2025-04-01 DIAGNOSIS — K51.00 ULCERATIVE PANCOLITIS (HCC): ICD-10-CM

## 2025-04-01 DIAGNOSIS — N40.1 BENIGN PROSTATIC HYPERPLASIA WITH NOCTURIA: ICD-10-CM

## 2025-04-01 PROCEDURE — 3078F DIAST BP <80 MM HG: CPT | Performed by: INTERNAL MEDICINE

## 2025-04-01 PROCEDURE — 3074F SYST BP LT 130 MM HG: CPT | Performed by: INTERNAL MEDICINE

## 2025-04-01 PROCEDURE — RXMED WILLOW AMBULATORY MEDICATION CHARGE: Performed by: INTERNAL MEDICINE

## 2025-04-01 PROCEDURE — 99214 OFFICE O/P EST MOD 30 MIN: CPT | Performed by: INTERNAL MEDICINE

## 2025-04-01 ASSESSMENT — FIBROSIS 4 INDEX: FIB4 SCORE: 0.9

## 2025-04-01 NOTE — PATIENT INSTRUCTIONS
Prostate Plus- combination pill of Saw Palmetto and pumpkin seeds, natural supplement to help with the prostate.

## 2025-04-02 ENCOUNTER — NON-PROVIDER VISIT (OUTPATIENT)
Dept: MEDICAL GROUP | Facility: PHYSICIAN GROUP | Age: 75
End: 2025-04-02
Payer: MEDICARE

## 2025-04-02 ENCOUNTER — APPOINTMENT (OUTPATIENT)
Dept: MEDICAL GROUP | Facility: PHYSICIAN GROUP | Age: 75
End: 2025-04-02
Payer: MEDICARE

## 2025-04-02 ENCOUNTER — RESULTS FOLLOW-UP (OUTPATIENT)
Dept: MEDICAL GROUP | Facility: PHYSICIAN GROUP | Age: 75
End: 2025-04-02

## 2025-04-02 ENCOUNTER — PHARMACY VISIT (OUTPATIENT)
Dept: PHARMACY | Facility: MEDICAL CENTER | Age: 75
End: 2025-04-02
Payer: COMMERCIAL

## 2025-04-02 DIAGNOSIS — R35.1 BENIGN PROSTATIC HYPERPLASIA WITH NOCTURIA: ICD-10-CM

## 2025-04-02 DIAGNOSIS — N40.1 BENIGN PROSTATIC HYPERPLASIA WITH NOCTURIA: ICD-10-CM

## 2025-04-02 LAB
APPEARANCE UR: CLEAR
BILIRUB UR STRIP-MCNC: NEGATIVE MG/DL
COLOR UR AUTO: YELLOW
GLUCOSE UR STRIP.AUTO-MCNC: NEGATIVE MG/DL
KETONES UR STRIP.AUTO-MCNC: NEGATIVE MG/DL
LEUKOCYTE ESTERASE UR QL STRIP.AUTO: NEGATIVE
NITRITE UR QL STRIP.AUTO: NEGATIVE
PH UR STRIP.AUTO: 5.5 [PH] (ref 5–8)
PROT UR QL STRIP: NEGATIVE MG/DL
RBC UR QL AUTO: NEGATIVE
SP GR UR STRIP.AUTO: 1.02
UROBILINOGEN UR STRIP-MCNC: 0.2 MG/DL

## 2025-04-02 PROCEDURE — 81002 URINALYSIS NONAUTO W/O SCOPE: CPT | Performed by: INTERNAL MEDICINE

## 2025-04-02 NOTE — PROGRESS NOTES
Curt Lamonte Blair is a 74 y.o. male here for a non-provider visit for a urinalysis    If abnormal was an in office provider notified today (if so, indicate provider)? Yes    Routed to PCP? Yes

## 2025-04-02 NOTE — PROGRESS NOTES
Subjective:   Chief Complaint/History of Present Illness:  Curt Blair is a 74 y.o. male established patient who presents today to discuss medical problems as listed below. Curt is unaccompanied for today's visit.    History of Present Illness  The patient presents for evaluation of urinary issues, microscopic colitis, and blood pressure management.    He has been experiencing intermittent difficulty with urination, characterized by a slow stream and incomplete bladder emptying. This issue is particularly pronounced at night, necessitating 4 to 5 bathroom visits. He reports no burning, tingling, or pain during urination. He also reports no changes in urine color, presence of sediment, or hematuria. His fluid intake primarily consists of water, with occasional soda and two small cups of black coffee daily. He is sexually active within a monogamous relationship and has no history of sexually transmitted diseases or infections. He has not used any over-the-counter supplements for prostate health. He has a history of an enlarged prostate. A pelvic CT in 2024 showed an enlarged prostate protruding into the bladder with some microcalcifications, although no size was given.    He has a history of microscopic colitis and indeterminate colitis. He reports that his bowel movements are regular, soft, and formed, with no instances of diarrhea. He is currently on mesalamine, which he reports as effective.    He is scheduled to see Dr. Valentin next month to discuss the continuation of Lotrel and CoQ10.    Supplemental Information  He has chronic knee issues and has undergone knee replacement surgery. He has been receiving cupping therapy from his therapist, which has been beneficial.    MEDICATIONS  Current: mesalamine, Lotrel, CoQ10, Repatha       Current Medications:  Current Outpatient Medications Ordered in Epic   Medication Sig Dispense Refill    DULoxetine (CYMBALTA) 60 MG Cap DR Particles delayed-release  "capsule Take 1 Capsule by mouth every day. 100 Capsule 3    amlodipine-benazepril (LOTREL) 5-10 MG per capsule Take 1 Capsule by mouth every day. 100 Capsule 3    mesalamine (LIALDA) 1.2 GM Tablet Delayed Response Take 4 Tablets by mouth every day. 400 Tablet 3    chlorhexidine (PERIDEX) 0.12 % Solution Rinse 1/2 oz. twice daily for 30 seconds after breakfast and before bedtime for two weeks only. 473 mL 2    Evolocumab (REPATHA) 140 MG/ML Solution Auto-injector SubQ injection pen Inject 1 mL under the skin every 14 days. 6 mL 3    amoxicillin (AMOXIL) 500 MG Cap Take 4 Capsules by mouth 1 hour prior to dental appointment. 16 Capsule 1    nitroglycerin (NITROSTAT) 0.4 MG SL Tab Place 1 Tablet under the tongue as needed for Chest Pain. 25 Tablet 11    aspirin 81 MG tablet Take 81 mg by mouth every day.       No current [facility]-ordered facility-administered medications on file.          Objective:   Physical Exam:    Vitals: /64   Pulse 74   Temp 36.3 °C (97.3 °F)   Resp 14   Ht 1.803 m (5' 11\")   Wt 97.5 kg (215 lb)   SpO2 95%    BMI: Body mass index is 29.99 kg/m².  Physical Exam  Constitutional:       General: He is not in acute distress.     Appearance: Normal appearance. He is not ill-appearing.   HENT:      Right Ear: External ear normal. There is impacted cerumen.      Left Ear: External ear normal. There is impacted cerumen.   Eyes:      General: No scleral icterus.     Conjunctiva/sclera: Conjunctivae normal.   Cardiovascular:      Rate and Rhythm: Normal rate and regular rhythm.      Pulses: Normal pulses.   Pulmonary:      Effort: Pulmonary effort is normal. No respiratory distress.      Breath sounds: Normal breath sounds. No wheezing.   Abdominal:      Palpations: Abdomen is soft.      Hernia: A hernia is present.      Comments: Umbilical, reducible   Musculoskeletal:      Right lower leg: No edema.      Left lower leg: No edema.   Skin:     General: Skin is warm and dry.      Findings: No rash. "   Neurological:      Gait: Gait normal.   Psychiatric:         Mood and Affect: Mood normal.         Behavior: Behavior normal.         Thought Content: Thought content normal.         Judgment: Judgment normal.           Results      Imaging  Pelvic CT in 2024 showed an enlarged prostate protruding into the bladder with some microcalcifications.    Assessment & Plan  1. BPH with LUTS  He has a history of an enlarged prostate, with imaging from 2024 showing an enlarged prostate protruding into the bladder with microcalcifications. He reports intermittent difficulty urinating, nocturia (4-5 times per night), and a slow stream with incomplete emptying. There is no burning, tingling, or pain with urination. Differential diagnoses include urinary tract infection (UTI), benign prostatic hyperplasia (BPH), prostatitis, and malignancy. Given the absence of symptoms for prostatitis and no history of STDs, BPH is the most likely diagnosis. Counseling on the risks of potential malignancy will be provided. A urinalysis will be conducted to rule out UTI. If the urinalysis is positive for infection, antibiotics will be considered. If BPH is confirmed, Tamsulosin (Flomax) may be prescribed. He is advised to try over-the-counter Prostate Plus (saw palmetto and pumpkin seed) for a month to see if it alleviates symptoms.    2. Pan-ulcerative colitis.  He has a history of microscopic colitis and is currently stable on mesalamine. He reports no issues with bowel movements, which are regular and formed. He will continue with mesalamine as it is effective and well-tolerated. Insurance wanted him to switch to sulfasalazine but with current stability we will maintain current treatment.     3. Hypertension  His blood pressure was 98/64 at the last visit, which is lower than necessary. He is currently on Lotrel (amlodipine and benazepril). It is recommended to discontinue the amlodipine component due to its potential to worsen edema and  lack of long-term cardiovascular and renal benefits. He will continue with benazepril alone to manage blood pressure and prevent fluid retention after discussing in more detail with his cardiologist, Dr. Forrest.     4. Cerumen impaction, bilateral  Chronic and decompensated, bilateral ear lavage completed in clinic by medical assistant Heavenly TUCKER. Patient tolerated without issue and was appreciative.    PROCEDURE  The patient has undergone knee replacement surgery.      Assessment and Plan:   Curt is a 74 y.o. male with the following:  Problem List Items Addressed This Visit       Benign prostatic hyperplasia with nocturia    Bilateral impacted cerumen    Relevant Orders    Ear Irrigation ([place] Only)    Essential hypertension, benign    Ulcerative pancolitis (HCC)          RTC: Return in about 3 months (around 7/1/2025).    I spent a total of 31 minutes with record review, exam, communication with the patient, communication with other providers, and documentation of this encounter.    Verbal consent was acquired by the patient to use [employer] ambient listening note generation during this visit Yes       PLEASE NOTE: This dictation was created using voice recognition software. I have made every reasonable attempt to correct obvious errors, but I expect that there are errors of grammar and possibly content that I did not discover before finalizing the note.      Shila Mullen, DO  Geriatric and Internal Medicine  [facility]

## 2025-04-14 ENCOUNTER — TELEPHONE (OUTPATIENT)
Dept: VASCULAR LAB | Facility: MEDICAL CENTER | Age: 75
End: 2025-04-14
Payer: MEDICARE

## 2025-04-14 ENCOUNTER — DOCUMENTATION (OUTPATIENT)
Dept: PHARMACY | Facility: MEDICAL CENTER | Age: 75
End: 2025-04-14
Payer: MEDICARE

## 2025-04-14 NOTE — TELEPHONE ENCOUNTER
Received EMR information that pt was never been onboarded for RXC services.     S/w pt today and offered RXC call reminders, most preferably with Repatha fills. Per pt, he states that he normally uses [employer] [facility] for refill reminders and agrees with the terms for the delivery process. He also added that he received a call from a clinical [facility] for a follow up and mentioned that we have RXC call opportunity to be offered to him and also to include in some supplies for his medication waste. During our conversation, we made sure that we covered most of his refill adherences, mostly with his Repatha. Pt just also confirmed that he received his refill and will be using his 2 out 5 more doses tomorrow, 04/15.     Scheduled pt's refill call reminder set up for 06/02. Pt has no further questions or concerns at this time.     NOE Jay, PhT  Vascular Pharmacy Liaison (Rx Coordinator)  P: [phone]  4/14/2025 4:13 PM

## 2025-04-14 NOTE — TELEPHONE ENCOUNTER
Prior Authorization for REPATHA SURECLICK 140MG/ML SOLUTION AUTO INJECTOR  (Quantity: 6 mls, Days: 84) has been submitted via Cover My Meds: Key ([id number])    Insurance: [employer]    Will follow up in 24-48 business hours.     NOE Jay, PhT  Vascular Pharmacy Liaison (Rx Coordinator)  P: [phone]  4/14/2025 3:19 PM

## 2025-04-14 NOTE — PROGRESS NOTES
PHARMACIST CLINICAL ONBOARDING - Clinical Onboarding -   Result = Complete, Next card status: Transfer/Restart    Therapeutic Category: Hyperlipidemia  ICD10: E785  Diagnosis Details: Hyperlipidemia [E78.5]  Medication(s) associated with Therapeutic Category: Repatha SureClick Solution Auto-injector 140 MG/ML  Medication(s) prescribed for FDA approved indication?   -Repatha SureClick Solution Auto-injector 140 MG/ML: Yes  Full drug therapy: Repatha 140mg/mL SureClick pen injecting 1mL (140mg) under the skin every 14 days   Past treatments: Repatha, Atorvastatin, Nexlizet, Rosuvastatin   Goals of therapy: Promote or maintain optimal therapy administration and adherence, Mitigation of side effects, Achieve goal LDL levels (< mg/dL) to reduce risk of cardiovascular events, Implement lifestyle modifications: diet, exercise, weight loss, and/or smoking cessation    Regimen Appropriateness Review: Hyperlipidemia  Any concerning drug and/or non-drug allergies? No  Any concerning drug interactions (drug-drug or dietary)? No  Any concern with prescribed dose (appropriateness, renal, and hepatic adjustments needed)? No  Any comorbidities, past medical history, precautions, or contraindications that pose a medication safety concern? No  Any relevant lab work needed that affects the initial start date? No  Any issues with patient's ability to self-administer medication? No       gap list patient first filled Repatha on 4/2/25.       Initial Assessment **TRF**  Dx: Hyperlipidemia [E78.5]  Secondary Prevention of clinical ASCVD  Specified LDL goal < 55mg/dL      Tx prescribed: Repatha 140mg/mL SureClick pen injecting 1mL (140mg) under the skin every 14 days     Administration: self-injecting Repatha #1 pen into alternating abdomen sides; no difficulty with pen administration    Proper Handling/Disposal: Disposing of pens into household trash. Advised to dispose of in sharps container or puncture resistant container.      Storage/Excursion: Reviewed to store under refrigerated temperatures in original box; pen can be stored out of the fridge at room temperature (< 77°F) for no more than 30 days    Duration of therapy: until progression, toxicity, or no longer clinically beneficial         Adherence & Potential Barriers: no missed or late injections; injecting every other Tuesday     Missed dose mgmt: If it’s within 7 days of missed dose, take it asap and resume normal schedule; if it’s beyond 7 days patient should to wait until the next dose on the original schedule.      List Common, Serious SE & Mitigation Reviewed: declined review with prior counseling   List Precautions Reviewed: declined review with prior counseling   List Current SE Reviewed (if applicable): none     Mitigation/mgmt: N/A as no SE reported       S/Sx: none  Clinically Relevant, Abnormal Labs from 12/11/24 unless otherwise noted:      CBC from 10/30/24: HGB 12.9 (L), HCT 41.7 (L), MCH 25.6 (L), MCHC 30.9 (L), RDW 51.6 (H),  (H)    Chem7 from 10/30/24: Glucose 105 (H)    LFTs from 10/30/24: Globulin 4.2 (H)    Lipids:  TG 93 HDL 67 LDL 49    ApoB: 60    CRP (cardiac): 4    HeFH/HoFH: possible    BP/HR: WNL (4/1/25)      Wellness/Lifestyle Counseling:    ? Dietary control: reducing red meat consumption to weekly; last ~50 pounds over the last 1.5 years; limiting carbohydrate intake     ? Exercise: recent TKA on both knees where participating with PT 2x/week     ? Smoking cessation: quit 1992     Immunizations: recommended RSV and PCV20     Med Rec/Updated drug list:   EMR accurate, no medication list inaccuracies. Reviewed with patient/caregiver.  DI Check: no clinically significant DDIs identified   Common DI & Dietary Avoidance:     Reviewed to not start any new meds/herbs/OTCs/supplements without speaking to clinic/pharmacist to check for DDIs      Goals of Therapy:    Promote or maintain optimal therapy administration and adherence    Mitigation of  side effects    Achieve goal LDL levels (< mg/dL; specified LDL goal < 55mg/dL) to reduce risk of cardiovascular events     Implement lifestyle modifications: diet, exercise, weight loss, and/or smoking cessation   Patient has agreed/understands to goals of therapy during education/counseling    Additional: I had the pleasure of speaking with Curt for gap list counseling on his use of Repatha for lipid management. Curt has been on Repatha since ~November 2024. He reported the injections have been very easy for him to administer and he received prior counseling when Repatha was first initiated. He plans to review PCV20 with his PCP at upcoming appointment and is deferring to receive the RSV vaccine until a later time after discussion with his provider. He feels well supported and has been very impressed by Repatha's efficacy at reducing his LDL. He thanked me for the call and welcomes future calls from pharmacy for support.

## 2025-04-14 NOTE — TELEPHONE ENCOUNTER
PA renewal submitted for REPATHA SURECLICK 140MG/ML SOLUTION AUTO INJECTOR  has been approved for a quantity of 6 mls , day supply 84    PA reference number: [id number]  Insurance: [facility] PL[facility]   Effective dates: 04/14/202-04/14/2026  Copay: REFILL TOO SOON 06/03/2025     Is patient eligible to fill with [facility]? Yes    Next Steps:  pt currently fills with [facility] and hasn't been followed by [facility]. Will outreach to pt to offer services.     NOE Jay, PhT  Vascular Pharmacy Liaison (Rx Coordinator)  P: [phone]  4/14/2025 4:02 PM

## 2025-05-05 PROCEDURE — RXMED WILLOW AMBULATORY MEDICATION CHARGE: Performed by: INTERNAL MEDICINE

## 2025-05-07 ENCOUNTER — PHARMACY VISIT (OUTPATIENT)
Dept: PHARMACY | Facility: MEDICAL CENTER | Age: 75
End: 2025-05-07
Payer: COMMERCIAL

## 2025-05-08 ENCOUNTER — OFFICE VISIT (OUTPATIENT)
Dept: CARDIOLOGY | Facility: MEDICAL CENTER | Age: 75
End: 2025-05-08
Attending: INTERNAL MEDICINE
Payer: MEDICARE

## 2025-05-08 VITALS
RESPIRATION RATE: 16 BRPM | HEART RATE: 80 BPM | DIASTOLIC BLOOD PRESSURE: 54 MMHG | BODY MASS INDEX: 29.96 KG/M2 | HEIGHT: 71 IN | SYSTOLIC BLOOD PRESSURE: 120 MMHG | WEIGHT: 214 LBS | OXYGEN SATURATION: 96 %

## 2025-05-08 DIAGNOSIS — Z95.5 STATUS POST CORONARY ARTERY STENT PLACEMENT: ICD-10-CM

## 2025-05-08 DIAGNOSIS — E78.5 DYSLIPIDEMIA: ICD-10-CM

## 2025-05-08 DIAGNOSIS — I25.10 CORONARY ARTERY DISEASE DUE TO LIPID RICH PLAQUE: ICD-10-CM

## 2025-05-08 DIAGNOSIS — I10 ESSENTIAL HYPERTENSION, BENIGN: ICD-10-CM

## 2025-05-08 DIAGNOSIS — I25.83 CORONARY ARTERY DISEASE DUE TO LIPID RICH PLAQUE: ICD-10-CM

## 2025-05-08 PROCEDURE — 3074F SYST BP LT 130 MM HG: CPT | Performed by: INTERNAL MEDICINE

## 2025-05-08 PROCEDURE — 99212 OFFICE O/P EST SF 10 MIN: CPT | Performed by: INTERNAL MEDICINE

## 2025-05-08 PROCEDURE — 3078F DIAST BP <80 MM HG: CPT | Performed by: INTERNAL MEDICINE

## 2025-05-08 PROCEDURE — 99213 OFFICE O/P EST LOW 20 MIN: CPT | Performed by: INTERNAL MEDICINE

## 2025-05-08 ASSESSMENT — FIBROSIS 4 INDEX: FIB4 SCORE: 0.9

## 2025-05-08 NOTE — PROGRESS NOTES
Chief Complaint   Patient presents with    Coronary Artery Disease     Follow up       Subjective     Curt Blair is a 74 y.o. male who presents today for follow-up of coronary artery disease characterized by remote PCI.     Since last visit doing well on his new cholesterol regimen still recovering from the surgery 1 year ago but active with no symptoms.    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 He went out to dinner for his daughter's birthday to a [employer] restaurant where he had cooked shrimp. He also had his 4th Covid shot on 4/25/2022.      Normally he has 2 BM's every morning and then 1 BM in the evening or overnight. He is s/p open cholecystectomy around 1997. No issues with dumping syndrome.      He developed multiple episodes of watery diarr    Inflamed sebaceous cyst 02/15/2018    Intrinsic eczema 02/07/2020    Myocardial infarct (HCC)     1992    Prediabetes 07/19/2019    S/P coronary artery stent placement     2 stents    Wound dehiscence 02/13/2024    He unfortunately sustained minimal wound dehiscence following right TKA in Jan 2024. He is following with orthopedics and currently holding of PT and taking Keflex until the wound closes.      Past Surgical History:   Procedure Laterality Date    KNEE REVISION TOTAL Left 7/18/2024    Procedure: LEFT TOTAL KNEE ARTHROPLASTY REVISION AND PATELLECTOMY;  Surgeon: Shreyas Pineda M.D.;  Location: SURGERY [facility];  Service: Orthopedics    PATELLECTOMY Left 7/18/2024    Procedure: PATELLECTOMY;  Surgeon: Shreyas Pineda M.D.;  Location: SURGERY [facility];  Service: Orthopedics    PB FIX INFRAPATELLA TENDON,PBIMARY  Left 04/29/2024    Procedure: LEFT KNEE PATELLAR TENDON REPAIR;  Surgeon: Shreyas Pineda M.D.;  Location: [facility];  Service: Orthopedics    PB TOTAL KNEE ARTHROPLASTY Right 01/22/2024    Procedure: RIGHT TOTAL KNEE ARTHROPLASTY;  Surgeon: Shreyas Pineda M.D.;  Location: [facility];  Service: Orthopedics    PB TOTAL KNEE ARTHROPLASTY Left 07/17/2023    Procedure: LEFT TOTAL KNEE ARTHROPLASTY;  Surgeon: Shreyas Pineda M.D.;  Location: [facility];  Service: Orthopedics    MO INJ LUMBAR/SACRAL,W/ IMAGING Right 12/02/2021    Procedure: RIGHT L4-5 interlaminar epidural steroid injection with sedation;  Surgeon: Sav Rick M.D.;  [facility];  Service: Pain Management    MO INJ LUMBAR/SACRAL,W/ IMAGING Right 07/28/2021    Procedure: RIGHT L4-5 interlaminar epidural steroid injection with sedation;  Surgeon: Sav Rick M.D.;  [facility];  Service: Pain Management    MO FLUOROSCOPIC GUIDANCE NEEDLE PLACEMENT Left 06/16/2021    Procedure: LEFT genicular nerve blocks, diagnostic;  Surgeon: Sav Rick M.D.;  [facility];  Service: Pain Management    MO INJ AA/STRD GNCLR NRV BRNCH W/IMG Left 06/16/2021    Procedure: GENICULAR NERVE BRANCHES INCLUDING IMAGING GUIDANCE WITH A REQUIRED MINIMUM THREE NERVE BRANCHES;  Surgeon: Sav Rick M.D.;  [facility];  Service: Pain Management    APPENDECTOMY      CHOLECYSTECTOMY      ENDARTERECTOMY      corornary    ORIF, KNEE  7/17/ 23 & 1- 4-29-24    STENT PLACEMENT      VASECTOMY       Family History   Problem Relation Age of Onset    Lung Disease Mother         copd    Hypertension Mother     Heart Disease Father         heart attack and heart disease    Cancer Brother         neck    Heart Disease Paternal Grandfather         Heart attack    Other Sister         non-malignant brain tumor     Diabetes Neg Hx      Social History     Socioeconomic History    Marital status:      Spouse name: Not on file    Number of children: Not on file    Years of education: Not on file    Highest education level: 12th grade   Occupational History    Not on file   Tobacco Use    Smoking status: Former     Current packs/day: 0.00     Average packs/day: 2.0 packs/day for 25.0 years (50.0 ttl pk-yrs)     Types: Cigarettes     Start date: 1967     Quit date: 1992     Years since quittin.3    Smokeless tobacco: Never    Tobacco comments:     avoid all tobacco products   Vaping Use    Vaping status: Never Used   Substance and Sexual Activity    Alcohol use: Not Currently     Alcohol/week: 1.2 oz     Types: 2 Standard drinks or equivalent per week    Drug use: No    Sexual activity: Yes     Partners: Female     Comment: , 3 children   Other Topics Concern     Service Yes    Blood Transfusions No    Caffeine Concern No    Occupational Exposure No    Hobby Hazards No    Sleep Concern Yes    Stress Concern Yes    Weight Concern Yes    Special Diet No     Comment: no fried    Back Care Yes    Exercise Yes    Bike Helmet No     Comment: does not ride bike    Seat Belt Not Asked    Self-Exams Yes   Social History Narrative    He was born in [place]. He worked in the [employer] industry, he retired at age 65. He  Luann in 2016 after both of their spouses passed away. He has children in [place] and several grandchildren and great grandchildren. He is an avid traveler.     Social Drivers of Health     Financial Resource Strain: Low Risk  (2020)    Overall Financial Resource Strain (CARDIA)     Difficulty of Paying Living Expenses: Not hard at all   Food Insecurity: No Food Insecurity (2020)    Hunger Vital Sign     Worried About Running Out of Food in the Last Year: Never true     Ran Out of Food in the Last Year: Never true   Transportation Needs: No Transportation Needs (2020)     PRAPARE - Transportation     Lack of Transportation (Medical): No     Lack of Transportation (Non-Medical): No   Physical Activity: Inactive (9/24/2020)    Exercise Vital Sign     Days of Exercise per Week: 0 days     Minutes of Exercise per Session: 0 min   Stress: Stress Concern Present (9/24/2020)    [place] [employer] - Occupational Stress Questionnaire     Feeling of Stress : To some extent   Social Connections: Moderately Integrated (9/24/2020)    Social Connection and Isolation Panel [NHANES]     Frequency of Communication with Friends and Family: More than three times a week     Frequency of Social Gatherings with Friends and Family: Once a week     Attends Scientologist Services: Never     Active Member of Clubs or Organizations: Yes     Attends Club or Organization Meetings: 1 to 4 times per year     Marital Status:    Intimate Partner Violence: Not on file   Housing Stability: Low Risk  (9/24/2020)    Housing Stability Vital Sign     Unable to Pay for Housing in the Last Year: No     Number of Places Lived in the Last Year: 1     Unstable Housing in the Last Year: No     Allergies   Allergen Reactions    Atorvastatin Myalgia     Ok to take 10 mg  Elevated CPK  Only at 40 Mg does this happen.    Lyrica [Pregabalin] Unspecified     Pt doesn't recall    Nexlizet [Bempedoic Acid-Ezetimibe] Myalgia    Rosuvastatin Myalgia     muscle cramps elevated CPK     Outpatient Encounter Medications as of 5/8/2025   Medication Sig Dispense Refill    Misc Natural Products (PROSTATE COMPLETE PO) Take  by mouth.      DULoxetine (CYMBALTA) 60 MG Cap DR Particles delayed-release capsule Take 1 Capsule by mouth every day. 100 Capsule 3    amlodipine-benazepril (LOTREL) 5-10 MG per capsule Take 1 Capsule by mouth every day. 100 Capsule 3    mesalamine (LIALDA) 1.2 GM Tablet Delayed Response Take 4 Tablets by mouth every day. 400 Tablet 3    chlorhexidine (PERIDEX) 0.12 % Solution Rinse 1/2 oz. twice  "daily for 30 seconds after breakfast and before bedtime for two weeks only. 473 mL 2    Evolocumab (REPATHA) 140 MG/ML Solution Auto-injector SubQ injection pen Inject 1 mL under the skin every 14 days. 6 mL 3    amoxicillin (AMOXIL) 500 MG Cap Take 4 Capsules by mouth 1 hour prior to dental appointment. 16 Capsule 1    nitroglycerin (NITROSTAT) 0.4 MG SL Tab Place 1 Tablet under the tongue as needed for Chest Pain. 25 Tablet 11    aspirin 81 MG tablet Take 81 mg by mouth every day.       No facility-administered encounter medications on file as of 5/8/2025.     Review of Systems   All other systems reviewed and are negative.             Objective     /54 (BP Location: Left arm, Patient Position: Sitting)   Pulse 80   Resp 16   Ht 1.803 m (5' 11\")   Wt 97.1 kg (214 lb)   SpO2 96%   BMI 29.85 kg/m²     Physical Exam  Vitals and nursing note reviewed.   Constitutional:       General: He is not in acute distress.     Appearance: Normal appearance.   HENT:      Head: Normocephalic and atraumatic.      Right Ear: External ear normal.      Left Ear: External ear normal.      Nose: Nose normal.   Eyes:      Conjunctiva/sclera: Conjunctivae normal.   Cardiovascular:      Rate and Rhythm: Normal rate and regular rhythm.      Pulses: Normal pulses.      Heart sounds: No murmur heard.  Pulmonary:      Effort: Pulmonary effort is normal. No respiratory distress.      Breath sounds: Normal breath sounds.   Abdominal:      General: There is no distension.      Palpations: Abdomen is soft.   Musculoskeletal:      Cervical back: No rigidity or tenderness.      Right lower leg: No edema.      Left lower leg: No edema.   Skin:     General: Skin is warm and dry.      Capillary Refill: Capillary refill takes 2 to 3 seconds.   Neurological:      General: No focal deficit present.      Mental Status: He is alert and oriented to person, place, and time.   Psychiatric:         Mood and Affect: Mood normal.         Behavior: " "Behavior normal.         Thought Content: Thought content normal.       LABS:  Lab Results   Component Value Date/Time    CHOLSTRLTOT 135 12/11/2024 07:06 AM    LDL 49 12/11/2024 07:06 AM    HDL 67 12/11/2024 07:06 AM    TRIGLYCERIDE 93 12/11/2024 07:06 AM       Lab Results   Component Value Date/Time    WBC 7.5 10/30/2024 07:48 AM    RBC 5.03 10/30/2024 07:48 AM    HEMOGLOBIN 12.9 (L) 10/30/2024 07:48 AM    HEMATOCRIT 41.7 (L) 10/30/2024 07:48 AM    MCV 82.9 10/30/2024 07:48 AM    NEUTSPOLYS 67.80 10/30/2024 07:48 AM    LYMPHOCYTES 17.10 (L) 10/30/2024 07:48 AM    MONOCYTES 13.10 10/30/2024 07:48 AM    EOSINOPHILS 0.70 10/30/2024 07:48 AM    BASOPHILS 0.80 10/30/2024 07:48 AM     Lab Results   Component Value Date/Time    SODIUM 136 10/30/2024 07:48 AM    POTASSIUM 4.6 10/30/2024 07:48 AM    CHLORIDE 100 10/30/2024 07:48 AM    CO2 25 10/30/2024 07:48 AM    GLUCOSE 105 (H) 10/30/2024 07:48 AM    BUN 13 10/30/2024 07:48 AM    CREATININE 0.86 10/30/2024 07:48 AM    CREATININE 0.93 02/20/2013 07:20 AM    BUNCREATRAT 17 02/20/2013 07:20 AM     Lab Results   Component Value Date    HBA1C 6.6 (H) 06/06/2024      Lab Results   Component Value Date/Time    ALKPHOSPHAT 99 10/30/2024 07:48 AM    ASTSGOT 16 10/30/2024 07:48 AM    ALTSGPT 8 10/30/2024 07:48 AM    TBILIRUBIN 0.4 10/30/2024 07:48 AM      No results found for: \"BNPBTYPENAT\"   Lab Results   Component Value Date/Time    TSH 2.170 02/20/2013 07:20 AM     Lab Results   Component Value Date/Time    PROTHROMBTM 16.0 (H) 08/23/2024 02:00 PM    INR 1.23 (H) 08/23/2024 02:00 PM        Imaging reviewed           Assessment & Plan     1. Coronary artery disease due to lipid rich plaque        2. Status post coronary artery stent placement        3. Essential hypertension, benign        4. Dyslipidemia            Medical Decision Making: Today's Assessment/Status/Plan:          Doing well from a cardiac standpoint goal LDL at less than 70 closer to 50 following up with the " lipid clinic and appropriate treatment longstanding low-dose aspirin blood pressure well-controlled recovering from the surgery active no cardiovascular symptoms.  Follow-up routinely.

## 2025-05-13 ENCOUNTER — OFFICE VISIT (OUTPATIENT)
Dept: MEDICAL GROUP | Facility: PHYSICIAN GROUP | Age: 75
End: 2025-05-13
Payer: MEDICARE

## 2025-05-13 VITALS
TEMPERATURE: 97.2 F | SYSTOLIC BLOOD PRESSURE: 120 MMHG | OXYGEN SATURATION: 96 % | DIASTOLIC BLOOD PRESSURE: 64 MMHG | HEART RATE: 73 BPM | HEIGHT: 71 IN | WEIGHT: 218 LBS | BODY MASS INDEX: 30.52 KG/M2

## 2025-05-13 DIAGNOSIS — E78.5 DYSLIPIDEMIA: ICD-10-CM

## 2025-05-13 DIAGNOSIS — K51.00 ULCERATIVE PANCOLITIS (HCC): ICD-10-CM

## 2025-05-13 DIAGNOSIS — R05.1 ACUTE COUGH: ICD-10-CM

## 2025-05-13 DIAGNOSIS — R73.09 ELEVATED HEMOGLOBIN A1C: ICD-10-CM

## 2025-05-13 DIAGNOSIS — Z96.652 HISTORY OF TOTAL LEFT KNEE REPLACEMENT: ICD-10-CM

## 2025-05-13 LAB
FLUAV RNA SPEC QL NAA+PROBE: NEGATIVE
FLUBV RNA SPEC QL NAA+PROBE: NEGATIVE
RSV RNA SPEC QL NAA+PROBE: NEGATIVE
SARS-COV-2 RNA RESP QL NAA+PROBE: NEGATIVE

## 2025-05-13 PROCEDURE — 99214 OFFICE O/P EST MOD 30 MIN: CPT | Performed by: INTERNAL MEDICINE

## 2025-05-13 PROCEDURE — 3074F SYST BP LT 130 MM HG: CPT | Performed by: INTERNAL MEDICINE

## 2025-05-13 PROCEDURE — 3078F DIAST BP <80 MM HG: CPT | Performed by: INTERNAL MEDICINE

## 2025-05-13 PROCEDURE — 0241U POCT CEPHEID COV-2, FLU A/B, RSV - PCR: CPT | Performed by: INTERNAL MEDICINE

## 2025-05-13 ASSESSMENT — FIBROSIS 4 INDEX: FIB4 SCORE: 0.9

## 2025-05-14 NOTE — ASSESSMENT & PLAN NOTE
Chronic and ongoing issue, continue with periodic evaluations with A1c to ensure relative stability.

## 2025-05-14 NOTE — PROGRESS NOTES
Subjective:   Chief Complaint/History of Present Illness:  Curt Blair is a 74 y.o. male established patient who presents today to discuss medical problems as listed below. Curt is unaccompanied for today's visit.      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 Working with Dr. Pineda at [facility] with plans to follow up in July if swelling ongoing. Continues to work with PT and feels like there is continued improvement with strengthening of the left quadriceps and hamstrings.     Ulcerative Pancolitis (Hcc)    Diarrhea started distinctly on 4/29/2022. He went out to dinner for his daughter's birthday to a [employer] restaurant where he had cooked shrimp. He also had his 4th Covid shot on 4/25/2022.      Normally he has 2 BM's every morning and then 1 BM in the evening or overnight. He is s/p open cholecystectomy around 1997. No issues with dumping syndrome.      He developed multiple episodes of watery diarrhea. He started peptobismol which slowed down the diarrhea then imodium which stopped the diarrhea but only short term when he took the medicine.      He notes prior episode of diverticulitis in 2019. He had exposure to amoxicillin from dental procedures around December and again in April. No prior episodes of Cdiff. Due to elevated inflammation markers and long delay to get into GI we started him empirically on budesonide which led to immediate relief of symptoms. He saw GI who performed colonoscopy which was concerning for inflammatory bowel disease versus indeterminate colitis (secondary to NSAIDs or duloxetine?). No hematochezia through this episode.     Colonoscopy (10/2022) with active colitis and GI recommendations from Dr. Francois- inflammation from your colon is most likely due to inflammatory bowel disease.       He was  initially on budesonide and started on mesalamine, now on maintenance therapy with mesalamine.     Current regimen: Mesalamine 4.8 g daily     Elevated Hemoglobin A1c     Latest Reference Range & Units 06/06/24 07:34   Glycohemoglobin 4.0 - 5.6 % 6.6 (H)   Estim. Avg Glu mg/dL 143     He has a history of prediabetes.  He reports it has been present for a long time and has never progressed.  No significant side effect such as blurred vision, polyuria, or polydipsia.  No prior use of glucose lowering medications.    In fall 2023 A1c became elevated, he has had multiple rounds of budesonide for colitis as well as steroid injections from orthopedics.    No current medicines for blood sugar.     Acute Cough    He reports URI symptoms last week which improved and then recurred again this past Sunday.  He is experiencing dry cough and nasal congestion with postnasal drip.  He also has had myalgias and lethargy.  He had exposure to Covid from his wife who was recently diagnosed. His vital signs are stable in clinic, no hypoxia, afebrile, normotensive, and normal heart rate with respirations.  Dayquil has been helpful so far.      Dyslipidemia     Latest Reference Range & Units 12/11/24 07:06   Cholesterol,Tot 100 - 199 mg/dL 135   Triglycerides 0 - 149 mg/dL 93   HDL >=40 mg/dL 67   LDL <100 mg/dL 49   Apolipoprotein-B 66 - 133 mg/dL 60 (L)       He had elevations in his CPK following increase in statin medicine which recurred, advised to go off statin therapy. Complication of tendon failure with left TKA on nexlizet. Doing well now on Repatha. No longer on CoQ 10.    Current regimen: Repatha 140 mg every2 weeks.          Current Medications:  Current Outpatient Medications Ordered in Epic   Medication Sig Dispense Refill    Misc Natural Products (PROSTATE COMPLETE PO) Take  by mouth.      DULoxetine (CYMBALTA) 60 MG Cap DR Particles delayed-release capsule Take 1 Capsule by mouth every day. 100 Capsule 3     "amlodipine-benazepril (LOTREL) 5-10 MG per capsule Take 1 Capsule by mouth every day. 100 Capsule 3    mesalamine (LIALDA) 1.2 GM Tablet Delayed Response Take 4 Tablets by mouth every day. 400 Tablet 3    chlorhexidine (PERIDEX) 0.12 % Solution Rinse 1/2 oz. twice daily for 30 seconds after breakfast and before bedtime for two weeks only. 473 mL 2    Evolocumab (REPATHA) 140 MG/ML Solution Auto-injector SubQ injection pen Inject 1 mL under the skin every 14 days. 6 mL 3    amoxicillin (AMOXIL) 500 MG Cap Take 4 Capsules by mouth 1 hour prior to dental appointment. 16 Capsule 1    nitroglycerin (NITROSTAT) 0.4 MG SL Tab Place 1 Tablet under the tongue as needed for Chest Pain. 25 Tablet 11    aspirin 81 MG tablet Take 81 mg by mouth every day.       No current [facility]-ordered facility-administered medications on file.          Objective:   Physical Exam:    Vitals: /64   Pulse 73   Temp 36.2 °C (97.2 °F) (Temporal)   Ht 1.803 m (5' 11\")   Wt 98.9 kg (218 lb)   SpO2 96%    BMI: Body mass index is 30.4 kg/m².  Physical Exam  Constitutional:       General: He is not in acute distress.     Appearance: Normal appearance. He is not ill-appearing.   HENT:      Right Ear: Ear canal and external ear normal. There is no impacted cerumen.      Left Ear: Ear canal and external ear normal. There is no impacted cerumen.      Nose: Congestion present. No rhinorrhea.      Mouth/Throat:      Mouth: Mucous membranes are moist.      Pharynx: Posterior oropharyngeal erythema present. No oropharyngeal exudate.   Eyes:      General: No scleral icterus.     Conjunctiva/sclera: Conjunctivae normal.   Cardiovascular:      Rate and Rhythm: Normal rate and regular rhythm.      Pulses: Normal pulses.   Pulmonary:      Effort: Pulmonary effort is normal. No respiratory distress.      Breath sounds: Normal breath sounds. No wheezing or rhonchi.      Comments: Dry cough periodically during clinic visit  Abdominal:      General: Bowel " sounds are normal.      Palpations: Abdomen is soft.   Musculoskeletal:         General: Swelling and deformity present.      Right lower leg: No edema.      Left lower leg: No edema.      Comments: Significant enlargement/deformity of left knee. Well healed incision, mild effusion noted on palpation throughout the knee joint.   Skin:     General: Skin is warm and dry.   Psychiatric:         Mood and Affect: Mood normal.         Behavior: Behavior normal.         Thought Content: Thought content normal.         Judgment: Judgment normal.            Assessment and Plan:   Curt is a 74 y.o. male with the following:  Problem List Items Addressed This Visit       Acute cough    New issue, negative viral testing in clinic today. Will continue with OTC treatments. He will reach out if symptoms worsen or change to suggest bacterial infection in the interim.          Relevant Orders    POCT Cepheid CoV-2, Flu A/B, RSV - PCR (Completed)    Dyslipidemia    Chronic and improved issue, doing well on Repatha 140 mg every 2 weeks, continue at this time. Follow lipids and apoB levels as recommended by cardiology/vascular medicine group. No longer on CoQ10.         Relevant Orders    CBC WITH DIFFERENTIAL    VITAMIN D,25 HYDROXY (DEFICIENCY)    VITAMIN B12    Elevated hemoglobin A1c    Chronic and ongoing issue, continue with periodic evaluations with A1c to ensure relative stability.          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 Will plan to follow up with Dr. Pineda at [facility] in July and may consider aspiration at that time if knee effusion remains present despite 1 year post-op. Was told previously that aspiration would likely result in recurrence of effusion.         Ulcerative pancolitis (HCC)    Chronic and improved, no current diarrhea. Continue mesalamine 4.8 g  daily. Will follow up with GI nurse practitioner later this month. Fecal calprotectin trending down.                RTC: Return if symptoms worsen or fail to improve.    I spent a total of 31 minutes with record review, exam, communication with the patient, communication with other providers, and documentation of this encounter.    Verbal consent was acquired by the patient to use [employer] ambient listening note generation during this visit Yes       PLEASE NOTE: This dictation was created using voice recognition software. I have made every reasonable attempt to correct obvious errors, but I expect that there are errors of grammar and possibly content that I did not discover before finalizing the note.      Shila Mullen, DO  Geriatric and Internal Medicine  [facility]

## 2025-05-14 NOTE — ASSESSMENT & PLAN NOTE
Chronic and ongoing issue, continues to work with PT with good improvement in strengthening. Will plan to follow up with Dr. Pineda at [facility] in July and may consider aspiration at that time if knee effusion remains present despite 1 year post-op. Was told previously that aspiration would likely result in recurrence of effusion.

## 2025-05-14 NOTE — ASSESSMENT & PLAN NOTE
Chronic and improved, no current diarrhea. Continue mesalamine 4.8 g daily. Will follow up with GI nurse practitioner later this month. Fecal calprotectin trending down.

## 2025-05-14 NOTE — ASSESSMENT & PLAN NOTE
Chronic and improved issue, doing well on Repatha 140 mg every 2 weeks, continue at this time. Follow lipids and apoB levels as recommended by cardiology/vascular medicine group. No longer on CoQ10.

## 2025-05-28 ENCOUNTER — OFFICE VISIT (OUTPATIENT)
Dept: MEDICAL GROUP | Facility: PHYSICIAN GROUP | Age: 75
End: 2025-05-28
Payer: MEDICARE

## 2025-05-28 VITALS
DIASTOLIC BLOOD PRESSURE: 64 MMHG | WEIGHT: 217.9 LBS | SYSTOLIC BLOOD PRESSURE: 116 MMHG | HEART RATE: 87 BPM | OXYGEN SATURATION: 94 % | HEIGHT: 71 IN | BODY MASS INDEX: 30.51 KG/M2 | TEMPERATURE: 97.9 F

## 2025-05-28 DIAGNOSIS — N40.1 BENIGN PROSTATIC HYPERPLASIA WITH NOCTURIA: Primary | ICD-10-CM

## 2025-05-28 DIAGNOSIS — R05.8 POST-VIRAL COUGH SYNDROME: ICD-10-CM

## 2025-05-28 DIAGNOSIS — M25.562 CHRONIC PAIN OF LEFT KNEE: ICD-10-CM

## 2025-05-28 DIAGNOSIS — R35.1 BENIGN PROSTATIC HYPERPLASIA WITH NOCTURIA: Primary | ICD-10-CM

## 2025-05-28 DIAGNOSIS — I10 ESSENTIAL HYPERTENSION, BENIGN: ICD-10-CM

## 2025-05-28 DIAGNOSIS — G89.29 CHRONIC PAIN OF LEFT KNEE: ICD-10-CM

## 2025-05-28 PROCEDURE — 3078F DIAST BP <80 MM HG: CPT | Performed by: INTERNAL MEDICINE

## 2025-05-28 PROCEDURE — 3074F SYST BP LT 130 MM HG: CPT | Performed by: INTERNAL MEDICINE

## 2025-05-28 PROCEDURE — 99213 OFFICE O/P EST LOW 20 MIN: CPT | Performed by: INTERNAL MEDICINE

## 2025-05-28 ASSESSMENT — FIBROSIS 4 INDEX: FIB4 SCORE: 0.9

## 2025-06-04 ENCOUNTER — HOSPITAL ENCOUNTER (OUTPATIENT)
Facility: MEDICAL CENTER | Age: 75
End: 2025-06-04
Attending: INTERNAL MEDICINE
Payer: MEDICARE

## 2025-06-04 DIAGNOSIS — R73.09 ELEVATED HEMOGLOBIN A1C: ICD-10-CM

## 2025-06-04 DIAGNOSIS — E78.5 DYSLIPIDEMIA: ICD-10-CM

## 2025-06-04 LAB
25(OH)D3 SERPL-MCNC: 38 NG/ML (ref 30–100)
ALBUMIN SERPL BCP-MCNC: 4.2 G/DL (ref 3.2–4.9)
ALBUMIN SERPL BCP-MCNC: 4.2 G/DL (ref 3.2–4.9)
ALBUMIN/GLOB SERPL: 1.4 G/DL
ALP SERPL-CCNC: 80 U/L (ref 30–99)
ALT SERPL-CCNC: 21 U/L (ref 2–50)
ANION GAP SERPL CALC-SCNC: 11 MMOL/L (ref 7–16)
ANION GAP SERPL CALC-SCNC: 11 MMOL/L (ref 7–16)
AST SERPL-CCNC: 26 U/L (ref 12–45)
BASOPHILS # BLD AUTO: 1.4 % (ref 0–1.8)
BASOPHILS # BLD: 0.07 K/UL (ref 0–0.12)
BILIRUB SERPL-MCNC: 0.7 MG/DL (ref 0.1–1.5)
BUN SERPL-MCNC: 18 MG/DL (ref 8–22)
BUN SERPL-MCNC: 18 MG/DL (ref 8–22)
CALCIUM ALBUM COR SERPL-MCNC: 9.2 MG/DL (ref 8.5–10.5)
CALCIUM ALBUM COR SERPL-MCNC: 9.2 MG/DL (ref 8.5–10.5)
CALCIUM SERPL-MCNC: 9.4 MG/DL (ref 8.5–10.5)
CALCIUM SERPL-MCNC: 9.4 MG/DL (ref 8.5–10.5)
CHLORIDE SERPL-SCNC: 101 MMOL/L (ref 96–112)
CHLORIDE SERPL-SCNC: 101 MMOL/L (ref 96–112)
CHOLEST SERPL-MCNC: 124 MG/DL (ref 100–199)
CO2 SERPL-SCNC: 25 MMOL/L (ref 20–33)
CO2 SERPL-SCNC: 25 MMOL/L (ref 20–33)
CREAT SERPL-MCNC: 0.91 MG/DL (ref 0.5–1.4)
CREAT SERPL-MCNC: 0.93 MG/DL (ref 0.5–1.4)
EOSINOPHIL # BLD AUTO: 0.08 K/UL (ref 0–0.51)
EOSINOPHIL NFR BLD: 1.6 % (ref 0–6.9)
ERYTHROCYTE [DISTWIDTH] IN BLOOD BY AUTOMATED COUNT: 48.7 FL (ref 35.9–50)
EST. AVERAGE GLUCOSE BLD GHB EST-MCNC: 128 MG/DL
FASTING STATUS PATIENT QL REPORTED: NORMAL
GFR SERPLBLD CREATININE-BSD FMLA CKD-EPI: 85 ML/MIN/1.73 M 2
GFR SERPLBLD CREATININE-BSD FMLA CKD-EPI: 88 ML/MIN/1.73 M 2
GLOBULIN SER CALC-MCNC: 3.1 G/DL (ref 1.9–3.5)
GLUCOSE SERPL-MCNC: 104 MG/DL (ref 65–99)
GLUCOSE SERPL-MCNC: 104 MG/DL (ref 65–99)
HBA1C MFR BLD: 6.1 % (ref 4–5.6)
HCT VFR BLD AUTO: 47 % (ref 42–52)
HDLC SERPL-MCNC: 65 MG/DL
HGB BLD-MCNC: 15.4 G/DL (ref 14–18)
IMM GRANULOCYTES # BLD AUTO: 0.02 K/UL (ref 0–0.11)
IMM GRANULOCYTES NFR BLD AUTO: 0.4 % (ref 0–0.9)
LDLC SERPL CALC-MCNC: 32 MG/DL
LYMPHOCYTES # BLD AUTO: 1.02 K/UL (ref 1–4.8)
LYMPHOCYTES NFR BLD: 19.9 % (ref 22–41)
MCH RBC QN AUTO: 29.8 PG (ref 27–33)
MCHC RBC AUTO-ENTMCNC: 32.8 G/DL (ref 32.3–36.5)
MCV RBC AUTO: 91.1 FL (ref 81.4–97.8)
MONOCYTES # BLD AUTO: 0.7 K/UL (ref 0–0.85)
MONOCYTES NFR BLD AUTO: 13.6 % (ref 0–13.4)
NEUTROPHILS # BLD AUTO: 3.24 K/UL (ref 1.82–7.42)
NEUTROPHILS NFR BLD: 63.1 % (ref 44–72)
NRBC # BLD AUTO: 0 K/UL
NRBC BLD-RTO: 0 /100 WBC (ref 0–0.2)
PHOSPHATE SERPL-MCNC: 3.4 MG/DL (ref 2.5–4.5)
PLATELET # BLD AUTO: 287 K/UL (ref 164–446)
PMV BLD AUTO: 10.6 FL (ref 9–12.9)
POTASSIUM SERPL-SCNC: 4.6 MMOL/L (ref 3.6–5.5)
POTASSIUM SERPL-SCNC: 4.6 MMOL/L (ref 3.6–5.5)
PROT SERPL-MCNC: 7.3 G/DL (ref 6–8.2)
RBC # BLD AUTO: 5.16 M/UL (ref 4.7–6.1)
SODIUM SERPL-SCNC: 137 MMOL/L (ref 135–145)
SODIUM SERPL-SCNC: 137 MMOL/L (ref 135–145)
TRIGL SERPL-MCNC: 133 MG/DL (ref 0–149)
VIT B12 SERPL-MCNC: 664 PG/ML (ref 211–911)
WBC # BLD AUTO: 5.1 K/UL (ref 4.8–10.8)

## 2025-06-04 PROCEDURE — 82607 VITAMIN B-12: CPT

## 2025-06-04 PROCEDURE — 80053 COMPREHEN METABOLIC PANEL: CPT

## 2025-06-04 PROCEDURE — 85025 COMPLETE CBC W/AUTO DIFF WBC: CPT

## 2025-06-04 PROCEDURE — 82306 VITAMIN D 25 HYDROXY: CPT

## 2025-06-04 PROCEDURE — 80061 LIPID PANEL: CPT

## 2025-06-04 PROCEDURE — 83036 HEMOGLOBIN GLYCOSYLATED A1C: CPT

## 2025-06-04 PROCEDURE — 82172 ASSAY OF APOLIPOPROTEIN: CPT

## 2025-06-04 PROCEDURE — 36415 COLL VENOUS BLD VENIPUNCTURE: CPT

## 2025-06-04 PROCEDURE — 80069 RENAL FUNCTION PANEL: CPT

## 2025-06-05 ENCOUNTER — HOSPITAL ENCOUNTER (OUTPATIENT)
Facility: MEDICAL CENTER | Age: 75
End: 2025-06-05
Attending: NURSE PRACTITIONER
Payer: MEDICARE

## 2025-06-05 ENCOUNTER — RESULTS FOLLOW-UP (OUTPATIENT)
Dept: MEDICAL GROUP | Facility: PHYSICIAN GROUP | Age: 75
End: 2025-06-05

## 2025-06-05 LAB
APO B100 SERPL-MCNC: 44 MG/DL (ref 66–133)
LACTOFERRIN STL QL IA: NEGATIVE

## 2025-06-05 PROCEDURE — 83993 ASSAY FOR CALPROTECTIN FECAL: CPT

## 2025-06-05 PROCEDURE — 83630 LACTOFERRIN FECAL (QUAL): CPT

## 2025-06-07 LAB — CALPROTECTIN STL-MCNT: 54 UG/G

## 2025-06-12 PROCEDURE — RXMED WILLOW AMBULATORY MEDICATION CHARGE: Performed by: INTERNAL MEDICINE

## 2025-06-13 ENCOUNTER — PHARMACY VISIT (OUTPATIENT)
Dept: PHARMACY | Facility: MEDICAL CENTER | Age: 75
End: 2025-06-13
Payer: COMMERCIAL

## 2025-06-16 ENCOUNTER — OFFICE VISIT (OUTPATIENT)
Dept: MEDICAL GROUP | Facility: MEDICAL CENTER | Age: 75
End: 2025-06-16
Payer: MEDICARE

## 2025-06-16 VITALS
HEIGHT: 71 IN | WEIGHT: 219.69 LBS | SYSTOLIC BLOOD PRESSURE: 120 MMHG | DIASTOLIC BLOOD PRESSURE: 70 MMHG | HEART RATE: 71 BPM | BODY MASS INDEX: 30.76 KG/M2

## 2025-06-16 DIAGNOSIS — E78.5 DYSLIPIDEMIA: Primary | ICD-10-CM

## 2025-06-16 PROCEDURE — 3074F SYST BP LT 130 MM HG: CPT | Performed by: STUDENT IN AN ORGANIZED HEALTH CARE EDUCATION/TRAINING PROGRAM

## 2025-06-16 PROCEDURE — 3078F DIAST BP <80 MM HG: CPT | Performed by: STUDENT IN AN ORGANIZED HEALTH CARE EDUCATION/TRAINING PROGRAM

## 2025-06-16 PROCEDURE — 99401 PREV MED CNSL INDIV APPRX 15: CPT | Performed by: STUDENT IN AN ORGANIZED HEALTH CARE EDUCATION/TRAINING PROGRAM

## 2025-06-16 ASSESSMENT — FIBROSIS 4 INDEX: FIB4 SCORE: 1.48

## 2025-06-16 NOTE — PROGRESS NOTES
"[facility]    Time in: 9:25 AM  Time out: 9:45 PM    Curt Lamonte Blair presents for management of dyslipidemia    Referral Source: Iván Otoole, Cardiology    Date Referral Placed: 9/20/23 - (reorderd for today 6/16/25)    Date First Seen in Clinic: 10/5/23    PERTINENT HLD PMHX  Age at Initial Diagnosis of Dyslipidemia: 40's      Baseline Lipids Prior to Treatment: Unknown    History of ASCVD: History of prior MI >12 months ago: pt had MI at 41yrs old and then also 15 yrs later he had PCI to RCA     Previously Attempted Interventions for Lipids - including outcome  Statin: Atorvastatin                                Outcome: myalgias and elevated   Non-Statin: Nexlizet   Outcome: other rash    Secondary causes/contributors (report to medical director if present):   Endocrine/Hypothyroidism:  none reported   Liver disease: none reported   Renal disease/nephrotic syndrome:  none reported  Dietary-induced (ketogenic, lean mass hyper-responder)? no  Medications: None    CURRENT MED MGMT  Current Lipid Lowering Meds:   Statin: None  Non-Statin: Repatha 140 mg [id number]  Supplements: None  Current adverse drug reactions/side effects? no  Is Adherence an Issue? no    Any Family History Cardiovascular Disease? yes  Relationship and Age of Onset: grandfather and father MI in their 60s      Vitals:    06/16/25 0943   BP: 120/70   Pulse: 71   Weight: 98.4 kg (217 lb)   Height: 1.803 m (5' 11\")      BMI Readings from Last 1 Encounters:   06/16/25 30.27 kg/m²      Wt Readings from Last 3 Encounters:   06/16/25 98.4 kg (217 lb)   05/28/25 98.8 kg (217 lb 14.4 oz)   05/13/25 98.9 kg (218 lb)     BP Readings from Last 2 Encounters:   06/16/25 120/70   05/28/25 116/64       DATA REVIEW:  Most Recent Lipid Panel:   Lab Results   Component Value Date/Time    CHOLSTRLTOT 124 06/04/2025 07:17 AM    LDL 32 06/04/2025 07:17 AM    HDL 65 06/04/2025 07:17 AM    TRIGLYCERIDE 133 06/04/2025 07:17 AM     Lab Results   Component " "Value Date/Time    LDL 32 06/04/2025 07:17 AM    LDL 49 12/11/2024 07:06 AM    LDL 94 10/16/2024 07:12 AM      No results found for: \"LDLCALC\"   Lab Results   Component Value Date/Time    LIPOPROTA 9 11/28/2023 07:20 AM      Lab Results   Component Value Date/Time    APOB 44 (L) 06/04/2025 07:17 AM      Lab Results   Component Value Date/Time    CRPHIGHSEN 4.0 (H) 12/11/2024 07:06 AM       Other Pertinent Blood Work:   Lab Results   Component Value Date    SODIUM 137 06/04/2025    POTASSIUM 4.6 06/04/2025    CHLORIDE 101 06/04/2025    CO2 25 06/04/2025    ANION 11.0 06/04/2025    GLUCOSE 104 (H) 06/04/2025    BUN 18 06/04/2025    CREATININE 0.91 06/04/2025    CALCIUM 9.4 06/04/2025    ASTSGOT 26 06/04/2025    ALTSGPT 21 06/04/2025    ALKPHOSPHAT 80 06/04/2025    TBILIRUBIN 0.7 06/04/2025    ALBUMIN 4.2 06/04/2025    AGRATIO 1.4 06/04/2025    TSHULTRASEN 3.330 06/06/2024    CPKTOTAL 279 (H) 08/21/2024       VASCULAR IMAGING:  CAC Score (if available): N/A  CIMT/Vascular screen (if available): N/A  Other (if applicable): N/A    ASSESSMENT AND PLAN    Patient Type, check all that apply: Secondary Prevention    Major ASCVD events: History of prior MI >12 months ago: pt had MI at age 41 then 2nd event at age 56 he underwent PCI to RCA     Evidence of genetic dyslipidemia (Familial Hyperlipidemia): Unknown     ASCVD risk calculations (if applicable)  N/A    ACC/AHA Indication for Statin Therapy:  Established ASCVD: Indication for High intensity statin     Other Significant Risk Markers:  High-risk conditions: >64yr old , Prior CABG or PCI outside of the major ASCVD event(s) , and Hypertension   Risk-enhancers: Famhx of premature ASCVD, Inflammatory diseases: RA, psoriasis, HIV, and hs-CRP >1.9  Lipoprotein(a): Favorable (<30 mg/dl or <75 nmol/L)  Most recent CAC percentile: n/a    Lipid Goals:  Primary: LDL-C <55 mg/dl  Secondary apoB <60 mg/dl  At goals? yes    LIFESTYLE INTERVENTIONS  TOBACCO:   Continued complete " avoidance of all tobacco products   EtOH:   Men: No more than two standard servings per day  Women: No more than one standard serving per day  PHYSICAL ACTIVITY: at least 150 min per week of moderate intensity - patient is doing physical therapy  NUTRITION: High fiber and gluten free     LIPID-LOWERING MEDICATION MANAGEMENT:     Statin Therapy:   Would not rechallenge at this time due to patient's intolerance     Non-Statin Meds:   PCSK9 mAb: Continue Repatha 140 mg subQ Q14D  Indication: ASCVD with suboptimal control of LDL-C despite maximally tolerated statin    Recommended Supplements: None     Studies Ordered at Todays Visit: None   Blood Work To Be Obtained Prior to Next Visit: Lipid panel, CMP, and ApoB  Follow-Up: 6 months    Amee Smith PharmD     CC:  Katherine A Thyssen, D.O. Michael Bloch MD
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
## 2025-07-07 DIAGNOSIS — M25.551 RIGHT HIP PAIN: Primary | ICD-10-CM

## 2025-07-07 PROCEDURE — RXMED WILLOW AMBULATORY MEDICATION CHARGE: Performed by: INTERNAL MEDICINE

## 2025-07-14 ENCOUNTER — PHARMACY VISIT (OUTPATIENT)
Dept: PHARMACY | Facility: MEDICAL CENTER | Age: 75
End: 2025-07-14
Payer: COMMERCIAL

## 2025-07-15 ENCOUNTER — HOSPITAL ENCOUNTER (OUTPATIENT)
Dept: RADIOLOGY | Facility: MEDICAL CENTER | Age: 75
End: 2025-07-15
Attending: NURSE PRACTITIONER
Payer: MEDICARE

## 2025-07-15 ENCOUNTER — PATIENT MESSAGE (OUTPATIENT)
Dept: CARDIOLOGY | Facility: MEDICAL CENTER | Age: 75
End: 2025-07-15
Payer: MEDICARE

## 2025-07-15 DIAGNOSIS — Z79.1 LONG TERM CURRENT USE OF NON-STEROIDAL ANTI-INFLAMMATORIES (NSAID): ICD-10-CM

## 2025-07-15 DIAGNOSIS — E66.811 OBESITY, CLASS I, BMI 30-34.9: ICD-10-CM

## 2025-07-15 DIAGNOSIS — K50.119 CROHN'S COLITIS, UNSPECIFIED COMPLICATION (HCC): ICD-10-CM

## 2025-07-15 DIAGNOSIS — K51.00 ULCERATIVE PANCOLITIS (HCC): ICD-10-CM

## 2025-07-15 DIAGNOSIS — R19.5 ELEVATED FECAL CALPROTECTIN: ICD-10-CM

## 2025-07-15 PROCEDURE — 700117 HCHG RX CONTRAST REV CODE 255: Performed by: NURSE PRACTITIONER

## 2025-07-15 PROCEDURE — 74177 CT ABD & PELVIS W/CONTRAST: CPT

## 2025-07-15 RX ORDER — SORBITOL/MANNITOL/XANTHAN GUM
1000 LIQUID (ML) ORAL ONCE
Status: COMPLETED | OUTPATIENT
Start: 2025-07-15 | End: 2025-07-15

## 2025-07-15 RX ADMIN — IOHEXOL 100 ML: 350 INJECTION, SOLUTION INTRAVENOUS at 12:35

## 2025-07-15 RX ADMIN — Medication 1000 ML: at 12:36

## 2025-07-16 ENCOUNTER — PATIENT MESSAGE (OUTPATIENT)
Dept: CARDIOLOGY | Facility: MEDICAL CENTER | Age: 75
End: 2025-07-16
Payer: MEDICARE

## 2025-07-30 DIAGNOSIS — R35.1 BENIGN PROSTATIC HYPERPLASIA WITH NOCTURIA: Primary | ICD-10-CM

## 2025-07-30 DIAGNOSIS — N40.1 BENIGN PROSTATIC HYPERPLASIA WITH NOCTURIA: Primary | ICD-10-CM

## 2025-07-30 PROCEDURE — RXMED WILLOW AMBULATORY MEDICATION CHARGE: Performed by: DENTIST

## 2025-07-30 PROCEDURE — RXMED WILLOW AMBULATORY MEDICATION CHARGE: Performed by: INTERNAL MEDICINE

## 2025-07-30 RX ORDER — TAMSULOSIN HYDROCHLORIDE 0.4 MG/1
0.4 CAPSULE ORAL
Qty: 100 CAPSULE | Refills: 3 | Status: SHIPPED | OUTPATIENT
Start: 2025-07-30

## 2025-07-31 ENCOUNTER — PHARMACY VISIT (OUTPATIENT)
Dept: PHARMACY | Facility: MEDICAL CENTER | Age: 75
End: 2025-07-31
Payer: COMMERCIAL

## 2025-08-13 PROCEDURE — RXMED WILLOW AMBULATORY MEDICATION CHARGE: Performed by: INTERNAL MEDICINE

## 2025-08-18 ENCOUNTER — PHARMACY VISIT (OUTPATIENT)
Dept: PHARMACY | Facility: MEDICAL CENTER | Age: 75
End: 2025-08-18
Payer: COMMERCIAL

## 2025-08-18 ENCOUNTER — TELEPHONE (OUTPATIENT)
Dept: HEALTH INFORMATION MANAGEMENT | Facility: OTHER | Age: 75
End: 2025-08-18
Payer: MEDICARE

## 2025-08-27 ENCOUNTER — OFFICE VISIT (OUTPATIENT)
Dept: MEDICAL GROUP | Facility: PHYSICIAN GROUP | Age: 75
End: 2025-08-27
Payer: MEDICARE

## 2025-08-27 VITALS
SYSTOLIC BLOOD PRESSURE: 118 MMHG | TEMPERATURE: 97.6 F | OXYGEN SATURATION: 95 % | WEIGHT: 221 LBS | BODY MASS INDEX: 30.94 KG/M2 | DIASTOLIC BLOOD PRESSURE: 72 MMHG | HEIGHT: 71 IN | HEART RATE: 74 BPM

## 2025-08-27 DIAGNOSIS — Z23 NEED FOR PNEUMOCOCCAL VACCINATION: Primary | ICD-10-CM

## 2025-08-27 DIAGNOSIS — M79.18 RIGHT BUTTOCK PAIN: ICD-10-CM

## 2025-08-27 DIAGNOSIS — M79.18 GLUTEAL PAIN: ICD-10-CM

## 2025-08-27 DIAGNOSIS — Z00.00 HEALTHCARE MAINTENANCE: ICD-10-CM

## 2025-08-27 DIAGNOSIS — N40.1 BENIGN PROSTATIC HYPERPLASIA WITH NOCTURIA: ICD-10-CM
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
## 2025-08-27 DIAGNOSIS — R35.1 BENIGN PROSTATIC HYPERPLASIA WITH NOCTURIA: ICD-10-CM

## 2025-08-27 ASSESSMENT — FIBROSIS 4 INDEX: FIB4 SCORE: 1.48

## 2025-08-29 ENCOUNTER — SPECIALTY PHARMACY (OUTPATIENT)
Dept: VASCULAR LAB | Facility: MEDICAL CENTER | Age: 75
End: 2025-08-29

## 2025-08-29 ENCOUNTER — OFFICE VISIT (OUTPATIENT)
Dept: PHYSICAL MEDICINE AND REHAB | Facility: MEDICAL CENTER | Age: 75
End: 2025-08-29
Payer: MEDICARE

## 2025-08-29 VITALS
HEIGHT: 71 IN | BODY MASS INDEX: 31.48 KG/M2 | SYSTOLIC BLOOD PRESSURE: 124 MMHG | HEART RATE: 84 BPM | WEIGHT: 224.87 LBS | TEMPERATURE: 97.5 F | OXYGEN SATURATION: 95 % | DIASTOLIC BLOOD PRESSURE: 60 MMHG

## 2025-08-29 DIAGNOSIS — Z95.5 STATUS POST CORONARY ARTERY STENT PLACEMENT: ICD-10-CM

## 2025-08-29 DIAGNOSIS — M54.50 ACUTE RIGHT-SIDED LOW BACK PAIN WITHOUT SCIATICA: Primary | ICD-10-CM

## 2025-08-29 DIAGNOSIS — E78.5 DYSLIPIDEMIA: ICD-10-CM

## 2025-08-29 DIAGNOSIS — E66.9 OBESITY (BMI 30-39.9): ICD-10-CM

## 2025-08-29 DIAGNOSIS — I25.83 CORONARY ARTERY DISEASE DUE TO LIPID RICH PLAQUE: Primary | ICD-10-CM

## 2025-08-29 DIAGNOSIS — I25.10 CORONARY ARTERY DISEASE DUE TO LIPID RICH PLAQUE: Primary | ICD-10-CM

## 2025-08-29 DIAGNOSIS — M79.10 MYALGIA: ICD-10-CM

## 2025-08-29 DIAGNOSIS — M47.816 LUMBAR FACET ARTHROPATHY: ICD-10-CM

## 2025-08-29 RX ORDER — EVOLOCUMAB 140 MG/ML
140 INJECTION, SOLUTION SUBCUTANEOUS
Qty: 6 ML | Refills: 3 | Status: SHIPPED | OUTPATIENT
Start: 2025-08-29

## 2025-08-29 ASSESSMENT — PATIENT HEALTH QUESTIONNAIRE - PHQ9: CLINICAL INTERPRETATION OF PHQ2 SCORE: 0

## 2025-08-29 ASSESSMENT — PAIN SCALES - GENERAL: PAINLEVEL_OUTOF10: 6=MODERATE PAIN

## 2025-08-29 ASSESSMENT — FIBROSIS 4 INDEX: FIB4 SCORE: 1.48

## 2025-09-03 ENCOUNTER — APPOINTMENT (OUTPATIENT)
Dept: PHYSICAL MEDICINE AND REHAB | Facility: MEDICAL CENTER | Age: 75
End: 2025-09-03
Attending: STUDENT IN AN ORGANIZED HEALTH CARE EDUCATION/TRAINING PROGRAM
Payer: MEDICARE

## 2025-12-08 ENCOUNTER — APPOINTMENT (OUTPATIENT)
Dept: MEDICAL GROUP | Facility: PHYSICIAN GROUP | Age: 75
End: 2025-12-08
Payer: MEDICARE

## (undated) DEVICE — SENSOR OXIMETER ADULT SPO2 RD SET (20EA/BX)

## (undated) DEVICE — HANDPIECE 10FT INTPLS SCT PLS IRRIGATION HAND CONTROL SET (6/PK)

## (undated) DEVICE — HUMID-VENT HEAT AND MOISTURE EXCHANGE- (50/BX)

## (undated) DEVICE — Device

## (undated) DEVICE — PAD PREP 24 X 48 CUFFED - (100/CA)

## (undated) DEVICE — SODIUM CHL IRRIGATION 0.9% 1000ML (12EA/CA)

## (undated) DEVICE — SUTURE 1 VICRYL PLUS CTX - 8 X 18 INCH (12/BX)

## (undated) DEVICE — SUCTION INSTRUMENT YANKAUER BULBOUS TIP W/O VENT (50EA/CA)

## (undated) DEVICE — GLOVE SURGICAL PROTEXIS PI 8.0 LF - (50PR/BX)

## (undated) DEVICE — SUTURE 2-0 VICRYL PLUS CT-1 - 8 X 18 INCH(12/BX)

## (undated) DEVICE — CANISTER SUCTION RIGID RED 1500CC (40EA/CA)

## (undated) DEVICE — SUTURE GENERAL

## (undated) DEVICE — TUBE CONNECTING SUCTION - CLEAR PLASTIC STERILE 72 IN (50EA/CA)

## (undated) DEVICE — ELECTRODE DUAL RETURN W/ CORD - (50/PK)

## (undated) DEVICE — COVER LIGHT HANDLE FLEXIBLE - SOFT (2EA/PK 80PK/CA)

## (undated) DEVICE — TOWEL STOP TIMEOUT SAFETY FLAG (40EA/CA)

## (undated) DEVICE — PACK TOTAL KNEE  (1/CA)

## (undated) DEVICE — GOWN WARMING STANDARD FLEX - (30/CA)

## (undated) DEVICE — BAG SPONGE COUNT 10.25 X 32 - BLUE (250/CA)

## (undated) DEVICE — GLOVE BIOGEL SZ 8 SURGICAL PF LTX - (50PR/BX 4BX/CA)

## (undated) DEVICE — PAD LAP STERILE 18 X 18 - (5/PK 40PK/CA)

## (undated) DEVICE — TIP INTPLS HFLO ML ORFC BTRY - (12/CS)  FOR SURGILAV

## (undated) DEVICE — WATER IRRIGATION STERILE 1000ML (12EA/CA)

## (undated) DEVICE — GLOVE BIOGEL INDICATOR SZ 8 SURGICAL PF LTX - (50/BX 4BX/CA)

## (undated) DEVICE — KIT HIP PREP IM ENCHANCE TOTAL (5EA/BX)

## (undated) DEVICE — LACTATED RINGERS INJ 1000 ML - (14EA/CA 60CA/PF)